# Patient Record
Sex: FEMALE | Race: WHITE | NOT HISPANIC OR LATINO | Employment: OTHER | URBAN - METROPOLITAN AREA
[De-identification: names, ages, dates, MRNs, and addresses within clinical notes are randomized per-mention and may not be internally consistent; named-entity substitution may affect disease eponyms.]

---

## 2017-02-26 ENCOUNTER — APPOINTMENT (OUTPATIENT)
Dept: RADIOLOGY | Facility: MEDICAL CENTER | Age: 47
DRG: 853 | End: 2017-02-26
Attending: EMERGENCY MEDICINE
Payer: MEDICARE

## 2017-02-26 ENCOUNTER — RESOLUTE PROFESSIONAL BILLING HOSPITAL PROF FEE (OUTPATIENT)
Dept: HOSPITALIST | Facility: MEDICAL CENTER | Age: 47
End: 2017-02-26
Payer: MEDICARE

## 2017-02-26 ENCOUNTER — HOSPITAL ENCOUNTER (INPATIENT)
Facility: MEDICAL CENTER | Age: 47
LOS: 25 days | DRG: 853 | End: 2017-03-23
Attending: EMERGENCY MEDICINE | Admitting: HOSPITALIST
Payer: MEDICARE

## 2017-02-26 DIAGNOSIS — A41.9 SEPSIS, DUE TO UNSPECIFIED ORGANISM: ICD-10-CM

## 2017-02-26 DIAGNOSIS — L03.119 CELLULITIS OF FOOT: ICD-10-CM

## 2017-02-26 DIAGNOSIS — L03.115 CELLULITIS OF RIGHT FOOT: ICD-10-CM

## 2017-02-26 PROBLEM — I12.9 CKD STAGE 4 SECONDARY TO HYPERTENSION (HCC): Status: ACTIVE | Noted: 2017-02-26

## 2017-02-26 PROBLEM — I10 HYPERTENSION: Status: ACTIVE | Noted: 2017-02-26

## 2017-02-26 PROBLEM — F03.90 DEMENTIA (HCC): Status: ACTIVE | Noted: 2017-02-26

## 2017-02-26 PROBLEM — I67.5 MOYA MOYA DISEASE: Status: ACTIVE | Noted: 2017-02-26

## 2017-02-26 PROBLEM — N18.4 CKD STAGE 4 SECONDARY TO HYPERTENSION (HCC): Status: ACTIVE | Noted: 2017-02-26

## 2017-02-26 LAB
ALBUMIN SERPL BCP-MCNC: 2.9 G/DL (ref 3.2–4.9)
ALBUMIN/GLOB SERPL: 0.8 G/DL
ALP SERPL-CCNC: 71 U/L (ref 30–99)
ALT SERPL-CCNC: 10 U/L (ref 2–50)
ANION GAP SERPL CALC-SCNC: 16 MMOL/L (ref 0–11.9)
AST SERPL-CCNC: 25 U/L (ref 12–45)
BASOPHILS # BLD AUTO: 0.2 % (ref 0–1.8)
BASOPHILS # BLD: 0.03 K/UL (ref 0–0.12)
BILIRUB SERPL-MCNC: 0.5 MG/DL (ref 0.1–1.5)
BUN SERPL-MCNC: 63 MG/DL (ref 8–22)
CALCIUM SERPL-MCNC: 8.6 MG/DL (ref 8.5–10.5)
CHLORIDE SERPL-SCNC: 105 MMOL/L (ref 96–112)
CO2 SERPL-SCNC: 15 MMOL/L (ref 20–33)
CREAT SERPL-MCNC: 4.61 MG/DL (ref 0.5–1.4)
EOSINOPHIL # BLD AUTO: 0.01 K/UL (ref 0–0.51)
EOSINOPHIL NFR BLD: 0.1 % (ref 0–6.9)
ERYTHROCYTE [DISTWIDTH] IN BLOOD BY AUTOMATED COUNT: 45.3 FL (ref 35.9–50)
GFR SERPL CREATININE-BSD FRML MDRD: 10 ML/MIN/1.73 M 2
GLOBULIN SER CALC-MCNC: 3.6 G/DL (ref 1.9–3.5)
GLUCOSE SERPL-MCNC: 102 MG/DL (ref 65–99)
HCT VFR BLD AUTO: 32.4 % (ref 37–47)
HGB BLD-MCNC: 10.4 G/DL (ref 12–16)
IMM GRANULOCYTES # BLD AUTO: 0.1 K/UL (ref 0–0.11)
IMM GRANULOCYTES NFR BLD AUTO: 0.6 % (ref 0–0.9)
LACTATE BLD-SCNC: 1.4 MMOL/L (ref 0.5–2)
LYMPHOCYTES # BLD AUTO: 1.29 K/UL (ref 1–4.8)
LYMPHOCYTES NFR BLD: 8.2 % (ref 22–41)
MCH RBC QN AUTO: 31.5 PG (ref 27–33)
MCHC RBC AUTO-ENTMCNC: 32.1 G/DL (ref 33.6–35)
MCV RBC AUTO: 98.2 FL (ref 81.4–97.8)
MONOCYTES # BLD AUTO: 2.03 K/UL (ref 0–0.85)
MONOCYTES NFR BLD AUTO: 12.9 % (ref 0–13.4)
NEUTROPHILS # BLD AUTO: 12.24 K/UL (ref 2–7.15)
NEUTROPHILS NFR BLD: 78 % (ref 44–72)
NRBC # BLD AUTO: 0 K/UL
NRBC BLD AUTO-RTO: 0 /100 WBC
PLATELET # BLD AUTO: 139 K/UL (ref 164–446)
PMV BLD AUTO: 11.5 FL (ref 9–12.9)
POTASSIUM SERPL-SCNC: 4.1 MMOL/L (ref 3.6–5.5)
PROT SERPL-MCNC: 6.5 G/DL (ref 6–8.2)
RBC # BLD AUTO: 3.3 M/UL (ref 4.2–5.4)
SODIUM SERPL-SCNC: 136 MMOL/L (ref 135–145)
WBC # BLD AUTO: 15.7 K/UL (ref 4.8–10.8)

## 2017-02-26 PROCEDURE — 99285 EMERGENCY DEPT VISIT HI MDM: CPT

## 2017-02-26 PROCEDURE — 87070 CULTURE OTHR SPECIMN AEROBIC: CPT

## 2017-02-26 PROCEDURE — 87077 CULTURE AEROBIC IDENTIFY: CPT

## 2017-02-26 PROCEDURE — 99223 1ST HOSP IP/OBS HIGH 75: CPT | Mod: AI | Performed by: HOSPITALIST

## 2017-02-26 PROCEDURE — 87186 SC STD MICRODIL/AGAR DIL: CPT

## 2017-02-26 PROCEDURE — 83605 ASSAY OF LACTIC ACID: CPT

## 2017-02-26 PROCEDURE — 700105 HCHG RX REV CODE 258: Performed by: HOSPITALIST

## 2017-02-26 PROCEDURE — 87205 SMEAR GRAM STAIN: CPT

## 2017-02-26 PROCEDURE — 3E0234Z INTRODUCTION OF SERUM, TOXOID AND VACCINE INTO MUSCLE, PERCUTANEOUS APPROACH: ICD-10-PCS | Performed by: EMERGENCY MEDICINE

## 2017-02-26 PROCEDURE — 85025 COMPLETE CBC W/AUTO DIFF WBC: CPT

## 2017-02-26 PROCEDURE — 770020 HCHG ROOM/CARE - TELE (206)

## 2017-02-26 PROCEDURE — 80053 COMPREHEN METABOLIC PANEL: CPT

## 2017-02-26 PROCEDURE — 700111 HCHG RX REV CODE 636 W/ 250 OVERRIDE (IP): Performed by: HOSPITALIST

## 2017-02-26 PROCEDURE — 96365 THER/PROPH/DIAG IV INF INIT: CPT

## 2017-02-26 PROCEDURE — 87040 BLOOD CULTURE FOR BACTERIA: CPT

## 2017-02-26 PROCEDURE — 71010 DX-CHEST-PORTABLE (1 VIEW): CPT

## 2017-02-26 RX ORDER — ONDANSETRON 4 MG/1
4 TABLET, ORALLY DISINTEGRATING ORAL EVERY 4 HOURS PRN
Status: DISCONTINUED | OUTPATIENT
Start: 2017-02-26 | End: 2017-03-23 | Stop reason: HOSPADM

## 2017-02-26 RX ORDER — PAROXETINE 10 MG/1
30 TABLET, FILM COATED ORAL DAILY
Status: DISCONTINUED | OUTPATIENT
Start: 2017-02-27 | End: 2017-02-27 | Stop reason: SINTOL

## 2017-02-26 RX ORDER — ASPIRIN 325 MG
325 TABLET ORAL DAILY
Status: DISCONTINUED | OUTPATIENT
Start: 2017-02-27 | End: 2017-03-23 | Stop reason: HOSPADM

## 2017-02-26 RX ORDER — ONDANSETRON 2 MG/ML
4 INJECTION INTRAMUSCULAR; INTRAVENOUS EVERY 4 HOURS PRN
Status: DISCONTINUED | OUTPATIENT
Start: 2017-02-26 | End: 2017-03-23 | Stop reason: HOSPADM

## 2017-02-26 RX ORDER — LEVETIRACETAM 500 MG/1
500 TABLET ORAL 2 TIMES DAILY
Status: DISCONTINUED | OUTPATIENT
Start: 2017-02-26 | End: 2017-02-26

## 2017-02-26 RX ORDER — AMLODIPINE BESYLATE 5 MG/1
5 TABLET ORAL DAILY
Status: DISCONTINUED | OUTPATIENT
Start: 2017-02-27 | End: 2017-03-23 | Stop reason: HOSPADM

## 2017-02-26 RX ORDER — TRAMADOL HYDROCHLORIDE 50 MG/1
50 TABLET ORAL EVERY 12 HOURS PRN
Status: DISCONTINUED | OUTPATIENT
Start: 2017-02-26 | End: 2017-02-27 | Stop reason: SINTOL

## 2017-02-26 RX ORDER — LORAZEPAM 1 MG/1
1 TABLET ORAL 2 TIMES DAILY
Status: ON HOLD | COMMUNITY
End: 2017-03-21

## 2017-02-26 RX ORDER — SODIUM CHLORIDE 9 MG/ML
500 INJECTION, SOLUTION INTRAVENOUS
Status: DISCONTINUED | OUTPATIENT
Start: 2017-02-26 | End: 2017-03-23 | Stop reason: HOSPADM

## 2017-02-26 RX ORDER — SODIUM BICARBONATE 650 MG/1
650 TABLET ORAL 3 TIMES DAILY
Status: DISCONTINUED | OUTPATIENT
Start: 2017-02-26 | End: 2017-02-27

## 2017-02-26 RX ORDER — ATORVASTATIN CALCIUM 40 MG/1
40 TABLET, FILM COATED ORAL NIGHTLY
Status: DISCONTINUED | OUTPATIENT
Start: 2017-02-27 | End: 2017-03-23 | Stop reason: HOSPADM

## 2017-02-26 RX ORDER — TRAZODONE HYDROCHLORIDE 50 MG/1
50 TABLET ORAL NIGHTLY
Status: DISCONTINUED | OUTPATIENT
Start: 2017-02-26 | End: 2017-02-26

## 2017-02-26 RX ORDER — PHENYTOIN SODIUM 100 MG/1
200 CAPSULE, EXTENDED RELEASE ORAL 2 TIMES DAILY
Status: DISCONTINUED | OUTPATIENT
Start: 2017-02-26 | End: 2017-03-23 | Stop reason: HOSPADM

## 2017-02-26 RX ORDER — LYSINE HCL 500 MG
500 TABLET ORAL DAILY
Status: ON HOLD | COMMUNITY
End: 2017-03-21

## 2017-02-26 RX ORDER — PROMETHAZINE HYDROCHLORIDE 25 MG/1
12.5-25 SUPPOSITORY RECTAL EVERY 4 HOURS PRN
Status: DISCONTINUED | OUTPATIENT
Start: 2017-02-26 | End: 2017-03-23 | Stop reason: HOSPADM

## 2017-02-26 RX ORDER — ROSUVASTATIN CALCIUM 10 MG/1
10 TABLET, COATED ORAL EVERY EVENING
Status: ON HOLD | COMMUNITY
End: 2017-03-21

## 2017-02-26 RX ORDER — DONEPEZIL HYDROCHLORIDE 5 MG/1
5 TABLET, FILM COATED ORAL EVERY EVENING
Status: DISCONTINUED | OUTPATIENT
Start: 2017-02-26 | End: 2017-02-26

## 2017-02-26 RX ORDER — DOCUSATE SODIUM 100 MG/1
100 CAPSULE, LIQUID FILLED ORAL 2 TIMES DAILY
Status: DISCONTINUED | OUTPATIENT
Start: 2017-02-27 | End: 2017-03-23 | Stop reason: HOSPADM

## 2017-02-26 RX ORDER — CARVEDILOL 25 MG/1
25 TABLET ORAL 2 TIMES DAILY WITH MEALS
Status: DISCONTINUED | OUTPATIENT
Start: 2017-02-27 | End: 2017-03-23 | Stop reason: HOSPADM

## 2017-02-26 RX ORDER — ACETAMINOPHEN 325 MG/1
650 TABLET ORAL EVERY 6 HOURS PRN
Status: DISCONTINUED | OUTPATIENT
Start: 2017-02-26 | End: 2017-03-23 | Stop reason: HOSPADM

## 2017-02-26 RX ORDER — RISPERIDONE 1 MG/1
1 TABLET ORAL 2 TIMES DAILY
Status: DISCONTINUED | OUTPATIENT
Start: 2017-02-26 | End: 2017-02-26

## 2017-02-26 RX ORDER — CHOLECALCIFEROL (VITAMIN D3) 125 MCG
1000 CAPSULE ORAL DAILY
Status: DISCONTINUED | OUTPATIENT
Start: 2017-02-27 | End: 2017-03-23 | Stop reason: HOSPADM

## 2017-02-26 RX ORDER — SODIUM CHLORIDE 9 MG/ML
INJECTION, SOLUTION INTRAVENOUS CONTINUOUS
Status: DISCONTINUED | OUTPATIENT
Start: 2017-02-26 | End: 2017-02-27

## 2017-02-26 RX ORDER — PROMETHAZINE HYDROCHLORIDE 25 MG/1
12.5-25 TABLET ORAL EVERY 4 HOURS PRN
Status: DISCONTINUED | OUTPATIENT
Start: 2017-02-26 | End: 2017-03-23 | Stop reason: HOSPADM

## 2017-02-26 RX ORDER — LINEZOLID 2 MG/ML
600 INJECTION, SOLUTION INTRAVENOUS EVERY 12 HOURS
Status: DISCONTINUED | OUTPATIENT
Start: 2017-02-26 | End: 2017-02-27

## 2017-02-26 RX ORDER — BISACODYL 10 MG
10 SUPPOSITORY, RECTAL RECTAL
Status: DISCONTINUED | OUTPATIENT
Start: 2017-02-26 | End: 2017-03-23 | Stop reason: HOSPADM

## 2017-02-26 RX ORDER — ENEMA 19; 7 G/133ML; G/133ML
1 ENEMA RECTAL
Status: DISCONTINUED | OUTPATIENT
Start: 2017-02-26 | End: 2017-03-23 | Stop reason: HOSPADM

## 2017-02-26 RX ORDER — FOLIC ACID 1 MG/1
1 TABLET ORAL DAILY
Status: DISCONTINUED | OUTPATIENT
Start: 2017-02-27 | End: 2017-03-23 | Stop reason: HOSPADM

## 2017-02-26 RX ORDER — AMLODIPINE BESYLATE 5 MG/1
5 TABLET ORAL DAILY
Status: ON HOLD | COMMUNITY
End: 2017-03-28

## 2017-02-26 RX ADMIN — SODIUM CHLORIDE: 9 INJECTION, SOLUTION INTRAVENOUS at 23:55

## 2017-02-26 RX ADMIN — CEFTRIAXONE 2 G: 2 INJECTION, POWDER, FOR SOLUTION INTRAMUSCULAR; INTRAVENOUS at 23:55

## 2017-02-26 ASSESSMENT — PAIN SCALES - GENERAL: PAINLEVEL_OUTOF10: 5

## 2017-02-26 ASSESSMENT — COPD QUESTIONNAIRES
HAVE YOU SMOKED AT LEAST 100 CIGARETTES IN YOUR ENTIRE LIFE: YES
DURING THE PAST 4 WEEKS HOW MUCH DID YOU FEEL SHORT OF BREATH: NONE/LITTLE OF THE TIME
DO YOU EVER COUGH UP ANY MUCUS OR PHLEGM?: NO/ONLY WITH OCCASIONAL COLDS OR INFECTIONS

## 2017-02-26 ASSESSMENT — LIFESTYLE VARIABLES: EVER_SMOKED: YES

## 2017-02-26 NOTE — IP AVS SNAPSHOT
" Home Care Instructions                                                                                                                  Name:Pascale Acevedo  Medical Record Number:[id number]  CSN: [id number]    YOB: 1970   Age: 46 y.o.  Sex: female  HT:1.753 m (5' 9\") WT: 119.9 kg (264 lb 5.3 oz)          Admit Date: 2/26/2017     Discharge Date:   Today's Date: 3/23/2017  Attending Doctor:  Nabeel Monahan M.D.                  Allergies:  Allegra; Amoxicillin; Azithromycin; Claritin; Erythromycin; Ibuprofen & caffeine-vitamins; Penicillins; Procardia; Rosemary oil; and Zyrtec            Discharge Instructions       Discharge Instructions    Discharged to other by medical transportation with escort. Discharged via wheelchair, hospital escort: Yes.  Special equipment needed: Wheelchair    Be sure to schedule a follow-up appointment with your primary care doctor or any specialists as instructed.     Discharge Plan:   Diet Plan: Discussed  Activity Level: Discussed  Smoking Cessation Offered: Patient Refused  Confirmed Follow up Appointment: Patient to Call and Schedule Appointment  Confirmed Symptoms Management: Discussed  Medication Reconciliation Updated: Yes  Influenza Vaccine Indication: Not indicated: Previously immunized this influenza season and > 8 years of age    I understand that a diet low in cholesterol, fat, and sodium is recommended for good health. Unless I have been given specific instructions below for another diet, I accept this instruction as my diet prescription.   Other diet: Regular    Special Instructions: None    · Is patient discharged on Warfarin / Coumadin?   No     · Is patient Post Blood Transfusion?  No    Depression / Suicide Risk    As you are discharged from this [facility] facility, it is important to learn how to keep safe from harming yourself.    Recognize the warning signs:  · Abrupt changes in personality, positive or negative- including increase in energy "   · Giving away possessions  · Change in eating patterns- significant weight changes-  positive or negative  · Change in sleeping patterns- unable to sleep or sleeping all the time   · Unwillingness or inability to communicate  · Depression  · Unusual sadness, discouragement and loneliness  · Talk of wanting to die  · Neglect of personal appearance   · Rebelliousness- reckless behavior  · Withdrawal from people/activities they love  · Confusion- inability to concentrate     If you or a loved one observes any of these behaviors or has concerns about self-harm, here's what you can do:  · Talk about it- your feelings and reasons for harming yourself  · Remove any means that you might use to hurt yourself (examples: pills, rope, extension cords, firearm)  · Get professional help from the community (Mental Health, Substance Abuse, psychological counseling)  · Do not be alone:Call your Safe Contact- someone whom you trust who will be there for you.  · Call your local CRISIS HOTLINE 922-4607 or 147-072-1964  · Call your local Children's Mobile Crisis Response Team [place] (469) 190-4746 or wwwKoru  · Call the toll free National Suicide Prevention Hotlines   · National Suicide Prevention Lifeline 599-609-KRYZ (7194)  · National Hope Line Network 800-SUICIDE (029-9874)  Cellulitis  Cellulitis is an infection of the skin and the tissue beneath it. The infected area is usually red and tender. Cellulitis occurs most often in the arms and lower legs.   CAUSES   Cellulitis is caused by bacteria that enter the skin through cracks or cuts in the skin. The most common types of bacteria that cause cellulitis are staphylococci and streptococci.  SIGNS AND SYMPTOMS   · Redness and warmth.  · Swelling.  · Tenderness or pain.  · Fever.  DIAGNOSIS   Your health care provider can usually determine what is wrong based on a physical exam. Blood tests may also be done.  TREATMENT   Treatment usually involves taking an  antibiotic medicine.  HOME CARE INSTRUCTIONS   · Take your antibiotic medicine as directed by your health care provider. Finish the antibiotic even if you start to feel better.  · Keep the infected arm or leg elevated to reduce swelling.  · Apply a warm cloth to the affected area up to 4 times per day to relieve pain.  · Take medicines only as directed by your health care provider.  · Keep all follow-up visits as directed by your health care provider.  SEEK MEDICAL CARE IF:   · You notice red streaks coming from the infected area.  · Your red area gets larger or turns dark in color.  · Your bone or joint underneath the infected area becomes painful after the skin has healed.  · Your infection returns in the same area or another area.  · You notice a swollen bump in the infected area.  · You develop new symptoms.  · You have a fever.  SEEK IMMEDIATE MEDICAL CARE IF:   · You feel very sleepy.  · You develop vomiting or diarrhea.  · You have a general ill feeling (malaise) with muscle aches and pains.  MAKE SURE YOU:   · Understand these instructions.  · Will watch your condition.  · Will get help right away if you are not doing well or get worse.     This information is not intended to replace advice given to you by your health care provider. Make sure you discuss any questions you have with your health care provider.     Document Released: 09/27/2006 Document Revised: 01/08/2016 Document Reviewed: 03/04/2013  [employer] Interactive Patient Education ©2016 [employer]    Wound Care  Taking care of your wound properly can help to prevent pain and infection. It can also help your wound to heal more quickly.   HOW TO CARE FOR YOUR WOUND   · Take or apply over-the-counter and prescription medicines only as told by your health care provider.  · If you were prescribed antibiotic medicine, take or apply it as told by your health care provider. Do not stop using the antibiotic even if your condition improves.  · Clean the  wound each day or as told by your health care provider.  ¨ Wash the wound with mild soap and water.  ¨ Rinse the wound with water to remove all soap.  ¨ Pat the wound dry with a clean towel. Do not rub it.  · There are many different ways to close and cover a wound. For example, a wound can be covered with stitches (sutures), skin glue, or adhesive strips. Follow instructions from your health care provider about:  ¨ How to take care of your wound.  ¨ When and how you should change your bandage (dressing).  ¨ When you should remove your dressing.  ¨ Removing whatever was used to close your wound.  · Check your wound every day for signs of infection. Watch for:  ¨ Redness, swelling, or pain.  ¨ Fluid, blood, or pus.  · Keep the dressing dry until your health care provider says it can be removed. Do not take baths, swim, use a hot tub, or do anything that would put your wound underwater until your health care provider approves.  · Raise (elevate) the injured area above the level of your heart while you are sitting or lying down.  · Do not scratch or pick at the wound.  · Keep all follow-up visits as told by your health care provider. This is important.  SEEK MEDICAL CARE IF:  · You received a tetanus shot and you have swelling, severe pain, redness, or bleeding at the injection site.  · You have a fever.  · Your pain is not controlled with medicine.  · You have increased redness, swelling, or pain at the site of your wound.  · You have fluid, blood, or pus coming from your wound.  · You notice a bad smell coming from your wound or your dressing.  SEEK IMMEDIATE MEDICAL CARE IF:  · You have a red streak going away from your wound.     This information is not intended to replace advice given to you by your health care provider. Make sure you discuss any questions you have with your health care provider.     Document Released: 09/26/2009 Document Revised: 05/03/2016 Document Reviewed: 12/14/2015  [employer]  Patient Education ©2016 [email or web address]      Follow-up Information     1. Follow up with SILKE Hayes. Schedule an appointment as soon as possible for a visit in 2 weeks.    Specialty:  Family Medicine    Why:  Follow up appointment    Contact information    [address]  [place] [address]  [phone]          2. Follow up with Gerardo Lynn M.D. In 2 weeks.    Specialty:  Orthopaedics    Contact information    [address]  [place] [address]  [phone]          3. Follow up with Ivy Gutierrez M.D. In 2 weeks.    Specialty:  Infectious Disease    Contact information    [address]  [address]  [place] [address] [phone]          4. [facility] .    Specialty:  Skilled Nursing Facility    Contact information    [address] [place] [address] [phone]         Discharge Medication Instructions:    Below are the medications your physician expects you to take upon discharge:    Review all your home medications and newly ordered medications with your doctor and/or pharmacist. Follow medication instructions as directed by your doctor and/or pharmacist.    Please keep your medication list with you and share with your physician.               Medication List      START taking these medications        Instructions    lorazepam 1 MG Tabs   Last time this was given:  1 mg on 3/23/2017 11:41 AM   Commonly known as:  ATIVAN    Take 1 Tab by mouth 3 times a day.   Dose:  1 mg       NS SOLN 50 mL with cefUROXime 7.5 GM SOLR 750 mg   Last time this was given:  750 mg on 3/23/2017  5:11 AM    750 mg by Intravenous route every 8 hours.   Dose:  750 mg       oxcarbazepine 150 MG Tabs   Last time this was given:  150 mg on 3/23/2017  8:27 AM   Commonly known as:  TRILEPTAL    Take 1 Tab by mouth 2 Times a Day.   Dose:  150 mg       sodium bicarbonate 650 MG Tabs   Last time this was given:  1,300 mg on 3/23/2017  8:18 AM   Commonly known as:  SODIUM  BICARBONATE    Take 2 Tabs by mouth 3 times a day.   Dose:  1300 mg         CHANGE how you take these medications        Instructions    atorvastatin 40 MG Tabs   What changed:  when to take this   Last time this was given:  40 mg on 3/22/2017  8:11 PM   Commonly known as:  LIPITOR    Take 1 Tab by mouth every day.   Dose:  40 mg         CONTINUE taking these medications        Instructions    amlodipine 5 MG Tabs   Last time this was given:  5 mg on 3/23/2017  8:21 AM   Commonly known as:  NORVASC    Take 5 mg by mouth every day.   Dose:  5 mg       aspirin 325 MG Tabs   Last time this was given:  325 mg on 3/23/2017  8:21 AM   Commonly known as:  ASA    Take 325 mg by mouth every day.   Dose:  325 mg       carvedilol 25 MG Tabs   Last time this was given:  25 mg on 3/23/2017  8:21 AM   Commonly known as:  COREG    Take 25 mg by mouth 2 times a day, with meals.   Dose:  25 mg       cyanocobalamin 1000 MCG Tabs   Last time this was given:  1,000 mcg on 3/23/2017  8:21 AM   Commonly known as:  VITAMIN B12    Take 1 Tab by mouth every day.   Dose:  1000 mcg       folic acid 1 MG Tabs   Last time this was given:  1 mg on 3/23/2017  8:19 AM   Commonly known as:  FOLVITE    Take 1 Tab by mouth every day.   Dose:  1 mg       lisinopril 20 MG Tabs   Commonly known as:  PRINIVIL    Take 20 mg by mouth every day.   Dose:  20 mg       NEXIUM 20 MG capsule   Generic drug:  esomeprazole    Take 20 mg by mouth every morning before breakfast.   Dose:  20 mg       phenytoin  MG Caps   Last time this was given:  200 mg on 3/23/2017  8:20 AM   Commonly known as:  DILANTIN    Take 200 mg by mouth 2 times a day.   Dose:  200 mg               Instructions           Diet / Nutrition:    Follow any diet instructions given to you by your doctor or the dietician, including how much salt (sodium) you are allowed each day.    If you are overweight, talk to your doctor about a weight reduction plan.    Activity:    Remain physically  active following your doctor's instructions about exercise and activity.    Rest often.     Any time you become even a little tired or short of breath, SIT DOWN and rest.    Worsening Symptoms:    Report any of the following signs and symptoms to the doctor's office immediately:    *Pain of jaw, arm, or neck  *Chest pain not relieved by medication                               *Dizziness or loss of consciousness  *Difficulty breathing even when at rest   *More tired than usual                                       *Bleeding drainage or swelling of surgical site  *Swelling of feet, ankles, legs or stomach                 *Fever (>100ºF)  *Pink or blood tinged sputum  *Weight gain (3lbs/day or 5lbs /week)           *Shock from internal defibrillator (if applicable)  *Palpitations or irregular heartbeats                *Cool and/or numb extremities    Stroke Awareness    Common Risk Factors for Stroke include:    Age  Atrial Fibrillation  Carotid Artery Stenosis  Diabetes Mellitus  Excessive alcohol consumption  High blood pressure  Overweight   Physical inactivity  Smoking    Warning signs and symptoms of a stroke include:    *Sudden numbness or weakness of the face, arm or leg (especially on one side of the body).  *Sudden confusion, trouble speaking or understanding.  *Sudden trouble seeing in one or both eyes.  *Sudden trouble walking, dizziness, loss of balance or coordination.Sudden severe headache with no known cause.    It is very important to get treatment quickly when a stroke occurs. If you experience any of the above warning signs, call 911 immediately.                   Disclaimer         Quit Smoking / Tobacco Use:    I understand the use of any tobacco products increases my chance of suffering from future heart disease or stroke and could cause other illnesses which may shorten my life. Quitting the use of tobacco products is the single most important thing I can do to improve my health. For further  information on smoking / tobacco cessation call a Toll Free Quit Line at 1-985.835.2416 (*National Cancer [place]) or 1-433.510.6518 (American Lung Association) or you can access the web based program at www.lungusa.org.    [place] Tobacco Users Help Line:  (627) 740-8197       Toll Free: 1-547.495.8617  Quit Tobacco Program [facility] Management Services (648)640-5776    Crisis Hotline:    [place] Crisis Hotline:  1-402-RIXSRNO or 1-585.936.7801    [place] Crisis Hotline:    1-406.412.6815 or 051-104-5764    Discharge Survey:   Thank you for choosing [facility]. We hope we did everything we could to make your hospital stay a pleasant one. You may be receiving a phone survey and we would appreciate your time and participation in answering the questions. Your input is very valuable to us in our efforts to improve our service to our patients and their families.        My signature on this form indicates that:    1. I have reviewed and understand the above information.  2. My questions regarding this information have been answered to my satisfaction.  3. I have formulated a plan with my discharge nurse to obtain my prescribed medications for home.                  Disclaimer         __________________________________                     __________       ________                       Patient Signature                                                 Date                    Time

## 2017-02-26 NOTE — IP AVS SNAPSHOT
dermSearch Access Code: Activation code not generated  Current dermSearch Status: Patient Declined    Ruth Kunstadter â€“ [employer]  A secure, online tool to manage your health information     [employer]’s dermSearch® is a secure, online tool that connects you to your personalized health information from the privacy of your home -- day or night - making it very easy for you to manage your healthcare. Once the activation process is completed, you can even access your medical information using the dermSearch tiny, which is available for free in the Apple Tiny store or Google Play store.     dermSearch provides the following levels of access (as shown below):   My Chart Features   [facility] Primary Care Doctor [facility]  Specialists [facility]  Urgent  Care Non-[facility]  Primary Care  Doctor   Email your healthcare team securely and privately 24/7 X X X X   Manage appointments: schedule your next appointment; view details of past/upcoming appointments X      Request prescription refills. X      View recent personal medical records, including lab and immunizations X X X X   View health record, including health history, allergies, medications X X X X   Read reports about your outpatient visits, procedures, consult and ER notes X X X X   See your discharge summary, which is a recap of your hospital and/or ER visit that includes your diagnosis, lab results, and care plan. X X       How to register for dermSearch:  1. Go to  [email or web address].  2. Click on the Sign Up Now box, which takes you to the New Member Sign Up page. You will need to provide the following information:  a. Enter your dermSearch Access Code exactly as it appears at the top of this page. (You will not need to use this code after you’ve completed the sign-up process. If you do not sign up before the expiration date, you must request a new code.)   b. Enter your date of birth.   c. Enter your home email address.   d. Click Submit, and follow the next screen’s instructions.  3. Create a dermSearch ID.  This will be your [employer] login ID and cannot be changed, so think of one that is secure and easy to remember.  4. Create a [employer] password. You can change your password at any time.  5. Enter your Password Reset Question and Answer. This can be used at a later time if you forget your password.   6. Enter your e-mail address. This allows you to receive e-mail notifications when new information is available in [employer].  7. Click Sign Up. You can now view your health information.    For assistance activating your [employer] account, call [phone]

## 2017-02-26 NOTE — IP AVS SNAPSHOT
3/23/2017          Pascale Acevedo  [address] [place] [address]    Dear Pascale:    [facility] wants to ensure your discharge home is safe and you or your loved ones have had all your questions answered regarding your care after you leave the hospital.    You may receive a telephone call within two days of your discharge.  This call is to make certain you understand your discharge instructions as well as ensure we provided you with the best care possible during your stay with us.     The call will only last approximately 3-5 minutes and will be done by a nurse.    Once again, we want to ensure your discharge home is safe and that you have a clear understanding of any next steps in your care.  If you have any questions or concerns, please do not hesitate to contact us, we are here for you.  Thank you for choosing [facility] for your healthcare needs.    Sincerely,    Preston Crane    [facility]

## 2017-02-26 NOTE — IP AVS SNAPSHOT
" <p align=\"LEFT\"><IMG SRC=\"//[email or web address]/blob$/[email or web address]\" alt=\"Image\" WIDTH=\"50%\" HEIGHT=\"200\" BORDER=\"\"></p>                   Name:Pascale Acevedo  Medical Record Number:[id number]  CSN: [id number]    YOB: 1970   Age: 46 y.o.  Sex: female  HT:1.753 m (5' 9\") WT: 119.9 kg (264 lb 5.3 oz)          Admit Date: 2/26/2017     Discharge Date:   Today's Date: 3/23/2017  Attending Doctor:  Nabeel Monahan M.D.                  Allergies:  Allegra; Amoxicillin; Azithromycin; Claritin; Erythromycin; Ibuprofen & caffeine-vitamins; Penicillins; Procardia; Rosemary oil; and Zyrtec          Follow-up Information     1. Follow up with SILKE Hayes. Schedule an appointment as soon as possible for a visit in 2 weeks.    Specialty:  Family Medicine    Why:  Follow up appointment    Contact information    [address] [facility] [place] [facility] [address]  [phone]          2. Follow up with Gerardo Lynn M.D. In 2 weeks.    Specialty:  Orthopaedics    Contact information    [address] [facility] [facility] [address]  [phone]          3. Follow up with Ivy Gutierrez M.D. In 2 weeks.    Specialty:  Infectious Disease    Contact information    [address] [address] [phone]          4. Follow up with [facility] ([facility]) .    Specialty:  Skilled Nursing Facility    Contact information    [facility] [place] [address] [phone]         Medication List      Take these Medications        Instructions    amlodipine 5 MG Tabs   Commonly known as:  NORVASC    Take 5 mg by mouth every day.   Dose:  5 mg       aspirin 325 MG Tabs   Commonly known as:  ASA    Take 325 mg by mouth every day.   Dose:  325 mg       atorvastatin 40 MG Tabs   What changed:  when to take this   Commonly known as:  LIPITOR    Take 1 Tab by mouth every day.   Dose:  40 mg       carvedilol 25 MG Tabs   Commonly known as:  COREG    Take 25 mg by mouth 2 times a day, with meals.   Dose:  25 mg   "    cyanocobalamin 1000 MCG Tabs   Commonly known as:  VITAMIN B12    Take 1 Tab by mouth every day.   Dose:  1000 mcg       folic acid 1 MG Tabs   Commonly known as:  FOLVITE    Take 1 Tab by mouth every day.   Dose:  1 mg       lisinopril 20 MG Tabs   Commonly known as:  PRINIVIL    Take 20 mg by mouth every day.   Dose:  20 mg       lorazepam 1 MG Tabs   Commonly known as:  ATIVAN    Take 1 Tab by mouth 3 times a day.   Dose:  1 mg       NEXIUM 20 MG capsule   Generic drug:  esomeprazole    Take 20 mg by mouth every morning before breakfast.   Dose:  20 mg       NS SOLN 50 mL with cefUROXime 7.5 GM SOLR 750 mg    750 mg by Intravenous route every 8 hours.   Dose:  750 mg       oxcarbazepine 150 MG Tabs   Commonly known as:  TRILEPTAL    Take 1 Tab by mouth 2 Times a Day.   Dose:  150 mg       phenytoin  MG Caps   Commonly known as:  DILANTIN    Take 200 mg by mouth 2 times a day.   Dose:  200 mg       sodium bicarbonate 650 MG Tabs   Commonly known as:  SODIUM BICARBONATE    Take 2 Tabs by mouth 3 times a day.   Dose:  1300 mg

## 2017-02-27 ENCOUNTER — APPOINTMENT (OUTPATIENT)
Dept: RADIOLOGY | Facility: MEDICAL CENTER | Age: 47
DRG: 853 | End: 2017-02-27
Attending: HOSPITALIST
Payer: MEDICARE

## 2017-02-27 ENCOUNTER — HOSPITAL ENCOUNTER (OUTPATIENT)
Dept: RADIOLOGY | Facility: MEDICAL CENTER | Age: 47
End: 2017-02-27

## 2017-02-27 PROBLEM — R65.20 SEVERE SEPSIS (HCC): Status: ACTIVE | Noted: 2017-02-27

## 2017-02-27 PROBLEM — E87.20 METABOLIC ACIDOSIS: Status: ACTIVE | Noted: 2017-02-27

## 2017-02-27 PROBLEM — D64.9 ANEMIA: Status: ACTIVE | Noted: 2017-02-27

## 2017-02-27 PROBLEM — N18.9 ACUTE ON CHRONIC RENAL FAILURE (HCC): Status: ACTIVE | Noted: 2017-02-27

## 2017-02-27 PROBLEM — N17.9 ACUTE ON CHRONIC RENAL FAILURE (HCC): Status: ACTIVE | Noted: 2017-02-27

## 2017-02-27 PROBLEM — A41.9 SEVERE SEPSIS (HCC): Status: ACTIVE | Noted: 2017-02-27

## 2017-02-27 PROBLEM — D69.6 THROMBOCYTOPENIA (HCC): Status: ACTIVE | Noted: 2017-02-27

## 2017-02-27 LAB
ANION GAP SERPL CALC-SCNC: 17 MMOL/L (ref 0–11.9)
B-HCG SERPL-ACNC: <0.6 MIU/ML (ref 0–5)
BNP SERPL-MCNC: 562 PG/ML (ref 0–100)
BUN SERPL-MCNC: 64 MG/DL (ref 8–22)
C DIFF DNA SPEC QL NAA+PROBE: NEGATIVE
C DIFF TOX GENS STL QL NAA+PROBE: NEGATIVE
CALCIUM SERPL-MCNC: 8.6 MG/DL (ref 8.5–10.5)
CHLORIDE SERPL-SCNC: 108 MMOL/L (ref 96–112)
CO2 SERPL-SCNC: 16 MMOL/L (ref 20–33)
CREAT SERPL-MCNC: 4.91 MG/DL (ref 0.5–1.4)
ERYTHROCYTE [SEDIMENTATION RATE] IN BLOOD BY WESTERGREN METHOD: >120 MM/HOUR (ref 0–20)
GFR SERPL CREATININE-BSD FRML MDRD: 10 ML/MIN/1.73 M 2
GLUCOSE SERPL-MCNC: 96 MG/DL (ref 65–99)
GRAM STN SPEC: NORMAL
LACTATE BLD-SCNC: 1.1 MMOL/L (ref 0.5–2)
LV EJECT FRACT  99904: 65
LV EJECT FRACT MOD 2C 99903: 85.68
LV EJECT FRACT MOD 4C 99902: 66.44
LV EJECT FRACT MOD BP 99901: 72.68
MAGNESIUM SERPL-MCNC: 1.8 MG/DL (ref 1.5–2.5)
POTASSIUM SERPL-SCNC: 3.9 MMOL/L (ref 3.6–5.5)
SIGNIFICANT IND 70042: NORMAL
SITE SITE: NORMAL
SODIUM SERPL-SCNC: 141 MMOL/L (ref 135–145)
SOURCE SOURCE: NORMAL
TSH SERPL DL<=0.005 MIU/L-ACNC: 1.08 UIU/ML (ref 0.3–3.7)

## 2017-02-27 PROCEDURE — 500881 HCHG PACK, EXTREMITY: Performed by: ORTHOPAEDIC SURGERY

## 2017-02-27 PROCEDURE — 770020 HCHG ROOM/CARE - TELE (206)

## 2017-02-27 PROCEDURE — 700111 HCHG RX REV CODE 636 W/ 250 OVERRIDE (IP): Performed by: HOSPITALIST

## 2017-02-27 PROCEDURE — 501838 HCHG SUTURE GENERAL: Performed by: ORTHOPAEDIC SURGERY

## 2017-02-27 PROCEDURE — 110371 HCHG SHELL REV 272: Performed by: ORTHOPAEDIC SURGERY

## 2017-02-27 PROCEDURE — 700102 HCHG RX REV CODE 250 W/ 637 OVERRIDE(OP): Performed by: INTERNAL MEDICINE

## 2017-02-27 PROCEDURE — 93306 TTE W/DOPPLER COMPLETE: CPT | Mod: 26 | Performed by: INTERNAL MEDICINE

## 2017-02-27 PROCEDURE — 110382 HCHG SHELL REV 271: Performed by: ORTHOPAEDIC SURGERY

## 2017-02-27 PROCEDURE — 87070 CULTURE OTHR SPECIMN AEROBIC: CPT

## 2017-02-27 PROCEDURE — 36415 COLL VENOUS BLD VENIPUNCTURE: CPT

## 2017-02-27 PROCEDURE — A9270 NON-COVERED ITEM OR SERVICE: HCPCS | Performed by: INTERNAL MEDICINE

## 2017-02-27 PROCEDURE — 84702 CHORIONIC GONADOTROPIN TEST: CPT

## 2017-02-27 PROCEDURE — 700102 HCHG RX REV CODE 250 W/ 637 OVERRIDE(OP): Performed by: HOSPITALIST

## 2017-02-27 PROCEDURE — 80048 BASIC METABOLIC PNL TOTAL CA: CPT

## 2017-02-27 PROCEDURE — 85652 RBC SED RATE AUTOMATED: CPT

## 2017-02-27 PROCEDURE — 87205 SMEAR GRAM STAIN: CPT

## 2017-02-27 PROCEDURE — A4606 OXYGEN PROBE USED W OXIMETER: HCPCS | Performed by: ORTHOPAEDIC SURGERY

## 2017-02-27 PROCEDURE — 160048 HCHG OR STATISTICAL LEVEL 1-5: Performed by: ORTHOPAEDIC SURGERY

## 2017-02-27 PROCEDURE — 0KBV0ZZ EXCISION OF RIGHT FOOT MUSCLE, OPEN APPROACH: ICD-10-PCS | Performed by: ORTHOPAEDIC SURGERY

## 2017-02-27 PROCEDURE — 99233 SBSQ HOSP IP/OBS HIGH 50: CPT | Performed by: HOSPITALIST

## 2017-02-27 PROCEDURE — A9270 NON-COVERED ITEM OR SERVICE: HCPCS | Performed by: HOSPITALIST

## 2017-02-27 PROCEDURE — 160009 HCHG ANES TIME/MIN: Performed by: ORTHOPAEDIC SURGERY

## 2017-02-27 PROCEDURE — 700111 HCHG RX REV CODE 636 W/ 250 OVERRIDE (IP)

## 2017-02-27 PROCEDURE — 160036 HCHG PACU - EA ADDL 30 MINS PHASE I: Performed by: ORTHOPAEDIC SURGERY

## 2017-02-27 PROCEDURE — 500088 HCHG BLADE, SAGITTAL: Performed by: ORTHOPAEDIC SURGERY

## 2017-02-27 PROCEDURE — 87077 CULTURE AEROBIC IDENTIFY: CPT

## 2017-02-27 PROCEDURE — 73718 MRI LOWER EXTREMITY W/O DYE: CPT | Mod: RT

## 2017-02-27 PROCEDURE — 73700 CT LOWER EXTREMITY W/O DYE: CPT | Mod: RT

## 2017-02-27 PROCEDURE — 83735 ASSAY OF MAGNESIUM: CPT

## 2017-02-27 PROCEDURE — 502240 HCHG MISC OR SUPPLY RC 0272: Performed by: ORTHOPAEDIC SURGERY

## 2017-02-27 PROCEDURE — 160002 HCHG RECOVERY MINUTES (STAT): Performed by: ORTHOPAEDIC SURGERY

## 2017-02-27 PROCEDURE — 87015 SPECIMEN INFECT AGNT CONCNTJ: CPT

## 2017-02-27 PROCEDURE — 84443 ASSAY THYROID STIM HORMONE: CPT

## 2017-02-27 PROCEDURE — 87075 CULTR BACTERIA EXCEPT BLOOD: CPT

## 2017-02-27 PROCEDURE — 160035 HCHG PACU - 1ST 60 MINS PHASE I: Performed by: ORTHOPAEDIC SURGERY

## 2017-02-27 PROCEDURE — 83605 ASSAY OF LACTIC ACID: CPT

## 2017-02-27 PROCEDURE — 93306 TTE W/DOPPLER COMPLETE: CPT

## 2017-02-27 PROCEDURE — 87493 C DIFF AMPLIFIED PROBE: CPT

## 2017-02-27 PROCEDURE — 160027 HCHG SURGERY MINUTES - 1ST 30 MINS LEVEL 2: Performed by: ORTHOPAEDIC SURGERY

## 2017-02-27 PROCEDURE — 83880 ASSAY OF NATRIURETIC PEPTIDE: CPT

## 2017-02-27 PROCEDURE — 0JDQ0ZZ EXTRACTION OF RIGHT FOOT SUBCUTANEOUS TISSUE AND FASCIA, OPEN APPROACH: ICD-10-PCS | Performed by: ORTHOPAEDIC SURGERY

## 2017-02-27 PROCEDURE — 0K9V0ZZ DRAINAGE OF RIGHT FOOT MUSCLE, OPEN APPROACH: ICD-10-PCS | Performed by: ORTHOPAEDIC SURGERY

## 2017-02-27 PROCEDURE — 700101 HCHG RX REV CODE 250

## 2017-02-27 PROCEDURE — 501480 HCHG STOCKINETTE: Performed by: ORTHOPAEDIC SURGERY

## 2017-02-27 RX ORDER — SODIUM BICARBONATE 650 MG/1
1300 TABLET ORAL 3 TIMES DAILY
Status: DISCONTINUED | OUTPATIENT
Start: 2017-02-27 | End: 2017-03-23 | Stop reason: HOSPADM

## 2017-02-27 RX ORDER — LORAZEPAM 1 MG/1
1 TABLET ORAL 3 TIMES DAILY
Status: DISCONTINUED | OUTPATIENT
Start: 2017-02-27 | End: 2017-03-23 | Stop reason: HOSPADM

## 2017-02-27 RX ORDER — LINEZOLID 600 MG/1
600 TABLET, FILM COATED ORAL EVERY 12 HOURS
Status: DISCONTINUED | OUTPATIENT
Start: 2017-02-27 | End: 2017-02-28

## 2017-02-27 RX ORDER — HEPARIN SODIUM 5000 [USP'U]/ML
5000 INJECTION, SOLUTION INTRAVENOUS; SUBCUTANEOUS EVERY 8 HOURS
Status: DISCONTINUED | OUTPATIENT
Start: 2017-02-27 | End: 2017-03-23 | Stop reason: HOSPADM

## 2017-02-27 RX ORDER — OXYCODONE HYDROCHLORIDE 5 MG/1
5 TABLET ORAL EVERY 6 HOURS PRN
Status: DISCONTINUED | OUTPATIENT
Start: 2017-02-27 | End: 2017-03-23 | Stop reason: HOSPADM

## 2017-02-27 RX ORDER — METRONIDAZOLE 500 MG/1
500 TABLET ORAL EVERY 8 HOURS
Status: DISCONTINUED | OUTPATIENT
Start: 2017-02-27 | End: 2017-03-01

## 2017-02-27 RX ORDER — LINEZOLID 2 MG/ML
600 INJECTION, SOLUTION INTRAVENOUS EVERY 12 HOURS
Status: DISCONTINUED | OUTPATIENT
Start: 2017-02-27 | End: 2017-02-27

## 2017-02-27 RX ADMIN — TRAMADOL HYDROCHLORIDE 50 MG: 50 TABLET, COATED ORAL at 20:34

## 2017-02-27 RX ADMIN — PHENYTOIN SODIUM 200 MG: 100 CAPSULE, EXTENDED RELEASE ORAL at 20:33

## 2017-02-27 RX ADMIN — TRAMADOL HYDROCHLORIDE 50 MG: 50 TABLET, COATED ORAL at 08:47

## 2017-02-27 RX ADMIN — SODIUM BICARBONATE TAB 650 MG 650 MG: 650 TAB at 01:53

## 2017-02-27 RX ADMIN — CARVEDILOL 25 MG: 25 TABLET, FILM COATED ORAL at 18:12

## 2017-02-27 RX ADMIN — SODIUM BICARBONATE TAB 650 MG 650 MG: 650 TAB at 06:44

## 2017-02-27 RX ADMIN — SODIUM BICARBONATE TAB 650 MG 650 MG: 650 TAB at 15:49

## 2017-02-27 RX ADMIN — METRONIDAZOLE 500 MG: 500 TABLET ORAL at 18:12

## 2017-02-27 RX ADMIN — AMLODIPINE BESYLATE 5 MG: 5 TABLET ORAL at 08:49

## 2017-02-27 RX ADMIN — LINEZOLID 600 MG: 2 INJECTION, SOLUTION INTRAVENOUS at 15:50

## 2017-02-27 RX ADMIN — PAROXETINE HYDROCHLORIDE 30 MG: 10 TABLET, FILM COATED ORAL at 08:48

## 2017-02-27 RX ADMIN — PHENYTOIN SODIUM 200 MG: 100 CAPSULE, EXTENDED RELEASE ORAL at 01:53

## 2017-02-27 RX ADMIN — SODIUM BICARBONATE 150 MEQ: 84 INJECTION, SOLUTION INTRAVENOUS at 10:21

## 2017-02-27 RX ADMIN — CYANOCOBALAMIN TAB 500 MCG 1000 MCG: 500 TAB at 08:47

## 2017-02-27 RX ADMIN — Medication 1300 MG: at 21:32

## 2017-02-27 RX ADMIN — OXYCODONE HYDROCHLORIDE 5 MG: 5 TABLET ORAL at 21:32

## 2017-02-27 RX ADMIN — LINEZOLID 600 MG: 600 INJECTION, SOLUTION INTRAVENOUS at 01:56

## 2017-02-27 RX ADMIN — LORAZEPAM 1 MG: 1 TABLET ORAL at 20:34

## 2017-02-27 RX ADMIN — CARVEDILOL 25 MG: 25 TABLET, FILM COATED ORAL at 08:49

## 2017-02-27 RX ADMIN — LINEZOLID 600 MG: 600 TABLET, FILM COATED ORAL at 21:32

## 2017-02-27 RX ADMIN — PHENYTOIN SODIUM 200 MG: 100 CAPSULE, EXTENDED RELEASE ORAL at 08:49

## 2017-02-27 RX ADMIN — FENTANYL CITRATE 25 MCG: 50 INJECTION, SOLUTION INTRAMUSCULAR; INTRAVENOUS at 13:30

## 2017-02-27 RX ADMIN — ASPIRIN 325 MG: 325 TABLET, FILM COATED ORAL at 08:48

## 2017-02-27 RX ADMIN — ATORVASTATIN CALCIUM 40 MG: 40 TABLET, FILM COATED ORAL at 20:33

## 2017-02-27 RX ADMIN — LORAZEPAM 1 MG: 1 TABLET ORAL at 11:39

## 2017-02-27 ASSESSMENT — COPD QUESTIONNAIRES
DO YOU EVER COUGH UP ANY MUCUS OR PHLEGM?: NO/ONLY WITH OCCASIONAL COLDS OR INFECTIONS
HAVE YOU SMOKED AT LEAST 100 CIGARETTES IN YOUR ENTIRE LIFE: YES
DURING THE PAST 4 WEEKS HOW MUCH DID YOU FEEL SHORT OF BREATH: NONE/LITTLE OF THE TIME

## 2017-02-27 ASSESSMENT — ENCOUNTER SYMPTOMS
COUGH: 0
DIZZINESS: 0
TINGLING: 0
BACK PAIN: 0
SORE THROAT: 0
CHILLS: 0
SHORTNESS OF BREATH: 0
DEPRESSION: 0
BLURRED VISION: 0
VOMITING: 0
ABDOMINAL PAIN: 0
EYE PAIN: 0
FEVER: 0
INSOMNIA: 0
PALPITATIONS: 0
NECK PAIN: 0
HEADACHES: 0
NAUSEA: 0

## 2017-02-27 ASSESSMENT — PAIN SCALES - GENERAL
PAINLEVEL_OUTOF10: 2
PAINLEVEL_OUTOF10: 2
PAINLEVEL_OUTOF10: 4
PAINLEVEL_OUTOF10: 7
PAINLEVEL_OUTOF10: 9
PAINLEVEL_OUTOF10: 0
PAINLEVEL_OUTOF10: 3
PAINLEVEL_OUTOF10: 9
PAINLEVEL_OUTOF10: 9
PAINLEVEL_OUTOF10: 6
PAINLEVEL_OUTOF10: 6

## 2017-02-27 ASSESSMENT — LIFESTYLE VARIABLES: DO YOU DRINK ALCOHOL: NO

## 2017-02-27 NOTE — ED PROVIDER NOTES
ED Provider Note    Scribed for Jordin Ivey M.D. by Esmer Jimenez. 2/26/2017,  10:29 PM.    CHIEF COMPLAINT  Chief Complaint   Patient presents with   • Wound Infection     pt has wound on right foot, went to [facility], report cellutliis with drainage on wound       HPI  Pascale Acevedo is a 46 y.o. female who presents to the Emergency Department brought in by ambulance as a transfer from [place] for a wound infection to the right foot, onset three days ago. There is drainage from the wound. The patient states that she stepped on rock and there were two pieces of rock imbedded in her foot. She endorses a fever and chills. The patient has a history of hypertension, kidney disease, stroke, moyamoya disease, CVA, and seizure disorder.    REVIEW OF SYSTEMS  See HPI for further details. All other systems are negative. C.    PAST MEDICAL HISTORY   has a past medical history of Hypertension; Kidney disease; Stroke (CMS-HCC); Moyamoya disease (10/3/2013); H/O: CVA (cerebrovascular accident) (12/24/2015); Mild mitral regurgitation (7/14/2014); Seizure disorder, grand mal (CMS-HCC) (3/3/2016); and CKD (chronic kidney disease), stage IV (CMS-HCC) (12/24/2015).    SOCIAL HISTORY  Social History     Social History Main Topics   • Smoking status: Former Smoker   • Smokeless tobacco: Never Used   • Alcohol Use: No   • Drug Use: No     History   Drug Use No       SURGICAL HISTORY   has past surgical history that includes primary c section and tubal coagulation laparoscopic bilateral.    CURRENT MEDICATIONS  No current facility-administered medications on file prior to encounter.     Current Outpatient Prescriptions on File Prior to Encounter   Medication Sig Dispense Refill   • vitamin D (CHOLECALCIFEROL) 1000 UNIT Tab Take 1,000 Units by mouth every day.     • phenytoin ER (DILANTIN) 100 MG Cap Take 200 mg by mouth 2 times a day.     • sodium bicarbonate (SODIUM BICARBONATE) 650 MG Tab Take 650 mg by mouth 3 times a day.   "   • Multiple Vitamins-Minerals (MULTIPLE VITAMINS/WOMENS PO) Take 1 Tab by mouth every day.     • aspirin (ASA) 325 MG Tab Take 325 mg by mouth every day.     • Melatonin 10 MG Tab Take 1 Tab by mouth every bedtime.     • lisinopril (PRINIVIL) 20 MG TABS Take 20 mg by mouth every day.     • carvedilol (COREG) 25 MG TABS Take 25 mg by mouth 2 times a day, with meals.     • paroxetine (PAXIL) 30 MG TABS Take 1 Tab by mouth every day. Take with food 30 Tab 11     ALLERGIES  Allergies   Allergen Reactions   • Allegra [Fexofenadine] Unspecified     Rxn = unknown   • Amoxicillin    • Azithromycin Unspecified     Rxn = unknown   • Claritin    • Erythromycin Unspecified     Rxn = unknown   • Ibuprofen & Caffeine-Vitamins Unspecified     Rxn = unknown   • Penicillins Unspecified     Rxn = unknown   • Procardia [Nifedipine]    • Rosemary Oil Anaphylaxis     Throat starts to close/swell.    • Zyrtec [Cetirizine] Unspecified     Rxn = unknown       PHYSICAL EXAM  VITAL SIGNS: /40 mmHg  Pulse 74  Temp(Src) 38.7 °C (101.7 °F)  Resp 18  Ht 1.753 m (5' 9\")  Wt 117.935 kg (260 lb)  BMI 38.38 kg/m2  Constitutional: Alert in no apparent distress.  HENT: No signs of trauma, Bilateral external ears normal, Nose normal.   Eyes: Pupils are equal and reactive, Conjunctiva normal, Non-icteric.   Neck: Normal range of motion, No tenderness, Supple, No stridor.   Lymphatic: No lymphadenopathy noted.   Cardiovascular: Regular rate and rhythm, no murmurs.   Thorax & Lungs: Normal breath sounds, No respiratory distress, No wheezing, No chest tenderness.   Abdomen: Bowel sounds normal, Soft, No tenderness, No masses, No pulsatile masses. No peritoneal signs.  Skin: Warm, Dry, No rash.   Back: No midline bony tenderness.  Extremities: hot erythematous swollen foot from ankle crease anteriorly to the toes with extensive seropurulent drainage from a wound under the pad of the third toe, extensive skin breakdown and purulent drainage " "underneath the third toe itself, Intact distal pulses, No edema, No cyanosis.  Musculoskeletal: Good range of motion in all major joints.   Neurologic: Alert , Normal motor function, Normal sensory function, No focal deficits noted.   Psychiatric: bizarre, child-like, giggly affect    DIAGNOSTIC STUDIES / PROCEDURES    LABS  Labs Reviewed   CBC WITH DIFFERENTIAL - Abnormal; Notable for the following:     WBC 15.7 (*)     RBC 3.30 (*)     Hemoglobin 10.4 (*)     Hematocrit 32.4 (*)     MCV 98.2 (*)     MCHC 32.1 (*)     Platelet Count 139 (*)     Neutrophils-Polys 78.00 (*)     Lymphocytes 8.20 (*)     Neutrophils (Absolute) 12.24 (*)     Monos (Absolute) 2.03 (*)     All other components within normal limits   COMP METABOLIC PANEL - Abnormal; Notable for the following:     Co2 15 (*)     Anion Gap 16.0 (*)     Glucose 102 (*)     Bun 63 (*)     Creatinine 4.61 (*)     Albumin 2.9 (*)     Globulin 3.6 (*)     All other components within normal limits   ESTIMATED GFR - Abnormal; Notable for the following:     GFR If  12 (*)     GFR If Non  10 (*)     All other components within normal limits   LACTIC ACID   BLOOD CULTURE    Narrative:     Per Hospital Policy: Only change Specimen Src: to \"Line\" if  specified by physician order.   BLOOD CULTURE    Narrative:     Per Hospital Policy: Only change Specimen Src: to \"Line\" if  specified by physician order.   CULTURE WOUND W/ GRAM STAIN   URINALYSIS   URINE CULTURE(NEW)   CULTURE RESPIRATORY W/ GRM STN   LACTIC ACID   LACTIC ACID   CBC WITH DIFFERENTIAL   COMP METABOLIC PANEL   MAGNESIUM   TSH   BTYPE NATRIURETIC PEPTIDE   [facility] POC GLUCOSE   [facility] POC GLUCOSE   [facility] POC GLUCOSE   [facility] POC GLUCOSE   [facility] POC GLUCOSE   [facility] POC GLUCOSE   [facility] POC GLUCOSE   [facility] POC GLUCOSE   [facility] POC GLUCOSE   All labs reviewed by me.    RADIOLOGY  CT-FOOT W/O PLUS RECONS RIGHT   Final Result      1.  Soft tissue swelling with " gas in the distal plantar aspect of the foot at the level of the MTP joints with draining wound to the plantar surface of the skin.      2.  Minimal gas in the extensor aspect of the foot at the level of the MTP joints which may represent extension of cellulitis anteriorly.      3.  No loculated fluid collection identified.      4.  Diffuse soft tissue swelling in the remainder of the foot.      5.  No acute bony abnormality. No evidence of osteomyelitis. No radiopaque soft tissue foreign body identified.      DX-CHEST-PORTABLE (1 VIEW)   Final Result      Minimal bibasilar interstitial edema or pneumonia. No lobar consolidation.      ECHOCARDIOGRAM COMP W/O CONT    (Results Pending)   The radiologist's interpretation of all radiological studies have been reviewed by me.    COURSE & MEDICAL DECISION MAKING  Nursing notes, VS, PMSFHx reviewed in chart.     Reviewed patient's medical records from transferring facility that showed the patient was afebrile at transferring facility. Labs showed WBC 16, BUN 58, creatinine of 4. X-ray showed soft tissue swelling but no osteomyelitis. She received a tetanus shot, 4.5g Zosyn at 7:00PM and 1g Vancomycin at 7:00PM.    10:29 PM Patient seen and examined at bedside. Differential diagnosis includes but is not limited to cellulitis, osteomyelitis, abscess. Ordered for DX chest, lactate, CBC with differential, CMP, urinalysis, urine culture, and blood culture to evaluate. The patient was informed that a CT of the foot will be ordered. It was discussed with the patient that she will be started on IV antibiotics and that she will need to be admitted to the hospital. She understood and verbalized agreement.     10:49 PM I spoke with Dr. Bender, hospitalist and informed him about the patient. He will admit. Patient care transferred.    10:54 PM I spoke with Dr. Castillo and informed him about the patient. He recommends ordering a CT with recons. This patient cannot receive contrast,  severe kidney dysfunction.    10:54 PM Ordered CT right foot plus recons. Have spoken with Dr. Giordano and the consulting orthopedist Dr. Estrada. This patient presents with sepsis, likely from a severe cellulitic foot infection with possible underlying osteomyelitis which will require further evaluation. She'll be admitted in guarded condition.    DISPOSITION:  Patient will be admitted to Dr. Bender in guarded condition.    FINAL IMPRESSION  1. Sepsis, due to unspecified organism (CMS-HCC)    2. Cellulitis of foot         Esmer THOMPSON (Fabrice), am scribing for, and in the presence of, Jordin Ivey M.D..    Electronically signed by: Esmer Jimenez (Fabrice), 2/26/2017    IJordin M.D. personally performed the services described in this documentation, as scribed by Esmer Jimenez in my presence, and it is both accurate and complete.    The note accurately reflects work and decisions made by me.  Jordin Ivey  2/27/2017  12:50 AM

## 2017-02-27 NOTE — PROGRESS NOTES
Hospital Medicine Progress Note, Adult, Complex               Author: Augustus Guerin Date & Time created: 2/27/2017  8:37 AM     Interval History:  Low grade temps over night. Bp borderline low but stable. Tolerating meds.     Review of Systems:  Review of Systems   Constitutional: Negative for fever and chills.   HENT: Negative for sore throat.    Eyes: Negative for blurred vision and pain.   Respiratory: Negative for cough and shortness of breath.    Cardiovascular: Negative for chest pain and palpitations.   Gastrointestinal: Negative for nausea, vomiting and abdominal pain.   Genitourinary: Negative for dysuria and urgency.   Musculoskeletal: Positive for joint pain. Negative for back pain and neck pain.   Skin: Negative for itching and rash.   Neurological: Negative for dizziness, tingling and headaches.   Psychiatric/Behavioral: Negative for depression. The patient does not have insomnia.    All other systems reviewed and are negative.      Physical Exam:  Physical Exam   Constitutional: She is oriented to person, place, and time. She appears well-developed and well-nourished. No distress.   HENT:   Right Ear: External ear normal.   Left Ear: External ear normal.   Nose: Nose normal.   Eyes: Conjunctivae are normal. Right eye exhibits no discharge. Left eye exhibits no discharge.   Neck: No JVD present.   Cardiovascular: Regular rhythm and normal heart sounds.    No murmur heard.  Pulmonary/Chest: Effort normal and breath sounds normal. No stridor. No respiratory distress. She has no wheezes. She has no rales.   Abdominal: Soft. Bowel sounds are normal. She exhibits no distension. There is no tenderness.   Musculoskeletal: She exhibits no edema or tenderness.   Left foot swelling with purulent areas cheri toes and on metatarsal head 3/4 there is a purulent discharge.    Neurological: She is alert and oriented to person, place, and time.   Skin: Skin is warm and dry. She is not diaphoretic. No erythema.    Psychiatric: She has a normal mood and affect. Her behavior is normal.   Nursing note and vitals reviewed.      Labs:        Invalid input(s): [id number]  Recent Labs      17   0506   BNPBTYPENAT  562*     Recent Labs      17   0506   SODIUM  136   --    POTASSIUM  4.1   --    CHLORIDE  105   --    CO2  15*   --    BUN  63*   --    CREATININE  4.61*   --    MAGNESIUM   --   1.8   CALCIUM  8.6   --      Recent Labs      17   ALTSGPT  10   ASTSGOT  25   ALKPHOSPHAT  71   TBILIRUBIN  0.5   GLUCOSE  102*     Recent Labs      17   RBC  3.30*   HEMOGLOBIN  10.4*   HEMATOCRIT  32.4*   PLATELETCT  139*     Recent Labs      17   WBC  15.7*   NEUTSPOLYS  78.00*   LYMPHOCYTES  8.20*   MONOCYTES  12.90   EOSINOPHILS  0.10   BASOPHILS  0.20   ASTSGOT  25   ALTSGPT  10   ALKPHOSPHAT  71   TBILIRUBIN  0.5           Hemodynamics:  Temp (24hrs), Av.1 °C (100.5 °F), Min:37.7 °C (99.9 °F), Max:38.7 °C (101.7 °F)  Temperature: 37.7 °C (99.9 °F)  Pulse  Av.3  Min: 70  Max: 74Heart Rate (Monitored): 70  Blood Pressure: 123/62 mmHg, NIBP: (!) 106/38 mmHg     Respiratory:    Respiration: 18, Pulse Oximetry: 91 %, O2 Daily Delivery Respiratory : Room Air with O2 Available     Work Of Breathing / Effort: Tachypnea  RUL Breath Sounds: Diminished, RML Breath Sounds: Diminished, RLL Breath Sounds: Diminished, BENNIE Breath Sounds: Diminished, LLL Breath Sounds: Diminished  Fluids:    Intake/Output Summary (Last 24 hours) at 17 0837  Last data filed at 17 0100   Gross per 24 hour   Intake    100 ml   Output      0 ml   Net    100 ml     Weight: 112.3 kg (247 lb 9.2 oz)  GI/Nutrition:  Orders Placed This Encounter   Procedures   • Diet NPO     Standing Status: Standing      Number of Occurrences: 1      Standing Expiration Date:      Order Specific Question:  Restrict to:     Answer:  Sips with Medications [3]     Medical Decision Making, by  Problem:  Active Hospital Problems    Diagnosis   • CKD (chronic kidney disease), stage IV (CMS-HCC) [N18.4]- trend creat   • HLD (hyperlipidemia) [E78.5]- statin   • Moyamoya disease [I67.5]- in past watch neuro exams.    • Anemia [D64.9]- trend check fe studies.    • Acute on chronic renal failure (CMS-HCC) [N17.9, N18.9]- iv fluids and trend.    • Metabolic acidosis [E87.2]- iv fluids. Add bicarb. Likely sepsis and renal failure related.    • Severe sepsis (CMS-HCC) [A41.9, R65.20]- 2/2 dm foot infection with associated renal failure. Iv fluids. Iv abx. I/d foot in ortho today. ID consult and mri foot. Correct lytes.    • Thrombocytopenia (CMS-HCC) [D69.6- suspect reactive 2/2 sepsis.    •    • Hypertension [I10]- benign   • Dementia [F03.90]- ativan- she was on this chronically tid. Will not change.    • Cellulitis of right foot [L03.115]- as above. Appears to be abscess   • CKD stage 4 secondary to hypertension (CMS-HCC) [I12.9, N18.4]   • Seizure disorder, grand mal (CMS-HCC) [G40.409]- cont meds   • Dilated cardiomyopathy (CMS-HCC) [I42.0]- watch with iv fluids needed.    • Essential hypertension, benign [I10]   Nephro??- kirk follows her. May need to consult them if worse tomorrow.   Discussed plan with RN  Guarded prognosis. High risk for complications.       EKG reviewed, Labs reviewed, Medications reviewed and Radiology images reviewed  Dey catheter: No Dey      DVT Prophylaxis: Heparin    Ulcer prophylaxis: Not indicated  Antibiotics: Treating active infection/contamination beyond 24 hours perioperative coverage  Assessed for rehab: Patient was assess for and/or received rehabilitation services during this hospitalization

## 2017-02-27 NOTE — PROGRESS NOTES
45 yo woman from [place]  Describes approximately 3 day history of swelling/pain/redness to foot  Has webspace abscess between 2nd and 3rd toes and plantar ulcer likely communicating  Will need I&D  Plan OR tomorrow  Discussed possibility that patient may require multiple toe amputations or BKA to eradicate infection  Wound swabbed (surya pus) and sent for culture  Admit medicine, IV antibiotics, NPO

## 2017-02-27 NOTE — PROGRESS NOTES
Pt arrived to unit via hospital bed at 0030. Pt oriented to room, unit, and plan of care. Tele-monitor placed and monitor room notified. All questions answered at this time. Call light within reach; fall precautions in place.

## 2017-02-27 NOTE — ED NOTES
Med rec updated and complete  Allergies reviewed.  Per transferring facility .  Pt is not sure when she last   Took her medications.  PTA pt received   vanco 1 gm  Zosyn 4.5 gm

## 2017-02-27 NOTE — PROGRESS NOTES
2 RN skin check completed with Becky CONTRERAS.  Red, blanching right elbow.  Red, blanching right heel.  Right foot, 2nd and 3rd toes w/ wound, weeping, red, swelling.  Wound on plantar aspect of MTPs of right foot, red, swelling.  Buttocks with IAD.  No other skin breakdown noted.

## 2017-02-27 NOTE — ED NOTES
BIB EMS from [facility]    Chief Complaint   Patient presents with   • Wound Infection     pt has wound on right foot, went to [facility], report cellutliis with drainage on wound     Poth gave pt vanco and 1 mg ativan     Pt has had foot wound for last 3 days.     Pt hx of dementia, Villavicencio villavicencio disease .     Pt on monitor, in gown, chart up for ERP

## 2017-02-27 NOTE — PROGRESS NOTES
Assumed pt care at 0700. Received bedside report from DIANE Bragg. AM assessment completed. AAOx4. Pt denies SOB; c/o pain of 6 on 0 to 10 pain scale (Will administer medication PRN - see MAR). Provided pt with RN and CNA extension numbers on white board and encouraged pt to call when needed. Discussed plan of care for the day, pt verbalizes understanding. VSS. Denies any additional needs at this time. Call light, belongings, and phone within reach. Hourly rounding in effect. Will continue to monitor.

## 2017-02-27 NOTE — H&P
CHIEF COMPLAINT:  Right foot infection times several days.    HISTORY OF PRESENT ILLNESS:  The patient is a very pleasant 46-year-old female   with past medical history of moyamoya with previous CVA in the past,   hyperlipidemia, hypertension, chronic kidney disease, essentially presented to   [facility] ER initially with right foot infection 3 days ago.  Given   the degree of drainage from the wound, patient was transferred here for higher   level of care.  Upon evaluation, the patient reported that she stepped on a   rock and the rock broke about a week ago and as a result was _____ in her   foot.  She started developing pain in her foot as well as fevers and chills.    Upon evaluation, patient appears to have significant exudative drainage from   her right foot.  Hence, as a result emergent CT of her foot was ordered which   shows evidence of soft tissue swelling with gas in the distal plantar aspect   of her foot.  In addition, they noted elevated leukocytosis, sepsis protocol   was started.  Dr. Nina, orthopedics was consulted for evaluation and for   most likely surgical intervention.    REVIEW OF SYSTEMS:  Please see HPI.  Otherwise, all other systems are negative   per AMA/CMS criteria.    ALLERGIES:  PATIENT IS ALLERGIC TO ADVIL, ALLEGRA, ZITHROMAX, CLARITIN,   ERYTHROMYCIN, PENICILLIN, PROCARDIA, AND ZYRTEC.    PAST MEDICAL HISTORY:  1.  Moyamoya disease.  2.  History of CVA in the past.  3.  Hyperlipidemia.  4.  Hypertension.  5.  Chronic kidney disease.    SOCIAL HISTORY:  Patient denies using any tobacco, alcohol or illicit drugs.    FAMILY HISTORY:  Reviewed and noncontributory.    PHYSICAL EXAMINATION:  VITAL SIGNS:  On admission, temperature 101.7, pulse 74, blood pressure   110/48, respirations 18, SpO2 of 93% on room air.  GENERAL:  Patient is well groomed, well nourished, no apparent distress and   nontoxic appearance.  HEENT:  Normocephalic, atraumatic.  Pupils are equal and reactive to  light,   nonicteric.  Mucous membranes are moist.  NECK:  Supple.  No thyromegaly, no JVD, no stridor.  CARDIOVASCULAR:  Normal rate and rhythm.  No gallops, rubs or murmurs   appreciated.  LUNGS:  Clear bilaterally.  No wheezes, rales or rhonchi.  ABDOMEN:  Soft, nontender and nondistended.  Positive bowel sounds.  No   hepatosplenomegaly or peritoneal signs noted.  SKIN:  Warm and dry, no erythema or rashes except for right plantar area,   which has exudative discharge.  EXTREMITIES:  Again, distal pulses intact, +2.  No clubbing or cyanosis.    There is significant swelling and erythema around her right ankle as well as   the pad of her third toe with extensive exudative discharge.  NEUROLOGIC:  Alert and oriented x3.  Cranial nerves II-XII grossly intact, no   focal deficits.  PSYCHIATRIC:  Judgment and mood normal.    LABORATORY DATA:  Her WBC count 15.7, hemoglobin 10.4, hematocrit 30.4,   platelet count 139.  CMP:  Sodium 136, potassium 4.1, chloride 105, CO2 of 15,   BUN 63, creatinine 4.61, lactic acid 1.4.    IMAGING:  CT of the foot without contrast shows soft tissue swelling with gas   in the distal plantar aspect of the foot at the level of the MTP joints with   draining wound to the plantar surface of the skin, minimal gas in the extensor   aspect of the foot at the level of the MTP joints _____ cellulitis, no   loculated fluid collection identified, soft tissue swelling in remainder of   the foot, no bony abnormalities.  No evidence for osteomyelitis.  No   radiopaque soft tissue foreign bodies identified.    ASSESSMENT AND PLAN:  1.  Severe sepsis secondary to right foot cellulitis.  We will continue with   IV antibiotic support including IV Zyvox and ceftriaxone.  We will continue   with aggressive IV fluid hydration and monitor her clinically.  2.  Right lower foot cellulitis.  As per CT reporting, appears that there is   significant tissue swelling with gas in the distal plantar aspect of her foot    with noted drainage.  Again, we will start patient on Zyvox and ceftriaxone.    Dr. Nina has been consulted for possible surgical intervention.  The patient   will be kept n.p.o. at this time.  3.  Metabolic acidosis, most likely due to infection.  Continue IV fluids.    Recheck BMP in the morning.  4.  Acute on chronic renal failure, baseline creatinine is around 2.9 to 3.4.    Her creatinine currently is 4.61.  I suspect there is a prerenal component.    We will give her IV fluids and then recheck her BMP in the morning to see if   her kidney functions have improved.  5.  Normocytic normochromic anemia.  No signs of gross bleeding.  Continue to   follow daily CBCs.  6.  Mild shortness of breath.  Patient denies smoking.  A chest x-ray was   performed which showed minimal bibasilar edema or atelectasis.  No lobar   consolidation.  We will continue with RT protocol and DuoNebs.  She is   currently on antibiotics, which should cover pulmonary organisms as well.  In   addition, an echocardiogram has also been ordered.  At this time, patient will   be placed on 5000 units t.i.d. heparin and docusate for gastrointestinal   prophylaxis.  The patient's code status is full code.      I anticipate hospital length of stay to be greater than 2 midnights.       ____________________________________     MD KATHRYN HARRIS / NEETA    DD:  02/27/2017 02:49:55  DT:  02/27/2017 04:31:53    D#:  [id number]  Job#:  [id number]

## 2017-02-27 NOTE — CARE PLAN
Problem: Safety  Goal: Will remain free from injury  Outcome: PROGRESSING AS EXPECTED  Fall precautions in place, pt calling appropriately    Problem: Knowledge Deficit  Goal: Knowledge of disease process/condition, treatment plan, diagnostic tests, and medications will improve  Outcome: PROGRESSING AS EXPECTED  Plan of care for day discussed at bedside, all questions answered

## 2017-02-27 NOTE — RESPIRATORY CARE
COPD EDUCATION by COPD CLINICAL EDUCATOR  2/27/2017 at 7:02 AM by Diandra Rivas     Patient reviewed by COPD education team. Patient does not qualify for COPD program.

## 2017-02-27 NOTE — CONSULTS
DATE OF SERVICE:  02/27/2017    REQUESTING PHYSICIAN:  Jordin Ivey MD    CHIEF COMPLAINT:  Right foot infection.    HISTORY OF PRESENT ILLNESS:  The patient is a 46-year-old woman with previous   stroke, history of Moyamoya.  She developed an infection in her right foot   about 3 days ago.  Reportedly stepped on a rock a week ago and then started   developing swelling.  Transferred from [facility] to   [facility].  She complains of pain in the foot.    ALLERGIES:  ADVIL, ALLEGRA, ZITHROMAX, CLARITIN, ERYTHROMYCIN, PENICILLIN,   PROCARDIA, ZYRTEC.    MEDICATIONS:  Aspirin, Coreg, lisinopril, multivitamin, Paxil, Dilantin,   sodium bicarbonate, vitamin D.    PAST MEDICAL HISTORY:  Moyamoya disease, history of stroke, hyperlipidemia,   hypertension, chronic renal disease.    SOCIAL HISTORY:  The patient does not smoke, drink alcohol or use drugs.  She   lives in [place] with her mother.    FAMILY HISTORY:  Negative.    REVIEW OF SYSTEMS:  No loss of consciousness, nausea, vomiting, diarrhea,   constipation, polyuria, dysuria, fevers, chills, weight loss, weight gain,   abdominal pain, chest pain or shortness of breath.    PHYSICAL EXAMINATION:  GENERAL:  The patient is in no acute distress.  VITAL SIGNS:  Blood pressure 110/40, heart rate 74, respirations 18,   temperature 101.7.  HEENT:  Normocephalic, atraumatic.  NECK:  Supple, nontender.  CHEST:  Nontender.  No labored breathing.  EXTREMITIES:  Left lower and bilateral upper extremities without tenderness or   deformity.  Right lower extremity shows erythema and swelling from the mid   foot distally.  There is significant swelling and tenderness of the second and   third toes.  There is purulent drainage from the space between the second and   third toes.  There is also an ulcer and callus on the plantar aspect of the   toes under the metatarsal head around the second and third metatarsals where   there is some purulent drainage as  well.    LABORATORY DATA:  White blood cell count 15,700, hematocrit 32%, platelet   count 139,000.  Sodium 136, potassium 4.1, creatinine 4.61, albumin 2.9.    No imaging studies.    ASSESSMENT:  Right foot infection/abscess.    PLAN:  I obtained culture of the purulent drainage, sent this to the lab.  She   will be admitted to the hospitalist on IV antibiotics.  I recommended a CT   scan to better evaluate the infection.  Recommend irrigation and debridement   in the morning.  Discuss with Dr. Monterroso who will be in the operating room   tomorrow.  The patient may require amputation of the second and third toes to   obtain adequate drainage and she may eventually need transmetatarsal or   transtibial amputations.       ____________________________________     MD RADHA WAYNE / NEETA    DD:  02/27/2017 07:32:08  DT:  02/27/2017 13:01:49    D#:  [id number]  Job#:  [id number]

## 2017-02-28 LAB
ALBUMIN SERPL BCP-MCNC: 2.5 G/DL (ref 3.2–4.9)
ALBUMIN/GLOB SERPL: 0.7 G/DL
ALP SERPL-CCNC: 64 U/L (ref 30–99)
ALT SERPL-CCNC: 17 U/L (ref 2–50)
ANION GAP SERPL CALC-SCNC: 15 MMOL/L (ref 0–11.9)
AST SERPL-CCNC: 31 U/L (ref 12–45)
BASOPHILS # BLD AUTO: 0.2 % (ref 0–1.8)
BASOPHILS # BLD: 0.02 K/UL (ref 0–0.12)
BILIRUB SERPL-MCNC: 0.3 MG/DL (ref 0.1–1.5)
BUN SERPL-MCNC: 60 MG/DL (ref 8–22)
CALCIUM SERPL-MCNC: 8.9 MG/DL (ref 8.5–10.5)
CHLORIDE SERPL-SCNC: 106 MMOL/L (ref 96–112)
CO2 SERPL-SCNC: 17 MMOL/L (ref 20–33)
CREAT SERPL-MCNC: 4.27 MG/DL (ref 0.5–1.4)
EOSINOPHIL # BLD AUTO: 0.05 K/UL (ref 0–0.51)
EOSINOPHIL NFR BLD: 0.4 % (ref 0–6.9)
ERYTHROCYTE [DISTWIDTH] IN BLOOD BY AUTOMATED COUNT: 45.5 FL (ref 35.9–50)
GFR SERPL CREATININE-BSD FRML MDRD: 11 ML/MIN/1.73 M 2
GLOBULIN SER CALC-MCNC: 3.8 G/DL (ref 1.9–3.5)
GLUCOSE SERPL-MCNC: 86 MG/DL (ref 65–99)
HCT VFR BLD AUTO: 29.2 % (ref 37–47)
HGB BLD-MCNC: 9.5 G/DL (ref 12–16)
IMM GRANULOCYTES # BLD AUTO: 0.12 K/UL (ref 0–0.11)
IMM GRANULOCYTES NFR BLD AUTO: 0.9 % (ref 0–0.9)
LYMPHOCYTES # BLD AUTO: 1.05 K/UL (ref 1–4.8)
LYMPHOCYTES NFR BLD: 7.9 % (ref 22–41)
MCH RBC QN AUTO: 31.5 PG (ref 27–33)
MCHC RBC AUTO-ENTMCNC: 32.5 G/DL (ref 33.6–35)
MCV RBC AUTO: 96.7 FL (ref 81.4–97.8)
MONOCYTES # BLD AUTO: 1.84 K/UL (ref 0–0.85)
MONOCYTES NFR BLD AUTO: 13.8 % (ref 0–13.4)
NEUTROPHILS # BLD AUTO: 10.23 K/UL (ref 2–7.15)
NEUTROPHILS NFR BLD: 76.8 % (ref 44–72)
NRBC # BLD AUTO: 0 K/UL
NRBC BLD AUTO-RTO: 0 /100 WBC
PLATELET # BLD AUTO: 142 K/UL (ref 164–446)
PMV BLD AUTO: 11.1 FL (ref 9–12.9)
POTASSIUM SERPL-SCNC: 4.1 MMOL/L (ref 3.6–5.5)
PROT SERPL-MCNC: 6.3 G/DL (ref 6–8.2)
RBC # BLD AUTO: 3.02 M/UL (ref 4.2–5.4)
SODIUM SERPL-SCNC: 138 MMOL/L (ref 135–145)
WBC # BLD AUTO: 13.3 K/UL (ref 4.8–10.8)

## 2017-02-28 PROCEDURE — 36415 COLL VENOUS BLD VENIPUNCTURE: CPT

## 2017-02-28 PROCEDURE — A9270 NON-COVERED ITEM OR SERVICE: HCPCS | Performed by: HOSPITALIST

## 2017-02-28 PROCEDURE — 700101 HCHG RX REV CODE 250

## 2017-02-28 PROCEDURE — 700102 HCHG RX REV CODE 250 W/ 637 OVERRIDE(OP): Performed by: HOSPITALIST

## 2017-02-28 PROCEDURE — 700111 HCHG RX REV CODE 636 W/ 250 OVERRIDE (IP): Performed by: INTERNAL MEDICINE

## 2017-02-28 PROCEDURE — 80053 COMPREHEN METABOLIC PANEL: CPT

## 2017-02-28 PROCEDURE — 700105 HCHG RX REV CODE 258

## 2017-02-28 PROCEDURE — 770006 HCHG ROOM/CARE - MED/SURG/GYN SEMI*

## 2017-02-28 PROCEDURE — 700102 HCHG RX REV CODE 250 W/ 637 OVERRIDE(OP): Performed by: INTERNAL MEDICINE

## 2017-02-28 PROCEDURE — A9270 NON-COVERED ITEM OR SERVICE: HCPCS | Performed by: INTERNAL MEDICINE

## 2017-02-28 PROCEDURE — 99232 SBSQ HOSP IP/OBS MODERATE 35: CPT | Performed by: INTERNAL MEDICINE

## 2017-02-28 PROCEDURE — 700105 HCHG RX REV CODE 258: Performed by: INTERNAL MEDICINE

## 2017-02-28 PROCEDURE — 85025 COMPLETE CBC W/AUTO DIFF WBC: CPT

## 2017-02-28 RX ORDER — SODIUM CHLORIDE 9 MG/ML
INJECTION, SOLUTION INTRAVENOUS
Status: COMPLETED
Start: 2017-02-28 | End: 2017-02-28

## 2017-02-28 RX ORDER — DIPHENOXYLATE HYDROCHLORIDE AND ATROPINE SULFATE 2.5; .025 MG/1; MG/1
1 TABLET ORAL 4 TIMES DAILY PRN
Status: DISCONTINUED | OUTPATIENT
Start: 2017-02-28 | End: 2017-03-23 | Stop reason: HOSPADM

## 2017-02-28 RX ADMIN — CYANOCOBALAMIN TAB 500 MCG 1000 MCG: 500 TAB at 09:01

## 2017-02-28 RX ADMIN — CARVEDILOL 25 MG: 25 TABLET, FILM COATED ORAL at 09:02

## 2017-02-28 RX ADMIN — METRONIDAZOLE 500 MG: 500 TABLET ORAL at 17:46

## 2017-02-28 RX ADMIN — SODIUM CHLORIDE 500 ML: 9 INJECTION, SOLUTION INTRAVENOUS at 14:42

## 2017-02-28 RX ADMIN — LORAZEPAM 1 MG: 1 TABLET ORAL at 19:52

## 2017-02-28 RX ADMIN — LORAZEPAM 1 MG: 1 TABLET ORAL at 11:40

## 2017-02-28 RX ADMIN — METRONIDAZOLE 500 MG: 500 TABLET ORAL at 09:01

## 2017-02-28 RX ADMIN — PHENYTOIN SODIUM 200 MG: 100 CAPSULE, EXTENDED RELEASE ORAL at 09:01

## 2017-02-28 RX ADMIN — CARVEDILOL 25 MG: 25 TABLET, FILM COATED ORAL at 17:45

## 2017-02-28 RX ADMIN — PHENYTOIN SODIUM 200 MG: 100 CAPSULE, EXTENDED RELEASE ORAL at 19:52

## 2017-02-28 RX ADMIN — CEFTRIAXONE 2 G: 2 INJECTION, POWDER, FOR SOLUTION INTRAMUSCULAR; INTRAVENOUS at 14:41

## 2017-02-28 RX ADMIN — Medication 1300 MG: at 14:41

## 2017-02-28 RX ADMIN — METRONIDAZOLE 500 MG: 500 TABLET ORAL at 02:57

## 2017-02-28 RX ADMIN — LINEZOLID 600 MG: 600 TABLET, FILM COATED ORAL at 09:01

## 2017-02-28 RX ADMIN — METRONIDAZOLE 500 MG: 500 INJECTION, SOLUTION INTRAVENOUS at 17:38

## 2017-02-28 RX ADMIN — Medication 1300 MG: at 19:52

## 2017-02-28 RX ADMIN — ATORVASTATIN CALCIUM 40 MG: 40 TABLET, FILM COATED ORAL at 19:52

## 2017-02-28 RX ADMIN — OXYCODONE HYDROCHLORIDE 5 MG: 5 TABLET ORAL at 12:43

## 2017-02-28 RX ADMIN — AMLODIPINE BESYLATE 5 MG: 5 TABLET ORAL at 09:02

## 2017-02-28 RX ADMIN — Medication 1300 MG: at 09:02

## 2017-02-28 RX ADMIN — OXYCODONE HYDROCHLORIDE 5 MG: 5 TABLET ORAL at 06:20

## 2017-02-28 RX ADMIN — METRONIDAZOLE 500 MG: 500 INJECTION, SOLUTION INTRAVENOUS at 22:19

## 2017-02-28 RX ADMIN — ASPIRIN 325 MG: 325 TABLET, FILM COATED ORAL at 09:01

## 2017-02-28 RX ADMIN — LORAZEPAM 1 MG: 1 TABLET ORAL at 02:56

## 2017-02-28 ASSESSMENT — ENCOUNTER SYMPTOMS
HEADACHES: 0
PALPITATIONS: 0
VOMITING: 0
FEVER: 0
NAUSEA: 0
DIAPHORESIS: 0
COUGH: 0
CHILLS: 0
ABDOMINAL PAIN: 0
DIARRHEA: 1

## 2017-02-28 ASSESSMENT — LIFESTYLE VARIABLES
PACK_YEARS: 1
EVER_SMOKED: YES
ALCOHOL_USE: NO

## 2017-02-28 ASSESSMENT — PAIN SCALES - GENERAL
PAINLEVEL_OUTOF10: 6
PAINLEVEL_OUTOF10: 0
PAINLEVEL_OUTOF10: 3
PAINLEVEL_OUTOF10: 5
PAINLEVEL_OUTOF10: 3
PAINLEVEL_OUTOF10: 10
PAINLEVEL_OUTOF10: 2

## 2017-02-28 NOTE — OP REPORT
DATE OF SERVICE:  02/27/2017    PREOPERATIVE DIAGNOSES:  1.  Right foot multiloculated abscess.  2.  Moyamoya disease.    INDICATIONS:  This is a 46-year-old female with multiple medical issues who   has had a right foot infection with purulent drainage between her second and   third toes and a plantar foot draining wound and erythema to her mid calf.    Risks and benefits of irrigation and debridement were discussed, which   included but not limited to bleeding, infection, neurovascular damage, pain,   stiffness, and the need for future surgery including possible amputation of   either toes, mid foot or a below-knee amputation.  She understands the   possibility of bleeding, infection, neurovascular damage, pain, stiffness, and   need for the surgery and amputation.  She understands all these risks and   wished to proceed.    DESCRIPTION OF PROCEDURE:  Patient was sedated with LMA anesthesia and her   left lower extremity was prepped and draped in usual sterile fashion.  Her   plantar wound was ellipsed in a 2 cm ellipse and a large amount of purulent   fluid was encountered.  This was in connection with her abscess and drainage   throughout between her second and third toes and the abscess continued   dorsally on the foot.  A 3 cm incision was made over the dorsal low foot to   allow for abscess drainage as well.  The sequential wounds totaling 12x3x2 cm   were debrided the skin, subcutaneous tissue, and underlying muscle in an   excisional fashion with a knife and rongeur.  The wounds were then irrigated   with copious amounts of normal saline solution and closed with nylon suture.    Sterile dressings were applied.  Patient tolerated the procedure well.    POSTOPERATIVE PLAN:  At this point, the patient will be admitted by the   medicine service while awaiting definitive antibiotics.  There is a good   chance that she may require more proximal amputation as she has a quite severe   infection.  We will see what  her response to medical treatment is over the   next several days.       ____________________________________     VALENTIN ROMERO MD PLA / NEETA    DD:  02/27/2017 13:00:14  DT:  02/27/2017 16:31:19    D#:  [id number]  Job#:  [id number]

## 2017-02-28 NOTE — PROGRESS NOTES
Assumed pt care at 0700. Received bedside report from DIANE Browning. AM assessment completed. AAOx4. Pt denies SOB but has some audible wheezing; c/o pain of 3 on 0 to 10 pain scale (Will administer medication PRN - see MAR). Provided pt with RN and CNA extension numbers on white board and encouraged pt to call when needed. Discussed plan of care for the day, pt verbalizes understanding. VSS. Denies any additional needs at this time. Call light, belongings, and phone within reach. Hourly rounding in effect. Will continue to monitor.

## 2017-02-28 NOTE — PROGRESS NOTES
Pt refusing IV restart, and IVF/IV ATB. Dr. Yepez on floor, made aware. MD put new orders in for pt PO ATB, and a change in pain medication per pt request.

## 2017-02-28 NOTE — PROGRESS NOTES
Report called to Gabriela ramirez RN, pt transferring to [place] [id number], pt's belongings packed, pt's family aware of transfer, transport called

## 2017-02-28 NOTE — CARE PLAN
Problem: Infection  Goal: Will remain free from infection  Outcome: NOT MET  This RN educated pt regarding ATB therapy with infection (R foot cellulitis, I&D 2/27/17). Pt refused IV ATB and IVF, along with IV insertion. MD solomon.

## 2017-02-28 NOTE — PROGRESS NOTES
Hospital Medicine Progress Note, Adult, Complex               Author: Shazia Valle Date & Time created: 2/28/2017  9:54 AM     Interval History:  The patient has known diabetes and presented with a foot infection  She had debridement with surgery Dr. Monterroso 2/27    Overnight she is refusing iv placement and iv antibiotics    Review of Systems:  Review of Systems   Constitutional: Negative for fever, chills and diaphoresis.   Respiratory: Negative for cough.    Cardiovascular: Negative for chest pain and palpitations.        Right foot swollen   Gastrointestinal: Positive for diarrhea. Negative for nausea and abdominal pain.   Genitourinary: Negative for dysuria.   Skin: Negative for rash.   Neurological: Negative for headaches.       Physical Exam:  Physical Exam   Constitutional: She is oriented to person, place, and time. No distress.   HENT:   Mouth/Throat: No oropharyngeal exudate.   Eyes: Conjunctivae are normal.   Cardiovascular: Normal rate and regular rhythm.    No murmur heard.  Pulmonary/Chest: Effort normal and breath sounds normal. She has no wheezes. She has no rales.   Abdominal: Bowel sounds are normal.   Musculoskeletal: She exhibits edema.   Right toes swollen  Surgical bandage with clear drainage present   Neurological: She is alert and oriented to person, place, and time.   Skin: Skin is warm and dry.   Nursing note and vitals reviewed.      Labs:        Invalid input(s): [id number]  Recent Labs      02/27/17   0506   BNPBTYPENAT  562*     Recent Labs      02/26/17 2230  02/27/17   0506  02/27/17   1013  02/28/17   0300   SODIUM  136   --   141  138   POTASSIUM  4.1   --   3.9  4.1   CHLORIDE  105   --   108  106   CO2  15*   --   16*  17*   BUN  63*   --   64*  60*   CREATININE  4.61*   --   4.91*  4.27*   MAGNESIUM   --   1.8   --    --    CALCIUM  8.6   --   8.6  8.9     Recent Labs      02/26/17   2230  02/27/17   1013  02/28/17   0300   ALTSGPT  10   --   17   ASTSGOT  25   --   31    ALKPHOSPHAT  71   --   64   TBILIRUBIN  0.5   --   0.3   GLUCOSE  102*  96  86     Recent Labs      170  17   0300   RBC  3.30*  3.02*   HEMOGLOBIN  10.4*  9.5*   HEMATOCRIT  32.4*  29.2*   PLATELETCT  139*  142*     Recent Labs      170  17   0300   WBC  15.7*  13.3*   NEUTSPOLYS  78.00*  76.80*   LYMPHOCYTES  8.20*  7.90*   MONOCYTES  12.90  13.80*   EOSINOPHILS  0.10  0.40   BASOPHILS  0.20  0.20   ASTSGOT  25  31   ALTSGPT  10  17   ALKPHOSPHAT  71  64   TBILIRUBIN  0.5  0.3           Hemodynamics:  Temp (24hrs), Av.9 °C (98.5 °F), Min:36.4 °C (97.6 °F), Max:37.6 °C (99.6 °F)  Temperature: 36.6 °C (97.9 °F)  Pulse  Av.5  Min: 58  Max: 78Heart Rate (Monitored): 61  Blood Pressure: 117/61 mmHg, NIBP: 112/46 mmHg     Respiratory:    Respiration: (!) 21, Pulse Oximetry: 94 %     Work Of Breathing / Effort: Moderate  RUL Breath Sounds: Diminished, RML Breath Sounds: Diminished, RLL Breath Sounds: Diminished, BENNIE Breath Sounds: Expiratory Wheezes, LLL Breath Sounds: Diminished  Fluids:    Intake/Output Summary (Last 24 hours) at 17 0954  Last data filed at 17 1351   Gross per 24 hour   Intake    300 ml   Output      0 ml   Net    300 ml     Weight: 115.3 kg (254 lb 3.1 oz)  GI/Nutrition:  Orders Placed This Encounter   Procedures   • DIET ORDER     Standing Status: Standing      Number of Occurrences: 1      Standing Expiration Date:      Order Specific Question:  Diet:     Answer:  Regular [1]     Medical Decision Making, by Problem:  Active Hospital Problems    Diagnosis   • CKD (chronic kidney disease), stage IV (CMS-HCC) [N18.4] stable  Will monitor creatinine level  Will need nephrology follow up at discharge   • HLD (hyperlipidemia) [E78.5] statin   • Moyamoya disease [I67.5] stable   • Anemia [D64.9] hemoglobin with mild drop after surgery   Will follow lab trend   • Acute on chronic renal failure (CMS-HCC) [N17.9, N18.9]  At her baseline today   •  Metabolic acidosis [E87.2] chronic due to renal failure,   Follow labs   • Severe sepsis (CMS-HCC) [A41.9, R65.20] improving leukocytosis  Encouraged iv placement for treatment, patient agreeable to peripheral iv only at this time  No piccline or centralline per patient request   • Thrombocytopenia (CMS-HCC) [D69.6] stable   • 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]   • Hypertension [I10] coreg and norvasc to treat   • Dementia [F03.90]    • Cellulitis of right foot [L03.115] continue antibiotics debridement done   • CKD stage 4 secondary to hypertension (CMS-HCC) [I12.9, N18.4]   • Seizure disorder, grand mal (CMS-HCC) [G40.409] no seizures now on dilantin   • Dilated cardiomyopathy (CMS-HCC) [I42.0] coreg    • Essential hypertension, benign [I10] controlled, will monitor       Labs reviewed, Medications reviewed and Radiology images reviewed  Dey catheter: No Dey      DVT Prophylaxis: Heparin    Ulcer prophylaxis: Not indicated  Antibiotics: Treating active infection/contamination beyond 24 hours perioperative coverage  Assessed for rehab: Patient returned to prior level of function, rehabilitation not indicated at this time

## 2017-02-28 NOTE — PROGRESS NOTES
Assumed care of patient after telephone report and arrival to floor and bed 2, room 630. Bed locked and in the lowest position. Call bell in reach,perdsonal items in reach. Will continue to observe and report.

## 2017-02-28 NOTE — CARE PLAN
Problem: Safety  Goal: Will remain free from injury  Outcome: PROGRESSING AS EXPECTED  Fall precautions in place, pt calling appropriately    Problem: Knowledge Deficit  Goal: Knowledge of disease process/condition, treatment plan, diagnostic tests, and medications will improve  Outcome: PROGRESSING SLOWER THAN EXPECTED  Plan of care for day discussed at bedside, all questions answered, pt refusing IV access

## 2017-02-28 NOTE — PROGRESS NOTES
Infectious Disease Progress Note    Author: Rocio Hinson M.D.    DOS & Time created: 2017  12:49 PM    Chief Complaint   Patient presents with   • Wound Infection     pt has wound on right foot, [facility], report cellutliis with drainage on wound     Interval History:  46-year-old white female with history of morbid obesity,  Moyamoya syndrome with prior stroke, who presents with a traumatic injury to her right foot with subsequent development of cellulitis and abscess    AF WBC 13.3 s/p drainage abscess-has been refusing IV  Labs Reviewed, Medications Reviewed, Radiology Reviewed and Wound Reviewed.    Review of Systems:  Review of Systems   Constitutional: Negative for fever and chills.   Gastrointestinal: Negative for nausea and vomiting.   Skin: Negative for rash.   All other systems reviewed and are negative.      Hemodynamics:  Temp (24hrs), Av.9 °C (98.4 °F), Min:36.4 °C (97.6 °F), Max:37.4 °C (99.4 °F)  Temperature: 36.6 °C (97.9 °F)  Pulse  Av.5  Min: 58  Max: 78Heart Rate (Monitored): 61  Blood Pressure: 117/61 mmHg, NIBP: 112/46 mmHg       Physical Exam:  Physical Exam   Constitutional: She appears well-developed. No distress.   Obese   HENT:   Head: Normocephalic and atraumatic.   Eyes: EOM are normal. Pupils are equal, round, and reactive to light.   Neck: Neck supple.   Cardiovascular: Normal rate and regular rhythm.    No murmur heard.  Pulmonary/Chest: Effort normal. No respiratory distress. She has no wheezes. She has no rales.   Abdominal: Soft. Bowel sounds are normal. She exhibits no distension. There is no tenderness.   Musculoskeletal:   Right foot dressed   Neurological: She is alert.   Skin: No rash noted.   Psychiatric:   Poor insight   Nursing note and vitals reviewed.      Labs:  Recent Labs      17   2230  17   0300   WBC  15.7*  13.3*   RBC  3.30*  3.02*   HEMOGLOBIN  10.4*  9.5*   HEMATOCRIT  32.4*  29.2*   MCV  98.2*  96.7   MCH  31.5  31.5    RDW  45.3  45.5   PLATELETCT  139*  142*   MPV  11.5  11.1   NEUTSPOLYS  78.00*  76.80*   LYMPHOCYTES  8.20*  7.90*   MONOCYTES  12.90  13.80*   EOSINOPHILS  0.10  0.40   BASOPHILS  0.20  0.20     Recent Labs      02/26/17 2230  02/27/17   1013  02/28/17   0300   SODIUM  136  141  138   POTASSIUM  4.1  3.9  4.1   CHLORIDE  105  108  106   CO2  15*  16*  17*   GLUCOSE  102*  96  86   BUN  63*  64*  60*     Recent Labs      02/26/17 2230 02/27/17   1013  02/28/17   0300   ALBUMIN  2.9*   --   2.5*   TBILIRUBIN  0.5   --   0.3   ALKPHOSPHAT  71   --   64   TOTPROTEIN  6.5   --   6.3   ALTSGPT  10   --   17   ASTSGOT  25   --   31   CREATININE  4.61*  4.91*  4.27*        Imaging:       CT-FOOT W/O PLUS RECONS RIGHT (Final result) Result time: 02/27/17 00:30:10     Final result by Gee Tam M.D. (02/27/17 00:30:10)     Impression:     1.  Soft tissue swelling with gas in the distal plantar aspect of the foot at the level of the MTP joints with draining wound to the plantar surface of the skin.  2.  Minimal gas in the extensor aspect of the foot at the level of the MTP joints which may represent extension of cellulitis anteriorly.  3.  No loculated fluid collection identified.  4.  Diffuse soft tissue swelling in the remainder of the foot.  5.  No acute bony abnormality. No evidence of osteomyelitis. No radiopaque soft tissue foreign body identified     MR-FOOT-W/O RIGHT (Final result) Result time: 02/28/17 11:19:53     Final result by Aureliano Floers M.D. (02/28/17 11:19:53)     Impression:     Forefoot bilobed abscess with apparent plantar draining sinus. Largest loculation is in the dorsal soft tissues as discussed above  No unenhanced MR evidence of osteomyelitis  Metatarsophalangeal joint effusions most likely are reactive given lack of bony destruction/marginal erosive change     Final result by Rad Intf (02/27/17 11:53:22)     Narrative:     TransthoracicEcho Report  CONCLUSIONS  No prior study is  available for comparison.   Normal left ventricular size, thickness, systolic function, and   diastolic function.  Left ventricular ejection fraction is visually estimated to be 65%.  Normal right ventricular size and systolic function.  Trace tricuspid regurgitation.  Normal pericardium without effusion.     Medications:  Current Facility-Administered Medications   Medication Dose Frequency Provider Last Rate Last Dose   • diphenoxylate-atropine (LOMOTIL) 2.5-0.025 MG per tablet 1 Tab  1 Tab 4X/DAY PRN Shazia Valle M.D.       • cefTRIAXone (ROCEPHIN) 2 g in  mL IVPB  2 g Q24HRS Rocio Hinson M.D.       • heparin injection 5,000 Units  5,000 Units Q8HRS Augustus Guerin M.D.   5,000 Units at 02/27/17 1550   • lorazepam (ATIVAN) tablet 1 mg  1 mg TID Augustus Guerin M.D.   1 mg at 02/28/17 1140   • metronidazole (FLAGYL) tablet 500 mg  500 mg Q8HRS Rocio Hinson M.D.   500 mg at 02/28/17 0901   • linezolid (ZYVOX) tablet 600 mg  600 mg Q12HRS Evelia Yepez M.D.   600 mg at 02/28/17 0901   • sodium bicarbonate (SODIUM BICARBONATE) tablet 1,300 mg  1,300 mg TID Evelia Yepez M.D.   1,300 mg at 02/28/17 0902   • oxycodone immediate-release (ROXICODONE) tablet 5 mg  5 mg Q6HRS PRN Evelia Yepez M.D.   5 mg at 02/28/17 1243   • amlodipine (NORVASC) tablet 5 mg  5 mg DAILY Chalino Bender M.D.   5 mg at 02/28/17 0902   • aspirin (ASA) tablet 325 mg  325 mg DAILY Chailno Bender M.D.   325 mg at 02/28/17 0901   • atorvastatin (LIPITOR) tablet 40 mg  40 mg Nightly Chalino Bender M.D.   40 mg at 02/27/17 2033   • carvedilol (COREG) tablet 25 mg  25 mg BID WITH MEALS Chalino Bender M.D.   25 mg at 02/28/17 0902   • cyanocobalamin (VITAMIN B-12) tablet 1,000 mcg  1,000 mcg DAILY Chalino Bender M.D.   1,000 mcg at 02/28/17 0901   • folic acid (FOLVITE) tablet 1 mg  1 mg DAILY Chalino Bender M.D.   Stopped at 02/27/17 0849   • phenytoin ER (DILANTIN) capsule 200  mg  200 mg BID Chalino Bender M.D.   200 mg at 02/28/17 0901   • docusate sodium (COLACE) capsule 100 mg  100 mg BID Chalino Bender M.D.   Stopped at 02/27/17 0900   • bisacodyl (DULCOLAX) suppository 10 mg  10 mg Q24HRS PRN Chalino Bender M.D.       • fleet enema 133 mL  1 Each Once PRN Chalino Bender M.D.       • Respiratory Care per Protocol   Continuous RT Chalino Bender M.D.       • ondansetron (ZOFRAN) syringe/vial injection 4 mg  4 mg Q4HRS PRN Chalino Bender M.D.       • ondansetron (ZOFRAN ODT) dispertab 4 mg  4 mg Q4HRS PRN Chalino Bender M.D.       • promethazine (PHENERGAN) tablet 12.5-25 mg  12.5-25 mg Q4HRS PRN Chalino Bender M.D.       • promethazine (PHENERGAN) suppository 12.5-25 mg  12.5-25 mg Q4HRS PRN Chalino Bender M.D.       • prochlorperazine (COMPAZINE) injection 5-10 mg  5-10 mg Q4HRS PRN Chalino Bender M.D.       • acetaminophen (TYLENOL) tablet 650 mg  650 mg Q6HRS PRN Chalino Bender M.D.       • NS (BOLUS) infusion 500 mL  500 mL Once PRN Chalino Bender M.D.         Last reviewed on 2/26/2017 11:24 PM by Radha Mixon    Micro:  Results     Procedure Component Value Units Date/Time    ANAEROBIC CULTURE [[id number]] Collected:  02/27/17 1243    Order Status:  Completed Specimen Information:  Tissue Updated:  02/28/17 1107     Significant Indicator NEG      Source TISS      Site Right Foot Abscess      Anaerobic Culture, Culture Res Culture in progress.     BLOOD CULTURE [[id number]] Collected:  02/26/17 2330    Order Status:  Completed Specimen Information:  Blood from Peripheral Updated:  02/28/17 0937     Significant Indicator NEG      Source BLD      Site PERIPHERAL      Blood Culture --      Result:        No Growth    Note: Blood cultures are incubated for 5 days and  are monitored continuously.Positive blood cultures  are called to the RN and reported as soon as  they are identified.      Narrative:      Per Hospital Policy: Only  "change Specimen Src: to \"Line\" if  specified by physician order.    GRAM STAIN [[id number]] Collected:  02/27/17 1243    Order Status:  Completed Specimen Information:  Tissue Updated:  02/27/17 1745     Significant Indicator .      Source TISS      Site Right Foot Abscess      Gram Stain Result --      Result:        Many WBCs.  Many Gram positive cocci.      GRAM STAIN [[id number]] Collected:  02/27/17 1015    Order Status:  Completed Specimen Information:  Wound Updated:  02/27/17 1743     Significant Indicator .      Source WND      Site LEFT FOOT      Gram Stain Result --      Result:        Many WBCs.  Many Gram positive cocci.      Narrative:      Collected By:[id number] FRANCES MIX    CULTURE TISSUE W/ GRM STAIN [[id number]] Collected:  02/27/17 1243    Order Status:  Completed Specimen Information:  Other Updated:  02/27/17 1359    CDIFF BY PCR [[id number]] Collected:  02/27/17 1130    Order Status:  Completed Specimen Information:  Stool from Stool Updated:  02/27/17 1347     C Diff by PCR Negative      027-NAP1-BI Presumptive Negative      Comment: Presumptive 027/NAP1/BI target DNA sequences are NOT DETECTED.       Narrative:      Collected By:[id number] FRANCES MIX  Does this patient have risk factors for C-diff?->Yes  C-Diff Risk Factors->antibiotic exposure  Has patient taken stool softeners or laxatives in the last 5  days?->No  Indication for CDiff by PCR?->Greater than/equal to 3 stools  in 24 hr & \"takes shape of container\"    GRAM STAIN [[id number]] Collected:  02/26/17 2350    Order Status:  Completed Specimen Information:  Wound Updated:  02/27/17 1046     Significant Indicator .      Source WND      Site RIGHT FOOT      Gram Stain Result --      Result:        Moderate WBCs.  Many Gram positive cocci.      CULTURE WOUND W/ GRAM STAIN [[id number]] Collected:  02/27/17 1015    Order Status:  Completed Specimen Information:  Wound from Left Foot Updated:  02/27/17 1035    Narrative:      " "Collected By:[id number] FRANCES MIX    BLOOD CULTURE [[id number]] Collected:  02/26/17 2230    Order Status:  Completed Specimen Information:  Blood from Peripheral Updated:  02/27/17 0920     Significant Indicator NEG      Source BLD      Site PERIPHERAL      Blood Culture --      Result:        No Growth    Note: Blood cultures are incubated for 5 days and  are monitored continuously.Positive blood cultures  are called to the RN and reported as soon as  they are identified.      Narrative:      Per Hospital Policy: Only change Specimen Src: to \"Line\" if  specified by physician order.    CULTURE WOUND W/ GRAM STAIN [[id number]] Collected:  02/26/17 2350    Order Status:  Completed Specimen Information:  Wound from Right Foot Updated:  02/26/17 2358    Culture Respiratory W/ GRM STN [[id number]]     Order Status:  Sent Specimen Information:  Respirate from Sputum     URINALYSIS [[id number]]     Order Status:  Sent Specimen Information:  Urine     URINE CULTURE(NEW) [[id number]]     Order Status:  Sent Specimen Information:  Urine     Blood Culture [[id number]] Collected:  02/26/17 0000    Order Status:  Canceled Specimen Information:  Other from Peripheral     Narrative:      ORDER WAS CANCELLED 02/26/2017 23:01, refer to [id number]  23:01  02/26/2017.  From different peripheral sites, if not done within the last  24 hours (Per Hospital Policy: Only change specimen source to  \"Line\" if specified by physician order)    Blood Culture [[id number]] Collected:  02/26/17 0000    Order Status:  Canceled Specimen Information:  Other from Peripheral     Narrative:      ORDER WAS CANCELLED 02/26/2017 23:01, refer to [id number]  23:02  02/26/2017.  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]   • Dementia [F03.90]   • Cellulitis of right foot [L03.115]   • CKD stage 4 secondary to hypertension (CMS-HCC) [I12.9, N18.4]   • Seizure disorder, grand mal (CMS-HCC) [G40.409]   • Dilated cardiomyopathy (CMS-HCC) [I42.0]   Right foot abscess, suspected OM    Plan:  Right foot abscess, multiloculated with sepsis  S/p drainage 2/27, emergent  Fever, resolved  Persistent leukocytosis  Gram stain with GPC, cx pending  Had to switch to PO abx as refusing IV-on Zyvox, flagyl, ceftriaxone (can be given IM)  Blood cxs neg so far  As no obvious OM-anticipating 4 weeks abx therapy if no amputation    Chronic renal failure  Avoiding nephrotoxic drugs  Current abx do not require adjustment for renal function    Moyamoya syndrome with prior stroke/dementia  Noncompliant with abx  Barrier to care  Places her at increased risk for amputation    Discussed with internal medicine

## 2017-02-28 NOTE — CONSULTS
DATE OF SERVICE:  02/27/2017    REASON FOR CONSULTATION:  Foot infection.    CONSULTING PHYSICIAN:  Dr. Guerin.    HISTORY OF PRESENT ILLNESS:  Please note majority of the history is obtained   from the chart and the family as the patient due to mental acuity is unable to   give any good history.  Briefly, she is a 46-year-old obese female with a   history of Moyamoya syndrome with prior stroke who presented to an outlying   facility with complaints of a several day history of worsening right foot   infection.  She denied any trauma or injury to the foot.  However father   stated and mom stated that she had possibly stepped on a rock and then   tried to dig it out herself.  Subsequently, she developed increasing pain,   erythema, and swelling of her foot with associated fever and chills.  On   present, she was transferred to [facility] for higher level of care.  She was   noted to have significant purulent drainage from her foot with a CT concerning   for necrotizing infection.  Orthopedics has been consulted and she is planned   to go to OR today because of PENICILLIN ALLERGY with unknown reaction.  She   was started on ceftriaxone and Zyvox.  Infectious disease consulted for   antibiotic recommendations and management.    REVIEW OF SYSTEMS:  Negative except for stated in the HPI and otherwise   unobtainable.    ALLERGIES:  SHE HAS UNKNOWN ALLERGIES TO ADVIL, ALLEGRA, ZITHROMAX( diarrhea), CLARITIN   TO ERYTHROMYCIN, PENICILLIN, PROCARDIA, AND ZYRTEC.    PAST MEDICAL HISTORY:  Moyamoya disease, stroke, hyperlipidemia, hypertension,   chronic kidney disease, but no diabetes.    FAMILY HISTORY:  Denied.    SOCIAL HISTORY:  She is a smoker and has only quit since being admitted to the   hospital.  No reported drinking or use of illicit drugs.    PHYSICAL EXAMINATION:  GENERAL:  She is morbidly obese white female, smiling and cooperative, in no   acute distress.  VITAL SIGNS:  T-max on admission was 101.7, current  temperature is 98, blood   pressure 104/75, pulse 66, respiratory rate 21, oxygen saturation 95% on 2 L   nasal cannula.  She weighs 112 kilos.  HEENT:  Normocephalic, atraumatic.  Pupils equal, round, and reactive to   light.  Extraocular movements intact.  Oropharynx is clear.  NECK:  Supple.  CARDIOVASCULAR:  Regular rate and rhythm, unable to auscultate any murmurs.  CHEST:  Clear to auscultation bilaterally, unlabored.  She has decreased   breath sounds at the bases.  ABDOMEN:  Obese, soft, nontender, nondistended.  EXTREMITIES:  Show no cyanosis or clubbing.  On the right foot, she has   diffusely swollen forefoot with erythema extending to the midfoot.  She has   multiple plantar bullae versus blistering which appears necrotic.  She has   purulent drainage.  SKIN:  + tattoos.  NEUROLOGIC:  She is awake, alert, does follow commands, but memory is poor.    Family thinks she has neuropathy as a result of her underlying syndrome and   prior stroke.    CURRENT LABORATORY DATA:  On admission, white blood cell count was 15.7, H and   H of 10.4 and 32.4, platelets 139, does have left shift.  ESR was greater   than 120.  Sodium 141, potassium 3.9, chloride 108, bicarbonate 16, glucose   96, BUN 64, creatinine 4.91.  BNP was 562.  Troponin was normal.  INR was not   done.  Urinalysis was not done.  Pregnancy test was negative.  C. diff was   negative.  Gram stain of the foot shows moderate WBCs and many Gram-positive   cocci.  CT scan showed soft tissue swelling with gas in the distal plantar   aspect of the foot.  Right foot cellulitis, no obvious abscess, diffuse soft   tissue swelling, no obvious osteomyelitis.  Chest x-ray showed some   interstitial edema versus pneumonia.    ASSESSMENT:  A 46-year-old white female with history of morbid obesity,   Moyamoya syndrome with prior stroke, who presents with a traumatic injury to   her right foot with subsequent development of cellulitis and concern for   necrotizing  infection.  She was initially febrile with significant improvement   since admission.  Blood cultures are negative at less than 24 hours due to   the severity of the foot infection and concern for necrotizing infection.  We   will discontinue the Zyvox in order to also cover for bloodstream infection   and start ceftaroline.  This will cover for staph which is the most likely   pathogen both MSSA and MRSA and discontinue the ceftriaxone, also cover gram   negatives.  We will add Flagyl to cover for anaerobes.  She has orthopedics to   see and evaluate the patient and she is planned for surgery today.  Discussed potential need for amputation  if debridement and antibiotics are unable to control infection.  Further   recommendations per culture results and clinical course.  Discussed with   internal medicine and family.    Thank you and we will follow with you.       ____________________________________     MD AURELIA BOUCHER / NEETA    DD:  02/27/2017 16:37:10  DT:  02/27/2017 21:02:17    D#:  [id number]  Job#:  [id number]

## 2017-02-28 NOTE — CARE PLAN
Problem: Knowledge Deficit  Goal: Knowledge of disease process/condition, treatment plan, diagnostic tests, and medications will improve  Outcome: NOT MET  Pt refused IV insertion, IVF and IV ATB. This RN and charge RN educated, pt continues to refuse. MD aware.

## 2017-03-01 ENCOUNTER — APPOINTMENT (OUTPATIENT)
Dept: NEUROLOGY | Facility: MEDICAL CENTER | Age: 47
End: 2017-03-01
Payer: MEDICARE

## 2017-03-01 PROBLEM — D69.6 THROMBOCYTOPENIA (HCC): Status: RESOLVED | Noted: 2017-02-27 | Resolved: 2017-03-01

## 2017-03-01 PROBLEM — N18.9 ACUTE ON CHRONIC RENAL FAILURE (HCC): Status: RESOLVED | Noted: 2017-02-27 | Resolved: 2017-03-01

## 2017-03-01 PROBLEM — A41.9 SEVERE SEPSIS (HCC): Status: RESOLVED | Noted: 2017-02-27 | Resolved: 2017-03-01

## 2017-03-01 PROBLEM — N17.9 ACUTE ON CHRONIC RENAL FAILURE (HCC): Status: RESOLVED | Noted: 2017-02-27 | Resolved: 2017-03-01

## 2017-03-01 PROBLEM — R65.20 SEVERE SEPSIS (HCC): Status: RESOLVED | Noted: 2017-02-27 | Resolved: 2017-03-01

## 2017-03-01 PROBLEM — E87.20 METABOLIC ACIDOSIS: Status: RESOLVED | Noted: 2017-02-27 | Resolved: 2017-03-01

## 2017-03-01 LAB
BACTERIA TISS AEROBE CULT: ABNORMAL
BACTERIA WND AEROBE CULT: ABNORMAL
GRAM STN SPEC: ABNORMAL
SIGNIFICANT IND 70042: ABNORMAL
SITE SITE: ABNORMAL
SOURCE SOURCE: ABNORMAL

## 2017-03-01 PROCEDURE — 99233 SBSQ HOSP IP/OBS HIGH 50: CPT | Performed by: HOSPITALIST

## 2017-03-01 PROCEDURE — 700105 HCHG RX REV CODE 258: Performed by: INTERNAL MEDICINE

## 2017-03-01 PROCEDURE — 700112 HCHG RX REV CODE 229: Performed by: HOSPITALIST

## 2017-03-01 PROCEDURE — A9270 NON-COVERED ITEM OR SERVICE: HCPCS | Performed by: HOSPITALIST

## 2017-03-01 PROCEDURE — 700102 HCHG RX REV CODE 250 W/ 637 OVERRIDE(OP): Performed by: HOSPITALIST

## 2017-03-01 PROCEDURE — 700111 HCHG RX REV CODE 636 W/ 250 OVERRIDE (IP): Performed by: INTERNAL MEDICINE

## 2017-03-01 PROCEDURE — A9283 FOOT PRESS OFF LOAD SUPP DEV: HCPCS

## 2017-03-01 PROCEDURE — 306637 HCHG MISC ORTHO ITEM RC 0274

## 2017-03-01 PROCEDURE — 700102 HCHG RX REV CODE 250 W/ 637 OVERRIDE(OP): Performed by: PSYCHIATRY & NEUROLOGY

## 2017-03-01 PROCEDURE — A9270 NON-COVERED ITEM OR SERVICE: HCPCS | Performed by: PSYCHIATRY & NEUROLOGY

## 2017-03-01 PROCEDURE — 700102 HCHG RX REV CODE 250 W/ 637 OVERRIDE(OP): Performed by: INTERNAL MEDICINE

## 2017-03-01 PROCEDURE — 700111 HCHG RX REV CODE 636 W/ 250 OVERRIDE (IP): Performed by: HOSPITALIST

## 2017-03-01 PROCEDURE — A9270 NON-COVERED ITEM OR SERVICE: HCPCS | Performed by: INTERNAL MEDICINE

## 2017-03-01 PROCEDURE — 770006 HCHG ROOM/CARE - MED/SURG/GYN SEMI*

## 2017-03-01 PROCEDURE — 700101 HCHG RX REV CODE 250

## 2017-03-01 RX ORDER — OXCARBAZEPINE 150 MG/1
150 TABLET, FILM COATED ORAL 2 TIMES DAILY
Status: DISCONTINUED | OUTPATIENT
Start: 2017-03-01 | End: 2017-03-23 | Stop reason: HOSPADM

## 2017-03-01 RX ORDER — HALOPERIDOL 5 MG/ML
5 INJECTION INTRAMUSCULAR EVERY 4 HOURS PRN
Status: DISCONTINUED | OUTPATIENT
Start: 2017-03-01 | End: 2017-03-23 | Stop reason: HOSPADM

## 2017-03-01 RX ADMIN — Medication 1300 MG: at 19:53

## 2017-03-01 RX ADMIN — METRONIDAZOLE 500 MG: 500 TABLET ORAL at 09:14

## 2017-03-01 RX ADMIN — PHENYTOIN SODIUM 200 MG: 100 CAPSULE, EXTENDED RELEASE ORAL at 19:52

## 2017-03-01 RX ADMIN — CEFUROXIME SODIUM 750 MG: 7.5 INJECTION, POWDER, FOR SOLUTION INTRAVENOUS at 16:10

## 2017-03-01 RX ADMIN — DOCUSATE SODIUM 100 MG: 100 CAPSULE ORAL at 21:00

## 2017-03-01 RX ADMIN — OXCARBAZEPINE 150 MG: 150 TABLET, FILM COATED ORAL at 21:54

## 2017-03-01 RX ADMIN — ATORVASTATIN CALCIUM 40 MG: 40 TABLET, FILM COATED ORAL at 19:53

## 2017-03-01 RX ADMIN — Medication 1300 MG: at 09:14

## 2017-03-01 RX ADMIN — DIPHENOXYLATE HYDROCHLORIDE AND ATROPINE SULFATE 1 TABLET: 2.5; .025 TABLET ORAL at 11:25

## 2017-03-01 RX ADMIN — CEFUROXIME SODIUM 750 MG: 7.5 INJECTION, POWDER, FOR SOLUTION INTRAVENOUS at 21:39

## 2017-03-01 RX ADMIN — AMLODIPINE BESYLATE 5 MG: 5 TABLET ORAL at 09:15

## 2017-03-01 RX ADMIN — PHENYTOIN SODIUM 200 MG: 100 CAPSULE, EXTENDED RELEASE ORAL at 09:14

## 2017-03-01 RX ADMIN — CARVEDILOL 25 MG: 25 TABLET, FILM COATED ORAL at 17:54

## 2017-03-01 RX ADMIN — ASPIRIN 325 MG: 325 TABLET, FILM COATED ORAL at 09:12

## 2017-03-01 RX ADMIN — HEPARIN SODIUM 5000 UNITS: 5000 INJECTION, SOLUTION INTRAVENOUS; SUBCUTANEOUS at 14:06

## 2017-03-01 RX ADMIN — Medication 1300 MG: at 14:02

## 2017-03-01 RX ADMIN — CARVEDILOL 25 MG: 25 TABLET, FILM COATED ORAL at 09:13

## 2017-03-01 RX ADMIN — METRONIDAZOLE 500 MG: 500 INJECTION, SOLUTION INTRAVENOUS at 04:59

## 2017-03-01 RX ADMIN — LORAZEPAM 1 MG: 1 TABLET ORAL at 04:55

## 2017-03-01 RX ADMIN — CEFTRIAXONE 2 G: 2 INJECTION, POWDER, FOR SOLUTION INTRAMUSCULAR; INTRAVENOUS at 08:52

## 2017-03-01 RX ADMIN — LORAZEPAM 1 MG: 1 TABLET ORAL at 11:28

## 2017-03-01 RX ADMIN — LORAZEPAM 1 MG: 1 TABLET ORAL at 19:53

## 2017-03-01 RX ADMIN — OXYCODONE HYDROCHLORIDE 5 MG: 5 TABLET ORAL at 04:54

## 2017-03-01 ASSESSMENT — ENCOUNTER SYMPTOMS
VOMITING: 0
FEVER: 0
MYALGIAS: 1
CHILLS: 0
NAUSEA: 0

## 2017-03-01 ASSESSMENT — PAIN SCALES - GENERAL
PAINLEVEL_OUTOF10: 0
PAINLEVEL_OUTOF10: 6

## 2017-03-01 ASSESSMENT — COPD QUESTIONNAIRES
DO YOU EVER COUGH UP ANY MUCUS OR PHLEGM?: NO/ONLY WITH OCCASIONAL COLDS OR INFECTIONS
DURING THE PAST 4 WEEKS HOW MUCH DID YOU FEEL SHORT OF BREATH: NONE/LITTLE OF THE TIME

## 2017-03-01 NOTE — PROGRESS NOTES
"No dressing change orders or wound care orders in place, wound is draining serosanguinous, pt has been up and walking regularly on wound despite repeated efforts of education to keep off wound. A progress note from Dr Monterroso states \"Daily dry dressing changes.\" Paged Dr Monterroso at this time to clarify wound care orders.   "

## 2017-03-01 NOTE — PROGRESS NOTES
"Walked into pt's room and immediately pt yelled \"no, walk away, I'm going to refuse it\", explained to pt that I was there to check on her and administer scheduled medications, pt stated \"I'm going to refuse everything, I don't care what it is\". Tried to provide education about importance of treatment plan, pt yelled \"leave me alone, I will talk to my doctor in the morning\"   "

## 2017-03-01 NOTE — PROGRESS NOTES
Patient removed bandage to I&D site. Extensive patient education about disease process and admonished patient to not do so again. Bandage reapplied without incident.

## 2017-03-01 NOTE — CARE PLAN
Problem: Infection  Goal: Will remain free from infection  Outcome: PROGRESSING AS EXPECTED  Antibiotic therapy administered per MAR.    Problem: Mobility  Goal: Risk for activity intolerance will decrease  Outcome: PROGRESSING AS EXPECTED  Pt able to pivot to commode, tolerates well.

## 2017-03-01 NOTE — DISCHARGE PLANNING
Care Transition Team Assessment    Information Source  Orientation : Disoriented to Event, Disoriented to Time  Information Given By: Other (Comments)  Informant's Name: Mother with patient answering some basic questions  Who is responsible for making decisions for patient? : Adult child  Name(s) of Primary Decision Maker: Jose Antonio/mother    Readmission Evaluation  Is this a readmission?: No  Why do you think you were readmitted?:  (Stomach)  Was an appointment arranged for you prior to discharge?: No  Were there new prescriptions you were supposed to fill after you were discharged?: Yes, prescriptions filled  Did you understand your discharge instructions?: Yes  Did you have enough support after your last discharge?: Yes    Elopement Risk  Legal Hold: No  Ambulatory or Self Mobile in Wheelchair: Yes  Disoriented: No  Psychiatric Symptoms: None  History of Wandering: No  Elopement this Admit: No  Vocalizing Wanting to Leave: No  Displays Behaviors, Body Language Wanting to Leave: No-Not at Risk for Elopement  Elopement Risk: Not at Risk for Elopement    Interdisciplinary Discharge Planning  Primary Care Physician: Mikel  Lives with - Patient's Self Care Capacity: Parents  Patient or legal guardian wants to designate a caregiver (see row info): No  Support Systems: Family Member(s), Friends / Neighbors  Housing / Facility: [address]  Do You Take your Prescribed Medications Regularly: Yes  Able to Return to Previous ADL's: Future Time w/Therapy  Mobility Issues: Yes  Prior Services: None  Patient Expects to be Discharged to:: home  Assistance Needed: Unknown at this Time  Durable Medical Equipment: Unknown    Discharge Preparedness  What is your plan after discharge?: Uncertain - pending medical team collaboration  What are your discharge supports?: Parent  Prior Functional Level: Ambulatory, Needs Assist with Medication Management  Difficulity with ADLs: Bathing, Brushing teeth, Dressing, Toileting  Difficulty with  ADLs Comment: Has rare brain disorder and needs assist/mental level low  Difficulity with IADLs: Cooking, Keeping track of finances, Driving, Managing medication, Laundry, Using the telephone or computer, Shopping    Functional Assesment  Prior Functional Level: Ambulatory, Needs Assist with Medication Management    Finances  Financial Barriers to Discharge: No  Prescription Coverage: Yes    Vision / Hearing Impairment  Vision Impairment : No  Right Eye Vision: Wears Glasses  Left Eye Vision: Wears Glasses  Hearing Impairment : No    Values / Beliefs / Concerns  Values / Beliefs Concerns : No    Advance Directive  Advance Directive?: None  Advance Directive offered?: AD Booklet refused    Domestic Abuse  Have you ever been the victim of abuse or violence?: No  Physical Abuse or Sexual Abuse: No  Verbal Abuse or Emotional Abuse: No  Possible Abuse Reported to:: Not Applicable    Psychological Assessment  History of Substance Abuse: None  Date Last Used - Prescription Opioids:  (Pain Management)  History of Psychiatric Problems: No  Non-compliant with Treatment: No  Newly Diagnosed Illness: No    Discharge Risks or Barriers  Discharge risks or barriers?: Post-acute placement / services, Other (comment) (Mother needs assistance or placement for daughter)  Patient risk factors: Cognitive / sensory / physical deficit    Anticipated Discharge Information  Anticipated discharge disposition: Discharge needs currently unknown

## 2017-03-01 NOTE — PROGRESS NOTES
"Pt refuses morning labs, tried to educate pt, but pt continue repeating \"no, no\", and yelled \"leave me alone, I'm not doing anything, I'm not giving blood, I'm not taking more meds, just leave now\", pt turned around and started mumbling talking to herself.  "

## 2017-03-01 NOTE — PROGRESS NOTES
Infectious Disease Progress Note    Author: Ivy Gutierrez M.D.    DOS & Time created: 3/1/2017  1:52 PM    Chief Complaint   Patient presents with   • Wound Infection     pt has wound on right foot, went [facility], report cellutliis with drainage on wound     Interval History:  46-year-old white female with history of morbid obesity,  Moyamoya syndrome with prior stroke, who presents with a traumatic injury to her right foot with subsequent development of cellulitis and abscess    AF WBC 13.3 s/p drainage abscess-has been refusing IV  3/1- willing to take iv. No fevers. C/o pain. Apparently walking on the foot  Labs Reviewed, Medications Reviewed, Radiology Reviewed and Wound Reviewed.    Review of Systems:  Review of Systems   Constitutional: Negative for fever and chills.   Gastrointestinal: Negative for nausea and vomiting.   Musculoskeletal: Positive for myalgias and joint pain.        Foot pain   Skin: Negative for rash.       Hemodynamics:  Temp (24hrs), Av.2 °C (99 °F), Min:36.8 °C (98.2 °F), Max:37.4 °C (99.4 °F)  Temperature: 37.3 °C (99.2 °F)  Pulse  Av.8  Min: 58  Max: 82   Blood Pressure: 131/67 mmHg       Physical Exam:  Physical Exam   Constitutional: She is oriented to person, place, and time. She appears well-developed. No distress.   Obese   HENT:   Head: Normocephalic and atraumatic.   Eyes: EOM are normal. Pupils are equal, round, and reactive to light.   Neck: Neck supple.   Cardiovascular: Normal rate and regular rhythm.    No murmur heard.  Pulmonary/Chest: Effort normal. No respiratory distress. She has no wheezes. She has no rales.   Abdominal: Soft. Bowel sounds are normal. She exhibits no distension. There is no tenderness.   Musculoskeletal:   Right foot dressed   Neurological: She is alert and oriented to person, place, and time.   Skin: No rash noted.   Psychiatric:   Poor insight   Nursing note and vitals reviewed.      Labs:  Recent Labs      17   2230  17    0300   WBC  15.7*  13.3*   RBC  3.30*  3.02*   HEMOGLOBIN  10.4*  9.5*   HEMATOCRIT  32.4*  29.2*   MCV  98.2*  96.7   MCH  31.5  31.5   RDW  45.3  45.5   PLATELETCT  139*  142*   MPV  11.5  11.1   NEUTSPOLYS  78.00*  76.80*   LYMPHOCYTES  8.20*  7.90*   MONOCYTES  12.90  13.80*   EOSINOPHILS  0.10  0.40   BASOPHILS  0.20  0.20     Recent Labs      02/26/17 2230 02/27/17   1013  02/28/17   0300   SODIUM  136  141  138   POTASSIUM  4.1  3.9  4.1   CHLORIDE  105  108  106   CO2  15*  16*  17*   GLUCOSE  102*  96  86   BUN  63*  64*  60*     Recent Labs      02/26/17 2230 02/27/17   1013  02/28/17   0300   ALBUMIN  2.9*   --   2.5*   TBILIRUBIN  0.5   --   0.3   ALKPHOSPHAT  71   --   64   TOTPROTEIN  6.5   --   6.3   ALTSGPT  10   --   17   ASTSGOT  25   --   31   CREATININE  4.61*  4.91*  4.27*        Imaging:       CT-FOOT W/O PLUS RECONS RIGHT (Final result) Result time: 02/27/17 00:30:10     Final result by Gee Tam M.D. (02/27/17 00:30:10)     Impression:     1.  Soft tissue swelling with gas in the distal plantar aspect of the foot at the level of the MTP joints with draining wound to the plantar surface of the skin.  2.  Minimal gas in the extensor aspect of the foot at the level of the MTP joints which may represent extension of cellulitis anteriorly.  3.  No loculated fluid collection identified.  4.  Diffuse soft tissue swelling in the remainder of the foot.  5.  No acute bony abnormality. No evidence of osteomyelitis. No radiopaque soft tissue foreign body identified     MR-FOOT-W/O RIGHT (Final result) Result time: 02/28/17 11:19:53     Final result by Aureliano Flores M.D. (02/28/17 11:19:53)     Impression:     Forefoot bilobed abscess with apparent plantar draining sinus. Largest loculation is in the dorsal soft tissues as discussed above  No unenhanced MR evidence of osteomyelitis  Metatarsophalangeal joint effusions most likely are reactive given lack of bony destruction/marginal  erosive change     Final result by Rad Intf (02/27/17 11:53:22)     Narrative:     TransthoracicEcho Report  CONCLUSIONS  No prior study is available for comparison.   Normal left ventricular size, thickness, systolic function, and   diastolic function.  Left ventricular ejection fraction is visually estimated to be 65%.  Normal right ventricular size and systolic function.  Trace tricuspid regurgitation.  Normal pericardium without effusion.     Medications:  Current Facility-Administered Medications   Medication Dose Frequency Provider Last Rate Last Dose   • diphenoxylate-atropine (LOMOTIL) 2.5-0.025 MG per tablet 1 Tab  1 Tab 4X/DAY PRN Shazia Valle M.D.   1 Tab at 03/01/17 1125   • cefTRIAXone (ROCEPHIN) 2 g in  mL IVPB  2 g Q24HRS Rocio Hinson M.D.   Stopped at 03/01/17 0922   • metronidazole (FLAGYL) IVPB 500 mg  500 mg Q8HRS Vidya Cruz, PHARMD   Stopped at 03/01/17 0559   • heparin injection 5,000 Units  5,000 Units Q8HRS Augustus Guerin M.D.   5,000 Units at 02/27/17 1550   • lorazepam (ATIVAN) tablet 1 mg  1 mg TID Augustus Guerin M.D.   1 mg at 03/01/17 1128   • metronidazole (FLAGYL) tablet 500 mg  500 mg Q8HRS Rocio Hinson M.D.   500 mg at 03/01/17 0914   • sodium bicarbonate (SODIUM BICARBONATE) tablet 1,300 mg  1,300 mg TID Evelia Yepez M.D.   1,300 mg at 03/01/17 0914   • oxycodone immediate-release (ROXICODONE) tablet 5 mg  5 mg Q6HRS PRN Evelia Yepez M.D.   5 mg at 03/01/17 0454   • amlodipine (NORVASC) tablet 5 mg  5 mg DAILY Chalino Bender M.D.   5 mg at 03/01/17 0915   • aspirin (ASA) tablet 325 mg  325 mg DAILY Chalino Bender M.D.   325 mg at 03/01/17 0912   • atorvastatin (LIPITOR) tablet 40 mg  40 mg Nightly Chalino Bender M.D.   40 mg at 02/28/17 1952   • carvedilol (COREG) tablet 25 mg  25 mg BID WITH MEALS Chalino Bender M.D.   25 mg at 03/01/17 0913   • cyanocobalamin (VITAMIN B-12) tablet 1,000 mcg  1,000 mcg DAILY Chalino  RIOS Bender   1,000 mcg at 02/28/17 0901   • folic acid (FOLVITE) tablet 1 mg  1 mg DAILY Chalino Bender M.D.   Stopped at 02/27/17 0849   • phenytoin ER (DILANTIN) capsule 200 mg  200 mg BID Chalino Bender M.D.   200 mg at 03/01/17 0914   • docusate sodium (COLACE) capsule 100 mg  100 mg BID Chalino Bender M.D.   Stopped at 02/27/17 0900   • bisacodyl (DULCOLAX) suppository 10 mg  10 mg Q24HRS PRN Chalino Bender M.D.       • fleet enema 133 mL  1 Each Once PRN Chalino Bender M.D.       • Respiratory Care per Protocol   Continuous RT Chalino Bender M.D.       • ondansetron (ZOFRAN) syringe/vial injection 4 mg  4 mg Q4HRS PRN Chalino Bender M.D.       • ondansetron (ZOFRAN ODT) dispertab 4 mg  4 mg Q4HRS PRN Chalino Bender M.D.       • promethazine (PHENERGAN) tablet 12.5-25 mg  12.5-25 mg Q4HRS PRN Chalino Bender M.D.       • promethazine (PHENERGAN) suppository 12.5-25 mg  12.5-25 mg Q4HRS PRN Chalino Bender M.D.       • prochlorperazine (COMPAZINE) injection 5-10 mg  5-10 mg Q4HRS PRN Chalino Bender M.D.       • acetaminophen (TYLENOL) tablet 650 mg  650 mg Q6HRS PRN Chalino Bender M.D.       • NS (BOLUS) infusion 500 mL  500 mL Once PRN Chalino Bender M.D.         Last reviewed on 2/26/2017 11:24 PM by Radha Mixon    Micro:  Results     Procedure Component Value Units Date/Time    CULTURE WOUND W/ GRAM STAIN [[id number]]  (Abnormal) Collected:  02/27/17 1015    Order Status:  Completed Specimen Information:  Wound from Left Foot Updated:  03/01/17 0949     Gram Stain Result --      Result:        Many WBCs.  Many Gram positive cocci.       Significant Indicator POS (POS)      Source WND      Site LEFT FOOT      Culture Result Wound Moderate growth Mixed skin shawn. (A)      Culture Result Wound -- (A)      Result:        Staphylococcus aureus  Heavy growth  See previous culture for sensitivity report.       Culture Result Wound -- (A)      Result:         Escherichia coli  Rare growth  See previous culture for sensitivity report.      Narrative:      Collected By:[id number] FRANCES MIX    CULTURE TISSUE W/ GRM STAIN [[id number]]  (Abnormal) Collected:  02/27/17 1243    Order Status:  Completed Specimen Information:  Tissue Updated:  03/01/17 0948     Gram Stain Result -- (A)      Result:        Many WBCs.  Many Gram positive cocci.       Significant Indicator POS (POS)      Source TISS      Site Right Foot Abscess      Tissue Culture -- (A)      Tissue Culture -- (A)      Result:        Staphylococcus aureus  Heavy growth  See previous culture for sensitivity report.       Tissue Culture -- (A)      Result:        Escherichia coli  Rare growth  See previous culture for sensitivity report.       Tissue Culture -- (A)      Result:        Viridans Streptococcus  Moderate growth  Mixed morphologies       Tissue Culture -- (A)      Result:        Beta Streptococcus Group G  Light growth      ANAEROBIC CULTURE [[id number]] Collected:  02/27/17 1243    Order Status:  Completed Specimen Information:  Tissue Updated:  03/01/17 0948     Significant Indicator NEG      Source TISS      Site Right Foot Abscess      Anaerobic Culture, Culture Res Culture in progress.     CULTURE WOUND W/ GRAM STAIN [[id number]]  (Abnormal)  (Susceptibility) Collected:  02/26/17 2350    Order Status:  Completed Specimen Information:  Wound from Right Foot Updated:  03/01/17 0946     Gram Stain Result --      Result:        Moderate WBCs.  Many Gram positive cocci.       Significant Indicator POS (POS)      Source WND      Site RIGHT FOOT      Culture Result Wound Moderate growth Mixed skin shawn. (A)      Culture Result Wound -- (A)      Result:        Staphylococcus aureus  Moderate growth       Culture Result Wound -- (A)      Result:        Escherichia coli  Rare growth      Culture & Susceptibility     ESCHERICHIA COLI     Antibiotic Sensitivity Microscan Unit Status    Ampicillin Sensitive  "<=8 mcg/mL Final    Cefepime Sensitive <=8 mcg/mL Final    Cefotaxime Sensitive <=2 mcg/mL Final    Cefotetan Sensitive <=16 mcg/mL Final    Ceftazidime Sensitive <=1 mcg/mL Final    Ceftriaxone Sensitive <=8 mcg/mL Final    Cefuroxime Sensitive <=4 mcg/mL Final    Ciprofloxacin Sensitive <=1 mcg/mL Final    Ertapenem Sensitive <=1 mcg/mL Final    Gentamicin Sensitive <=4 mcg/mL Final    Pip/Tazobactam Sensitive <=16 mcg/mL Final    Tigecycline Sensitive <=2 mcg/mL Final    Tobramycin Sensitive <=4 mcg/mL Final    Trimeth/Sulfa Sensitive <=2/38 mcg/mL Final              STAPHYLOCOCCUS AUREUS     Antibiotic Sensitivity Microscan Unit Status    Ampicillin/sulbactam Sensitive <=8/4 mcg/mL Final    Clindamycin Sensitive <=0.5 mcg/mL Final    Daptomycin Sensitive <=0.5 mcg/mL Final    Erythromycin Sensitive <=0.5 mcg/mL Final    Moxifloxacin Sensitive <=0.5 mcg/mL Final    Oxacillin Sensitive <=0.25 mcg/mL Final    Tetracycline Sensitive <=4 mcg/mL Final    Trimeth/Sulfa Sensitive <=0.5/9.5 mcg/mL Final    Vancomycin Sensitive 2 mcg/mL Final                       BLOOD CULTURE [[id number]] Collected:  02/26/17 2330    Order Status:  Completed Specimen Information:  Blood from Peripheral Updated:  02/28/17 0937     Significant Indicator NEG      Source BLD      Site PERIPHERAL      Blood Culture --      Result:        No Growth    Note: Blood cultures are incubated for 5 days and  are monitored continuously.Positive blood cultures  are called to the RN and reported as soon as  they are identified.      Narrative:      Per Hospital Policy: Only change Specimen Src: to \"Line\" if  specified by physician order.    GRAM STAIN [[id number]] Collected:  02/27/17 1243    Order Status:  Completed Specimen Information:  Tissue Updated:  02/27/17 1745     Significant Indicator .      Source TISS      Site Right Foot Abscess      Gram Stain Result --      Result:        Many WBCs.  Many Gram positive cocci.      GRAM STAIN [[id number]] " "Collected:  02/27/17 1015    Order Status:  Completed Specimen Information:  Wound Updated:  02/27/17 1743     Significant Indicator .      Source WND      Site LEFT FOOT      Gram Stain Result --      Result:        Many WBCs.  Many Gram positive cocci.      Narrative:      Collected By:[id number] FRANCES MIX    CDIFF BY PCR [[id number]] Collected:  02/27/17 1130    Order Status:  Completed Specimen Information:  Stool from Stool Updated:  02/27/17 1347     C Diff by PCR Negative      027-NAP1-BI Presumptive Negative      Comment: Presumptive 027/NAP1/BI target DNA sequences are NOT DETECTED.       Narrative:      Collected By:[id number] FRANCES MIX  Does this patient have risk factors for C-diff?->Yes  C-Diff Risk Factors->antibiotic exposure  Has patient taken stool softeners or laxatives in the last 5  days?->No  Indication for CDiff by PCR?->Greater than/equal to 3 stools  in 24 hr & \"takes shape of container\"    GRAM STAIN [[id number]] Collected:  02/26/17 2350    Order Status:  Completed Specimen Information:  Wound Updated:  02/27/17 1046     Significant Indicator .      Source WND      Site RIGHT FOOT      Gram Stain Result --      Result:        Moderate WBCs.  Many Gram positive cocci.      BLOOD CULTURE [[id number]] Collected:  02/26/17 2230    Order Status:  Completed Specimen Information:  Blood from Peripheral Updated:  02/27/17 0920     Significant Indicator NEG      Source BLD      Site PERIPHERAL      Blood Culture --      Result:        No Growth    Note: Blood cultures are incubated for 5 days and  are monitored continuously.Positive blood cultures  are called to the RN and reported as soon as  they are identified.      Narrative:      Per Hospital Policy: Only change Specimen Src: to \"Line\" if  specified by physician order.    Culture Respiratory W/ GRM STN [[id number]]     Order Status:  Completed Specimen Information:  Respirate from Sputum     URINALYSIS [[id number]]     Order Status:  Sent " "Specimen Information:  Urine     URINE CULTURE(NEW) [[id number]]     Order Status:  Sent Specimen Information:  Urine     Blood Culture [[id number]] Collected:  02/26/17 0000    Order Status:  Canceled Specimen Information:  Other from Peripheral     Narrative:      ORDER WAS CANCELLED 02/26/2017 23:01, refer to [id number]  23:01  02/26/2017.  From different peripheral sites, if not done within the last  24 hours (Per Hospital Policy: Only change specimen source to  \"Line\" if specified by physician order)    Blood Culture [[id number]] Collected:  02/26/17 0000    Order Status:  Canceled Specimen Information:  Other from Peripheral     Narrative:      ORDER WAS CANCELLED 02/26/2017 23:01, refer to [id number]  23:02  02/26/2017.  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]   • Dementia [F03.90]   • Cellulitis of right foot [L03.115]   • CKD stage 4 secondary to hypertension (CMS-HCC) [I12.9, N18.4]   • Seizure disorder, grand mal (CMS-HCC) [G40.409]   • Dilated cardiomyopathy (CMS-HCC) [I42.0]   Right foot abscess, suspected OM    Plan:  Right foot abscess, multiloculated with sepsis  S/p drainage 2/27, emergent  Fever, resolved  Persistent leukocytosis  C/s are mssa, e coli, strep vridans, grp c strep  Start zinacef  picc line    Chronic renal failure  Avoiding nephrotoxic drugs  Current abx do not require adjustment for renal function    Moyamoya syndrome with prior stroke/dementia  Noncompliant with abx  Barrier to care  Places her at increased risk for amputation    Discussed with family  Discussed with internal medicine  "

## 2017-03-01 NOTE — PROGRESS NOTES
Assumed care of pt at shift change, report received from day shift RN. Pt A&Ox4, resting in bed. C/o 2/10 RLE pain, declines pharmacologic intervention, pillows for elevation provided. Denies any other s/s of distress at this time. Up to the commode with one person assist, tolerates well. All needs addressed. Bed in low position, alarm on, call light within reach, hourly rounding in place.

## 2017-03-01 NOTE — WOUND TEAM
Wound team consult placed regarding dressing recommendations for surgical site.  Spoke with floor RN as Dr Monterroso wrote a note requesting daily dry dressings.  Instructed her to call surgeon if wound has declined or there are concerns.

## 2017-03-01 NOTE — CARE PLAN
Problem: Safety  Goal: Will remain free from injury  Outcome: PROGRESSING AS EXPECTED  Patient currently without injury from hospital    Problem: Pain Management  Goal: Pain level will decrease to patient’s comfort goal  Intervention: Educate and implement non-pharmacologic comfort measures. Examples: relaxation, distration, play therapy, activity therapy, massage, etc.  Discussed relaxation and stress release techniques

## 2017-03-01 NOTE — DISCHARGE PLANNING
Care Transition Team Discharge Planning    Anticipated Discharge Information  Anticipated discharge disposition: Discharge needs currently unknown              Discharge Plan:  Myla's discharge plan/needs are not fully known at this time.  I am adding her to difficult discharge and asking Tasha Das to assist with long-term plan.     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  She appears to have simple language skills and needs assistance with personal care due to childlike understanding.  Mother states that patient is increasing in her needs and it is difficult to care for her, mostly due to her running and desire to get away from her mother.  She wonders if there are locked resources available to help with care.  Family lives in [place] and states [facility] has locked unit, but unsure of any availability.  Pt has [employer].  I advised mother that qualifications for lock unit and what would be accepted is up in air.  She understands, but wants to look at if possible for at least a short period.      I called Benji Maurice “Mrs. YUNG” [phone]. Director of [facility] and she advised she has a locked unit which houses both short-term and long-term patients.  She states that patient cannot have wounds if in locked unit.  I advised family that we will need plan from doctors, wound resolved and approval of insurance and facility to be an option so down the road at best.  Family thanked me for at least finding contact and what their current requirements are.      Patient is currently being evaluated for needs and plan of care.  I have notified Tasha of need to f/u on difficult discharge/long-term care needs.  Kurtis brown.

## 2017-03-01 NOTE — PSYCHIATRY
"PSYCHIATRIC CONSULTATION:  Reason for admission:   Wound infection with sepsis  Reason for consult:  Medication Management/family assistance  Requesting Physician:  Nabeel Monahan M.D.   Supervising Physician:   Lynn Carpenter M.D.     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 Pt is hospitalized for a foot wound s/p surgical debridement on 2/27, receiving IV antibiotics. Pt has refused IV placement/antibiotics, multiple PO meds, and AM lab draws this morning. At some point told nurse \"I'm going to refuse everything.\" Per pt, she was just having a mood swing, and is now agreeable to treatment. On ROS pt denied hallucinations but stated \"at 9:30 last night I heard things, but I didn't\", and then declined to explain further. Parents have POA and ask that hospital staff follow treatment plan regardless of pt refusal. Parents also believe that the patient is struggling with the amount of people coming in and out of the room, the noises from the neighbor pt, and the heat in the room. The interview was limited by lack of pt cooperation. She exited the room to sit in the hallway where it was cooler and began mumbling to herself.    Pt's mother is concerned with the patient wandering. At some point during the evening, pt was able to leave chung and head for the tayla pad. She has had issues with wandering in the past, including an incident this fall in which she left [place] in the middle of [place]     She is very disinhibited. She laughs inappropriately at times and appears almost childlike or euphoric. She has insight into her presentation and tries to describe the feeling of having strokes to this provider.       Psychiatric Review of Systems:current symptoms as reported by pt.  Depression:  Decreased sleep, difficulties with " "concentration. No loss of interest, guilt, decreased energy, changes in appetite, thoughts of suicide.       Suzi: No increased energy, racing thoughts, grandiosity.   Anxiety/Panic Attacks: No anxiety, no panic attacks  PTSD symptom: No nightmares/flashbacks  Psychosis: Denies auditory and visual hallucinations, no evidence of response to internal stimuli          Medical Review of Systems: as reported by pt. All systems reviewed. Only those found to be + are noted below. All others are negative.   Neurological:    TBIs: Denies   SZs: Yes, seizure disorder   Strokes: Yes, multiple CVAs    Other: Diagnosis of Moyamoya   Other medical symptoms: Foot pain.       Psychiatric Examination: observed phenomenon:  Vitals:   Filed Vitals:    02/28/17 1600 02/28/17 2000 03/01/17 0500 03/01/17 0753   BP: 132/65 102/48 141/72 131/67   Pulse: 82 67 73 77   Temp: 36.8 °C (98.2 °F) 37.3 °C (99.1 °F) 37.4 °C (99.4 °F) 37.3 °C (99.2 °F)   Resp: 18 17 20 18   Height:       Weight:       SpO2: 93% 95% 94% 92%     Musculoskeletal(abnormal movements, gait, etc): Psychomotor agitation, covers face with hands often  Appearance: Obese, unkempt hair, tattoo on L calf   Thoughts: Disorganized  Speech: Clear, regular rate and rhythm, normal volume and tone  Mood: \"1 out of 5\"      Affect: Irritated     SI/HI:   Denies, no evidence of SI/HI  Attention/Alertness:  Poor attention and alertness  Memory:    Unable to assess  Orientation:   Unable to asses  Fund of Knowledge:    Unable to assess  Insight/Judgement into mental illness: Poor insight and judgement   Neurological Testing: Unable to assess         Past Psychiatric Hx: Pt is seen by Dr. Dino Morin for Neurology, and has received some psychiatric medications from him over the last year. Takes lorazepam and paroxetine for mood changes and irritability. In October she had a week long episode of rage and destructive behavior, during which she was combative with her parents. She was " prescribed risperidone at that time, but later developed facial swelling, difficulties breathing, and hallucinations, so it was discontinued. Also trying Donepezil for memory, but family notes no changes.   She has been on depakote, which gave her an adverse reaction. She states abilify kept her awake and agitated.     Family Psychiatric Hx: Unspecified psychiatric condition in maternal grandfather, otherwise unknown.     Social Hx: Unmarried, no children. Lives with mother, who is also caregiver, in [place]. Mother and father both have POA.     Drug/Alcohol/Tobacco Hx: Former smoker, otherwise uknown.     Medical Hx: labs, MARS, medications, etc were reviewed. Only those findings of potential interest to psychiatry are noted below:    MRI-Brain 2009: 1) Interval resolution of left frontal acute infarct, left frontal intracerebral hematoma and subdural and subarachnoid hemorrhages on the left. 2) Stable pattern of microvascular ischemic gliosis in the bifrontal supratentorial white matter. 3) Unchanged finding of poor flow signal in the medial middle cerebral artery branches bilaterally consistent with the patient's diagnosis of moyamoya disease.     EEG 12/06/2016: Abnormal EEG showing dysfunction in the Right hemisphere, particularly in the occipital and probably posterior temporal regions. Clinical correlation is suggested. The absence of epileptogenic activity on EEG does not rule out epilepsy.       Medical Conditions:  Past Medical History   Diagnosis Date   • Hypertension    • Kidney disease    • Stroke (CMS-HCC)    • Moyamoya disease 10/3/2013   • H/O: CVA (cerebrovascular accident) 12/24/2015   • Mild mitral regurgitation 7/14/2014   • Seizure disorder, grand mal (CMS-HCC) 3/3/2016   • CKD (chronic kidney disease), stage IV (CMS-HCC) 12/24/2015     Allergies:  Medications (currently prescribed at [facility]):    Current facility-administered medications:   •  cefUROXime (ZINACEF) 750 mg in NS 50 mL IVPB, 750 mg,  Intravenous, Q8HRS, Ivy Gutierrez M.D.  •  diphenoxylate-atropine (LOMOTIL) 2.5-0.025 MG per tablet 1 Tab, 1 Tab, Oral, 4X/DAY PRN, Shazia Valle M.D., 1 Tab at 03/01/17 1125  •  heparin injection 5,000 Units, 5,000 Units, Subcutaneous, Q8HRS, Augustus Guerin M.D., 5,000 Units at 03/01/17 1406  •  lorazepam (ATIVAN) tablet 1 mg, 1 mg, Oral, TID, Augustus Guerin M.D., 1 mg at 03/01/17 1128  •  sodium bicarbonate (SODIUM BICARBONATE) tablet 1,300 mg, 1,300 mg, Oral, TID, Evelia Yepez M.D., 1,300 mg at 03/01/17 1402  •  oxycodone immediate-release (ROXICODONE) tablet 5 mg, 5 mg, Oral, Q6HRS PRN, Evelia Yepez M.D., 5 mg at 03/01/17 0454  •  amlodipine (NORVASC) tablet 5 mg, 5 mg, Oral, DAILY, Chalino Bender M.D., 5 mg at 03/01/17 0915  •  aspirin (ASA) tablet 325 mg, 325 mg, Oral, DAILY, Chalino Bender M.D., 325 mg at 03/01/17 0912  •  atorvastatin (LIPITOR) tablet 40 mg, 40 mg, Oral, Nightly, Chalino Bender M.D., 40 mg at 02/28/17 1952  •  carvedilol (COREG) tablet 25 mg, 25 mg, Oral, BID WITH MEALS, Chalino Bender M.D., 25 mg at 03/01/17 0913  •  cyanocobalamin (VITAMIN B-12) tablet 1,000 mcg, 1,000 mcg, Oral, DAILY, Chalino Bender M.D., 1,000 mcg at 02/28/17 0901  •  folic acid (FOLVITE) tablet 1 mg, 1 mg, Oral, DAILY, Chalino Bender M.D., Stopped at 02/27/17 0849  •  phenytoin ER (DILANTIN) capsule 200 mg, 200 mg, Oral, BID, Chalino Bender M.D., 200 mg at 03/01/17 0914  •  docusate sodium (COLACE) capsule 100 mg, 100 mg, Oral, BID, Chalino Bender M.D., Stopped at 02/27/17 0900  •  bisacodyl (DULCOLAX) suppository 10 mg, 10 mg, Rectal, Q24HRS PRN, Chalino Bender M.D.  •  fleet enema 133 mL, 1 Each, Rectal, Once PRN, Chalino Bender M.D.  •  Respiratory Care per Protocol, , Nebulization, Continuous RT, Chalino Bender M.D.  •  ondansetron (ZOFRAN) syringe/vial injection 4 mg, 4 mg, Intravenous, Q4HRS PRN, Chalino Bender M.D.  •  ondansetron (ZOFRAN  ODT) dispertab 4 mg, 4 mg, Oral, Q4HRS PRN, Chalino Bender M.D.  •  promethazine (PHENERGAN) tablet 12.5-25 mg, 12.5-25 mg, Oral, Q4HRS PRN, Chalino Bender M.D.  •  promethazine (PHENERGAN) suppository 12.5-25 mg, 12.5-25 mg, Rectal, Q4HRS PRN, Chalino Bender M.D.  •  prochlorperazine (COMPAZINE) injection 5-10 mg, 5-10 mg, Intravenous, Q4HRS PRN, Chalino Bender M.D.  •  acetaminophen (TYLENOL) tablet 650 mg, 650 mg, Oral, Q6HRS PRN, Chalino Bender M.D.  •  NS (BOLUS) infusion 500 mL, 500 mL, Intravenous, Once PRN, Chalino Bender M.D.  Labs: WBC 13.3  ECG: None this admission    Cranial Imaging: reviewed  MRI from 2009:   . INTERVAL RESOLUTION OF LEFT FRONTAL ACUTE INFARCT, LEFT FRONTAL   INTRACEREBRAL HEMATOMA AND SUBDURAL AND SUBARACHNOID HEMORRHAGES ON THE   LEFT.     2. STABLE PATTERN OF MICROVASCULAR ISCHEMIC GLIOSIS IN THE BIFRONTAL   SUPRATENTORIAL WHITE MATTER.      3. UNCHANGED FINDING OF POOR FLOW SIGNAL IN THE MEDIAL MIDDLE CEREBRAL   ARTERY BRANCHES BILATERALLY CONSISTENT WITH THE PATIENT'S DIAGNOSIS OF   MOYAMOYA DISEASE.  NOTE THAT THERE IS ALSO LOCALIZED INCREASED T2 SIGNAL   WITHIN THE RIGHT GLOBUS PALLIDUS WHICH IS UNCHANGED AND IS LIKELY THE   RESULT OF A PREVIOUS ISCHEMIC INSULT.  IN ADDITION, THERE IS SOMEWHAT   SMALL SIZE AND DEFORMITY OF THE HEAD OF THE CAUDATE NUCLEUS ON THE RIGHT   WHICH IS UNCHANGED AND MAY BE THE RESULT OF A CHRONIC ISCHEMIC PROCESS.          ASSESSMENT: (new dx, acuity level)  1. Moyamoya disease with dementia   2. Hx of CVA  3. Seizure disorder   4. Rule out delirium  5. Right foot abscess     Pt with PMH of moyamoya, multiple CVAs, and seizure disorder is experiencing irritability and agitation.. Images from 2009 demonstrate frontal lobe pathology, indicating that pt may benefit from mood stabilizers vs. Antipsychotic medications to improve quality of life. Currently prescribed and Dilantin as seizure prophylaxis.       PLAN:  Discussed trying  very low dose trileptal with patient. She has tried multiple agents to try to control her behavior; it isn't first line but she has already tried multiple medications.   Suggest we consider decreasing ativan as it can further disinhibit patients with frontal pathology, but will get more collateral about the ativan before we decrease it.   Zyprexa can work incredibly well with frontal pathology but also increases risk of stroke. Will try lower risk medications first.

## 2017-03-02 ENCOUNTER — APPOINTMENT (OUTPATIENT)
Dept: RADIOLOGY | Facility: MEDICAL CENTER | Age: 47
DRG: 853 | End: 2017-03-02
Attending: HOSPITALIST
Payer: MEDICARE

## 2017-03-02 LAB
BACTERIA SPEC ANAEROBE CULT: NORMAL
SIGNIFICANT IND 70042: NORMAL
SITE SITE: NORMAL
SOURCE SOURCE: NORMAL

## 2017-03-02 PROCEDURE — A9270 NON-COVERED ITEM OR SERVICE: HCPCS | Performed by: HOSPITALIST

## 2017-03-02 PROCEDURE — 99233 SBSQ HOSP IP/OBS HIGH 50: CPT | Performed by: HOSPITALIST

## 2017-03-02 PROCEDURE — 700111 HCHG RX REV CODE 636 W/ 250 OVERRIDE (IP): Performed by: HOSPITALIST

## 2017-03-02 PROCEDURE — 770006 HCHG ROOM/CARE - MED/SURG/GYN SEMI*

## 2017-03-02 PROCEDURE — 700102 HCHG RX REV CODE 250 W/ 637 OVERRIDE(OP): Performed by: PSYCHIATRY & NEUROLOGY

## 2017-03-02 PROCEDURE — 700102 HCHG RX REV CODE 250 W/ 637 OVERRIDE(OP): Performed by: HOSPITALIST

## 2017-03-02 PROCEDURE — 700102 HCHG RX REV CODE 250 W/ 637 OVERRIDE(OP): Performed by: INTERNAL MEDICINE

## 2017-03-02 PROCEDURE — A9270 NON-COVERED ITEM OR SERVICE: HCPCS | Performed by: PSYCHIATRY & NEUROLOGY

## 2017-03-02 PROCEDURE — 700112 HCHG RX REV CODE 229: Performed by: HOSPITALIST

## 2017-03-02 PROCEDURE — 700105 HCHG RX REV CODE 258: Performed by: INTERNAL MEDICINE

## 2017-03-02 PROCEDURE — 700111 HCHG RX REV CODE 636 W/ 250 OVERRIDE (IP): Performed by: INTERNAL MEDICINE

## 2017-03-02 PROCEDURE — A9270 NON-COVERED ITEM OR SERVICE: HCPCS | Performed by: INTERNAL MEDICINE

## 2017-03-02 RX ADMIN — Medication 1300 MG: at 14:36

## 2017-03-02 RX ADMIN — LORAZEPAM 1 MG: 1 TABLET ORAL at 04:23

## 2017-03-02 RX ADMIN — Medication 1300 MG: at 09:04

## 2017-03-02 RX ADMIN — DOCUSATE SODIUM 100 MG: 100 CAPSULE ORAL at 20:11

## 2017-03-02 RX ADMIN — CARVEDILOL 25 MG: 25 TABLET, FILM COATED ORAL at 17:24

## 2017-03-02 RX ADMIN — Medication 1300 MG: at 20:11

## 2017-03-02 RX ADMIN — ATORVASTATIN CALCIUM 40 MG: 40 TABLET, FILM COATED ORAL at 20:12

## 2017-03-02 RX ADMIN — FOLIC ACID 1 MG: 1 TABLET ORAL at 09:04

## 2017-03-02 RX ADMIN — CARVEDILOL 25 MG: 25 TABLET, FILM COATED ORAL at 09:03

## 2017-03-02 RX ADMIN — ASPIRIN 325 MG: 325 TABLET, FILM COATED ORAL at 09:03

## 2017-03-02 RX ADMIN — OXCARBAZEPINE 150 MG: 150 TABLET, FILM COATED ORAL at 09:02

## 2017-03-02 RX ADMIN — LORAZEPAM 1 MG: 1 TABLET ORAL at 19:41

## 2017-03-02 RX ADMIN — HEPARIN SODIUM 5000 UNITS: 5000 INJECTION, SOLUTION INTRAVENOUS; SUBCUTANEOUS at 20:11

## 2017-03-02 RX ADMIN — CEFUROXIME SODIUM 750 MG: 7.5 INJECTION, POWDER, FOR SOLUTION INTRAVENOUS at 14:35

## 2017-03-02 RX ADMIN — PHENYTOIN SODIUM 200 MG: 100 CAPSULE, EXTENDED RELEASE ORAL at 09:01

## 2017-03-02 RX ADMIN — CYANOCOBALAMIN TAB 500 MCG 1000 MCG: 500 TAB at 09:05

## 2017-03-02 RX ADMIN — CEFUROXIME SODIUM 750 MG: 7.5 INJECTION, POWDER, FOR SOLUTION INTRAVENOUS at 22:35

## 2017-03-02 RX ADMIN — HEPARIN SODIUM 5000 UNITS: 5000 INJECTION, SOLUTION INTRAVENOUS; SUBCUTANEOUS at 14:36

## 2017-03-02 RX ADMIN — OXCARBAZEPINE 150 MG: 150 TABLET, FILM COATED ORAL at 20:12

## 2017-03-02 RX ADMIN — OXYCODONE HYDROCHLORIDE 5 MG: 5 TABLET ORAL at 20:11

## 2017-03-02 RX ADMIN — CEFUROXIME SODIUM 750 MG: 7.5 INJECTION, POWDER, FOR SOLUTION INTRAVENOUS at 05:58

## 2017-03-02 RX ADMIN — AMLODIPINE BESYLATE 5 MG: 5 TABLET ORAL at 09:03

## 2017-03-02 RX ADMIN — LORAZEPAM 1 MG: 1 TABLET ORAL at 11:18

## 2017-03-02 RX ADMIN — PHENYTOIN SODIUM 200 MG: 100 CAPSULE, EXTENDED RELEASE ORAL at 20:11

## 2017-03-02 RX ADMIN — HEPARIN SODIUM 5000 UNITS: 5000 INJECTION, SOLUTION INTRAVENOUS; SUBCUTANEOUS at 06:04

## 2017-03-02 RX ADMIN — HALOPERIDOL LACTATE 5 MG: 5 INJECTION, SOLUTION INTRAMUSCULAR at 01:56

## 2017-03-02 ASSESSMENT — PAIN SCALES - GENERAL
PAINLEVEL_OUTOF10: 0
PAINLEVEL_OUTOF10: ASSUMED PAIN PRESENT
PAINLEVEL_OUTOF10: 0
PAINLEVEL_OUTOF10: 0

## 2017-03-02 ASSESSMENT — ENCOUNTER SYMPTOMS
FEVER: 0
MYALGIAS: 1
VOMITING: 0
CHILLS: 0
NAUSEA: 0

## 2017-03-02 NOTE — PROGRESS NOTES
Updated by bedside RN of pt's ongoing agitation, Dr. Monahan notified, order received for Haldol 5 mg IV q 4hrs.

## 2017-03-02 NOTE — PROGRESS NOTES
"Pt trying to call the police, stating that her dad was outside, pt oriented to environment and education provided. Later after pt called requesting room change, when told that wasn't possible at this time pt yelled \"do I have to listen to all that, my dad just die right there outside like 10 minutes ago, I can't sleep\", pt reoriented to environment and medication for anxiety provided.   "

## 2017-03-02 NOTE — PROGRESS NOTES
Hospital Medicine Interval Note  Date of Service:  3/2/2017    Chief Complaint  46 y.o.-year-old female admitted 2/26/2017 with foot infection    Interval Problem Update  3/2 - discussed with patient; discussed on IDT rounds. May benefit from locked unit after her abx therapy complete and wound healed; will probably need SNF.     3/1 - discussed with family and patient at length. Family can barely care for the patient and they have to call the police to find her at least several times per week. Discussed with psych. Patient is now agreeable for PICC; ordered. Questions answered.     Consultants/Specialty  Psych - [facility]  ID - Matt  Ortho - [place]    Disposition  Clinical    I spent a total of 35 minutes during this clinical encounter of which > 50% was devoted to counseling and coordinating care including review of records, pertinent lab data and studies, as well as discussing diagnostic evaluation and work up, planned therapeutic interventions and future disposition of care. Where indicated, the assessment and plan reflect discussion of patient with consultants, other healthcare providers, family members, and additional research needed to obtain further information in formulating the plan of care of this patient.          Review of Systems   Unable to perform ROS: psychiatric disorder      Physical Exam Laboratory/Imaging   Filed Vitals:    03/01/17 1523 03/01/17 2000 03/02/17 0352 03/02/17 0800   BP: 127/66 134/59 118/49 115/72   Pulse: 62 67 65 68   Temp: 36.2 °C (97.2 °F) 37.2 °C (99 °F) 36.9 °C (98.4 °F) 36.6 °C (97.8 °F)   Resp: 20 18 20 20   Height:       Weight:       SpO2: 96% 100% 98% 94%     Physical Exam   Constitutional: She appears well-developed and well-nourished.   HENT:   Head: Normocephalic and atraumatic.   Mouth/Throat: Oropharynx is clear and moist.   Eyes: Conjunctivae and EOM are normal. Pupils are equal, round, and reactive to light. No scleral icterus.   Neck: Normal range of  motion. Neck supple. No tracheal deviation present. No thyromegaly present.   Cardiovascular: Normal rate, regular rhythm, normal heart sounds and intact distal pulses.    No murmur heard.  Pulmonary/Chest: Effort normal and breath sounds normal. No respiratory distress. She has no wheezes.   Abdominal: Soft. Bowel sounds are normal. She exhibits no distension. There is no tenderness.   Musculoskeletal: Normal range of motion. She exhibits edema and tenderness.   Right foot wrapped, erythematous at toe with serous drainage   Lymphadenopathy:     She has no cervical adenopathy.        Right: No supraclavicular adenopathy present.        Left: No supraclavicular adenopathy present.   Neurological: She is alert.   Skin: Skin is warm and dry.   Vitals reviewed.   Lab Results   Component Value Date/Time    WBC 13.3* 02/28/2017 03:00 AM    HEMOGLOBIN 9.5* 02/28/2017 03:00 AM    HEMATOCRIT 29.2* 02/28/2017 03:00 AM    PLATELET COUNT 142* 02/28/2017 03:00 AM     Lab Results   Component Value Date/Time    SODIUM 138 02/28/2017 03:00 AM    POTASSIUM 4.1 02/28/2017 03:00 AM    CHLORIDE 106 02/28/2017 03:00 AM    CO2 17* 02/28/2017 03:00 AM    GLUCOSE 86 02/28/2017 03:00 AM    BUN 60* 02/28/2017 03:00 AM    CREATININE 4.27* 02/28/2017 03:00 AM      Assessment/Plan    Cellulitis of right foot  Assessment & Plan  Active Orders     Ordered     Ordering Provider       Wed Mar 1, 2017  1:56 PM    03/01/17 1356  cefUROXime (ZINACEF) 750 mg in NS 50 mL IVPB   EVERY 8 HOURS      Ivy Gutierrez M.D.      ID on board  Ortho I&D'd  PICC ordered    Villavicencio villavicencio disease  Assessment & Plan  Asked psych to see  Currently on ativan and paxil, appreciate psych assistance  Discussed with family  Wander guard    CKD stage 4 secondary to hypertension (CMS-[facility])  Assessment & Plan  Lab Results   Component Value Date    BUN 60* 02/28/2017    CREATININE 4.27* 02/28/2017    CREATININE 0.9 11/19/2006   Baseline Cr 3-range, trending  Maintaining  hydration  Avoiding nephrotoxics: NSAIDs etc  Following Cr closely; to keep near baseline as possible    Anemia  Assessment & Plan  Lab Results   Component Value Date/Time    HEMOGLOBIN 9.5* 02/28/2017 03:00 AM    HEMATOCRIT 29.2* 02/28/2017 03:00 AM    MCV 96.7 02/28/2017 03:00 AM    PLATELET COUNT 142* 02/28/2017 03:00 AM   Goal Hb generally > 8 gm/dL  Transfusion per AABB guidelines available at ncbi.nlm.nih.gov/pubmed/99099148    Essential hypertension, benign  Assessment & Plan  SBP goal less than 140 mmHg  DBP goal less than 90 mmHg  PRN and antihypertensives to titrate by hospitalist towards goal  Most recent Blood Pressure: 115/72 mmHg     Dilated cardiomyopathy (CMS-HCC)  Assessment & Plan  Medically managed    HLD (hyperlipidemia)  Assessment & Plan  Statin    Seizure disorder, grand mal (CMS-HCC)  Assessment & Plan  Dilantin       Labs reviewed and Medications reviewed        DVT Prophylaxis: Heparin    Ulcer prophylaxis: Not indicated

## 2017-03-02 NOTE — ASSESSMENT & PLAN NOTE
Lab Results   Component Value Date    BUN 28* 03/20/2017    CREATININE 2.19* 03/20/2017    CREATININE 0.9 11/19/2006   Baseline Cr 2-3-range, trending  Maintaining hydration  Avoiding nephrotoxics: NSAIDs etc  Following Cr closely; to keep near baseline as possible

## 2017-03-02 NOTE — CARE PLAN
Problem: Communication  Goal: The ability to communicate needs accurately and effectively will improve  Outcome: PROGRESSING AS EXPECTED  Education about POC and safety provided.     Problem: Safety  Goal: Will remain free from injury  Outcome: PROGRESSING AS EXPECTED  Pt getting out of bed without calling for assistance and complaining about bed alarm. Education provided about safety, importance of bed alarm and use of call light for needs. Wanderguard in place.    Problem: Psychosocial Needs:  Goal: Level of anxiety will decrease  Outcome: PROGRESSING AS EXPECTED  Pharmacologic interventions provided for anxiety and agitation.

## 2017-03-02 NOTE — PROGRESS NOTES
Assumed care of pt at shift change, report received from day shift RN. Pt A&Ox4, resting in bed. Denies pain, nausea, dizziness, numbness or tingling at this time. No other s/s of discomfort. Compliant with assessment and agreed to take scheduled medications. Wanderguard on left ankle. All needs addressed at this time. Bed in low position, alarm on, call light within reach, hourly rounding in place.

## 2017-03-02 NOTE — CARE PLAN
Problem: Infection  Goal: Will remain free from infection  Outcome: PROGRESSING AS EXPECTED  Providing education about signs and symptoms of infection and perform hand washing before and after eating or bathroom.

## 2017-03-02 NOTE — PROGRESS NOTES
Infectious Disease Progress Note    Author: Ivy Gutierrez M.D.    DOS & Time created: 3/2/2017  3:18 PM    Chief Complaint   Patient presents with   • Wound Infection     pt has wound on right foot, went [facility], report cellutliis with drainage on wound     Interval History:  46-year-old white female with history of morbid obesity,  Moyamoya syndrome with prior stroke, who presents with a traumatic injury to her right foot with subsequent development of cellulitis and abscess    AF WBC 13.3 s/p drainage abscess-has been refusing IV  3/1- willing to take iv. No fevers. C/o pain. Apparently walking on the foot  3/2- says she is feeling better. No fevers.  Labs Reviewed, Medications Reviewed, Radiology Reviewed and Wound Reviewed.    Review of Systems:  Review of Systems   Constitutional: Negative for fever and chills.   Gastrointestinal: Negative for nausea and vomiting.   Musculoskeletal: Positive for myalgias and joint pain.        Foot pain   Skin: Negative for rash.       Hemodynamics:  Temp (24hrs), Av.7 °C (98.1 °F), Min:36.2 °C (97.2 °F), Max:37.2 °C (99 °F)  Temperature: 36.6 °C (97.8 °F)  Pulse  Av.3  Min: 58  Max: 82   Blood Pressure: 115/72 mmHg       Physical Exam:  Physical Exam   Constitutional: She is oriented to person, place, and time. She appears well-developed. No distress.   Obese   HENT:   Head: Normocephalic and atraumatic.   Eyes: EOM are normal. Pupils are equal, round, and reactive to light.   Neck: Neck supple.   Cardiovascular: Normal rate and regular rhythm.    No murmur heard.  Pulmonary/Chest: Effort normal. No respiratory distress. She has no wheezes. She has no rales.   Abdominal: Soft. Bowel sounds are normal. She exhibits no distension. There is no tenderness.   Musculoskeletal:   Right foot dressed   Neurological: She is alert and oriented to person, place, and time.   Skin: No rash noted.   Psychiatric:   Poor insight   Nursing note and vitals  reviewed.      Labs:  Recent Labs      02/28/17   0300   WBC  13.3*   RBC  3.02*   HEMOGLOBIN  9.5*   HEMATOCRIT  29.2*   MCV  96.7   MCH  31.5   RDW  45.5   PLATELETCT  142*   MPV  11.1   NEUTSPOLYS  76.80*   LYMPHOCYTES  7.90*   MONOCYTES  13.80*   EOSINOPHILS  0.40   BASOPHILS  0.20     Recent Labs      02/28/17   0300   SODIUM  138   POTASSIUM  4.1   CHLORIDE  106   CO2  17*   GLUCOSE  86   BUN  60*     Recent Labs      02/28/17   0300   ALBUMIN  2.5*   TBILIRUBIN  0.3   ALKPHOSPHAT  64   TOTPROTEIN  6.3   ALTSGPT  17   ASTSGOT  31   CREATININE  4.27*        Imaging:       CT-FOOT W/O PLUS RECONS RIGHT (Final result) Result time: 02/27/17 00:30:10     Final result by Gee Tam M.D. (02/27/17 00:30:10)     Impression:     1.  Soft tissue swelling with gas in the distal plantar aspect of the foot at the level of the MTP joints with draining wound to the plantar surface of the skin.  2.  Minimal gas in the extensor aspect of the foot at the level of the MTP joints which may represent extension of cellulitis anteriorly.  3.  No loculated fluid collection identified.  4.  Diffuse soft tissue swelling in the remainder of the foot.  5.  No acute bony abnormality. No evidence of osteomyelitis. No radiopaque soft tissue foreign body identified     MR-FOOT-W/O RIGHT (Final result) Result time: 02/28/17 11:19:53     Final result by Aureliano Flores M.D. (02/28/17 11:19:53)     Impression:     Forefoot bilobed abscess with apparent plantar draining sinus. Largest loculation is in the dorsal soft tissues as discussed above  No unenhanced MR evidence of osteomyelitis  Metatarsophalangeal joint effusions most likely are reactive given lack of bony destruction/marginal erosive change     Final result by Rad Intf (02/27/17 11:53:22)     Narrative:     TransthoracicEcho Report  CONCLUSIONS  No prior study is available for comparison.   Normal left ventricular size, thickness, systolic function, and   diastolic  function.  Left ventricular ejection fraction is visually estimated to be 65%.  Normal right ventricular size and systolic function.  Trace tricuspid regurgitation.  Normal pericardium without effusion.     Medications:  Current Facility-Administered Medications   Medication Dose Frequency Provider Last Rate Last Dose   • [START ON 3/3/2017] cefUROXime (ZINACEF) 750 mg in NS 50 mL IVPB  750 mg DAILY Ivy Gutierrez M.D.       • haloperidol lactate (HALDOL) injection 5 mg  5 mg Q4HRS PRN Nabeel Monahan M.D.   5 mg at 03/02/17 0156   • oxcarbazepine (TRILEPTAL) tablet 150 mg  150 mg BID Lynn Carpenter M.D.   150 mg at 03/02/17 0902   • diphenoxylate-atropine (LOMOTIL) 2.5-0.025 MG per tablet 1 Tab  1 Tab 4X/DAY PRN Shazia Valle M.D.   1 Tab at 03/01/17 1125   • heparin injection 5,000 Units  5,000 Units Q8HRS Augustus Guerin M.D.   5,000 Units at 03/02/17 1436   • lorazepam (ATIVAN) tablet 1 mg  1 mg TID Augustus Guerin M.D.   1 mg at 03/02/17 1118   • sodium bicarbonate (SODIUM BICARBONATE) tablet 1,300 mg  1,300 mg TID Evelia Yepez M.D.   1,300 mg at 03/02/17 1436   • oxycodone immediate-release (ROXICODONE) tablet 5 mg  5 mg Q6HRS PRN Evelia Yepez M.D.   5 mg at 03/01/17 0454   • amlodipine (NORVASC) tablet 5 mg  5 mg DAILY Chalino Bender M.D.   5 mg at 03/02/17 0903   • aspirin (ASA) tablet 325 mg  325 mg DAILY Chalino Bender M.D.   325 mg at 03/02/17 0903   • atorvastatin (LIPITOR) tablet 40 mg  40 mg Nightly Chalino Bender M.D.   40 mg at 03/01/17 1953   • carvedilol (COREG) tablet 25 mg  25 mg BID WITH MEALS Chalino Bender M.D.   25 mg at 03/02/17 0903   • cyanocobalamin (VITAMIN B-12) tablet 1,000 mcg  1,000 mcg DAILY Chalino Bender M.D.   1,000 mcg at 03/02/17 0905   • folic acid (FOLVITE) tablet 1 mg  1 mg DAILY Chalino Bender M.D.   1 mg at 03/02/17 0904   • phenytoin ER (DILANTIN) capsule 200 mg  200 mg BID Chalino Bender M.D.   200 mg at 03/02/17  0901   • docusate sodium (COLACE) capsule 100 mg  100 mg BID Chalino Bender M.D.   100 mg at 03/01/17 2100   • bisacodyl (DULCOLAX) suppository 10 mg  10 mg Q24HRS PRN Chalino Bender M.D.       • fleet enema 133 mL  1 Each Once PRN Chalino Bender M.D.       • Respiratory Care per Protocol   Continuous RT Chalino Bender M.D.       • ondansetron (ZOFRAN) syringe/vial injection 4 mg  4 mg Q4HRS PRN Chalino Bender M.D.       • ondansetron (ZOFRAN ODT) dispertab 4 mg  4 mg Q4HRS PRN Chalino Bender M.D.       • promethazine (PHENERGAN) tablet 12.5-25 mg  12.5-25 mg Q4HRS PRN Chalino Bender M.D.       • promethazine (PHENERGAN) suppository 12.5-25 mg  12.5-25 mg Q4HRS PRN Chalino Bender M.D.       • prochlorperazine (COMPAZINE) injection 5-10 mg  5-10 mg Q4HRS PRN Chalino Bender M.D.       • acetaminophen (TYLENOL) tablet 650 mg  650 mg Q6HRS PRN Chalino Bender M.D.       • NS (BOLUS) infusion 500 mL  500 mL Once PRN Chalino Bender M.D.         Last reviewed on 2/26/2017 11:24 PM by Radha Mixon    Micro:  Results     Procedure Component Value Units Date/Time    ANAEROBIC CULTURE [[id number]] Collected:  02/27/17 1243    Order Status:  Completed Specimen Information:  Tissue Updated:  03/02/17 1455     Significant Indicator NEG      Source TISS      Site Right Foot Abscess      Anaerobic Culture, Culture Res No Anaerobes isolated.     CULTURE TISSUE W/ GRM STAIN [[id number]]  (Abnormal) Collected:  02/27/17 1243    Order Status:  Completed Specimen Information:  Tissue Updated:  03/02/17 1455     Gram Stain Result -- (A)      Result:        Many WBCs.  Many Gram positive cocci.       Significant Indicator POS (POS)      Source TISS      Site Right Foot Abscess      Tissue Culture -- (A)      Tissue Culture -- (A)      Result:        Staphylococcus aureus  Heavy growth  See previous culture for sensitivity report.       Tissue Culture -- (A)      Result:        Escherichia  coli  Rare growth  See previous culture for sensitivity report.       Tissue Culture -- (A)      Result:        Viridans Streptococcus  Moderate growth  Mixed morphologies       Tissue Culture -- (A)      Result:        Beta Streptococcus Group G  Light growth      CULTURE WOUND W/ GRAM STAIN [[id number]]  (Abnormal) Collected:  02/27/17 1015    Order Status:  Completed Specimen Information:  Wound from Left Foot Updated:  03/01/17 0949     Gram Stain Result --      Result:        Many WBCs.  Many Gram positive cocci.       Significant Indicator POS (POS)      Source WND      Site LEFT FOOT      Culture Result Wound Moderate growth Mixed skin shawn. (A)      Culture Result Wound -- (A)      Result:        Staphylococcus aureus  Heavy growth  See previous culture for sensitivity report.       Culture Result Wound -- (A)      Result:        Escherichia coli  Rare growth  See previous culture for sensitivity report.      Narrative:      Collected By:[id number] FRANCES MIX    CULTURE WOUND W/ GRAM STAIN [[id number]]  (Abnormal)  (Susceptibility) Collected:  02/26/17 2350    Order Status:  Completed Specimen Information:  Wound from Right Foot Updated:  03/01/17 0946     Gram Stain Result --      Result:        Moderate WBCs.  Many Gram positive cocci.       Significant Indicator POS (POS)      Source WND      Site RIGHT FOOT      Culture Result Wound Moderate growth Mixed skin shawn. (A)      Culture Result Wound -- (A)      Result:        Staphylococcus aureus  Moderate growth       Culture Result Wound -- (A)      Result:        Escherichia coli  Rare growth      Culture & Susceptibility     ESCHERICHIA COLI     Antibiotic Sensitivity Microscan Unit Status    Ampicillin Sensitive <=8 mcg/mL Final    Cefepime Sensitive <=8 mcg/mL Final    Cefotaxime Sensitive <=2 mcg/mL Final    Cefotetan Sensitive <=16 mcg/mL Final    Ceftazidime Sensitive <=1 mcg/mL Final    Ceftriaxone Sensitive <=8 mcg/mL Final    Cefuroxime  "Sensitive <=4 mcg/mL Final    Ciprofloxacin Sensitive <=1 mcg/mL Final    Ertapenem Sensitive <=1 mcg/mL Final    Gentamicin Sensitive <=4 mcg/mL Final    Pip/Tazobactam Sensitive <=16 mcg/mL Final    Tigecycline Sensitive <=2 mcg/mL Final    Tobramycin Sensitive <=4 mcg/mL Final    Trimeth/Sulfa Sensitive <=2/38 mcg/mL Final              STAPHYLOCOCCUS AUREUS     Antibiotic Sensitivity Microscan Unit Status    Ampicillin/sulbactam Sensitive <=8/4 mcg/mL Final    Clindamycin Sensitive <=0.5 mcg/mL Final    Daptomycin Sensitive <=0.5 mcg/mL Final    Erythromycin Sensitive <=0.5 mcg/mL Final    Moxifloxacin Sensitive <=0.5 mcg/mL Final    Oxacillin Sensitive <=0.25 mcg/mL Final    Tetracycline Sensitive <=4 mcg/mL Final    Trimeth/Sulfa Sensitive <=0.5/9.5 mcg/mL Final    Vancomycin Sensitive 2 mcg/mL Final                       BLOOD CULTURE [[id number]] Collected:  02/26/17 2330    Order Status:  Completed Specimen Information:  Blood from Peripheral Updated:  02/28/17 0937     Significant Indicator NEG      Source BLD      Site PERIPHERAL      Blood Culture --      Result:        No Growth    Note: Blood cultures are incubated for 5 days and  are monitored continuously.Positive blood cultures  are called to the RN and reported as soon as  they are identified.      Narrative:      Per Hospital Policy: Only change Specimen Src: to \"Line\" if  specified by physician order.    GRAM STAIN [[id number]] Collected:  02/27/17 1243    Order Status:  Completed Specimen Information:  Tissue Updated:  02/27/17 1745     Significant Indicator .      Source TISS      Site Right Foot Abscess      Gram Stain Result --      Result:        Many WBCs.  Many Gram positive cocci.      GRAM STAIN [[id number]] Collected:  02/27/17 1015    Order Status:  Completed Specimen Information:  Wound Updated:  02/27/17 1743     Significant Indicator .      Source WND      Site LEFT FOOT      Gram Stain Result --      Result:        Many WBCs.  Many " "Gram positive cocci.      Narrative:      Collected By:[id number] FRANCES MIX    CDIFF BY PCR [[id number]] Collected:  02/27/17 1130    Order Status:  Completed Specimen Information:  Stool from Stool Updated:  02/27/17 1347     C Diff by PCR Negative      027-NAP1-BI Presumptive Negative      Comment: Presumptive 027/NAP1/BI target DNA sequences are NOT DETECTED.       Narrative:      Collected By:[id number] FRANCES MIX  Does this patient have risk factors for C-diff?->Yes  C-Diff Risk Factors->antibiotic exposure  Has patient taken stool softeners or laxatives in the last 5  days?->No  Indication for CDiff by PCR?->Greater than/equal to 3 stools  in 24 hr & \"takes shape of container\"    GRAM STAIN [[id number]] Collected:  02/26/17 2350    Order Status:  Completed Specimen Information:  Wound Updated:  02/27/17 1046     Significant Indicator .      Source WND      Site RIGHT FOOT      Gram Stain Result --      Result:        Moderate WBCs.  Many Gram positive cocci.      BLOOD CULTURE [[id number]] Collected:  02/26/17 2230    Order Status:  Completed Specimen Information:  Blood from Peripheral Updated:  02/27/17 0920     Significant Indicator NEG      Source BLD      Site PERIPHERAL      Blood Culture --      Result:        No Growth    Note: Blood cultures are incubated for 5 days and  are monitored continuously.Positive blood cultures  are called to the RN and reported as soon as  they are identified.      Narrative:      Per Hospital Policy: Only change Specimen Src: to \"Line\" if  specified by physician order.    URINALYSIS [[id number]] Collected:  02/26/17 0000    Order Status:  Canceled Specimen Information:  Urine     Narrative:      TEST Urinalysis WAS CANCELLED, 03/02/17 01:05 Test autocancelled, not  collected or received  Indication for culture:->Emergency Room Patient    URINE CULTURE(NEW) [[id number]] Collected:  02/26/17 0000    Order Status:  Canceled Specimen Information:  Urine     Narrative:      " "TEST Urine Culture WAS CANCELLED, 03/02/17 01:05 Test autocancelled, not  collected or received  Indication for culture:->Emergency Room Patient    Blood Culture [[id number]] Collected:  02/26/17 0000    Order Status:  Canceled Specimen Information:  Other from Peripheral     Narrative:      ORDER WAS CANCELLED 02/26/2017 23:01, refer to [id number]  23:01  02/26/2017.  From different peripheral sites, if not done within the last  24 hours (Per Hospital Policy: Only change specimen source to  \"Line\" if specified by physician order)    Blood Culture [[id number]] Collected:  02/26/17 0000    Order Status:  Canceled Specimen Information:  Other from Peripheral     Narrative:      ORDER WAS CANCELLED 02/26/2017 23:01, refer to [id number]  23:02  02/26/2017.  From different peripheral sites, if not done within the last  24 hours (Per Hospital Policy: Only change specimen source to  \"Line\" if specified by physician order)    Culture Respiratory W/ GRM STN [[id number]] Collected:  02/26/17 0000    Order Status:  Canceled Specimen Information:  Sputum from Sputum     Narrative:      TEST Respiratory Culture w/ GS WAS CANCELLED, 03/02/17 01:05 Test  autocancelled, not collected or received  Sputum cultures, induced if needed. 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]   • Dementia [F03.90]   • Cellulitis of right foot [L03.115]   • CKD stage 4 secondary to hypertension (CMS-HCC) [I12.9, N18.4]   • Seizure disorder, grand mal (CMS-HCC) [G40.409]   • Dilated cardiomyopathy (CMS-HCC) [I42.0]   Right foot abscess, suspected OM    Plan:  Right foot abscess, multiloculated with sepsis  S/p drainage 2/27, emergent  Fever, resolved  Persistent leukocytosis  C/s are mssa, e coli, " strep vridans, grp c strep  Start zinacef        Chronic renal failure- 4.27  Avoiding nephrotoxic drugs  Current abx do not require adjustment for renal function    Moyamoya syndrome with prior stroke/dementia  Noncompliant with abx  Barrier to care  Places her at increased risk for amputation    Discussed with family  Discussed with internal medicine

## 2017-03-02 NOTE — PROGRESS NOTES
Pt wants  to take off the wanderguard off, RN tries to fix it, divert and educate, but pt refuses and still want to take it off

## 2017-03-02 NOTE — DISCHARGE PLANNING
Medical Social Work  Parent's mother is the primary care giver for her daughter, and she is completely overwhelmed at this time, parent's are .  Mother states as her daughter has gotten older her behaviors have gotten worse, arguing, not taking her medications and in particular her running away.  She couldn't say how many times she has called the [employer] to say her daughter has run away.  She has minimal community and family support. Patient receives 721.00 a month SSI-D.  Discussed possible options, group homes or skilled facility.   Parent's main concern is their daughter's safety, in particular her running away.  They are open to both options, if insurance can cover and or if they can afford the cost of care.

## 2017-03-02 NOTE — ASSESSMENT & PLAN NOTE
SBP goal less than 140 mmHg  DBP goal less than 90 mmHg  PRN and antihypertensives to titrate by hospitalist towards goal  Most recent Blood Pressure: 142/69 mmHg

## 2017-03-02 NOTE — CARE PLAN
Problem: Safety  Goal: Will remain free from injury  Fall precaution in place, bed alarm on, assistance with mobility PRN. Encourage pt to wear socks while walking. No injury so far on shift.

## 2017-03-02 NOTE — PROGRESS NOTES
Assumed care of pt at 0730. A/Ox4, discussed plan of care. Pt on room air. Discussed placement of PICC line with pts mother, POA she signed consent form for placement. All needs met at this time. Bed in lowest position, treaded socks on, personal belongings and call light within reach, instructed to call for any assistance.

## 2017-03-02 NOTE — PSYCHIATRY
"PSYCHIATRIC FOLLOW UP:    Reason for Admission: Wound infection with sepsis  Legal hold status:   Not on a hold  Psychiatric Supervising Attending:  Lynn Carpenter M.D.    HPI:  Patient had experiences early this morning that she found very upsetting and felt to be real events. Per RN note, pt attempted to call police because she thought her father had  outside her room. She did not want to discuss today. Was given Ativan for anxiety and reported that it helped her fall back to sleep. Received first dose of Trileptal this morning and is doing very well with it. Her parents report that this is the best they have seen her do in a while.     Parents also state that she does poorly without Ativan and Paxil.      Medical Review of Systems: as reported by pt. All systems reviewed. Only those found to be + are noted below. All others are negative.   Neurological:    TBIs:  Denies   SZs:   Yes, seizure disorder   Strokes:   Yes, multiple CVAs   Other:  Diagnosis of Moyamoya   Other medical systems: Foot pain.     Psychiatric Examination: observed phenomenon:  Vitals:   Filed Vitals:    17 1523 17 2000 17 0352 17 0800   BP: 127/66 134/59 118/49 115/72   Pulse: 62 67 65 68   Temp: 36.2 °C (97.2 °F) 37.2 °C (99 °F) 36.9 °C (98.4 °F) 36.6 °C (97.8 °F)   Resp: 20 18 20 20   Height:       Weight:       SpO2: 96% 100% 98% 94%     Musculoskeletal: Poor eye contact, psychomotor retardation  Appearance: Obese, tired, poor grooming  Thoughts: [place]  Speech: Clear, regular rate and rhythm, normal volume and tone  Mood:  \"Better\"    Affect:  Euthymic   SI/HI:   Denies SI/HI  Attention/Alertness: Attentive and alert     Memory:  Good immediate and delayed recall   Orientation:   Oriented to time, person, and place, but not date  Fund of Knowledge:   Adequate    Insight/Judgement into mental illness: Limited insight  Neurological Testing: Limited by lack of pt cooperation        New Medical Conditions/lab " results/tests: None       Assessment:  1. Moyamoya disease with dementia   2. Hx of CVA  3. Seizure disorder   4. Rule out delirium   5. Right foot abscess     Pt has improved dramatically from yesterday following dose of Trileptal. Pt and parents agree that today is better than things have been for a long time. Parents report that pt is unstable without Ativan and Paxil.      Plan:  Continue Trileptal 150 mg BID. Continue current doses of Ativan and Paxil because pt is stable and has had problems without these meds in the past.    Will follow

## 2017-03-02 NOTE — PROGRESS NOTES
Pt disconnected bed alarm and was walking out her room pulling her IV pole, pt stated she was going to see her dad. Pt educated about safety and transferred back to bed. Bed alarm placed back on and built in alarm turn on.

## 2017-03-02 NOTE — ASSESSMENT & PLAN NOTE
Active Orders     Ordered     Ordering Provider       Wed Mar 1, 2017  1:56 PM    03/01/17 1356  cefUROXime (ZINACEF) 750 mg in NS 50 mL IVPB   EVERY 8 HOURS      Ivy Gutierrez M.D.      Abx through 4/23  ID on board  Ortho I&D'd  PICC in place

## 2017-03-02 NOTE — PROGRESS NOTES
Pt removed wandergaurd, security called for assistance in replacing wandergaurd, wandergaurd placed back on pt's left ankle by DIANE Ahuja.     PRN IV haldol now on board for agitation.

## 2017-03-02 NOTE — PROGRESS NOTES
Hospital Medicine Interval Note  Date of Service:  3/1/2017    Chief Complaint  46 y.o.-year-old female admitted 2/26/2017 with foot infection    Interval Problem Update  3/1 - discussed with family and patient at length. Family can barely care for the patient and they have to call the police to find her at least several times per week. Discussed with psych. Patient is now agreeable for PICC; ordered. Questions answered.     Consultants/Specialty  Psych - Pavlatos  ID - Matt  Ortho - [place]    Disposition  Clinical    I spent a total of 45 minutes during this clinical encounter of which > 50% was devoted to counseling and coordinating care including review of records, pertinent lab data and studies, as well as discussing diagnostic evaluation and work up, planned therapeutic interventions and future disposition of care. Where indicated, the assessment and plan reflect discussion of patient with consultants, other healthcare providers, family members, and additional research needed to obtain further information in formulating the plan of care of this patient.          Review of Systems   Unable to perform ROS: psychiatric disorder      Physical Exam Laboratory/Imaging   Filed Vitals:    02/28/17 2000 03/01/17 0500 03/01/17 0753 03/01/17 1523   BP: 102/48 141/72 131/67 127/66   Pulse: 67 73 77 62   Temp: 37.3 °C (99.1 °F) 37.4 °C (99.4 °F) 37.3 °C (99.2 °F) 36.2 °C (97.2 °F)   Resp: 17 20 18 20   Height:       Weight:       SpO2: 95% 94% 92% 96%     Physical Exam   Constitutional: She appears well-developed and well-nourished.   HENT:   Head: Normocephalic and atraumatic.   Mouth/Throat: Oropharynx is clear and moist.   Eyes: Conjunctivae and EOM are normal. Pupils are equal, round, and reactive to light. No scleral icterus.   Neck: Normal range of motion. Neck supple. No tracheal deviation present. No thyromegaly present.   Cardiovascular: Normal rate, regular rhythm, normal heart sounds and intact distal  pulses.    No murmur heard.  Pulmonary/Chest: Effort normal and breath sounds normal. No respiratory distress. She has no wheezes.   Abdominal: Soft. Bowel sounds are normal. She exhibits no distension. There is no tenderness.   Musculoskeletal: Normal range of motion. She exhibits edema and tenderness.   Right foot wrapped, erythematous at toe with serous drainage   Lymphadenopathy:     She has no cervical adenopathy.        Right: No supraclavicular adenopathy present.        Left: No supraclavicular adenopathy present.   Neurological: She is alert.   Skin: Skin is warm and dry.   Vitals reviewed.   Lab Results   Component Value Date/Time    WBC 13.3* 02/28/2017 03:00 AM    HEMOGLOBIN 9.5* 02/28/2017 03:00 AM    HEMATOCRIT 29.2* 02/28/2017 03:00 AM    PLATELET COUNT 142* 02/28/2017 03:00 AM     Lab Results   Component Value Date/Time    SODIUM 138 02/28/2017 03:00 AM    POTASSIUM 4.1 02/28/2017 03:00 AM    CHLORIDE 106 02/28/2017 03:00 AM    CO2 17* 02/28/2017 03:00 AM    GLUCOSE 86 02/28/2017 03:00 AM    BUN 60* 02/28/2017 03:00 AM    CREATININE 4.27* 02/28/2017 03:00 AM      Assessment/Plan    Cellulitis of right foot  Assessment & Plan  Active Orders     Ordered     Ordering Provider       Wed Mar 1, 2017  1:56 PM    03/01/17 1356  cefUROXime (ZINACEF) 750 mg in NS 50 mL IVPB   EVERY 8 HOURS      Ivy Gutierrez M.D.      ID on board  Ortho I&D'd  PICC ordered    Villavicencio villavicencio disease  Assessment & Plan  Asked psych to see  Currently on ativan and paxil, which is probably not idea  Discussed with family  Wander guard    CKD stage 4 secondary to hypertension (CMS-[facility])  Assessment & Plan  Lab Results   Component Value Date    BUN 60* 02/28/2017    CREATININE 4.27* 02/28/2017    CREATININE 0.9 11/19/2006   Baseline Cr 3-range, trending  Maintaining hydration  Avoiding nephrotoxics: NSAIDs etc  Following Cr closely; to keep near baseline as possible    Anemia  Assessment & Plan  Lab Results   Component Value Date/Time     HEMOGLOBIN 9.5* 02/28/2017 03:00 AM    HEMATOCRIT 29.2* 02/28/2017 03:00 AM    MCV 96.7 02/28/2017 03:00 AM    PLATELET COUNT 142* 02/28/2017 03:00 AM    Goal Hb generally > 8 gm/dL  Transfusion per AABB guidelines available at ncbi.nlm.nih.gov/pubmed/06312864    Essential hypertension, benign  Assessment & Plan  SBP goal less than 140 mmHg  DBP goal less than 90 mmHg  PRN and antihypertensives to titrate by hospitalist towards goal  Most recent Blood Pressure: 127/66 mmHg    Dilated cardiomyopathy (CMS-HCC)  Assessment & Plan  Medically managed    HLD (hyperlipidemia)  Assessment & Plan  Statin    Seizure disorder, grand mal (CMS-HCC)  Assessment & Plan  Dilantin     Labs reviewed and Medications reviewed        DVT Prophylaxis: Heparin    Ulcer prophylaxis: Not indicated

## 2017-03-02 NOTE — ASSESSMENT & PLAN NOTE
Lab Results   Component Value Date/Time    HEMOGLOBIN 10.3* 03/10/2017 02:55 AM    HEMATOCRIT 32.1* 03/10/2017 02:55 AM    MCV 98.5* 03/10/2017 02:55 AM    PLATELET COUNT 329 03/10/2017 02:55 AM   Goal Hb generally > 8 gm/dL  Transfusion per AABB guidelines available at ncbi.nlm.nih.gov/pubmed/97822467

## 2017-03-03 LAB
ANION GAP SERPL CALC-SCNC: 11 MMOL/L (ref 0–11.9)
BUN SERPL-MCNC: 42 MG/DL (ref 8–22)
CALCIUM SERPL-MCNC: 8.5 MG/DL (ref 8.5–10.5)
CHLORIDE SERPL-SCNC: 112 MMOL/L (ref 96–112)
CO2 SERPL-SCNC: 20 MMOL/L (ref 20–33)
CREAT SERPL-MCNC: 2.89 MG/DL (ref 0.5–1.4)
ERYTHROCYTE [DISTWIDTH] IN BLOOD BY AUTOMATED COUNT: 47.1 FL (ref 35.9–50)
GFR SERPL CREATININE-BSD FRML MDRD: 18 ML/MIN/1.73 M 2
GLUCOSE SERPL-MCNC: 106 MG/DL (ref 65–99)
HCT VFR BLD AUTO: 28.4 % (ref 37–47)
HGB BLD-MCNC: 9.4 G/DL (ref 12–16)
MCH RBC QN AUTO: 31.8 PG (ref 27–33)
MCHC RBC AUTO-ENTMCNC: 33.1 G/DL (ref 33.6–35)
MCV RBC AUTO: 95.9 FL (ref 81.4–97.8)
PLATELET # BLD AUTO: 235 K/UL (ref 164–446)
PMV BLD AUTO: 10 FL (ref 9–12.9)
POTASSIUM SERPL-SCNC: 3.5 MMOL/L (ref 3.6–5.5)
RBC # BLD AUTO: 2.96 M/UL (ref 4.2–5.4)
SODIUM SERPL-SCNC: 143 MMOL/L (ref 135–145)
WBC # BLD AUTO: 9.9 K/UL (ref 4.8–10.8)

## 2017-03-03 PROCEDURE — 700111 HCHG RX REV CODE 636 W/ 250 OVERRIDE (IP): Performed by: INTERNAL MEDICINE

## 2017-03-03 PROCEDURE — 700102 HCHG RX REV CODE 250 W/ 637 OVERRIDE(OP): Performed by: PSYCHIATRY & NEUROLOGY

## 2017-03-03 PROCEDURE — 99233 SBSQ HOSP IP/OBS HIGH 50: CPT | Performed by: HOSPITALIST

## 2017-03-03 PROCEDURE — 80048 BASIC METABOLIC PNL TOTAL CA: CPT

## 2017-03-03 PROCEDURE — A9270 NON-COVERED ITEM OR SERVICE: HCPCS | Performed by: HOSPITALIST

## 2017-03-03 PROCEDURE — A9270 NON-COVERED ITEM OR SERVICE: HCPCS | Performed by: PSYCHIATRY & NEUROLOGY

## 2017-03-03 PROCEDURE — 700102 HCHG RX REV CODE 250 W/ 637 OVERRIDE(OP): Performed by: INTERNAL MEDICINE

## 2017-03-03 PROCEDURE — 36415 COLL VENOUS BLD VENIPUNCTURE: CPT

## 2017-03-03 PROCEDURE — 85027 COMPLETE CBC AUTOMATED: CPT

## 2017-03-03 PROCEDURE — 700105 HCHG RX REV CODE 258: Performed by: INTERNAL MEDICINE

## 2017-03-03 PROCEDURE — 770006 HCHG ROOM/CARE - MED/SURG/GYN SEMI*

## 2017-03-03 PROCEDURE — 700102 HCHG RX REV CODE 250 W/ 637 OVERRIDE(OP): Performed by: HOSPITALIST

## 2017-03-03 PROCEDURE — 700111 HCHG RX REV CODE 636 W/ 250 OVERRIDE (IP): Performed by: HOSPITALIST

## 2017-03-03 PROCEDURE — A9270 NON-COVERED ITEM OR SERVICE: HCPCS | Performed by: INTERNAL MEDICINE

## 2017-03-03 RX ADMIN — Medication 1300 MG: at 20:42

## 2017-03-03 RX ADMIN — ASPIRIN 325 MG: 325 TABLET, FILM COATED ORAL at 08:39

## 2017-03-03 RX ADMIN — CEFUROXIME SODIUM 750 MG: 7.5 INJECTION, POWDER, FOR SOLUTION INTRAVENOUS at 23:54

## 2017-03-03 RX ADMIN — CEFUROXIME SODIUM 750 MG: 7.5 INJECTION, POWDER, FOR SOLUTION INTRAVENOUS at 14:30

## 2017-03-03 RX ADMIN — OXCARBAZEPINE 150 MG: 150 TABLET, FILM COATED ORAL at 09:25

## 2017-03-03 RX ADMIN — LORAZEPAM 1 MG: 1 TABLET ORAL at 10:52

## 2017-03-03 RX ADMIN — LORAZEPAM 1 MG: 1 TABLET ORAL at 03:33

## 2017-03-03 RX ADMIN — LORAZEPAM 1 MG: 1 TABLET ORAL at 19:31

## 2017-03-03 RX ADMIN — CARVEDILOL 25 MG: 25 TABLET, FILM COATED ORAL at 08:40

## 2017-03-03 RX ADMIN — CARVEDILOL 25 MG: 25 TABLET, FILM COATED ORAL at 17:22

## 2017-03-03 RX ADMIN — OXYCODONE HYDROCHLORIDE 5 MG: 5 TABLET ORAL at 19:31

## 2017-03-03 RX ADMIN — AMLODIPINE BESYLATE 5 MG: 5 TABLET ORAL at 08:41

## 2017-03-03 RX ADMIN — Medication 1300 MG: at 08:42

## 2017-03-03 RX ADMIN — PHENYTOIN SODIUM 200 MG: 100 CAPSULE, EXTENDED RELEASE ORAL at 20:42

## 2017-03-03 RX ADMIN — FOLIC ACID 1 MG: 1 TABLET ORAL at 08:41

## 2017-03-03 RX ADMIN — CEFUROXIME SODIUM 750 MG: 7.5 INJECTION, POWDER, FOR SOLUTION INTRAVENOUS at 05:56

## 2017-03-03 RX ADMIN — CYANOCOBALAMIN TAB 500 MCG 1000 MCG: 500 TAB at 08:43

## 2017-03-03 RX ADMIN — Medication 1300 MG: at 14:24

## 2017-03-03 RX ADMIN — HEPARIN SODIUM 5000 UNITS: 5000 INJECTION, SOLUTION INTRAVENOUS; SUBCUTANEOUS at 05:56

## 2017-03-03 RX ADMIN — ATORVASTATIN CALCIUM 40 MG: 40 TABLET, FILM COATED ORAL at 20:42

## 2017-03-03 RX ADMIN — OXCARBAZEPINE 150 MG: 150 TABLET, FILM COATED ORAL at 20:42

## 2017-03-03 RX ADMIN — PHENYTOIN SODIUM 200 MG: 100 CAPSULE, EXTENDED RELEASE ORAL at 08:38

## 2017-03-03 ASSESSMENT — ENCOUNTER SYMPTOMS
VOMITING: 0
CHILLS: 0
MYALGIAS: 1
NAUSEA: 0
FEVER: 0

## 2017-03-03 ASSESSMENT — PAIN SCALES - GENERAL
PAINLEVEL_OUTOF10: 7
PAINLEVEL_OUTOF10: 0

## 2017-03-03 NOTE — PROGRESS NOTES
Assumed care of pt at 0730. A/Ox4, discussed plan of care. Pt on room air, tolerating regular diet, pt is stand by assist. All needs met at this time. Bed in lowest position, treaded socks on, personal belongings and call light within reach, instructed to call for any assistance.

## 2017-03-03 NOTE — PROGRESS NOTES
Infectious Disease Progress Note    Author: Ivy Gutierrez M.D.    DOS & Time created: 3/3/2017  2:13 PM    Chief Complaint   Patient presents with   • Wound Infection     pt has wound on right foot, went to [facility], report cellutliis with drainage on wound     Interval History:  46-year-old white female with history of morbid obesity,  Moyamoya syndrome with prior stroke, who presents with a traumatic injury to her right foot with subsequent development of cellulitis and abscess    AF WBC 13.3 s/p drainage abscess-has been refusing IV  3/1- willing to take iv. No fevers. C/o pain. Apparently walking on the foot  3/2- says she is feeling better. No fevers.  3/3- in good spirits. No fevers.   Labs Reviewed, Medications Reviewed, Radiology Reviewed and Wound Reviewed.    Review of Systems:  Review of Systems   Constitutional: Negative for fever and chills.   Gastrointestinal: Negative for nausea and vomiting.   Musculoskeletal: Positive for myalgias and joint pain.        Foot pain   Skin: Negative for rash.       Hemodynamics:  Temp (24hrs), Av.7 °C (98.1 °F), Min:36.4 °C (97.6 °F), Max:37 °C (98.6 °F)  Temperature: 36.4 °C (97.6 °F)  Pulse  Av.3  Min: 58  Max: 82   Blood Pressure: 147/78 mmHg       Physical Exam:  Physical Exam   Constitutional: She is oriented to person, place, and time. She appears well-developed. No distress.   Obese   HENT:   Head: Normocephalic and atraumatic.   Eyes: EOM are normal. Pupils are equal, round, and reactive to light.   Neck: Neck supple.   Cardiovascular: Normal rate and regular rhythm.    No murmur heard.  Pulmonary/Chest: Effort normal. No respiratory distress. She has no wheezes. She has no rales.   Abdominal: Soft. Bowel sounds are normal. She exhibits no distension. There is no tenderness.   Musculoskeletal:   Right foot dressed   Neurological: She is alert and oriented to person, place, and time.   Skin: No rash noted.   Psychiatric:   Poor insight   Nursing  note and vitals reviewed.      Labs:  Recent Labs      03/03/17   0242   WBC  9.9   RBC  2.96*   HEMOGLOBIN  9.4*   HEMATOCRIT  28.4*   MCV  95.9   MCH  31.8   RDW  47.1   PLATELETCT  235   MPV  10.0     Recent Labs      03/03/17   0241   SODIUM  143   POTASSIUM  3.5*   CHLORIDE  112   CO2  20   GLUCOSE  106*   BUN  42*     Recent Labs      03/03/17   0241   CREATININE  2.89*        Imaging:       CT-FOOT W/O PLUS RECONS RIGHT (Final result) Result time: 02/27/17 00:30:10     Final result by Gee Tam M.D. (02/27/17 00:30:10)     Impression:     1.  Soft tissue swelling with gas in the distal plantar aspect of the foot at the level of the MTP joints with draining wound to the plantar surface of the skin.  2.  Minimal gas in the extensor aspect of the foot at the level of the MTP joints which may represent extension of cellulitis anteriorly.  3.  No loculated fluid collection identified.  4.  Diffuse soft tissue swelling in the remainder of the foot.  5.  No acute bony abnormality. No evidence of osteomyelitis. No radiopaque soft tissue foreign body identified     MR-FOOT-W/O RIGHT (Final result) Result time: 02/28/17 11:19:53     Final result by Aureliano Flores M.D. (02/28/17 11:19:53)     Impression:     Forefoot bilobed abscess with apparent plantar draining sinus. Largest loculation is in the dorsal soft tissues as discussed above  No unenhanced MR evidence of osteomyelitis  Metatarsophalangeal joint effusions most likely are reactive given lack of bony destruction/marginal erosive change     Final result by Rad Intf (02/27/17 11:53:22)     Narrative:     TransthoracicEcho Report  CONCLUSIONS  No prior study is available for comparison.   Normal left ventricular size, thickness, systolic function, and   diastolic function.  Left ventricular ejection fraction is visually estimated to be 65%.  Normal right ventricular size and systolic function.  Trace tricuspid regurgitation.  Normal pericardium without  effusion.     Medications:  Current Facility-Administered Medications   Medication Dose Frequency Provider Last Rate Last Dose   • cefUROXime (ZINACEF) 750 mg in NS 50 mL IVPB  750 mg Q8HR Ivy Gutierrez M.D.   Stopped at 03/03/17 0626   • haloperidol lactate (HALDOL) injection 5 mg  5 mg Q4HRS PRN Nabeel Monahan M.D.   5 mg at 03/02/17 0156   • oxcarbazepine (TRILEPTAL) tablet 150 mg  150 mg BID Lynn Carpenter M.D.   150 mg at 03/03/17 0925   • diphenoxylate-atropine (LOMOTIL) 2.5-0.025 MG per tablet 1 Tab  1 Tab 4X/DAY PRN Shazia Valle M.D.   1 Tab at 03/01/17 1125   • heparin injection 5,000 Units  5,000 Units Q8HRS Augustus Guerin M.D.   5,000 Units at 03/03/17 0556   • lorazepam (ATIVAN) tablet 1 mg  1 mg TID Augustus Guerin M.D.   1 mg at 03/03/17 1052   • sodium bicarbonate (SODIUM BICARBONATE) tablet 1,300 mg  1,300 mg TID Evelia Yepez M.D.   1,300 mg at 03/03/17 0842   • oxycodone immediate-release (ROXICODONE) tablet 5 mg  5 mg Q6HRS PRN Evelia Yepez M.D.   5 mg at 03/02/17 2011   • amlodipine (NORVASC) tablet 5 mg  5 mg DAILY Chalino Bender M.D.   5 mg at 03/03/17 0841   • aspirin (ASA) tablet 325 mg  325 mg DAILY Chalino Bender M.D.   325 mg at 03/03/17 0839   • atorvastatin (LIPITOR) tablet 40 mg  40 mg Nightly Chalino Bender M.D.   40 mg at 03/02/17 2012   • carvedilol (COREG) tablet 25 mg  25 mg BID WITH MEALS Chalino Bender M.D.   25 mg at 03/03/17 0840   • cyanocobalamin (VITAMIN B-12) tablet 1,000 mcg  1,000 mcg DAILY Movcarolyn Kazanchyan, M.D.   1,000 mcg at 03/03/17 0843   • folic acid (FOLVITE) tablet 1 mg  1 mg DAILY Chalino Bender M.D.   1 mg at 03/03/17 0841   • phenytoin ER (DILANTIN) capsule 200 mg  200 mg BID Chalino Bender M.D.   200 mg at 03/03/17 0838   • docusate sodium (COLACE) capsule 100 mg  100 mg BID Chalino Bender M.D.   100 mg at 03/02/17 2011   • bisacodyl (DULCOLAX) suppository 10 mg  10 mg Q24HRS PRN Chalino Benedr  M.D.       • fleet enema 133 mL  1 Each Once PRN Chalino Bender M.D.       • Respiratory Care per Protocol   Continuous RT Chalino Bender M.D.       • ondansetron (ZOFRAN) syringe/vial injection 4 mg  4 mg Q4HRS PRN Chalino Benedr M.D.       • ondansetron (ZOFRAN ODT) dispertab 4 mg  4 mg Q4HRS PRN Chalino Bender M.D.       • promethazine (PHENERGAN) tablet 12.5-25 mg  12.5-25 mg Q4HRS PRN Chalino Bender M.D.       • promethazine (PHENERGAN) suppository 12.5-25 mg  12.5-25 mg Q4HRS PRN Chalino Bender M.D.       • prochlorperazine (COMPAZINE) injection 5-10 mg  5-10 mg Q4HRS PRN Chalino Bender M.D.       • acetaminophen (TYLENOL) tablet 650 mg  650 mg Q6HRS PRN Chalino Bender M.D.       • NS (BOLUS) infusion 500 mL  500 mL Once PRN Chalino Bender M.D.         Last reviewed on 2/26/2017 11:24 PM by Radha Mixon    Micro:  Results     Procedure Component Value Units Date/Time    ANAEROBIC CULTURE [[id number]] Collected:  02/27/17 1243    Order Status:  Completed Specimen Information:  Tissue Updated:  03/02/17 1455     Significant Indicator NEG      Source TISS      Site Right Foot Abscess      Anaerobic Culture, Culture Res No Anaerobes isolated.     CULTURE TISSUE W/ GRM STAIN [[id number]]  (Abnormal) Collected:  02/27/17 1243    Order Status:  Completed Specimen Information:  Tissue Updated:  03/02/17 1455     Gram Stain Result -- (A)      Result:        Many WBCs.  Many Gram positive cocci.       Significant Indicator POS (POS)      Source TISS      Site Right Foot Abscess      Tissue Culture -- (A)      Tissue Culture -- (A)      Result:        Staphylococcus aureus  Heavy growth  See previous culture for sensitivity report.       Tissue Culture -- (A)      Result:        Escherichia coli  Rare growth  See previous culture for sensitivity report.       Tissue Culture -- (A)      Result:        Viridans Streptococcus  Moderate growth  Mixed morphologies       Tissue Culture --  (A)      Result:        Beta Streptococcus Group G  Light growth      CULTURE WOUND W/ GRAM STAIN [[id number]]  (Abnormal) Collected:  02/27/17 1015    Order Status:  Completed Specimen Information:  Wound from Left Foot Updated:  03/01/17 0949     Gram Stain Result --      Result:        Many WBCs.  Many Gram positive cocci.       Significant Indicator POS (POS)      Source WND      Site LEFT FOOT      Culture Result Wound Moderate growth Mixed skin shawn. (A)      Culture Result Wound -- (A)      Result:        Staphylococcus aureus  Heavy growth  See previous culture for sensitivity report.       Culture Result Wound -- (A)      Result:        Escherichia coli  Rare growth  See previous culture for sensitivity report.      Narrative:      Collected By:[id number] FRANCES MIX    CULTURE WOUND W/ GRAM STAIN [[id number]]  (Abnormal)  (Susceptibility) Collected:  02/26/17 2350    Order Status:  Completed Specimen Information:  Wound from Right Foot Updated:  03/01/17 0946     Gram Stain Result --      Result:        Moderate WBCs.  Many Gram positive cocci.       Significant Indicator POS (POS)      Source WND      Site RIGHT FOOT      Culture Result Wound Moderate growth Mixed skin shawn. (A)      Culture Result Wound -- (A)      Result:        Staphylococcus aureus  Moderate growth       Culture Result Wound -- (A)      Result:        Escherichia coli  Rare growth      Culture & Susceptibility     ESCHERICHIA COLI     Antibiotic Sensitivity Microscan Unit Status    Ampicillin Sensitive <=8 mcg/mL Final    Cefepime Sensitive <=8 mcg/mL Final    Cefotaxime Sensitive <=2 mcg/mL Final    Cefotetan Sensitive <=16 mcg/mL Final    Ceftazidime Sensitive <=1 mcg/mL Final    Ceftriaxone Sensitive <=8 mcg/mL Final    Cefuroxime Sensitive <=4 mcg/mL Final    Ciprofloxacin Sensitive <=1 mcg/mL Final    Ertapenem Sensitive <=1 mcg/mL Final    Gentamicin Sensitive <=4 mcg/mL Final    Pip/Tazobactam Sensitive <=16 mcg/mL Final     "Tigecycline Sensitive <=2 mcg/mL Final    Tobramycin Sensitive <=4 mcg/mL Final    Trimeth/Sulfa Sensitive <=2/38 mcg/mL Final              STAPHYLOCOCCUS AUREUS     Antibiotic Sensitivity Microscan Unit Status    Ampicillin/sulbactam Sensitive <=8/4 mcg/mL Final    Clindamycin Sensitive <=0.5 mcg/mL Final    Daptomycin Sensitive <=0.5 mcg/mL Final    Erythromycin Sensitive <=0.5 mcg/mL Final    Moxifloxacin Sensitive <=0.5 mcg/mL Final    Oxacillin Sensitive <=0.25 mcg/mL Final    Tetracycline Sensitive <=4 mcg/mL Final    Trimeth/Sulfa Sensitive <=0.5/9.5 mcg/mL Final    Vancomycin Sensitive 2 mcg/mL Final                       BLOOD CULTURE [[id number]] Collected:  02/26/17 2330    Order Status:  Completed Specimen Information:  Blood from Peripheral Updated:  02/28/17 0937     Significant Indicator NEG      Source BLD      Site PERIPHERAL      Blood Culture --      Result:        No Growth    Note: Blood cultures are incubated for 5 days and  are monitored continuously.Positive blood cultures  are called to the RN and reported as soon as  they are identified.      Narrative:      Per Hospital Policy: Only change Specimen Src: to \"Line\" if  specified by physician order.    GRAM STAIN [[id number]] Collected:  02/27/17 1243    Order Status:  Completed Specimen Information:  Tissue Updated:  02/27/17 1745     Significant Indicator .      Source TISS      Site Right Foot Abscess      Gram Stain Result --      Result:        Many WBCs.  Many Gram positive cocci.      GRAM STAIN [[id number]] Collected:  02/27/17 1015    Order Status:  Completed Specimen Information:  Wound Updated:  02/27/17 1743     Significant Indicator .      Source WND      Site LEFT FOOT      Gram Stain Result --      Result:        Many WBCs.  Many Gram positive cocci.      Narrative:      Collected By:[id number] FRANCES BAUTISTAFF BY PCR [[id number]] Collected:  02/27/17 1130    Order Status:  Completed Specimen Information:  Stool from Stool " "Updated:  02/27/17 1347     C Diff by PCR Negative      027-NAP1-BI Presumptive Negative      Comment: Presumptive 027/NAP1/BI target DNA sequences are NOT DETECTED.       Narrative:      Collected By:[id number] MIX  Does this patient have risk factors for C-diff?->Yes  C-Diff Risk Factors->antibiotic exposure  Has patient taken stool softeners or laxatives in the last 5  days?->No  Indication for CDiff by PCR?->Greater than/equal to 3 stools  in 24 hr & \"takes shape of container\"    GRAM STAIN [[id number]] Collected:  02/26/17 2350    Order Status:  Completed Specimen Information:  Wound Updated:  02/27/17 1046     Significant Indicator .      Source WND      Site RIGHT FOOT      Gram Stain Result --      Result:        Moderate WBCs.  Many Gram positive cocci.      BLOOD CULTURE [[id number]] Collected:  02/26/17 2230    Order Status:  Completed Specimen Information:  Blood from Peripheral Updated:  02/27/17 0920     Significant Indicator NEG      Source BLD      Site PERIPHERAL      Blood Culture --      Result:        No Growth    Note: Blood cultures are incubated for 5 days and  are monitored continuously.Positive blood cultures  are called to the RN and reported as soon as  they are identified.      Narrative:      Per Hospital Policy: Only change Specimen Src: to \"Line\" if  specified by physician order.    URINALYSIS [[id number]] Collected:  02/26/17 0000    Order Status:  Canceled Specimen Information:  Urine     Narrative:      TEST Urinalysis WAS CANCELLED, 03/02/17 01:05 Test autocancelled, not  collected or received  Indication for culture:->Emergency Room Patient    URINE CULTURE(NEW) [[id number]] Collected:  02/26/17 0000    Order Status:  Canceled Specimen Information:  Urine     Narrative:      TEST Urine Culture WAS CANCELLED, 03/02/17 01:05 Test autocancelled, not  collected or received  Indication for culture:->Emergency Room Patient    Blood Culture [[id number]] Collected:  02/26/17 0000    " "Order Status:  Canceled Specimen Information:  Other from Peripheral     Narrative:      ORDER WAS CANCELLED 02/26/2017 23:01, refer to [id number]  23:01  02/26/2017.  From different peripheral sites, if not done within the last  24 hours (Per Hospital Policy: Only change specimen source to  \"Line\" if specified by physician order)    Blood Culture [[id number]] Collected:  02/26/17 0000    Order Status:  Canceled Specimen Information:  Other from Peripheral     Narrative:      ORDER WAS CANCELLED 02/26/2017 23:01, refer to [id number]  23:02  02/26/2017.  From different peripheral sites, if not done within the last  24 hours (Per Hospital Policy: Only change specimen source to  \"Line\" if specified by physician order)    Culture Respiratory W/ GRM STN [[id number]] Collected:  02/26/17 0000    Order Status:  Canceled Specimen Information:  Sputum from Sputum     Narrative:      TEST Respiratory Culture w/ GS WAS CANCELLED, 03/02/17 01:05 Test  autocancelled, not collected or received  Sputum cultures, induced if needed. If not done within the  last 24 hours      .mi    Assessment:  Active Hospital Problems    Diagnosis   • Acute on chronic renal failure (CMS-[facility]) [N17.9, N18.9]   • Metabolic acidosis [E87.2]   • Severe sepsis (CMS-HCC) [A41.9, R65.20]   • Thrombocytopenia (CMS-HCC) [D69.6]   • 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]   • Dementia [F03.90]   • Cellulitis of right foot [L03.115]   • CKD stage 4 secondary to hypertension (CMS-HCC) [I12.9, N18.4]   • Seizure disorder, grand mal (CMS-HCC) [G40.409]   • Dilated cardiomyopathy (CMS-HCC) [I42.0]   Right foot abscess, suspected OM    Plan:  Right foot abscess, multiloculated with sepsis  S/p drainage 2/27, emergent  Fever, resolved  Leucocytosis  resolved  C/s are mssa, e coli, strep vridans, grp c strep  Started zinacef  If cr continues to improve picc- held due to her kidney fn  Continue wound care    Chronic renal failure- 2.2  Avoiding nephrotoxic drugs      Moyamoya " syndrome with prior stroke/dementia  Noncompliant with abx  Barrier to care  Places her at increased risk for amputation    Discussed with family  Discussed with internal medicine

## 2017-03-03 NOTE — CARE PLAN
Problem: Communication  Goal: The ability to communicate needs accurately and effectively will improve  Outcome: PROGRESSING AS EXPECTED  Patient and family were educated and updated about patients antibiotics. Communicated plan of care to mother, POA about medical interventions that are being put in place for daughter.     Problem: Fluid Volume:  Goal: Will maintain balanced intake and output  Outcome: PROGRESSING AS EXPECTED  Promoting patient with fluids through out the shift. Offering patient water and juice often.

## 2017-03-03 NOTE — PROGRESS NOTES
PICC Note:  Patient with elevated creatinine of 4.27.  Per protocol unable to place PICC line.  Discussed with Dr. Gutierrez alternative options to PICC.  She will discuss with Nephrology.  Will cancel line for now.

## 2017-03-03 NOTE — PROGRESS NOTES
S/P I&D foot  Some anxiety issues today  Noncompliance has been hard with plantar foot wound  Continue IV ABX  Encourage following weightbearing precautions

## 2017-03-04 LAB
BACTERIA BLD CULT: NORMAL
BACTERIA BLD CULT: NORMAL
GLUCOSE BLD-MCNC: 162 MG/DL (ref 65–99)
SIGNIFICANT IND 70042: NORMAL
SIGNIFICANT IND 70042: NORMAL
SITE SITE: NORMAL
SITE SITE: NORMAL
SOURCE SOURCE: NORMAL
SOURCE SOURCE: NORMAL

## 2017-03-04 PROCEDURE — 93922 UPR/L XTREMITY ART 2 LEVELS: CPT

## 2017-03-04 PROCEDURE — A9270 NON-COVERED ITEM OR SERVICE: HCPCS | Performed by: HOSPITALIST

## 2017-03-04 PROCEDURE — A9270 NON-COVERED ITEM OR SERVICE: HCPCS | Performed by: PSYCHIATRY & NEUROLOGY

## 2017-03-04 PROCEDURE — 99232 SBSQ HOSP IP/OBS MODERATE 35: CPT | Performed by: HOSPITALIST

## 2017-03-04 PROCEDURE — 700102 HCHG RX REV CODE 250 W/ 637 OVERRIDE(OP): Performed by: HOSPITALIST

## 2017-03-04 PROCEDURE — 770006 HCHG ROOM/CARE - MED/SURG/GYN SEMI*

## 2017-03-04 PROCEDURE — 700102 HCHG RX REV CODE 250 W/ 637 OVERRIDE(OP): Performed by: INTERNAL MEDICINE

## 2017-03-04 PROCEDURE — 700111 HCHG RX REV CODE 636 W/ 250 OVERRIDE (IP): Performed by: INTERNAL MEDICINE

## 2017-03-04 PROCEDURE — 700111 HCHG RX REV CODE 636 W/ 250 OVERRIDE (IP): Performed by: HOSPITALIST

## 2017-03-04 PROCEDURE — 700102 HCHG RX REV CODE 250 W/ 637 OVERRIDE(OP): Performed by: PSYCHIATRY & NEUROLOGY

## 2017-03-04 PROCEDURE — 700105 HCHG RX REV CODE 258: Performed by: INTERNAL MEDICINE

## 2017-03-04 PROCEDURE — 93922 UPR/L XTREMITY ART 2 LEVELS: CPT | Mod: 26 | Performed by: SURGERY

## 2017-03-04 PROCEDURE — 82962 GLUCOSE BLOOD TEST: CPT

## 2017-03-04 PROCEDURE — A9270 NON-COVERED ITEM OR SERVICE: HCPCS | Performed by: INTERNAL MEDICINE

## 2017-03-04 RX ADMIN — CYANOCOBALAMIN TAB 500 MCG 1000 MCG: 500 TAB at 10:22

## 2017-03-04 RX ADMIN — OXCARBAZEPINE 150 MG: 150 TABLET, FILM COATED ORAL at 20:25

## 2017-03-04 RX ADMIN — Medication 1300 MG: at 20:25

## 2017-03-04 RX ADMIN — FOLIC ACID 1 MG: 1 TABLET ORAL at 10:23

## 2017-03-04 RX ADMIN — PHENYTOIN SODIUM 200 MG: 100 CAPSULE, EXTENDED RELEASE ORAL at 10:25

## 2017-03-04 RX ADMIN — ASPIRIN 325 MG: 325 TABLET, FILM COATED ORAL at 10:24

## 2017-03-04 RX ADMIN — LORAZEPAM 1 MG: 1 TABLET ORAL at 12:32

## 2017-03-04 RX ADMIN — Medication 1300 MG: at 15:12

## 2017-03-04 RX ADMIN — CEFUROXIME SODIUM 750 MG: 7.5 INJECTION, POWDER, FOR SOLUTION INTRAVENOUS at 23:07

## 2017-03-04 RX ADMIN — LORAZEPAM 1 MG: 1 TABLET ORAL at 04:32

## 2017-03-04 RX ADMIN — CARVEDILOL 25 MG: 25 TABLET, FILM COATED ORAL at 10:23

## 2017-03-04 RX ADMIN — CEFUROXIME SODIUM 750 MG: 7.5 INJECTION, POWDER, FOR SOLUTION INTRAVENOUS at 15:16

## 2017-03-04 RX ADMIN — OXYCODONE HYDROCHLORIDE 5 MG: 5 TABLET ORAL at 12:32

## 2017-03-04 RX ADMIN — CEFUROXIME SODIUM 750 MG: 7.5 INJECTION, POWDER, FOR SOLUTION INTRAVENOUS at 06:32

## 2017-03-04 RX ADMIN — OXCARBAZEPINE 150 MG: 150 TABLET, FILM COATED ORAL at 10:25

## 2017-03-04 RX ADMIN — PHENYTOIN SODIUM 200 MG: 100 CAPSULE, EXTENDED RELEASE ORAL at 20:25

## 2017-03-04 RX ADMIN — LORAZEPAM 1 MG: 1 TABLET ORAL at 18:46

## 2017-03-04 RX ADMIN — CARVEDILOL 25 MG: 25 TABLET, FILM COATED ORAL at 18:46

## 2017-03-04 RX ADMIN — AMLODIPINE BESYLATE 5 MG: 5 TABLET ORAL at 10:23

## 2017-03-04 RX ADMIN — ATORVASTATIN CALCIUM 40 MG: 40 TABLET, FILM COATED ORAL at 20:25

## 2017-03-04 RX ADMIN — HEPARIN SODIUM 5000 UNITS: 5000 INJECTION, SOLUTION INTRAVENOUS; SUBCUTANEOUS at 23:05

## 2017-03-04 RX ADMIN — Medication 1300 MG: at 10:23

## 2017-03-04 RX ADMIN — OXYCODONE HYDROCHLORIDE 5 MG: 5 TABLET ORAL at 05:27

## 2017-03-04 ASSESSMENT — ENCOUNTER SYMPTOMS
CHILLS: 0
MYALGIAS: 1
VOMITING: 0
FEVER: 0
NAUSEA: 0

## 2017-03-04 ASSESSMENT — PAIN SCALES - GENERAL
PAINLEVEL_OUTOF10: 0
PAINLEVEL_OUTOF10: 0
PAINLEVEL_OUTOF10: 4

## 2017-03-04 NOTE — CARE PLAN
Problem: Safety  Goal: Will remain free from falls  Outcome: PROGRESSING AS EXPECTED  No new falls or injuries noted. Patient resting in bed with treaded sock to nonsurgical foot. Patient currently calling appropriately with bed alarm applied. Patient also currently compliant with turning and repositioning.     Problem: Infection  Goal: Will remain free from infection  Outcome: PROGRESSING AS EXPECTED  No new signs or symptoms of infection at this time.

## 2017-03-04 NOTE — PROGRESS NOTES
Hospital Medicine Interval Note  Date of Service:  3/4/2017    Chief Complaint  46 y.o.-year-old female admitted 2/26/2017 with foot infection    Interval Problem Update  3/4 - discussed with patient, who had pulled out her IV and denied it. Advised her not to do that and not to walk on her foot, also. Questions answered.     3/3 - discussed with patient, improved mentation, calmer. Foot less painful. Discussed on IDT rounds. PICC held due to SCr.     3/2 - discussed with patient; discussed on IDT rounds. May benefit from locked unit after her abx therapy complete and wound healed; will probably need SNF.      Consultants/Specialty  Psych - Pavlatos  ID - Matt  Ortho - [place]    Disposition  Clinical         Review of Systems   Unable to perform ROS: psychiatric disorder      Physical Exam Laboratory/Imaging   Filed Vitals:    03/03/17 1600 03/03/17 2000 03/04/17 0400 03/04/17 0700   BP: 138/70 135/68 144/85 132/69   Pulse: 71 66 70 64   Temp: 36.8 °C (98.3 °F) 37.1 °C (98.7 °F) 36.7 °C (98 °F) 36.7 °C (98.1 °F)   Resp: 16 17 18 13   Height:       Weight:  119.6 kg (263 lb 10.7 oz)     SpO2: 95% 96% 96% 96%     Physical Exam   Constitutional: She appears well-developed and well-nourished.   HENT:   Head: Normocephalic and atraumatic.   Mouth/Throat: Oropharynx is clear and moist.   Eyes: Conjunctivae and EOM are normal. Pupils are equal, round, and reactive to light. No scleral icterus.   Neck: Normal range of motion. Neck supple. No tracheal deviation present. No thyromegaly present.   Cardiovascular: Normal rate, regular rhythm, normal heart sounds and intact distal pulses.    No murmur heard.  Pulmonary/Chest: Effort normal and breath sounds normal. No respiratory distress. She has no wheezes.   Abdominal: Soft. Bowel sounds are normal. She exhibits no distension. There is no tenderness.   Musculoskeletal: Normal range of motion. She exhibits edema and tenderness.   Right foot wrapped, erythematous at toe  with serous drainage   Lymphadenopathy:     She has no cervical adenopathy.        Right: No supraclavicular adenopathy present.        Left: No supraclavicular adenopathy present.   Neurological: She is alert.   Skin: Skin is warm and dry.   Vitals reviewed.   Lab Results   Component Value Date/Time    WBC 9.9 03/03/2017 02:42 AM    HEMOGLOBIN 9.4* 03/03/2017 02:42 AM    HEMATOCRIT 28.4* 03/03/2017 02:42 AM    PLATELET COUNT 235 03/03/2017 02:42 AM     Lab Results   Component Value Date/Time    SODIUM 143 03/03/2017 02:41 AM    POTASSIUM 3.5* 03/03/2017 02:41 AM    CHLORIDE 112 03/03/2017 02:41 AM    CO2 20 03/03/2017 02:41 AM    GLUCOSE 106* 03/03/2017 02:41 AM    BUN 42* 03/03/2017 02:41 AM    CREATININE 2.89* 03/03/2017 02:41 AM      Assessment/Plan    Cellulitis of right foot  Assessment & Plan  Active Orders     Ordered     Ordering Provider       Wed Mar 1, 2017  1:56 PM    03/01/17 1356  cefUROXime (ZINACEF) 750 mg in NS 50 mL IVPB   EVERY 8 HOURS      Ivy Gutierrez M.D.      ID on board  Ortho I&D'd  PICC ordered    Daniella middletonya disease  Assessment & Plan  Asked psych to see  Appreciate psych assistance  Discussed with family  Wander guard    CKD stage 4 secondary to hypertension (CMS-HCC)  Assessment & Plan  Lab Results   Component Value Date    BUN 42* 03/03/2017    CREATININE 2.89* 03/03/2017    CREATININE 0.9 11/19/2006   Baseline Cr 3-range, trending  Maintaining hydration  Avoiding nephrotoxics: NSAIDs etc  Following Cr closely; to keep near baseline as possible    Anemia  Assessment & Plan  Lab Results   Component Value Date/Time    HEMOGLOBIN 9.4* 03/03/2017 02:42 AM    HEMATOCRIT 28.4* 03/03/2017 02:42 AM    MCV 95.9 03/03/2017 02:42 AM    PLATELET COUNT 235 03/03/2017 02:42 AM   Goal Hb generally > 8 gm/dL  Transfusion per AABB guidelines available at ncbi.nlm.nih.gov/pubmed/49207691    Essential hypertension, benign  Assessment & Plan  SBP goal less than 140 mmHg  DBP goal less than 90 mmHg  PRN  and antihypertensives to titrate by hospitalist towards goal  Most recent Blood Pressure: 147/78 mmHg     Dilated cardiomyopathy (CMS-[facility])  Assessment & Plan  Medically managed    HLD (hyperlipidemia)  Assessment & Plan  Statin    Seizure disorder, grand mal (CMS-[facility])  Assessment & Plan  Dilantin       Labs reviewed and Medications reviewed        DVT Prophylaxis: Heparin    Ulcer prophylaxis: Not indicated

## 2017-03-04 NOTE — PROGRESS NOTES
"Ortho PN    POD #5 s/p I&D Right foot by Dr. Monterroso    Pain in foot relatively unchanged.  Noncompliant w dressing and WB restrictions    /69 mmHg  Pulse 64  Temp(Src) 36.7 °C (98.1 °F)  Resp 13  Ht 1.753 m (5' 9\")  Wt 119.6 kg (263 lb 10.7 oz)  BMI 38.92 kg/m2  SpO2 96%  Breastfeeding? No    Results     Procedure Component Value Units Date/Time    BLOOD CULTURE [[id number]] Collected:  02/26/17 2330    Order Status:  Completed Specimen Information:  Blood from Peripheral Updated:  03/04/17 0100     Significant Indicator NEG      Source BLD      Site PERIPHERAL      Blood Culture No growth after 5 days of incubation.     Narrative:      Per Hospital Policy: Only change Specimen Src: to \"Line\" if  specified by physician order.    BLOOD CULTURE [[id number]] Collected:  02/26/17 2230    Order Status:  Completed Specimen Information:  Blood from Peripheral Updated:  03/04/17 0100     Significant Indicator NEG      Source BLD      Site PERIPHERAL      Blood Culture No growth after 5 days of incubation.     Narrative:      Per Hospital Policy: Only change Specimen Src: to \"Line\" if  specified by physician order.    ANAEROBIC CULTURE [[id number]] Collected:  02/27/17 1243    Order Status:  Completed Specimen Information:  Tissue Updated:  03/02/17 1455     Significant Indicator NEG      Source TISS      Site Right Foot Abscess      Anaerobic Culture, Culture Res No Anaerobes isolated.     CULTURE TISSUE W/ GRM STAIN [[id number]]  (Abnormal) Collected:  02/27/17 1243    Order Status:  Completed Specimen Information:  Tissue Updated:  03/02/17 1455     Gram Stain Result -- (A)      Result:        Many WBCs.  Many Gram positive cocci.       Significant Indicator POS (POS)      Source TISS      Site Right Foot Abscess      Tissue Culture -- (A)      Tissue Culture -- (A)      Result:        Staphylococcus aureus  Heavy growth  See previous culture for sensitivity report.       Tissue Culture -- (A)      Result:    "     Escherichia coli  Rare growth  See previous culture for sensitivity report.       Tissue Culture -- (A)      Result:        Viridans Streptococcus  Moderate growth  Mixed morphologies       Tissue Culture -- (A)      Result:        Beta Streptococcus Group G  Light growth      CULTURE WOUND W/ GRAM STAIN [[id number]]  (Abnormal) Collected:  02/27/17 1015    Order Status:  Completed Specimen Information:  Wound from Left Foot Updated:  03/01/17 0949     Gram Stain Result --      Result:        Many WBCs.  Many Gram positive cocci.       Significant Indicator POS (POS)      Source WND      Site LEFT FOOT      Culture Result Wound Moderate growth Mixed skin shawn. (A)      Culture Result Wound -- (A)      Result:        Staphylococcus aureus  Heavy growth  See previous culture for sensitivity report.       Culture Result Wound -- (A)      Result:        Escherichia coli  Rare growth  See previous culture for sensitivity report.      Narrative:      Collected By:[id number] FRANCES MIX    CULTURE WOUND W/ GRAM STAIN [[id number]]  (Abnormal)  (Susceptibility) Collected:  02/26/17 2350    Order Status:  Completed Specimen Information:  Wound from Right Foot Updated:  03/01/17 0946     Gram Stain Result --      Result:        Moderate WBCs.  Many Gram positive cocci.       Significant Indicator POS (POS)      Source WND      Site RIGHT FOOT      Culture Result Wound Moderate growth Mixed skin shawn. (A)      Culture Result Wound -- (A)      Result:        Staphylococcus aureus  Moderate growth       Culture Result Wound -- (A)      Result:        Escherichia coli  Rare growth      Culture & Susceptibility     ESCHERICHIA COLI     Antibiotic Sensitivity Microscan Unit Status    Ampicillin Sensitive <=8 mcg/mL Final    Cefepime Sensitive <=8 mcg/mL Final    Cefotaxime Sensitive <=2 mcg/mL Final    Cefotetan Sensitive <=16 mcg/mL Final    Ceftazidime Sensitive <=1 mcg/mL Final    Ceftriaxone Sensitive <=8 mcg/mL Final     Cefuroxime Sensitive <=4 mcg/mL Final    Ciprofloxacin Sensitive <=1 mcg/mL Final    Ertapenem Sensitive <=1 mcg/mL Final    Gentamicin Sensitive <=4 mcg/mL Final    Pip/Tazobactam Sensitive <=16 mcg/mL Final    Tigecycline Sensitive <=2 mcg/mL Final    Tobramycin Sensitive <=4 mcg/mL Final    Trimeth/Sulfa Sensitive <=2/38 mcg/mL Final              STAPHYLOCOCCUS AUREUS     Antibiotic Sensitivity Microscan Unit Status    Ampicillin/sulbactam Sensitive <=8/4 mcg/mL Final    Clindamycin Sensitive <=0.5 mcg/mL Final    Daptomycin Sensitive <=0.5 mcg/mL Final    Erythromycin Sensitive <=0.5 mcg/mL Final    Moxifloxacin Sensitive <=0.5 mcg/mL Final    Oxacillin Sensitive <=0.25 mcg/mL Final    Tetracycline Sensitive <=4 mcg/mL Final    Trimeth/Sulfa Sensitive <=0.5/9.5 mcg/mL Final    Vancomycin Sensitive 2 mcg/mL Final                       GRAM STAIN [[id number]] Collected:  02/27/17 1243    Order Status:  Completed Specimen Information:  Tissue Updated:  02/27/17 1745     Significant Indicator .      Source TISS      Site Right Foot Abscess      Gram Stain Result --      Result:        Many WBCs.  Many Gram positive cocci.      GRAM STAIN [[id number]] Collected:  02/27/17 1015    Order Status:  Completed Specimen Information:  Wound Updated:  02/27/17 1743     Significant Indicator .      Source WND      Site LEFT FOOT      Gram Stain Result --      Result:        Many WBCs.  Many Gram positive cocci.      Narrative:      Collected By:[id number] FRANCES MIX    CDIFF BY PCR [[id number]] Collected:  02/27/17 1130    Order Status:  Completed Specimen Information:  Stool from Stool Updated:  02/27/17 1347     C Diff by PCR Negative      027-NAP1-BI Presumptive Negative      Comment: Presumptive 027/NAP1/BI target DNA sequences are NOT DETECTED.       Narrative:      Collected By:[id number] FRANCES MIX  Does this patient have risk factors for C-diff?->Yes  C-Diff Risk Factors->antibiotic exposure  Has patient taken  "stool softeners or laxatives in the last 5  days?->No  Indication for CDiff by PCR?->Greater than/equal to 3 stools  in 24 hr & \"takes shape of container\"    GRAM STAIN [[id number]] Collected:  02/26/17 2350    Order Status:  Completed Specimen Information:  Wound Updated:  02/27/17 1046     Significant Indicator .      Source WND      Site RIGHT FOOT      Gram Stain Result --      Result:        Moderate WBCs.  Many Gram positive cocci.      URINALYSIS [[id number]] Collected:  02/26/17 0000    Order Status:  Canceled Specimen Information:  Urine     Narrative:      TEST Urinalysis WAS CANCELLED, 03/02/17 01:05 Test autocancelled, not  collected or received  Indication for culture:->Emergency Room Patient    URINE CULTURE(NEW) [[id number]] Collected:  02/26/17 0000    Order Status:  Canceled Specimen Information:  Urine     Narrative:      TEST Urine Culture WAS CANCELLED, 03/02/17 01:05 Test autocancelled, not  collected or received  Indication for culture:->Emergency Room Patient    Blood Culture [[id number]] Collected:  02/26/17 0000    Order Status:  Canceled Specimen Information:  Other from Peripheral     Narrative:      ORDER WAS CANCELLED 02/26/2017 23:01, refer to [id number]  23:01  02/26/2017.  From different peripheral sites, if not done within the last  24 hours (Per Hospital Policy: Only change specimen source to  \"Line\" if specified by physician order)    Blood Culture [[id number]] Collected:  02/26/17 0000    Order Status:  Canceled Specimen Information:  Other from Peripheral     Narrative:      ORDER WAS CANCELLED 02/26/2017 23:01, refer to [id number]  23:02  02/26/2017.  From different peripheral sites, if not done within the last  24 hours (Per Hospital Policy: Only change specimen source to  \"Line\" if specified by physician order)    Culture Respiratory W/ GRM STN [[id number]] Collected:  02/26/17 0000    Order Status:  Canceled Specimen Information:  Sputum from Sputum     Narrative:      TEST " Respiratory Culture w/ GS WAS CANCELLED, 03/02/17 01:05 Test  autocancelled, not collected or received  Sputum cultures, induced if needed. If not done within the  last 24 hours        RLE: Circumferential swelling forefoot, purulent drainage plantar 2nd MTH ulcer.  States she has normal sensation throughout.  Difficulty palpating PT or DP pulses RLE, easily palpable LLE.      POD #5 with persistent drainage.      NWB RLE  LPS consult  Vascular studies  Will need repeat I&D with GSR and 2nd MTH excision v amputation, depending on results of vascular studies  Abx per ID, pt will have difficulty getting PICC 2/2 underlying kidney dz  NPO at midnight tonight if vasc studies done     Gerardo Lynn MD

## 2017-03-04 NOTE — PROGRESS NOTES
Hospital Medicine Interval Note  Date of Service:  3/3/2017    Chief Complaint  46 y.o.-year-old female admitted 2/26/2017 with foot infection    Interval Problem Update  3/3 - discussed with patient, improved mentation, calmer. Foot less painful. Discussed on IDT rounds. PICC held due to SCr.     3/2 - discussed with patient; discussed on IDT rounds. May benefit from locked unit after her abx therapy complete and wound healed; will probably need SNF.      Consultants/Specialty  Psych - Pavlatos  ID - Matt  Ortho - [place]    Disposition  Clinical         Review of Systems   Unable to perform ROS: psychiatric disorder      Physical Exam Laboratory/Imaging   Filed Vitals:    03/02/17 1600 03/02/17 2000 03/03/17 0400 03/03/17 0800   BP: 119/73 108/51 125/77 147/78   Pulse: 72 67 68 64   Temp: 36.8 °C (98.3 °F) 37 °C (98.6 °F) 36.7 °C (98 °F) 36.4 °C (97.6 °F)   Resp: 18 16 16 16   Height:       Weight:       SpO2: 95% 95% 94% 96%     Physical Exam   Constitutional: She appears well-developed and well-nourished.   HENT:   Head: Normocephalic and atraumatic.   Mouth/Throat: Oropharynx is clear and moist.   Eyes: Conjunctivae and EOM are normal. Pupils are equal, round, and reactive to light. No scleral icterus.   Neck: Normal range of motion. Neck supple. No tracheal deviation present. No thyromegaly present.   Cardiovascular: Normal rate, regular rhythm, normal heart sounds and intact distal pulses.    No murmur heard.  Pulmonary/Chest: Effort normal and breath sounds normal. No respiratory distress. She has no wheezes.   Abdominal: Soft. Bowel sounds are normal. She exhibits no distension. There is no tenderness.   Musculoskeletal: Normal range of motion. She exhibits edema and tenderness.   Right foot wrapped, erythematous at toe with serous drainage   Lymphadenopathy:     She has no cervical adenopathy.        Right: No supraclavicular adenopathy present.        Left: No supraclavicular adenopathy present.    Neurological: She is alert.   Skin: Skin is warm and dry.   Vitals reviewed.   Lab Results   Component Value Date/Time    WBC 9.9 03/03/2017 02:42 AM    HEMOGLOBIN 9.4* 03/03/2017 02:42 AM    HEMATOCRIT 28.4* 03/03/2017 02:42 AM    PLATELET COUNT 235 03/03/2017 02:42 AM     Lab Results   Component Value Date/Time    SODIUM 143 03/03/2017 02:41 AM    POTASSIUM 3.5* 03/03/2017 02:41 AM    CHLORIDE 112 03/03/2017 02:41 AM    CO2 20 03/03/2017 02:41 AM    GLUCOSE 106* 03/03/2017 02:41 AM    BUN 42* 03/03/2017 02:41 AM    CREATININE 2.89* 03/03/2017 02:41 AM      Assessment/Plan    Cellulitis of right foot  Assessment & Plan  Active Orders     Ordered     Ordering Provider       Wed Mar 1, 2017  1:56 PM    03/01/17 1356  cefUROXime (ZINACEF) 750 mg in NS 50 mL IVPB   EVERY 8 HOURS      Ivy Gutierrez M.D.      ID on board  Ortho I&D'd  PICC ordered    Daniella villavicencio disease  Assessment & Plan  Asked psych to see  Appreciate psych assistance  Discussed with family  Wander guard    CKD stage 4 secondary to hypertension (CMS-[facility])  Assessment & Plan  Lab Results   Component Value Date    BUN 42* 03/03/2017    CREATININE 2.89* 03/03/2017    CREATININE 0.9 11/19/2006   Baseline Cr 3-range, trending  Maintaining hydration  Avoiding nephrotoxics: NSAIDs etc  Following Cr closely; to keep near baseline as possible    Anemia  Assessment & Plan  Lab Results   Component Value Date/Time    HEMOGLOBIN 9.4* 03/03/2017 02:42 AM    HEMATOCRIT 28.4* 03/03/2017 02:42 AM    MCV 95.9 03/03/2017 02:42 AM    PLATELET COUNT 235 03/03/2017 02:42 AM   Goal Hb generally > 8 gm/dL  Transfusion per AABB guidelines available at ncbi.nlm.nih.gov/pubmed/48973358    Essential hypertension, benign  Assessment & Plan  SBP goal less than 140 mmHg  DBP goal less than 90 mmHg  PRN and antihypertensives to titrate by hospitalist towards goal  Most recent Blood Pressure: 147/78 mmHg     Dilated cardiomyopathy (CMS-HCC)  Assessment & Plan  Medically  managed    HLD (hyperlipidemia)  Assessment & Plan  Statin    Seizure disorder, grand mal (CMS-HCC)  Assessment & Plan  Dilantin       Labs reviewed and Medications reviewed        DVT Prophylaxis: Heparin    Ulcer prophylaxis: Not indicated

## 2017-03-04 NOTE — CARE PLAN
Problem: Safety  Goal: Will remain free from injury  Outcome: PROGRESSING AS EXPECTED  Educated about calling for help when ambulating. Patient has call light with in reach and all personal belongings near patient.     Problem: Bowel/Gastric:  Goal: Normal bowel function is maintained or improved  Outcome: PROGRESSING AS EXPECTED  Educated patient about normal bowel habits. Educated the patient about stool softness offered per MAR.

## 2017-03-04 NOTE — PROGRESS NOTES
Patient's mother called, and updated over the telephone with daughter's permission. Telephone supplied to patient for conversation.

## 2017-03-05 ENCOUNTER — APPOINTMENT (OUTPATIENT)
Dept: RADIOLOGY | Facility: MEDICAL CENTER | Age: 47
DRG: 853 | End: 2017-03-05
Attending: ANESTHESIOLOGY
Payer: MEDICARE

## 2017-03-05 LAB
APTT PPP: 27.9 SEC (ref 24.7–36)
EKG IMPRESSION: NORMAL
GLUCOSE BLD-MCNC: 98 MG/DL (ref 65–99)
GRAM STN SPEC: NORMAL
GRAM STN SPEC: NORMAL
HCG UR QL: NEGATIVE
INR PPP: 1.2 (ref 0.87–1.13)
PROTHROMBIN TIME: 15.6 SEC (ref 12–14.6)
SIGNIFICANT IND 70042: NORMAL
SIGNIFICANT IND 70042: NORMAL
SITE SITE: NORMAL
SITE SITE: NORMAL
SOURCE SOURCE: NORMAL
SOURCE SOURCE: NORMAL
SP GR UR REFRACTOMETRY: 1.01

## 2017-03-05 PROCEDURE — 87205 SMEAR GRAM STAIN: CPT | Mod: 91

## 2017-03-05 PROCEDURE — 87075 CULTR BACTERIA EXCEPT BLOOD: CPT | Mod: 91

## 2017-03-05 PROCEDURE — 82962 GLUCOSE BLOOD TEST: CPT

## 2017-03-05 PROCEDURE — 0Y6R0Z0 DETACHMENT AT RIGHT 2ND TOE, COMPLETE, OPEN APPROACH: ICD-10-PCS | Performed by: ORTHOPAEDIC SURGERY

## 2017-03-05 PROCEDURE — A4606 OXYGEN PROBE USED W OXIMETER: HCPCS | Performed by: ORTHOPAEDIC SURGERY

## 2017-03-05 PROCEDURE — A9270 NON-COVERED ITEM OR SERVICE: HCPCS | Performed by: HOSPITALIST

## 2017-03-05 PROCEDURE — 0JDQ0ZZ EXTRACTION OF RIGHT FOOT SUBCUTANEOUS TISSUE AND FASCIA, OPEN APPROACH: ICD-10-PCS | Performed by: ORTHOPAEDIC SURGERY

## 2017-03-05 PROCEDURE — 87070 CULTURE OTHR SPECIMN AEROBIC: CPT

## 2017-03-05 PROCEDURE — 770006 HCHG ROOM/CARE - MED/SURG/GYN SEMI*

## 2017-03-05 PROCEDURE — 501838 HCHG SUTURE GENERAL: Performed by: ORTHOPAEDIC SURGERY

## 2017-03-05 PROCEDURE — 160035 HCHG PACU - 1ST 60 MINS PHASE I: Performed by: ORTHOPAEDIC SURGERY

## 2017-03-05 PROCEDURE — 700111 HCHG RX REV CODE 636 W/ 250 OVERRIDE (IP)

## 2017-03-05 PROCEDURE — 501488 HCHG SUCTION CANN, WOUNDVAC TRAC: Performed by: ORTHOPAEDIC SURGERY

## 2017-03-05 PROCEDURE — 501330 HCHG SET, CYSTO IRRIG TUBING: Performed by: ORTHOPAEDIC SURGERY

## 2017-03-05 PROCEDURE — 0Y6T0Z0 DETACHMENT AT RIGHT 3RD TOE, COMPLETE, OPEN APPROACH: ICD-10-PCS | Performed by: ORTHOPAEDIC SURGERY

## 2017-03-05 PROCEDURE — 700102 HCHG RX REV CODE 250 W/ 637 OVERRIDE(OP): Performed by: HOSPITALIST

## 2017-03-05 PROCEDURE — 110382 HCHG SHELL REV 271: Performed by: ORTHOPAEDIC SURGERY

## 2017-03-05 PROCEDURE — 700102 HCHG RX REV CODE 250 W/ 637 OVERRIDE(OP): Performed by: INTERNAL MEDICINE

## 2017-03-05 PROCEDURE — 500088 HCHG BLADE, SAGITTAL: Performed by: ORTHOPAEDIC SURGERY

## 2017-03-05 PROCEDURE — 500054 HCHG BANDAGE, ELASTIC 6: Performed by: ORTHOPAEDIC SURGERY

## 2017-03-05 PROCEDURE — 99232 SBSQ HOSP IP/OBS MODERATE 35: CPT | Performed by: HOSPITALIST

## 2017-03-05 PROCEDURE — 85730 THROMBOPLASTIN TIME PARTIAL: CPT

## 2017-03-05 PROCEDURE — 0LNN0ZZ RELEASE RIGHT LOWER LEG TENDON, OPEN APPROACH: ICD-10-PCS | Performed by: ORTHOPAEDIC SURGERY

## 2017-03-05 PROCEDURE — 93010 ELECTROCARDIOGRAM REPORT: CPT | Performed by: INTERNAL MEDICINE

## 2017-03-05 PROCEDURE — 160027 HCHG SURGERY MINUTES - 1ST 30 MINS LEVEL 2: Performed by: ORTHOPAEDIC SURGERY

## 2017-03-05 PROCEDURE — 500881 HCHG PACK, EXTREMITY: Performed by: ORTHOPAEDIC SURGERY

## 2017-03-05 PROCEDURE — A9270 NON-COVERED ITEM OR SERVICE: HCPCS | Performed by: PSYCHIATRY & NEUROLOGY

## 2017-03-05 PROCEDURE — 160038 HCHG SURGERY MINUTES - EA ADDL 1 MIN LEVEL 2: Performed by: ORTHOPAEDIC SURGERY

## 2017-03-05 PROCEDURE — A6454 SELF-ADHER BAND W>=3" <5"/YD: HCPCS | Performed by: ORTHOPAEDIC SURGERY

## 2017-03-05 PROCEDURE — 160009 HCHG ANES TIME/MIN: Performed by: ORTHOPAEDIC SURGERY

## 2017-03-05 PROCEDURE — 700111 HCHG RX REV CODE 636 W/ 250 OVERRIDE (IP): Performed by: INTERNAL MEDICINE

## 2017-03-05 PROCEDURE — A6402 STERILE GAUZE <= 16 SQ IN: HCPCS | Performed by: ORTHOPAEDIC SURGERY

## 2017-03-05 PROCEDURE — 160036 HCHG PACU - EA ADDL 30 MINS PHASE I: Performed by: ORTHOPAEDIC SURGERY

## 2017-03-05 PROCEDURE — 500452 HCHG DRESSING, WOUND VAC MED.: Performed by: ORTHOPAEDIC SURGERY

## 2017-03-05 PROCEDURE — 36415 COLL VENOUS BLD VENIPUNCTURE: CPT

## 2017-03-05 PROCEDURE — 700102 HCHG RX REV CODE 250 W/ 637 OVERRIDE(OP): Performed by: PSYCHIATRY & NEUROLOGY

## 2017-03-05 PROCEDURE — 0QBN0ZZ EXCISION OF RIGHT METATARSAL, OPEN APPROACH: ICD-10-PCS | Performed by: ORTHOPAEDIC SURGERY

## 2017-03-05 PROCEDURE — 87186 SC STD MICRODIL/AGAR DIL: CPT

## 2017-03-05 PROCEDURE — 160048 HCHG OR STATISTICAL LEVEL 1-5: Performed by: ORTHOPAEDIC SURGERY

## 2017-03-05 PROCEDURE — 87015 SPECIMEN INFECT AGNT CONCNTJ: CPT

## 2017-03-05 PROCEDURE — 160002 HCHG RECOVERY MINUTES (STAT): Performed by: ORTHOPAEDIC SURGERY

## 2017-03-05 PROCEDURE — 700101 HCHG RX REV CODE 250

## 2017-03-05 PROCEDURE — 85610 PROTHROMBIN TIME: CPT

## 2017-03-05 PROCEDURE — 502240 HCHG MISC OR SUPPLY RC 0272: Performed by: ORTHOPAEDIC SURGERY

## 2017-03-05 PROCEDURE — 93005 ELECTROCARDIOGRAM TRACING: CPT | Performed by: ANESTHESIOLOGY

## 2017-03-05 PROCEDURE — 81025 URINE PREGNANCY TEST: CPT

## 2017-03-05 PROCEDURE — 700105 HCHG RX REV CODE 258: Performed by: INTERNAL MEDICINE

## 2017-03-05 PROCEDURE — A9270 NON-COVERED ITEM OR SERVICE: HCPCS | Performed by: INTERNAL MEDICINE

## 2017-03-05 PROCEDURE — 71010 DX-CHEST-PORTABLE (1 VIEW): CPT

## 2017-03-05 PROCEDURE — 87077 CULTURE AEROBIC IDENTIFY: CPT

## 2017-03-05 RX ADMIN — OXYCODONE HYDROCHLORIDE 5 MG: 5 TABLET ORAL at 16:51

## 2017-03-05 RX ADMIN — LORAZEPAM 1 MG: 1 TABLET ORAL at 18:51

## 2017-03-05 RX ADMIN — LORAZEPAM 1 MG: 1 TABLET ORAL at 03:35

## 2017-03-05 RX ADMIN — PHENYTOIN SODIUM 200 MG: 100 CAPSULE, EXTENDED RELEASE ORAL at 21:31

## 2017-03-05 RX ADMIN — CEFUROXIME SODIUM 750 MG: 7.5 INJECTION, POWDER, FOR SOLUTION INTRAVENOUS at 15:42

## 2017-03-05 RX ADMIN — Medication 1300 MG: at 15:42

## 2017-03-05 RX ADMIN — ACETAMINOPHEN 650 MG: 325 TABLET, FILM COATED ORAL at 21:31

## 2017-03-05 RX ADMIN — CARVEDILOL 25 MG: 25 TABLET, FILM COATED ORAL at 16:51

## 2017-03-05 RX ADMIN — ATORVASTATIN CALCIUM 40 MG: 40 TABLET, FILM COATED ORAL at 21:32

## 2017-03-05 RX ADMIN — CEFUROXIME SODIUM 750 MG: 7.5 INJECTION, POWDER, FOR SOLUTION INTRAVENOUS at 06:10

## 2017-03-05 RX ADMIN — CARVEDILOL 25 MG: 25 TABLET, FILM COATED ORAL at 08:30

## 2017-03-05 RX ADMIN — CEFUROXIME SODIUM 750 MG: 7.5 INJECTION, POWDER, FOR SOLUTION INTRAVENOUS at 21:32

## 2017-03-05 RX ADMIN — OXCARBAZEPINE 150 MG: 150 TABLET, FILM COATED ORAL at 21:31

## 2017-03-05 RX ADMIN — AMLODIPINE BESYLATE 5 MG: 5 TABLET ORAL at 08:30

## 2017-03-05 RX ADMIN — Medication 1300 MG: at 21:32

## 2017-03-05 ASSESSMENT — ENCOUNTER SYMPTOMS
WHEEZING: 0
MYALGIAS: 1
NAUSEA: 0
FEVER: 0
VOMITING: 0
DIARRHEA: 0
CONSTIPATION: 0
NERVOUS/ANXIOUS: 1
SHORTNESS OF BREATH: 0
CHILLS: 0
COUGH: 0

## 2017-03-05 ASSESSMENT — PAIN SCALES - GENERAL
PAINLEVEL_OUTOF10: 0

## 2017-03-05 NOTE — CARE PLAN
Problem: Communication  Goal: The ability to communicate needs accurately and effectively will improve  Pt educated on use of call light and white board for communication, pt verbalizes understanding of white board communication and times of rounding.

## 2017-03-05 NOTE — PROGRESS NOTES
LIMB PRESERVATION SERVICE     Recd consult for R foot  47 y/o female with moyamoya syndrome with CVA, HTN, CKD. Admitted for right foot infection after stepping on rock. ID and Ortho involved. S/p I & D of R foot on 2/27      Arterial studies completed, per Venus CONTRERAS, non invasive study tech said prelim studies show no sign of blood flow issues. Advised to place pt NPO at midnight.  Plantar and dorsal foot ulcerations. RN to place dressing per dressing maintenance orders.      PLAN  NPO midnight, pending final results of arterial study  Wound care per orders to RLE  NWB RLE      D/w: RN, Dr. Hinson, Dr. Lynn

## 2017-03-05 NOTE — OR SURGEON
Immediate Post-Operative Note      PreOp Diagnosis: Right nonhealing foot ulceration    PostOp Diagnosis: Same    Procedure(s):  IRRIGATION & DEBRIDEMENT ORTHO AND GASTROC RECESSION - Wound Class: Dirty or Infected  SECOND METATARSAL HEAD RESECTION - Wound Class: Dirty or Infected    Surgeon(s):  Gerardo Lynn M.D.    Anesthesiologist/Type of Anesthesia:  Anesthesiologist: Brian Duval M.D./General    Surgical Staff:  Circulator: Loyda Britton R.N.  Scrub Person: Michi Singh    Specimen: Tissue for cx, 2/3 MT heads for Cx    Estimated Blood Loss: 12cc    TT: 36 @ 250mmHg    Findings: Gross purulence dorsal incisions and plantar ulceration    Complications: None    Postop    Will need return to OR Wednesday or Thursday for vac change v delayed primary closure  NWB RLE in CAM boot, boot at ALL times          3/5/2017 1:37 PM Gerardo Lynn

## 2017-03-05 NOTE — PROGRESS NOTES
Infectious Disease Progress Note    Author: Rocio Hinson M.D.    DOS & Time created: 3/4/2017  5:49 PM    Chief Complaint   Patient presents with   • Wound Infection     pt has wound on right foot, [facility], report cellutliis with drainage on wound     Interval History:  46-year-old white female with history of morbid obesity,  Moyamoya syndrome with prior stroke, who presents with a traumatic injury to her right foot with subsequent development of cellulitis and abscess    AF WBC 13.3 s/p drainage abscess-has been refusing IV  3/1- willing to take iv. No fevers. C/o pain. Apparently walking on the foot  3/2- says she is feeling better. No fevers.  3/3- in good spirits. No fevers.   3/4 AF WBC 9.9 foot worse-denies pain  Labs Reviewed, Medications Reviewed, Radiology Reviewed and Wound Reviewed.    Review of Systems:  Review of Systems   Constitutional: Negative for fever and chills.   Gastrointestinal: Negative for nausea and vomiting.   Musculoskeletal: Positive for myalgias and joint pain.        Foot pain   Skin: Negative for rash.       Hemodynamics:  Temp (24hrs), Av.8 °C (98.3 °F), Min:36.7 °C (98 °F), Max:37.1 °C (98.7 °F)  Temperature: 36.7 °C (98.1 °F)  Pulse  Av.2  Min: 58  Max: 82   Blood Pressure: 132/69 mmHg       Physical Exam:  Physical Exam   Constitutional: She is oriented to person, place, and time. She appears well-developed. No distress.   Obese   HENT:   Head: Normocephalic and atraumatic.   Eyes: EOM are normal. Pupils are equal, round, and reactive to light.   Neck: Neck supple.   Cardiovascular: Normal rate and regular rhythm.    No murmur heard.  Pulmonary/Chest: Effort normal. No respiratory distress. She has no wheezes. She has no rales.   Abdominal: Soft. Bowel sounds are normal. She exhibits no distension. There is no tenderness.   Musculoskeletal:   Right foot edematous-visible purulence   Neurological: She is alert and oriented to person, place, and time.    Skin: No rash noted.   Psychiatric:   Poor insight   Nursing note and vitals reviewed.      Labs:  Recent Labs      03/03/17   0242   WBC  9.9   RBC  2.96*   HEMOGLOBIN  9.4*   HEMATOCRIT  28.4*   MCV  95.9   MCH  31.8   RDW  47.1   PLATELETCT  235   MPV  10.0     Recent Labs      03/03/17   0241   SODIUM  143   POTASSIUM  3.5*   CHLORIDE  112   CO2  20   GLUCOSE  106*   BUN  42*     Recent Labs      03/03/17   0241   CREATININE  2.89*        Imaging:       CT-FOOT W/O PLUS RECONS RIGHT (Final result) Result time: 02/27/17 00:30:10     Final result by Gee Tam M.D. (02/27/17 00:30:10)     Impression:     1.  Soft tissue swelling with gas in the distal plantar aspect of the foot at the level of the MTP joints with draining wound to the plantar surface of the skin.  2.  Minimal gas in the extensor aspect of the foot at the level of the MTP joints which may represent extension of cellulitis anteriorly.  3.  No loculated fluid collection identified.  4.  Diffuse soft tissue swelling in the remainder of the foot.  5.  No acute bony abnormality. No evidence of osteomyelitis. No radiopaque soft tissue foreign body identified     MR-FOOT-W/O RIGHT (Final result) Result time: 02/28/17 11:19:53     Final result by Aureliano Flores M.D. (02/28/17 11:19:53)     Impression:     Forefoot bilobed abscess with apparent plantar draining sinus. Largest loculation is in the dorsal soft tissues as discussed above  No unenhanced MR evidence of osteomyelitis  Metatarsophalangeal joint effusions most likely are reactive given lack of bony destruction/marginal erosive change     Final result by Rad Intf (02/27/17 11:53:22)     Narrative:     TransthoracicEcho Report  CONCLUSIONS  No prior study is available for comparison.   Normal left ventricular size, thickness, systolic function, and   diastolic function.  Left ventricular ejection fraction is visually estimated to be 65%.  Normal right ventricular size and systolic  function.  Trace tricuspid regurgitation.  Normal pericardium without effusion.     Medications:  Current Facility-Administered Medications   Medication Dose Frequency Provider Last Rate Last Dose   • cefUROXime (ZINACEF) 750 mg in NS 50 mL IVPB  750 mg Q8HR Ivy Gutierrez M.D.   Stopped at 03/04/17 1546   • haloperidol lactate (HALDOL) injection 5 mg  5 mg Q4HRS PRN Nabeel Monahan M.D.   5 mg at 03/02/17 0156   • oxcarbazepine (TRILEPTAL) tablet 150 mg  150 mg BID Lynn Carpenter M.D.   150 mg at 03/04/17 1025   • diphenoxylate-atropine (LOMOTIL) 2.5-0.025 MG per tablet 1 Tab  1 Tab 4X/DAY PRN Shazia Valle M.D.   1 Tab at 03/01/17 1125   • heparin injection 5,000 Units  5,000 Units Q8HRS Augustus Guerin M.D.   5,000 Units at 03/03/17 0556   • lorazepam (ATIVAN) tablet 1 mg  1 mg TID Augustus Guerin M.D.   1 mg at 03/04/17 1232   • sodium bicarbonate (SODIUM BICARBONATE) tablet 1,300 mg  1,300 mg TID Evelia Yepez M.D.   1,300 mg at 03/04/17 1512   • oxycodone immediate-release (ROXICODONE) tablet 5 mg  5 mg Q6HRS PRN Evelia Yepez M.D.   5 mg at 03/04/17 1232   • amlodipine (NORVASC) tablet 5 mg  5 mg DAILY Chalino Bender M.D.   5 mg at 03/04/17 1023   • aspirin (ASA) tablet 325 mg  325 mg DAILY Chalino Bender M.D.   325 mg at 03/04/17 1024   • atorvastatin (LIPITOR) tablet 40 mg  40 mg Nightly Chalino Bender M.D.   40 mg at 03/03/17 2042   • carvedilol (COREG) tablet 25 mg  25 mg BID WITH MEALS Chalino Bender M.D.   25 mg at 03/04/17 1023   • cyanocobalamin (VITAMIN B-12) tablet 1,000 mcg  1,000 mcg DAILY Chalino Bender M.D.   1,000 mcg at 03/04/17 1022   • folic acid (FOLVITE) tablet 1 mg  1 mg DAILY Chalino Bender M.D.   1 mg at 03/04/17 1023   • phenytoin ER (DILANTIN) capsule 200 mg  200 mg BID Chalino Bender M.D.   200 mg at 03/04/17 1025   • docusate sodium (COLACE) capsule 100 mg  100 mg BID Chalino Bender M.D.   100 mg at 03/02/17 2011   •  "bisacodyl (DULCOLAX) suppository 10 mg  10 mg Q24HRS PRN Chalino Bender M.D.       • fleet enema 133 mL  1 Each Once PRN Chalino Bender M.D.       • Respiratory Care per Protocol   Continuous RT Chalino Bender M.D.       • ondansetron (ZOFRAN) syringe/vial injection 4 mg  4 mg Q4HRS PRN Chalino Bender M.D.       • ondansetron (ZOFRAN ODT) dispertab 4 mg  4 mg Q4HRS PRN Chalino Bender M.D.       • promethazine (PHENERGAN) tablet 12.5-25 mg  12.5-25 mg Q4HRS PRN Chalino Bender M.D.       • promethazine (PHENERGAN) suppository 12.5-25 mg  12.5-25 mg Q4HRS PRN Chalino eBnder M.D.       • prochlorperazine (COMPAZINE) injection 5-10 mg  5-10 mg Q4HRS PRN Chalino Bender M.D.       • acetaminophen (TYLENOL) tablet 650 mg  650 mg Q6HRS PRN Chalino Bender M.D.       • NS (BOLUS) infusion 500 mL  500 mL Once PRN Chalino Bender M.D.         Last reviewed on 2/26/2017 11:24 PM by Radha Mixon    Micro:  Results     Procedure Component Value Units Date/Time    BLOOD CULTURE [[id number]] Collected:  02/26/17 2330    Order Status:  Completed Specimen Information:  Blood from Peripheral Updated:  03/04/17 0100     Significant Indicator NEG      Source BLD      Site PERIPHERAL      Blood Culture No growth after 5 days of incubation.     Narrative:      Per Hospital Policy: Only change Specimen Src: to \"Line\" if  specified by physician order.    BLOOD CULTURE [[id number]] Collected:  02/26/17 2230    Order Status:  Completed Specimen Information:  Blood from Peripheral Updated:  03/04/17 0100     Significant Indicator NEG      Source BLD      Site PERIPHERAL      Blood Culture No growth after 5 days of incubation.     Narrative:      Per Hospital Policy: Only change Specimen Src: to \"Line\" if  specified by physician order.    ANAEROBIC CULTURE [[id number]] Collected:  02/27/17 1243    Order Status:  Completed Specimen Information:  Tissue Updated:  03/02/17 7890     Significant Indicator NEG  "     Source TISS      Site Right Foot Abscess      Anaerobic Culture, Culture Res No Anaerobes isolated.     CULTURE TISSUE W/ GRM STAIN [[id number]]  (Abnormal) Collected:  02/27/17 1243    Order Status:  Completed Specimen Information:  Tissue Updated:  03/02/17 1455     Gram Stain Result -- (A)      Result:        Many WBCs.  Many Gram positive cocci.       Significant Indicator POS (POS)      Source TISS      Site Right Foot Abscess      Tissue Culture -- (A)      Tissue Culture -- (A)      Result:        Staphylococcus aureus  Heavy growth  See previous culture for sensitivity report.       Tissue Culture -- (A)      Result:        Escherichia coli  Rare growth  See previous culture for sensitivity report.       Tissue Culture -- (A)      Result:        Viridans Streptococcus  Moderate growth  Mixed morphologies       Tissue Culture -- (A)      Result:        Beta Streptococcus Group G  Light growth      CULTURE WOUND W/ GRAM STAIN [[id number]]  (Abnormal) Collected:  02/27/17 1015    Order Status:  Completed Specimen Information:  Wound from Left Foot Updated:  03/01/17 0949     Gram Stain Result --      Result:        Many WBCs.  Many Gram positive cocci.       Significant Indicator POS (POS)      Source WND      Site LEFT FOOT      Culture Result Wound Moderate growth Mixed skin shawn. (A)      Culture Result Wound -- (A)      Result:        Staphylococcus aureus  Heavy growth  See previous culture for sensitivity report.       Culture Result Wound -- (A)      Result:        Escherichia coli  Rare growth  See previous culture for sensitivity report.      Narrative:      Collected By:[id number] FRANCES MIX    CULTURE WOUND W/ GRAM STAIN [[id number]]  (Abnormal)  (Susceptibility) Collected:  02/26/17 2350    Order Status:  Completed Specimen Information:  Wound from Right Foot Updated:  03/01/17 0946     Gram Stain Result --      Result:        Moderate WBCs.  Many Gram positive cocci.       Significant  Indicator POS (POS)      Source WND      Site RIGHT FOOT      Culture Result Wound Moderate growth Mixed skin shawn. (A)      Culture Result Wound -- (A)      Result:        Staphylococcus aureus  Moderate growth       Culture Result Wound -- (A)      Result:        Escherichia coli  Rare growth      Culture & Susceptibility     ESCHERICHIA COLI     Antibiotic Sensitivity Microscan Unit Status    Ampicillin Sensitive <=8 mcg/mL Final    Cefepime Sensitive <=8 mcg/mL Final    Cefotaxime Sensitive <=2 mcg/mL Final    Cefotetan Sensitive <=16 mcg/mL Final    Ceftazidime Sensitive <=1 mcg/mL Final    Ceftriaxone Sensitive <=8 mcg/mL Final    Cefuroxime Sensitive <=4 mcg/mL Final    Ciprofloxacin Sensitive <=1 mcg/mL Final    Ertapenem Sensitive <=1 mcg/mL Final    Gentamicin Sensitive <=4 mcg/mL Final    Pip/Tazobactam Sensitive <=16 mcg/mL Final    Tigecycline Sensitive <=2 mcg/mL Final    Tobramycin Sensitive <=4 mcg/mL Final    Trimeth/Sulfa Sensitive <=2/38 mcg/mL Final              STAPHYLOCOCCUS AUREUS     Antibiotic Sensitivity Microscan Unit Status    Ampicillin/sulbactam Sensitive <=8/4 mcg/mL Final    Clindamycin Sensitive <=0.5 mcg/mL Final    Daptomycin Sensitive <=0.5 mcg/mL Final    Erythromycin Sensitive <=0.5 mcg/mL Final    Moxifloxacin Sensitive <=0.5 mcg/mL Final    Oxacillin Sensitive <=0.25 mcg/mL Final    Tetracycline Sensitive <=4 mcg/mL Final    Trimeth/Sulfa Sensitive <=0.5/9.5 mcg/mL Final    Vancomycin Sensitive 2 mcg/mL Final                       GRAM STAIN [[id number]] Collected:  02/27/17 1243    Order Status:  Completed Specimen Information:  Tissue Updated:  02/27/17 1745     Significant Indicator .      Source TISS      Site Right Foot Abscess      Gram Stain Result --      Result:        Many WBCs.  Many Gram positive cocci.      GRAM STAIN [[id number]] Collected:  02/27/17 1015    Order Status:  Completed Specimen Information:  Wound Updated:  02/27/17 1743     Significant Indicator .   "    Source [facility]      Site LEFT FOOT      Gram Stain Result --      Result:        Many WBCs.  Many Gram positive cocci.      Narrative:      Collected By:[id number] FRANCES MIX    CDIFF BY PCR [[id number]] Collected:  02/27/17 1130    Order Status:  Completed Specimen Information:  Stool from Stool Updated:  02/27/17 1347     C Diff by PCR Negative      027-NAP1-BI Presumptive Negative      Comment: Presumptive 027/NAP1/BI target DNA sequences are NOT DETECTED.       Narrative:      Collected By:[id number] FRANCES MIX  Does this patient have risk factors for C-diff?->Yes  C-Diff Risk Factors->antibiotic exposure  Has patient taken stool softeners or laxatives in the last 5  days?->No  Indication for CDiff by PCR?->Greater than/equal to 3 stools  in 24 hr & \"takes shape of container\"    GRAM STAIN [[id number]] Collected:  02/26/17 2350    Order Status:  Completed Specimen Information:  Wound Updated:  02/27/17 1046     Significant Indicator .      Source [facility] RIGHT FOOT      Gram Stain Result --      Result:        Moderate WBCs.  Many Gram positive cocci.      URINALYSIS [[id number]] Collected:  02/26/17 0000    Order Status:  Canceled Specimen Information:  Urine     Narrative:      TEST Urinalysis WAS CANCELLED, 03/02/17 01:05 Test autocancelled, not  collected or received  Indication for culture:->Emergency Room Patient    URINE CULTURE(NEW) [[id number]] Collected:  02/26/17 0000    Order Status:  Canceled Specimen Information:  Urine     Narrative:      TEST Urine Culture WAS CANCELLED, 03/02/17 01:05 Test autocancelled, not  collected or received  Indication for culture:->Emergency Room Patient    Blood Culture [[id number]] Collected:  02/26/17 0000    Order Status:  Canceled Specimen Information:  Other from Peripheral     Narrative:      ORDER WAS CANCELLED 02/26/2017 23:01, refer to [id number]  23:01  02/26/2017.  From different peripheral sites, if not done within the last  24 hours (Per Hospital " "Policy: Only change specimen source to  \"Line\" if specified by physician order)    Blood Culture [[id number]] Collected:  02/26/17 0000    Order Status:  Canceled Specimen Information:  Other from Peripheral     Narrative:      ORDER WAS CANCELLED 02/26/2017 23:01, refer to [id number]  23:02  02/26/2017.  From different peripheral sites, if not done within the last  24 hours (Per Hospital Policy: Only change specimen source to  \"Line\" if specified by physician order)    Culture Respiratory W/ GRM STN [[id number]] Collected:  02/26/17 0000    Order Status:  Canceled Specimen Information:  Sputum from Sputum     Narrative:      TEST Respiratory Culture w/ GS WAS CANCELLED, 03/02/17 01:05 Test  autocancelled, not collected or received  Sputum cultures, induced if needed. 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]   • Dementia [F03.90]   • Cellulitis of right foot [L03.115]   • CKD stage 4 secondary to hypertension (CMS-HCC) [I12.9, N18.4]   • Seizure disorder, grand mal (CMS-HCC) [G40.409]   • Dilated cardiomyopathy (CMS-HCC) [I42.0]   Right foot abscess, suspected OM    Plan:  Right foot abscess, multiloculated with sepsis  S/p drainage 2/27, emergent  Fever, resolved  Leucocytosis  resolved  C/s are MSSA,E coli, strep vridans, group C strep  Continue zinacef  Foot not improving-high risk for limb loss  Plan for repeat debridement  Continue wound care    Chronic renal failure- 2.9  Avoiding nephrotoxic drugs  Adjusting abx for renal function    Moyamoya syndrome with prior stroke/dementia  Noncompliant with abx  Barrier to care  Places her at increased risk for amputation    Discussed with Surgery  "

## 2017-03-05 NOTE — CARE PLAN
"Problem: Communication  Goal: The ability to communicate needs accurately and effectively will improve  Outcome: PROGRESSING SLOWER THAN EXPECTED  Pt is unable to coherently communicate medical updates with her Mother who is also her POA. While pt is oriented she is unable to recall what the MDs say during rounds. Andria Acevedo requests a plan of care update after the vascular study today. She also states not to allow Pt to refuse anything \"That will help her foot get better\". Pt verbalizes understanding as well. Will continue to monitor.          "

## 2017-03-05 NOTE — PROGRESS NOTES
MD ordered Vascular studies for Pt. As RN was speaking with the CN because study had not been completed, MD reordered the study as STAT (at 1523). RN spoke with Vascular Tech Gurvinder at [phone] who stated he was in the ER and would be up to complete the study after he was complete with the ER.

## 2017-03-05 NOTE — PROGRESS NOTES
"Ortho PN    POD #6 s/p I&D Right foot by Dr. Monterroso    [facility] since [facility] for [facility] today    /81 mmHg  Pulse 57  Temp(Src) 36.8 °C (98.2 °F)  Resp 18  Ht 1.753 m (5' 9\")  Wt 119.6 kg (263 lb 10.7 oz)  BMI 38.92 kg/m2  SpO2 99%  Breastfeeding? No    Results     Procedure Component Value Units Date/Time    BLOOD CULTURE [[id number]] Collected:  02/26/17 2330    Order Status:  Completed Specimen Information:  Blood from Peripheral Updated:  03/04/17 0100     Significant Indicator NEG      Source BLD      Site PERIPHERAL      Blood Culture No growth after 5 days of incubation.     Narrative:      Per Hospital Policy: Only change Specimen Src: to \"Line\" if  specified by physician order.    BLOOD CULTURE [[id number]] Collected:  02/26/17 2230    Order Status:  Completed Specimen Information:  Blood from Peripheral Updated:  03/04/17 0100     Significant Indicator NEG      Source BLD      Site PERIPHERAL      Blood Culture No growth after 5 days of incubation.     Narrative:      Per Hospital Policy: Only change Specimen Src: to \"Line\" if  specified by physician order.    ANAEROBIC CULTURE [[id number]] Collected:  02/27/17 1243    Order Status:  Completed Specimen Information:  Tissue Updated:  03/02/17 1455     Significant Indicator NEG      Source TISS      Site Right Foot Abscess      Anaerobic Culture, Culture Res No Anaerobes isolated.     CULTURE TISSUE W/ GRM STAIN [[id number]]  (Abnormal) Collected:  02/27/17 1243    Order Status:  Completed Specimen Information:  Tissue Updated:  03/02/17 1455     Gram Stain Result -- (A)      Result:        Many WBCs.  Many Gram positive cocci.       Significant Indicator POS (POS)      Source TISS      Site Right Foot Abscess      Tissue Culture -- (A)      Tissue Culture -- (A)      Result:        Staphylococcus aureus  Heavy growth  See previous culture for sensitivity report.       Tissue Culture -- (A)      Result:        Escherichia " coli  Rare growth  See previous culture for sensitivity report.       Tissue Culture -- (A)      Result:        Viridans Streptococcus  Moderate growth  Mixed morphologies       Tissue Culture -- (A)      Result:        Beta Streptococcus Group G  Light growth      CULTURE WOUND W/ GRAM STAIN [[id number]]  (Abnormal) Collected:  02/27/17 1015    Order Status:  Completed Specimen Information:  Wound from Left Foot Updated:  03/01/17 0949     Gram Stain Result --      Result:        Many WBCs.  Many Gram positive cocci.       Significant Indicator POS (POS)      Source WND      Site LEFT FOOT      Culture Result Wound Moderate growth Mixed skin shawn. (A)      Culture Result Wound -- (A)      Result:        Staphylococcus aureus  Heavy growth  See previous culture for sensitivity report.       Culture Result Wound -- (A)      Result:        Escherichia coli  Rare growth  See previous culture for sensitivity report.      Narrative:      Collected By:[id number] FRANCES MIX    CULTURE WOUND W/ GRAM STAIN [[id number]]  (Abnormal)  (Susceptibility) Collected:  02/26/17 2350    Order Status:  Completed Specimen Information:  Wound from Right Foot Updated:  03/01/17 0946     Gram Stain Result --      Result:        Moderate WBCs.  Many Gram positive cocci.       Significant Indicator POS (POS)      Source WND      Site RIGHT FOOT      Culture Result Wound Moderate growth Mixed skin shawn. (A)      Culture Result Wound -- (A)      Result:        Staphylococcus aureus  Moderate growth       Culture Result Wound -- (A)      Result:        Escherichia coli  Rare growth      Culture & Susceptibility     ESCHERICHIA COLI     Antibiotic Sensitivity Microscan Unit Status    Ampicillin Sensitive <=8 mcg/mL Final    Cefepime Sensitive <=8 mcg/mL Final    Cefotaxime Sensitive <=2 mcg/mL Final    Cefotetan Sensitive <=16 mcg/mL Final    Ceftazidime Sensitive <=1 mcg/mL Final    Ceftriaxone Sensitive <=8 mcg/mL Final    Cefuroxime  Sensitive <=4 mcg/mL Final    Ciprofloxacin Sensitive <=1 mcg/mL Final    Ertapenem Sensitive <=1 mcg/mL Final    Gentamicin Sensitive <=4 mcg/mL Final    Pip/Tazobactam Sensitive <=16 mcg/mL Final    Tigecycline Sensitive <=2 mcg/mL Final    Tobramycin Sensitive <=4 mcg/mL Final    Trimeth/Sulfa Sensitive <=2/38 mcg/mL Final              STAPHYLOCOCCUS AUREUS     Antibiotic Sensitivity Microscan Unit Status    Ampicillin/sulbactam Sensitive <=8/4 mcg/mL Final    Clindamycin Sensitive <=0.5 mcg/mL Final    Daptomycin Sensitive <=0.5 mcg/mL Final    Erythromycin Sensitive <=0.5 mcg/mL Final    Moxifloxacin Sensitive <=0.5 mcg/mL Final    Oxacillin Sensitive <=0.25 mcg/mL Final    Tetracycline Sensitive <=4 mcg/mL Final    Trimeth/Sulfa Sensitive <=0.5/9.5 mcg/mL Final    Vancomycin Sensitive 2 mcg/mL Final                       GRAM STAIN [[id number]] Collected:  02/27/17 1243    Order Status:  Completed Specimen Information:  Tissue Updated:  02/27/17 1745     Significant Indicator .      Source TISS      Site Right Foot Abscess      Gram Stain Result --      Result:        Many WBCs.  Many Gram positive cocci.      GRAM STAIN [[id number]] Collected:  02/27/17 1015    Order Status:  Completed Specimen Information:  Wound Updated:  02/27/17 1743     Significant Indicator .      Source WND      Site LEFT FOOT      Gram Stain Result --      Result:        Many WBCs.  Many Gram positive cocci.      Narrative:      Collected By:[id number] FRANCES MIX    CDIFF BY PCR [[id number]] Collected:  02/27/17 1130    Order Status:  Completed Specimen Information:  Stool from Stool Updated:  02/27/17 1347     C Diff by PCR Negative      027-NAP1-BI Presumptive Negative      Comment: Presumptive 027/NAP1/BI target DNA sequences are NOT DETECTED.       Narrative:      Collected By:[id number] FRANCES MIX  Does this patient have risk factors for C-diff?->Yes  C-Diff Risk Factors->antibiotic exposure  Has patient taken stool  "softeners or laxatives in the last 5  days?->No  Indication for CDiff by PCR?->Greater than/equal to 3 stools  in 24 hr & \"takes shape of container\"    GRAM STAIN [[id number]] Collected:  02/26/17 3848    Order Status:  Completed Specimen Information:  Wound Updated:  02/27/17 1046     Significant Indicator .      Source WND      Site RIGHT FOOT      Gram Stain Result --      Result:        Moderate WBCs.  Many Gram positive cocci.          RLE: Circumferential swelling forefoot, purulent drainage plantar 2nd MTH ulcer.  States she has normal sensation throughout.  Difficulty palpating PT or DP pulses RLE, easily palpable LLE.      POD #6 with persistent drainage.      Right foot I&D, GSR, 2nd MT head excision today  NWB RLE  LPS consult  Abx per ID, pt will have difficulty getting PICC 2/2 underlying kidney dz    Gerardo Lynn MD    "

## 2017-03-05 NOTE — OP REPORT
DATE OF SERVICE:  03/05/2017    PREOPERATIVE DIAGNOSIS:  Right foot nonhealing ulceration.    POSTOPERATIVE DIAGNOSIS:  Right foot nonhealing ulceration.    PROCEDURES PERFORMED:  1.  Irrigation and debridement, right dorsal foot surgical wound dehiscence.  2.  Irrigation and debridement skin, subcutaneous tissue to bone, right foot.  3.  Right second metatarsal head excision.  4.  Right third metatarsal head excision.  5.  Right gastrocsoleus recession.  6.  Right wound VAC placement, nondisposable, less than 50 cm2.    SURGEON:  Gerardo Lynn MD    ANESTHESIA:  General.    ESTIMATED BLOOD LOSS:  12 mL.    TOURNIQUET TIME:  36 minutes at 250 mmHg.    SPECIMENS:  Tissue for culture, second and third metatarsal heads sent   separately for culture.    FINDINGS:  Gross purulence and dehiscence dorsal incisions x2, one directly in   the third webspace, one proximal to the third webspace, gross purulence   plantar second and third metatarsal head, ulceration tracking to metatarsal   head.    COMPLICATIONS:  None.    OUTCOME:  To PACU in stable condition.    HISTORY OF PRESENT ILLNESS:  This is a 46-year-old female with multiple   medical comorbidities who is postoperative day #6 status post incision and   drainage of right foot by one of my partner.  She has gone on to have   persistent and worsening purulent drainage and was indicated for repeat I and   D.  Vascular studies were completed that showed adequate blood flow to the   foot such that she should be able to heal this infection.  She and her parents   were agreed in the preoperative holding area and identified by name and   medical record number.  The right lower extremity was marked.  Risks of the   procedure including bleeding, infection, pain, neurovascular damage, need for   more surgery and amputation were discussed and she provided a written consent.    DESCRIPTION OF PROCEDURE:  She was taken to the operating room, placed on the   table in supine  position.  General anesthesia was induced.  A nonsterile   tourniquet was placed on her right thigh.  Sutures were removed from her right   foot.  Purulent drainage was appreciated from dorsal and plantar wounds.  Her   right lower extremity was prepped and draped with Betadine prep and paint.    An operative pause was undertaken, where all present were in agreement with   patient identification, laterality, and procedure to be performed.  The leg   was elevated for 5 minutes and the tourniquet was raised to 250 mmHg.    Right gastrocsoleus recession:  Her foot wounds were covered with a Coban.  A   3 cm longitudinal incision was made along the medial border of the   myotendinous junction of the gastrocsoleus complex.  The crural fascia was   incised in line with the incision.  The gastroc fascia was then isolated and   transected with a 15 blade, taking care to protect the sural and saphenous   nerves.  We gained 20 degrees of dorsiflexion with this.  The incision was   copiously irrigated.  The subcutaneous layer was closed with buried   interrupted Vicryl suture.  The skin was closed with a running 3-0 nylon.    Xeroform gauze and a Tegaderm dressing were placed.    Irrigation and debridement surgical wound dehiscence, irrigation and   debridement, skin, subcutaneous tissue to bone, dorsal and plantar wounds:    There were 2 previous incisions, one 2 cm just proximal to the third webspace   and one approximately 1 cm within the third webspace.  These were connected   sharply and purulent drainage and necrotic tissue was found underlying,   significant amount of time was spent debriding necrotic tissue sharply with   forceps and scalpel.  Attention was turned to the plantar ulceration, which   was approximately 2x2 cm.  A significant amount of purulent material was   encountered in this plantar area.  This was sharply removed.  Skin edges were   freshened.    Right second and third metatarsal head excision:  The  second   metatarsophalangeal joint capsule was entered dorsally in longitudinal   fashion.  We had noted previously that the plantar ulceration tracked to both   the second and third metatarsal heads, which were quite prominent plantarly as   the second and third toes metatarsophalangeal joints were dorsally   subluxated.  An oscillating saw was used to remove the second metatarsal head   at the neck with a plantar bevel.  The same procedure was performed with the   third metatarsal head.  Each of these was removed and sent for culture   separately from tissue obtained from within the wound.  A 3 L of sterile   normal saline was irrigated through the foot and the communicating dorsal and   plantar wounds.  A curette was used to sharply debride remaining necrotic   tissue.  No additional pockets of purulence were appreciated.    Wound VAC placement:  It was decided that given the amount of purulence within   the foot that a closure would not be wise.  The dorsal incision was left open   and closed around a vacuum sponge.  Both plantar and dorsal incisions were   lined with an Ioban dressing.  They were bridged with the leash dorsal.    Excellent suction was obtained.  A compressive Ace bandage was placed.  The   tourniquet was let down for a total time of 36 minutes.  The patient was   awakened and extubated in stable condition.    POSTOPERATIVE COURSE:  She will get her a diabetic Cam walker boot with   offloading for her plantar second and third metatarsal area.  She will be   touchdown weightbearing right lower extremity.  We will plan to return to the   operating room in 3-4 days for repeat I and D and VAC change versus delayed   primary closure.       ____________________________________     MD RHIANNA ORELLANA / NEETA    DD:  03/05/2017 14:01:33  DT:  03/05/2017 14:33:02    D#:  [id number]  Job#:  [id number]

## 2017-03-05 NOTE — CONSULTS
"DATE OF SERVICE:  2017    REASON FOR CONSULTATION:  Acute kidney injury and need for PICC line   placement.    CHIEF COMPLAINT:  \"I have an infection on my right foot again.\"    HISTORY OF PRESENT ILLNESS:  A 46-year-old white female with PMH CKD stage   III/IV, hypertension, anemia of CKD, Moyamoya disease, and hyperlipidemia who   initially presented to an outside ER with a right foot infection on   2017.  She was admitted there, had an I and D performed and transferred   here for higher level of care as they felt that maybe she needed more surgical   options.  The patient states that she believes her injury was sustained when   she stepped on a manjula approximately a week prior to the previous ER and then   she started having pain and swelling along with some redness in her feet.  She   feels better since admission and the drainage.  However, a CT of the foot   that was done showed evidence of soft tissue swelling with gas and so   orthopedics was consulted here and she underwent an I and D.  She has been   doing relatively well, tolerating antibiotics and the plan was again for the   surgeons to go and perform another I and D today.  ID has been also following   the case and following her cultures.  They are concerned that there is a high   risk of limb loss if not improved and she likely is to be on long-term   antibiotics.  The primary team would like to place the PICC line to help   facilitate her discharge and outpatient antibiotic regimen and consulted us to   help them with any options.    REVIEW OF SYSTEMS:  As per HPI, otherwise negative per AMA/CMS criteria.    PAST MEDICAL HISTORY:  1.  CKD, stage III/IV.  2.  Hypertension.  3.  Anemia of CKD.  4.  Moyamoya disease.  5.  History of cerebrovascular accident.  6.  Hyperlipidemia.  7.  History of intracranial hemorrhage.  8.  Dilated cardiomyopathy.    PAST SURGICAL HISTORY:  1.  Tonsillectomy.  2.  .  3.  Tubal ligation.  4.  ET tube " placement.  5.  I and D.  6.  Cholecystectomy.  7.  Uterine ablation.    FAMILY HISTORY:  Reviewed and noncontributory to current illness.    SOCIAL HISTORY:  1.  Denies tobacco use.  2.  Denies ETOH use.  3.  Denies any illicit drug use.    ALLERGIES:  1.  ALLEGRA, REACTION UNKNOWN.  2.  AMOXICILLIN, REACTION UNKNOWN.  3.  AZITHROMYCIN, REACTION UNKNOWN.  4.  CLARITIN, REACTION UNKNOWN.  5.  ERYTHROMYCIN, REACTION UNKNOWN.  6.  IBUPROFEN, REACTION UNKNOWN.  7.  PENICILLIN, REACTION UNKNOWN.  8.  HUY OIL, REACTION ANAPHYLAXIS.  9.  ZYRTEC, REACTION UNKNOWN.  10.  PROCARDIA, REACTION UNKNOWN.    PHYSICAL EXAMINATION:  VITAL SIGNS:  Blood pressure 121/77, pulse of 57, respiratory rate 17,   temperature 98.5, pulse ox 91% on room air, weight 119.6 kilograms.  HEENT:  Normocephalic, atraumatic.  OP clear.  No scleral icterus.  NECK:  Supple, nontender.  CARDIOVASCULAR:  Regular rate and rhythm.  No murmurs, rubs, gallops.  LUNGS:  Clear to auscultation bilaterally.  No wheezes or rales.  ABDOMEN:  Soft, nontender, nondistended.  Positive bowel sounds.  EXTREMITIES:  No clubbing or cyanosis.  Bilateral upper extremity and left   lower extremity with no tenderness or deformity.  RLE with erythema and   swelling from the mid foot distally, significant swelling and tenderness of   the second and third toes, and purulent drainage from the space between the   second and third toes.  There is also an ulcer and callus on the plantar   aspect of the toes underneath the metatarsal head around the second and third   metatarsals.  NEUROLOGIC:  No focal deficits.  Alert and oriented.  PSYCHIATRIC:  The patient is cooperative and appropriate.    DIAGNOSTIC LABS:  The following labs are all from March 3rd.  WBC 9.9,   hemoglobin 9.4, hematocrit 28.4, platelets 235.  Sodium 143, potassium 3.5,   chloride 112, CO2 of 20, BUN 42, creatinine 2.9, glucose 106, calcium 8.5.    ASSESSMENT AND PLAN:  1.  Acute kidney injury, etiology  likely secondary to acute tubular necrosis   from cellulitis.  2.  Chronic kidney disease stage III/IV.  Etiology likely secondary to   hypertension.  3.  Right lower extremity cellulitis with abscess, status post   incision and drainage.  4.  Hypertension.  5.  Anemia of chronic kidney disease.  6.  Hyperlipidemia.  7.  Moyamoya disease.  8.  History of cerebrovascular accident.    ASSESSMENT:  1.  Glomerular filtration rate:  Baseline creatinine unclear, presumed to be   around 2.5-3, currently at or near baseline.  2.  Urine:  Nonoliguric.  3.  Blood pressure:  Goal less than 140/90.  4.  Volume:  Euvolemic.  5.  Acid base:  Non-anion gap metabolic acidosis likely secondary to acute   kidney injury on chronic kidney disease.  6.  Electrolytes:  Stable.  7.  Anemia.  Goal hemoglobin 10-11.  8.  Mineral and bone disorder:  Calcium normal.  PO4 unavailable.  PTH   unavailable.  Vitamin D unavailable.  9.  Dialysis access:  None.  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    PLAN:  1.  No compelling indication for RRT at this time.  2.  Would prefer the patient to have a tunneled IJ PICC line if needed to save   her peripheral veins for possible other future accesses.  3.  We will obtain PTH, vitamin D and phosphorus in the morning with a.m.   labs.  4.  Please dose all antibiotics and other medications for eGFR less than 15.  5.  We will obtain an iron panel in the a.m.  6.  We will follow the patient with you closely.    Thank you for this consultation.       ____________________________________     MD SHANDA Sorensen / NEETA    DD:  03/05/2017 10:34:18  DT:  03/05/2017 11:25:42    D#:  [id number]  Job#:  [id number]

## 2017-03-05 NOTE — PROGRESS NOTES
Pt is A&Ox4, flat affect.  No family is at bedside. VSS.  Denies pain.  SBA.  Calls appropriately.  Denies N/T at this time.   Dressings to right foot are CDI.  Pt updated on POC, needs met and questions answered. Call light within reach and working properly.

## 2017-03-06 ENCOUNTER — APPOINTMENT (OUTPATIENT)
Dept: RADIOLOGY | Facility: MEDICAL CENTER | Age: 47
DRG: 853 | End: 2017-03-06
Attending: HOSPITALIST
Payer: MEDICARE

## 2017-03-06 LAB
25(OH)D3 SERPL-MCNC: 51 NG/ML (ref 30–100)
ANION GAP SERPL CALC-SCNC: 7 MMOL/L (ref 0–11.9)
BUN SERPL-MCNC: 30 MG/DL (ref 8–22)
CALCIUM SERPL-MCNC: 8.5 MG/DL (ref 8.5–10.5)
CHLORIDE SERPL-SCNC: 110 MMOL/L (ref 96–112)
CO2 SERPL-SCNC: 25 MMOL/L (ref 20–33)
CREAT SERPL-MCNC: 2.4 MG/DL (ref 0.5–1.4)
ERYTHROCYTE [DISTWIDTH] IN BLOOD BY AUTOMATED COUNT: 47.3 FL (ref 35.9–50)
FERRITIN SERPL-MCNC: 291.1 NG/ML (ref 10–291)
GFR SERPL CREATININE-BSD FRML MDRD: 22 ML/MIN/1.73 M 2
GLUCOSE SERPL-MCNC: 78 MG/DL (ref 65–99)
HCT VFR BLD AUTO: 29 % (ref 37–47)
HGB BLD-MCNC: 9.5 G/DL (ref 12–16)
IRON SATN MFR SERPL: 17 % (ref 15–55)
IRON SERPL-MCNC: 29 UG/DL (ref 40–170)
MAGNESIUM SERPL-MCNC: 1.5 MG/DL (ref 1.5–2.5)
MCH RBC QN AUTO: 31.9 PG (ref 27–33)
MCHC RBC AUTO-ENTMCNC: 32.8 G/DL (ref 33.6–35)
MCV RBC AUTO: 97.3 FL (ref 81.4–97.8)
PHOSPHATE SERPL-MCNC: 4.4 MG/DL (ref 2.5–4.5)
PLATELET # BLD AUTO: 302 K/UL (ref 164–446)
PMV BLD AUTO: 9.2 FL (ref 9–12.9)
POTASSIUM SERPL-SCNC: 3.7 MMOL/L (ref 3.6–5.5)
PTH-INTACT SERPL-MCNC: 83.3 PG/ML (ref 14–72)
RBC # BLD AUTO: 2.98 M/UL (ref 4.2–5.4)
SODIUM SERPL-SCNC: 142 MMOL/L (ref 135–145)
TIBC SERPL-MCNC: 168 UG/DL (ref 250–450)
WBC # BLD AUTO: 8.6 K/UL (ref 4.8–10.8)

## 2017-03-06 PROCEDURE — 76937 US GUIDE VASCULAR ACCESS: CPT

## 2017-03-06 PROCEDURE — 700102 HCHG RX REV CODE 250 W/ 637 OVERRIDE(OP): Performed by: HOSPITALIST

## 2017-03-06 PROCEDURE — 700101 HCHG RX REV CODE 250

## 2017-03-06 PROCEDURE — 02HV33Z INSERTION OF INFUSION DEVICE INTO SUPERIOR VENA CAVA, PERCUTANEOUS APPROACH: ICD-10-PCS | Performed by: RADIOLOGY

## 2017-03-06 PROCEDURE — 0JH63WZ INSERTION OF TOTALLY IMPLANTABLE VASCULAR ACCESS DEVICE INTO CHEST SUBCUTANEOUS TISSUE AND FASCIA, PERCUTANEOUS APPROACH: ICD-10-PCS | Performed by: RADIOLOGY

## 2017-03-06 PROCEDURE — 99153 MOD SED SAME PHYS/QHP EA: CPT

## 2017-03-06 PROCEDURE — B518ZZA FLUOROSCOPY OF SUPERIOR VENA CAVA, GUIDANCE: ICD-10-PCS | Performed by: RADIOLOGY

## 2017-03-06 PROCEDURE — 770006 HCHG ROOM/CARE - MED/SURG/GYN SEMI*

## 2017-03-06 PROCEDURE — A9270 NON-COVERED ITEM OR SERVICE: HCPCS | Performed by: PSYCHIATRY & NEUROLOGY

## 2017-03-06 PROCEDURE — A9270 NON-COVERED ITEM OR SERVICE: HCPCS | Performed by: INTERNAL MEDICINE

## 2017-03-06 PROCEDURE — 700102 HCHG RX REV CODE 250 W/ 637 OVERRIDE(OP): Performed by: INTERNAL MEDICINE

## 2017-03-06 PROCEDURE — A9270 NON-COVERED ITEM OR SERVICE: HCPCS | Performed by: HOSPITALIST

## 2017-03-06 PROCEDURE — 700111 HCHG RX REV CODE 636 W/ 250 OVERRIDE (IP): Performed by: INTERNAL MEDICINE

## 2017-03-06 PROCEDURE — 80048 BASIC METABOLIC PNL TOTAL CA: CPT

## 2017-03-06 PROCEDURE — 82728 ASSAY OF FERRITIN: CPT

## 2017-03-06 PROCEDURE — 700105 HCHG RX REV CODE 258: Performed by: INTERNAL MEDICINE

## 2017-03-06 PROCEDURE — 77001 FLUOROGUIDE FOR VEIN DEVICE: CPT

## 2017-03-06 PROCEDURE — 85027 COMPLETE CBC AUTOMATED: CPT

## 2017-03-06 PROCEDURE — 99232 SBSQ HOSP IP/OBS MODERATE 35: CPT | Performed by: HOSPITALIST

## 2017-03-06 PROCEDURE — 700111 HCHG RX REV CODE 636 W/ 250 OVERRIDE (IP)

## 2017-03-06 PROCEDURE — 83970 ASSAY OF PARATHORMONE: CPT

## 2017-03-06 PROCEDURE — 84100 ASSAY OF PHOSPHORUS: CPT

## 2017-03-06 PROCEDURE — 83550 IRON BINDING TEST: CPT

## 2017-03-06 PROCEDURE — 36558 INSERT TUNNELED CV CATH: CPT

## 2017-03-06 PROCEDURE — 83540 ASSAY OF IRON: CPT

## 2017-03-06 PROCEDURE — 700111 HCHG RX REV CODE 636 W/ 250 OVERRIDE (IP): Performed by: HOSPITALIST

## 2017-03-06 PROCEDURE — 82306 VITAMIN D 25 HYDROXY: CPT

## 2017-03-06 PROCEDURE — B548ZZA ULTRASONOGRAPHY OF SUPERIOR VENA CAVA, GUIDANCE: ICD-10-PCS | Performed by: RADIOLOGY

## 2017-03-06 PROCEDURE — 700112 HCHG RX REV CODE 229: Performed by: HOSPITALIST

## 2017-03-06 PROCEDURE — A6212 FOAM DRG <=16 SQ IN W/BORDER: HCPCS | Performed by: RADIOLOGY

## 2017-03-06 PROCEDURE — 700102 HCHG RX REV CODE 250 W/ 637 OVERRIDE(OP): Performed by: PSYCHIATRY & NEUROLOGY

## 2017-03-06 PROCEDURE — 83735 ASSAY OF MAGNESIUM: CPT

## 2017-03-06 RX ORDER — MIDAZOLAM HYDROCHLORIDE 1 MG/ML
INJECTION INTRAMUSCULAR; INTRAVENOUS
Status: COMPLETED
Start: 2017-03-06 | End: 2017-03-06

## 2017-03-06 RX ORDER — ONDANSETRON 2 MG/ML
4 INJECTION INTRAMUSCULAR; INTRAVENOUS PRN
Status: ACTIVE | OUTPATIENT
Start: 2017-03-06 | End: 2017-03-06

## 2017-03-06 RX ORDER — LIDOCAINE HYDROCHLORIDE AND EPINEPHRINE BITARTRATE 20; .01 MG/ML; MG/ML
INJECTION, SOLUTION SUBCUTANEOUS
Status: COMPLETED
Start: 2017-03-06 | End: 2017-03-06

## 2017-03-06 RX ORDER — SODIUM CHLORIDE 9 MG/ML
500 INJECTION, SOLUTION INTRAVENOUS PRN
Status: DISCONTINUED | OUTPATIENT
Start: 2017-03-06 | End: 2017-03-23 | Stop reason: HOSPADM

## 2017-03-06 RX ORDER — MIDAZOLAM HYDROCHLORIDE 1 MG/ML
.5-2 INJECTION INTRAMUSCULAR; INTRAVENOUS PRN
Status: ACTIVE | OUTPATIENT
Start: 2017-03-06 | End: 2017-03-06

## 2017-03-06 RX ADMIN — FOLIC ACID 1 MG: 1 TABLET ORAL at 09:11

## 2017-03-06 RX ADMIN — LORAZEPAM 1 MG: 1 TABLET ORAL at 02:48

## 2017-03-06 RX ADMIN — Medication 1300 MG: at 21:34

## 2017-03-06 RX ADMIN — CARVEDILOL 25 MG: 25 TABLET, FILM COATED ORAL at 17:52

## 2017-03-06 RX ADMIN — LIDOCAINE HYDROCHLORIDE AND EPINEPHRINE: 20; 10 INJECTION, SOLUTION INFILTRATION; PERINEURAL at 11:48

## 2017-03-06 RX ADMIN — CEFUROXIME SODIUM 750 MG: 7.5 INJECTION, POWDER, FOR SOLUTION INTRAVENOUS at 21:35

## 2017-03-06 RX ADMIN — ASPIRIN 325 MG: 325 TABLET, FILM COATED ORAL at 09:10

## 2017-03-06 RX ADMIN — Medication 1300 MG: at 14:40

## 2017-03-06 RX ADMIN — ACETAMINOPHEN 650 MG: 325 TABLET, FILM COATED ORAL at 21:55

## 2017-03-06 RX ADMIN — Medication 1300 MG: at 09:11

## 2017-03-06 RX ADMIN — ATORVASTATIN CALCIUM 40 MG: 40 TABLET, FILM COATED ORAL at 21:34

## 2017-03-06 RX ADMIN — LORAZEPAM 1 MG: 1 TABLET ORAL at 21:34

## 2017-03-06 RX ADMIN — OXCARBAZEPINE 150 MG: 150 TABLET, FILM COATED ORAL at 21:34

## 2017-03-06 RX ADMIN — CEFUROXIME SODIUM 750 MG: 7.5 INJECTION, POWDER, FOR SOLUTION INTRAVENOUS at 14:41

## 2017-03-06 RX ADMIN — PHENYTOIN SODIUM 200 MG: 100 CAPSULE, EXTENDED RELEASE ORAL at 09:11

## 2017-03-06 RX ADMIN — MIDAZOLAM 2 MG: 1 INJECTION INTRAMUSCULAR; INTRAVENOUS at 11:06

## 2017-03-06 RX ADMIN — AMLODIPINE BESYLATE 5 MG: 5 TABLET ORAL at 09:12

## 2017-03-06 RX ADMIN — LORAZEPAM 1 MG: 1 TABLET ORAL at 12:35

## 2017-03-06 RX ADMIN — OXYCODONE HYDROCHLORIDE 5 MG: 5 TABLET ORAL at 17:52

## 2017-03-06 RX ADMIN — PHENYTOIN SODIUM 200 MG: 100 CAPSULE, EXTENDED RELEASE ORAL at 21:34

## 2017-03-06 RX ADMIN — CEFUROXIME SODIUM 750 MG: 7.5 INJECTION, POWDER, FOR SOLUTION INTRAVENOUS at 05:12

## 2017-03-06 RX ADMIN — FENTANYL CITRATE 50 MCG: 50 INJECTION, SOLUTION INTRAMUSCULAR; INTRAVENOUS at 11:06

## 2017-03-06 RX ADMIN — CARVEDILOL 25 MG: 25 TABLET, FILM COATED ORAL at 09:09

## 2017-03-06 RX ADMIN — CYANOCOBALAMIN TAB 500 MCG 1000 MCG: 500 TAB at 09:11

## 2017-03-06 RX ADMIN — MIDAZOLAM HYDROCHLORIDE 2 MG: 1 INJECTION INTRAMUSCULAR; INTRAVENOUS at 11:06

## 2017-03-06 RX ADMIN — OXYCODONE HYDROCHLORIDE 5 MG: 5 TABLET ORAL at 01:05

## 2017-03-06 RX ADMIN — HEPARIN SODIUM 5000 UNITS: 5000 INJECTION, SOLUTION INTRAVENOUS; SUBCUTANEOUS at 14:40

## 2017-03-06 ASSESSMENT — ENCOUNTER SYMPTOMS
ABDOMINAL PAIN: 0
NAUSEA: 0
FEVER: 0
CHILLS: 0
SHORTNESS OF BREATH: 1
MYALGIAS: 1
COUGH: 0
NERVOUS/ANXIOUS: 1
CONSTIPATION: 0
SHORTNESS OF BREATH: 0
DIARRHEA: 0
VOMITING: 0
DEPRESSION: 0
HEADACHES: 0

## 2017-03-06 ASSESSMENT — PAIN SCALES - GENERAL
PAINLEVEL_OUTOF10: 2
PAINLEVEL_OUTOF10: 8
PAINLEVEL_OUTOF10: 4

## 2017-03-06 NOTE — PROGRESS NOTES
Infectious Disease Progress Note    Author: Rocio Hinson M.D.    DOS & Time created: 3/5/2017  6:01 PM    Chief Complaint   Patient presents with   • Wound Infection     pt has wound on right foot, went to [facility], report cellutliis with drainage on wound     Interval History:  46-year-old white female with history of morbid obesity,  Moyamoya syndrome with prior stroke, who presents with a traumatic injury to her right foot with subsequent development of cellulitis and abscess    AF WBC 13.3 s/p drainage abscess-has been refusing IV  3/1- willing to take iv. No fevers. C/o pain. Apparently walking on the foot  3/2- says she is feeling better. No fevers.  3/3- in good spirits. No fevers.   3/4 AF WBC 9.9 foot worse-denies pain  3/5 AF no CBC NPO for surgery. No new complaints  Labs Reviewed, Medications Reviewed, Radiology Reviewed and Wound Reviewed.    Review of Systems:  Review of Systems   Constitutional: Negative for fever and chills.   Gastrointestinal: Negative for nausea and vomiting.   Musculoskeletal: Positive for myalgias and joint pain.        Foot pain   Skin: Negative for rash.       Hemodynamics:  Temp (24hrs), Av.9 °C (98.5 °F), Min:36.6 °C (97.9 °F), Max:37.4 °C (99.3 °F)  Temperature: 36.8 °C (98.2 °F)  Pulse  Av.6  Min: 52  Max: 82Heart Rate (Monitored): (!) 57  Blood Pressure: 135/74 mmHg, NIBP: 158/74 mmHg       Physical Exam:  Physical Exam   Constitutional: She is oriented to person, place, and time. She appears well-developed. No distress.   Obese   HENT:   Head: Normocephalic and atraumatic.   Eyes: EOM are normal. Pupils are equal, round, and reactive to light.   Neck: Neck supple.   Cardiovascular: Normal rate and regular rhythm.    No murmur heard.  Pulmonary/Chest: Effort normal. No respiratory distress. She has no wheezes. She has no rales.   Abdominal: Soft. Bowel sounds are normal. She exhibits no distension. There is no tenderness.   Musculoskeletal:   Right foot  edematous-visible purulence   Neurological: She is alert and oriented to person, place, and time.   Skin: No rash noted.   Psychiatric:   Poor insight   Nursing note and vitals reviewed.      Labs:  Recent Labs      03/03/17   0242   WBC  9.9   RBC  2.96*   HEMOGLOBIN  9.4*   HEMATOCRIT  28.4*   MCV  95.9   MCH  31.8   RDW  47.1   PLATELETCT  235   MPV  10.0     Recent Labs      03/03/17   0241   SODIUM  143   POTASSIUM  3.5*   CHLORIDE  112   CO2  20   GLUCOSE  106*   BUN  42*     Recent Labs      03/03/17   0241   CREATININE  2.89*        Imaging:       CT-FOOT W/O PLUS RECONS RIGHT (Final result) Result time: 02/27/17 00:30:10     Final result by Gee Tam M.D. (02/27/17 00:30:10)     Impression:     1.  Soft tissue swelling with gas in the distal plantar aspect of the foot at the level of the MTP joints with draining wound to the plantar surface of the skin.  2.  Minimal gas in the extensor aspect of the foot at the level of the MTP joints which may represent extension of cellulitis anteriorly.  3.  No loculated fluid collection identified.  4.  Diffuse soft tissue swelling in the remainder of the foot.  5.  No acute bony abnormality. No evidence of osteomyelitis. No radiopaque soft tissue foreign body identified     MR-FOOT-W/O RIGHT (Final result) Result time: 02/28/17 11:19:53     Final result by Aureliano Flores M.D. (02/28/17 11:19:53)     Impression:     Forefoot bilobed abscess with apparent plantar draining sinus. Largest loculation is in the dorsal soft tissues as discussed above  No unenhanced MR evidence of osteomyelitis  Metatarsophalangeal joint effusions most likely are reactive given lack of bony destruction/marginal erosive change     Final result by Rad Intf (02/27/17 11:53:22)     Narrative:     TransthoracicEcho Report  CONCLUSIONS  No prior study is available for comparison.   Normal left ventricular size, thickness, systolic function, and   diastolic function.  Left ventricular  ejection fraction is visually estimated to be 65%.  Normal right ventricular size and systolic function.  Trace tricuspid regurgitation.  Normal pericardium without effusion.     Medications:  Current Facility-Administered Medications   Medication Dose Frequency Provider Last Rate Last Dose   • cefUROXime (ZINACEF) 750 mg in NS 50 mL IVPB  750 mg Q8HR Ivy Gutierrez M.D.   Stopped at 03/05/17 1612   • haloperidol lactate (HALDOL) injection 5 mg  5 mg Q4HRS PRN Nabeel Monahan M.D.   5 mg at 03/02/17 0156   • oxcarbazepine (TRILEPTAL) tablet 150 mg  150 mg BID Lynn Carpenter M.D.   Stopped at 03/05/17 0900   • diphenoxylate-atropine (LOMOTIL) 2.5-0.025 MG per tablet 1 Tab  1 Tab 4X/DAY PRN Shazia Valle M.D.   1 Tab at 03/01/17 1125   • heparin injection 5,000 Units  5,000 Units Q8HRS Augustus Guerin M.D.   Stopped at 03/05/17 0600   • lorazepam (ATIVAN) tablet 1 mg  1 mg TID Augustus Guerin M.D.   Stopped at 03/05/17 1130   • sodium bicarbonate (SODIUM BICARBONATE) tablet 1,300 mg  1,300 mg TID Evelia Yepez M.D.   1,300 mg at 03/05/17 1542   • oxycodone immediate-release (ROXICODONE) tablet 5 mg  5 mg Q6HRS PRN Evelia Yepez M.D.   5 mg at 03/05/17 1651   • amlodipine (NORVASC) tablet 5 mg  5 mg DAILY Chalino Bender M.D.   5 mg at 03/05/17 0830   • aspirin (ASA) tablet 325 mg  325 mg DAILY Chalino Bender M.D.   Stopped at 03/05/17 0900   • atorvastatin (LIPITOR) tablet 40 mg  40 mg Nightly Chalino Bender M.D.   40 mg at 03/04/17 2025   • carvedilol (COREG) tablet 25 mg  25 mg BID WITH MEALS Chalino Bender M.D.   25 mg at 03/05/17 1651   • cyanocobalamin (VITAMIN B-12) tablet 1,000 mcg  1,000 mcg DAILY Chalino Bender M.D.   Stopped at 03/05/17 0900   • folic acid (FOLVITE) tablet 1 mg  1 mg DAILY Chalino Bender M.D.   Stopped at 03/05/17 0900   • phenytoin ER (DILANTIN) capsule 200 mg  200 mg BID Chalino Bender M.D.   Stopped at 03/05/17 0900   • docusate  sodium (COLACE) capsule 100 mg  100 mg BID Chalino Bender M.D.   Stopped at 03/05/17 0900   • bisacodyl (DULCOLAX) suppository 10 mg  10 mg Q24HRS PRN Chalino Bender M.D.       • fleet enema 133 mL  1 Each Once PRN Chalino Bender M.D.       • Respiratory Care per Protocol   Continuous RT Chalino Bender M.D.       • ondansetron (ZOFRAN) syringe/vial injection 4 mg  4 mg Q4HRS PRN Chalino Bender M.D.       • ondansetron (ZOFRAN ODT) dispertab 4 mg  4 mg Q4HRS PRN Chalino Bender M.D.       • promethazine (PHENERGAN) tablet 12.5-25 mg  12.5-25 mg Q4HRS PRN Chalino Bender M.D.       • promethazine (PHENERGAN) suppository 12.5-25 mg  12.5-25 mg Q4HRS PRN Chalino Bender M.D.       • prochlorperazine (COMPAZINE) injection 5-10 mg  5-10 mg Q4HRS PRN Chalino Bender M.D.       • acetaminophen (TYLENOL) tablet 650 mg  650 mg Q6HRS PRN Chalino Bender M.D.       • NS (BOLUS) infusion 500 mL  500 mL Once PRN Chalino Bender M.D.         Last reviewed on 2/26/2017 11:24 PM by Radha Mixon    Micro:  Results     Procedure Component Value Units Date/Time    CULTURE TISSUE W/ GRM STAIN [[id number]] Collected:  03/05/17 1307    Order Status:  Completed Specimen Information:  Other Updated:  03/05/17 1359    ANAEROBIC CULTURE [[id number]] Collected:  03/05/17 1307    Order Status:  Completed Specimen Information:  Other Updated:  03/05/17 1359    CULTURE TISSUE W/ GRM STAIN [[id number]] Collected:  03/05/17 1302    Order Status:  Completed Specimen Information:  Other Updated:  03/05/17 1358    ANAEROBIC CULTURE [[id number]] Collected:  03/05/17 1302    Order Status:  Completed Specimen Information:  Other Updated:  03/05/17 1358    BLOOD CULTURE [[id number]] Collected:  02/26/17 2330    Order Status:  Completed Specimen Information:  Blood from Peripheral Updated:  03/04/17 0100     Significant Indicator NEG      Source BLD      Site PERIPHERAL      Blood Culture No growth after 5 days of  "incubation.     Narrative:      Per Hospital Policy: Only change Specimen Src: to \"Line\" if  specified by physician order.    BLOOD CULTURE [[id number]] Collected:  02/26/17 2230    Order Status:  Completed Specimen Information:  Blood from Peripheral Updated:  03/04/17 0100     Significant Indicator NEG      Source BLD      Site PERIPHERAL      Blood Culture No growth after 5 days of incubation.     Narrative:      Per Hospital Policy: Only change Specimen Src: to \"Line\" if  specified by physician order.    ANAEROBIC CULTURE [[id number]] Collected:  02/27/17 1243    Order Status:  Completed Specimen Information:  Tissue Updated:  03/02/17 1455     Significant Indicator NEG      Source TISS      Site Right Foot Abscess      Anaerobic Culture, Culture Res No Anaerobes isolated.     CULTURE TISSUE W/ GRM STAIN [[id number]]  (Abnormal) Collected:  02/27/17 1243    Order Status:  Completed Specimen Information:  Tissue Updated:  03/02/17 1455     Gram Stain Result -- (A)      Result:        Many WBCs.  Many Gram positive cocci.       Significant Indicator POS (POS)      Source TISS      Site Right Foot Abscess      Tissue Culture -- (A)      Tissue Culture -- (A)      Result:        Staphylococcus aureus  Heavy growth  See previous culture for sensitivity report.       Tissue Culture -- (A)      Result:        Escherichia coli  Rare growth  See previous culture for sensitivity report.       Tissue Culture -- (A)      Result:        Viridans Streptococcus  Moderate growth  Mixed morphologies       Tissue Culture -- (A)      Result:        Beta Streptococcus Group G  Light growth      CULTURE WOUND W/ GRAM STAIN [[id number]]  (Abnormal) Collected:  02/27/17 1015    Order Status:  Completed Specimen Information:  Wound from Left Foot Updated:  03/01/17 0949     Gram Stain Result --      Result:        Many WBCs.  Many Gram positive cocci.       Significant Indicator POS (POS)      Source WND      Site LEFT FOOT      Culture " Result Wound Moderate growth Mixed skin shawn. (A)      Culture Result Wound -- (A)      Result:        Staphylococcus aureus  Heavy growth  See previous culture for sensitivity report.       Culture Result Wound -- (A)      Result:        Escherichia coli  Rare growth  See previous culture for sensitivity report.      Narrative:      Collected By:[id number] FRANCES MIX    CULTURE WOUND W/ GRAM STAIN [[id number]]  (Abnormal)  (Susceptibility) Collected:  02/26/17 8840    Order Status:  Completed Specimen Information:  Wound from Right Foot Updated:  03/01/17 0940     Gram Stain Result --      Result:        Moderate WBCs.  Many Gram positive cocci.       Significant Indicator POS (POS)      Source WND      Site RIGHT FOOT      Culture Result Wound Moderate growth Mixed skin shawn. (A)      Culture Result Wound -- (A)      Result:        Staphylococcus aureus  Moderate growth       Culture Result Wound -- (A)      Result:        Escherichia coli  Rare growth      Culture & Susceptibility     ESCHERICHIA COLI     Antibiotic Sensitivity Microscan Unit Status    Ampicillin Sensitive <=8 mcg/mL Final    Cefepime Sensitive <=8 mcg/mL Final    Cefotaxime Sensitive <=2 mcg/mL Final    Cefotetan Sensitive <=16 mcg/mL Final    Ceftazidime Sensitive <=1 mcg/mL Final    Ceftriaxone Sensitive <=8 mcg/mL Final    Cefuroxime Sensitive <=4 mcg/mL Final    Ciprofloxacin Sensitive <=1 mcg/mL Final    Ertapenem Sensitive <=1 mcg/mL Final    Gentamicin Sensitive <=4 mcg/mL Final    Pip/Tazobactam Sensitive <=16 mcg/mL Final    Tigecycline Sensitive <=2 mcg/mL Final    Tobramycin Sensitive <=4 mcg/mL Final    Trimeth/Sulfa Sensitive <=2/38 mcg/mL Final              STAPHYLOCOCCUS AUREUS     Antibiotic Sensitivity Microscan Unit Status    Ampicillin/sulbactam Sensitive <=8/4 mcg/mL Final    Clindamycin Sensitive <=0.5 mcg/mL Final    Daptomycin Sensitive <=0.5 mcg/mL Final    Erythromycin Sensitive <=0.5 mcg/mL Final    Moxifloxacin  "Sensitive <=0.5 mcg/mL Final    Oxacillin Sensitive <=0.25 mcg/mL Final    Tetracycline Sensitive <=4 mcg/mL Final    Trimeth/Sulfa Sensitive <=0.5/9.5 mcg/mL Final    Vancomycin Sensitive 2 mcg/mL Final                       GRAM STAIN [[id number]] Collected:  02/27/17 1243    Order Status:  Completed Specimen Information:  Tissue Updated:  02/27/17 1745     Significant Indicator .      Source TISS      Site Right Foot Abscess      Gram Stain Result --      Result:        Many WBCs.  Many Gram positive cocci.      GRAM STAIN [[id number]] Collected:  02/27/17 1015    Order Status:  Completed Specimen Information:  Wound Updated:  02/27/17 1743     Significant Indicator .      Source WND      Site LEFT FOOT      Gram Stain Result --      Result:        Many WBCs.  Many Gram positive cocci.      Narrative:      Collected By:[id number] FRANCES MIX    CDIFF BY PCR [[id number]] Collected:  02/27/17 1130    Order Status:  Completed Specimen Information:  Stool from Stool Updated:  02/27/17 1347     C Diff by PCR Negative      027-NAP1-BI Presumptive Negative      Comment: Presumptive 027/NAP1/BI target DNA sequences are NOT DETECTED.       Narrative:      Collected By:[id number] FRANCES MIX  Does this patient have risk factors for C-diff?->Yes  C-Diff Risk Factors->antibiotic exposure  Has patient taken stool softeners or laxatives in the last 5  days?->No  Indication for CDiff by PCR?->Greater than/equal to 3 stools  in 24 hr & \"takes shape of container\"    GRAM STAIN [[id number]] Collected:  02/26/17 2350    Order Status:  Completed Specimen Information:  Wound Updated:  02/27/17 1046     Significant Indicator .      Source WND      Site RIGHT FOOT      Gram Stain Result --      Result:        Moderate WBCs.  Many Gram positive cocci.            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 Recheck CBC  C/s are MSSA,E coli, strep viridans, group C strep  Continue zinacef  Due to worsening-Plan for repeat debridement today  Vascular studies normal  Continue wound care    Chronic renal failure- 2.9  Avoiding nephrotoxic drugs  Adjusting abx for renal function    Moyamoya syndrome with prior stroke/dementia  Noncompliant with abx  Barrier to care    Remains at risk for limb loss    Discussed with Surgery

## 2017-03-06 NOTE — PROGRESS NOTES
Hospital Medicine Interval Note  Date of Service:  3/5/2017    Chief Complaint  46 y.o.-year-old female admitted 2/26/2017 with foot infection    Interval Problem Update  3/5 - Consuted nephro janeth Schwarz to have tunneled IJ for abx. Central line placed by anesth in the meantime. DC accuchecks.     3/4 - discussed with patient, who had pulled out her IV and denied it. Advised her not to do that and not to walk on her foot, also. Questions answered.     3/3 - discussed with patient, improved mentation, calmer. Foot less painful. Discussed on IDT rounds. PICC held due to SCr.     3/2 - discussed with patient; discussed on IDT rounds. May benefit from locked unit after her abx therapy complete and wound healed; will probably need SNF.      Disposition  Clinical         Review of Systems   Constitutional: Negative for fever and chills.   Respiratory: Negative for cough, shortness of breath and wheezing.    Cardiovascular: Negative for chest pain.   Gastrointestinal: Negative for nausea, vomiting, diarrhea and constipation.   Psychiatric/Behavioral: The patient is nervous/anxious.       Physical Exam Laboratory/Imaging   Filed Vitals:    03/05/17 1415 03/05/17 1420 03/05/17 1430 03/05/17 1431   BP:    109/56   Pulse: 57 58 58 74   Temp:    36.8 °C (98.2 °F)   Resp: 17 14 16 17   Height:       Weight:       SpO2: 96% 98% 97% 99%     Physical Exam   Constitutional: She appears well-developed and well-nourished.   HENT:   Head: Normocephalic and atraumatic.   Eyes: Conjunctivae are normal.   Cardiovascular: Normal rate, regular rhythm, normal heart sounds and intact distal pulses.    No murmur heard.  Pulmonary/Chest: Effort normal and breath sounds normal. No respiratory distress. She has no wheezes.   Abdominal: Soft. Bowel sounds are normal. She exhibits no distension. There is no tenderness.   Musculoskeletal: Normal range of motion. She exhibits edema and tenderness.   Right foot wrapped, erythematous at toe with serous  drainage   Lymphadenopathy:        Right: No supraclavicular adenopathy present.        Left: No supraclavicular adenopathy present.   Neurological: She is alert.   Somewhat oriented   Skin: Skin is warm and dry.   Vitals reviewed.   Lab Results   Component Value Date/Time    WBC 9.9 03/03/2017 02:42 AM    HEMOGLOBIN 9.4* 03/03/2017 02:42 AM    HEMATOCRIT 28.4* 03/03/2017 02:42 AM    PLATELET COUNT 235 03/03/2017 02:42 AM     Lab Results   Component Value Date/Time    SODIUM 143 03/03/2017 02:41 AM    POTASSIUM 3.5* 03/03/2017 02:41 AM    CHLORIDE 112 03/03/2017 02:41 AM    CO2 20 03/03/2017 02:41 AM    GLUCOSE 106* 03/03/2017 02:41 AM    BUN 42* 03/03/2017 02:41 AM    CREATININE 2.89* 03/03/2017 02:41 AM      Assessment/Plan    Cellulitis of right foot  Assessment & Plan  Active Orders     Ordered     Ordering Provider       Wed Mar 1, 2017  1:56 PM    03/01/17 1356  cefUROXime (ZINACEF) 750 mg in NS 50 mL IVPB   EVERY 8 HOURS      Ivy Gutierrez M.D.      ID consulted  Ortho consulted  Ortho I&D'd 2/27, 3/5/2017  Some non-compliance w/ abx in past   Tunneled IJ PENDING    Villavicencio villavicencio disease  Assessment & Plan  Psych consulted  Discussed with family  Wander guard    CKD stage 4 secondary to hypertension (CMS-HCC)  Assessment & Plan  Lab Results   Component Value Date    BUN 42* 03/03/2017    CREATININE 2.89* 03/03/2017    CREATININE 0.9 11/19/2006   Baseline Cr 3-range, trending  Maintaining hydration  Avoiding nephrotoxics: NSAIDs etc  nephro Schwarz consulted     Anemia  Assessment & Plan  Hgb stable     Essential hypertension, benign  Assessment & Plan  SBP goal less than 140 mmHg  DBP goal less than 90 mmHg  PRN and antihypertensives to titrate by hospitalist towards goal    Dilated cardiomyopathy (CMS-HCC)  Assessment & Plan  Medically managed    HLD (hyperlipidemia)  Assessment & Plan  Statin    Seizure disorder, grand mal (CMS-HCC)  Assessment & Plan  Dilantin       Labs reviewed and Medications  reviewed        DVT Prophylaxis: Heparin    Ulcer prophylaxis: Not indicated

## 2017-03-06 NOTE — PROGRESS NOTES
Hospital Medicine Interval Note  Date of Service:  3/6/2017    Chief Complaint  46 y.o.-year-old female admitted 2/26/2017 with foot infection    Interval Problem Update  3/6 - RIJ removed and tunneled IJ central line placed. Cultures growing staph.     3/5 - Consuted nephro Schwarz, ok to have tunneled IJ for abx. Central line placed by anesth in the meantime. DC accuchecks.     3/4 - discussed with patient, who had pulled out her IV and denied it. Advised her not to do that and not to walk on her foot, also. Questions answered.     3/3 - discussed with patient, improved mentation, calmer. Foot less painful. Discussed on IDT rounds. PICC held due to SCr.     3/2 - discussed with patient; discussed on IDT rounds. May benefit from locked unit after her abx therapy complete and wound healed; will probably need SNF.      Disposition  Clinical     Review of Systems   Constitutional: Negative for fever and chills.   Respiratory: Negative for cough and shortness of breath.    Cardiovascular: Negative for chest pain.   Gastrointestinal: Negative for nausea, vomiting, diarrhea and constipation.   Psychiatric/Behavioral: Negative for depression. The patient is nervous/anxious.       Physical Exam Laboratory/Imaging   Filed Vitals:    03/06/17 1220 03/06/17 1235 03/06/17 1250 03/06/17 1320   BP: 134/71 135/61 130/69 110/51   Pulse: 62 62 60 66   Temp: 36.8 °C (98.3 °F) 36.7 °C (98.1 °F) 36.7 °C (98.1 °F) 36.9 °C (98.4 °F)   Resp: 18 18 18 18   Height:       Weight:       SpO2: 93% 92% 92% 94%     Physical Exam   Constitutional: She appears well-developed and well-nourished.   HENT:   Head: Normocephalic.   Eyes: Conjunctivae are normal.   Cardiovascular: Normal rate and regular rhythm.    Pulmonary/Chest: Effort normal. No respiratory distress. She has no wheezes.   Abdominal: Soft. Bowel sounds are normal. She exhibits no distension. There is no tenderness.   Musculoskeletal: Normal range of motion. She exhibits edema and  tenderness.   Right foot wrapped, erythematous at toe with serous drainage   Lymphadenopathy:        Right: No supraclavicular adenopathy present.        Left: No supraclavicular adenopathy present.   Neurological: She is alert.   Somewhat oriented   Skin: Skin is warm and dry. There is erythema (right foot bandage).   Vitals reviewed.   Lab Results   Component Value Date/Time    WBC 8.6 03/06/2017 02:50 AM    HEMOGLOBIN 9.5* 03/06/2017 02:50 AM    HEMATOCRIT 29.0* 03/06/2017 02:50 AM    PLATELET COUNT 302 03/06/2017 02:50 AM     Lab Results   Component Value Date/Time    SODIUM 142 03/06/2017 02:50 AM    POTASSIUM 3.7 03/06/2017 02:50 AM    CHLORIDE 110 03/06/2017 02:50 AM    CO2 25 03/06/2017 02:50 AM    GLUCOSE 78 03/06/2017 02:50 AM    BUN 30* 03/06/2017 02:50 AM    CREATININE 2.40* 03/06/2017 02:50 AM      Assessment/Plan    Cellulitis of right foot  Assessment & Plan  Active Orders     Ordered     Ordering Provider       Wed Mar 1, 2017  1:56 PM    03/01/17 1356  cefUROXime (ZINACEF) 750 mg in NS 50 mL IVPB   EVERY 8 HOURS      Ivy Gutierrez M.D.      ID consulted  Ortho consulted  Ortho I&D'd 2/27, 3/5/2017  Some non-compliance w/ abx in past   Tunneled IJ placed    Villavicencio villavicencio disease  Assessment & Plan  Psych consulted  Discussed with family  Wander guard    CKD stage 4 secondary to hypertension (CMS-HCC)  Assessment & Plan  Baseline Cr 3-range, trending  Maintaining hydration  Avoiding nephrotoxics: NSAIDs etc  nephro Schwarz consulted     Anemia  Assessment & Plan  Hgb stable     Essential hypertension, benign  Assessment & Plan  SBP goal less than 140 mmHg  DBP goal less than 90 mmHg  PRN and antihypertensives to titrate by hospitalist towards goal    Dilated cardiomyopathy (CMS-HCC)  Assessment & Plan  Medically managed    HLD (hyperlipidemia)  Assessment & Plan  Statin    Seizure disorder, grand mal (CMS-HCC)  Assessment & Plan  Dilantin       Labs reviewed and Medications reviewed        DVT  Prophylaxis: Heparin    Ulcer prophylaxis: Not indicated

## 2017-03-06 NOTE — PROGRESS NOTES
Patient underwent a tunneled central line placement right subclavian 6F 24 cm by Dr. Montes. Pt remained hemodynamically stable during procedure. Report called to Jarad CONTRERAS. Pt transported by bed to [facility].     DescribeMe Powerline 6F Ref #[id number] Lot # [id number]

## 2017-03-06 NOTE — CARE PLAN
Problem: Safety  Goal: Will remain free from falls  Bed alarm in place, bed in low and locked position, in room close to nurses station, call light within reach and used appropriately.     Problem: Pain Management  Goal: Pain level will decrease to patient’s comfort goal  Pt reporting pain in right foot, medicated per MAR

## 2017-03-06 NOTE — OR SURGEON
Immediate Post- Operative Note        PostOp Diagnosis: Need for long term abx.       Procedure(s): Tunneled CVC placement, Removal R IJ CVC.       Estimated Blood Loss: Less than 5 ml        Complications: None            3/6/2017     1130 AM     Slick Montes

## 2017-03-06 NOTE — PROGRESS NOTES
"Hospital Medicine Progress Note, Adult, Complex               Author: Marichuy Brennan Date & Time created: 3/6/2017  11:19 AM     Interval History:    DATE OF SERVICE:  03/05/2017    REASON FOR CONSULTATION:  Acute kidney injury and need for PICC line    placement.    CHIEF COMPLAINT:  \"I have an infection on my right foot again.\"    HISTORY OF PRESENT ILLNESS:  A 46-year-old white female with PMH CKD stage  III/IV, hypertension, anemia of CKD, Moyamoya disease, and hyperlipidemia who initially presented to an outside ER with a right foot infection on    02/24/2017.  She was admitted there, had an I and D performed and transferred   here for higher level of care as they felt that maybe she needed more surgical options.  The patient states that she believes her injury was sustained when  she stepped on a manjula approximately a week prior to the previous ER and then  she started having pain and swelling along with some redness in her feet.  She feels better since admission and the drainage.  However, a CT of the foot  that was done showed evidence of soft tissue swelling with gas and so    orthopedics was consulted here and she underwent an I and D.  She has been  doing relatively well, tolerating antibiotics and the plan was again for the  surgeons to go and perform another I and D today.  ID has been also following  the case and following her cultures.  They are concerned that there is a high  risk of limb loss if not improved and she likely is to be on long-term  antibiotics.  The primary team would like to place the PICC line to help  facilitate her discharge and outpatient antibiotic regimen and consulted us tohelp them with any options.    Daily Nephrology update  3/06/17 - Cr is trending down, good UOP    REVIEW OF SYSTEMS:  As per HPI, otherwise negative per AMA/CMS criteria.      PHYSICAL EXAMINATION:  VITAL SIGNS:  Blood pressure 121/77, pulse of 57, respiratory rate 17,    temperature 98.5, pulse ox 91% on " room air, weight 119.6 kilograms.  HEENT:  Normocephalic, atraumatic.  OP clear.  No scleral icterus.  NECK:  Supple, nontender.  CARDIOVASCULAR:  Regular rate and rhythm.  No murmurs, rubs, gallops.  LUNGS:  Clear to auscultation bilaterally.  No wheezes or rales.  ABDOMEN:  Soft, nontender, nondistended.  Positive bowel sounds.  EXTREMITIES:  No clubbing or cyanosis.  Bilateral upper extremity and left    lower extremity with no tenderness or deformity.  RLE with erythema and    swelling from the mid foot distally, significant swelling and tenderness of    the second and third toes, and purulent drainage from the space between the    second and third toes.  There is also an ulcer and callus on the plantar    aspect of the toes underneath the metatarsal head around the second and third    metatarsals.  NEUROLOGIC:  No focal deficits.  Alert and oriented.  PSYCHIATRIC:  The patient is cooperative and appropriate.        Review of Systems:  ROS    Physical Exam:  Physical Exam    Labs:        Invalid input(s): [id number]      Recent Labs      17   0250   SODIUM  142   POTASSIUM  3.7   CHLORIDE  110   CO2  25   BUN  30*   CREATININE  2.40*   MAGNESIUM  1.5   PHOSPHORUS  4.4   CALCIUM  8.5     Recent Labs      17   0250   GLUCOSE  78     Recent Labs      17   1113  17   0250   RBC   --   2.98*   HEMOGLOBIN   --   9.5*   HEMATOCRIT   --   29.0*   PLATELETCT   --   302   PROTHROMBTM  15.6*   --    APTT  27.9   --    INR  1.20*   --    IRON   --   29*   FERRITIN   --   291.1*   TOTIRONBC   --   168*     Recent Labs      17   0250   WBC  8.6           Hemodynamics:  Temp (24hrs), Av.7 °C (98.1 °F), Min:36.4 °C (97.6 °F), Max:36.9 °C (98.4 °F)  Temperature: 36.9 °C (98.4 °F)  Pulse  Av.2  Min: 52  Max: 82Heart Rate (Monitored): (!) 58  Blood Pressure: 158/84 mmHg, NIBP: 147/73 mmHg     Respiratory:    Respiration: 18, Pulse Oximetry: 98 %     Work Of Breathing / Effort: Mild  RUL  Breath Sounds: Diminished, RML Breath Sounds: Diminished, RLL Breath Sounds: Diminished, BENNIE Breath Sounds: Diminished, LLL Breath Sounds: Diminished  Fluids:    Intake/Output Summary (Last 24 hours) at 03/06/17 1119  Last data filed at 03/06/17 0600   Gross per 24 hour   Intake   1160 ml   Output      0 ml   Net   1160 ml        GI/Nutrition:  Orders Placed This Encounter   Procedures   • DIET NPO     Standing Status: Standing      Number of Occurrences: 8      Standing Expiration Date:      Order Specific Question:  Restrict to:     Answer:  Sips with Medications [3]      Comments:  for tunneled PICC placement     Medical Decision Making, by Problem:  Active Hospital Problems    Diagnosis   • Cellulitis of right foot [L03.115]   • Anemia [D64.9]   • 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  Glomerular filtration rate:    Baseline creatinine unclear, presumed to be  around 2.5-3, currently at or near baseline.  2.  Urine:  Nonoliguric.  3.  Blood pressure:  Goal less than 140/90.  4.  Volume:  Euvolemic.  5.  Acid base:  Non-anion gap metabolic acidosis likely secondary to acute    kidney injury on chronic kidney disease.  6.  Electrolytes:  Stable.  7.  Anemia.  Goal hemoglobin 10-11.  8.  Mineral and bone disorder:  Calcium normal.  PO4 unavailable.  PTH    unavailable.  Vitamin D unavailable.  9.  Dialysis access:  None.  She has had multiple fistulas on bilateral    extremities that have failed.    PLAN:  1.  No compelling indication for RRT at this time.  2. Cr is trending down, good UOP, follow  PTH, vitamin D and phosphorus .  4.  Please dose all antibiotics and other medications for eGFR less than 15.    Core Measures

## 2017-03-07 LAB
ANION GAP SERPL CALC-SCNC: 9 MMOL/L (ref 0–11.9)
BACTERIA TISS AEROBE CULT: ABNORMAL
BACTERIA TISS AEROBE CULT: ABNORMAL
BUN SERPL-MCNC: 27 MG/DL (ref 8–22)
CALCIUM SERPL-MCNC: 8.1 MG/DL (ref 8.5–10.5)
CHLORIDE SERPL-SCNC: 110 MMOL/L (ref 96–112)
CO2 SERPL-SCNC: 27 MMOL/L (ref 20–33)
CREAT SERPL-MCNC: 2.28 MG/DL (ref 0.5–1.4)
ERYTHROCYTE [DISTWIDTH] IN BLOOD BY AUTOMATED COUNT: 45.8 FL (ref 35.9–50)
GFR SERPL CREATININE-BSD FRML MDRD: 23 ML/MIN/1.73 M 2
GLUCOSE SERPL-MCNC: 87 MG/DL (ref 65–99)
GRAM STN SPEC: ABNORMAL
HCT VFR BLD AUTO: 28.7 % (ref 37–47)
HGB BLD-MCNC: 9.2 G/DL (ref 12–16)
MCH RBC QN AUTO: 31.4 PG (ref 27–33)
MCHC RBC AUTO-ENTMCNC: 32.1 G/DL (ref 33.6–35)
MCV RBC AUTO: 98 FL (ref 81.4–97.8)
PLATELET # BLD AUTO: 266 K/UL (ref 164–446)
PMV BLD AUTO: 9.6 FL (ref 9–12.9)
POTASSIUM SERPL-SCNC: 3.7 MMOL/L (ref 3.6–5.5)
RBC # BLD AUTO: 2.93 M/UL (ref 4.2–5.4)
SIGNIFICANT IND 70042: ABNORMAL
SITE SITE: ABNORMAL
SODIUM SERPL-SCNC: 146 MMOL/L (ref 135–145)
SOURCE SOURCE: ABNORMAL
WBC # BLD AUTO: 7.3 K/UL (ref 4.8–10.8)

## 2017-03-07 PROCEDURE — 700105 HCHG RX REV CODE 258: Performed by: INTERNAL MEDICINE

## 2017-03-07 PROCEDURE — 700111 HCHG RX REV CODE 636 W/ 250 OVERRIDE (IP): Performed by: INTERNAL MEDICINE

## 2017-03-07 PROCEDURE — 700102 HCHG RX REV CODE 250 W/ 637 OVERRIDE(OP): Performed by: HOSPITALIST

## 2017-03-07 PROCEDURE — 80048 BASIC METABOLIC PNL TOTAL CA: CPT

## 2017-03-07 PROCEDURE — A9270 NON-COVERED ITEM OR SERVICE: HCPCS | Performed by: HOSPITALIST

## 2017-03-07 PROCEDURE — 700102 HCHG RX REV CODE 250 W/ 637 OVERRIDE(OP): Performed by: INTERNAL MEDICINE

## 2017-03-07 PROCEDURE — 700111 HCHG RX REV CODE 636 W/ 250 OVERRIDE (IP): Performed by: HOSPITALIST

## 2017-03-07 PROCEDURE — 85027 COMPLETE CBC AUTOMATED: CPT

## 2017-03-07 PROCEDURE — A9270 NON-COVERED ITEM OR SERVICE: HCPCS | Performed by: PSYCHIATRY & NEUROLOGY

## 2017-03-07 PROCEDURE — 700102 HCHG RX REV CODE 250 W/ 637 OVERRIDE(OP): Performed by: PSYCHIATRY & NEUROLOGY

## 2017-03-07 PROCEDURE — A9270 NON-COVERED ITEM OR SERVICE: HCPCS | Performed by: INTERNAL MEDICINE

## 2017-03-07 PROCEDURE — 770006 HCHG ROOM/CARE - MED/SURG/GYN SEMI*

## 2017-03-07 PROCEDURE — 99233 SBSQ HOSP IP/OBS HIGH 50: CPT | Performed by: INTERNAL MEDICINE

## 2017-03-07 RX ADMIN — ATORVASTATIN CALCIUM 40 MG: 40 TABLET, FILM COATED ORAL at 19:57

## 2017-03-07 RX ADMIN — Medication 1300 MG: at 14:42

## 2017-03-07 RX ADMIN — FOLIC ACID 1 MG: 1 TABLET ORAL at 07:49

## 2017-03-07 RX ADMIN — LORAZEPAM 1 MG: 1 TABLET ORAL at 19:57

## 2017-03-07 RX ADMIN — ASPIRIN 325 MG: 325 TABLET, FILM COATED ORAL at 07:49

## 2017-03-07 RX ADMIN — CARVEDILOL 25 MG: 25 TABLET, FILM COATED ORAL at 07:49

## 2017-03-07 RX ADMIN — OXYCODONE HYDROCHLORIDE 5 MG: 5 TABLET ORAL at 10:35

## 2017-03-07 RX ADMIN — CYANOCOBALAMIN TAB 500 MCG 1000 MCG: 500 TAB at 07:48

## 2017-03-07 RX ADMIN — HEPARIN SODIUM 5000 UNITS: 5000 INJECTION, SOLUTION INTRAVENOUS; SUBCUTANEOUS at 14:41

## 2017-03-07 RX ADMIN — CEFUROXIME SODIUM 750 MG: 7.5 INJECTION, POWDER, FOR SOLUTION INTRAVENOUS at 05:10

## 2017-03-07 RX ADMIN — HEPARIN SODIUM 5000 UNITS: 5000 INJECTION, SOLUTION INTRAVENOUS; SUBCUTANEOUS at 20:00

## 2017-03-07 RX ADMIN — OXCARBAZEPINE 150 MG: 150 TABLET, FILM COATED ORAL at 19:57

## 2017-03-07 RX ADMIN — CARVEDILOL 25 MG: 25 TABLET, FILM COATED ORAL at 17:30

## 2017-03-07 RX ADMIN — CEFUROXIME SODIUM 750 MG: 7.5 INJECTION, POWDER, FOR SOLUTION INTRAVENOUS at 14:42

## 2017-03-07 RX ADMIN — Medication 1300 MG: at 07:50

## 2017-03-07 RX ADMIN — Medication 1300 MG: at 20:44

## 2017-03-07 RX ADMIN — LORAZEPAM 1 MG: 1 TABLET ORAL at 11:16

## 2017-03-07 RX ADMIN — CEFUROXIME SODIUM 750 MG: 7.5 INJECTION, POWDER, FOR SOLUTION INTRAVENOUS at 20:45

## 2017-03-07 RX ADMIN — AMLODIPINE BESYLATE 5 MG: 5 TABLET ORAL at 07:49

## 2017-03-07 RX ADMIN — PHENYTOIN SODIUM 200 MG: 100 CAPSULE, EXTENDED RELEASE ORAL at 19:57

## 2017-03-07 RX ADMIN — OXYCODONE HYDROCHLORIDE 5 MG: 5 TABLET ORAL at 19:57

## 2017-03-07 RX ADMIN — PHENYTOIN SODIUM 200 MG: 100 CAPSULE, EXTENDED RELEASE ORAL at 07:49

## 2017-03-07 RX ADMIN — LORAZEPAM 1 MG: 1 TABLET ORAL at 03:26

## 2017-03-07 ASSESSMENT — PAIN SCALES - GENERAL
PAINLEVEL_OUTOF10: 7
PAINLEVEL_OUTOF10: 0
PAINLEVEL_OUTOF10: 4

## 2017-03-07 ASSESSMENT — ENCOUNTER SYMPTOMS
CONSTIPATION: 0
NERVOUS/ANXIOUS: 1
HEADACHES: 0
DEPRESSION: 0
FEVER: 0
SHORTNESS OF BREATH: 0
COUGH: 0
VOMITING: 0
ABDOMINAL PAIN: 0
DIARRHEA: 0
NAUSEA: 0
CHILLS: 0

## 2017-03-07 NOTE — PROGRESS NOTES
Patient has become uncooperative and unwilling to wear surgical boot on left foot, she reports that it is too hot and it hurts.  Patient has been educated on the importance of the boot by RN and MD.  Called patient's family to enlist their help/influence.  Patient finally agree to wear the boot after administration of oxycodone and 4 ice packs.

## 2017-03-07 NOTE — PROGRESS NOTES
"Infectious Disease Progress Note    Author: Rocio Hinson M.D.    DOS & Time created: 3/7/2017  3:56 PM    Chief Complaint   Patient presents with   • Wound Infection     pt has wound on right foot, went to [facility], report cellutliis with drainage on wound     Interval History:  46-year-old white female with history of morbid obesity,  Moyamoya syndrome with prior stroke, who presents with a traumatic injury to her right foot with subsequent development of cellulitis and abscess.  S/p repeat I&D with 2nd and 3rd metatarsal head excisions and GSR on 3/5.     AF WBC 13.3 s/p drainage abscess-has been refusing IV  3/1- willing to take iv. No fevers. C/o pain. Apparently walking on the foot  3/2- says she is feeling better. No fevers.  3/3- in good spirits. No fevers.   3/4 AF WBC 9.9 foot worse-denies pain  3/5 AF no CBC NPO for surgery. No new complaints  3/6 AF, WBC 8.6.  Minimal pain to Right foot, complains that \"boot is heavy.\"  3/7 AF WBC 7.3 feeling better today-coloring books Denies SE abx  Labs Reviewed, Medications Reviewed, Radiology Reviewed and Wound Reviewed.    Review of Systems:  Review of Systems   Constitutional: Negative for fever and chills.   Cardiovascular: Negative for chest pain.   Gastrointestinal: Negative for nausea, vomiting, abdominal pain and diarrhea.   Musculoskeletal: Positive for joint pain.        Foot pain- improving   Skin: Negative for rash.   Neurological: Negative for headaches.   All other systems reviewed and are negative.      Hemodynamics:  Temp (24hrs), Av.7 °C (98.1 °F), Min:36.5 °C (97.7 °F), Max:37.1 °C (98.7 °F)  Temperature: 36.6 °C (97.9 °F)  Pulse  Av.1  Min: 52  Max: 89   Blood Pressure: 145/82 mmHg       Physical Exam:  Physical Exam   Constitutional: She is oriented to person, place, and time. She appears well-developed. No distress.   Obese   HENT:   Head: Normocephalic and atraumatic.   Eyes: EOM are normal. Pupils are equal, round, and reactive " to light.   Neck: Neck supple.   Right IJ triple lumen- tender to touch, minimal erythema and bruising   Cardiovascular: Normal rate and regular rhythm.    No murmur heard.  Pulmonary/Chest: Effort normal and breath sounds normal. No respiratory distress. She has no wheezes. She has no rales.   Abdominal: Soft. Bowel sounds are normal. She exhibits no distension. There is no tenderness.   Musculoskeletal: She exhibits tenderness.   Right foot- DM walking boot, original surgical bandage in place with WV.   Neurological: She is alert and oriented to person, place, and time.   Skin: Skin is warm and dry. No rash noted.   Psychiatric:   Poor insight   Nursing note and vitals reviewed.      Labs:  Recent Labs      03/06/17   0250  03/07/17   0330   WBC  8.6  7.3   RBC  2.98*  2.93*   HEMOGLOBIN  9.5*  9.2*   HEMATOCRIT  29.0*  28.7*   MCV  97.3  98.0*   MCH  31.9  31.4   RDW  47.3  45.8   PLATELETCT  302  266   MPV  9.2  9.6     Recent Labs      03/06/17   0250  03/07/17   0330   SODIUM  142  146*   POTASSIUM  3.7  3.7   CHLORIDE  110  110   CO2  25  27   GLUCOSE  78  87   BUN  30*  27*     Recent Labs      03/06/17   0250  03/07/17   0330   CREATININE  2.40*  2.28*        Imaging:       CT-FOOT W/O PLUS RECONS RIGHT (Final result) Result time: 02/27/17 00:30:10     Final result by Gee Tam M.D. (02/27/17 00:30:10)     Impression:     1.  Soft tissue swelling with gas in the distal plantar aspect of the foot at the level of the MTP joints with draining wound to the plantar surface of the skin.  2.  Minimal gas in the extensor aspect of the foot at the level of the MTP joints which may represent extension of cellulitis anteriorly.  3.  No loculated fluid collection identified.  4.  Diffuse soft tissue swelling in the remainder of the foot.  5.  No acute bony abnormality. No evidence of osteomyelitis. No radiopaque soft tissue foreign body identified     MR-FOOT-W/O RIGHT (Final result) Result time: 02/28/17  11:19:53     Final result by Aureliano Flores M.D. (02/28/17 11:19:53)     Impression:     Forefoot bilobed abscess with apparent plantar draining sinus. Largest loculation is in the dorsal soft tissues as discussed above  No unenhanced MR evidence of osteomyelitis  Metatarsophalangeal joint effusions most likely are reactive given lack of bony destruction/marginal erosive change     Final result by Rad Intf (02/27/17 11:53:22)     Narrative:     TransthoracicEcho Report  CONCLUSIONS  No prior study is available for comparison.   Normal left ventricular size, thickness, systolic function, and   diastolic function.  Left ventricular ejection fraction is visually estimated to be 65%.  Normal right ventricular size and systolic function.  Trace tricuspid regurgitation.  Normal pericardium without effusion.     Medications:  Current Facility-Administered Medications   Medication Dose Frequency Provider Last Rate Last Dose   • NS (BOLUS) infusion 500 mL  500 mL PRN Slick Montes M.D.       • cefUROXime (ZINACEF) 750 mg in NS 50 mL IVPB  750 mg Q8HR Ivy Gutierrez M.D. 100 mL/hr at 03/07/17 1442 750 mg at 03/07/17 1442   • haloperidol lactate (HALDOL) injection 5 mg  5 mg Q4HRS PRN Nabeel Monahan M.D.   5 mg at 03/02/17 0156   • oxcarbazepine (TRILEPTAL) tablet 150 mg  150 mg BID Lynn Carpenter M.D.   150 mg at 03/06/17 2134   • diphenoxylate-atropine (LOMOTIL) 2.5-0.025 MG per tablet 1 Tab  1 Tab 4X/DAY PRN Shazia Valle M.D.   1 Tab at 03/01/17 1125   • heparin injection 5,000 Units  5,000 Units Q8HRS Augustus Guerin M.D.   5,000 Units at 03/07/17 1441   • lorazepam (ATIVAN) tablet 1 mg  1 mg TID Augustus Guerin M.D.   1 mg at 03/07/17 1116   • sodium bicarbonate (SODIUM BICARBONATE) tablet 1,300 mg  1,300 mg TID Evelia Yepez M.D.   1,300 mg at 03/07/17 1442   • oxycodone immediate-release (ROXICODONE) tablet 5 mg  5 mg Q6HRS PRN Evelia Yepez M.D.   5 mg at 03/07/17 1035   • amlodipine  (NORVASC) tablet 5 mg  5 mg DAILY Chalino Bender M.D.   5 mg at 03/07/17 0749   • aspirin (ASA) tablet 325 mg  325 mg DAILY Chalino Bender M.D.   325 mg at 03/07/17 0749   • atorvastatin (LIPITOR) tablet 40 mg  40 mg Nightly Chalino Bender M.D.   40 mg at 03/06/17 2134   • carvedilol (COREG) tablet 25 mg  25 mg BID WITH MEALS Chalino Bender M.D.   25 mg at 03/07/17 0749   • cyanocobalamin (VITAMIN B-12) tablet 1,000 mcg  1,000 mcg DAILY Chalino Bender M.D.   1,000 mcg at 03/07/17 0748   • folic acid (FOLVITE) tablet 1 mg  1 mg DAILY Chalino Bender M.D.   1 mg at 03/07/17 0749   • phenytoin ER (DILANTIN) capsule 200 mg  200 mg BID Chalino Bender M.D.   200 mg at 03/07/17 0749   • docusate sodium (COLACE) capsule 100 mg  100 mg BID Chalino Bender M.D.   Stopped at 03/05/17 0900   • bisacodyl (DULCOLAX) suppository 10 mg  10 mg Q24HRS PRN Chalino Bender M.D.       • fleet enema 133 mL  1 Each Once PRN Chalino Bender M.D.       • Respiratory Care per Protocol   Continuous RT Chalino Bender M.D.       • ondansetron (ZOFRAN) syringe/vial injection 4 mg  4 mg Q4HRS PRN Chalino Bender M.D.       • ondansetron (ZOFRAN ODT) dispertab 4 mg  4 mg Q4HRS PRN Chalino Bender M.D.       • promethazine (PHENERGAN) tablet 12.5-25 mg  12.5-25 mg Q4HRS PRN Chalino Bender M.D.       • promethazine (PHENERGAN) suppository 12.5-25 mg  12.5-25 mg Q4HRS PRN JOSE GómezD.       • prochlorperazine (COMPAZINE) injection 5-10 mg  5-10 mg Q4HRS PRN Chalino Bender M.D.       • acetaminophen (TYLENOL) tablet 650 mg  650 mg Q6HRS PRN Chalino Bender M.D.   650 mg at 03/06/17 2155   • NS (BOLUS) infusion 500 mL  500 mL Once PRN Chalino Bender M.D.         Last reviewed on 2/26/2017 11:24 PM by Claudia Abebe PhT    Micro:  Results     Procedure Component Value Units Date/Time    CULTURE TISSUE W/ GRM STAIN [[id number]] Collected:  03/05/17 1307    Order Status:  Completed  Specimen Information:  Bone Updated:  03/07/17 0749     Significant Indicator NEG      Source BONE      Site Right Metatarsal Heads      Tissue Culture No growth at 48 hours.      Gram Stain Result --      Result:        Rare WBCs.  No organisms seen.      ANAEROBIC CULTURE [[id number]] Collected:  03/05/17 1307    Order Status:  Completed Specimen Information:  Bone Updated:  03/07/17 0749     Significant Indicator NEG      Source BONE      Site Right Metatarsal Heads      Anaerobic Culture, Culture Res Culture in progress.     CULTURE TISSUE W/ GRM STAIN [[id number]]  (Abnormal)  (Susceptibility) Collected:  03/05/17 1302    Order Status:  Completed Specimen Information:  Tissue Updated:  03/07/17 0748     Gram Stain Result -- (A)      Result:        Few WBCs.  Moderate Gram positive cocci.       Significant Indicator POS (POS)      Source TISS      Site Right Foot Tissue      Tissue Culture -- (A)      Tissue Culture -- (A)      Result:        Staphylococcus aureus  Rare growth      Culture & Susceptibility     STAPHYLOCOCCUS AUREUS     Antibiotic Sensitivity Microscan Unit Status    Ampicillin/sulbactam Sensitive <=8/4 mcg/mL Final    Clindamycin Sensitive <=0.5 mcg/mL Final    Daptomycin Sensitive <=0.5 mcg/mL Final    Erythromycin Sensitive <=0.5 mcg/mL Final    Moxifloxacin Sensitive <=0.5 mcg/mL Final    Oxacillin Sensitive <=0.25 mcg/mL Final    Tetracycline Sensitive <=4 mcg/mL Final    Trimeth/Sulfa Sensitive <=0.5/9.5 mcg/mL Final    Vancomycin Sensitive 1 mcg/mL Final                       ANAEROBIC CULTURE [[id number]] Collected:  03/05/17 1302    Order Status:  Completed Specimen Information:  Tissue Updated:  03/07/17 0748     Significant Indicator NEG      Source TISS      Site Right Foot Tissue      Anaerobic Culture, Culture Res Culture in progress.     GRAM STAIN [[id number]] Collected:  03/05/17 1302    Order Status:  Completed Specimen Information:  Tissue Updated:  03/05/17 2013     Significant  "Indicator .      Source TISS      Site Right Foot Tissue      Gram Stain Result --      Result:        Few WBCs.  Moderate Gram positive cocci.      GRAM STAIN [[id number]] Collected:  03/05/17 1307    Order Status:  Completed Specimen Information:  Bone Updated:  03/05/17 2013     Significant Indicator .      Source BONE      Site Right Metatarsal Heads      Gram Stain Result --      Result:        Rare WBCs.  No organisms seen.      BLOOD CULTURE [[id number]] Collected:  02/26/17 2330    Order Status:  Completed Specimen Information:  Blood from Peripheral Updated:  03/04/17 0100     Significant Indicator NEG      Source BLD      Site PERIPHERAL      Blood Culture No growth after 5 days of incubation.     Narrative:      Per Hospital Policy: Only change Specimen Src: to \"Line\" if  specified by physician order.    BLOOD CULTURE [[id number]] Collected:  02/26/17 2230    Order Status:  Completed Specimen Information:  Blood from Peripheral Updated:  03/04/17 0100     Significant Indicator NEG      Source BLD      Site PERIPHERAL      Blood Culture No growth after 5 days of incubation.     Narrative:      Per Hospital Policy: Only change Specimen Src: to \"Line\" if  specified by physician order.    ANAEROBIC CULTURE [[id number]] Collected:  02/27/17 1243    Order Status:  Completed Specimen Information:  Tissue Updated:  03/02/17 1455     Significant Indicator NEG      Source TISS      Site Right Foot Abscess      Anaerobic Culture, Culture Res No Anaerobes isolated.     CULTURE TISSUE W/ GRM STAIN [[id number]]  (Abnormal) Collected:  02/27/17 1243    Order Status:  Completed Specimen Information:  Tissue Updated:  03/02/17 1455     Gram Stain Result -- (A)      Result:        Many WBCs.  Many Gram positive cocci.       Significant Indicator POS (POS)      Source TISS      Site Right Foot Abscess      Tissue Culture -- (A)      Tissue Culture -- (A)      Result:        Staphylococcus aureus  Heavy growth  See previous " culture for sensitivity report.       Tissue Culture -- (A)      Result:        Escherichia coli  Rare growth  See previous culture for sensitivity report.       Tissue Culture -- (A)      Result:        Viridans Streptococcus  Moderate growth  Mixed morphologies       Tissue Culture -- (A)      Result:        Beta Streptococcus Group G  Light growth      CULTURE WOUND W/ GRAM STAIN [[id number]]  (Abnormal) Collected:  02/27/17 1015    Order Status:  Completed Specimen Information:  Wound from Left Foot Updated:  03/01/17 0949     Gram Stain Result --      Result:        Many WBCs.  Many Gram positive cocci.       Significant Indicator POS (POS)      Source WND      Site LEFT FOOT      Culture Result Wound Moderate growth Mixed skin shawn. (A)      Culture Result Wound -- (A)      Result:        Staphylococcus aureus  Heavy growth  See previous culture for sensitivity report.       Culture Result Wound -- (A)      Result:        Escherichia coli  Rare growth  See previous culture for sensitivity report.      Narrative:      Collected By:[id number] FRANCES MIX    CULTURE WOUND W/ GRAM STAIN [[id number]]  (Abnormal)  (Susceptibility) Collected:  02/26/17 2350    Order Status:  Completed Specimen Information:  Wound from Right Foot Updated:  03/01/17 0946     Gram Stain Result --      Result:        Moderate WBCs.  Many Gram positive cocci.       Significant Indicator POS (POS)      Source WND      Site RIGHT FOOT      Culture Result Wound Moderate growth Mixed skin shawn. (A)      Culture Result Wound -- (A)      Result:        Staphylococcus aureus  Moderate growth       Culture Result Wound -- (A)      Result:        Escherichia coli  Rare growth      Culture & Susceptibility     ESCHERICHIA COLI     Antibiotic Sensitivity Microscan Unit Status    Ampicillin Sensitive <=8 mcg/mL Final    Cefepime Sensitive <=8 mcg/mL Final    Cefotaxime Sensitive <=2 mcg/mL Final    Cefotetan Sensitive <=16 mcg/mL Final     Ceftazidime Sensitive <=1 mcg/mL Final    Ceftriaxone Sensitive <=8 mcg/mL Final    Cefuroxime Sensitive <=4 mcg/mL Final    Ciprofloxacin Sensitive <=1 mcg/mL Final    Ertapenem Sensitive <=1 mcg/mL Final    Gentamicin Sensitive <=4 mcg/mL Final    Pip/Tazobactam Sensitive <=16 mcg/mL Final    Tigecycline Sensitive <=2 mcg/mL Final    Tobramycin Sensitive <=4 mcg/mL Final    Trimeth/Sulfa Sensitive <=2/38 mcg/mL Final              STAPHYLOCOCCUS AUREUS     Antibiotic Sensitivity Microscan Unit Status    Ampicillin/sulbactam Sensitive <=8/4 mcg/mL Final    Clindamycin Sensitive <=0.5 mcg/mL Final    Daptomycin Sensitive <=0.5 mcg/mL Final    Erythromycin Sensitive <=0.5 mcg/mL Final    Moxifloxacin Sensitive <=0.5 mcg/mL Final    Oxacillin Sensitive <=0.25 mcg/mL Final    Tetracycline Sensitive <=4 mcg/mL Final    Trimeth/Sulfa Sensitive <=0.5/9.5 mcg/mL Final    Vancomycin Sensitive 2 mcg/mL Final                             Assessment:  Active Hospital Problems    Diagnosis   • Acute on chronic renal failure (CMS-[facility]) [N17.9, N18.9]   • Metabolic acidosis [E87.2]   • Severe sepsis (CMS-HCC) [A41.9, R65.20]   • Thrombocytopenia (CMS-HCC) [D69.6]   • 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  C/s are MSSA,E coli, strep viridans, group C strep  Due to worsening s/p repeat I&D with 2nd and 3rd metatarsal head excisions and GSR on 3/5 by Dr. Lynn.    OR cx sent of tissue and bone- pending.  Plan for return to OR in 3-4 days for repeat I&D with WV change vs delayed primary closure.  Vascular studies normal  Continue IV Zinacef-endpoint 6  weeks from last surgery if limb salvageable  Continue wound care    Chronic renal failure- 2.28  Avoid nephrotoxic drugs  Adjusting abx for renal function    Moyamoya syndrome with prior stroke/dementia  Noncompliant with abx  Barrier to care    Remains at risk for limb loss

## 2017-03-07 NOTE — PROGRESS NOTES
LIMB PRESERVATION SERVICE       45 y/o female with moyamoya syndrome with CVA, HTN, CKD. Admitted for right foot infection after stepping on rock. ID and Ortho involved. S/p I & D of R foot on 2/27    POD # 1 s/p I & D R dorsal foot with VAC application, R 2nd and 3rd MTH excision, R GSR by Dr. Lynn    Surgical drsg intact, VAC patent at 125mmhg continuously   Pain controlled    Pt non compliant with wearing wearing saniya boot.   Placed this am by RN, then removed in afternoon by pt. Stated boot is too heavy and too hot.   Explained importance of wearing offloading boot. Adjusted padding, different suggestions to make boot more comfortable offered. Pt refused to wear boot.    Discussed with RN and Ortho MD. If pt continues to refuse boot, recommend PRAFO boot to maintain dorsiflexion          PLAN  drsg to remain CDI  Walking boot to RLE continuously d/t calf incision  Heel touch WB RLE in boot  IV abx per ID  Return to surgery in 2-3 days for I & D with VAC change vs closure      D/w: pt, RN, Dr. Lynn

## 2017-03-07 NOTE — PROGRESS NOTES
Received report at bedside and assumed care of patient at 1900. Pt resting comfortably in bed at this time. AxOx4, bed alarm ibed in low and locked position, call light within reach and used appropriately, non-slip socks on patient, hourly rounding in place. Pt in no signs of distress at this time.     Pt refusing bed alarm despite education about fall risk and risk for injury. Patient in room close to nurses station and agrees to call when in need of assistance.

## 2017-03-07 NOTE — PROGRESS NOTES
Hospital Medicine Interval Note  Date of Service:  3/7/2017    Chief Complaint  46 y.o.-year-old female admitted 2/26/2017 with foot infection    Interval Problem Update  3/7 - No acute issues or complaints.    3/6 - RIJ removed and tunneled IJ central line placed. Cultures growing staph.     3/5 - Consuted nephro Schwarz, ok to have tunneled IJ for abx. Central line placed by anesth in the meantime. DC accuchecks.     3/4 - discussed with patient, who had pulled out her IV and denied it. Advised her not to do that and not to walk on her foot, also. Questions answered.     3/3 - discussed with patient, improved mentation, calmer. Foot less painful. Discussed on IDT rounds. PICC held due to SCr.     3/2 - discussed with patient; discussed on IDT rounds. May benefit from locked unit after her abx therapy complete and wound healed; will probably need SNF.      Disposition  Clinical     Review of Systems   Constitutional: Negative for fever and chills.   Respiratory: Negative for cough and shortness of breath.    Cardiovascular: Negative for chest pain.   Gastrointestinal: Negative for nausea, vomiting, diarrhea and constipation.   Psychiatric/Behavioral: Negative for depression. The patient is nervous/anxious.       Physical Exam Laboratory/Imaging   Filed Vitals:    03/06/17 1608 03/06/17 2100 03/07/17 0400 03/07/17 0800   BP: 127/60 146/77 138/88 145/82   Pulse: 63 70 89 67   Temp: 36.8 °C (98.2 °F) 37.1 °C (98.7 °F) 36.5 °C (97.7 °F) 36.6 °C (97.9 °F)   Resp: 18 17 17 16   Height:       Weight:       SpO2: 94% 95% 95% 98%     Physical Exam   Constitutional: She appears well-developed and well-nourished.   HENT:   Head: Normocephalic.   Eyes: Conjunctivae are normal.   Cardiovascular: Normal rate and regular rhythm.    Pulmonary/Chest: Effort normal. No respiratory distress. She has no wheezes.   Abdominal: Soft. Bowel sounds are normal. She exhibits no distension. There is no tenderness.   Musculoskeletal: Normal  range of motion. She exhibits edema and tenderness.   Right foot wrapped, erythematous at toe with serous drainage   Lymphadenopathy:        Right: No supraclavicular adenopathy present.        Left: No supraclavicular adenopathy present.   Neurological: She is alert.   Somewhat oriented   Skin: Skin is warm and dry. There is erythema (right foot bandage).   Vitals reviewed.   Lab Results   Component Value Date/Time    WBC 7.3 03/07/2017 03:30 AM    HEMOGLOBIN 9.2* 03/07/2017 03:30 AM    HEMATOCRIT 28.7* 03/07/2017 03:30 AM    PLATELET COUNT 266 03/07/2017 03:30 AM     Lab Results   Component Value Date/Time    SODIUM 146* 03/07/2017 03:30 AM    POTASSIUM 3.7 03/07/2017 03:30 AM    CHLORIDE 110 03/07/2017 03:30 AM    CO2 27 03/07/2017 03:30 AM    GLUCOSE 87 03/07/2017 03:30 AM    BUN 27* 03/07/2017 03:30 AM    CREATININE 2.28* 03/07/2017 03:30 AM      Assessment/Plan    Cellulitis of right foot  Assessment & Plan  Active Orders     Ordered     Ordering Provider       Wed Mar 1, 2017  1:56 PM    03/01/17 1356  cefUROXime (ZINACEF) 750 mg in NS 50 mL IVPB   EVERY 8 HOURS      Ivy Gutierrez M.D.      ID consulted  Ortho consulted  Ortho I&D'd 2/27, 3/5/2017  Some non-compliance w/ abx in past   Tunneled IJ placed    Villavicencio villavicencio disease  Assessment & Plan  Psych consulted  Discussed with family  Wander guard    CKD stage 4 secondary to hypertension (CMS-HCC)  Assessment & Plan  Baseline Cr 3-range, trending  Maintaining hydration  Avoiding nephrotoxics: NSAIDs etc  nephro recs appeciated     Anemia  Assessment & Plan  Hgb stable     Essential hypertension, benign  Assessment & Plan  SBP goal less than 140 mmHg  DBP goal less than 90 mmHg  PRN and antihypertensives to titrate by hospitalist towards goal    Dilated cardiomyopathy (CMS-HCC)  Assessment & Plan  Medically managed    HLD (hyperlipidemia)  Assessment & Plan  Statin    Seizure disorder, grand mal (CMS-HCC)  Assessment & Plan  Dilantin       Labs reviewed and  Medications reviewed        DVT Prophylaxis: Heparin    Ulcer prophylaxis: Not indicated

## 2017-03-07 NOTE — PROGRESS NOTES
[facility] -  PROGRESS NOTE               Author: Dwayne Schaffer M.D. (Resident)  Attending: Dr. Marichuy Bajwa Date & Time: 3/7/2017  11:40 AM     HPI:  A 46-year-old white female with PMH CKD stage  III/IV, hypertension, anemia of CKD, Moyamoya disease, and hyperlipidemia who initially presented to an outside ER with a right foot infection on  02/24/2017.  She was admitted there, had an I and D performed and transferred   here for higher level of care as they felt that maybe she needed more surgical options.  The patient states that she believes her injury was sustained when  she stepped on a manjula approximately a week prior to the previous ER and then  she started having pain and swelling along with some redness in her feet.  She feels better since admission and the drainage.  However, a CT of the foot  that was done showed evidence of soft tissue swelling with gas and so  orthopedics was consulted here and she underwent an I and D.  She has been  doing relatively well, tolerating antibiotics and the plan was again for the  surgeons to go and perform another I and D today.  ID has been also following  the case and following her cultures.  They are concerned that there is a high  risk of limb loss if not improved and she likely is to be on long-term  antibiotics.  The primary team would like to place the PICC line to help  facilitate her discharge and outpatient antibiotic regimen and consulted us tohelp them with any options.        DAILY NEPHROLOGY SUMMARY:  3/05/17 - Consult completed  3/06/17 - Cr is trending down, good UOP  3/07/17 - Cr continues to improve, has been at or below her baseline for the past several days, urinating without issue, right tunnelled IJ PICC line in place, no new complaints.    ROS   As per HPI, otherwise negative per AMA/CMS criteria.    Physical Exam  VITAL SIGNS:  Blood pressure 121/77, pulse of 57, respiratory rate 17,    temperature 98.5, pulse ox 91% on  room air, weight 119.6 kilograms.  HEENT:  Normocephalic, atraumatic.  OP clear.  No scleral icterus.  NECK:  Supple, nontender.  CARDIOVASCULAR:  Regular rate and rhythm.  No murmurs, rubs, gallops.  LUNGS:  Clear to auscultation bilaterally.  No wheezes or rales.  ABDOMEN:  Soft, nontender, nondistended.  Positive bowel sounds.  EXTREMITIES:  No clubbing or cyanosis.  Bilateral upper extremity and left    lower extremity with no tenderness or deformity.  RLE with erythema and    swelling from the mid foot distally, significant swelling and tenderness of    the second and third toes, and purulent drainage from the space between the    second and third toes.  There is also an ulcer and callus on the plantar    aspect of the toes underneath the metatarsal head around the second and third    metatarsals.  NEUROLOGIC:  No focal deficits.  Alert and oriented.  PSYCHIATRIC:  The patient is cooperative and appropriate.    PAST FAMILY HISTORY: Reviewed and Unchanged  SOCIAL HISTORY: Reviewed and Unchanged  CURRENT MEDICATIONS: Reviewed  LABORATORY RESULTS: Reviewed  IMAGING STUDIES: Reviewed      Fluids:    Date 03/07/17 0700 - 03/08/17 0659   Shift 9889-8020 7681-3002 7046-5738 24 Hour Total   I  N  T  A  K  E   P.O. 240   240      P.O. 240   240    Shift Total 240   240   O  U  T  P  U  T   Urine          Number of Times Voided 1 x   1 x    Stool          Number of Times Stooled 1 x   1 x    Shift Total          240         IMPRESSION:  1.  Acute kidney injury, etiology likely secondary to acute tubular necrosis from cellulitis.  2.  Chronic kidney disease stage III/IV.  Etiology likely secondary to hypertension.  3.  Right lower extremity cellulitis with abscess, status post incision and drainage X2.  4.  Hypertension.  5.  Anemia of chronic kidney disease.  6.  Hyperlipidemia.  7.  Moyamoya disease.  8.  History of cerebrovascular accident.  9. Secondary hyperparathyroidism. Etiology chronic kidney  disease.    ASSESSMENT:  1.  Glomerular filtration rate:  Baseline creatinine unclear, presumed to be around 2.5-3, currently at or near baseline.  2.  Urine:  Nonoliguric.  3.  Blood pressure:  Goal less than 140/90.  4.  Volume:  Euvolemic.  5.  Acid base:  No disturbances.  6.  Electrolytes:  Stable.  7.  Anemia.  Goal hemoglobin 10-11.  8.  Mineral and bone disorder:  Calcium low.  PO4 normal.  PTH mildly elevated.  Vitamin D normal.  9.  Dialysis access:  None.  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 Right IJ PICC in place.    PLAN:  - No compelling indication for RRT at this time.  - Patient has been at baseline kidney function for several days and right IJ PICC line is in place and functional  - Nephrology will sign off at this time. Appreciate the opportunity to participate in the care of this patient.  - Please have patient follow up with Dr. Ortiz (her outpatient nephrologist) 2-4 weeks after discharge

## 2017-03-07 NOTE — CARE PLAN
"Problem: Communication  Goal: The ability to communicate needs accurately and effectively will improve  Intervention: Educate patient and significant other/support system about the plan of care, procedures, treatments, medications and allow for questions  Patient educated about surgeons desire for her to wear boot at all times, patient continues to refuse stating \"the doctor doesn't want me to do that\"      Problem: Safety  Goal: Will remain free from falls  Bed alarm in place, bed in low and locked position, non-slip socks on patient, hourly rounding in place.         "

## 2017-03-07 NOTE — PROGRESS NOTES
Per day shift RN, surgeon called and wants patient wearing boot at all times, not just while ambulating because she is at risk for malformations in her foot. Pt refusing to wear the boot even after education of malformation risk and how that could affect her mobilizing. Pt states she understands and continues to refuse to wear boot.

## 2017-03-08 LAB
ANION GAP SERPL CALC-SCNC: 6 MMOL/L (ref 0–11.9)
BACTERIA TISS AEROBE CULT: NORMAL
BASOPHILS # BLD AUTO: 0.2 % (ref 0–1.8)
BASOPHILS # BLD: 0.02 K/UL (ref 0–0.12)
BUN SERPL-MCNC: 25 MG/DL (ref 8–22)
CALCIUM SERPL-MCNC: 8.4 MG/DL (ref 8.5–10.5)
CHLORIDE SERPL-SCNC: 109 MMOL/L (ref 96–112)
CO2 SERPL-SCNC: 26 MMOL/L (ref 20–33)
CREAT SERPL-MCNC: 2.21 MG/DL (ref 0.5–1.4)
EOSINOPHIL # BLD AUTO: 0.22 K/UL (ref 0–0.51)
EOSINOPHIL NFR BLD: 2.7 % (ref 0–6.9)
ERYTHROCYTE [DISTWIDTH] IN BLOOD BY AUTOMATED COUNT: 45.9 FL (ref 35.9–50)
GFR SERPL CREATININE-BSD FRML MDRD: 24 ML/MIN/1.73 M 2
GLUCOSE SERPL-MCNC: 91 MG/DL (ref 65–99)
GRAM STN SPEC: NORMAL
HCT VFR BLD AUTO: 28.5 % (ref 37–47)
HGB BLD-MCNC: 9.2 G/DL (ref 12–16)
IMM GRANULOCYTES # BLD AUTO: 0.07 K/UL (ref 0–0.11)
IMM GRANULOCYTES NFR BLD AUTO: 0.9 % (ref 0–0.9)
LYMPHOCYTES # BLD AUTO: 2.63 K/UL (ref 1–4.8)
LYMPHOCYTES NFR BLD: 32.5 % (ref 22–41)
MCH RBC QN AUTO: 31.7 PG (ref 27–33)
MCHC RBC AUTO-ENTMCNC: 32.3 G/DL (ref 33.6–35)
MCV RBC AUTO: 98.3 FL (ref 81.4–97.8)
MONOCYTES # BLD AUTO: 0.76 K/UL (ref 0–0.85)
MONOCYTES NFR BLD AUTO: 9.4 % (ref 0–13.4)
NEUTROPHILS # BLD AUTO: 4.39 K/UL (ref 2–7.15)
NEUTROPHILS NFR BLD: 54.3 % (ref 44–72)
NRBC # BLD AUTO: 0 K/UL
NRBC BLD AUTO-RTO: 0 /100 WBC
PLATELET # BLD AUTO: 282 K/UL (ref 164–446)
PMV BLD AUTO: 9.5 FL (ref 9–12.9)
POTASSIUM SERPL-SCNC: 4 MMOL/L (ref 3.6–5.5)
RBC # BLD AUTO: 2.9 M/UL (ref 4.2–5.4)
SIGNIFICANT IND 70042: NORMAL
SITE SITE: NORMAL
SODIUM SERPL-SCNC: 141 MMOL/L (ref 135–145)
SOURCE SOURCE: NORMAL
WBC # BLD AUTO: 8.1 K/UL (ref 4.8–10.8)

## 2017-03-08 PROCEDURE — 99233 SBSQ HOSP IP/OBS HIGH 50: CPT | Performed by: INTERNAL MEDICINE

## 2017-03-08 PROCEDURE — 770006 HCHG ROOM/CARE - MED/SURG/GYN SEMI*

## 2017-03-08 PROCEDURE — A9270 NON-COVERED ITEM OR SERVICE: HCPCS | Performed by: HOSPITALIST

## 2017-03-08 PROCEDURE — 85025 COMPLETE CBC W/AUTO DIFF WBC: CPT

## 2017-03-08 PROCEDURE — 80048 BASIC METABOLIC PNL TOTAL CA: CPT

## 2017-03-08 PROCEDURE — A9270 NON-COVERED ITEM OR SERVICE: HCPCS | Performed by: PSYCHIATRY & NEUROLOGY

## 2017-03-08 PROCEDURE — 700102 HCHG RX REV CODE 250 W/ 637 OVERRIDE(OP): Performed by: HOSPITALIST

## 2017-03-08 PROCEDURE — 700102 HCHG RX REV CODE 250 W/ 637 OVERRIDE(OP): Performed by: INTERNAL MEDICINE

## 2017-03-08 PROCEDURE — 700111 HCHG RX REV CODE 636 W/ 250 OVERRIDE (IP): Performed by: INTERNAL MEDICINE

## 2017-03-08 PROCEDURE — A9270 NON-COVERED ITEM OR SERVICE: HCPCS | Performed by: INTERNAL MEDICINE

## 2017-03-08 PROCEDURE — 700102 HCHG RX REV CODE 250 W/ 637 OVERRIDE(OP): Performed by: PSYCHIATRY & NEUROLOGY

## 2017-03-08 PROCEDURE — 700105 HCHG RX REV CODE 258: Performed by: INTERNAL MEDICINE

## 2017-03-08 PROCEDURE — 700111 HCHG RX REV CODE 636 W/ 250 OVERRIDE (IP): Performed by: HOSPITALIST

## 2017-03-08 RX ADMIN — LORAZEPAM 1 MG: 1 TABLET ORAL at 03:42

## 2017-03-08 RX ADMIN — LORAZEPAM 1 MG: 1 TABLET ORAL at 11:42

## 2017-03-08 RX ADMIN — HEPARIN SODIUM 5000 UNITS: 5000 INJECTION, SOLUTION INTRAVENOUS; SUBCUTANEOUS at 21:28

## 2017-03-08 RX ADMIN — ATORVASTATIN CALCIUM 40 MG: 40 TABLET, FILM COATED ORAL at 19:57

## 2017-03-08 RX ADMIN — ACETAMINOPHEN 650 MG: 325 TABLET, FILM COATED ORAL at 22:23

## 2017-03-08 RX ADMIN — Medication 1300 MG: at 19:56

## 2017-03-08 RX ADMIN — Medication 1300 MG: at 14:38

## 2017-03-08 RX ADMIN — CEFUROXIME SODIUM 750 MG: 7.5 INJECTION, POWDER, FOR SOLUTION INTRAVENOUS at 22:23

## 2017-03-08 RX ADMIN — LORAZEPAM 1 MG: 1 TABLET ORAL at 19:56

## 2017-03-08 RX ADMIN — OXCARBAZEPINE 150 MG: 150 TABLET, FILM COATED ORAL at 09:16

## 2017-03-08 RX ADMIN — CEFUROXIME SODIUM 750 MG: 7.5 INJECTION, POWDER, FOR SOLUTION INTRAVENOUS at 05:28

## 2017-03-08 RX ADMIN — FOLIC ACID 1 MG: 1 TABLET ORAL at 09:09

## 2017-03-08 RX ADMIN — HEPARIN SODIUM 5000 UNITS: 5000 INJECTION, SOLUTION INTRAVENOUS; SUBCUTANEOUS at 14:38

## 2017-03-08 RX ADMIN — OXCARBAZEPINE 150 MG: 150 TABLET, FILM COATED ORAL at 19:56

## 2017-03-08 RX ADMIN — HEPARIN SODIUM 5000 UNITS: 5000 INJECTION, SOLUTION INTRAVENOUS; SUBCUTANEOUS at 05:28

## 2017-03-08 RX ADMIN — PHENYTOIN SODIUM 200 MG: 100 CAPSULE, EXTENDED RELEASE ORAL at 19:57

## 2017-03-08 RX ADMIN — CYANOCOBALAMIN TAB 500 MCG 1000 MCG: 500 TAB at 09:08

## 2017-03-08 RX ADMIN — PHENYTOIN SODIUM 200 MG: 100 CAPSULE, EXTENDED RELEASE ORAL at 09:16

## 2017-03-08 RX ADMIN — CARVEDILOL 25 MG: 25 TABLET, FILM COATED ORAL at 09:09

## 2017-03-08 RX ADMIN — AMLODIPINE BESYLATE 5 MG: 5 TABLET ORAL at 09:09

## 2017-03-08 RX ADMIN — ASPIRIN 325 MG: 325 TABLET, FILM COATED ORAL at 09:09

## 2017-03-08 RX ADMIN — OXYCODONE HYDROCHLORIDE 5 MG: 5 TABLET ORAL at 19:56

## 2017-03-08 RX ADMIN — CEFUROXIME SODIUM 750 MG: 7.5 INJECTION, POWDER, FOR SOLUTION INTRAVENOUS at 14:36

## 2017-03-08 ASSESSMENT — PAIN SCALES - GENERAL
PAINLEVEL_OUTOF10: 4
PAINLEVEL_OUTOF10: 0
PAINLEVEL_OUTOF10: 7

## 2017-03-08 ASSESSMENT — ENCOUNTER SYMPTOMS
VOMITING: 0
CONSTIPATION: 0
HEADACHES: 0
CHILLS: 0
DIARRHEA: 0
DEPRESSION: 0
COUGH: 0
ABDOMINAL PAIN: 0
FEVER: 0
SHORTNESS OF BREATH: 0
NAUSEA: 0
NERVOUS/ANXIOUS: 1

## 2017-03-08 NOTE — PROGRESS NOTES
"LIMB PRESERVATION SERVICE     POD #3, s/p :  1.  Irrigation and debridement, right dorsal foot surgical wound dehiscence.  2.  Irrigation and debridement skin, subcutaneous tissue to bone, right foot.  3.  Right second metatarsal head excision.  4.  Right third metatarsal head excision.  5.  Right gastrocsoleus recession.  6.  Right wound VAC placement    /76 mmHg  Pulse 63  Temp(Src) 36.9 °C (98.4 °F)  Resp 16  Ht 1.753 m (5' 9\")  Wt 119.6 kg (263 lb 10.7 oz)  BMI 38.92 kg/m2  SpO2 99%  Breastfeeding? No   Pain well controlled  VAC to right foot, pressure maintained at 125 mmHg  Hipolito boot in plave    Back to surgery tomorrow with Dr. Lynn  NPO at [place]  "

## 2017-03-08 NOTE — PROGRESS NOTES
Hospital Medicine Interval Note  Date of Service:  3/8/2017    Chief Complaint  46 y.o.-year-old female admitted 2/26/2017 with foot infection    Interval Problem Update  3/8 - Pt coloring in her coloring book.  No acute issues overnight    3/7 - No acute issues or complaints.    3/6 - RIJ removed and tunneled IJ central line placed. Cultures growing staph.     3/5 - Consuted nephro Schwarz, ok to have tunneled IJ for abx. Central line placed by anesth in the meantime. DC accuchecks.     3/4 - discussed with patient, who had pulled out her IV and denied it. Advised her not to do that and not to walk on her foot, also. Questions answered.     3/3 - discussed with patient, improved mentation, calmer. Foot less painful. Discussed on IDT rounds. PICC held due to SCr.     3/2 - discussed with patient; discussed on IDT rounds. May benefit from locked unit after her abx therapy complete and wound healed; will probably need SNF.      Disposition  Clinical     Review of Systems   Constitutional: Negative for fever and chills.   Respiratory: Negative for cough and shortness of breath.    Cardiovascular: Negative for chest pain.   Gastrointestinal: Negative for nausea, vomiting, diarrhea and constipation.   Psychiatric/Behavioral: Negative for depression. The patient is nervous/anxious.       Physical Exam Laboratory/Imaging   Filed Vitals:    03/07/17 1600 03/07/17 2000 03/08/17 0400 03/08/17 0845   BP: 133/62 127/66 125/67 151/76   Pulse: 64 64 70 63   Temp: 36.3 °C (97.4 °F) 36.3 °C (97.3 °F) 37.6 °C (99.7 °F) 36.9 °C (98.4 °F)   Resp: 16 17 18 16   Height:       Weight:       SpO2: 96% 94% 96% 99%     Physical Exam   Constitutional: She appears well-developed and well-nourished.   HENT:   Head: Normocephalic.   Eyes: Conjunctivae are normal.   Cardiovascular: Normal rate and regular rhythm.    Pulmonary/Chest: Effort normal. No respiratory distress. She has no wheezes.   Abdominal: Soft. Bowel sounds are normal. She exhibits  no distension. There is no tenderness.   Musculoskeletal: Normal range of motion. She exhibits edema and tenderness.   Right foot wrapped, erythematous at toe with serous drainage   Lymphadenopathy:        Right: No supraclavicular adenopathy present.        Left: No supraclavicular adenopathy present.   Neurological: She is alert.   Somewhat oriented   Skin: Skin is warm and dry. There is erythema (right foot bandage).   Vitals reviewed.   Lab Results   Component Value Date/Time    WBC 8.1 03/08/2017 03:50 AM    HEMOGLOBIN 9.2* 03/08/2017 03:50 AM    HEMATOCRIT 28.5* 03/08/2017 03:50 AM    PLATELET COUNT 282 03/08/2017 03:50 AM     Lab Results   Component Value Date/Time    SODIUM 141 03/08/2017 03:50 AM    POTASSIUM 4.0 03/08/2017 03:50 AM    CHLORIDE 109 03/08/2017 03:50 AM    CO2 26 03/08/2017 03:50 AM    GLUCOSE 91 03/08/2017 03:50 AM    BUN 25* 03/08/2017 03:50 AM    CREATININE 2.21* 03/08/2017 03:50 AM      Assessment/Plan    Cellulitis of right foot  Assessment & Plan  Active Orders     Ordered     Ordering Provider       Wed Mar 1, 2017  1:56 PM    03/01/17 1356  cefUROXime (ZINACEF) 750 mg in NS 50 mL IVPB   EVERY 8 HOURS      Ivy Gutierrez M.D.      ID recs appreciated  Ortho recs appreciated  Ortho I&D'd 2/27, 3/5/2017  Some non-compliance w/ abx in past   Tunneled IJ placed  Possible repeat I&D vs wound closure in next few days  Cont wound vac and care  Cont abx as above    Villavicencio villavicencio disease  Assessment & Plan  Psych consulted  Discussed with family  Wander guard    CKD stage 4 secondary to hypertension (CMS-HCC)  Assessment & Plan  Baseline Cr 2-3-range, monitoring  Maintaining hydration  Avoiding nephrotoxics: NSAIDs etc  nephro sign off    Anemia  Assessment & Plan  Hgb stable     Essential hypertension, benign  Assessment & Plan  SBP goal less than 140 mmHg  DBP goal less than 90 mmHg  PRN and antihypertensives to titrate by hospitalist towards goal    Dilated cardiomyopathy (CMS-[facility])  Assessment  & Plan  Medically managed    HLD (hyperlipidemia)  Assessment & Plan  Statin    Seizure disorder, grand mal (CMS-HCC)  Assessment & Plan  Dilantin       Labs reviewed and Medications reviewed        DVT Prophylaxis: Heparin    Ulcer prophylaxis: Not indicated

## 2017-03-08 NOTE — CARE PLAN
Problem: Safety  Goal: Will remain free from falls  Bed alarm in place, bed in low and locked position, non-slip socks on patient.     Problem: Pain Management  Goal: Pain level will decrease to patient’s comfort goal  Pt reporting 7/10 pain in right foot, medicated per MAR.

## 2017-03-08 NOTE — PROGRESS NOTES
"Infectious Disease Progress Note    Author: Rocio Hinson M.D.    DOS & Time created: 3/8/2017  2:37 PM    Chief Complaint   Patient presents with   • Wound Infection     pt has wound on right foot, went to [facility], report cellutliis with drainage on wound     Interval History:  46-year-old white female with history of morbid obesity,  Moyamoya syndrome with prior stroke, who presents with a traumatic injury to her right foot with subsequent development of cellulitis and abscess.  S/p repeat I&D with 2nd and 3rd metatarsal head excisions and GSR on 3/5.     AF WBC 13.3 s/p drainage abscess-has been refusing IV  3/1- willing to take iv. No fevers. C/o pain. Apparently walking on the foot  3/2- says she is feeling better. No fevers.  3/3- in good spirits. No fevers.   3/4 AF WBC 9.9 foot worse-denies pain  3/5 AF no CBC NPO for surgery. No new complaints  3/6 AF, WBC 8.6.  Minimal pain to Right foot, complains that \"boot is heavy.\"  3/7 AF WBC 7.3 feeling better today-coloring books Denies SE abx  3/8 AF WBC 8.1 ambulates to BR-pain controlled. Still coloring  Labs Reviewed, Medications Reviewed, Radiology Reviewed and Wound Reviewed.    Review of Systems:  Review of Systems   Constitutional: Negative for fever and chills.   Cardiovascular: Negative for chest pain.   Gastrointestinal: Negative for nausea, vomiting, abdominal pain and diarrhea.   Musculoskeletal: Positive for joint pain.        Foot pain- improving   Skin: Negative for rash.   Neurological: Negative for headaches.   All other systems reviewed and are negative.      Hemodynamics:  Temp (24hrs), Av.8 °C (98.2 °F), Min:36.3 °C (97.3 °F), Max:37.6 °C (99.7 °F)  Temperature: 36.9 °C (98.4 °F)  Pulse  Av.1  Min: 52  Max: 89   Blood Pressure: 151/76 mmHg       Physical Exam:  Physical Exam   Constitutional: She is oriented to person, place, and time. She appears well-developed. No distress.   Obese   HENT:   Head: Normocephalic and " atraumatic.   Eyes: EOM are normal. Pupils are equal, round, and reactive to light.   Neck: Neck supple.   Right SCV PICC- tender to touch, minimal erythema and bruising   Cardiovascular: Normal rate and regular rhythm.    No murmur heard.  Pulmonary/Chest: Effort normal and breath sounds normal. No respiratory distress. She has no wheezes. She has no rales.   Abdominal: Soft. Bowel sounds are normal. She exhibits no distension. There is no tenderness.   Musculoskeletal: She exhibits tenderness.   Right foot- DM walking boot, dressed   Neurological: She is alert and oriented to person, place, and time.   Skin: Skin is warm and dry. No rash noted.   Psychiatric:   Poor insight   Nursing note and vitals reviewed.      Labs:  Recent Labs      03/06/17 0250 03/07/17 0330 03/08/17   0350   WBC  8.6  7.3  8.1   RBC  2.98*  2.93*  2.90*   HEMOGLOBIN  9.5*  9.2*  9.2*   HEMATOCRIT  29.0*  28.7*  28.5*   MCV  97.3  98.0*  98.3*   MCH  31.9  31.4  31.7   RDW  47.3  45.8  45.9   PLATELETCT  302  266  282   MPV  9.2  9.6  9.5   NEUTSPOLYS   --    --   54.30   LYMPHOCYTES   --    --   32.50   MONOCYTES   --    --   9.40   EOSINOPHILS   --    --   2.70   BASOPHILS   --    --   0.20     Recent Labs      03/06/17 0250 03/07/17 0330 03/08/17   0350   SODIUM  142  146*  141   POTASSIUM  3.7  3.7  4.0   CHLORIDE  110  110  109   CO2  25  27  26   GLUCOSE  78  87  91   BUN  30*  27*  25*     Recent Labs      03/06/17 0250 03/07/17 0330  03/08/17   0350   CREATININE  2.40*  2.28*  2.21*        Imaging:       CT-FOOT W/O PLUS RECONS RIGHT (Final result) Result time: 02/27/17 00:30:10     Final result by Gee Tam M.D. (02/27/17 00:30:10)     Impression:     1.  Soft tissue swelling with gas in the distal plantar aspect of the foot at the level of the MTP joints with draining wound to the plantar surface of the skin.  2.  Minimal gas in the extensor aspect of the foot at the level of the MTP joints which may  represent extension of cellulitis anteriorly.  3.  No loculated fluid collection identified.  4.  Diffuse soft tissue swelling in the remainder of the foot.  5.  No acute bony abnormality. No evidence of osteomyelitis. No radiopaque soft tissue foreign body identified     MR-FOOT-W/O RIGHT (Final result) Result time: 02/28/17 11:19:53     Final result by Aureliano Flores M.D. (02/28/17 11:19:53)     Impression:     Forefoot bilobed abscess with apparent plantar draining sinus. Largest loculation is in the dorsal soft tissues as discussed above  No unenhanced MR evidence of osteomyelitis  Metatarsophalangeal joint effusions most likely are reactive given lack of bony destruction/marginal erosive change     Final result by Rad Intf (02/27/17 11:53:22)     Narrative:     TransthoracicEcho Report  CONCLUSIONS  No prior study is available for comparison.   Normal left ventricular size, thickness, systolic function, and   diastolic function.  Left ventricular ejection fraction is visually estimated to be 65%.  Normal right ventricular size and systolic function.  Trace tricuspid regurgitation.  Normal pericardium without effusion.     Medications:  Current Facility-Administered Medications   Medication Dose Frequency Provider Last Rate Last Dose   • NS (BOLUS) infusion 500 mL  500 mL PRN Slick Montes M.D.       • cefUROXime (ZINACEF) 750 mg in NS 50 mL IVPB  750 mg Q8HR Ivy Gutierrez M.D. 100 mL/hr at 03/08/17 1436 750 mg at 03/08/17 1436   • haloperidol lactate (HALDOL) injection 5 mg  5 mg Q4HRS PRN Nabeel Monahan M.D.   5 mg at 03/02/17 0156   • oxcarbazepine (TRILEPTAL) tablet 150 mg  150 mg BID Lynn Carpenter M.D.   150 mg at 03/08/17 0916   • diphenoxylate-atropine (LOMOTIL) 2.5-0.025 MG per tablet 1 Tab  1 Tab 4X/DAY PRN Shazia Valle M.D.   1 Tab at 03/01/17 1125   • heparin injection 5,000 Units  5,000 Units Q8HRS Augustus Guerin M.D.   5,000 Units at 03/08/17 0528   • lorazepam (ATIVAN) tablet 1  mg  1 mg TID Augustus Guerin M.D.   1 mg at 03/08/17 1142   • sodium bicarbonate (SODIUM BICARBONATE) tablet 1,300 mg  1,300 mg TID Evelia Yepez M.D.   Stopped at 03/08/17 0800   • oxycodone immediate-release (ROXICODONE) tablet 5 mg  5 mg Q6HRS PRN Evelia Yepez M.D.   5 mg at 03/07/17 1957   • amlodipine (NORVASC) tablet 5 mg  5 mg DAILY Chalino Bender M.D.   5 mg at 03/08/17 0909   • aspirin (ASA) tablet 325 mg  325 mg DAILY Chalino Bender M.D.   325 mg at 03/08/17 0909   • atorvastatin (LIPITOR) tablet 40 mg  40 mg Nightly Chalino Bender M.D.   40 mg at 03/07/17 1957   • carvedilol (COREG) tablet 25 mg  25 mg BID WITH MEALS Chalino Bender M.D.   25 mg at 03/08/17 0909   • cyanocobalamin (VITAMIN B-12) tablet 1,000 mcg  1,000 mcg DAILY Chalino Bender M.D.   1,000 mcg at 03/08/17 0908   • folic acid (FOLVITE) tablet 1 mg  1 mg DAILY Chalino Bender M.D.   1 mg at 03/08/17 0909   • phenytoin ER (DILANTIN) capsule 200 mg  200 mg BID Chalino Bender M.D.   200 mg at 03/08/17 0916   • docusate sodium (COLACE) capsule 100 mg  100 mg BID Chalino Bender M.D.   Stopped at 03/05/17 0900   • bisacodyl (DULCOLAX) suppository 10 mg  10 mg Q24HRS PRN Chalino Bender M.D.       • fleet enema 133 mL  1 Each Once PRN Chalino Bender M.D.       • Respiratory Care per Protocol   Continuous RT Chalino Bender M.D.       • ondansetron (ZOFRAN) syringe/vial injection 4 mg  4 mg Q4HRS PRN Chalino Bender M.D.       • ondansetron (ZOFRAN ODT) dispertab 4 mg  4 mg Q4HRS PRN Chalino Bender M.D.       • promethazine (PHENERGAN) tablet 12.5-25 mg  12.5-25 mg Q4HRS PRN Chalino Bender M.D.       • promethazine (PHENERGAN) suppository 12.5-25 mg  12.5-25 mg Q4HRS PRN Chalino Bender M.D.       • prochlorperazine (COMPAZINE) injection 5-10 mg  5-10 mg Q4HRS PRN Chalino Bender M.D.       • acetaminophen (TYLENOL) tablet 650 mg  650 mg Q6HRS PRN Chalino Bender M.D.    650 mg at 03/06/17 2155   • NS (BOLUS) infusion 500 mL  500 mL Once PRN Chalino Bender M.D.         Last reviewed on 2/26/2017 11:24 PM by aRdha Mixon    Micro:  Results     Procedure Component Value Units Date/Time    CULTURE TISSUE W/ GRM STAIN [[id number]] Collected:  03/05/17 1307    Order Status:  Completed Specimen Information:  Bone Updated:  03/08/17 0856     Gram Stain Result --      Result:        Rare WBCs.  No organisms seen.       Significant Indicator NEG      Source BONE      Site Right Metatarsal Heads      Tissue Culture No growth at 72 hours.     ANAEROBIC CULTURE [[id number]] Collected:  03/05/17 1307    Order Status:  Completed Specimen Information:  Bone Updated:  03/08/17 0856     Significant Indicator NEG      Source BONE      Site Right Metatarsal Heads      Anaerobic Culture, Culture Res Culture in progress.     CULTURE TISSUE W/ GRM STAIN [[id number]]  (Abnormal)  (Susceptibility) Collected:  03/05/17 1302    Order Status:  Completed Specimen Information:  Tissue Updated:  03/07/17 0748     Gram Stain Result -- (A)      Result:        Few WBCs.  Moderate Gram positive cocci.       Significant Indicator POS (POS)      Source TISS      Site Right Foot Tissue      Tissue Culture -- (A)      Tissue Culture -- (A)      Result:        Staphylococcus aureus  Rare growth      Culture & Susceptibility     STAPHYLOCOCCUS AUREUS     Antibiotic Sensitivity Microscan Unit Status    Ampicillin/sulbactam Sensitive <=8/4 mcg/mL Final    Clindamycin Sensitive <=0.5 mcg/mL Final    Daptomycin Sensitive <=0.5 mcg/mL Final    Erythromycin Sensitive <=0.5 mcg/mL Final    Moxifloxacin Sensitive <=0.5 mcg/mL Final    Oxacillin Sensitive <=0.25 mcg/mL Final    Tetracycline Sensitive <=4 mcg/mL Final    Trimeth/Sulfa Sensitive <=0.5/9.5 mcg/mL Final    Vancomycin Sensitive 1 mcg/mL Final                       ANAEROBIC CULTURE [[id number]] Collected:  03/05/17 1302    Order Status:  Completed Specimen  "Information:  Tissue Updated:  03/07/17 0748     Significant Indicator NEG      Source TISS      Site Right Foot Tissue      Anaerobic Culture, Culture Res Culture in progress.     GRAM STAIN [[id number]] Collected:  03/05/17 1302    Order Status:  Completed Specimen Information:  Tissue Updated:  03/05/17 2013     Significant Indicator .      Source TISS      Site Right Foot Tissue      Gram Stain Result --      Result:        Few WBCs.  Moderate Gram positive cocci.      GRAM STAIN [[id number]] Collected:  03/05/17 1307    Order Status:  Completed Specimen Information:  Bone Updated:  03/05/17 2013     Significant Indicator .      Source BONE      Site Right Metatarsal Heads      Gram Stain Result --      Result:        Rare WBCs.  No organisms seen.      BLOOD CULTURE [[id number]] Collected:  02/26/17 2330    Order Status:  Completed Specimen Information:  Blood from Peripheral Updated:  03/04/17 0100     Significant Indicator NEG      Source BLD      Site PERIPHERAL      Blood Culture No growth after 5 days of incubation.     Narrative:      Per Hospital Policy: Only change Specimen Src: to \"Line\" if  specified by physician order.    BLOOD CULTURE [[id number]] Collected:  02/26/17 2230    Order Status:  Completed Specimen Information:  Blood from Peripheral Updated:  03/04/17 0100     Significant Indicator NEG      Source BLD      Site PERIPHERAL      Blood Culture No growth after 5 days of incubation.     Narrative:      Per Hospital Policy: Only change Specimen Src: to \"Line\" if  specified by physician order.    ANAEROBIC CULTURE [[id number]] Collected:  02/27/17 1243    Order Status:  Completed Specimen Information:  Tissue Updated:  03/02/17 1455     Significant Indicator NEG      Source TISS      Site Right Foot Abscess      Anaerobic Culture, Culture Res No Anaerobes isolated.     CULTURE TISSUE W/ GRM STAIN [[id number]]  (Abnormal) Collected:  02/27/17 1243    Order Status:  Completed Specimen Information:  " Tissue Updated:  03/02/17 5472     Gram Stain Result -- (A)      Result:        Many WBCs.  Many Gram positive cocci.       Significant Indicator POS (POS)      Source TISS      Site Right Foot Abscess      Tissue Culture -- (A)      Tissue Culture -- (A)      Result:        Staphylococcus aureus  Heavy growth  See previous culture for sensitivity report.       Tissue Culture -- (A)      Result:        Escherichia coli  Rare growth  See previous culture for sensitivity report.       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 Lynn.    OR cx sent of tissue and bone- negative to date Tissue +SA  Plan for return to OR for repeat I&D with [place] change vs delayed primary closure.  Vascular studies normal  Continue IV Zinacef-endpoint 6 weeks from last surgery if limb salvageable  Continue wound care    Chronic renal failure- 2.21  Avoiding nephrotoxic drugs  Adjusting abx as needed for renal function    Moyamoya syndrome with prior stroke/dementia  Noncompliant with abx  Barrier to  care    Remains at risk for limb loss

## 2017-03-09 PROCEDURE — 160035 HCHG PACU - 1ST 60 MINS PHASE I: Performed by: ORTHOPAEDIC SURGERY

## 2017-03-09 PROCEDURE — A9270 NON-COVERED ITEM OR SERVICE: HCPCS | Performed by: HOSPITALIST

## 2017-03-09 PROCEDURE — 160027 HCHG SURGERY MINUTES - 1ST 30 MINS LEVEL 2: Performed by: ORTHOPAEDIC SURGERY

## 2017-03-09 PROCEDURE — 160036 HCHG PACU - EA ADDL 30 MINS PHASE I: Performed by: ORTHOPAEDIC SURGERY

## 2017-03-09 PROCEDURE — 700102 HCHG RX REV CODE 250 W/ 637 OVERRIDE(OP): Performed by: INTERNAL MEDICINE

## 2017-03-09 PROCEDURE — 160038 HCHG SURGERY MINUTES - EA ADDL 1 MIN LEVEL 2: Performed by: ORTHOPAEDIC SURGERY

## 2017-03-09 PROCEDURE — 501330 HCHG SET, CYSTO IRRIG TUBING: Performed by: ORTHOPAEDIC SURGERY

## 2017-03-09 PROCEDURE — 700101 HCHG RX REV CODE 250: Mod: JW

## 2017-03-09 PROCEDURE — 500881 HCHG PACK, EXTREMITY: Performed by: ORTHOPAEDIC SURGERY

## 2017-03-09 PROCEDURE — A4606 OXYGEN PROBE USED W OXIMETER: HCPCS | Performed by: ORTHOPAEDIC SURGERY

## 2017-03-09 PROCEDURE — 160002 HCHG RECOVERY MINUTES (STAT): Performed by: ORTHOPAEDIC SURGERY

## 2017-03-09 PROCEDURE — 110371 HCHG SHELL REV 272: Performed by: ORTHOPAEDIC SURGERY

## 2017-03-09 PROCEDURE — 700102 HCHG RX REV CODE 250 W/ 637 OVERRIDE(OP): Performed by: PSYCHIATRY & NEUROLOGY

## 2017-03-09 PROCEDURE — 700111 HCHG RX REV CODE 636 W/ 250 OVERRIDE (IP): Performed by: INTERNAL MEDICINE

## 2017-03-09 PROCEDURE — 700111 HCHG RX REV CODE 636 W/ 250 OVERRIDE (IP)

## 2017-03-09 PROCEDURE — 500440 HCHG DRESSING, STERILE ROLL (KERLIX): Performed by: ORTHOPAEDIC SURGERY

## 2017-03-09 PROCEDURE — 160048 HCHG OR STATISTICAL LEVEL 1-5: Performed by: ORTHOPAEDIC SURGERY

## 2017-03-09 PROCEDURE — 700105 HCHG RX REV CODE 258: Performed by: INTERNAL MEDICINE

## 2017-03-09 PROCEDURE — 700102 HCHG RX REV CODE 250 W/ 637 OVERRIDE(OP): Performed by: HOSPITALIST

## 2017-03-09 PROCEDURE — 770006 HCHG ROOM/CARE - MED/SURG/GYN SEMI*

## 2017-03-09 PROCEDURE — 0JBQ0ZZ EXCISION OF RIGHT FOOT SUBCUTANEOUS TISSUE AND FASCIA, OPEN APPROACH: ICD-10-PCS | Performed by: ORTHOPAEDIC SURGERY

## 2017-03-09 PROCEDURE — 99233 SBSQ HOSP IP/OBS HIGH 50: CPT | Performed by: INTERNAL MEDICINE

## 2017-03-09 PROCEDURE — 110382 HCHG SHELL REV 271: Performed by: ORTHOPAEDIC SURGERY

## 2017-03-09 PROCEDURE — 700112 HCHG RX REV CODE 229: Performed by: HOSPITALIST

## 2017-03-09 PROCEDURE — 500053 HCHG BANDAGE, ELASTIC 4: Performed by: ORTHOPAEDIC SURGERY

## 2017-03-09 PROCEDURE — 0JDQ0ZZ EXTRACTION OF RIGHT FOOT SUBCUTANEOUS TISSUE AND FASCIA, OPEN APPROACH: ICD-10-PCS | Performed by: ORTHOPAEDIC SURGERY

## 2017-03-09 PROCEDURE — 0HQMXZZ REPAIR RIGHT FOOT SKIN, EXTERNAL APPROACH: ICD-10-PCS | Performed by: ORTHOPAEDIC SURGERY

## 2017-03-09 PROCEDURE — 160022 HCHG BLOCK: Performed by: ORTHOPAEDIC SURGERY

## 2017-03-09 PROCEDURE — 160009 HCHG ANES TIME/MIN: Performed by: ORTHOPAEDIC SURGERY

## 2017-03-09 PROCEDURE — A9270 NON-COVERED ITEM OR SERVICE: HCPCS | Performed by: PSYCHIATRY & NEUROLOGY

## 2017-03-09 PROCEDURE — 502240 HCHG MISC OR SUPPLY RC 0272: Performed by: ORTHOPAEDIC SURGERY

## 2017-03-09 PROCEDURE — A9270 NON-COVERED ITEM OR SERVICE: HCPCS | Performed by: INTERNAL MEDICINE

## 2017-03-09 PROCEDURE — 501838 HCHG SUTURE GENERAL: Performed by: ORTHOPAEDIC SURGERY

## 2017-03-09 PROCEDURE — 700111 HCHG RX REV CODE 636 W/ 250 OVERRIDE (IP): Performed by: HOSPITALIST

## 2017-03-09 RX ADMIN — HEPARIN SODIUM 5000 UNITS: 5000 INJECTION, SOLUTION INTRAVENOUS; SUBCUTANEOUS at 22:42

## 2017-03-09 RX ADMIN — OXCARBAZEPINE 150 MG: 150 TABLET, FILM COATED ORAL at 22:43

## 2017-03-09 RX ADMIN — CYANOCOBALAMIN TAB 500 MCG 1000 MCG: 500 TAB at 10:04

## 2017-03-09 RX ADMIN — ASPIRIN 325 MG: 325 TABLET, FILM COATED ORAL at 09:00

## 2017-03-09 RX ADMIN — PHENYTOIN SODIUM 200 MG: 100 CAPSULE, EXTENDED RELEASE ORAL at 22:43

## 2017-03-09 RX ADMIN — LORAZEPAM 1 MG: 1 TABLET ORAL at 11:31

## 2017-03-09 RX ADMIN — AMLODIPINE BESYLATE 5 MG: 5 TABLET ORAL at 10:04

## 2017-03-09 RX ADMIN — PHENYTOIN SODIUM 200 MG: 100 CAPSULE, EXTENDED RELEASE ORAL at 10:05

## 2017-03-09 RX ADMIN — CEFUROXIME SODIUM 750 MG: 7.5 INJECTION, POWDER, FOR SOLUTION INTRAVENOUS at 15:08

## 2017-03-09 RX ADMIN — LORAZEPAM 1 MG: 1 TABLET ORAL at 04:33

## 2017-03-09 RX ADMIN — Medication 1300 MG: at 22:43

## 2017-03-09 RX ADMIN — Medication 1300 MG: at 10:04

## 2017-03-09 RX ADMIN — LORAZEPAM 1 MG: 1 TABLET ORAL at 19:14

## 2017-03-09 RX ADMIN — CEFUROXIME SODIUM 750 MG: 7.5 INJECTION, POWDER, FOR SOLUTION INTRAVENOUS at 22:44

## 2017-03-09 RX ADMIN — DOCUSATE SODIUM 100 MG: 100 CAPSULE ORAL at 22:42

## 2017-03-09 RX ADMIN — CARVEDILOL 25 MG: 25 TABLET, FILM COATED ORAL at 17:38

## 2017-03-09 RX ADMIN — CEFUROXIME SODIUM 750 MG: 7.5 INJECTION, POWDER, FOR SOLUTION INTRAVENOUS at 06:09

## 2017-03-09 RX ADMIN — FOLIC ACID 1 MG: 1 TABLET ORAL at 10:03

## 2017-03-09 RX ADMIN — CARVEDILOL 25 MG: 25 TABLET, FILM COATED ORAL at 10:03

## 2017-03-09 RX ADMIN — ATORVASTATIN CALCIUM 40 MG: 40 TABLET, FILM COATED ORAL at 22:42

## 2017-03-09 ASSESSMENT — PAIN SCALES - GENERAL
PAINLEVEL_OUTOF10: 6
PAINLEVEL_OUTOF10: 0
PAINLEVEL_OUTOF10: 6

## 2017-03-09 ASSESSMENT — ENCOUNTER SYMPTOMS
NERVOUS/ANXIOUS: 1
DEPRESSION: 0
DIARRHEA: 0
COUGH: 0
VOMITING: 0
FEVER: 0
CONSTIPATION: 0
SHORTNESS OF BREATH: 0
NAUSEA: 0
CHILLS: 0

## 2017-03-09 NOTE — CARE PLAN
Problem: Communication  Goal: The ability to communicate needs accurately and effectively will improve  Outcome: PROGRESSING AS EXPECTED    Problem: Pain Management  Goal: Pain level will decrease to patient’s comfort goal  Outcome: PROGRESSING AS EXPECTED    03/08/17 2000   OTHER   Nurse Pain Scale 0 - 10  7   Non Verbal Scale  Calm   Comfort Goal Comfort at Rest;Comfort with Movement

## 2017-03-09 NOTE — CARE PLAN
Problem: Psychosocial Needs:  Goal: Level of anxiety will decrease  Outcome: PROGRESSING SLOWER THAN EXPECTED  Pt became increasingly anxious/frustrated when this RN explained to her that she needed to wear the boot to her RLE at all times. Refusing adamantly & becoming verbally aggressive/slamming her fists down on her side table. Administered scheduled Ativan.    Problem: Mobility  Goal: Risk for activity intolerance will decrease  Outcome: PROGRESSING SLOWER THAN EXPECTED  Pt refusing to wear boot to RLE despite extensive education. Utilizing commode.

## 2017-03-09 NOTE — PROGRESS NOTES
AOx4. Pain in right foot. PRN pain medication given. Refuses to wear boot. MD aware. RIJ has fluids TKO. Tolerates PO intake well. NPO after midnight for surgery today.  1 assist to the bedside commode. VSS with no signs of acute distress. Bed alarm on for safety. Bed in lowest position. Hourly rounding continued.

## 2017-03-09 NOTE — PROGRESS NOTES
Hospital Medicine Interval Note  Date of Service:  3/9/2017    Chief Complaint  46 y.o.-year-old female admitted 2/26/2017 with foot infection    Interval Problem Update  3/9 - Pt removed protective boot from foot yesterday evening.  No other acute issues or complaints.     3/8 - Pt coloring in her coloring book.  No acute issues overnight    3/7 - No acute issues or complaints.    3/6 - RIJ removed and tunneled IJ central line placed. Cultures growing staph.     3/5 - Consuted nephro janeth Schwarz to have tunneled IJ for abx. Central line placed by anesth in the meantime. DC accuchecks.     3/4 - discussed with patient, who had pulled out her IV and denied it. Advised her not to do that and not to walk on her foot, also. Questions answered.     3/3 - discussed with patient, improved mentation, calmer. Foot less painful. Discussed on IDT rounds. PICC held due to SCr.     3/2 - discussed with patient; discussed on IDT rounds. May benefit from locked unit after her abx therapy complete and wound healed; will probably need SNF.      Disposition  Clinical     Review of Systems   Constitutional: Negative for fever and chills.   Respiratory: Negative for cough and shortness of breath.    Cardiovascular: Negative for chest pain.   Gastrointestinal: Negative for nausea, vomiting, diarrhea and constipation.   Psychiatric/Behavioral: Negative for depression. The patient is nervous/anxious.       Physical Exam Laboratory/Imaging   Filed Vitals:    03/08/17 1957 03/08/17 2000 03/09/17 0400 03/09/17 0813   BP: 118/54 118/54 145/77 146/78   Pulse:  69 61 70   Temp:  36.4 °C (97.6 °F) 36.4 °C (97.6 °F) 36.8 °C (98.2 °F)   Resp:  18 20 18   Height:       Weight:       SpO2:  95% 96% 97%     Physical Exam   Constitutional: She appears well-developed and well-nourished.   HENT:   Head: Normocephalic.   Eyes: Conjunctivae are normal.   Cardiovascular: Normal rate and regular rhythm.    Pulmonary/Chest: Effort normal. No respiratory  distress. She has no wheezes.   Abdominal: Soft. Bowel sounds are normal. She exhibits no distension. There is no tenderness.   Musculoskeletal: Normal range of motion. She exhibits edema and tenderness.   Right foot wrapped, erythematous at toe with serous drainage   Lymphadenopathy:        Right: No supraclavicular adenopathy present.        Left: No supraclavicular adenopathy present.   Neurological: She is alert.   Somewhat oriented   Skin: Skin is warm and dry. There is erythema (right foot bandage).   Vitals reviewed.   Lab Results   Component Value Date/Time    WBC 8.1 03/08/2017 03:50 AM    HEMOGLOBIN 9.2* 03/08/2017 03:50 AM    HEMATOCRIT 28.5* 03/08/2017 03:50 AM    PLATELET COUNT 282 03/08/2017 03:50 AM     Lab Results   Component Value Date/Time    SODIUM 141 03/08/2017 03:50 AM    POTASSIUM 4.0 03/08/2017 03:50 AM    CHLORIDE 109 03/08/2017 03:50 AM    CO2 26 03/08/2017 03:50 AM    GLUCOSE 91 03/08/2017 03:50 AM    BUN 25* 03/08/2017 03:50 AM    CREATININE 2.21* 03/08/2017 03:50 AM      Assessment/Plan    Cellulitis of right foot  Assessment & Plan  Active Orders     Ordered     Ordering Provider       Wed Mar 1, 2017  1:56 PM    03/01/17 1356  cefUROXime (ZINACEF) 750 mg in NS 50 mL IVPB   EVERY 8 HOURS      Ivy Gutierrez M.D.      ID recs appreciated  Ortho recs appreciated  Ortho I&D'd 2/27, 3/5/2017  Some non-compliance w/ abx in past   Tunneled IJ placed  Tentatively scheduled for repeat I&D 3/9  Cont wound vac and care  Cont abx as above    Villavicencio villavicencio disease  Assessment & Plan  Psych consulted  Discussed with family  Wander guard    CKD stage 4 secondary to hypertension (CMS-HCC)  Assessment & Plan  Baseline Cr 2-3-range, monitoring  Maintaining hydration  Avoiding nephrotoxics: NSAIDs etc  nephro sign off    Anemia  Assessment & Plan  Hgb stable     Essential hypertension, benign  Assessment & Plan  SBP goal less than 140 mmHg  DBP goal less than 90 mmHg  PRN and antihypertensives to titrate  by hospitalist towards goal    Dilated cardiomyopathy (CMS-HCC)  Assessment & Plan  Medically managed    HLD (hyperlipidemia)  Assessment & Plan  Statin    Seizure disorder, grand mal (CMS-[facility])  Assessment & Plan  Dilantin       Labs reviewed and Medications reviewed        DVT Prophylaxis: Heparin    Ulcer prophylaxis: Not indicated

## 2017-03-09 NOTE — OR SURGEON
Immediate Post-Operative Note      PreOp Diagnosis: Right foot nonhealing wound    PostOp Diagnosis: Same    Procedure(s):  IRRIGATION & DEBRIDEMENT ORTHO - FOOT - Wound Class: Dirty or Infected    Surgeon(s):  Gerardo Lynn M.D.    Anesthesiologist/Type of Anesthesia:  Anesthesiologist: Jordin Felix M.D./General    Surgical Staff:  Circulator: Rin Mark R.N.  Scrub Person: Hilda Hollins    Specimen: None    Estimated Blood Loss: 10cc    Findings: No gross purulence, dorsal wound amenable to colsure    Complications: None        3/9/2017 3:47 PM Gerardo Lynn

## 2017-03-09 NOTE — PROGRESS NOTES
"Ortho PN    POD #4 s/p Right GSR, I&D Right foot w vac placement    NPO since MN for repeat I&D with DPC v vac change.  States she has been better about wearing boot.    /67 mmHg  Pulse 67  Temp(Src) 36.9 °C (98.4 °F)  Resp 16  Ht 1.753 m (5' 9\")  Wt 119.6 kg (263 lb 10.7 oz)  BMI 38.92 kg/m2  SpO2 96%  Breastfeeding? No    Results     Procedure Component Value Units Date/Time    CULTURE TISSUE W/ GRM STAIN [[id number]] Collected:  03/05/17 1307    Order Status:  Completed Specimen Information:  Bone Updated:  03/08/17 0856     Gram Stain Result --      Result:        Rare WBCs.  No organisms seen.       Significant Indicator NEG      Source BONE      Site Right Metatarsal Heads      Tissue Culture No growth at 72 hours.     ANAEROBIC CULTURE [[id number]] Collected:  03/05/17 1307    Order Status:  Completed Specimen Information:  Bone Updated:  03/08/17 0856     Significant Indicator NEG      Source BONE      Site Right Metatarsal Heads      Anaerobic Culture, Culture Res Culture in progress.     CULTURE TISSUE W/ GRM STAIN [[id number]]  (Abnormal)  (Susceptibility) Collected:  03/05/17 1302    Order Status:  Completed Specimen Information:  Tissue Updated:  03/07/17 0748     Gram Stain Result -- (A)      Result:        Few WBCs.  Moderate Gram positive cocci.       Significant Indicator POS (POS)      Source TISS      Site Right Foot Tissue      Tissue Culture -- (A)      Tissue Culture -- (A)      Result:        Staphylococcus aureus  Rare growth      Culture & Susceptibility     STAPHYLOCOCCUS AUREUS     Antibiotic Sensitivity Microscan Unit Status    Ampicillin/sulbactam Sensitive <=8/4 mcg/mL Final    Clindamycin Sensitive <=0.5 mcg/mL Final    Daptomycin Sensitive <=0.5 mcg/mL Final    Erythromycin Sensitive <=0.5 mcg/mL Final    Moxifloxacin Sensitive <=0.5 mcg/mL Final    Oxacillin Sensitive <=0.25 mcg/mL Final    Tetracycline Sensitive <=4 mcg/mL Final    Trimeth/Sulfa Sensitive <=0.5/9.5 mcg/mL " "Final    Vancomycin Sensitive 1 mcg/mL Final                       ANAEROBIC CULTURE [[id number]] Collected:  03/05/17 1302    Order Status:  Completed Specimen Information:  Tissue Updated:  03/07/17 0748     Significant Indicator NEG      Source TISS      Site Right Foot Tissue      Anaerobic Culture, Culture Res Culture in progress.     GRAM STAIN [[id number]] Collected:  03/05/17 1302    Order Status:  Completed Specimen Information:  Tissue Updated:  03/05/17 2013     Significant Indicator .      Source TISS      Site Right Foot Tissue      Gram Stain Result --      Result:        Few WBCs.  Moderate Gram positive cocci.      GRAM STAIN [[id number]] Collected:  03/05/17 1307    Order Status:  Completed Specimen Information:  Bone Updated:  03/05/17 2013     Significant Indicator .      Source BONE      Site Right Metatarsal Heads      Gram Stain Result --      Result:        Rare WBCs.  No organisms seen.      BLOOD CULTURE [[id number]] Collected:  02/26/17 2330    Order Status:  Completed Specimen Information:  Blood from Peripheral Updated:  03/04/17 0100     Significant Indicator NEG      Source BLD      Site PERIPHERAL      Blood Culture No growth after 5 days of incubation.     Narrative:      Per Hospital Policy: Only change Specimen Src: to \"Line\" if  specified by physician order.    BLOOD CULTURE [[id number]] Collected:  02/26/17 2230    Order Status:  Completed Specimen Information:  Blood from Peripheral Updated:  03/04/17 0100     Significant Indicator NEG      Source BLD      Site PERIPHERAL      Blood Culture No growth after 5 days of incubation.     Narrative:      Per Hospital Policy: Only change Specimen Src: to \"Line\" if  specified by physician order.        RLE: Vac in place,  Wiggles toes      POD #4      Right foot I&D, vac change v delayed primary closure today  NWB RLE, Boot at ALL times  Dressing changes per LPS  Abx per ID    Gerardo Lynn MD    "

## 2017-03-10 ENCOUNTER — APPOINTMENT (OUTPATIENT)
Dept: RADIOLOGY | Facility: MEDICAL CENTER | Age: 47
DRG: 853 | End: 2017-03-10
Attending: INTERNAL MEDICINE
Payer: MEDICARE

## 2017-03-10 LAB
ANION GAP SERPL CALC-SCNC: 7 MMOL/L (ref 0–11.9)
BACTERIA SPEC ANAEROBE CULT: NORMAL
BASOPHILS # BLD AUTO: 0.3 % (ref 0–1.8)
BASOPHILS # BLD: 0.03 K/UL (ref 0–0.12)
BUN SERPL-MCNC: 21 MG/DL (ref 8–22)
CALCIUM SERPL-MCNC: 8.4 MG/DL (ref 8.5–10.5)
CHLORIDE SERPL-SCNC: 109 MMOL/L (ref 96–112)
CO2 SERPL-SCNC: 26 MMOL/L (ref 20–33)
CREAT SERPL-MCNC: 2.23 MG/DL (ref 0.5–1.4)
EOSINOPHIL # BLD AUTO: 0.15 K/UL (ref 0–0.51)
EOSINOPHIL NFR BLD: 1.6 % (ref 0–6.9)
ERYTHROCYTE [DISTWIDTH] IN BLOOD BY AUTOMATED COUNT: 45.9 FL (ref 35.9–50)
GFR SERPL CREATININE-BSD FRML MDRD: 24 ML/MIN/1.73 M 2
GLUCOSE SERPL-MCNC: 90 MG/DL (ref 65–99)
HCT VFR BLD AUTO: 32.1 % (ref 37–47)
HGB BLD-MCNC: 10.3 G/DL (ref 12–16)
IMM GRANULOCYTES # BLD AUTO: 0.05 K/UL (ref 0–0.11)
IMM GRANULOCYTES NFR BLD AUTO: 0.5 % (ref 0–0.9)
LYMPHOCYTES # BLD AUTO: 2.44 K/UL (ref 1–4.8)
LYMPHOCYTES NFR BLD: 25.7 % (ref 22–41)
MCH RBC QN AUTO: 31.6 PG (ref 27–33)
MCHC RBC AUTO-ENTMCNC: 32.1 G/DL (ref 33.6–35)
MCV RBC AUTO: 98.5 FL (ref 81.4–97.8)
MONOCYTES # BLD AUTO: 0.74 K/UL (ref 0–0.85)
MONOCYTES NFR BLD AUTO: 7.8 % (ref 0–13.4)
NEUTROPHILS # BLD AUTO: 6.09 K/UL (ref 2–7.15)
NEUTROPHILS NFR BLD: 64.1 % (ref 44–72)
NRBC # BLD AUTO: 0 K/UL
NRBC BLD AUTO-RTO: 0 /100 WBC
PLATELET # BLD AUTO: 329 K/UL (ref 164–446)
PMV BLD AUTO: 9.5 FL (ref 9–12.9)
POTASSIUM SERPL-SCNC: 4.4 MMOL/L (ref 3.6–5.5)
RBC # BLD AUTO: 3.26 M/UL (ref 4.2–5.4)
SIGNIFICANT IND 70042: NORMAL
SITE SITE: NORMAL
SODIUM SERPL-SCNC: 142 MMOL/L (ref 135–145)
SOURCE SOURCE: NORMAL
WBC # BLD AUTO: 9.5 K/UL (ref 4.8–10.8)

## 2017-03-10 PROCEDURE — 700112 HCHG RX REV CODE 229: Performed by: HOSPITALIST

## 2017-03-10 PROCEDURE — A9270 NON-COVERED ITEM OR SERVICE: HCPCS | Performed by: INTERNAL MEDICINE

## 2017-03-10 PROCEDURE — 700111 HCHG RX REV CODE 636 W/ 250 OVERRIDE (IP): Performed by: INTERNAL MEDICINE

## 2017-03-10 PROCEDURE — 80048 BASIC METABOLIC PNL TOTAL CA: CPT

## 2017-03-10 PROCEDURE — 700102 HCHG RX REV CODE 250 W/ 637 OVERRIDE(OP): Performed by: HOSPITALIST

## 2017-03-10 PROCEDURE — 700102 HCHG RX REV CODE 250 W/ 637 OVERRIDE(OP): Performed by: PSYCHIATRY & NEUROLOGY

## 2017-03-10 PROCEDURE — 700102 HCHG RX REV CODE 250 W/ 637 OVERRIDE(OP)

## 2017-03-10 PROCEDURE — 700102 HCHG RX REV CODE 250 W/ 637 OVERRIDE(OP): Performed by: INTERNAL MEDICINE

## 2017-03-10 PROCEDURE — 99233 SBSQ HOSP IP/OBS HIGH 50: CPT | Performed by: INTERNAL MEDICINE

## 2017-03-10 PROCEDURE — 85025 COMPLETE CBC W/AUTO DIFF WBC: CPT

## 2017-03-10 PROCEDURE — A9270 NON-COVERED ITEM OR SERVICE: HCPCS | Performed by: HOSPITALIST

## 2017-03-10 PROCEDURE — 700111 HCHG RX REV CODE 636 W/ 250 OVERRIDE (IP): Performed by: HOSPITALIST

## 2017-03-10 PROCEDURE — 71010 DX-CHEST-PORTABLE (1 VIEW): CPT

## 2017-03-10 PROCEDURE — 700105 HCHG RX REV CODE 258: Performed by: INTERNAL MEDICINE

## 2017-03-10 PROCEDURE — A9270 NON-COVERED ITEM OR SERVICE: HCPCS

## 2017-03-10 PROCEDURE — A9270 NON-COVERED ITEM OR SERVICE: HCPCS | Performed by: PSYCHIATRY & NEUROLOGY

## 2017-03-10 PROCEDURE — 770006 HCHG ROOM/CARE - MED/SURG/GYN SEMI*

## 2017-03-10 RX ORDER — ALBUTEROL SULFATE 90 UG/1
2 AEROSOL, METERED RESPIRATORY (INHALATION)
Status: DISCONTINUED | OUTPATIENT
Start: 2017-03-10 | End: 2017-03-10

## 2017-03-10 RX ORDER — ALBUTEROL SULFATE 90 UG/1
AEROSOL, METERED RESPIRATORY (INHALATION)
Status: ACTIVE
Start: 2017-03-10 | End: 2017-03-11

## 2017-03-10 RX ORDER — ALBUTEROL SULFATE 90 UG/1
2 AEROSOL, METERED RESPIRATORY (INHALATION) EVERY 4 HOURS PRN
Status: DISCONTINUED | OUTPATIENT
Start: 2017-03-10 | End: 2017-03-23 | Stop reason: HOSPADM

## 2017-03-10 RX ADMIN — HEPARIN SODIUM 5000 UNITS: 5000 INJECTION, SOLUTION INTRAVENOUS; SUBCUTANEOUS at 14:30

## 2017-03-10 RX ADMIN — LORAZEPAM 1 MG: 1 TABLET ORAL at 11:57

## 2017-03-10 RX ADMIN — CYANOCOBALAMIN TAB 500 MCG 1000 MCG: 500 TAB at 08:35

## 2017-03-10 RX ADMIN — Medication 1300 MG: at 14:30

## 2017-03-10 RX ADMIN — CEFUROXIME SODIUM 750 MG: 7.5 INJECTION, POWDER, FOR SOLUTION INTRAVENOUS at 14:29

## 2017-03-10 RX ADMIN — AMLODIPINE BESYLATE 5 MG: 5 TABLET ORAL at 08:34

## 2017-03-10 RX ADMIN — OXCARBAZEPINE 150 MG: 150 TABLET, FILM COATED ORAL at 08:34

## 2017-03-10 RX ADMIN — ATORVASTATIN CALCIUM 40 MG: 40 TABLET, FILM COATED ORAL at 20:38

## 2017-03-10 RX ADMIN — ASPIRIN 325 MG: 325 TABLET, FILM COATED ORAL at 08:34

## 2017-03-10 RX ADMIN — LORAZEPAM 1 MG: 1 TABLET ORAL at 20:38

## 2017-03-10 RX ADMIN — Medication 1300 MG: at 08:35

## 2017-03-10 RX ADMIN — HEPARIN SODIUM 5000 UNITS: 5000 INJECTION, SOLUTION INTRAVENOUS; SUBCUTANEOUS at 05:55

## 2017-03-10 RX ADMIN — CEFUROXIME SODIUM 750 MG: 7.5 INJECTION, POWDER, FOR SOLUTION INTRAVENOUS at 06:22

## 2017-03-10 RX ADMIN — OXCARBAZEPINE 150 MG: 150 TABLET, FILM COATED ORAL at 20:47

## 2017-03-10 RX ADMIN — CARVEDILOL 25 MG: 25 TABLET, FILM COATED ORAL at 17:11

## 2017-03-10 RX ADMIN — HEPARIN SODIUM 5000 UNITS: 5000 INJECTION, SOLUTION INTRAVENOUS; SUBCUTANEOUS at 20:39

## 2017-03-10 RX ADMIN — OXYCODONE HYDROCHLORIDE 5 MG: 5 TABLET ORAL at 19:17

## 2017-03-10 RX ADMIN — ALBUTEROL SULFATE 2 PUFF: 90 AEROSOL, METERED RESPIRATORY (INHALATION) at 19:19

## 2017-03-10 RX ADMIN — PHENYTOIN SODIUM 200 MG: 100 CAPSULE, EXTENDED RELEASE ORAL at 20:47

## 2017-03-10 RX ADMIN — CARVEDILOL 25 MG: 25 TABLET, FILM COATED ORAL at 08:34

## 2017-03-10 RX ADMIN — Medication 1300 MG: at 20:47

## 2017-03-10 RX ADMIN — DOCUSATE SODIUM 100 MG: 100 CAPSULE ORAL at 20:38

## 2017-03-10 RX ADMIN — PHENYTOIN SODIUM 200 MG: 100 CAPSULE, EXTENDED RELEASE ORAL at 08:35

## 2017-03-10 RX ADMIN — FOLIC ACID 1 MG: 1 TABLET ORAL at 08:34

## 2017-03-10 ASSESSMENT — PAIN SCALES - GENERAL
PAINLEVEL_OUTOF10: 0
PAINLEVEL_OUTOF10: 6

## 2017-03-10 ASSESSMENT — ENCOUNTER SYMPTOMS
ABDOMINAL PAIN: 0
FEVER: 0
NERVOUS/ANXIOUS: 1
CONSTIPATION: 0
SHORTNESS OF BREATH: 0
VOMITING: 0
DEPRESSION: 0
COUGH: 0
NAUSEA: 0
CHILLS: 0
HEADACHES: 0
DIARRHEA: 0

## 2017-03-10 ASSESSMENT — LIFESTYLE VARIABLES
EVER_SMOKED: YES
PACK_YEARS: 30

## 2017-03-10 NOTE — PROGRESS NOTES
Aaox4, Vieira while in bed, c/o RLE pain with movement, dressing cdi, POc discussed, iv antibiotics, needs attended, kept safe.

## 2017-03-10 NOTE — CARE PLAN
Problem: Safety  Goal: Will remain free from falls  Intervention: Implement fall precautions  Call light and personal belongings within reach. Pt instructed to call for assistance, pt verbalizes understanding. Non skid socks on. Strip alarm in place. Bed in lowest position.       Problem: Venous Thromboembolism (VTW)/Deep Vein Thrombosis (DVT) Prevention:  Goal: Patient will participate in Venous Thrombosis (VTE)/Deep Vein Thrombosis (DVT)Prevention Measures  Intervention: Assess and monitor for anticoagulation complications  Patient is on heparin for dvt prophylaxis without complication.

## 2017-03-10 NOTE — PROGRESS NOTES
Post op assessment- pt was awake and oriented x4. Scale 1-10 pt stated 6 for pain. Respiratory assessment, breath sounds clear bilaterally anterior, clear diminished posterior. Cardiac assessment- RRR- pulse 2+ radial, capillary refill within 2 second, skin turgor within normal limits, capillary refill on operated foot 4 second- sensation intact per pt. Pt. States bandage is not too tight.

## 2017-03-10 NOTE — CARE PLAN
Problem: Knowledge Deficit  Goal: Knowledge of disease process/condition, treatment plan, diagnostic tests, and medications will improve  Intervention: Explain information regarding disease process/condition, treatment plan, diagnostic tests, and medications and document in education  Awaiting orders for RLE wound dressing and activity      Problem: Pain Management  Goal: Pain level will decrease to patient’s comfort goal  Intervention: Follow pain managment plan developed in collaboration with patient and Interdisciplinary Team  Prn meds per MAR

## 2017-03-10 NOTE — PROGRESS NOTES
Hospital Medicine Interval Note  Date of Service:  3/10/2017    Chief Complaint  46 y.o.-year-old female admitted 2/26/2017 with foot infection    Interval Problem Update  3/10 - s/p I&D with wound closure of right foot.  Pain controlled.    3/9 - Pt removed protective boot from foot yesterday evening.  No other acute issues or complaints.     3/8 - Pt coloring in her coloring book.  No acute issues overnight    3/7 - No acute issues or complaints.    3/6 - RIJ removed and tunneled IJ central line placed. Cultures growing staph.     3/5 - Consuted nephro janeth Schwarz to have tunneled IJ for abx. Central line placed by anesth in the meantime. DC accuchecks.     3/4 - discussed with patient, who had pulled out her IV and denied it. Advised her not to do that and not to walk on her foot, also. Questions answered.     3/3 - discussed with patient, improved mentation, calmer. Foot less painful. Discussed on IDT rounds. PICC held due to SCr.     3/2 - discussed with patient; discussed on IDT rounds. May benefit from locked unit after her abx therapy complete and wound healed; will probably need SNF.      Disposition  Clinical     Review of Systems   Constitutional: Negative for fever and chills.   Respiratory: Negative for cough and shortness of breath.    Cardiovascular: Negative for chest pain.   Gastrointestinal: Negative for nausea, vomiting, diarrhea and constipation.   Psychiatric/Behavioral: Negative for depression. The patient is nervous/anxious.       Physical Exam Laboratory/Imaging   Filed Vitals:    03/09/17 1707 03/09/17 2000 03/10/17 0400 03/10/17 0800   BP: 153/81 130/65 147/83 139/71   Pulse: 66 61 73 67   Temp: 36.3 °C (97.3 °F) 36 °C (96.8 °F) 36.1 °C (97 °F) 36.5 °C (97.7 °F)   Resp: 18 16 16 18   Height:       Weight:       SpO2: 92% 93% 93% 94%     Physical Exam   Constitutional: She appears well-developed and well-nourished.   HENT:   Head: Normocephalic.   Eyes: Conjunctivae are normal.    Cardiovascular: Normal rate and regular rhythm.    Pulmonary/Chest: Effort normal. No respiratory distress. She has no wheezes.   Abdominal: Soft. Bowel sounds are normal. She exhibits no distension. There is no tenderness.   Musculoskeletal: Normal range of motion.   Right foot wrapped, erythematous at toe    Lymphadenopathy:        Right: No supraclavicular adenopathy present.        Left: No supraclavicular adenopathy present.   Neurological: She is alert.   Somewhat oriented   Skin: Skin is warm and dry. Erythema: right foot bandage.   Vitals reviewed.   Lab Results   Component Value Date/Time    WBC 9.5 03/10/2017 02:55 AM    HEMOGLOBIN 10.3* 03/10/2017 02:55 AM    HEMATOCRIT 32.1* 03/10/2017 02:55 AM    PLATELET COUNT 329 03/10/2017 02:55 AM     Lab Results   Component Value Date/Time    SODIUM 142 03/10/2017 02:55 AM    POTASSIUM 4.4 03/10/2017 02:55 AM    CHLORIDE 109 03/10/2017 02:55 AM    CO2 26 03/10/2017 02:55 AM    GLUCOSE 90 03/10/2017 02:55 AM    BUN 21 03/10/2017 02:55 AM    CREATININE 2.23* 03/10/2017 02:55 AM      Assessment/Plan    Cellulitis of right foot  Assessment & Plan  Active Orders     Ordered     Ordering Provider       Wed Mar 1, 2017  1:56 PM    03/01/17 1356  cefUROXime (ZINACEF) 750 mg in NS 50 mL IVPB   EVERY 8 HOURS      Ivy Gutierrez M.D.      ID recs appreciated  Ortho recs appreciated  Ortho I&D'd 2/27, 3/5/2017, and 3/9/17 with wound closure  Some non-compliance w/ abx in past   Tunneled IJ placed  Cont wound care and abx as above    Villavicencio villavicencio disease  Assessment & Plan  Psych consulted  Discussed with family  Wander guard    CKD stage 4 secondary to hypertension (CMS-HCC)  Assessment & Plan  Baseline Cr 2-3-range, monitoring  Cont to encourage PO hydration  Avoiding nephrotoxics: NSAIDs etc  nephro sign off    Anemia  Assessment & Plan  Hgb stable     Essential hypertension, benign  Assessment & Plan  SBP goal less than 140 mmHg  DBP goal less than 90 mmHg  PRN and  antihypertensives to titrate by hospitalist towards goal    Dilated cardiomyopathy (CMS-[facility])  Assessment & Plan  Medically managed    HLD (hyperlipidemia)  Assessment & Plan  Statin    Seizure disorder, grand mal (CMS-HCC)  Assessment & Plan  Dilantin       Labs reviewed and Medications reviewed        DVT Prophylaxis: Heparin    Ulcer prophylaxis: Not indicated

## 2017-03-10 NOTE — PROGRESS NOTES
"Infectious Disease Progress Note    Author: Ivy Gutierrez M.D.    DOS & Time created: 3/10/2017  11:17 AM    Chief Complaint   Patient presents with   • Wound Infection     pt has wound on right foot, went to [facility], report cellutliis with drainage on wound     Interval History:  46-year-old white female with history of morbid obesity,  Moyamoya syndrome with prior stroke, who presents with a traumatic injury to her right foot with subsequent development of cellulitis and abscess.  S/p repeat I&D with 2nd and 3rd metatarsal head excisions and GSR on 3/5.     AF WBC 13.3 s/p drainage abscess-has been refusing IV  3/1- willing to take iv. No fevers. C/o pain. Apparently walking on the foot  3/2- says she is feeling better. No fevers.  3/3- in good spirits. No fevers.   3/4 AF WBC 9.9 foot worse-denies pain  3/5 AF no CBC NPO for surgery. No new complaints  3/6 AF, WBC 8.6.  Minimal pain to Right foot, complains that \"boot is heavy.\"  3/7 AF WBC 7.3 feeling better today-coloring books Denies SE abx  3/8 AF WBC 8.1 ambulates to BR-pain controlled. Still coloring  3/10- no fevers. Had another I&D 3/9  Labs Reviewed, Medications Reviewed, Radiology Reviewed and Wound Reviewed.    Review of Systems:  Review of Systems   Constitutional: Negative for fever and chills.   Cardiovascular: Negative for chest pain.   Gastrointestinal: Negative for nausea, vomiting, abdominal pain and diarrhea.   Musculoskeletal: Positive for joint pain.        Foot pain- improving   Skin: Negative for rash.   Neurological: Negative for headaches.   All other systems reviewed and are negative.      Hemodynamics:  Temp (24hrs), Av.5 °C (97.7 °F), Min:36 °C (96.8 °F), Max:37.2 °C (99 °F)  Temperature: 36.5 °C (97.7 °F)  Pulse  Av.4  Min: 52  Max: 89Heart Rate (Monitored): 66  Blood Pressure: 139/71 mmHg, NIBP: 149/88 mmHg       Physical Exam:  Physical Exam   Constitutional: She is oriented to person, place, and time. She appears " well-developed. No distress.   Obese   HENT:   Head: Normocephalic and atraumatic.   Eyes: EOM are normal. Pupils are equal, round, and reactive to light.   Neck: Neck supple.   Right SCV PICC- tender to touch, minimal erythema and bruising   Cardiovascular: Normal rate and regular rhythm.    No murmur heard.  Pulmonary/Chest: Effort normal and breath sounds normal. No respiratory distress. She has no wheezes. She has no rales.   Abdominal: Soft. Bowel sounds are normal. She exhibits no distension. There is no tenderness.   Musculoskeletal: She exhibits tenderness.   Right foot- DM walking boot, dressed   Neurological: She is alert and oriented to person, place, and time.   Skin: Skin is warm and dry. No rash noted.   Psychiatric:   Poor insight   Nursing note and vitals reviewed.      Labs:  Recent Labs      03/08/17   0350  03/10/17   0255   WBC  8.1  9.5   RBC  2.90*  3.26*   HEMOGLOBIN  9.2*  10.3*   HEMATOCRIT  28.5*  32.1*   MCV  98.3*  98.5*   MCH  31.7  31.6   RDW  45.9  45.9   PLATELETCT  282  329   MPV  9.5  9.5   NEUTSPOLYS  54.30  64.10   LYMPHOCYTES  32.50  25.70   MONOCYTES  9.40  7.80   EOSINOPHILS  2.70  1.60   BASOPHILS  0.20  0.30     Recent Labs      03/08/17   0350  03/10/17   0255   SODIUM  141  142   POTASSIUM  4.0  4.4   CHLORIDE  109  109   CO2  26  26   GLUCOSE  91  90   BUN  25*  21     Recent Labs      03/08/17   0350  03/10/17   0255   CREATININE  2.21*  2.23*        Imaging:       CT-FOOT W/O PLUS RECONS RIGHT (Final result) Result time: 02/27/17 00:30:10     Final result by Gee Tam M.D. (02/27/17 00:30:10)     Impression:     1.  Soft tissue swelling with gas in the distal plantar aspect of the foot at the level of the MTP joints with draining wound to the plantar surface of the skin.  2.  Minimal gas in the extensor aspect of the foot at the level of the MTP joints which may represent extension of cellulitis anteriorly.  3.  No loculated fluid collection identified.  4.   Diffuse soft tissue swelling in the remainder of the foot.  5.  No acute bony abnormality. No evidence of osteomyelitis. No radiopaque soft tissue foreign body identified     MR-FOOT-W/O RIGHT (Final result) Result time: 02/28/17 11:19:53     Final result by Aureliano Flores M.D. (02/28/17 11:19:53)     Impression:     Forefoot bilobed abscess with apparent plantar draining sinus. Largest loculation is in the dorsal soft tissues as discussed above  No unenhanced MR evidence of osteomyelitis  Metatarsophalangeal joint effusions most likely are reactive given lack of bony destruction/marginal erosive change     Final result by Rad Intf (02/27/17 11:53:22)     Narrative:     TransthoracicEcho Report  CONCLUSIONS  No prior study is available for comparison.   Normal left ventricular size, thickness, systolic function, and   diastolic function.  Left ventricular ejection fraction is visually estimated to be 65%.  Normal right ventricular size and systolic function.  Trace tricuspid regurgitation.  Normal pericardium without effusion.     Medications:  Current Facility-Administered Medications   Medication Dose Frequency Provider Last Rate Last Dose   • NS (BOLUS) infusion 500 mL  500 mL PRN Slick Motnes M.D.       • cefUROXime (ZINACEF) 750 mg in NS 50 mL IVPB  750 mg Q8HR Ivy Gutierrez M.D.   Stopped at 03/10/17 0652   • haloperidol lactate (HALDOL) injection 5 mg  5 mg Q4HRS PRN Nabeel Monahan M.D.   5 mg at 03/02/17 0156   • oxcarbazepine (TRILEPTAL) tablet 150 mg  150 mg BID Lynn Carpenter M.D.   150 mg at 03/10/17 0834   • diphenoxylate-atropine (LOMOTIL) 2.5-0.025 MG per tablet 1 Tab  1 Tab 4X/DAY PRN Shazia Valle M.D.   1 Tab at 03/01/17 1125   • heparin injection 5,000 Units  5,000 Units Q8HRS Augustus Guerin M.D.   5,000 Units at 03/10/17 0555   • lorazepam (ATIVAN) tablet 1 mg  1 mg TID Augustus Guerin M.D.   Stopped at 03/10/17 0330   • sodium bicarbonate (SODIUM BICARBONATE) tablet 1,300 mg   1,300 mg TID Evelia Yepez M.D.   1,300 mg at 03/10/17 0835   • oxycodone immediate-release (ROXICODONE) tablet 5 mg  5 mg Q6HRS PRN Evelia Yepez M.D.   5 mg at 03/08/17 1956   • amlodipine (NORVASC) tablet 5 mg  5 mg DAILY Chalino Bender M.D.   5 mg at 03/10/17 0834   • aspirin (ASA) tablet 325 mg  325 mg DAILY Chalino Bender M.D.   325 mg at 03/10/17 0834   • atorvastatin (LIPITOR) tablet 40 mg  40 mg Nightly Chalino Bender M.D.   40 mg at 03/09/17 2242   • carvedilol (COREG) tablet 25 mg  25 mg BID WITH MEALS Chalino Bender M.D.   25 mg at 03/10/17 0834   • cyanocobalamin (VITAMIN B-12) tablet 1,000 mcg  1,000 mcg DAILY Chalino Bender M.D.   1,000 mcg at 03/10/17 0835   • folic acid (FOLVITE) tablet 1 mg  1 mg DAILY Chalino Bender M.D.   1 mg at 03/10/17 0834   • phenytoin ER (DILANTIN) capsule 200 mg  200 mg BID Chalino Bender M.D.   200 mg at 03/10/17 0835   • docusate sodium (COLACE) capsule 100 mg  100 mg BID Chalino Bender M.D.   100 mg at 03/09/17 2242   • bisacodyl (DULCOLAX) suppository 10 mg  10 mg Q24HRS PRN Chalino Bender M.D.       • fleet enema 133 mL  1 Each Once PRN Chalino Bender M.D.       • Respiratory Care per Protocol   Continuous RT Chalino Bender M.D.       • ondansetron (ZOFRAN) syringe/vial injection 4 mg  4 mg Q4HRS PRN Chalino Bender M.D.       • ondansetron (ZOFRAN ODT) dispertab 4 mg  4 mg Q4HRS PRN Chalino Bender M.D.       • promethazine (PHENERGAN) tablet 12.5-25 mg  12.5-25 mg Q4HRS PRN Chalino Bender M.D.       • promethazine (PHENERGAN) suppository 12.5-25 mg  12.5-25 mg Q4HRS PRSEDA Bender M.D.       • prochlorperazine (COMPAZINE) injection 5-10 mg  5-10 mg Q4HRS PRN Chalino Bender M.D.       • acetaminophen (TYLENOL) tablet 650 mg  650 mg Q6HRS PRN Chalino Bender M.D.   650 mg at 03/08/17 2223   • NS (BOLUS) infusion 500 mL  500 mL Once PRN Chalino Bender M.D.         Last reviewed on  3/9/2017  3:38 PM by Rin Mark R.N.    Micro:  Results     Procedure Component Value Units Date/Time    CULTURE TISSUE W/ GRM STAIN [[id number]]  (Abnormal)  (Susceptibility) Collected:  03/05/17 1302    Order Status:  Completed Specimen Information:  Tissue Updated:  03/09/17 1612     Significant Indicator POS (POS)      Source TISS      Site Right Foot Tissue      Tissue Culture -- (A)      Gram Stain Result -- (A)      Result:        Few WBCs.  Moderate Gram positive cocci.       Tissue Culture -- (A)      Result:        Staphylococcus aureus  Rare growth      Culture & Susceptibility     STAPHYLOCOCCUS AUREUS     Antibiotic Sensitivity Microscan Unit Status    Ampicillin/sulbactam Sensitive <=8/4 mcg/mL Final    Clindamycin Sensitive <=0.5 mcg/mL Final    Daptomycin Sensitive <=0.5 mcg/mL Final    Erythromycin Sensitive <=0.5 mcg/mL Final    Moxifloxacin Sensitive <=0.5 mcg/mL Final    Oxacillin Sensitive <=0.25 mcg/mL Final    Tetracycline Sensitive <=4 mcg/mL Final    Trimeth/Sulfa Sensitive <=0.5/9.5 mcg/mL Final    Vancomycin Sensitive 1 mcg/mL Final                       ANAEROBIC CULTURE [[id number]] Collected:  03/05/17 1302    Order Status:  Completed Specimen Information:  Tissue Updated:  03/09/17 1612     Significant Indicator NEG      Source TISS      Site Right Foot Tissue      Anaerobic Culture, Culture Res No Anaerobes isolated.     CULTURE TISSUE W/ GRM STAIN [[id number]] Collected:  03/05/17 1307    Order Status:  Completed Specimen Information:  Bone Updated:  03/08/17 0856     Gram Stain Result --      Result:        Rare WBCs.  No organisms seen.       Significant Indicator NEG      Source BONE      Site Right Metatarsal Heads      Tissue Culture No growth at 72 hours.     ANAEROBIC CULTURE [[id number]] Collected:  03/05/17 1307    Order Status:  Completed Specimen Information:  Bone Updated:  03/08/17 0856     Significant Indicator NEG      Source BONE      Site Right Metatarsal  "Heads      Anaerobic Culture, Culture Res Culture in progress.     GRAM STAIN [[id number]] Collected:  03/05/17 1302    Order Status:  Completed Specimen Information:  Tissue Updated:  03/05/17 2013     Significant Indicator .      Source TISS      Site Right Foot Tissue      Gram Stain Result --      Result:        Few WBCs.  Moderate Gram positive cocci.      GRAM STAIN [[id number]] Collected:  03/05/17 1307    Order Status:  Completed Specimen Information:  Bone Updated:  03/05/17 2013     Significant Indicator .      Source BONE      Site Right Metatarsal Heads      Gram Stain Result --      Result:        Rare WBCs.  No organisms seen.      BLOOD CULTURE [[id number]] Collected:  02/26/17 2330    Order Status:  Completed Specimen Information:  Blood from Peripheral Updated:  03/04/17 0100     Significant Indicator NEG      Source BLD      Site PERIPHERAL      Blood Culture No growth after 5 days of incubation.     Narrative:      Per Hospital Policy: Only change Specimen Src: to \"Line\" if  specified by physician order.    BLOOD CULTURE [[id number]] Collected:  02/26/17 2230    Order Status:  Completed Specimen Information:  Blood from Peripheral Updated:  03/04/17 0100     Significant Indicator NEG      Source BLD      Site PERIPHERAL      Blood Culture No growth after 5 days of incubation.     Narrative:      Per Hospital Policy: Only change Specimen Src: to \"Line\" if  specified by physician order.          Assessment:  Active Hospital Problems    Diagnosis   • Acute on chronic renal failure (CMS-HCC) [N17.9, N18.9]   • Metabolic acidosis [E87.2]   • Severe sepsis (CMS-HCC) [A41.9, R65.20]   • Thrombocytopenia (CMS-HCC) [D69.6]   • 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]   • Dementia [F03.90]   • Cellulitis of right foot [L03.115]   • CKD stage 4 secondary to hypertension (CMS-HCC) [I12.9, N18.4]   • Seizure disorder, grand mal (CMS-HCC) [G40.409]   • Dilated cardiomyopathy (CMS-HCC) [I42.0]   Right foot abscess, suspected " OM    Plan:  Right foot abscess, multiloculated with sepsis  S/p drainage 2/27, emergent  Fever, resolved  Leukocytosis- resolved   C/s are MSSA,E coli, strep viridans, group C strep  Due to worsening s/p repeat I&D with 2nd and 3rd metatarsal head excisions and GSR on 3/5 by Dr. Lynn.    OR cx sent of tissue and bone- negative to date Tissue +MSSA  3/9 went to OR  For  repeat I&D with [place] change vs delayed primary closure.  Vascular studies normal  Continue IV Zinacef-endpoint 6 weeks from last surgery if limb salvageable  Continue wound care    Chronic renal failure- 2.23  Avoiding nephrotoxic drugs  Adjusting abx as needed for renal function    Moyamoya syndrome with prior stroke/dementia  Noncompliant with abx  Barrier to care    Remains at risk for limb loss  snf placement

## 2017-03-10 NOTE — OP REPORT
DATE OF SERVICE:  03/09/2017    PREOPERATIVE DIAGNOSIS:  Right nonhealing foot wound.    POSTOPERATIVE DIAGNOSIS:  Right nonhealing foot wound.    PROCEDURES PERFORMED:  1.  Irrigation and debridement skin, subcutaneous tissue to bone.  2.  Complex closure of surgical wound dehiscence, right foot.    SURGEON:  Gerardo Lynn MD     ASSISTANT:  Radha Concepcion.    ANESTHESIA:  General with peripheral nerve block requested by me for   postoperative pain control.    SPECIMENS:  None.    ESTIMATED BLOOD LOSS:  10 mL.     FINDINGS:  Closeable dorsal incision, no gross purulence, wound continues to   track to second and third metatarsophalangeal joints, plantar ulceration   approximately 2x2 cm, also tracking to second and third metatarsophalangeal   joints.     COMPLICATIONS:  None.     OUTCOME:  To PACU in stable condition.    HISTORY OF PRESENT ILLNESS:  This is a 46-year-old female with a history of   nonhealing ulceration of her right foot.  She was initially washed out by my   partner then taken for revision washout 4 days ago by myself where we left the   wound open over a wound VAC.  We also performed second and third metatarsal   head excisions and gastroc recession.  The patient has been marginally   cooperative and wearing her boot since that time.  She is indicated today for   repeat I and D and closure versus VAC change.  She was agreed in the   preoperative holding area and identified by name and medical record number.    The right lower extremity was marked.  Risks of the procedure including   bleeding, infection, pain, need for more surgery, neurovascular damage, and   amputation were discussed and she provided a written consent.    DESCRIPTION OF PROCEDURE:  She was taken to operating room and placed on table   in supine position.  General anesthesia was induced.  The wound VAC was   removed and granulation tissue was found underlying.  There was no gross   purulence.  There was some necrotic fibrinous tissue  present particularly in the   plantar wound and also in the dorsal.  The leg was prepped with a Betadine   prep and paint and dressed in sterile fashion.  The plantar and dorsal   incisions were washed out with 3 liters of sterile normal saline on cysto   tubing using a combination of curette, forceps and scalpel to excise necrotic   tissue.  There was fibrinous tissue plantar that was removed.  Skin edges were   sharply freshened.  The dorsal incision was closed with large bites 2-0   Vicryl suture in horizontal mattress fashion.  The tissue was somewhat   macerated, but did approximate well.  The plantar wound was dressed with an   Aquacel dressing, gauze, and a compressive dressing were placed.  The patient   was awakened and extubated in stable condition.    POSTOPERATIVE COURSE:  She will remain under the care of the hospitalist   service, followed by infectious disease and limb preservation.  Daily dressing   changes per limb preservation service.  She will be heel weightbearing in her   offloading Cam walker boot at all times.       ____________________________________     MD RHIANNA ORELLANA / NEETA    DD:  03/09/2017 15:54:22  DT:  03/09/2017 16:45:59    D#:  [id number]  Job#:  [id number]

## 2017-03-11 LAB
ANION GAP SERPL CALC-SCNC: 9 MMOL/L (ref 0–11.9)
BUN SERPL-MCNC: 22 MG/DL (ref 8–22)
CALCIUM SERPL-MCNC: 8.6 MG/DL (ref 8.5–10.5)
CHLORIDE SERPL-SCNC: 108 MMOL/L (ref 96–112)
CO2 SERPL-SCNC: 23 MMOL/L (ref 20–33)
CREAT SERPL-MCNC: 2.04 MG/DL (ref 0.5–1.4)
GFR SERPL CREATININE-BSD FRML MDRD: 26 ML/MIN/1.73 M 2
GLUCOSE SERPL-MCNC: 90 MG/DL (ref 65–99)
POTASSIUM SERPL-SCNC: 4.1 MMOL/L (ref 3.6–5.5)
SODIUM SERPL-SCNC: 140 MMOL/L (ref 135–145)

## 2017-03-11 PROCEDURE — 80048 BASIC METABOLIC PNL TOTAL CA: CPT

## 2017-03-11 PROCEDURE — 700102 HCHG RX REV CODE 250 W/ 637 OVERRIDE(OP): Performed by: PSYCHIATRY & NEUROLOGY

## 2017-03-11 PROCEDURE — A9270 NON-COVERED ITEM OR SERVICE: HCPCS | Performed by: HOSPITALIST

## 2017-03-11 PROCEDURE — 700111 HCHG RX REV CODE 636 W/ 250 OVERRIDE (IP): Performed by: HOSPITALIST

## 2017-03-11 PROCEDURE — 700102 HCHG RX REV CODE 250 W/ 637 OVERRIDE(OP): Performed by: INTERNAL MEDICINE

## 2017-03-11 PROCEDURE — 700111 HCHG RX REV CODE 636 W/ 250 OVERRIDE (IP): Performed by: INTERNAL MEDICINE

## 2017-03-11 PROCEDURE — A9270 NON-COVERED ITEM OR SERVICE: HCPCS | Performed by: PSYCHIATRY & NEUROLOGY

## 2017-03-11 PROCEDURE — 700105 HCHG RX REV CODE 258: Performed by: INTERNAL MEDICINE

## 2017-03-11 PROCEDURE — 99233 SBSQ HOSP IP/OBS HIGH 50: CPT | Performed by: INTERNAL MEDICINE

## 2017-03-11 PROCEDURE — 700102 HCHG RX REV CODE 250 W/ 637 OVERRIDE(OP): Performed by: HOSPITALIST

## 2017-03-11 PROCEDURE — A9270 NON-COVERED ITEM OR SERVICE: HCPCS | Performed by: INTERNAL MEDICINE

## 2017-03-11 PROCEDURE — 770006 HCHG ROOM/CARE - MED/SURG/GYN SEMI*

## 2017-03-11 RX ADMIN — ASPIRIN 325 MG: 325 TABLET, FILM COATED ORAL at 07:59

## 2017-03-11 RX ADMIN — CARVEDILOL 25 MG: 25 TABLET, FILM COATED ORAL at 07:59

## 2017-03-11 RX ADMIN — Medication 1300 MG: at 20:35

## 2017-03-11 RX ADMIN — FOLIC ACID 1 MG: 1 TABLET ORAL at 07:59

## 2017-03-11 RX ADMIN — CEFUROXIME SODIUM 750 MG: 7.5 INJECTION, POWDER, FOR SOLUTION INTRAVENOUS at 13:47

## 2017-03-11 RX ADMIN — CEFUROXIME SODIUM 750 MG: 7.5 INJECTION, POWDER, FOR SOLUTION INTRAVENOUS at 21:56

## 2017-03-11 RX ADMIN — HEPARIN SODIUM 5000 UNITS: 5000 INJECTION, SOLUTION INTRAVENOUS; SUBCUTANEOUS at 20:35

## 2017-03-11 RX ADMIN — OXYCODONE HYDROCHLORIDE 5 MG: 5 TABLET ORAL at 13:39

## 2017-03-11 RX ADMIN — AMLODIPINE BESYLATE 5 MG: 5 TABLET ORAL at 07:59

## 2017-03-11 RX ADMIN — PHENYTOIN SODIUM 200 MG: 100 CAPSULE, EXTENDED RELEASE ORAL at 07:59

## 2017-03-11 RX ADMIN — OXCARBAZEPINE 150 MG: 150 TABLET, FILM COATED ORAL at 20:38

## 2017-03-11 RX ADMIN — HEPARIN SODIUM 5000 UNITS: 5000 INJECTION, SOLUTION INTRAVENOUS; SUBCUTANEOUS at 06:06

## 2017-03-11 RX ADMIN — HEPARIN SODIUM 5000 UNITS: 5000 INJECTION, SOLUTION INTRAVENOUS; SUBCUTANEOUS at 13:39

## 2017-03-11 RX ADMIN — ATORVASTATIN CALCIUM 40 MG: 40 TABLET, FILM COATED ORAL at 20:35

## 2017-03-11 RX ADMIN — LORAZEPAM 1 MG: 1 TABLET ORAL at 20:35

## 2017-03-11 RX ADMIN — OXCARBAZEPINE 150 MG: 150 TABLET, FILM COATED ORAL at 07:59

## 2017-03-11 RX ADMIN — CYANOCOBALAMIN TAB 500 MCG 1000 MCG: 500 TAB at 07:59

## 2017-03-11 RX ADMIN — CARVEDILOL 25 MG: 25 TABLET, FILM COATED ORAL at 16:51

## 2017-03-11 RX ADMIN — CEFUROXIME SODIUM 750 MG: 7.5 INJECTION, POWDER, FOR SOLUTION INTRAVENOUS at 00:09

## 2017-03-11 RX ADMIN — CEFUROXIME SODIUM 750 MG: 7.5 INJECTION, POWDER, FOR SOLUTION INTRAVENOUS at 06:06

## 2017-03-11 RX ADMIN — LORAZEPAM 1 MG: 1 TABLET ORAL at 10:14

## 2017-03-11 RX ADMIN — Medication 1300 MG: at 16:51

## 2017-03-11 RX ADMIN — Medication 1300 MG: at 08:50

## 2017-03-11 RX ADMIN — PHENYTOIN SODIUM 200 MG: 100 CAPSULE, EXTENDED RELEASE ORAL at 20:38

## 2017-03-11 ASSESSMENT — PAIN SCALES - GENERAL
PAINLEVEL_OUTOF10: 4
PAINLEVEL_OUTOF10: 0
PAINLEVEL_OUTOF10: 4
PAINLEVEL_OUTOF10: 0
PAINLEVEL_OUTOF10: 3

## 2017-03-11 ASSESSMENT — ENCOUNTER SYMPTOMS
SHORTNESS OF BREATH: 0
VOMITING: 0
COUGH: 0
CHILLS: 0
NAUSEA: 0
DIARRHEA: 0
FEVER: 0
DEPRESSION: 0
NERVOUS/ANXIOUS: 1
CONSTIPATION: 0

## 2017-03-11 NOTE — PROGRESS NOTES
Hospital Medicine Interval Note  Date of Service:  3/11/2017    Chief Complaint  46 y.o.-year-old female admitted 2/26/2017 with foot infection    Interval Problem Update  Pt resting comfortably.  No acute issues or complaints overnight.      Disposition  Clinical     Review of Systems   Constitutional: Negative for fever and chills.   Respiratory: Negative for cough and shortness of breath.    Cardiovascular: Negative for chest pain.   Gastrointestinal: Negative for nausea, vomiting, diarrhea and constipation.   Psychiatric/Behavioral: Negative for depression. The patient is nervous/anxious.       Physical Exam Laboratory/Imaging   Filed Vitals:    03/10/17 1600 03/10/17 2000 03/11/17 0400 03/11/17 0800   BP: 137/77 133/71 143/71 175/88   Pulse: 70 66 63 62   Temp: 36.5 °C (97.7 °F) 36.8 °C (98.2 °F) 36.8 °C (98.2 °F) 36.8 °C (98.2 °F)   Resp: 18 16 16 16   Height:       Weight:   119.9 kg (264 lb 5.3 oz)    SpO2: 95% 96% 96% 96%     Physical Exam   Constitutional: She appears well-developed and well-nourished.   HENT:   Head: Normocephalic.   Eyes: Conjunctivae are normal.   Cardiovascular: Normal rate and regular rhythm.    Pulmonary/Chest: Effort normal. No respiratory distress. She has no wheezes.   Abdominal: Soft. Bowel sounds are normal. She exhibits no distension. There is no tenderness.   Musculoskeletal: Normal range of motion.   Right foot wrapped and protective boot on   Lymphadenopathy:        Right: No supraclavicular adenopathy present.        Left: No supraclavicular adenopathy present.   Neurological: She is alert.   Skin: Skin is warm and dry. Erythema: right foot bandage.   Vitals reviewed.   Lab Results   Component Value Date/Time    WBC 9.5 03/10/2017 02:55 AM    HEMOGLOBIN 10.3* 03/10/2017 02:55 AM    HEMATOCRIT 32.1* 03/10/2017 02:55 AM    PLATELET COUNT 329 03/10/2017 02:55 AM     Lab Results   Component Value Date/Time    SODIUM 140 03/11/2017 03:00 AM    POTASSIUM 4.1 03/11/2017 03:00 AM     CHLORIDE 108 03/11/2017 03:00 AM    CO2 23 03/11/2017 03:00 AM    GLUCOSE 90 03/11/2017 03:00 AM    BUN 22 03/11/2017 03:00 AM    CREATININE 2.04* 03/11/2017 03:00 AM      Assessment/Plan    Cellulitis of right foot  Assessment & Plan  Active Orders     Ordered     Ordering Provider       Wed Mar 1, 2017  1:56 PM    03/01/17 1356  cefUROXime (ZINACEF) 750 mg in NS 50 mL IVPB   EVERY 8 HOURS      Ivy Gutierrez M.D.      ID recs appreciated  Ortho recs appreciated  Ortho I&D'd 2/27, 3/5/2017, and 3/9/17 with wound closure  LPS on board; recs appreciated and following  Some non-compliance w/ abx in past   Tunneled IJ placed  Cont wound care and abx as above    Villavicencio villavicencio disease  Assessment & Plan  Psych consulted  Discussed with family  Wander guard    CKD stage 4 secondary to hypertension (CMS-HCC)  Assessment & Plan  Baseline Cr 2-3-range, monitoring  Cont to encourage PO hydration  Avoiding nephrotoxics: NSAIDs etc  nephro sign off    Anemia  Assessment & Plan  Hgb stable     Essential hypertension, benign  Assessment & Plan  SBP goal less than 140 mmHg  DBP goal less than 90 mmHg  PRN and antihypertensives to titrate by hospitalist towards goal    Dilated cardiomyopathy (CMS-HCC)  Assessment & Plan  Medically managed    HLD (hyperlipidemia)  Assessment & Plan  Statin    Seizure disorder, grand mal (CMS-HCC)  Assessment & Plan  Dilantin       Labs reviewed and Medications reviewed        DVT Prophylaxis: Heparin    Ulcer prophylaxis: Not indicated

## 2017-03-11 NOTE — PROGRESS NOTES
"LIMB PRESERVATION SERVICE       47 y/o female with moyamoya syndrome with CVA, HTN, CKD. Admitted for right foot infection after stepping on rock. ID and Ortho involved. S/p I & D of R foot on 2/27. Non diabetic.      s/p I & D R dorsal foot with VAC application, R 2nd and 3rd MTH excision, R GSR by Dr. Lynn on 3/5  S/p  I & D R foot with closure by Dr. Lynn on 3/9    /88 mmHg  Pulse 62  Temp(Src) 36.8 °C (98.2 °F)  Resp 16  Ht 1.753 m (5' 9\")  Wt 119.9 kg (264 lb 5.3 oz)  BMI 39.02 kg/m2  SpO2 96%  Breastfeeding? No  Surgical drsg loose, falling off  Pain controlled      Per RN, not wearing boot this am, boot was replaced. Upon assessment for dressing change boot was off     R dorsal incision: approximated with sutures, scant bleeding when dressing was removed. Cleaned with NS, applied adaptic, followed by dry gauze.    R forefoot plantar ulcer: 2nd and 3rd MTH previously excised. Wound tissue 50% slough, 50% red tissue. Measures 1.5x1.5x0.8cm. Wound cleansed with NS, applied honey alginate to autolytically debride slough.    R GSR incision: with sutures well approximated. Cleaned with NS, applied adhesive foam.   Secured entire foot with kerlix, ace wrap.Boot replaced following dressing change.      Explained importance of wearing offloading boot. Pt states she will wear the boot all of the time.      PLAN  dressing orders placed for R foot and calf incision for nursing   Walking boot to RLE continuously d/t calf incision  Heel touch WB RLE in boot  IV abx per ID, plan for 6 wks      D/C plan: SNF referrals. Will need to f/u 3 wks at [facility] 3/31 for post op check         D/w: pt, RN, Dr. Gutierrez  "

## 2017-03-11 NOTE — PROGRESS NOTES
Pt a+ox4, c/o RLE pain with movement- boot remains on- dsg CDI, limb preservation to change today per report. Pt verbalizes understanding of POC, cont on iv abx. Bed alarm on .   -family @ bedside and aware of POC.

## 2017-03-11 NOTE — CARE PLAN
Problem: Safety  Goal: Will remain free from injury  Intervention: Provide assistance with mobility  Bed alarm on , call light within reach.

## 2017-03-11 NOTE — PROGRESS NOTES
C/o wheezing and SOB as verbalized, O2 sat in RA 95%, IS barely 1000,  MD made aware, chest xray ordered, RT protocol in place.

## 2017-03-11 NOTE — CARE PLAN
Problem: Safety  Goal: Will remain free from falls  Intervention: Implement fall precautions  Call light and personal belongings within reach. Pt instructed to call for assistance, pt verbalizes understanding. Non skid socks on. Strip alarm in place. Bed in lowest position.       Problem: Venous Thromboembolism (VTW)/Deep Vein Thrombosis (DVT) Prevention:  Goal: Patient will participate in Venous Thrombosis (VTE)/Deep Vein Thrombosis (DVT)Prevention Measures  Intervention: Assess and monitor for anticoagulation complications  Patient is on heparin for dvt prophylaxis without complication.      Problem: Pain Management  Goal: Pain level will decrease to patient’s comfort goal  Intervention: Follow pain managment plan developed in collaboration with patient and Interdisciplinary Team  Patient c/o pain. Medications given with good results. Pre and post pain assessment documented.

## 2017-03-12 PROCEDURE — 700102 HCHG RX REV CODE 250 W/ 637 OVERRIDE(OP): Performed by: PSYCHIATRY & NEUROLOGY

## 2017-03-12 PROCEDURE — 700102 HCHG RX REV CODE 250 W/ 637 OVERRIDE(OP): Performed by: HOSPITALIST

## 2017-03-12 PROCEDURE — 700105 HCHG RX REV CODE 258

## 2017-03-12 PROCEDURE — 700105 HCHG RX REV CODE 258: Performed by: INTERNAL MEDICINE

## 2017-03-12 PROCEDURE — A9270 NON-COVERED ITEM OR SERVICE: HCPCS | Performed by: INTERNAL MEDICINE

## 2017-03-12 PROCEDURE — 700111 HCHG RX REV CODE 636 W/ 250 OVERRIDE (IP): Performed by: INTERNAL MEDICINE

## 2017-03-12 PROCEDURE — 99233 SBSQ HOSP IP/OBS HIGH 50: CPT | Performed by: INTERNAL MEDICINE

## 2017-03-12 PROCEDURE — 770006 HCHG ROOM/CARE - MED/SURG/GYN SEMI*

## 2017-03-12 PROCEDURE — 700111 HCHG RX REV CODE 636 W/ 250 OVERRIDE (IP): Performed by: HOSPITALIST

## 2017-03-12 PROCEDURE — A9270 NON-COVERED ITEM OR SERVICE: HCPCS | Performed by: HOSPITALIST

## 2017-03-12 PROCEDURE — A9270 NON-COVERED ITEM OR SERVICE: HCPCS | Performed by: PSYCHIATRY & NEUROLOGY

## 2017-03-12 PROCEDURE — 700102 HCHG RX REV CODE 250 W/ 637 OVERRIDE(OP): Performed by: INTERNAL MEDICINE

## 2017-03-12 RX ORDER — SODIUM CHLORIDE 9 MG/ML
INJECTION, SOLUTION INTRAVENOUS
Status: COMPLETED
Start: 2017-03-12 | End: 2017-03-12

## 2017-03-12 RX ADMIN — Medication 1300 MG: at 14:45

## 2017-03-12 RX ADMIN — OXYCODONE HYDROCHLORIDE 5 MG: 5 TABLET ORAL at 08:11

## 2017-03-12 RX ADMIN — LORAZEPAM 1 MG: 1 TABLET ORAL at 04:12

## 2017-03-12 RX ADMIN — CARVEDILOL 25 MG: 25 TABLET, FILM COATED ORAL at 17:30

## 2017-03-12 RX ADMIN — ASPIRIN 325 MG: 325 TABLET, FILM COATED ORAL at 08:01

## 2017-03-12 RX ADMIN — ALBUTEROL SULFATE 2 PUFF: 90 AEROSOL, METERED RESPIRATORY (INHALATION) at 14:58

## 2017-03-12 RX ADMIN — LORAZEPAM 1 MG: 1 TABLET ORAL at 20:20

## 2017-03-12 RX ADMIN — HEPARIN SODIUM 5000 UNITS: 5000 INJECTION, SOLUTION INTRAVENOUS; SUBCUTANEOUS at 20:19

## 2017-03-12 RX ADMIN — OXYCODONE HYDROCHLORIDE 5 MG: 5 TABLET ORAL at 21:04

## 2017-03-12 RX ADMIN — ACETAMINOPHEN 650 MG: 325 TABLET, FILM COATED ORAL at 10:56

## 2017-03-12 RX ADMIN — OXCARBAZEPINE 150 MG: 150 TABLET, FILM COATED ORAL at 20:18

## 2017-03-12 RX ADMIN — CEFUROXIME SODIUM 750 MG: 7.5 INJECTION, POWDER, FOR SOLUTION INTRAVENOUS at 21:05

## 2017-03-12 RX ADMIN — CEFUROXIME SODIUM 750 MG: 7.5 INJECTION, POWDER, FOR SOLUTION INTRAVENOUS at 14:45

## 2017-03-12 RX ADMIN — OXCARBAZEPINE 150 MG: 150 TABLET, FILM COATED ORAL at 08:01

## 2017-03-12 RX ADMIN — FOLIC ACID 1 MG: 1 TABLET ORAL at 08:02

## 2017-03-12 RX ADMIN — CYANOCOBALAMIN TAB 500 MCG 1000 MCG: 500 TAB at 08:01

## 2017-03-12 RX ADMIN — LORAZEPAM 1 MG: 1 TABLET ORAL at 11:09

## 2017-03-12 RX ADMIN — SODIUM CHLORIDE 500 ML: 9 INJECTION, SOLUTION INTRAVENOUS at 20:34

## 2017-03-12 RX ADMIN — Medication 1300 MG: at 08:01

## 2017-03-12 RX ADMIN — PHENYTOIN SODIUM 200 MG: 100 CAPSULE, EXTENDED RELEASE ORAL at 20:19

## 2017-03-12 RX ADMIN — Medication 1300 MG: at 20:19

## 2017-03-12 RX ADMIN — OXYCODONE HYDROCHLORIDE 5 MG: 5 TABLET ORAL at 14:44

## 2017-03-12 RX ADMIN — CARVEDILOL 25 MG: 25 TABLET, FILM COATED ORAL at 08:02

## 2017-03-12 RX ADMIN — HEPARIN SODIUM 5000 UNITS: 5000 INJECTION, SOLUTION INTRAVENOUS; SUBCUTANEOUS at 05:31

## 2017-03-12 RX ADMIN — ATORVASTATIN CALCIUM 40 MG: 40 TABLET, FILM COATED ORAL at 20:19

## 2017-03-12 RX ADMIN — PHENYTOIN SODIUM 200 MG: 100 CAPSULE, EXTENDED RELEASE ORAL at 08:02

## 2017-03-12 RX ADMIN — HEPARIN SODIUM 5000 UNITS: 5000 INJECTION, SOLUTION INTRAVENOUS; SUBCUTANEOUS at 14:45

## 2017-03-12 RX ADMIN — CEFUROXIME SODIUM 750 MG: 7.5 INJECTION, POWDER, FOR SOLUTION INTRAVENOUS at 05:32

## 2017-03-12 RX ADMIN — AMLODIPINE BESYLATE 5 MG: 5 TABLET ORAL at 08:01

## 2017-03-12 ASSESSMENT — ENCOUNTER SYMPTOMS
CONSTIPATION: 0
NAUSEA: 0
VOMITING: 0
HEADACHES: 0
SHORTNESS OF BREATH: 0
DEPRESSION: 0
NERVOUS/ANXIOUS: 1
DIARRHEA: 0
FEVER: 0
CHILLS: 0
ABDOMINAL PAIN: 0
COUGH: 0

## 2017-03-12 ASSESSMENT — PAIN SCALES - GENERAL
PAINLEVEL_OUTOF10: 0
PAINLEVEL_OUTOF10: 0
PAINLEVEL_OUTOF10: 6
PAINLEVEL_OUTOF10: 4
PAINLEVEL_OUTOF10: 0

## 2017-03-12 NOTE — PROGRESS NOTES
"Infectious Disease Progress Note    Author: Ivy Gutierrez M.D.    DOS & Time created: 3/12/2017  4:45 PM    Chief Complaint   Patient presents with   • Wound Infection     pt has wound on right foot, went to [facility], report cellutliis with drainage on wound     Interval History:  46-year-old white female with history of morbid obesity,  Moyamoya syndrome with prior stroke, who presents with a traumatic injury to her right foot with subsequent development of cellulitis and abscess.  S/p repeat I&D with 2nd and 3rd metatarsal head excisions and GSR on 3/5.     AF WBC 13.3 s/p drainage abscess-has been refusing IV  3/1- willing to take iv. No fevers. C/o pain. Apparently walking on the foot  3/2- says she is feeling better. No fevers.  3/3- in good spirits. No fevers.   3/4 AF WBC 9.9 foot worse-denies pain  3/5 AF no CBC NPO for surgery. No new complaints  3/6 AF, WBC 8.6.  Minimal pain to Right foot, complains that \"boot is heavy.\"  3/7 AF WBC 7.3 feeling better today-coloring books Denies SE abx  3/8 AF WBC 8.1 ambulates to BR-pain controlled. Still coloring  3/10- no fevers. Had another I&D 3/9  3/12- no fevers. Sitting in bed coloring.  Labs Reviewed, Medications Reviewed, Radiology Reviewed and Wound Reviewed.    Review of Systems:  Review of Systems   Constitutional: Negative for fever and chills.   Cardiovascular: Negative for chest pain.   Gastrointestinal: Negative for nausea, vomiting, abdominal pain and diarrhea.   Musculoskeletal: Positive for joint pain.        Foot pain- improving   Skin: Negative for rash.   Neurological: Negative for headaches.   All other systems reviewed and are negative.      Hemodynamics:  Temp (24hrs), Av.5 °C (97.7 °F), Min:36.3 °C (97.3 °F), Max:36.9 °C (98.4 °F)  Temperature: 36.3 °C (97.3 °F)  Pulse  Av.6  Min: 52  Max: 89   Blood Pressure: 146/75 mmHg       Physical Exam:  Physical Exam   Constitutional: She is oriented to person, place, and time. She appears " well-developed. No distress.   Obese   HENT:   Head: Normocephalic and atraumatic.   Eyes: EOM are normal. Pupils are equal, round, and reactive to light.   Neck: Neck supple.   Right SCV PICC- tender to touch, minimal erythema and bruising   Cardiovascular: Normal rate and regular rhythm.    No murmur heard.  Pulmonary/Chest: Effort normal and breath sounds normal. No respiratory distress. She has no wheezes. She has no rales.   Abdominal: Soft. Bowel sounds are normal. She exhibits no distension. There is no tenderness.   Musculoskeletal: She exhibits tenderness.   Right foot- DM walking boot, dressed   Neurological: She is alert and oriented to person, place, and time.   Skin: Skin is warm and dry. No rash noted.   Psychiatric:   Poor insight   Nursing note and vitals reviewed.      Labs:  Recent Labs      03/10/17   0255   WBC  9.5   RBC  3.26*   HEMOGLOBIN  10.3*   HEMATOCRIT  32.1*   MCV  98.5*   MCH  31.6   RDW  45.9   PLATELETCT  329   MPV  9.5   NEUTSPOLYS  64.10   LYMPHOCYTES  25.70   MONOCYTES  7.80   EOSINOPHILS  1.60   BASOPHILS  0.30     Recent Labs      03/10/17   0255  03/11/17   0300   SODIUM  142  140   POTASSIUM  4.4  4.1   CHLORIDE  109  108   CO2  26  23   GLUCOSE  90  90   BUN  21  22     Recent Labs      03/10/17   0255  03/11/17   0300   CREATININE  2.23*  2.04*        Imaging:       CT-FOOT W/O PLUS RECONS RIGHT (Final result) Result time: 02/27/17 00:30:10     Final result by Gee Tam M.D. (02/27/17 00:30:10)     Impression:     1.  Soft tissue swelling with gas in the distal plantar aspect of the foot at the level of the MTP joints with draining wound to the plantar surface of the skin.  2.  Minimal gas in the extensor aspect of the foot at the level of the MTP joints which may represent extension of cellulitis anteriorly.  3.  No loculated fluid collection identified.  4.  Diffuse soft tissue swelling in the remainder of the foot.  5.  No acute bony abnormality. No evidence of  osteomyelitis. No radiopaque soft tissue foreign body identified     MR-FOOT-W/O RIGHT (Final result) Result time: 02/28/17 11:19:53     Final result by Aureliano Flores M.D. (02/28/17 11:19:53)     Impression:     Forefoot bilobed abscess with apparent plantar draining sinus. Largest loculation is in the dorsal soft tissues as discussed above  No unenhanced MR evidence of osteomyelitis  Metatarsophalangeal joint effusions most likely are reactive given lack of bony destruction/marginal erosive change     Final result by Rad Intf (02/27/17 11:53:22)     Narrative:     TransthoracicEcho Report  CONCLUSIONS  No prior study is available for comparison.   Normal left ventricular size, thickness, systolic function, and   diastolic function.  Left ventricular ejection fraction is visually estimated to be 65%.  Normal right ventricular size and systolic function.  Trace tricuspid regurgitation.  Normal pericardium without effusion.     Medications:  Current Facility-Administered Medications   Medication Dose Frequency Provider Last Rate Last Dose   • albuterol inhaler 2 Puff  2 Puff Q4HRS PRN Chalino Bender M.D.   2 Puff at 03/12/17 1458   • NS (BOLUS) infusion 500 mL  500 mL PRN Slick Montes M.D.       • cefUROXime (ZINACEF) 750 mg in NS 50 mL IVPB  750 mg Q8HR Ivy Gutierrez M.D.   Stopped at 03/12/17 1515   • haloperidol lactate (HALDOL) injection 5 mg  5 mg Q4HRS PRN Nabeel Monahan M.D.   5 mg at 03/02/17 0156   • oxcarbazepine (TRILEPTAL) tablet 150 mg  150 mg BID Lynn Carpenter M.D.   150 mg at 03/12/17 0801   • diphenoxylate-atropine (LOMOTIL) 2.5-0.025 MG per tablet 1 Tab  1 Tab 4X/DAY PRN Shazia Valle M.D.   1 Tab at 03/01/17 1125   • heparin injection 5,000 Units  5,000 Units Q8HRS Augustus Guerin M.D.   5,000 Units at 03/12/17 1445   • lorazepam (ATIVAN) tablet 1 mg  1 mg TID Augustus Guerin M.D.   1 mg at 03/12/17 1109   • sodium bicarbonate (SODIUM BICARBONATE) tablet 1,300 mg  1,300 mg  TID Evelia Yepez M.D.   1,300 mg at 03/12/17 1445   • oxycodone immediate-release (ROXICODONE) tablet 5 mg  5 mg Q6HRS PRN Evelia Yepez M.D.   5 mg at 03/12/17 1444   • amlodipine (NORVASC) tablet 5 mg  5 mg DAILY Chalino Bender M.D.   5 mg at 03/12/17 0801   • aspirin (ASA) tablet 325 mg  325 mg DAILY Chalino Bender M.D.   325 mg at 03/12/17 0801   • atorvastatin (LIPITOR) tablet 40 mg  40 mg Nightly Chalino Bender M.D.   40 mg at 03/11/17 2035   • carvedilol (COREG) tablet 25 mg  25 mg BID WITH MEALS Chalino Bender M.D.   25 mg at 03/12/17 0802   • cyanocobalamin (VITAMIN B-12) tablet 1,000 mcg  1,000 mcg DAILY Chalino Bender M.D.   1,000 mcg at 03/12/17 0801   • folic acid (FOLVITE) tablet 1 mg  1 mg DAILY Chalino Bender M.D.   1 mg at 03/12/17 0802   • phenytoin ER (DILANTIN) capsule 200 mg  200 mg BID Chalino Bender M.D.   200 mg at 03/12/17 0802   • docusate sodium (COLACE) capsule 100 mg  100 mg BID Chalino Bender M.D.   100 mg at 03/10/17 2038   • bisacodyl (DULCOLAX) suppository 10 mg  10 mg Q24HRS PRN Chalino Bender M.D.       • fleet enema 133 mL  1 Each Once PRN Chalino Bender M.D.       • Respiratory Care per Protocol   Continuous RT Chalino Bender M.D.       • ondansetron (ZOFRAN) syringe/vial injection 4 mg  4 mg Q4HRS PRN Chalino Bender M.D.       • ondansetron (ZOFRAN ODT) dispertab 4 mg  4 mg Q4HRS PRN Chalino Bender M.D.       • promethazine (PHENERGAN) tablet 12.5-25 mg  12.5-25 mg Q4HRS PRN Chalino Bender M.D.       • promethazine (PHENERGAN) suppository 12.5-25 mg  12.5-25 mg Q4HRS PRN Chalino Bender M.D.       • prochlorperazine (COMPAZINE) injection 5-10 mg  5-10 mg Q4HRS PRN Chalino Bender M.D.       • acetaminophen (TYLENOL) tablet 650 mg  650 mg Q6HRS PRN Chalino Bender M.D.   650 mg at 03/12/17 1056   • NS (BOLUS) infusion 500 mL  500 mL Once PRN Chalino Bender M.D.         Last reviewed on 3/9/2017   3:38 PM by Rin Mark R.N.    Micro:  Results     Procedure Component Value Units Date/Time    CULTURE TISSUE W/ GRM STAIN [[id number]] Collected:  03/05/17 1307    Order Status:  Completed Specimen Information:  Bone Updated:  03/10/17 1544     Gram Stain Result --      Result:        Rare WBCs.  No organisms seen.       Significant Indicator NEG      Source BONE      Site Right Metatarsal Heads      Tissue Culture No growth at 72 hours.     ANAEROBIC CULTURE [[id number]] Collected:  03/05/17 1307    Order Status:  Completed Specimen Information:  Bone Updated:  03/10/17 1544     Significant Indicator NEG      Source BONE      Site Right Metatarsal Heads      Anaerobic Culture, Culture Res No Anaerobes isolated.     CULTURE TISSUE W/ GRM STAIN [[id number]]  (Abnormal)  (Susceptibility) Collected:  03/05/17 1302    Order Status:  Completed Specimen Information:  Tissue Updated:  03/09/17 1612     Significant Indicator POS (POS)      Source TISS      Site Right Foot Tissue      Tissue Culture -- (A)      Gram Stain Result -- (A)      Result:        Few WBCs.  Moderate Gram positive cocci.       Tissue Culture -- (A)      Result:        Staphylococcus aureus  Rare growth      Culture & Susceptibility     STAPHYLOCOCCUS AUREUS     Antibiotic Sensitivity Microscan Unit Status    Ampicillin/sulbactam Sensitive <=8/4 mcg/mL Final    Clindamycin Sensitive <=0.5 mcg/mL Final    Daptomycin Sensitive <=0.5 mcg/mL Final    Erythromycin Sensitive <=0.5 mcg/mL Final    Moxifloxacin Sensitive <=0.5 mcg/mL Final    Oxacillin Sensitive <=0.25 mcg/mL Final    Tetracycline Sensitive <=4 mcg/mL Final    Trimeth/Sulfa Sensitive <=0.5/9.5 mcg/mL Final    Vancomycin Sensitive 1 mcg/mL Final                       ANAEROBIC CULTURE [[id number]] Collected:  03/05/17 1302    Order Status:  Completed Specimen Information:  Tissue Updated:  03/09/17 1612     Significant Indicator NEG      Source TISS      Site Right Foot Tissue       Anaerobic Culture, Culture Res No Anaerobes isolated.     GRAM STAIN [[id number]] Collected:  03/05/17 1302    Order Status:  Completed Specimen Information:  Tissue Updated:  03/05/17 2013     Significant Indicator .      Source TISS      Site Right Foot Tissue      Gram Stain Result --      Result:        Few WBCs.  Moderate Gram positive cocci.      GRAM STAIN [[id number]] Collected:  03/05/17 1307    Order Status:  Completed Specimen Information:  Bone Updated:  03/05/17 2013     Significant Indicator .      Source BONE      Site Right Metatarsal Heads      Gram Stain Result --      Result:        Rare WBCs.  No organisms seen.            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 Lynn.    OR cx sent of tissue and bone- negative to date Tissue +MSSA  3/9 went to OR  For  repeat I&D with [place] change vs delayed primary closure.  Vascular studies normal  Continue IV Zinacef-endpoint 6 weeks from last surgery if limb salvageable  Continue wound care    Chronic renal failure- 2.23  Avoiding nephrotoxic drugs  Adjusting abx as needed for renal function    Moyamoya syndrome with prior stroke/dementia  Noncompliant with abx  Barrier to care    Remains at risk for limb loss  snf  placement

## 2017-03-12 NOTE — CARE PLAN
Problem: Safety  Goal: Will remain free from injury  Bed alarm on, safety teaching reinforced

## 2017-03-13 PROCEDURE — 770006 HCHG ROOM/CARE - MED/SURG/GYN SEMI*

## 2017-03-13 PROCEDURE — 700111 HCHG RX REV CODE 636 W/ 250 OVERRIDE (IP): Performed by: HOSPITALIST

## 2017-03-13 PROCEDURE — 700105 HCHG RX REV CODE 258: Performed by: INTERNAL MEDICINE

## 2017-03-13 PROCEDURE — A9270 NON-COVERED ITEM OR SERVICE: HCPCS | Performed by: INTERNAL MEDICINE

## 2017-03-13 PROCEDURE — 700102 HCHG RX REV CODE 250 W/ 637 OVERRIDE(OP): Performed by: HOSPITALIST

## 2017-03-13 PROCEDURE — 99232 SBSQ HOSP IP/OBS MODERATE 35: CPT | Performed by: INTERNAL MEDICINE

## 2017-03-13 PROCEDURE — A9270 NON-COVERED ITEM OR SERVICE: HCPCS | Performed by: HOSPITALIST

## 2017-03-13 PROCEDURE — 700102 HCHG RX REV CODE 250 W/ 637 OVERRIDE(OP): Performed by: PSYCHIATRY & NEUROLOGY

## 2017-03-13 PROCEDURE — 700111 HCHG RX REV CODE 636 W/ 250 OVERRIDE (IP): Performed by: INTERNAL MEDICINE

## 2017-03-13 PROCEDURE — 700102 HCHG RX REV CODE 250 W/ 637 OVERRIDE(OP): Performed by: INTERNAL MEDICINE

## 2017-03-13 PROCEDURE — A9270 NON-COVERED ITEM OR SERVICE: HCPCS | Performed by: PSYCHIATRY & NEUROLOGY

## 2017-03-13 RX ADMIN — ATORVASTATIN CALCIUM 40 MG: 40 TABLET, FILM COATED ORAL at 20:43

## 2017-03-13 RX ADMIN — OXCARBAZEPINE 150 MG: 150 TABLET, FILM COATED ORAL at 09:00

## 2017-03-13 RX ADMIN — ASPIRIN 325 MG: 325 TABLET, FILM COATED ORAL at 08:01

## 2017-03-13 RX ADMIN — Medication 1300 MG: at 08:02

## 2017-03-13 RX ADMIN — CEFUROXIME SODIUM 750 MG: 7.5 INJECTION, POWDER, FOR SOLUTION INTRAVENOUS at 14:08

## 2017-03-13 RX ADMIN — HEPARIN SODIUM 5000 UNITS: 5000 INJECTION, SOLUTION INTRAVENOUS; SUBCUTANEOUS at 14:08

## 2017-03-13 RX ADMIN — CARVEDILOL 25 MG: 25 TABLET, FILM COATED ORAL at 08:02

## 2017-03-13 RX ADMIN — HEPARIN SODIUM 5000 UNITS: 5000 INJECTION, SOLUTION INTRAVENOUS; SUBCUTANEOUS at 06:08

## 2017-03-13 RX ADMIN — OXYCODONE HYDROCHLORIDE 5 MG: 5 TABLET ORAL at 14:27

## 2017-03-13 RX ADMIN — OXYCODONE HYDROCHLORIDE 5 MG: 5 TABLET ORAL at 20:43

## 2017-03-13 RX ADMIN — CEFUROXIME SODIUM 750 MG: 7.5 INJECTION, POWDER, FOR SOLUTION INTRAVENOUS at 06:08

## 2017-03-13 RX ADMIN — LORAZEPAM 1 MG: 1 TABLET ORAL at 20:41

## 2017-03-13 RX ADMIN — FOLIC ACID 1 MG: 1 TABLET ORAL at 08:02

## 2017-03-13 RX ADMIN — Medication 1300 MG: at 20:43

## 2017-03-13 RX ADMIN — AMLODIPINE BESYLATE 5 MG: 5 TABLET ORAL at 08:02

## 2017-03-13 RX ADMIN — OXYCODONE HYDROCHLORIDE 5 MG: 5 TABLET ORAL at 06:08

## 2017-03-13 RX ADMIN — PHENYTOIN SODIUM 200 MG: 100 CAPSULE, EXTENDED RELEASE ORAL at 20:44

## 2017-03-13 RX ADMIN — PHENYTOIN SODIUM 200 MG: 100 CAPSULE, EXTENDED RELEASE ORAL at 09:00

## 2017-03-13 RX ADMIN — LORAZEPAM 1 MG: 1 TABLET ORAL at 11:30

## 2017-03-13 RX ADMIN — CEFUROXIME SODIUM 750 MG: 7.5 INJECTION, POWDER, FOR SOLUTION INTRAVENOUS at 23:00

## 2017-03-13 RX ADMIN — CARVEDILOL 25 MG: 25 TABLET, FILM COATED ORAL at 17:30

## 2017-03-13 RX ADMIN — CYANOCOBALAMIN TAB 500 MCG 1000 MCG: 500 TAB at 08:02

## 2017-03-13 RX ADMIN — OXCARBAZEPINE 150 MG: 150 TABLET, FILM COATED ORAL at 20:44

## 2017-03-13 RX ADMIN — Medication 1300 MG: at 14:08

## 2017-03-13 RX ADMIN — HEPARIN SODIUM 5000 UNITS: 5000 INJECTION, SOLUTION INTRAVENOUS; SUBCUTANEOUS at 20:44

## 2017-03-13 ASSESSMENT — ENCOUNTER SYMPTOMS
NAUSEA: 0
COUGH: 0
DEPRESSION: 0
CONSTIPATION: 0
FEVER: 0
VOMITING: 0
DIARRHEA: 0
CHILLS: 0
HEADACHES: 0
ABDOMINAL PAIN: 0
SHORTNESS OF BREATH: 0

## 2017-03-13 ASSESSMENT — PAIN SCALES - GENERAL
PAINLEVEL_OUTOF10: 9
PAINLEVEL_OUTOF10: 3
PAINLEVEL_OUTOF10: 3
PAINLEVEL_OUTOF10: 0

## 2017-03-13 NOTE — PROGRESS NOTES
AOx4, ambulatory with sba, no N/V, +flatus, normoactive bs, voiding, -BM, reporting R foot pain that is 6/10, medicated per MAR. Dressings changed during previous shift, CDI. Patient and RN discussed plan of care, all questions answered. Labs noted, assessment complete, patient tolerating reg diet. Call light in place, personal belongings available, bed alarm on, patient educated on importance of calling for assistance, hourly rounding in place. No additional needs at this time. VSS

## 2017-03-13 NOTE — CARE PLAN
Problem: Safety  Goal: Will remain free from falls  Outcome: PROGRESSING AS EXPECTED  Bed alarm in place and in working order, bed in lowest locked position, call light within reach, non slip socks in place when OOB, RN extension provided. Verbalizes understanding to call for assistance prior to independent attempts at mobility. Hourly rounding in effect.     Problem: Venous Thromboembolism (VTW)/Deep Vein Thrombosis (DVT) Prevention:  Goal: Patient will participate in Venous Thrombosis (VTE)/Deep Vein Thrombosis (DVT)Prevention Measures  Outcome: PROGRESSING AS EXPECTED  Receiving Heparin for dvt ppx.     Problem: Pain Management  Goal: Pain level will decrease to patient’s comfort goal  Outcome: PROGRESSING AS EXPECTED  Medicated w/ 5mg Oxy for pain

## 2017-03-13 NOTE — PROGRESS NOTES
Hospital Medicine Interval Note  Date of Service:  3/13/2017    Chief Complaint  46 y.o.-year-old female admitted 2/26/2017 with foot infection    Interval Problem Update  Pt resting comfortably.  No acute issues or complaints.  Denies any palin from right foot.  Offloading boot on.     Disposition  Clinical     Review of Systems   Constitutional: Negative for fever and chills.   Respiratory: Negative for cough and shortness of breath.    Cardiovascular: Negative for chest pain.   Gastrointestinal: Negative for nausea, vomiting, diarrhea and constipation.   Psychiatric/Behavioral: Negative for depression.      Physical Exam Laboratory/Imaging   Filed Vitals:    03/12/17 1600 03/12/17 2000 03/13/17 0400 03/13/17 0740   BP: 142/79 121/59 143/75 153/82   Pulse: 63 62 66 62   Temp: 37.2 °C (99 °F) 37.6 °C (99.6 °F) 36.9 °C (98.4 °F) 36.8 °C (98.2 °F)   Resp: 16 18 18 18   Height:       Weight:       SpO2: 90% 94% 95% 95%     Physical Exam   Constitutional: She appears well-developed and well-nourished.   HENT:   Head: Normocephalic.   Eyes: Conjunctivae are normal.   Cardiovascular: Normal rate and regular rhythm.    Pulmonary/Chest: Effort normal. No respiratory distress. She has no wheezes.   Abdominal: Soft. Bowel sounds are normal. She exhibits no distension. There is no tenderness.   Musculoskeletal: Normal range of motion.   Right foot wrapped and protective boot on   Lymphadenopathy:        Right: No supraclavicular adenopathy present.        Left: No supraclavicular adenopathy present.   Neurological: She is alert.   Skin: Skin is warm and dry. Erythema: right foot bandage.   Vitals reviewed.   Lab Results   Component Value Date/Time    WBC 9.5 03/10/2017 02:55 AM    HEMOGLOBIN 10.3* 03/10/2017 02:55 AM    HEMATOCRIT 32.1* 03/10/2017 02:55 AM    PLATELET COUNT 329 03/10/2017 02:55 AM     Lab Results   Component Value Date/Time    SODIUM 140 03/11/2017 03:00 AM    POTASSIUM 4.1 03/11/2017 03:00 AM    CHLORIDE 108  03/11/2017 03:00 AM    CO2 23 03/11/2017 03:00 AM    GLUCOSE 90 03/11/2017 03:00 AM    BUN 22 03/11/2017 03:00 AM    CREATININE 2.04* 03/11/2017 03:00 AM      Assessment/Plan    Cellulitis of right foot  Assessment & Plan  Active Orders     Ordered     Ordering Provider       Wed Mar 1, 2017  1:56 PM    03/01/17 1356  cefUROXime (ZINACEF) 750 mg in NS 50 mL IVPB   EVERY 8 HOURS      Ivy Gutierrez M.D.      ID recs appreciated  Ortho recs appreciated  Ortho I&D'd 2/27, 3/5/2017, and 3/9/17 with wound closure  LPS on board; recs appreciated and following  Some non-compliance w/ abx in past   Tunneled IJ placed  Cont wound care and abx as above  IV abx for 6 weeks from 3/9 - 3/20    Villavicencio villavicencio disease  Assessment & Plan  Psych consulted  Discussed with family  Wander guard    CKD stage 4 secondary to hypertension (CMS-HCC)  Assessment & Plan  Baseline Cr 2-3-range, monitoring  Cont to encourage PO hydration  Avoiding nephrotoxics: NSAIDs etc  nephro signed off    Anemia  Assessment & Plan  Hgb stable     Essential hypertension, benign  Assessment & Plan  SBP goal less than 140 mmHg  DBP goal less than 90 mmHg  PRN and antihypertensives to titrate by hospitalist towards goal    Dilated cardiomyopathy (CMS-HCC)  Assessment & Plan  Medically managed    HLD (hyperlipidemia)  Assessment & Plan  Statin    Seizure disorder, grand mal (CMS-HCC)  Assessment & Plan  Dilantin       Labs reviewed and Medications reviewed        DVT Prophylaxis: Heparin    Ulcer prophylaxis: Not indicated

## 2017-03-13 NOTE — PROGRESS NOTES
"Infectious Disease Progress Note    Author: Ivy Gutierrez M.D.    DOS & Time created: 3/13/2017  4:14 PM    Chief Complaint   Patient presents with   • Wound Infection     pt has wound on right foot, [facility], report cellutliis with drainage on wound     Interval History:  46-year-old white female with history of morbid obesity,  Moyamoya syndrome with prior stroke, who presents with a traumatic injury to her right foot with subsequent development of cellulitis and abscess.  S/p repeat I&D with 2nd and 3rd metatarsal head excisions and GSR on 3/5.     AF WBC 13.3 s/p drainage abscess-has been refusing IV  3/1- willing to take iv. No fevers. C/o pain. Apparently walking on the foot  3/2- says she is feeling better. No fevers.  3/3- in good spirits. No fevers.   3/4 AF WBC 9.9 foot worse-denies pain  3/5 AF no CBC NPO for surgery. No new complaints  3/6 AF, WBC 8.6.  Minimal pain to Right foot, complains that \"boot is heavy.\"  3/7 AF WBC 7.3 feeling better today-coloring books Denies SE abx  3/8 AF WBC 8.1 ambulates to BR-pain controlled. Still coloring  3/10- no fevers. Had another I&D 3/9  3/12- no fevers. Sitting in bed coloring.  3/13- no fevers. No new issues.  Labs Reviewed, Medications Reviewed, Radiology Reviewed and Wound Reviewed.    Review of Systems:  Review of Systems   Constitutional: Negative for fever and chills.   Cardiovascular: Negative for chest pain.   Gastrointestinal: Negative for nausea, vomiting, abdominal pain and diarrhea.   Musculoskeletal: Positive for joint pain.        Foot pain- improving   Skin: Negative for rash.   Neurological: Negative for headaches.   All other systems reviewed and are negative.      Hemodynamics:  Temp (24hrs), Av.1 °C (98.7 °F), Min:36.8 °C (98.2 °F), Max:37.6 °C (99.6 °F)  Temperature: 36.8 °C (98.2 °F)  Pulse  Av.5  Min: 52  Max: 89   Blood Pressure: 153/82 mmHg       Physical Exam:  Physical Exam   Constitutional: She is oriented to " person, place, and time. She appears well-developed. No distress.   Obese   HENT:   Head: Normocephalic and atraumatic.   Eyes: EOM are normal. Pupils are equal, round, and reactive to light.   Neck: Neck supple.   Right SCV PICC- tender to touch, minimal erythema and bruising   Cardiovascular: Normal rate and regular rhythm.    No murmur heard.  Pulmonary/Chest: Effort normal and breath sounds normal. No respiratory distress. She has no wheezes. She has no rales.   Abdominal: Soft. Bowel sounds are normal. She exhibits no distension. There is no tenderness.   Musculoskeletal: She exhibits tenderness.   Right foot- DM walking boot, dressed   Neurological: She is alert and oriented to person, place, and time.   Skin: Skin is warm and dry. No rash noted.   Psychiatric:   Poor insight   Nursing note and vitals reviewed.      Labs:  No results for input(s): WBC, RBC, HEMOGLOBIN, HEMATOCRIT, MCV, MCH, RDW, PLATELETCT, MPV, NEUTSPOLYS, LYMPHOCYTES, MONOCYTES, EOSINOPHILS, BASOPHILS, RBCMORPHOLO in the last 72 hours.  Recent Labs      03/11/17   0300   SODIUM  140   POTASSIUM  4.1   CHLORIDE  108   CO2  23   GLUCOSE  90   BUN  22     Recent Labs      03/11/17   0300   CREATININE  2.04*        Imaging:       CT-FOOT W/O PLUS RECONS RIGHT (Final result) Result time: 02/27/17 00:30:10     Final result by Gee Tam M.D. (02/27/17 00:30:10)     Impression:     1.  Soft tissue swelling with gas in the distal plantar aspect of the foot at the level of the MTP joints with draining wound to the plantar surface of the skin.  2.  Minimal gas in the extensor aspect of the foot at the level of the MTP joints which may represent extension of cellulitis anteriorly.  3.  No loculated fluid collection identified.  4.  Diffuse soft tissue swelling in the remainder of the foot.  5.  No acute bony abnormality. No evidence of osteomyelitis. No radiopaque soft tissue foreign body identified     MR-FOOT-W/O RIGHT (Final result) Result  time: 02/28/17 11:19:53     Final result by Aureliano Flores M.D. (02/28/17 11:19:53)     Impression:     Forefoot bilobed abscess with apparent plantar draining sinus. Largest loculation is in the dorsal soft tissues as discussed above  No unenhanced MR evidence of osteomyelitis  Metatarsophalangeal joint effusions most likely are reactive given lack of bony destruction/marginal erosive change     Final result by Rad Intf (02/27/17 11:53:22)     Narrative:     TransthoracicEcho Report  CONCLUSIONS  No prior study is available for comparison.   Normal left ventricular size, thickness, systolic function, and   diastolic function.  Left ventricular ejection fraction is visually estimated to be 65%.  Normal right ventricular size and systolic function.  Trace tricuspid regurgitation.  Normal pericardium without effusion.     Medications:  Current Facility-Administered Medications   Medication Dose Frequency Provider Last Rate Last Dose   • albuterol inhaler 2 Puff  2 Puff Q4HRS PRN Chalino Bender M.D.   2 Puff at 03/12/17 1458   • NS (BOLUS) infusion 500 mL  500 mL PRN Slick Montes M.D.       • cefUROXime (ZINACEF) 750 mg in NS 50 mL IVPB  750 mg Q8HR Ivy Gutierrez M.D.   Stopped at 03/13/17 1438   • haloperidol lactate (HALDOL) injection 5 mg  5 mg Q4HRS PRN Nabeel Monahan M.D.   5 mg at 03/02/17 0156   • oxcarbazepine (TRILEPTAL) tablet 150 mg  150 mg BID Lynn Carpenter M.D.   150 mg at 03/13/17 0900   • diphenoxylate-atropine (LOMOTIL) 2.5-0.025 MG per tablet 1 Tab  1 Tab 4X/DAY PRN Shazia Valle M.D.   1 Tab at 03/01/17 1125   • heparin injection 5,000 Units  5,000 Units Q8HRS Augustus Guerin M.D.   5,000 Units at 03/13/17 1408   • lorazepam (ATIVAN) tablet 1 mg  1 mg TID Augustus Guerin M.D.   1 mg at 03/13/17 1130   • sodium bicarbonate (SODIUM BICARBONATE) tablet 1,300 mg  1,300 mg TID Evelia Yepez M.D.   1,300 mg at 03/13/17 1408   • oxycodone immediate-release (ROXICODONE) tablet  5 mg  5 mg Q6HRS PRN Evelia Yepez M.D.   5 mg at 03/13/17 1427   • amlodipine (NORVASC) tablet 5 mg  5 mg DAILY Chalino Bender M.D.   5 mg at 03/13/17 0802   • aspirin (ASA) tablet 325 mg  325 mg DAILY Chalino Bender M.D.   325 mg at 03/13/17 0801   • atorvastatin (LIPITOR) tablet 40 mg  40 mg Nightly Chalino Bender M.D.   40 mg at 03/12/17 2019   • carvedilol (COREG) tablet 25 mg  25 mg BID WITH MEALS Chalino Bender M.D.   25 mg at 03/13/17 0802   • cyanocobalamin (VITAMIN B-12) tablet 1,000 mcg  1,000 mcg DAILY Chalino Bender M.D.   1,000 mcg at 03/13/17 0802   • folic acid (FOLVITE) tablet 1 mg  1 mg DAILY Chalino Bender M.D.   1 mg at 03/13/17 0802   • phenytoin ER (DILANTIN) capsule 200 mg  200 mg BID Chalino Bender M.D.   200 mg at 03/13/17 0900   • docusate sodium (COLACE) capsule 100 mg  100 mg BID Chalino Bender M.D.   100 mg at 03/10/17 2038   • bisacodyl (DULCOLAX) suppository 10 mg  10 mg Q24HRS PRN Chalino Bender M.D.       • fleet enema 133 mL  1 Each Once PRN Chalino Bender M.D.       • Respiratory Care per Protocol   Continuous RT Chalino Bender M.D.       • ondansetron (ZOFRAN) syringe/vial injection 4 mg  4 mg Q4HRS PRN Chalino Bender M.D.       • ondansetron (ZOFRAN ODT) dispertab 4 mg  4 mg Q4HRS PRN Chailno Bender M.D.       • promethazine (PHENERGAN) tablet 12.5-25 mg  12.5-25 mg Q4HRS PRN Chalino Bender M.D.       • promethazine (PHENERGAN) suppository 12.5-25 mg  12.5-25 mg Q4HRS PRN Chalino Bender M.D.       • prochlorperazine (COMPAZINE) injection 5-10 mg  5-10 mg Q4HRS PRN Chalino Bender M.D.       • acetaminophen (TYLENOL) tablet 650 mg  650 mg Q6HRS PRN Chalino Bender M.D.   650 mg at 03/12/17 1056   • NS (BOLUS) infusion 500 mL  500 mL Once PRN Chalino Bender M.D.         Last reviewed on 3/9/2017  3:38 PM by Rin Mark R.N.    Micro:  Results     Procedure Component Value Units Date/Time     CULTURE TISSUE W/ GRM STAIN [[id number]] Collected:  03/05/17 1307    Order Status:  Completed Specimen Information:  Bone Updated:  03/10/17 1544     Gram Stain Result --      Result:        Rare WBCs.  No organisms seen.       Significant Indicator NEG      Source BONE      Site Right Metatarsal Heads      Tissue Culture No growth at 72 hours.     ANAEROBIC CULTURE [[id number]] Collected:  03/05/17 1307    Order Status:  Completed Specimen Information:  Bone Updated:  03/10/17 1544     Significant Indicator NEG      Source BONE      Site Right Metatarsal Heads      Anaerobic Culture, Culture Res No Anaerobes isolated.     CULTURE TISSUE W/ GRM STAIN [[id number]]  (Abnormal)  (Susceptibility) Collected:  03/05/17 1302    Order Status:  Completed Specimen Information:  Tissue Updated:  03/09/17 1612     Significant Indicator POS (POS)      Source TISS      Site Right Foot Tissue      Tissue Culture -- (A)      Gram Stain Result -- (A)      Result:        Few WBCs.  Moderate Gram positive cocci.       Tissue Culture -- (A)      Result:        Staphylococcus aureus  Rare growth      Culture & Susceptibility     STAPHYLOCOCCUS AUREUS     Antibiotic Sensitivity Microscan Unit Status    Ampicillin/sulbactam Sensitive <=8/4 mcg/mL Final    Clindamycin Sensitive <=0.5 mcg/mL Final    Daptomycin Sensitive <=0.5 mcg/mL Final    Erythromycin Sensitive <=0.5 mcg/mL Final    Moxifloxacin Sensitive <=0.5 mcg/mL Final    Oxacillin Sensitive <=0.25 mcg/mL Final    Tetracycline Sensitive <=4 mcg/mL Final    Trimeth/Sulfa Sensitive <=0.5/9.5 mcg/mL Final    Vancomycin Sensitive 1 mcg/mL Final                       ANAEROBIC CULTURE [[id number]] Collected:  03/05/17 1302    Order Status:  Completed Specimen Information:  Tissue Updated:  03/09/17 1612     Significant Indicator NEG      Source TISS      Site Right Foot Tissue      Anaerobic Culture, Culture Res No Anaerobes isolated.           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 Lynn.    OR cx sent of tissue and bone- negative to date Tissue +MSSA  3/9 went to OR  For  repeat I&D with [place] change vs delayed primary closure.  Vascular studies normal  Continue IV Zinacef-endpoint 6 weeks from last surgery - 4/23/17  Continue wound care    Chronic renal failure- 2.23  Avoiding nephrotoxic drugs  Adjusting abx as needed for renal function    Moyamoya syndrome with prior stroke/dementia  Noncompliant with abx  Barrier to care    Remains at risk for limb loss  snf placement

## 2017-03-14 PROCEDURE — 99232 SBSQ HOSP IP/OBS MODERATE 35: CPT | Performed by: FAMILY MEDICINE

## 2017-03-14 PROCEDURE — A9270 NON-COVERED ITEM OR SERVICE: HCPCS | Performed by: PSYCHIATRY & NEUROLOGY

## 2017-03-14 PROCEDURE — 700102 HCHG RX REV CODE 250 W/ 637 OVERRIDE(OP): Performed by: HOSPITALIST

## 2017-03-14 PROCEDURE — A9270 NON-COVERED ITEM OR SERVICE: HCPCS | Performed by: HOSPITALIST

## 2017-03-14 PROCEDURE — 700102 HCHG RX REV CODE 250 W/ 637 OVERRIDE(OP): Performed by: PSYCHIATRY & NEUROLOGY

## 2017-03-14 PROCEDURE — 770006 HCHG ROOM/CARE - MED/SURG/GYN SEMI*

## 2017-03-14 PROCEDURE — 700111 HCHG RX REV CODE 636 W/ 250 OVERRIDE (IP): Performed by: INTERNAL MEDICINE

## 2017-03-14 PROCEDURE — A9270 NON-COVERED ITEM OR SERVICE: HCPCS | Performed by: INTERNAL MEDICINE

## 2017-03-14 PROCEDURE — 700111 HCHG RX REV CODE 636 W/ 250 OVERRIDE (IP): Performed by: HOSPITALIST

## 2017-03-14 PROCEDURE — 700102 HCHG RX REV CODE 250 W/ 637 OVERRIDE(OP): Performed by: INTERNAL MEDICINE

## 2017-03-14 PROCEDURE — 700105 HCHG RX REV CODE 258: Performed by: INTERNAL MEDICINE

## 2017-03-14 RX ADMIN — HEPARIN SODIUM 5000 UNITS: 5000 INJECTION, SOLUTION INTRAVENOUS; SUBCUTANEOUS at 05:22

## 2017-03-14 RX ADMIN — FOLIC ACID 1 MG: 1 TABLET ORAL at 08:13

## 2017-03-14 RX ADMIN — AMLODIPINE BESYLATE 5 MG: 5 TABLET ORAL at 08:13

## 2017-03-14 RX ADMIN — OXYCODONE HYDROCHLORIDE 5 MG: 5 TABLET ORAL at 10:34

## 2017-03-14 RX ADMIN — HEPARIN SODIUM 5000 UNITS: 5000 INJECTION, SOLUTION INTRAVENOUS; SUBCUTANEOUS at 21:30

## 2017-03-14 RX ADMIN — PHENYTOIN SODIUM 200 MG: 100 CAPSULE, EXTENDED RELEASE ORAL at 08:14

## 2017-03-14 RX ADMIN — LORAZEPAM 1 MG: 1 TABLET ORAL at 10:34

## 2017-03-14 RX ADMIN — CEFUROXIME SODIUM 750 MG: 7.5 INJECTION, POWDER, FOR SOLUTION INTRAVENOUS at 21:29

## 2017-03-14 RX ADMIN — Medication 1300 MG: at 13:40

## 2017-03-14 RX ADMIN — CYANOCOBALAMIN TAB 500 MCG 1000 MCG: 500 TAB at 08:13

## 2017-03-14 RX ADMIN — ATORVASTATIN CALCIUM 40 MG: 40 TABLET, FILM COATED ORAL at 19:57

## 2017-03-14 RX ADMIN — CARVEDILOL 25 MG: 25 TABLET, FILM COATED ORAL at 17:24

## 2017-03-14 RX ADMIN — OXCARBAZEPINE 150 MG: 150 TABLET, FILM COATED ORAL at 08:14

## 2017-03-14 RX ADMIN — OXCARBAZEPINE 150 MG: 150 TABLET, FILM COATED ORAL at 19:57

## 2017-03-14 RX ADMIN — HEPARIN SODIUM 5000 UNITS: 5000 INJECTION, SOLUTION INTRAVENOUS; SUBCUTANEOUS at 13:40

## 2017-03-14 RX ADMIN — ASPIRIN 325 MG: 325 TABLET, FILM COATED ORAL at 08:13

## 2017-03-14 RX ADMIN — Medication 1300 MG: at 08:13

## 2017-03-14 RX ADMIN — Medication 1300 MG: at 19:57

## 2017-03-14 RX ADMIN — CEFUROXIME SODIUM 750 MG: 7.5 INJECTION, POWDER, FOR SOLUTION INTRAVENOUS at 13:40

## 2017-03-14 RX ADMIN — LORAZEPAM 1 MG: 1 TABLET ORAL at 19:57

## 2017-03-14 RX ADMIN — LORAZEPAM 1 MG: 1 TABLET ORAL at 05:22

## 2017-03-14 RX ADMIN — CARVEDILOL 25 MG: 25 TABLET, FILM COATED ORAL at 08:13

## 2017-03-14 RX ADMIN — PHENYTOIN SODIUM 200 MG: 100 CAPSULE, EXTENDED RELEASE ORAL at 19:57

## 2017-03-14 RX ADMIN — CEFUROXIME SODIUM 750 MG: 7.5 INJECTION, POWDER, FOR SOLUTION INTRAVENOUS at 05:48

## 2017-03-14 ASSESSMENT — ENCOUNTER SYMPTOMS
BLURRED VISION: 0
VOMITING: 0
TREMORS: 0
ABDOMINAL PAIN: 0
COUGH: 0
CHILLS: 0
TINGLING: 0
PALPITATIONS: 0
DOUBLE VISION: 0
NAUSEA: 0
SPUTUM PRODUCTION: 0
DIZZINESS: 0
FEVER: 0
HEMOPTYSIS: 0
PHOTOPHOBIA: 0
WEIGHT LOSS: 0
HEARTBURN: 0
NECK PAIN: 0
HEADACHES: 0
DIARRHEA: 0
ORTHOPNEA: 0

## 2017-03-14 ASSESSMENT — PAIN SCALES - GENERAL
PAINLEVEL_OUTOF10: 0

## 2017-03-14 ASSESSMENT — LIFESTYLE VARIABLES: DO YOU DRINK ALCOHOL: NO

## 2017-03-14 NOTE — PSYCHIATRY
"PSYCHIATRIC FOLLOW-UP NOTE      ID:   45 y/o woman with Moyamoya, frontal lobe stroke.       S:  Remains stable on trileptal. Paxil tapered off. Also on ativan. This combination seems to work well. Her affect has almost normalized. No more wandering behavior, no more grandiosity. Per staff she is occasionally childlike but this has decreased quite a bit. Focus is improved. She states she still doesn't like to go out of the house much because she doesn't like to interact with people. Discussed doing something like [employer] music therapy in one on one lessons so she isn't in a class; she stated she would consider it. Resources provided    ROS:  Mood improved  Denies depression  No signs/symptoms of priya  No signs/symptoms of psychosis   All other systems reviewed and are negative.     MSE:  Vitals:  Filed Vitals:    03/13/17 1620 03/13/17 2000 03/14/17 0400 03/14/17 0800   BP: 117/63 123/71 154/89 126/73   Pulse: 65 60 62 60   Temp: 36.2 °C (97.2 °F) 36.7 °C (98.1 °F) 36.1 °C (97 °F) 36.7 °C (98.1 °F)   Resp: 20 18 18 18   Height:       Weight:       SpO2: 95% 94% 94% 97%       Appearance:  Grooming wnl  Behavior: no psychomotor agitation or retardation   Speech: nl tone, rate, volume   Mood: \"good\"  Affect: full and congruent   Thought Process:logical and sequential   Thought Content  No si/hi  Cognition: impaired  Memory:wn  Attention:fair   Judgment: fair   Insightfair     A:  Moyamoya  Frontal lobe stroke  Mood disorder unspecified    P:  Trileptal 150mg po bid  Ativan 1mg po tid  Avoid antidepressants  F/u with outpatient psychiatry when ready to go.   Psych signing off  "

## 2017-03-14 NOTE — PROGRESS NOTES
"Infectious Disease Progress Note    Author: Ivy Gutierrez M.D.    DOS & Time created: 3/14/2017  2:56 PM    Chief Complaint   Patient presents with   • Wound Infection     pt has wound on right foot, [facility], report cellutliis with drainage on wound     Interval History:  46-year-old white female with history of morbid obesity,  Moyamoya syndrome with prior stroke, who presents with a traumatic injury to her right foot with subsequent development of cellulitis and abscess.  S/p repeat I&D with 2nd and 3rd metatarsal head excisions and GSR on 3/5.     AF WBC 13.3 s/p drainage abscess-has been refusing IV  3/1- willing to take iv. No fevers. C/o pain. Apparently walking on the foot  3/2- says she is feeling better. No fevers.  3/3- in good spirits. No fevers.   3/4 AF WBC 9.9 foot worse-denies pain  3/5 AF no CBC NPO for surgery. No new complaints  3/6 AF, WBC 8.6.  Minimal pain to Right foot, complains that \"boot is heavy.\"  3/7 AF WBC 7.3 feeling better today-coloring books Denies SE abx  3/8 AF WBC 8.1 ambulates to BR-pain controlled. Still coloring  3/10- no fevers. Had another I&D 3/9  3/12- no fevers. Sitting in bed coloring.  3/13- no fevers. No new issues.  3/14- no fevers. Awaiting placement  Labs Reviewed, Medications Reviewed, Radiology Reviewed and Wound Reviewed.    Review of Systems:  Review of Systems   Constitutional: Negative for fever and chills.   Cardiovascular: Negative for chest pain.   Gastrointestinal: Negative for nausea, vomiting, abdominal pain and diarrhea.   Musculoskeletal: Positive for joint pain.        Foot pain- improving   Skin: Negative for rash.   Neurological: Negative for headaches.   All other systems reviewed and are negative.      Hemodynamics:  Temp (24hrs), Av.4 °C (97.6 °F), Min:36.1 °C (97 °F), Max:36.7 °C (98.1 °F)  Temperature: 36.7 °C (98.1 °F)  Pulse  Av.3  Min: 52  Max: 89   Blood Pressure: 126/73 mmHg       Physical Exam:  Physical Exam "   Constitutional: She is oriented to person, place, and time. She appears well-developed. No distress.   Obese   HENT:   Head: Normocephalic and atraumatic.   Eyes: EOM are normal. Pupils are equal, round, and reactive to light.   Neck: Neck supple.   Right SCV PICC- tender to touch, minimal erythema and bruising   Cardiovascular: Normal rate and regular rhythm.    No murmur heard.  Pulmonary/Chest: Effort normal and breath sounds normal. No respiratory distress. She has no wheezes. She has no rales.   Abdominal: Soft. Bowel sounds are normal. She exhibits no distension. There is no tenderness.   Musculoskeletal: She exhibits tenderness.   Right foot- DM walking boot, dressed   Neurological: She is alert and oriented to person, place, and time.   Skin: Skin is warm and dry. No rash noted.   Psychiatric:   Poor insight   Nursing note and vitals reviewed.      Labs:  No results for input(s): WBC, RBC, HEMOGLOBIN, HEMATOCRIT, MCV, MCH, RDW, PLATELETCT, MPV, NEUTSPOLYS, LYMPHOCYTES, MONOCYTES, EOSINOPHILS, BASOPHILS, RBCMORPHOLO in the last 72 hours.  No results for input(s): SODIUM, POTASSIUM, CHLORIDE, CO2, GLUCOSE, BUN, CPKTOTAL in the last 72 hours.  No results for input(s): ALBUMIN, TBILIRUBIN, ALKPHOSPHAT, TOTPROTEIN, ALTSGPT, ASTSGOT, CREATININE in the last 72 hours.     Imaging:       CT-FOOT W/O PLUS RECONS RIGHT (Final result) Result time: 02/27/17 00:30:10     Final result by Gee Tam M.D. (02/27/17 00:30:10)     Impression:     1.  Soft tissue swelling with gas in the distal plantar aspect of the foot at the level of the MTP joints with draining wound to the plantar surface of the skin.  2.  Minimal gas in the extensor aspect of the foot at the level of the MTP joints which may represent extension of cellulitis anteriorly.  3.  No loculated fluid collection identified.  4.  Diffuse soft tissue swelling in the remainder of the foot.  5.  No acute bony abnormality. No evidence of osteomyelitis. No  radiopaque soft tissue foreign body identified     MR-FOOT-W/O RIGHT (Final result) Result time: 02/28/17 11:19:53     Final result by Aureliano Flores M.D. (02/28/17 11:19:53)     Impression:     Forefoot bilobed abscess with apparent plantar draining sinus. Largest loculation is in the dorsal soft tissues as discussed above  No unenhanced MR evidence of osteomyelitis  Metatarsophalangeal joint effusions most likely are reactive given lack of bony destruction/marginal erosive change     Final result by Rad Intf (02/27/17 11:53:22)     Narrative:     TransthoracicEcho Report  CONCLUSIONS  No prior study is available for comparison.   Normal left ventricular size, thickness, systolic function, and   diastolic function.  Left ventricular ejection fraction is visually estimated to be 65%.  Normal right ventricular size and systolic function.  Trace tricuspid regurgitation.  Normal pericardium without effusion.     Medications:  Current Facility-Administered Medications   Medication Dose Frequency Provider Last Rate Last Dose   • albuterol inhaler 2 Puff  2 Puff Q4HRS PRN Chalino Bender M.D.   2 Puff at 03/12/17 1458   • NS (BOLUS) infusion 500 mL  500 mL PRN Slick Montes M.D.       • cefUROXime (ZINACEF) 750 mg in NS 50 mL IVPB  750 mg Q8HR Ivy Gutierrez M.D. 100 mL/hr at 03/14/17 1340 750 mg at 03/14/17 1340   • haloperidol lactate (HALDOL) injection 5 mg  5 mg Q4HRS PRN Nabeel Monahan M.D.   5 mg at 03/02/17 0156   • oxcarbazepine (TRILEPTAL) tablet 150 mg  150 mg BID Lynn Carpenter M.D.   150 mg at 03/14/17 0814   • diphenoxylate-atropine (LOMOTIL) 2.5-0.025 MG per tablet 1 Tab  1 Tab 4X/DAY PRN Shazia Valle M.D.   1 Tab at 03/01/17 1125   • heparin injection 5,000 Units  5,000 Units Q8HRS Augustus Guerin M.D.   5,000 Units at 03/14/17 1340   • lorazepam (ATIVAN) tablet 1 mg  1 mg TID Augustus Guerin M.D.   1 mg at 03/14/17 1034   • sodium bicarbonate (SODIUM BICARBONATE) tablet 1,300 mg   1,300 mg TID Evelia Yepez M.D.   1,300 mg at 03/14/17 1340   • oxycodone immediate-release (ROXICODONE) tablet 5 mg  5 mg Q6HRS PRN Evelia Yepez M.D.   5 mg at 03/14/17 1034   • amlodipine (NORVASC) tablet 5 mg  5 mg DAILY Chalino Bender M.D.   5 mg at 03/14/17 0813   • aspirin (ASA) tablet 325 mg  325 mg DAILY Chalino Bender M.D.   325 mg at 03/14/17 0813   • atorvastatin (LIPITOR) tablet 40 mg  40 mg Nightly Chalino Bender M.D.   40 mg at 03/13/17 2043   • carvedilol (COREG) tablet 25 mg  25 mg BID WITH MEALS Chalino Bender M.D.   25 mg at 03/14/17 0813   • cyanocobalamin (VITAMIN B-12) tablet 1,000 mcg  1,000 mcg DAILY Chalino Bender M.D.   1,000 mcg at 03/14/17 0813   • folic acid (FOLVITE) tablet 1 mg  1 mg DAILY Chalino Bender M.D.   1 mg at 03/14/17 0813   • phenytoin ER (DILANTIN) capsule 200 mg  200 mg BID Chalino Bender M.D.   200 mg at 03/14/17 0814   • docusate sodium (COLACE) capsule 100 mg  100 mg BID Chalino Bender M.D.   100 mg at 03/10/17 2038   • bisacodyl (DULCOLAX) suppository 10 mg  10 mg Q24HRS PRN Chalino Bender M.D.       • fleet enema 133 mL  1 Each Once PRN Chalino Bender M.D.       • Respiratory Care per Protocol   Continuous RT Chalino Bender M.D.       • ondansetron (ZOFRAN) syringe/vial injection 4 mg  4 mg Q4HRS PRN Chalino Bender M.D.       • ondansetron (ZOFRAN ODT) dispertab 4 mg  4 mg Q4HRS PRN Chalino Bender M.D.       • promethazine (PHENERGAN) tablet 12.5-25 mg  12.5-25 mg Q4HRS PRN Chalino Bender M.D.       • promethazine (PHENERGAN) suppository 12.5-25 mg  12.5-25 mg Q4HRS PRSEDA Bender M.D.       • prochlorperazine (COMPAZINE) injection 5-10 mg  5-10 mg Q4HRS PRN Chalino Bender M.D.       • acetaminophen (TYLENOL) tablet 650 mg  650 mg Q6HRS PRN Chalino Bender M.D.   650 mg at 03/12/17 1056   • NS (BOLUS) infusion 500 mL  500 mL Once PRN Chalino Bender M.D.         Last reviewed on  3/9/2017  3:38 PM by Rin Mark R.N.    Micro:  Results     Procedure Component Value Units Date/Time    CULTURE TISSUE W/ GRM STAIN [[id number]] Collected:  03/05/17 1307    Order Status:  Completed Specimen Information:  Bone Updated:  03/10/17 1544     Gram Stain Result --      Result:        Rare WBCs.  No organisms seen.       Significant Indicator NEG      Source BONE      Site Right Metatarsal Heads      Tissue Culture No growth at 72 hours.     ANAEROBIC CULTURE [[id number]] Collected:  03/05/17 1307    Order Status:  Completed Specimen Information:  Bone Updated:  03/10/17 1544     Significant Indicator NEG      Source BONE      Site Right Metatarsal Heads      Anaerobic Culture, Culture Res No Anaerobes isolated.     CULTURE TISSUE W/ GRM STAIN [[id number]]  (Abnormal)  (Susceptibility) Collected:  03/05/17 1302    Order Status:  Completed Specimen Information:  Tissue Updated:  03/09/17 1612     Significant Indicator POS (POS)      Source TISS      Site Right Foot Tissue      Tissue Culture -- (A)      Gram Stain Result -- (A)      Result:        Few WBCs.  Moderate Gram positive cocci.       Tissue Culture -- (A)      Result:        Staphylococcus aureus  Rare growth      Culture & Susceptibility     STAPHYLOCOCCUS AUREUS     Antibiotic Sensitivity Microscan Unit Status    Ampicillin/sulbactam Sensitive <=8/4 mcg/mL Final    Clindamycin Sensitive <=0.5 mcg/mL Final    Daptomycin Sensitive <=0.5 mcg/mL Final    Erythromycin Sensitive <=0.5 mcg/mL Final    Moxifloxacin Sensitive <=0.5 mcg/mL Final    Oxacillin Sensitive <=0.25 mcg/mL Final    Tetracycline Sensitive <=4 mcg/mL Final    Trimeth/Sulfa Sensitive <=0.5/9.5 mcg/mL Final    Vancomycin Sensitive 1 mcg/mL Final                       ANAEROBIC CULTURE [[id number]] Collected:  03/05/17 1302    Order Status:  Completed Specimen Information:  Tissue Updated:  03/09/17 1612     Significant Indicator NEG      Source TISS      Site Right Foot  Tissue      Anaerobic Culture, Culture Res No Anaerobes isolated.           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 Lynn.    OR cx sent of tissue and bone- negative to date Tissue +MSSA  3/9 went to OR  For  repeat I&D with [place] change vs delayed primary closure.  Vascular studies normal  Continue IV Zinacef-endpoint 6 weeks from last surgery - 4/23/17  Continue wound care    Chronic renal failure- 2.23  Avoiding nephrotoxic drugs  Adjusting abx as needed for renal function    Moyamoya syndrome with prior stroke/dementia  Noncompliant with abx  Barrier to care    Remains at risk for limb loss  snf placement

## 2017-03-14 NOTE — PROGRESS NOTES
Hospital Medicine Progress Note, Adult, Complex               Author: Belén Tony Date & Time created: 3/14/2017  10:39 AM     Interval History:  46 y.o.-year-old female admitted 2/26/2017 with foot infection    Review of Systems:  Review of Systems   Constitutional: Negative for fever, chills and weight loss.   HENT: Negative for hearing loss and tinnitus.    Eyes: Negative for blurred vision, double vision and photophobia.   Respiratory: Negative for cough, hemoptysis and sputum production.    Cardiovascular: Negative for chest pain, palpitations and orthopnea.   Gastrointestinal: Negative for heartburn, nausea and vomiting.   Genitourinary: Negative for dysuria, urgency and frequency.   Musculoskeletal: Negative for joint pain and neck pain.   Skin: Negative for itching.   Neurological: Negative for dizziness, tingling, tremors and headaches.       Physical Exam:  Physical Exam   Constitutional: No distress.   HENT:   Head: Normocephalic.   Eyes: Right eye exhibits no discharge. Left eye exhibits no discharge.   Neck: Neck supple. No JVD present.   Cardiovascular: Normal rate and regular rhythm.    Pulmonary/Chest: Effort normal. No stridor. No respiratory distress. She has no wheezes. She has no rales.   Abdominal: Soft. She exhibits no distension. There is no tenderness. There is no rebound.   Musculoskeletal:   PROTECTIVE BOOT IS ON THE RIGHT LEG   Neurological: She is alert.   Skin: Skin is warm. She is not diaphoretic.       Labs:        Invalid input(s): [id number]      No results for input(s): SODIUM, POTASSIUM, CHLORIDE, CO2, BUN, CREATININE, MAGNESIUM, PHOSPHORUS, CALCIUM in the last 72 hours.  No results for input(s): ALTSGPT, ASTSGOT, ALKPHOSPHAT, TBILIRUBIN, DBILIRUBIN, GAMMAGT, AMYLASE, LIPASE, ALB, PREALBUMIN, GLUCOSE in the last 72 hours.  No results for input(s): RBC, HEMOGLOBIN, HEMATOCRIT, PLATELETCT, PROTHROMBTM, APTT, INR, IRON, FERRITIN, TOTIRONBC in the last 72 hours.             Hemodynamics:  Temp (24hrs), Av.4 °C (97.6 °F), Min:36.1 °C (97 °F), Max:36.7 °C (98.1 °F)  Temperature: 36.7 °C (98.1 °F)  Pulse  Av.3  Min: 52  Max: 89   Blood Pressure: 126/73 mmHg     Respiratory:    Respiration: 18, Pulse Oximetry: 97 %     Work Of Breathing / Effort: Mild  RUL Breath Sounds: Clear, RML Breath Sounds: Clear, RLL Breath Sounds: Clear, BENNIE Breath Sounds: Clear, LLL Breath Sounds: Clear  Fluids:    Intake/Output Summary (Last 24 hours) at 17 1039  Last data filed at 17 1243   Gross per 24 hour   Intake    320 ml   Output      0 ml   Net    320 ml        GI/Nutrition:  Orders Placed This Encounter   Procedures   • DIET ORDER     Standing Status: Standing      Number of Occurrences: 1      Standing Expiration Date:      Order Specific Question:  Diet:     Answer:  Regular [1]     Medical Decision Making, by Problem:  Active Hospital Problems    Diagnosis   • Cellulitis of right foot [L03.115]   • Anemia [D64.9]   • 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

## 2017-03-15 PROCEDURE — 700102 HCHG RX REV CODE 250 W/ 637 OVERRIDE(OP): Performed by: HOSPITALIST

## 2017-03-15 PROCEDURE — A9270 NON-COVERED ITEM OR SERVICE: HCPCS | Performed by: HOSPITALIST

## 2017-03-15 PROCEDURE — A9270 NON-COVERED ITEM OR SERVICE: HCPCS | Performed by: INTERNAL MEDICINE

## 2017-03-15 PROCEDURE — G8979 MOBILITY GOAL STATUS: HCPCS | Mod: CI

## 2017-03-15 PROCEDURE — 700102 HCHG RX REV CODE 250 W/ 637 OVERRIDE(OP): Performed by: INTERNAL MEDICINE

## 2017-03-15 PROCEDURE — 99232 SBSQ HOSP IP/OBS MODERATE 35: CPT | Performed by: FAMILY MEDICINE

## 2017-03-15 PROCEDURE — 700102 HCHG RX REV CODE 250 W/ 637 OVERRIDE(OP): Performed by: PSYCHIATRY & NEUROLOGY

## 2017-03-15 PROCEDURE — 770006 HCHG ROOM/CARE - MED/SURG/GYN SEMI*

## 2017-03-15 PROCEDURE — A9270 NON-COVERED ITEM OR SERVICE: HCPCS | Performed by: PSYCHIATRY & NEUROLOGY

## 2017-03-15 PROCEDURE — G8980 MOBILITY D/C STATUS: HCPCS | Mod: CI

## 2017-03-15 PROCEDURE — 700111 HCHG RX REV CODE 636 W/ 250 OVERRIDE (IP): Performed by: INTERNAL MEDICINE

## 2017-03-15 PROCEDURE — 97161 PT EVAL LOW COMPLEX 20 MIN: CPT

## 2017-03-15 PROCEDURE — 700105 HCHG RX REV CODE 258: Performed by: INTERNAL MEDICINE

## 2017-03-15 PROCEDURE — 700111 HCHG RX REV CODE 636 W/ 250 OVERRIDE (IP): Performed by: HOSPITALIST

## 2017-03-15 PROCEDURE — G8978 MOBILITY CURRENT STATUS: HCPCS | Mod: CI

## 2017-03-15 PROCEDURE — 700112 HCHG RX REV CODE 229: Performed by: HOSPITALIST

## 2017-03-15 RX ADMIN — ATORVASTATIN CALCIUM 40 MG: 40 TABLET, FILM COATED ORAL at 21:03

## 2017-03-15 RX ADMIN — HEPARIN SODIUM 5000 UNITS: 5000 INJECTION, SOLUTION INTRAVENOUS; SUBCUTANEOUS at 04:34

## 2017-03-15 RX ADMIN — PHENYTOIN SODIUM 200 MG: 100 CAPSULE, EXTENDED RELEASE ORAL at 08:49

## 2017-03-15 RX ADMIN — LORAZEPAM 1 MG: 1 TABLET ORAL at 21:03

## 2017-03-15 RX ADMIN — CARVEDILOL 25 MG: 25 TABLET, FILM COATED ORAL at 18:14

## 2017-03-15 RX ADMIN — CYANOCOBALAMIN TAB 500 MCG 1000 MCG: 500 TAB at 08:47

## 2017-03-15 RX ADMIN — Medication 1300 MG: at 12:28

## 2017-03-15 RX ADMIN — DOCUSATE SODIUM 100 MG: 100 CAPSULE ORAL at 21:03

## 2017-03-15 RX ADMIN — Medication 1300 MG: at 21:03

## 2017-03-15 RX ADMIN — HEPARIN SODIUM 5000 UNITS: 5000 INJECTION, SOLUTION INTRAVENOUS; SUBCUTANEOUS at 21:03

## 2017-03-15 RX ADMIN — FOLIC ACID 1 MG: 1 TABLET ORAL at 09:02

## 2017-03-15 RX ADMIN — OXCARBAZEPINE 150 MG: 150 TABLET, FILM COATED ORAL at 08:49

## 2017-03-15 RX ADMIN — ASPIRIN 325 MG: 325 TABLET, FILM COATED ORAL at 08:48

## 2017-03-15 RX ADMIN — PHENYTOIN SODIUM 200 MG: 100 CAPSULE, EXTENDED RELEASE ORAL at 21:03

## 2017-03-15 RX ADMIN — CARVEDILOL 25 MG: 25 TABLET, FILM COATED ORAL at 08:46

## 2017-03-15 RX ADMIN — AMLODIPINE BESYLATE 5 MG: 5 TABLET ORAL at 08:48

## 2017-03-15 RX ADMIN — CEFUROXIME SODIUM 750 MG: 7.5 INJECTION, POWDER, FOR SOLUTION INTRAVENOUS at 21:03

## 2017-03-15 RX ADMIN — LORAZEPAM 1 MG: 1 TABLET ORAL at 12:29

## 2017-03-15 RX ADMIN — CEFUROXIME SODIUM 750 MG: 7.5 INJECTION, POWDER, FOR SOLUTION INTRAVENOUS at 05:19

## 2017-03-15 RX ADMIN — CEFUROXIME SODIUM 750 MG: 7.5 INJECTION, POWDER, FOR SOLUTION INTRAVENOUS at 14:11

## 2017-03-15 RX ADMIN — OXCARBAZEPINE 150 MG: 150 TABLET, FILM COATED ORAL at 21:03

## 2017-03-15 RX ADMIN — HEPARIN SODIUM 5000 UNITS: 5000 INJECTION, SOLUTION INTRAVENOUS; SUBCUTANEOUS at 14:10

## 2017-03-15 RX ADMIN — LORAZEPAM 1 MG: 1 TABLET ORAL at 04:34

## 2017-03-15 ASSESSMENT — ENCOUNTER SYMPTOMS
ORTHOPNEA: 0
SPUTUM PRODUCTION: 0
DIARRHEA: 0
TINGLING: 0
BACK PAIN: 0
DOUBLE VISION: 0
HEADACHES: 0
FEVER: 0
MYALGIAS: 0
COUGH: 0
DIZZINESS: 0
ABDOMINAL PAIN: 0
NAUSEA: 0
WEIGHT LOSS: 0
BLURRED VISION: 0
VOMITING: 0
HEMOPTYSIS: 0
PALPITATIONS: 0
CHILLS: 0
TREMORS: 0
PHOTOPHOBIA: 0
HEARTBURN: 0

## 2017-03-15 ASSESSMENT — PAIN SCALES - GENERAL
PAINLEVEL_OUTOF10: 0

## 2017-03-15 ASSESSMENT — GAIT ASSESSMENTS
DISTANCE (FEET): 250
GAIT LEVEL OF ASSIST: MODIFIED INDEPENDENT
DEVIATION: ANTALGIC

## 2017-03-15 NOTE — PROGRESS NOTES
Pt. Awake alert and oriented with intact dressing on R foot with boot on.  Pt. Able to make her needs known. Due meds given. Able to get up with standby assistance to the bathroom tolerates well. Needs attended.

## 2017-03-15 NOTE — DISCHARGE PLANNING
Medical Social Work  Faxed Choice to [facility] for [facility] in [place].

## 2017-03-15 NOTE — PROGRESS NOTES
Patient refusing to allow me to collect labs. Managed to try once and was not able to get blood return. Dr. Wells notified. Will continue to try intermittently.

## 2017-03-15 NOTE — THERAPY
"Physical Therapy Evaluation completed.   Bed Mobility:  Supine to Sit: Modified Independent  Transfers: Sit to Stand: Modified Independent  Gait: Level Of Assist: Modified Independent with No Equipment Needed       Plan of Care: Patient with no further skilled PT needs in the acute care setting at this time and Patient demonstrates safety with mobility in this environment at this time.   Discharge Recommendations: Equipment: No Equipment Needed. Post-acute therapy recommended after discharged home.    See \"Rehab Therapy-Acute\" Patient Summary Report for complete documentation.     "

## 2017-03-15 NOTE — PROGRESS NOTES
Report received from day shift RN, patient care assumed at 1900. Patient assessment as documented. Patient denies pain or discomfort at this time. Breathing is even and unlabored on room air. Patient is ambulatory with SBA. Patient is resting in bed. Bed in low position, call light within reach. Hourly rounding in place. Patient has no further complaints at this time, will continue to monitor.    Dressing and boot in place to RLE. CDI.     Patient refusing dressing change at this time. Patient educated regarding proper wound care and continues to refuse. Spoke with patient's mother on the phone about situation. Will continue to monitor and try again tomorrow.

## 2017-03-15 NOTE — DISCHARGE PLANNING
[facility] received notification from [facility]. The referral has been denied. Due to behaviors and Care exceeds capacity

## 2017-03-15 NOTE — CARE PLAN
Problem: Safety  Goal: Will remain free from injury  Bed in lowest, locked position. Call light within reach.     Problem: Venous Thromboembolism (VTW)/Deep Vein Thrombosis (DVT) Prevention:  Goal: Patient will participate in Venous Thrombosis (VTE)/Deep Vein Thrombosis (DVT)Prevention Measures  Patient receiving heparin injections

## 2017-03-15 NOTE — DISCHARGE PLANNING
[facility]S received a SNF order. Per the choice form the referral has been sent to [facility]. [facility] called and spoke to  on floor Kurtis to notify  that a SNF order is needed

## 2017-03-15 NOTE — PROGRESS NOTES
"Infectious Disease Progress Note    Author: Ivy Gutierrez M.D.    DOS & Time created: 3/15/2017  2:14 PM    Chief Complaint   Patient presents with   • Wound Infection     pt has wound on right foot, [facility], report cellutliis with drainage on wound     Interval History:  46-year-old white female with history of morbid obesity,  Moyamoya syndrome with prior stroke, who presents with a traumatic injury to her right foot with subsequent development of cellulitis and abscess.  S/p repeat I&D with 2nd and 3rd metatarsal head excisions and GSR on 3/5.     AF WBC 13.3 s/p drainage abscess-has been refusing IV  3/1- willing to take iv. No fevers. C/o pain. Apparently walking on the foot  3/2- says she is feeling better. No fevers.  3/3- in good spirits. No fevers.   3/4 AF WBC 9.9 foot worse-denies pain  3/5 AF no CBC NPO for surgery. No new complaints  3/6 AF, WBC 8.6.  Minimal pain to Right foot, complains that \"boot is heavy.\"  3/7 AF WBC 7.3 feeling better today-coloring books Denies SE abx  3/8 AF WBC 8.1 ambulates to BR-pain controlled. Still coloring  3/10- no fevers. Had another I&D 3/9  3/12- no fevers. Sitting in bed coloring.  3/13- no fevers. No new issues.  3/14- no fevers. Awaiting placement  3/15- no fevers. No new issues  Labs Reviewed, Medications Reviewed, Radiology Reviewed and Wound Reviewed.    Review of Systems:  Review of Systems   Constitutional: Negative for fever and chills.   Cardiovascular: Negative for chest pain.   Gastrointestinal: Negative for nausea, vomiting, abdominal pain and diarrhea.   Musculoskeletal: Positive for joint pain.        Foot pain- improving   Skin: Negative for rash.   Neurological: Negative for headaches.   All other systems reviewed and are negative.      Hemodynamics:  Temp (24hrs), Av.6 °C (97.8 °F), Min:36.3 °C (97.3 °F), Max:37 °C (98.6 °F)  Temperature: 36.3 °C (97.3 °F)  Pulse  Av.2  Min: 52  Max: 89   Blood Pressure: 157/82 mmHg   "     Physical Exam:  Physical Exam   Constitutional: She is oriented to person, place, and time. She appears well-developed. No distress.   Obese   HENT:   Head: Normocephalic and atraumatic.   Eyes: EOM are normal. Pupils are equal, round, and reactive to light.   Neck: Neck supple.   Right SCV PICC- tender to touch, minimal erythema and bruising   Cardiovascular: Normal rate and regular rhythm.    No murmur heard.  Pulmonary/Chest: Effort normal and breath sounds normal. No respiratory distress. She has no wheezes. She has no rales.   Abdominal: Soft. Bowel sounds are normal. She exhibits no distension. There is no tenderness.   Musculoskeletal: She exhibits tenderness.   Right foot- DM walking boot, dressed   Neurological: She is alert and oriented to person, place, and time.   Skin: Skin is warm and dry. No rash noted.   Psychiatric:   Poor insight   Nursing note and vitals reviewed.      Labs:  No results for input(s): WBC, RBC, HEMOGLOBIN, HEMATOCRIT, MCV, MCH, RDW, PLATELETCT, MPV, NEUTSPOLYS, LYMPHOCYTES, MONOCYTES, EOSINOPHILS, BASOPHILS, RBCMORPHOLO in the last 72 hours.  No results for input(s): SODIUM, POTASSIUM, CHLORIDE, CO2, GLUCOSE, BUN, CPKTOTAL in the last 72 hours.  No results for input(s): ALBUMIN, TBILIRUBIN, ALKPHOSPHAT, TOTPROTEIN, ALTSGPT, ASTSGOT, CREATININE in the last 72 hours.     Imaging:       CT-FOOT W/O PLUS RECONS RIGHT (Final result) Result time: 02/27/17 00:30:10     Final result by Gee Tam M.D. (02/27/17 00:30:10)     Impression:     1.  Soft tissue swelling with gas in the distal plantar aspect of the foot at the level of the MTP joints with draining wound to the plantar surface of the skin.  2.  Minimal gas in the extensor aspect of the foot at the level of the MTP joints which may represent extension of cellulitis anteriorly.  3.  No loculated fluid collection identified.  4.  Diffuse soft tissue swelling in the remainder of the foot.  5.  No acute bony abnormality. No  evidence of osteomyelitis. No radiopaque soft tissue foreign body identified     MR-FOOT-W/O RIGHT (Final result) Result time: 02/28/17 11:19:53     Final result by Aureliano Flores M.D. (02/28/17 11:19:53)     Impression:     Forefoot bilobed abscess with apparent plantar draining sinus. Largest loculation is in the dorsal soft tissues as discussed above  No unenhanced MR evidence of osteomyelitis  Metatarsophalangeal joint effusions most likely are reactive given lack of bony destruction/marginal erosive change     Final result by Rad Intf (02/27/17 11:53:22)     Narrative:     TransthoracicEcho Report  CONCLUSIONS  No prior study is available for comparison.   Normal left ventricular size, thickness, systolic function, and   diastolic function.  Left ventricular ejection fraction is visually estimated to be 65%.  Normal right ventricular size and systolic function.  Trace tricuspid regurgitation.  Normal pericardium without effusion.     Medications:  Current Facility-Administered Medications   Medication Dose Frequency Provider Last Rate Last Dose   • albuterol inhaler 2 Puff  2 Puff Q4HRS PRN Chalino Bender M.D.   2 Puff at 03/12/17 1458   • NS (BOLUS) infusion 500 mL  500 mL PRN Slick Montes M.D.       • cefUROXime (ZINACEF) 750 mg in NS 50 mL IVPB  750 mg Q8HR Ivy Gutierrez M.D. 100 mL/hr at 03/15/17 1411 750 mg at 03/15/17 1411   • haloperidol lactate (HALDOL) injection 5 mg  5 mg Q4HRS PRN Nabeel Monahan M.D.   5 mg at 03/02/17 0156   • oxcarbazepine (TRILEPTAL) tablet 150 mg  150 mg BID Lynn Carpneter M.D.   150 mg at 03/15/17 0849   • diphenoxylate-atropine (LOMOTIL) 2.5-0.025 MG per tablet 1 Tab  1 Tab 4X/DAY PRN Shazia Valle M.D.   1 Tab at 03/01/17 1125   • heparin injection 5,000 Units  5,000 Units Q8HRS Augustus Guerin M.D.   5,000 Units at 03/15/17 1410   • lorazepam (ATIVAN) tablet 1 mg  1 mg TID Augustus Guerin M.D.   1 mg at 03/15/17 1229   • sodium bicarbonate (SODIUM  BICARBONATE) tablet 1,300 mg  1,300 mg TID Evelia Yepez M.D.   1,300 mg at 03/15/17 1228   • oxycodone immediate-release (ROXICODONE) tablet 5 mg  5 mg Q6HRS PRN Evelia Yepez M.D.   5 mg at 03/14/17 1034   • amlodipine (NORVASC) tablet 5 mg  5 mg DAILY Chalino Bender M.D.   5 mg at 03/15/17 0848   • aspirin (ASA) tablet 325 mg  325 mg DAILY Chalino Bender M.D.   325 mg at 03/15/17 0848   • atorvastatin (LIPITOR) tablet 40 mg  40 mg Nightly Chalino Bender M.D.   40 mg at 03/14/17 1957   • carvedilol (COREG) tablet 25 mg  25 mg BID WITH MEALS Chalino Bender M.D.   25 mg at 03/15/17 0846   • cyanocobalamin (VITAMIN B-12) tablet 1,000 mcg  1,000 mcg DAILY Chalino Bender M.D.   1,000 mcg at 03/15/17 0847   • folic acid (FOLVITE) tablet 1 mg  1 mg DAILY Chalino Bender M.D.   1 mg at 03/15/17 0902   • phenytoin ER (DILANTIN) capsule 200 mg  200 mg BID Chalino Bender M.D.   200 mg at 03/15/17 0849   • docusate sodium (COLACE) capsule 100 mg  100 mg BID Chalino Bender M.D.   100 mg at 03/10/17 2038   • bisacodyl (DULCOLAX) suppository 10 mg  10 mg Q24HRS PRN Chalino Bender M.D.       • fleet enema 133 mL  1 Each Once PRN Chalino Bender M.D.       • Respiratory Care per Protocol   Continuous RT Chalino Bender M.D.       • ondansetron (ZOFRAN) syringe/vial injection 4 mg  4 mg Q4HRS PRN Chalino Bender M.D.       • ondansetron (ZOFRAN ODT) dispertab 4 mg  4 mg Q4HRS PRN Chalino Bender M.D.       • promethazine (PHENERGAN) tablet 12.5-25 mg  12.5-25 mg Q4HRS PRN Chalino Bender M.D.       • promethazine (PHENERGAN) suppository 12.5-25 mg  12.5-25 mg Q4HRS PRN Chalino Bender M.D.       • prochlorperazine (COMPAZINE) injection 5-10 mg  5-10 mg Q4HRS PRN Chalino Bender M.D.       • acetaminophen (TYLENOL) tablet 650 mg  650 mg Q6HRS PRN Chalino Bender M.D.   650 mg at 03/12/17 1056   • NS (BOLUS) infusion 500 mL  500 mL Once PRN Chalino Bender  M.D.         Last reviewed on 3/9/2017  3:38 PM by iRn Mark R.N.    Micro:  Results     Procedure Component Value Units Date/Time    CULTURE TISSUE W/ GRM STAIN [[id number]] Collected:  03/05/17 1307    Order Status:  Completed Specimen Information:  Bone Updated:  03/10/17 1544     Gram Stain Result --      Result:        Rare WBCs.  No organisms seen.       Significant Indicator NEG      Source BONE      Site Right Metatarsal Heads      Tissue Culture No growth at 72 hours.     ANAEROBIC CULTURE [[id number]] Collected:  03/05/17 1307    Order Status:  Completed Specimen Information:  Bone Updated:  03/10/17 1544     Significant Indicator NEG      Source BONE      Site Right Metatarsal Heads      Anaerobic Culture, Culture Res No Anaerobes isolated.     CULTURE TISSUE W/ GRM STAIN [[id number]]  (Abnormal)  (Susceptibility) Collected:  03/05/17 1302    Order Status:  Completed Specimen Information:  Tissue Updated:  03/09/17 1612     Significant Indicator POS (POS)      Source TISS      Site Right Foot Tissue      Tissue Culture -- (A)      Gram Stain Result -- (A)      Result:        Few WBCs.  Moderate Gram positive cocci.       Tissue Culture -- (A)      Result:        Staphylococcus aureus  Rare growth      Culture & Susceptibility     STAPHYLOCOCCUS AUREUS     Antibiotic Sensitivity Microscan Unit Status    Ampicillin/sulbactam Sensitive <=8/4 mcg/mL Final    Clindamycin Sensitive <=0.5 mcg/mL Final    Daptomycin Sensitive <=0.5 mcg/mL Final    Erythromycin Sensitive <=0.5 mcg/mL Final    Moxifloxacin Sensitive <=0.5 mcg/mL Final    Oxacillin Sensitive <=0.25 mcg/mL Final    Tetracycline Sensitive <=4 mcg/mL Final    Trimeth/Sulfa Sensitive <=0.5/9.5 mcg/mL Final    Vancomycin Sensitive 1 mcg/mL Final                       ANAEROBIC CULTURE [[id number]] Collected:  03/05/17 1302    Order Status:  Completed Specimen Information:  Tissue Updated:  03/09/17 1612     Significant Indicator NEG      Source  TISS      Site Right Foot Tissue      Anaerobic Culture, Culture Res No Anaerobes isolated.           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 Lynn.    OR cx sent of tissue and bone- negative to date Tissue +MSSA  3/9 went to OR  For  repeat I&D with [place] change vs delayed primary closure.  Vascular studies normal  Continue IV Zinacef-endpoint 6 weeks from last surgery - 4/23/17  Continue wound care    Chronic renal failure- 2.04  Avoiding nephrotoxic drugs  Adjusting abx as needed for renal function    Moyamoya syndrome with prior stroke/dementia  Noncompliant with abx  Barrier to care    Remains at risk for limb loss  snf placement- referral sent to [facility]

## 2017-03-15 NOTE — PROGRESS NOTES
Hospital Medicine Progress Note, Adult, Complex               Author: Belén Tony Date & Time created: 3/15/2017  10:34 AM     Interval History:    46 y.o.-year-old female admitted 2/26/2017 with foot infection,MODD DISORDER AND  IBARRA IBARRA.  Review of Systems:  Review of Systems   Constitutional: Negative for fever, chills and weight loss.   HENT: Negative for hearing loss and tinnitus.    Eyes: Negative for blurred vision, double vision and photophobia.   Respiratory: Negative for cough, hemoptysis and sputum production.    Cardiovascular: Negative for chest pain, palpitations and orthopnea.   Gastrointestinal: Negative for heartburn, nausea and vomiting.   Genitourinary: Negative for dysuria, urgency and frequency.   Musculoskeletal: Negative for myalgias and back pain.   Skin: Negative for itching.   Neurological: Negative for dizziness, tingling, tremors and headaches.       Physical Exam:  Physical Exam   Constitutional: She is oriented to person, place, and time. No distress.   HENT:   Head: Normocephalic and atraumatic.   Eyes: Right eye exhibits no discharge. Left eye exhibits no discharge.   Neck: Neck supple. No JVD present.   Cardiovascular: Normal rate and regular rhythm.    Pulmonary/Chest: No stridor. No respiratory distress. She has no wheezes. She has no rales.   Abdominal: Soft. She exhibits no distension. There is no tenderness. There is no rebound.   Musculoskeletal:   RIGHT LEG IN THE PROTECTIVE BOOT   Neurological: She is alert and oriented to person, place, and time.   Skin: Skin is warm. She is not diaphoretic.       Labs:        Invalid input(s): [id number]      No results for input(s): SODIUM, POTASSIUM, CHLORIDE, CO2, BUN, CREATININE, MAGNESIUM, PHOSPHORUS, CALCIUM in the last 72 hours.  No results for input(s): ALTSGPT, ASTSGOT, ALKPHOSPHAT, TBILIRUBIN, DBILIRUBIN, GAMMAGT, AMYLASE, LIPASE, ALB, PREALBUMIN, GLUCOSE in the last 72 hours.  No results for input(s): RBC,  HEMOGLOBIN, HEMATOCRIT, PLATELETCT, PROTHROMBTM, APTT, INR, IRON, FERRITIN, TOTIRONBC in the last 72 hours.            Hemodynamics:  Temp (24hrs), Av.6 °C (97.8 °F), Min:36.3 °C (97.3 °F), Max:37 °C (98.6 °F)  Temperature: 36.3 °C (97.3 °F)  Pulse  Av.2  Min: 52  Max: 89   Blood Pressure: 157/82 mmHg     Respiratory:    Respiration: 18, Pulse Oximetry: 95 %     Work Of Breathing / Effort: Mild  RUL Breath Sounds: Clear, RML Breath Sounds: Clear, RLL Breath Sounds: Clear, BENNIE Breath Sounds: Clear, LLL Breath Sounds: Clear  Fluids:    Intake/Output Summary (Last 24 hours) at 03/15/17 1034  Last data filed at 03/15/17 1010   Gross per 24 hour   Intake    660 ml   Output      0 ml   Net    660 ml        GI/Nutrition:  Orders Placed This Encounter   Procedures   • DIET ORDER     Standing Status: Standing      Number of Occurrences: 1      Standing Expiration Date:      Order Specific Question:  Diet:     Answer:  Regular [1]     Medical Decision Making, by Problem:  Active Hospital Problems    Diagnosis   • Cellulitis of right foot [L03.115]   • Anemia [D64.9]   • 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  S/P I&D  ORTHO INPUT IS NOTED  I.D INPUT IS NOTED  IV ZINACEF UNTIL 2017  WOUND CARE    CHRONIC RENAL FAILURE  AT BASELINE    VILLAVICENCIO VILLAVICENCIO    NO ACUTE CHANGE    HTN  CONTROLLED  ON AMLODIPINE  COREG    SEIZURE  ON DILANTIN    MOOD DISORDER  ATIVAN  TRILEPTAL  PSYCH INPUT IS NOTED          Dey catheter: No Dey

## 2017-03-16 PROCEDURE — A9270 NON-COVERED ITEM OR SERVICE: HCPCS | Performed by: HOSPITALIST

## 2017-03-16 PROCEDURE — 700111 HCHG RX REV CODE 636 W/ 250 OVERRIDE (IP): Performed by: INTERNAL MEDICINE

## 2017-03-16 PROCEDURE — 700102 HCHG RX REV CODE 250 W/ 637 OVERRIDE(OP): Performed by: INTERNAL MEDICINE

## 2017-03-16 PROCEDURE — 700111 HCHG RX REV CODE 636 W/ 250 OVERRIDE (IP): Performed by: HOSPITALIST

## 2017-03-16 PROCEDURE — 700102 HCHG RX REV CODE 250 W/ 637 OVERRIDE(OP): Performed by: PSYCHIATRY & NEUROLOGY

## 2017-03-16 PROCEDURE — A9270 NON-COVERED ITEM OR SERVICE: HCPCS | Performed by: INTERNAL MEDICINE

## 2017-03-16 PROCEDURE — 770006 HCHG ROOM/CARE - MED/SURG/GYN SEMI*

## 2017-03-16 PROCEDURE — 700102 HCHG RX REV CODE 250 W/ 637 OVERRIDE(OP): Performed by: HOSPITALIST

## 2017-03-16 PROCEDURE — A9270 NON-COVERED ITEM OR SERVICE: HCPCS | Performed by: PSYCHIATRY & NEUROLOGY

## 2017-03-16 PROCEDURE — 700105 HCHG RX REV CODE 258: Performed by: INTERNAL MEDICINE

## 2017-03-16 PROCEDURE — 99232 SBSQ HOSP IP/OBS MODERATE 35: CPT | Performed by: FAMILY MEDICINE

## 2017-03-16 RX ADMIN — OXYCODONE HYDROCHLORIDE 5 MG: 5 TABLET ORAL at 20:19

## 2017-03-16 RX ADMIN — FOLIC ACID 1 MG: 1 TABLET ORAL at 07:56

## 2017-03-16 RX ADMIN — Medication 1300 MG: at 08:00

## 2017-03-16 RX ADMIN — LORAZEPAM 1 MG: 1 TABLET ORAL at 11:12

## 2017-03-16 RX ADMIN — CEFUROXIME SODIUM 750 MG: 7.5 INJECTION, POWDER, FOR SOLUTION INTRAVENOUS at 05:43

## 2017-03-16 RX ADMIN — LORAZEPAM 1 MG: 1 TABLET ORAL at 04:38

## 2017-03-16 RX ADMIN — AMLODIPINE BESYLATE 5 MG: 5 TABLET ORAL at 07:56

## 2017-03-16 RX ADMIN — CEFUROXIME SODIUM 750 MG: 7.5 INJECTION, POWDER, FOR SOLUTION INTRAVENOUS at 22:29

## 2017-03-16 RX ADMIN — CARVEDILOL 25 MG: 25 TABLET, FILM COATED ORAL at 07:56

## 2017-03-16 RX ADMIN — CEFUROXIME SODIUM 750 MG: 7.5 INJECTION, POWDER, FOR SOLUTION INTRAVENOUS at 14:21

## 2017-03-16 RX ADMIN — HEPARIN SODIUM 5000 UNITS: 5000 INJECTION, SOLUTION INTRAVENOUS; SUBCUTANEOUS at 05:43

## 2017-03-16 RX ADMIN — ATORVASTATIN CALCIUM 40 MG: 40 TABLET, FILM COATED ORAL at 20:19

## 2017-03-16 RX ADMIN — HEPARIN SODIUM 5000 UNITS: 5000 INJECTION, SOLUTION INTRAVENOUS; SUBCUTANEOUS at 20:20

## 2017-03-16 RX ADMIN — PHENYTOIN SODIUM 200 MG: 100 CAPSULE, EXTENDED RELEASE ORAL at 07:57

## 2017-03-16 RX ADMIN — OXCARBAZEPINE 150 MG: 150 TABLET, FILM COATED ORAL at 07:58

## 2017-03-16 RX ADMIN — Medication 1300 MG: at 20:20

## 2017-03-16 RX ADMIN — HEPARIN SODIUM 5000 UNITS: 5000 INJECTION, SOLUTION INTRAVENOUS; SUBCUTANEOUS at 14:21

## 2017-03-16 RX ADMIN — OXCARBAZEPINE 150 MG: 150 TABLET, FILM COATED ORAL at 20:20

## 2017-03-16 RX ADMIN — Medication 1300 MG: at 14:21

## 2017-03-16 RX ADMIN — CARVEDILOL 25 MG: 25 TABLET, FILM COATED ORAL at 17:22

## 2017-03-16 RX ADMIN — LORAZEPAM 1 MG: 1 TABLET ORAL at 20:19

## 2017-03-16 RX ADMIN — ASPIRIN 325 MG: 325 TABLET, FILM COATED ORAL at 07:56

## 2017-03-16 RX ADMIN — CYANOCOBALAMIN TAB 500 MCG 1000 MCG: 500 TAB at 07:56

## 2017-03-16 RX ADMIN — PHENYTOIN SODIUM 200 MG: 100 CAPSULE, EXTENDED RELEASE ORAL at 20:20

## 2017-03-16 ASSESSMENT — ENCOUNTER SYMPTOMS
NECK PAIN: 0
BACK PAIN: 0
HEMOPTYSIS: 0
ORTHOPNEA: 0
BLURRED VISION: 0
NAUSEA: 0
MYALGIAS: 0
FEVER: 0
SPUTUM PRODUCTION: 0
HEARTBURN: 0
COUGH: 0
PALPITATIONS: 0
HEADACHES: 0
TREMORS: 0
DIZZINESS: 0
DIARRHEA: 0
WEIGHT LOSS: 0
DOUBLE VISION: 0
VOMITING: 0
ABDOMINAL PAIN: 0
PHOTOPHOBIA: 0
CHILLS: 0
TINGLING: 0

## 2017-03-16 ASSESSMENT — PAIN SCALES - GENERAL
PAINLEVEL_OUTOF10: 2

## 2017-03-16 NOTE — PROGRESS NOTES
Hospital Medicine Progress Note, Adult, Complex               Author: Belén Tony Date & Time created: 3/16/2017  10:52 AM     Interval History:  46 y.o.-year-old female admitted 2/26/2017 with foot infection,MODD DISORDER AND  IBARRA IBARRA.    Review of Systems:  Review of Systems   Constitutional: Negative for fever, chills and weight loss.   HENT: Negative for hearing loss and tinnitus.    Eyes: Negative for blurred vision, double vision and photophobia.   Respiratory: Negative for cough, hemoptysis and sputum production.    Cardiovascular: Negative for chest pain, palpitations and orthopnea.   Gastrointestinal: Negative for heartburn, nausea and vomiting.   Genitourinary: Negative for dysuria, urgency and frequency.   Musculoskeletal: Negative for myalgias, back pain and neck pain.   Skin: Negative for itching.   Neurological: Negative for dizziness, tingling, tremors and headaches.       Physical Exam:  Physical Exam   Constitutional: She is oriented to person, place, and time. No distress.   HENT:   Head: Normocephalic and atraumatic.   Eyes: Right eye exhibits no discharge. Left eye exhibits discharge.   Neck: JVD present.   Cardiovascular: Normal rate and regular rhythm.    Pulmonary/Chest: No stridor. No respiratory distress. She has no wheezes. She has no rales.   Abdominal: Soft. She exhibits no distension. There is no tenderness. There is no rebound.   Musculoskeletal:   PROTECTIVE BOOT ON THE RIGHT LEG.   Neurological: She is alert and oriented to person, place, and time.   Skin: Skin is warm. She is not diaphoretic.       Labs:        Invalid input(s): [id number]      No results for input(s): SODIUM, POTASSIUM, CHLORIDE, CO2, BUN, CREATININE, MAGNESIUM, PHOSPHORUS, CALCIUM in the last 72 hours.  No results for input(s): ALTSGPT, ASTSGOT, ALKPHOSPHAT, TBILIRUBIN, DBILIRUBIN, GAMMAGT, AMYLASE, LIPASE, ALB, PREALBUMIN, GLUCOSE in the last 72 hours.  No results for input(s): RBC, HEMOGLOBIN,  HEMATOCRIT, PLATELETCT, PROTHROMBTM, APTT, INR, IRON, FERRITIN, TOTIRONBC in the last 72 hours.            Hemodynamics:  Temp (24hrs), Av.3 °C (97.4 °F), Min:36.1 °C (96.9 °F), Max:36.7 °C (98.1 °F)  Temperature: 36.3 °C (97.3 °F)  Pulse  Av  Min: 52  Max: 89   Blood Pressure: 159/73 mmHg     Respiratory:    Respiration: 18, Pulse Oximetry: 100 %     Work Of Breathing / Effort: Mild  RUL Breath Sounds: Clear, RML Breath Sounds: Clear, RLL Breath Sounds: Clear, BENNIE Breath Sounds: Clear, LLL Breath Sounds: Clear  Fluids:    Intake/Output Summary (Last 24 hours) at 17 1052  Last data filed at 17 0819   Gross per 24 hour   Intake   1560 ml   Output      0 ml   Net   1560 ml        GI/Nutrition:  Orders Placed This Encounter   Procedures   • DIET ORDER     Standing Status: Standing      Number of Occurrences: 1      Standing Expiration Date:      Order Specific Question:  Diet:     Answer:  Regular [1]     Medical Decision Making, by Problem:  Active Hospital Problems    Diagnosis   • Cellulitis of right foot [L03.115]   • Anemia [D64.9]   • 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  S/P I&D  NO ACUTE CHANGE  ORTHO INPUT IS NOTED  I.D INPUT IS NOTED  IV ZINACEF UNTIL 2017  WOUND CARE    CHRONIC RENAL FAILURE  AT BASELINE    VILLAVICENCIO VILLAVICENCIO    NO ACUTE CHANGE    HTN  ON AMLODIPINE  COREG    SEIZURE  ON DILANTIN    MOOD DISORDER  ATIVAN  TRILEPTAL  PSYCH INPUT IS NOTED    Core Measures

## 2017-03-16 NOTE — CARE PLAN
Problem: Safety  Goal: Will remain free from injury  Bed in low and locked position. Call light and belongings within reach. Hourly rounding.     Problem: Knowledge Deficit  Goal: Knowledge of disease process/condition, treatment plan, diagnostic tests, and medications will improve  Plan of care discussed with the patient.

## 2017-03-16 NOTE — PROGRESS NOTES
Received report and assumed care of the patient. Patient is alert and oriented x4. Flat affect. Up self with a SBA. Bed in low and locked position. Call light and belongings within reach. Dressing and boot/immobilizer in place to RLE. Plan of care discussed with the patient. Hourly rounding in place.

## 2017-03-16 NOTE — CARE PLAN
Problem: Communication  Goal: The ability to communicate needs accurately and effectively will improve  Outcome: PROGRESSING AS EXPECTED  Patient is able to make needs known    Problem: Safety  Goal: Will remain free from injury  Outcome: PROGRESSING AS EXPECTED  Patient did not have a fall this shift. Safety measures intact. Refusing bed alarm after further education from this RN

## 2017-03-16 NOTE — DISCHARGE PLANNING
CCS received a message from [facility] the referral has been accepted.      CCS spoke to [place] to Samreen  At [facility]. The referral is pending. Samreen is requesting clarification on wound care needs of the patient's. SW on floor mitchel has been notified.

## 2017-03-16 NOTE — PROGRESS NOTES
Patient A&Ox4 this shift. Patient rates foot pain a 2/10 on pain scale but declines interventions at this time. Central line has closed system set up in place running at KVO. Bilateral lower extremities CSM intact. Patient has two DSD and immobilizer on RLE. Safety measures intact. Patient refusing bed alarm after further education from this RN. Hourly rounding completed. No s/sx of distress this shift.     0730- Patient continues to refuse morning labs.  1115- Two RLE dressings changed.  RLE cleansed with NS and pat dry. Foam dressing applied. Right Dorsal foot incision cleansed with normal saline and then pat dry. Adaptic applied over incision and then covered with gauze. Right Plantar wound cleansed with normal saline and pat dry. Honeycolloid applied to wound bed and covered in gauze. Entire RLE then wrapped in Kerlex and an ace bandage. Dressing to be changed every 48 hours.

## 2017-03-16 NOTE — DISCHARGE PLANNING
CCS received a SNf choice form per the choice form the referral has been sent to both [facility].

## 2017-03-16 NOTE — PROGRESS NOTES
Patient is refusing a bed alarm. Educated regarding the use of an alarm for her safety and she is still refusing. All other safety precautions are in place.

## 2017-03-16 NOTE — PROGRESS NOTES
"Infectious Disease Progress Note    Author: Ivy Gutierrez M.D.    DOS & Time created: 3/16/2017  1:47 PM    Chief Complaint   Patient presents with   • Wound Infection     pt has wound on right foot, went to [facility], report cellutliis with drainage on wound     Interval History:  46-year-old white female with history of morbid obesity,  Moyamoya syndrome with prior stroke, who presents with a traumatic injury to her right foot with subsequent development of cellulitis and abscess.  S/p repeat I&D with 2nd and 3rd metatarsal head excisions and GSR on 3/5.     AF WBC 13.3 s/p drainage abscess-has been refusing IV  3/1- willing to take iv. No fevers. C/o pain. Apparently walking on the foot  3/2- says she is feeling better. No fevers.  3/3- in good spirits. No fevers.   3/4 AF WBC 9.9 foot worse-denies pain  3/5 AF no CBC NPO for surgery. No new complaints  3/6 AF, WBC 8.6.  Minimal pain to Right foot, complains that \"boot is heavy.\"  3/7 AF WBC 7.3 feeling better today-coloring books Denies SE abx  3/8 AF WBC 8.1 ambulates to BR-pain controlled. Still coloring  3/10- no fevers. Had another I&D 3/9  3/12- no fevers. Sitting in bed coloring.  3/13- no fevers. No new issues.  3/14- no fevers. Awaiting placement  3/15- no fevers. No new issues  3/16- no fevers. No new issues  Labs Reviewed, Medications Reviewed, Radiology Reviewed and Wound Reviewed.    Review of Systems:  Review of Systems   Constitutional: Negative for fever and chills.   Cardiovascular: Negative for chest pain.   Gastrointestinal: Negative for nausea, vomiting, abdominal pain and diarrhea.   Musculoskeletal: Positive for joint pain.        Foot pain- improving   Skin: Negative for rash.   Neurological: Negative for headaches.   All other systems reviewed and are negative.      Hemodynamics:  Temp (24hrs), Av.3 °C (97.4 °F), Min:36.1 °C (96.9 °F), Max:36.7 °C (98.1 °F)  Temperature: 36.3 °C (97.3 °F)  Pulse  Av  Min: 52  Max: 89   Blood " Pressure: 159/73 mmHg       Physical Exam:  Physical Exam   Constitutional: She is oriented to person, place, and time. She appears well-developed. No distress.   Obese   HENT:   Head: Normocephalic and atraumatic.   Eyes: EOM are normal. Pupils are equal, round, and reactive to light.   Neck: Neck supple.   Right SCV PICC- tender to touch, minimal erythema and bruising   Cardiovascular: Normal rate and regular rhythm.    No murmur heard.  Pulmonary/Chest: Effort normal and breath sounds normal. No respiratory distress. She has no wheezes. She has no rales.   Abdominal: Soft. Bowel sounds are normal. She exhibits no distension. There is no tenderness.   Musculoskeletal: She exhibits tenderness.   Right foot- DM walking boot, dressed   Neurological: She is alert and oriented to person, place, and time.   Skin: Skin is warm and dry. No rash noted.   Psychiatric:   Poor insight   Nursing note and vitals reviewed.      Labs:  No results for input(s): WBC, RBC, HEMOGLOBIN, HEMATOCRIT, MCV, MCH, RDW, PLATELETCT, MPV, NEUTSPOLYS, LYMPHOCYTES, MONOCYTES, EOSINOPHILS, BASOPHILS, RBCMORPHOLO in the last 72 hours.  No results for input(s): SODIUM, POTASSIUM, CHLORIDE, CO2, GLUCOSE, BUN, CPKTOTAL in the last 72 hours.  No results for input(s): ALBUMIN, TBILIRUBIN, ALKPHOSPHAT, TOTPROTEIN, ALTSGPT, ASTSGOT, CREATININE in the last 72 hours.     Imaging:       CT-FOOT W/O PLUS RECONS RIGHT (Final result) Result time: 02/27/17 00:30:10     Final result by Gee Tam M.D. (02/27/17 00:30:10)     Impression:     1.  Soft tissue swelling with gas in the distal plantar aspect of the foot at the level of the MTP joints with draining wound to the plantar surface of the skin.  2.  Minimal gas in the extensor aspect of the foot at the level of the MTP joints which may represent extension of cellulitis anteriorly.  3.  No loculated fluid collection identified.  4.  Diffuse soft tissue swelling in the remainder of the foot.  5.  No  acute bony abnormality. No evidence of osteomyelitis. No radiopaque soft tissue foreign body identified     MR-FOOT-W/O RIGHT (Final result) Result time: 02/28/17 11:19:53     Final result by Aureliano Flores M.D. (02/28/17 11:19:53)     Impression:     Forefoot bilobed abscess with apparent plantar draining sinus. Largest loculation is in the dorsal soft tissues as discussed above  No unenhanced MR evidence of osteomyelitis  Metatarsophalangeal joint effusions most likely are reactive given lack of bony destruction/marginal erosive change     Final result by Rad Intf (02/27/17 11:53:22)     Narrative:     TransthoracicEcho Report  CONCLUSIONS  No prior study is available for comparison.   Normal left ventricular size, thickness, systolic function, and   diastolic function.  Left ventricular ejection fraction is visually estimated to be 65%.  Normal right ventricular size and systolic function.  Trace tricuspid regurgitation.  Normal pericardium without effusion.     Medications:  Current Facility-Administered Medications   Medication Dose Frequency Provider Last Rate Last Dose   • albuterol inhaler 2 Puff  2 Puff Q4HRS PRN Chalion Bender M.D.   2 Puff at 03/12/17 1458   • NS (BOLUS) infusion 500 mL  500 mL PRN Slick Montes M.D.       • cefUROXime (ZINACEF) 750 mg in NS 50 mL IVPB  750 mg Q8HR Ivy Gutierrez M.D.   Stopped at 03/16/17 0713   • haloperidol lactate (HALDOL) injection 5 mg  5 mg Q4HRS PRN Nabeel Monahan M.D.   5 mg at 03/02/17 0156   • oxcarbazepine (TRILEPTAL) tablet 150 mg  150 mg BID Lynn Carpenter M.D.   150 mg at 03/16/17 0758   • diphenoxylate-atropine (LOMOTIL) 2.5-0.025 MG per tablet 1 Tab  1 Tab 4X/DAY PRN Shazia Valle M.D.   1 Tab at 03/01/17 1125   • heparin injection 5,000 Units  5,000 Units Q8HRS Augustus Guerin M.D.   5,000 Units at 03/16/17 0543   • lorazepam (ATIVAN) tablet 1 mg  1 mg TID Augustus Guerin M.D.   1 mg at 03/16/17 1112   • sodium bicarbonate (SODIUM  BICARBONATE) tablet 1,300 mg  1,300 mg TID Evelia Yepez M.D.   1,300 mg at 03/16/17 0800   • oxycodone immediate-release (ROXICODONE) tablet 5 mg  5 mg Q6HRS PRN Evelia Yepez M.D.   5 mg at 03/14/17 1034   • amlodipine (NORVASC) tablet 5 mg  5 mg DAILY Chalino Bender M.D.   5 mg at 03/16/17 0756   • aspirin (ASA) tablet 325 mg  325 mg DAILY Chalino Bender M.D.   325 mg at 03/16/17 0756   • atorvastatin (LIPITOR) tablet 40 mg  40 mg Nightly Chalino Bender M.D.   40 mg at 03/15/17 2103   • carvedilol (COREG) tablet 25 mg  25 mg BID WITH MEALS Chalino Bender M.D.   25 mg at 03/16/17 0756   • cyanocobalamin (VITAMIN B-12) tablet 1,000 mcg  1,000 mcg DAILY Chalino Bender M.D.   1,000 mcg at 03/16/17 0756   • folic acid (FOLVITE) tablet 1 mg  1 mg DAILY Chalino Bender M.D.   1 mg at 03/16/17 0756   • phenytoin ER (DILANTIN) capsule 200 mg  200 mg BID Chalino Bender M.D.   200 mg at 03/16/17 0757   • docusate sodium (COLACE) capsule 100 mg  100 mg BID Chalino Bender M.D.   100 mg at 03/15/17 2103   • bisacodyl (DULCOLAX) suppository 10 mg  10 mg Q24HRS PRN Chalino Bender M.D.       • fleet enema 133 mL  1 Each Once PRN Chalino Bender M.D.       • Respiratory Care per Protocol   Continuous RT Chalino Bender M.D.       • ondansetron (ZOFRAN) syringe/vial injection 4 mg  4 mg Q4HRS PRN Chalino Bender M.D.       • ondansetron (ZOFRAN ODT) dispertab 4 mg  4 mg Q4HRS PRN Chalino Bender M.D.       • promethazine (PHENERGAN) tablet 12.5-25 mg  12.5-25 mg Q4HRS PRN Chalino Bender M.D.       • promethazine (PHENERGAN) suppository 12.5-25 mg  12.5-25 mg Q4HRS PRN Chalino Bender M.D.       • prochlorperazine (COMPAZINE) injection 5-10 mg  5-10 mg Q4HRS PRN Chalino Bender M.D.       • acetaminophen (TYLENOL) tablet 650 mg  650 mg Q6HRS PRN Chalino Bender M.D.   650 mg at 03/12/17 1056   • NS (BOLUS) infusion 500 mL  500 mL Once PRN Chalino Bender  M.D.         Last reviewed on 3/9/2017  3:38 PM by Rin Mark R.N.    Micro:  Results     Procedure Component Value Units Date/Time    CULTURE TISSUE W/ GRM STAIN [[id number]] Collected:  03/05/17 1307    Order Status:  Completed Specimen Information:  Bone Updated:  03/10/17 1544     Gram Stain Result --      Result:        Rare WBCs.  No organisms seen.       Significant Indicator NEG      Source BONE      Site Right Metatarsal Heads      Tissue Culture No growth at 72 hours.     ANAEROBIC CULTURE [[id number]] Collected:  03/05/17 1307    Order Status:  Completed Specimen Information:  Bone Updated:  03/10/17 1544     Significant Indicator NEG      Source BONE      Site Right Metatarsal Heads      Anaerobic Culture, Culture Res No Anaerobes isolated.     CULTURE TISSUE W/ GRM STAIN [[id number]]  (Abnormal)  (Susceptibility) Collected:  03/05/17 1302    Order Status:  Completed Specimen Information:  Tissue Updated:  03/09/17 1612     Significant Indicator POS (POS)      Source TISS      Site Right Foot Tissue      Tissue Culture -- (A)      Gram Stain Result -- (A)      Result:        Few WBCs.  Moderate Gram positive cocci.       Tissue Culture -- (A)      Result:        Staphylococcus aureus  Rare growth      Culture & Susceptibility     STAPHYLOCOCCUS AUREUS     Antibiotic Sensitivity Microscan Unit Status    Ampicillin/sulbactam Sensitive <=8/4 mcg/mL Final    Clindamycin Sensitive <=0.5 mcg/mL Final    Daptomycin Sensitive <=0.5 mcg/mL Final    Erythromycin Sensitive <=0.5 mcg/mL Final    Moxifloxacin Sensitive <=0.5 mcg/mL Final    Oxacillin Sensitive <=0.25 mcg/mL Final    Tetracycline Sensitive <=4 mcg/mL Final    Trimeth/Sulfa Sensitive <=0.5/9.5 mcg/mL Final    Vancomycin Sensitive 1 mcg/mL Final                       ANAEROBIC CULTURE [[id number]] Collected:  03/05/17 1302    Order Status:  Completed Specimen Information:  Tissue Updated:  03/09/17 1612     Significant Indicator NEG      Source  TISS      Site Right Foot Tissue      Anaerobic Culture, Culture Res No Anaerobes isolated.           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 Lynn.    OR cx sent of tissue and bone- negative to date Tissue +MSSA  3/9 went to OR  For  repeat I&D with [place] change vs delayed primary closure.  Vascular studies normal  Continue IV Zinacef-endpoint 6 weeks from last surgery - 4/23/17  Continue wound care  Check labs in am    Chronic renal failure- 2.04  Avoiding nephrotoxic drugs  Adjusting abx as needed for renal function    Moyamoya syndrome with prior stroke/dementia  Noncompliant with abx  Barrier to care    Remains at risk for limb loss  Awaiting placement  ID will sign off. Please call as needed

## 2017-03-17 PROCEDURE — 700111 HCHG RX REV CODE 636 W/ 250 OVERRIDE (IP): Performed by: INTERNAL MEDICINE

## 2017-03-17 PROCEDURE — 700102 HCHG RX REV CODE 250 W/ 637 OVERRIDE(OP): Performed by: INTERNAL MEDICINE

## 2017-03-17 PROCEDURE — 700111 HCHG RX REV CODE 636 W/ 250 OVERRIDE (IP): Performed by: HOSPITALIST

## 2017-03-17 PROCEDURE — A9270 NON-COVERED ITEM OR SERVICE: HCPCS | Performed by: INTERNAL MEDICINE

## 2017-03-17 PROCEDURE — 700102 HCHG RX REV CODE 250 W/ 637 OVERRIDE(OP): Performed by: HOSPITALIST

## 2017-03-17 PROCEDURE — 700112 HCHG RX REV CODE 229: Performed by: HOSPITALIST

## 2017-03-17 PROCEDURE — 700102 HCHG RX REV CODE 250 W/ 637 OVERRIDE(OP): Performed by: PSYCHIATRY & NEUROLOGY

## 2017-03-17 PROCEDURE — A9270 NON-COVERED ITEM OR SERVICE: HCPCS | Performed by: HOSPITALIST

## 2017-03-17 PROCEDURE — 700105 HCHG RX REV CODE 258: Performed by: INTERNAL MEDICINE

## 2017-03-17 PROCEDURE — 99232 SBSQ HOSP IP/OBS MODERATE 35: CPT | Performed by: FAMILY MEDICINE

## 2017-03-17 PROCEDURE — A9270 NON-COVERED ITEM OR SERVICE: HCPCS | Performed by: PSYCHIATRY & NEUROLOGY

## 2017-03-17 PROCEDURE — 770006 HCHG ROOM/CARE - MED/SURG/GYN SEMI*

## 2017-03-17 RX ADMIN — AMLODIPINE BESYLATE 5 MG: 5 TABLET ORAL at 08:42

## 2017-03-17 RX ADMIN — LORAZEPAM 1 MG: 1 TABLET ORAL at 20:06

## 2017-03-17 RX ADMIN — Medication 1300 MG: at 08:52

## 2017-03-17 RX ADMIN — Medication 1300 MG: at 20:05

## 2017-03-17 RX ADMIN — CARVEDILOL 25 MG: 25 TABLET, FILM COATED ORAL at 16:32

## 2017-03-17 RX ADMIN — CYANOCOBALAMIN TAB 500 MCG 1000 MCG: 500 TAB at 08:41

## 2017-03-17 RX ADMIN — LORAZEPAM 1 MG: 1 TABLET ORAL at 03:30

## 2017-03-17 RX ADMIN — OXCARBAZEPINE 150 MG: 150 TABLET, FILM COATED ORAL at 20:06

## 2017-03-17 RX ADMIN — FOLIC ACID 1 MG: 1 TABLET ORAL at 08:42

## 2017-03-17 RX ADMIN — DOCUSATE SODIUM 100 MG: 100 CAPSULE ORAL at 20:06

## 2017-03-17 RX ADMIN — ACETAMINOPHEN 650 MG: 325 TABLET, FILM COATED ORAL at 08:46

## 2017-03-17 RX ADMIN — CEFUROXIME SODIUM 750 MG: 7.5 INJECTION, POWDER, FOR SOLUTION INTRAVENOUS at 06:27

## 2017-03-17 RX ADMIN — CEFUROXIME SODIUM 750 MG: 7.5 INJECTION, POWDER, FOR SOLUTION INTRAVENOUS at 14:46

## 2017-03-17 RX ADMIN — ASPIRIN 325 MG: 325 TABLET, FILM COATED ORAL at 08:41

## 2017-03-17 RX ADMIN — Medication 1300 MG: at 16:32

## 2017-03-17 RX ADMIN — CARVEDILOL 25 MG: 25 TABLET, FILM COATED ORAL at 08:41

## 2017-03-17 RX ADMIN — PHENYTOIN SODIUM 200 MG: 100 CAPSULE, EXTENDED RELEASE ORAL at 08:42

## 2017-03-17 RX ADMIN — PHENYTOIN SODIUM 200 MG: 100 CAPSULE, EXTENDED RELEASE ORAL at 20:05

## 2017-03-17 RX ADMIN — HEPARIN SODIUM 5000 UNITS: 5000 INJECTION, SOLUTION INTRAVENOUS; SUBCUTANEOUS at 22:07

## 2017-03-17 RX ADMIN — ATORVASTATIN CALCIUM 40 MG: 40 TABLET, FILM COATED ORAL at 20:06

## 2017-03-17 RX ADMIN — OXCARBAZEPINE 150 MG: 150 TABLET, FILM COATED ORAL at 08:44

## 2017-03-17 RX ADMIN — HEPARIN SODIUM 5000 UNITS: 5000 INJECTION, SOLUTION INTRAVENOUS; SUBCUTANEOUS at 06:26

## 2017-03-17 RX ADMIN — CEFUROXIME SODIUM 750 MG: 7.5 INJECTION, POWDER, FOR SOLUTION INTRAVENOUS at 22:07

## 2017-03-17 ASSESSMENT — ENCOUNTER SYMPTOMS
WEIGHT LOSS: 0
NECK PAIN: 0
NAUSEA: 0
BLURRED VISION: 0
MYALGIAS: 0
SPUTUM PRODUCTION: 0
VOMITING: 0
FEVER: 0
PALPITATIONS: 0
COUGH: 0
HEMOPTYSIS: 0
TINGLING: 0
DIZZINESS: 0
PHOTOPHOBIA: 0
CHILLS: 0
HEADACHES: 0
DOUBLE VISION: 0
BACK PAIN: 0
ORTHOPNEA: 0
HEARTBURN: 0
TREMORS: 0

## 2017-03-17 ASSESSMENT — PAIN SCALES - GENERAL
PAINLEVEL_OUTOF10: 0
PAINLEVEL_OUTOF10: 4

## 2017-03-17 NOTE — PROGRESS NOTES
Pt resting with eyes closed at this time.  Bed in lowest position. Call light within reach. Personal belongings within reach. Non-skid socks on both feet. Will continue to monitor and provide care.

## 2017-03-17 NOTE — PROGRESS NOTES
Hospital Medicine Progress Note, Adult, Complex               Author: Belén Tony Date & Time created: 3/17/2017  11:18 AM     Interval History:    46 y.o.-year-old female admitted 2/26/2017 with foot infection,MODD DISORDER AND  IBARRA IBARRA.  Review of Systems:  Review of Systems   Constitutional: Negative for fever, chills and weight loss.   HENT: Negative for hearing loss and tinnitus.    Eyes: Negative for blurred vision, double vision and photophobia.   Respiratory: Negative for cough, hemoptysis and sputum production.    Cardiovascular: Negative for chest pain, palpitations and orthopnea.   Gastrointestinal: Negative for heartburn, nausea and vomiting.   Genitourinary: Negative for dysuria, urgency and frequency.   Musculoskeletal: Negative for myalgias, back pain and neck pain.   Skin: Negative for itching.   Neurological: Negative for dizziness, tingling, tremors and headaches.       Physical Exam:  Physical Exam   Constitutional: She is oriented to person, place, and time. No distress.   HENT:   Head: Normocephalic and atraumatic.   Eyes: Right eye exhibits no discharge. Left eye exhibits no discharge.   Neck: Neck supple. No JVD present.   Cardiovascular: Normal rate and regular rhythm.    Pulmonary/Chest: Effort normal. No stridor. No respiratory distress. She has no wheezes. She has no rales.   Abdominal: She exhibits no distension. There is no tenderness. There is no rebound.   Musculoskeletal: She exhibits no tenderness.   RIGHT LEG IN PROTECTIVE BOOT.   Neurological: She is alert and oriented to person, place, and time.   Skin: Skin is warm and dry. She is not diaphoretic.       Labs:        Invalid input(s): [id number]      No results for input(s): SODIUM, POTASSIUM, CHLORIDE, CO2, BUN, CREATININE, MAGNESIUM, PHOSPHORUS, CALCIUM in the last 72 hours.  No results for input(s): ALTSGPT, ASTSGOT, ALKPHOSPHAT, TBILIRUBIN, DBILIRUBIN, GAMMAGT, AMYLASE, LIPASE, ALB, PREALBUMIN, GLUCOSE in the  last 72 hours.  No results for input(s): RBC, HEMOGLOBIN, HEMATOCRIT, PLATELETCT, PROTHROMBTM, APTT, INR, IRON, FERRITIN, TOTIRONBC in the last 72 hours.            Hemodynamics:  Temp (24hrs), Av.7 °C (98 °F), Min:36.3 °C (97.4 °F), Max:37 °C (98.6 °F)  Temperature: 36.3 °C (97.4 °F)  Pulse  Av  Min: 52  Max: 89   Blood Pressure: 156/76 mmHg     Respiratory:    Respiration: 16, Pulse Oximetry: 96 %     Work Of Breathing / Effort: Mild  RUL Breath Sounds: Clear, RML Breath Sounds: Clear, RLL Breath Sounds: Clear, BENNIE Breath Sounds: Clear, LLL Breath Sounds: Clear  Fluids:    Intake/Output Summary (Last 24 hours) at 17 1118  Last data filed at 17 0600   Gross per 24 hour   Intake   1280 ml   Output      0 ml   Net   1280 ml        GI/Nutrition:  Orders Placed This Encounter   Procedures   • DIET ORDER     Standing Status: Standing      Number of Occurrences: 1      Standing Expiration Date:      Order Specific Question:  Diet:     Answer:  Regular [1]     Medical Decision Making, by Problem:  Active Hospital Problems    Diagnosis   • Cellulitis of right foot [L03.115]   • Anemia [D64.9]   • 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]   • CKD stage 4 secondary to hypertension (CMS-HCC) [I12.9, N18.4]   • Seizure disorder, grand mal (CMS-HCC) [G40.409]   • HLD (hyperlipidemia) [E78.5]   • Dilated cardiomyopathy (CMS-HCC) [I42.0]   • Essential hypertension, benign [I10]   46YR OLD F WITH RIGHT FOOT CELLULITIS AND ABCESS OF RIGHT  FOOT  DISCUSSED THE PT DURING THE ROUNDS WITH THE NURSING STAFF,PT STATED   THAT SHE ONLY WANTS TO GO HOME  S/P I&D  NO ACUTE CHANGE  ORTHO INPUT IS NOTED  I.D INPUT IS NOTED  IV ZINACEF UNTIL 2017  WOUND CARE    CHRONIC RENAL FAILURE  AT BASELINE    FRED VILLAVICENCIO    NO ACUTE CHANGE    HTN  ON AMLODIPINE  COREG    SEIZURE  ON DILANTIN    MOOD DISORDER  ATIVAN  TRILEPTAL  PSYCH INPUT IS NOTED    Core Measures

## 2017-03-17 NOTE — PROGRESS NOTES
Pt's central line does not draw blood, and day-shift RN stated that MD is aware of this. Pt refused to get blood from peripheral vein.  Will pass this to day-shift RN during report.

## 2017-03-17 NOTE — PROGRESS NOTES
"LIMB PRESERVATION SERVICE       45 y/o female with moyamoya syndrome with CVA, HTN, CKD. Admitted for right foot infection after stepping on rock. ID and Ortho involved. S/p I & D of R foot on 2/27. Non diabetic.      s/p I & D R dorsal foot with VAC application, R 2nd and 3rd MTH excision, R GSR by Dr. yLnn on 3/5  S/p  I & D R foot with closure by Dr. Lynn on 3/9    /84 mmHg  Pulse 65  Temp(Src) 36.4 °C (97.5 °F)  Resp 18  Ht 1.753 m (5' 9\")  Wt 119.9 kg (264 lb 5.3 oz)  BMI 39.02 kg/m2  SpO2 96%  Breastfeeding? No     ID has signed off      Daily wound care by nsg    SNF placement pending      PLAN  -Continue wound care  -Walking boot to RLE at all times until 6 weeks post-op  -Heel touch WB RLE in boot  -pt to f/u at wound clinic during LPS rounds on 3/31  "

## 2017-03-17 NOTE — CARE PLAN
Problem: Communication  Goal: The ability to communicate needs accurately and effectively will improve  Outcome: PROGRESSING AS EXPECTED  Patient is able to make needs known.    Problem: Safety  Goal: Will remain free from injury  Outcome: PROGRESSING AS EXPECTED  Patient did not have a fall this shift. Safety measures intact. Call bell in reach. Refusing bed alarm after further education from this RN

## 2017-03-17 NOTE — PROGRESS NOTES
Pt refuses bed alarm.  Instructed pt to call for assistance at anytime, and pt verbalized understanding.  Pt is aox4; 2 bed rails raised; call light within reach; all personal belongings within reach. Will continue to monitor and provide care.

## 2017-03-17 NOTE — PROGRESS NOTES
Patient A&Ox4 this shift. Patient rates foot pain a 4/10 on pain scale. PRN Tylenol administered per pt request. Central line has closed system set up in place running at KVO. Bilateral lower extremities CSM intact. Patient has two DSD and immobilizer on RLE. Safety measures intact. Patient refusing bed alarm after further education from this RN. Hourly rounding completed. No s/sx of distress this shift.     1000- Pain reassessment. Patient denies pain at this time and declines interventions.  1043- Right lateral surgical incision CDI with Foam adaptic in place. Right Plantar wound cleansed with normal saline and pat dry. Honeycolloid applied to wound bed and covered in gauze. Entire RLE then wrapped in Kerlex and an ace bandage.

## 2017-03-18 PROCEDURE — A9270 NON-COVERED ITEM OR SERVICE: HCPCS | Performed by: HOSPITALIST

## 2017-03-18 PROCEDURE — 700102 HCHG RX REV CODE 250 W/ 637 OVERRIDE(OP): Performed by: INTERNAL MEDICINE

## 2017-03-18 PROCEDURE — A9270 NON-COVERED ITEM OR SERVICE: HCPCS | Performed by: PSYCHIATRY & NEUROLOGY

## 2017-03-18 PROCEDURE — 700102 HCHG RX REV CODE 250 W/ 637 OVERRIDE(OP): Performed by: HOSPITALIST

## 2017-03-18 PROCEDURE — 700111 HCHG RX REV CODE 636 W/ 250 OVERRIDE (IP): Performed by: HOSPITALIST

## 2017-03-18 PROCEDURE — 700105 HCHG RX REV CODE 258: Performed by: INTERNAL MEDICINE

## 2017-03-18 PROCEDURE — 99232 SBSQ HOSP IP/OBS MODERATE 35: CPT | Performed by: HOSPITALIST

## 2017-03-18 PROCEDURE — 700102 HCHG RX REV CODE 250 W/ 637 OVERRIDE(OP): Performed by: PSYCHIATRY & NEUROLOGY

## 2017-03-18 PROCEDURE — 700111 HCHG RX REV CODE 636 W/ 250 OVERRIDE (IP): Performed by: INTERNAL MEDICINE

## 2017-03-18 PROCEDURE — A9270 NON-COVERED ITEM OR SERVICE: HCPCS | Performed by: INTERNAL MEDICINE

## 2017-03-18 PROCEDURE — 51798 US URINE CAPACITY MEASURE: CPT

## 2017-03-18 PROCEDURE — 700112 HCHG RX REV CODE 229: Performed by: HOSPITALIST

## 2017-03-18 PROCEDURE — 770006 HCHG ROOM/CARE - MED/SURG/GYN SEMI*

## 2017-03-18 RX ADMIN — PHENYTOIN SODIUM 200 MG: 100 CAPSULE, EXTENDED RELEASE ORAL at 20:10

## 2017-03-18 RX ADMIN — OXCARBAZEPINE 150 MG: 150 TABLET, FILM COATED ORAL at 08:14

## 2017-03-18 RX ADMIN — DOCUSATE SODIUM 100 MG: 100 CAPSULE ORAL at 20:08

## 2017-03-18 RX ADMIN — LORAZEPAM 1 MG: 1 TABLET ORAL at 20:08

## 2017-03-18 RX ADMIN — ASPIRIN 325 MG: 325 TABLET, FILM COATED ORAL at 08:13

## 2017-03-18 RX ADMIN — FOLIC ACID 1 MG: 1 TABLET ORAL at 08:12

## 2017-03-18 RX ADMIN — Medication 1300 MG: at 20:08

## 2017-03-18 RX ADMIN — PHENYTOIN SODIUM 200 MG: 100 CAPSULE, EXTENDED RELEASE ORAL at 08:14

## 2017-03-18 RX ADMIN — CARVEDILOL 25 MG: 25 TABLET, FILM COATED ORAL at 17:12

## 2017-03-18 RX ADMIN — HEPARIN SODIUM 5000 UNITS: 5000 INJECTION, SOLUTION INTRAVENOUS; SUBCUTANEOUS at 05:53

## 2017-03-18 RX ADMIN — CEFUROXIME SODIUM 750 MG: 7.5 INJECTION, POWDER, FOR SOLUTION INTRAVENOUS at 23:00

## 2017-03-18 RX ADMIN — Medication 1300 MG: at 08:12

## 2017-03-18 RX ADMIN — CARVEDILOL 25 MG: 25 TABLET, FILM COATED ORAL at 08:12

## 2017-03-18 RX ADMIN — Medication 1300 MG: at 14:06

## 2017-03-18 RX ADMIN — PHENYTOIN SODIUM 100 MG: 100 CAPSULE, EXTENDED RELEASE ORAL at 20:08

## 2017-03-18 RX ADMIN — ATORVASTATIN CALCIUM 40 MG: 40 TABLET, FILM COATED ORAL at 20:08

## 2017-03-18 RX ADMIN — CEFUROXIME SODIUM 750 MG: 7.5 INJECTION, POWDER, FOR SOLUTION INTRAVENOUS at 05:57

## 2017-03-18 RX ADMIN — CEFUROXIME SODIUM 750 MG: 7.5 INJECTION, POWDER, FOR SOLUTION INTRAVENOUS at 14:05

## 2017-03-18 RX ADMIN — OXCARBAZEPINE 150 MG: 150 TABLET, FILM COATED ORAL at 20:09

## 2017-03-18 RX ADMIN — CYANOCOBALAMIN TAB 500 MCG 1000 MCG: 500 TAB at 08:12

## 2017-03-18 RX ADMIN — LORAZEPAM 1 MG: 1 TABLET ORAL at 03:51

## 2017-03-18 RX ADMIN — LORAZEPAM 1 MG: 1 TABLET ORAL at 10:56

## 2017-03-18 RX ADMIN — AMLODIPINE BESYLATE 5 MG: 5 TABLET ORAL at 08:12

## 2017-03-18 ASSESSMENT — ENCOUNTER SYMPTOMS
ABDOMINAL PAIN: 0
DIZZINESS: 0
NAUSEA: 0
PALPITATIONS: 0
CHILLS: 0
CONSTIPATION: 0
FEVER: 0
VOMITING: 0
COUGH: 0
FOCAL WEAKNESS: 0
MYALGIAS: 0
SHORTNESS OF BREATH: 0
HEADACHES: 0
DIARRHEA: 0
WEAKNESS: 0

## 2017-03-18 ASSESSMENT — PAIN SCALES - GENERAL
PAINLEVEL_OUTOF10: 0

## 2017-03-18 NOTE — PROGRESS NOTES
Patient A&Ox4 this shift. Patient denies pain or the the need for interventions.  Central line has closed system set up in place running at KVO. Central line dressing changed and new tubing in place. Bilateral lower extremities CSM intact. Patient has two DSD and immobilizer on RLE. Safety measures intact. Patient refusing bed alarm after further education from this RN. Hourly rounding completed. No s/sx of distress this shift.       1115-Right lateral surgical incision CDI with Foam adaptic in place. Right Plantar wound cleansed with normal saline and pat dry. Honeycolloid applied to wound bed and covered in gauze. Entire RLE then wrapped in Kerlex and an ace bandage.

## 2017-03-18 NOTE — PROGRESS NOTES
Hospital Medicine Interval Note  Date of Service:  3/18/2017    Chief Complaint  46 y.o.-year-old female admitted 2/26/2017 with villavicencio villavicencio and right foot infection    Interval Problem Update  3/18 - no changes today, kind of grumpy. No questions. She tells me she is not interested in going to any SNF but only wants to go home with her parents.    Consultants/Specialty  Matt, ID    Disposition  IV abx through 4/23        Review of Systems   Constitutional: Negative for fever and chills.   Respiratory: Negative for cough and shortness of breath.    Cardiovascular: Negative for chest pain and palpitations.   Gastrointestinal: Negative for nausea, vomiting, abdominal pain, diarrhea and constipation.   Genitourinary: Negative for dysuria.   Musculoskeletal: Positive for joint pain. Negative for myalgias.   Skin: Negative for itching.   Neurological: Negative for dizziness, focal weakness, weakness and headaches.   All other systems reviewed and are negative.     Physical Exam Laboratory/Imaging   Filed Vitals:    03/17/17 1450 03/17/17 1950 03/18/17 0255 03/18/17 0745   BP: 140/74 122/71 136/73 170/88   Pulse: 60 61 63 64   Temp: 36.8 °C (98.3 °F) 36.7 °C (98.1 °F) 36.4 °C (97.5 °F) 36.4 °C (97.6 °F)   Resp: 18 17 16 16   Height:       Weight:       SpO2: 95% 95% 96% 97%     Physical Exam   Constitutional: She appears well-developed and well-nourished.   HENT:   Head: Normocephalic and atraumatic.   Mouth/Throat: Oropharynx is clear and moist.   Eyes: Conjunctivae and EOM are normal. Pupils are equal, round, and reactive to light. No scleral icterus.   Neck: Normal range of motion. Neck supple. No tracheal deviation present. No thyromegaly present.   Cardiovascular: Normal rate, regular rhythm, normal heart sounds and intact distal pulses.    No murmur heard.  Pulmonary/Chest: Effort normal and breath sounds normal. No respiratory distress. She has no wheezes.   Abdominal: Soft. Bowel sounds are normal. She exhibits  no distension. There is no tenderness.   Musculoskeletal: Normal range of motion. She exhibits tenderness. She exhibits no edema.   Right foot in boot   Lymphadenopathy:     She has no cervical adenopathy.        Right: No supraclavicular adenopathy present.        Left: No supraclavicular adenopathy present.   Neurological: She is alert.   Skin: Skin is warm and dry.   Vitals reviewed.   Lab Results   Component Value Date/Time    WBC 9.5 03/10/2017 02:55 AM    HEMOGLOBIN 10.3* 03/10/2017 02:55 AM    HEMATOCRIT 32.1* 03/10/2017 02:55 AM    PLATELET COUNT 329 03/10/2017 02:55 AM     Lab Results   Component Value Date/Time    SODIUM 140 03/11/2017 03:00 AM    POTASSIUM 4.1 03/11/2017 03:00 AM    CHLORIDE 108 03/11/2017 03:00 AM    CO2 23 03/11/2017 03:00 AM    GLUCOSE 90 03/11/2017 03:00 AM    BUN 22 03/11/2017 03:00 AM    CREATININE 2.04* 03/11/2017 03:00 AM      Assessment/Plan    Cellulitis of right foot  Assessment & Plan  Active Orders     Ordered     Ordering Provider       Wed Mar 1, 2017  1:56 PM    03/01/17 1356  cefUROXime (ZINACEF) 750 mg in NS 50 mL IVPB   EVERY 8 HOURS      Ivy Gutierrez M.D.      Abx through 4/23  ID on board  Ortho I&D'd  PICC in place    Daniella villavicencio disease  Assessment & Plan  Asked psych to see  Appreciate psych assistance  Discussed with family  Wander guard    CKD stage 4 secondary to hypertension (CMS-[facility])  Assessment & Plan  Lab Results   Component Value Date    BUN 22 03/11/2017    CREATININE 2.04* 03/11/2017    CREATININE 0.9 11/19/2006   Baseline Cr 2-3-range, trending  Maintaining hydration  Avoiding nephrotoxics: NSAIDs etc  Following Cr closely; to keep near baseline as possible    Anemia  Assessment & Plan  Lab Results   Component Value Date/Time    HEMOGLOBIN 10.3* 03/10/2017 02:55 AM    HEMATOCRIT 32.1* 03/10/2017 02:55 AM    MCV 98.5* 03/10/2017 02:55 AM    PLATELET COUNT 329 03/10/2017 02:55 AM   Goal Hb generally > 8 gm/dL  Transfusion per AABB guidelines available at  ncbi.nlm.nih.gov/pubmed/62435278    Essential hypertension, benign  Assessment & Plan  SBP goal less than 140 mmHg  DBP goal less than 90 mmHg  PRN and antihypertensives to titrate by hospitalist towards goal  Most recent Blood Pressure: (!) 170/88 mmHg     Dilated cardiomyopathy (CMS-HCC)  Assessment & Plan  Medically managed    HLD (hyperlipidemia)  Assessment & Plan  Statin    Seizure disorder, grand mal (CMS-[facility])  Assessment & Plan  Dilantin     Labs reviewed and Medications reviewed        DVT Prophylaxis: Heparin    Ulcer prophylaxis: Not indicated

## 2017-03-19 PROCEDURE — A9270 NON-COVERED ITEM OR SERVICE: HCPCS | Performed by: INTERNAL MEDICINE

## 2017-03-19 PROCEDURE — 700102 HCHG RX REV CODE 250 W/ 637 OVERRIDE(OP): Performed by: PSYCHIATRY & NEUROLOGY

## 2017-03-19 PROCEDURE — 700102 HCHG RX REV CODE 250 W/ 637 OVERRIDE(OP): Performed by: INTERNAL MEDICINE

## 2017-03-19 PROCEDURE — 99232 SBSQ HOSP IP/OBS MODERATE 35: CPT | Performed by: HOSPITALIST

## 2017-03-19 PROCEDURE — 700105 HCHG RX REV CODE 258: Performed by: INTERNAL MEDICINE

## 2017-03-19 PROCEDURE — 700102 HCHG RX REV CODE 250 W/ 637 OVERRIDE(OP): Performed by: HOSPITALIST

## 2017-03-19 PROCEDURE — 700111 HCHG RX REV CODE 636 W/ 250 OVERRIDE (IP): Performed by: INTERNAL MEDICINE

## 2017-03-19 PROCEDURE — A9270 NON-COVERED ITEM OR SERVICE: HCPCS | Performed by: HOSPITALIST

## 2017-03-19 PROCEDURE — 700111 HCHG RX REV CODE 636 W/ 250 OVERRIDE (IP): Performed by: HOSPITALIST

## 2017-03-19 PROCEDURE — 770006 HCHG ROOM/CARE - MED/SURG/GYN SEMI*

## 2017-03-19 PROCEDURE — A9270 NON-COVERED ITEM OR SERVICE: HCPCS | Performed by: PSYCHIATRY & NEUROLOGY

## 2017-03-19 PROCEDURE — 700112 HCHG RX REV CODE 229: Performed by: HOSPITALIST

## 2017-03-19 RX ORDER — DIPHENHYDRAMINE HCL 25 MG
25 TABLET ORAL EVERY 6 HOURS PRN
Status: DISCONTINUED | OUTPATIENT
Start: 2017-03-19 | End: 2017-03-23 | Stop reason: HOSPADM

## 2017-03-19 RX ADMIN — ALBUTEROL SULFATE 2 PUFF: 90 AEROSOL, METERED RESPIRATORY (INHALATION) at 08:17

## 2017-03-19 RX ADMIN — ASPIRIN 325 MG: 325 TABLET, FILM COATED ORAL at 08:15

## 2017-03-19 RX ADMIN — ATORVASTATIN CALCIUM 40 MG: 40 TABLET, FILM COATED ORAL at 19:43

## 2017-03-19 RX ADMIN — CEFUROXIME SODIUM 750 MG: 7.5 INJECTION, POWDER, FOR SOLUTION INTRAVENOUS at 22:45

## 2017-03-19 RX ADMIN — Medication 1300 MG: at 08:55

## 2017-03-19 RX ADMIN — FOLIC ACID 1 MG: 1 TABLET ORAL at 08:14

## 2017-03-19 RX ADMIN — OXCARBAZEPINE 150 MG: 150 TABLET, FILM COATED ORAL at 08:16

## 2017-03-19 RX ADMIN — Medication 1300 MG: at 15:37

## 2017-03-19 RX ADMIN — LORAZEPAM 1 MG: 1 TABLET ORAL at 11:50

## 2017-03-19 RX ADMIN — HEPARIN SODIUM 5000 UNITS: 5000 INJECTION, SOLUTION INTRAVENOUS; SUBCUTANEOUS at 13:44

## 2017-03-19 RX ADMIN — Medication 1300 MG: at 19:43

## 2017-03-19 RX ADMIN — HEPARIN SODIUM 5000 UNITS: 5000 INJECTION, SOLUTION INTRAVENOUS; SUBCUTANEOUS at 22:00

## 2017-03-19 RX ADMIN — CYANOCOBALAMIN TAB 500 MCG 1000 MCG: 500 TAB at 08:15

## 2017-03-19 RX ADMIN — CEFUROXIME SODIUM 750 MG: 7.5 INJECTION, POWDER, FOR SOLUTION INTRAVENOUS at 05:37

## 2017-03-19 RX ADMIN — CARVEDILOL 25 MG: 25 TABLET, FILM COATED ORAL at 08:15

## 2017-03-19 RX ADMIN — AMLODIPINE BESYLATE 5 MG: 5 TABLET ORAL at 08:15

## 2017-03-19 RX ADMIN — DOCUSATE SODIUM 100 MG: 100 CAPSULE ORAL at 19:43

## 2017-03-19 RX ADMIN — PHENYTOIN SODIUM 200 MG: 100 CAPSULE, EXTENDED RELEASE ORAL at 19:45

## 2017-03-19 RX ADMIN — HEPARIN SODIUM 5000 UNITS: 5000 INJECTION, SOLUTION INTRAVENOUS; SUBCUTANEOUS at 05:37

## 2017-03-19 RX ADMIN — CEFUROXIME SODIUM 750 MG: 7.5 INJECTION, POWDER, FOR SOLUTION INTRAVENOUS at 13:38

## 2017-03-19 RX ADMIN — ACETAMINOPHEN 650 MG: 325 TABLET, FILM COATED ORAL at 02:23

## 2017-03-19 RX ADMIN — CARVEDILOL 25 MG: 25 TABLET, FILM COATED ORAL at 17:44

## 2017-03-19 RX ADMIN — DIPHENHYDRAMINE HCL 25 MG: 25 TABLET ORAL at 16:20

## 2017-03-19 RX ADMIN — LORAZEPAM 1 MG: 1 TABLET ORAL at 03:27

## 2017-03-19 RX ADMIN — LORAZEPAM 1 MG: 1 TABLET ORAL at 19:43

## 2017-03-19 RX ADMIN — OXCARBAZEPINE 150 MG: 150 TABLET, FILM COATED ORAL at 19:44

## 2017-03-19 RX ADMIN — PHENYTOIN SODIUM 200 MG: 100 CAPSULE, EXTENDED RELEASE ORAL at 08:15

## 2017-03-19 ASSESSMENT — ENCOUNTER SYMPTOMS
VOMITING: 0
HEADACHES: 0
DIZZINESS: 0
NAUSEA: 0
SHORTNESS OF BREATH: 0
FEVER: 0
PALPITATIONS: 0
CHILLS: 0
FOCAL WEAKNESS: 0
COUGH: 0
DIARRHEA: 0
ABDOMINAL PAIN: 0
WEAKNESS: 0
CONSTIPATION: 0
MYALGIAS: 0

## 2017-03-19 ASSESSMENT — PAIN SCALES - GENERAL
PAINLEVEL_OUTOF10: 0
PAINLEVEL_OUTOF10: 0

## 2017-03-19 NOTE — CARE PLAN
Problem: Safety  Goal: Will remain free from falls  Fall precautions in place. RN educated on bed alarm, patient still refuses to have bed alarm turned on. Call light within reach, RN instructed patient to use call light for assistance with activities. Non-skid socks in place. Frequent rounding by staff.    Problem: Infection  Goal: Will remain free from infection  Intervention: Assess signs and symptoms of infection  Staff members use Avagard when entering and exiting room. Chlorhexidine bath to central line site ordered (patient refused tonight), RN scrubs hub of central line for 15 seconds using alcohol swab before accessing line. Primary lines changed twice weekly and secondary lines changed daily.

## 2017-03-19 NOTE — PROGRESS NOTES
Hospital Medicine Interval Note  Date of Service:  3/19/2017    Chief Complaint  46 y.o.-year-old female admitted 2/26/2017 with villavicencio villavicencio and right foot infection    Interval Problem Update  3/19 - about the same, parents came to visit, abx continuing. She still says she does not want to go to the SNF in [place].     3/18 - no changes today, kind of grumpy. No questions. She tells me she is not interested in going to any SNF but only wants to go home with her parents.    Consultants/Specialty  Matt, ID    Disposition  IV abx through 4/23        Review of Systems   Constitutional: Negative for fever and chills.   Respiratory: Negative for cough and shortness of breath.    Cardiovascular: Negative for chest pain and palpitations.   Gastrointestinal: Negative for nausea, vomiting, abdominal pain, diarrhea and constipation.   Genitourinary: Negative for dysuria.   Musculoskeletal: Positive for joint pain. Negative for myalgias.   Skin: Negative for itching.   Neurological: Negative for dizziness, focal weakness, weakness and headaches.   All other systems reviewed and are negative.     Physical Exam Laboratory/Imaging   Filed Vitals:    03/18/17 1535 03/18/17 2000 03/19/17 0400 03/19/17 0800   BP: 139/66 126/51 146/72 134/81   Pulse: 63 64 69 66   Temp: 36.7 °C (98 °F) 36.7 °C (98 °F) 36.4 °C (97.5 °F) 36.4 °C (97.6 °F)   Resp: 16 16 16 18   Height:       Weight:       SpO2: 92% 97% 96% 96%     Physical Exam   Constitutional: She appears well-developed and well-nourished.   HENT:   Head: Normocephalic and atraumatic.   Mouth/Throat: Oropharynx is clear and moist.   Eyes: Conjunctivae and EOM are normal. Pupils are equal, round, and reactive to light. No scleral icterus.   Neck: Normal range of motion. Neck supple. No tracheal deviation present. No thyromegaly present.   Cardiovascular: Normal rate, regular rhythm, normal heart sounds and intact distal pulses.    No murmur heard.  Pulmonary/Chest: Effort normal and  breath sounds normal. No respiratory distress. She has no wheezes.   Abdominal: Soft. Bowel sounds are normal. She exhibits no distension. There is no tenderness.   Musculoskeletal: Normal range of motion. She exhibits tenderness. She exhibits no edema.   Right foot in boot   Lymphadenopathy:     She has no cervical adenopathy.        Right: No supraclavicular adenopathy present.        Left: No supraclavicular adenopathy present.   Neurological: She is alert.   Skin: Skin is warm and dry.   Vitals reviewed.   Lab Results   Component Value Date/Time    WBC 9.5 03/10/2017 02:55 AM    HEMOGLOBIN 10.3* 03/10/2017 02:55 AM    HEMATOCRIT 32.1* 03/10/2017 02:55 AM    PLATELET COUNT 329 03/10/2017 02:55 AM     Lab Results   Component Value Date/Time    SODIUM 140 03/11/2017 03:00 AM    POTASSIUM 4.1 03/11/2017 03:00 AM    CHLORIDE 108 03/11/2017 03:00 AM    CO2 23 03/11/2017 03:00 AM    GLUCOSE 90 03/11/2017 03:00 AM    BUN 22 03/11/2017 03:00 AM    CREATININE 2.04* 03/11/2017 03:00 AM      Assessment/Plan    Cellulitis of right foot  Assessment & Plan  Active Orders     Ordered     Ordering Provider       Wed Mar 1, 2017  1:56 PM    03/01/17 1356  cefUROXime (ZINACEF) 750 mg in NS 50 mL IVPB   EVERY 8 HOURS      Ivy Gutierrez M.D.      Abx through 4/23  ID on board  Ortho I&D'd  PICC in place    Daniella middletonya disease  Assessment & Plan  Stable  Wander guard    CKD stage 4 secondary to hypertension (CMS-[facility])  Assessment & Plan  Lab Results   Component Value Date    BUN 22 03/11/2017    CREATININE 2.04* 03/11/2017    CREATININE 0.9 11/19/2006   Baseline Cr 2-3-range, trending  Maintaining hydration  Avoiding nephrotoxics: NSAIDs etc  Following Cr closely; to keep near baseline as possible    Anemia  Assessment & Plan  Lab Results   Component Value Date/Time    HEMOGLOBIN 10.3* 03/10/2017 02:55 AM    HEMATOCRIT 32.1* 03/10/2017 02:55 AM    MCV 98.5* 03/10/2017 02:55 AM    PLATELET COUNT 329 03/10/2017 02:55 AM   Goal Hb  generally > 8 gm/dL  Transfusion per AABB guidelines available at ncbi.nlm.nih.gov/pubmed/70266648    Essential hypertension, benign  Assessment & Plan  SBP goal less than 140 mmHg  DBP goal less than 90 mmHg  PRN and antihypertensives to titrate by hospitalist towards goal  Most recent Blood Pressure: 134/81 mmHg     Dilated cardiomyopathy (CMS-HCC)  Assessment & Plan  Medically managed    HLD (hyperlipidemia)  Assessment & Plan  Statin    Seizure disorder, grand mal (CMS-HCC)  Assessment & Plan  Dilantin       Labs reviewed and Medications reviewed        DVT Prophylaxis: Heparin    Ulcer prophylaxis: Not indicated

## 2017-03-20 LAB
ANION GAP SERPL CALC-SCNC: 10 MMOL/L (ref 0–11.9)
BUN SERPL-MCNC: 28 MG/DL (ref 8–22)
CALCIUM SERPL-MCNC: 8.3 MG/DL (ref 8.5–10.5)
CHLORIDE SERPL-SCNC: 105 MMOL/L (ref 96–112)
CO2 SERPL-SCNC: 24 MMOL/L (ref 20–33)
CREAT SERPL-MCNC: 2.19 MG/DL (ref 0.5–1.4)
ERYTHROCYTE [DISTWIDTH] IN BLOOD BY AUTOMATED COUNT: 50.8 FL (ref 35.9–50)
GFR SERPL CREATININE-BSD FRML MDRD: 24 ML/MIN/1.73 M 2
GLUCOSE SERPL-MCNC: 108 MG/DL (ref 65–99)
HCT VFR BLD AUTO: 30.5 % (ref 37–47)
HGB BLD-MCNC: 9.7 G/DL (ref 12–16)
MCH RBC QN AUTO: 31.4 PG (ref 27–33)
MCHC RBC AUTO-ENTMCNC: 31.8 G/DL (ref 33.6–35)
MCV RBC AUTO: 98.7 FL (ref 81.4–97.8)
PLATELET # BLD AUTO: 204 K/UL (ref 164–446)
PMV BLD AUTO: 10 FL (ref 9–12.9)
POTASSIUM SERPL-SCNC: 3.9 MMOL/L (ref 3.6–5.5)
RBC # BLD AUTO: 3.09 M/UL (ref 4.2–5.4)
SODIUM SERPL-SCNC: 139 MMOL/L (ref 135–145)
WBC # BLD AUTO: 10.3 K/UL (ref 4.8–10.8)

## 2017-03-20 PROCEDURE — A9270 NON-COVERED ITEM OR SERVICE: HCPCS | Performed by: HOSPITALIST

## 2017-03-20 PROCEDURE — 700111 HCHG RX REV CODE 636 W/ 250 OVERRIDE (IP): Performed by: HOSPITALIST

## 2017-03-20 PROCEDURE — 700102 HCHG RX REV CODE 250 W/ 637 OVERRIDE(OP): Performed by: HOSPITALIST

## 2017-03-20 PROCEDURE — 80048 BASIC METABOLIC PNL TOTAL CA: CPT

## 2017-03-20 PROCEDURE — A9270 NON-COVERED ITEM OR SERVICE: HCPCS | Performed by: PSYCHIATRY & NEUROLOGY

## 2017-03-20 PROCEDURE — 770006 HCHG ROOM/CARE - MED/SURG/GYN SEMI*

## 2017-03-20 PROCEDURE — 85027 COMPLETE CBC AUTOMATED: CPT

## 2017-03-20 PROCEDURE — 700105 HCHG RX REV CODE 258: Performed by: INTERNAL MEDICINE

## 2017-03-20 PROCEDURE — 700102 HCHG RX REV CODE 250 W/ 637 OVERRIDE(OP): Performed by: PSYCHIATRY & NEUROLOGY

## 2017-03-20 PROCEDURE — A9270 NON-COVERED ITEM OR SERVICE: HCPCS | Performed by: INTERNAL MEDICINE

## 2017-03-20 PROCEDURE — 700111 HCHG RX REV CODE 636 W/ 250 OVERRIDE (IP): Performed by: INTERNAL MEDICINE

## 2017-03-20 PROCEDURE — 700102 HCHG RX REV CODE 250 W/ 637 OVERRIDE(OP): Performed by: INTERNAL MEDICINE

## 2017-03-20 PROCEDURE — 99233 SBSQ HOSP IP/OBS HIGH 50: CPT | Performed by: HOSPITALIST

## 2017-03-20 RX ADMIN — CARVEDILOL 25 MG: 25 TABLET, FILM COATED ORAL at 16:52

## 2017-03-20 RX ADMIN — LORAZEPAM 1 MG: 1 TABLET ORAL at 19:49

## 2017-03-20 RX ADMIN — PHENYTOIN SODIUM 200 MG: 100 CAPSULE, EXTENDED RELEASE ORAL at 08:48

## 2017-03-20 RX ADMIN — DIPHENHYDRAMINE HCL 25 MG: 25 TABLET ORAL at 03:36

## 2017-03-20 RX ADMIN — FOLIC ACID 1 MG: 1 TABLET ORAL at 08:49

## 2017-03-20 RX ADMIN — CYANOCOBALAMIN TAB 500 MCG 1000 MCG: 500 TAB at 08:47

## 2017-03-20 RX ADMIN — ALBUTEROL SULFATE 2 PUFF: 90 AEROSOL, METERED RESPIRATORY (INHALATION) at 03:41

## 2017-03-20 RX ADMIN — DIPHENHYDRAMINE HCL 25 MG: 25 TABLET ORAL at 16:51

## 2017-03-20 RX ADMIN — ASPIRIN 325 MG: 325 TABLET, FILM COATED ORAL at 08:47

## 2017-03-20 RX ADMIN — OXCARBAZEPINE 150 MG: 150 TABLET, FILM COATED ORAL at 08:49

## 2017-03-20 RX ADMIN — CEFUROXIME SODIUM 750 MG: 7.5 INJECTION, POWDER, FOR SOLUTION INTRAVENOUS at 23:00

## 2017-03-20 RX ADMIN — ACETAMINOPHEN 650 MG: 325 TABLET, FILM COATED ORAL at 08:53

## 2017-03-20 RX ADMIN — PHENYTOIN SODIUM 200 MG: 100 CAPSULE, EXTENDED RELEASE ORAL at 20:55

## 2017-03-20 RX ADMIN — ALTEPLASE 2 MG: 2.2 INJECTION, POWDER, LYOPHILIZED, FOR SOLUTION INTRAVENOUS at 19:54

## 2017-03-20 RX ADMIN — Medication 1300 MG: at 20:55

## 2017-03-20 RX ADMIN — LORAZEPAM 1 MG: 1 TABLET ORAL at 12:44

## 2017-03-20 RX ADMIN — AMLODIPINE BESYLATE 5 MG: 5 TABLET ORAL at 08:48

## 2017-03-20 RX ADMIN — CEFUROXIME SODIUM 750 MG: 7.5 INJECTION, POWDER, FOR SOLUTION INTRAVENOUS at 13:59

## 2017-03-20 RX ADMIN — Medication 1300 MG: at 16:51

## 2017-03-20 RX ADMIN — Medication 1300 MG: at 08:47

## 2017-03-20 RX ADMIN — OXCARBAZEPINE 150 MG: 150 TABLET, FILM COATED ORAL at 20:55

## 2017-03-20 RX ADMIN — CARVEDILOL 25 MG: 25 TABLET, FILM COATED ORAL at 08:48

## 2017-03-20 RX ADMIN — ATORVASTATIN CALCIUM 40 MG: 40 TABLET, FILM COATED ORAL at 20:55

## 2017-03-20 RX ADMIN — HEPARIN SODIUM 5000 UNITS: 5000 INJECTION, SOLUTION INTRAVENOUS; SUBCUTANEOUS at 05:52

## 2017-03-20 ASSESSMENT — ENCOUNTER SYMPTOMS
SHORTNESS OF BREATH: 0
VOMITING: 0
CHILLS: 0
NAUSEA: 0
PALPITATIONS: 0
MYALGIAS: 0
WEAKNESS: 0
CONSTIPATION: 0
FEVER: 0
FOCAL WEAKNESS: 0
ABDOMINAL PAIN: 0
HEADACHES: 0
DIZZINESS: 0
COUGH: 0
DIARRHEA: 0

## 2017-03-20 ASSESSMENT — PAIN SCALES - GENERAL
PAINLEVEL_OUTOF10: 3
PAINLEVEL_OUTOF10: 5

## 2017-03-20 NOTE — PROGRESS NOTES
Hospital Medicine Interval Note  Date of Service:  3/20/2017    Chief Complaint  46 y.o.-year-old female admitted 2/26/2017 with villavicencio villavicencio and right foot infection    Interval Problem Update  3/20 - discussed with the patient and her parents at the bedside. They want her to go to a facility though the patient says she will go home with parents. Parents say absolutely not. Discussed plan, questions answered.     3/19 - about the same, parents came to visit, abx continuing. She still says she does not want to go to the SNF in [place].     3/18 - no changes today, kind of grumpy. No questions. She tells me she is not interested in going to any SNF but only wants to go home with her parents.    Consultants/Specialty  ALLISON Gutierrez    Disposition  IV abx through 4/23     I spent a total of 35 minutes during this clinical encounter of which > 50% was devoted to counseling and coordinating care including review of records, pertinent lab data and studies, as well as discussing diagnostic evaluation and work up, planned therapeutic interventions and future disposition of care. Where indicated, the assessment and plan reflect discussion of patient with consultants, other healthcare providers, family members, and additional research needed to obtain further information in formulating the plan of care of this patient.          Review of Systems   Constitutional: Negative for fever and chills.   Respiratory: Negative for cough and shortness of breath.    Cardiovascular: Negative for chest pain and palpitations.   Gastrointestinal: Negative for nausea, vomiting, abdominal pain, diarrhea and constipation.   Genitourinary: Negative for dysuria.   Musculoskeletal: Positive for joint pain. Negative for myalgias.   Skin: Negative for itching.   Neurological: Negative for dizziness, focal weakness, weakness and headaches.   All other systems reviewed and are negative.     Physical Exam Laboratory/Imaging   Filed Vitals:    03/19/17 1600  03/19/17 2000 03/20/17 0400 03/20/17 0700   BP: 139/77 144/81 154/78 152/70   Pulse: 65 66 70 68   Temp: 37 °C (98.6 °F) 36.9 °C (98.4 °F) 36.2 °C (97.1 °F) 36.7 °C (98.1 °F)   Resp: 18 16 16 15   Height:       Weight:       SpO2: 93% 96% 94% 98%     Physical Exam   Constitutional: She appears well-developed and well-nourished.   HENT:   Head: Normocephalic and atraumatic.   Mouth/Throat: Oropharynx is clear and moist.   Eyes: Conjunctivae and EOM are normal. Pupils are equal, round, and reactive to light. No scleral icterus.   Neck: Normal range of motion. Neck supple. No tracheal deviation present. No thyromegaly present.   Cardiovascular: Normal rate, regular rhythm, normal heart sounds and intact distal pulses.    No murmur heard.  Pulmonary/Chest: Effort normal and breath sounds normal. No respiratory distress. She has no wheezes.   Abdominal: Soft. Bowel sounds are normal. She exhibits no distension. There is no tenderness.   Musculoskeletal: Normal range of motion. She exhibits tenderness. She exhibits no edema.   Right foot in boot   Lymphadenopathy:     She has no cervical adenopathy.        Right: No supraclavicular adenopathy present.        Left: No supraclavicular adenopathy present.   Neurological: She is alert.   Skin: Skin is warm and dry.   Vitals reviewed.   Lab Results   Component Value Date/Time    WBC 9.5 03/10/2017 02:55 AM    HEMOGLOBIN 10.3* 03/10/2017 02:55 AM    HEMATOCRIT 32.1* 03/10/2017 02:55 AM    PLATELET COUNT 329 03/10/2017 02:55 AM     Lab Results   Component Value Date/Time    SODIUM 140 03/11/2017 03:00 AM    POTASSIUM 4.1 03/11/2017 03:00 AM    CHLORIDE 108 03/11/2017 03:00 AM    CO2 23 03/11/2017 03:00 AM    GLUCOSE 90 03/11/2017 03:00 AM    BUN 22 03/11/2017 03:00 AM    CREATININE 2.04* 03/11/2017 03:00 AM      Assessment/Plan    Cellulitis of right foot  Assessment & Plan  Active Orders     Ordered     Ordering Provider       Wed Mar 1, 2017  1:56 PM    03/01/17 1357   cefUROXime (ZINACEF) 750 mg in NS 50 mL IVPB   EVERY 8 HOURS      Ivy Gutierrez M.D.      Abx through 4/23  ID on board  Ortho I&D'd  PICC in place    Daniella villavicencio disease  Assessment & Plan  Stable  Wander guard    CKD stage 4 secondary to hypertension (CMS-[facility])  Assessment & Plan  Lab Results   Component Value Date    BUN 22 03/11/2017    CREATININE 2.04* 03/11/2017    CREATININE 0.9 11/19/2006   Baseline Cr 2-3-range, trending  Maintaining hydration  Avoiding nephrotoxics: NSAIDs etc  Following Cr closely; to keep near baseline as possible    Anemia  Assessment & Plan  Lab Results   Component Value Date/Time    HEMOGLOBIN 10.3* 03/10/2017 02:55 AM    HEMATOCRIT 32.1* 03/10/2017 02:55 AM    MCV 98.5* 03/10/2017 02:55 AM    PLATELET COUNT 329 03/10/2017 02:55 AM   Goal Hb generally > 8 gm/dL  Transfusion per AABB guidelines available at ncbi.nlm.nih.gov/pubmed/32317877    Essential hypertension, benign  Assessment & Plan  SBP goal less than 140 mmHg  DBP goal less than 90 mmHg  PRN and antihypertensives to titrate by hospitalist towards goal  Most recent Blood Pressure: 134/81 mmHg     Dilated cardiomyopathy (CMS-HCC)  Assessment & Plan  Medically managed    HLD (hyperlipidemia)  Assessment & Plan  Statin    Seizure disorder, grand mal (CMS-HCC)  Assessment & Plan  Dilantin       Labs reviewed and Medications reviewed        DVT Prophylaxis: Heparin    Ulcer prophylaxis: Not indicated

## 2017-03-20 NOTE — PROGRESS NOTES
Patient alert and oriented.   Compliant with all care and medications.   R foot dressings changed today.   Ambulated around unit twice.   IV antibiotics until 4/23/17.  Possible discharge to SNF, social work following.

## 2017-03-20 NOTE — CARE PLAN
Problem: Mobility  Goal: Risk for activity intolerance will decrease  Outcome: PROGRESSING AS EXPECTED  Patient ambulated twice around unit independently.     Problem: Skin Integrity  Goal: Risk for impaired skin integrity will decrease  Outcome: PROGRESSING AS EXPECTED  Patient's wounds to R foot changed today, healing well- sutures approximated.

## 2017-03-21 PROBLEM — D64.9 ANEMIA: Status: RESOLVED | Noted: 2017-02-27 | Resolved: 2017-03-21

## 2017-03-21 PROCEDURE — A9270 NON-COVERED ITEM OR SERVICE: HCPCS | Performed by: INTERNAL MEDICINE

## 2017-03-21 PROCEDURE — 700102 HCHG RX REV CODE 250 W/ 637 OVERRIDE(OP): Performed by: HOSPITALIST

## 2017-03-21 PROCEDURE — A9270 NON-COVERED ITEM OR SERVICE: HCPCS | Performed by: HOSPITALIST

## 2017-03-21 PROCEDURE — A9270 NON-COVERED ITEM OR SERVICE: HCPCS | Performed by: PSYCHIATRY & NEUROLOGY

## 2017-03-21 PROCEDURE — 99231 SBSQ HOSP IP/OBS SF/LOW 25: CPT | Performed by: HOSPITALIST

## 2017-03-21 PROCEDURE — 700105 HCHG RX REV CODE 258: Performed by: INTERNAL MEDICINE

## 2017-03-21 PROCEDURE — 700102 HCHG RX REV CODE 250 W/ 637 OVERRIDE(OP): Performed by: PSYCHIATRY & NEUROLOGY

## 2017-03-21 PROCEDURE — 700111 HCHG RX REV CODE 636 W/ 250 OVERRIDE (IP): Performed by: HOSPITALIST

## 2017-03-21 PROCEDURE — 700111 HCHG RX REV CODE 636 W/ 250 OVERRIDE (IP): Performed by: INTERNAL MEDICINE

## 2017-03-21 PROCEDURE — 700102 HCHG RX REV CODE 250 W/ 637 OVERRIDE(OP): Performed by: INTERNAL MEDICINE

## 2017-03-21 PROCEDURE — 770006 HCHG ROOM/CARE - MED/SURG/GYN SEMI*

## 2017-03-21 RX ORDER — OXCARBAZEPINE 150 MG/1
150 TABLET, FILM COATED ORAL 2 TIMES DAILY
Qty: 60 TAB | Status: ON HOLD | DISCHARGE
Start: 2017-03-21 | End: 2017-03-28

## 2017-03-21 RX ORDER — FOLIC ACID 1 MG/1
1 TABLET ORAL DAILY
Qty: 30 TAB | Status: ON HOLD | DISCHARGE
Start: 2017-03-21 | End: 2017-03-28

## 2017-03-21 RX ORDER — ATORVASTATIN CALCIUM 40 MG/1
40 TABLET, FILM COATED ORAL DAILY
Qty: 30 TAB | Status: ON HOLD | DISCHARGE
Start: 2017-03-21 | End: 2017-03-28

## 2017-03-21 RX ORDER — SODIUM BICARBONATE 650 MG/1
1300 TABLET ORAL 3 TIMES DAILY
Qty: 120 TAB | Refills: 3 | Status: SHIPPED | DISCHARGE
Start: 2017-03-21 | End: 2017-03-26

## 2017-03-21 RX ORDER — LORAZEPAM 1 MG/1
1 TABLET ORAL 3 TIMES DAILY
Qty: 30 TAB | Refills: 0 | Status: ON HOLD | DISCHARGE
Start: 2017-03-21 | End: 2017-03-28

## 2017-03-21 RX ADMIN — OXYCODONE HYDROCHLORIDE 5 MG: 5 TABLET ORAL at 02:05

## 2017-03-21 RX ADMIN — Medication 1300 MG: at 11:18

## 2017-03-21 RX ADMIN — LORAZEPAM 1 MG: 1 TABLET ORAL at 20:38

## 2017-03-21 RX ADMIN — CEFUROXIME SODIUM 750 MG: 7.5 INJECTION, POWDER, FOR SOLUTION INTRAVENOUS at 15:07

## 2017-03-21 RX ADMIN — FOLIC ACID 1 MG: 1 TABLET ORAL at 08:04

## 2017-03-21 RX ADMIN — HEPARIN SODIUM 5000 UNITS: 5000 INJECTION, SOLUTION INTRAVENOUS; SUBCUTANEOUS at 06:08

## 2017-03-21 RX ADMIN — CYANOCOBALAMIN TAB 500 MCG 1000 MCG: 500 TAB at 08:05

## 2017-03-21 RX ADMIN — PHENYTOIN SODIUM 200 MG: 100 CAPSULE, EXTENDED RELEASE ORAL at 08:05

## 2017-03-21 RX ADMIN — CEFUROXIME SODIUM 750 MG: 7.5 INJECTION, POWDER, FOR SOLUTION INTRAVENOUS at 22:45

## 2017-03-21 RX ADMIN — DIPHENHYDRAMINE HCL 25 MG: 25 TABLET ORAL at 00:37

## 2017-03-21 RX ADMIN — AMLODIPINE BESYLATE 5 MG: 5 TABLET ORAL at 08:06

## 2017-03-21 RX ADMIN — ALBUTEROL SULFATE 2 PUFF: 90 AEROSOL, METERED RESPIRATORY (INHALATION) at 08:07

## 2017-03-21 RX ADMIN — Medication 1300 MG: at 15:07

## 2017-03-21 RX ADMIN — OXCARBAZEPINE 150 MG: 150 TABLET, FILM COATED ORAL at 08:05

## 2017-03-21 RX ADMIN — CARVEDILOL 25 MG: 25 TABLET, FILM COATED ORAL at 08:04

## 2017-03-21 RX ADMIN — ASPIRIN 325 MG: 325 TABLET, FILM COATED ORAL at 08:04

## 2017-03-21 RX ADMIN — OXCARBAZEPINE 150 MG: 150 TABLET, FILM COATED ORAL at 20:38

## 2017-03-21 RX ADMIN — ATORVASTATIN CALCIUM 40 MG: 40 TABLET, FILM COATED ORAL at 20:38

## 2017-03-21 RX ADMIN — CARVEDILOL 25 MG: 25 TABLET, FILM COATED ORAL at 17:44

## 2017-03-21 RX ADMIN — LORAZEPAM 1 MG: 1 TABLET ORAL at 02:46

## 2017-03-21 RX ADMIN — Medication 1300 MG: at 20:38

## 2017-03-21 RX ADMIN — PHENYTOIN SODIUM 200 MG: 100 CAPSULE, EXTENDED RELEASE ORAL at 20:38

## 2017-03-21 RX ADMIN — CEFUROXIME SODIUM 750 MG: 7.5 INJECTION, POWDER, FOR SOLUTION INTRAVENOUS at 06:09

## 2017-03-21 RX ADMIN — LORAZEPAM 1 MG: 1 TABLET ORAL at 11:18

## 2017-03-21 ASSESSMENT — ENCOUNTER SYMPTOMS
HEADACHES: 0
COUGH: 0
ABDOMINAL PAIN: 0
CHILLS: 0
DIZZINESS: 0
FEVER: 0
PALPITATIONS: 0
SHORTNESS OF BREATH: 0
FOCAL WEAKNESS: 0
MYALGIAS: 0
NAUSEA: 0
WEAKNESS: 0
DIARRHEA: 0
CONSTIPATION: 0
VOMITING: 0

## 2017-03-21 ASSESSMENT — PAIN SCALES - GENERAL
PAINLEVEL_OUTOF10: 6
PAINLEVEL_OUTOF10: 0
PAINLEVEL_OUTOF10: 0

## 2017-03-21 NOTE — PROGRESS NOTES
Hospital Medicine Interval Note  Date of Service:  3/21/2017    Chief Complaint  46 y.o.-year-old female admitted 2/26/2017 with villavicencio villavicencio and right foot infection    Interval Problem Update  3/21 - discussed with SW. Discussed with nursing/ID. Planning on SNF, accepted locally. Questions answered.    3/20 - discussed with the patient and her parents at the bedside. They want her to go to a facility though the patient says she will go home with parents. Parents say absolutely not. Discussed plan, questions answered.     3/19 - about the same, parents came to visit, abx continuing. She still says she does not want to go to the SNF in [place].       Consultants/Specialty  Matt, ID    Disposition  IV abx through 4/23            Review of Systems   Constitutional: Negative for fever and chills.   Respiratory: Negative for cough and shortness of breath.    Cardiovascular: Negative for chest pain and palpitations.   Gastrointestinal: Negative for nausea, vomiting, abdominal pain, diarrhea and constipation.   Genitourinary: Negative for dysuria.   Musculoskeletal: Positive for joint pain. Negative for myalgias.   Skin: Negative for itching.   Neurological: Negative for dizziness, focal weakness, weakness and headaches.   All other systems reviewed and are negative.     Physical Exam Laboratory/Imaging   Filed Vitals:    03/20/17 1600 03/20/17 2002 03/21/17 0300 03/21/17 0700   BP: 135/55 145/79 130/74 180/96   Pulse: 69 63 79 80   Temp: 36.7 °C (98.1 °F) 36.2 °C (97.1 °F) 36.2 °C (97.1 °F) 37.2 °C (99 °F)   Resp: 14 16 19 15   Height:       Weight:       SpO2: 96% 95% 97% 97%     Physical Exam   Constitutional: She appears well-developed and well-nourished.   HENT:   Head: Normocephalic and atraumatic.   Mouth/Throat: Oropharynx is clear and moist.   Eyes: Conjunctivae and EOM are normal. Pupils are equal, round, and reactive to light. No scleral icterus.   Neck: Normal range of motion. Neck supple. No tracheal deviation  present. No thyromegaly present.   Cardiovascular: Normal rate, regular rhythm, normal heart sounds and intact distal pulses.    No murmur heard.  Pulmonary/Chest: Effort normal and breath sounds normal. No respiratory distress. She has no wheezes.   Abdominal: Soft. Bowel sounds are normal. She exhibits no distension. There is no tenderness.   Musculoskeletal: Normal range of motion. She exhibits tenderness. She exhibits no edema.   Right foot in boot   Lymphadenopathy:     She has no cervical adenopathy.        Right: No supraclavicular adenopathy present.        Left: No supraclavicular adenopathy present.   Neurological: She is alert.   Skin: Skin is warm and dry.   Vitals reviewed.   Lab Results   Component Value Date/Time    WBC 10.3 03/20/2017 09:20 PM    HEMOGLOBIN 9.7* 03/20/2017 09:20 PM    HEMATOCRIT 30.5* 03/20/2017 09:20 PM    PLATELET COUNT 204 03/20/2017 09:20 PM     Lab Results   Component Value Date/Time    SODIUM 139 03/20/2017 09:20 PM    POTASSIUM 3.9 03/20/2017 09:20 PM    CHLORIDE 105 03/20/2017 09:20 PM    CO2 24 03/20/2017 09:20 PM    GLUCOSE 108* 03/20/2017 09:20 PM    BUN 28* 03/20/2017 09:20 PM    CREATININE 2.19* 03/20/2017 09:20 PM      Assessment/Plan    Cellulitis of right foot  Assessment & Plan  Active Orders     Ordered     Ordering Provider       Wed Mar 1, 2017  1:56 PM    03/01/17 1356  cefUROXime (ZINACEF) 750 mg in NS 50 mL IVPB   EVERY 8 HOURS      Ivy Gutierrez M.D.      Abx through 4/23  ID on board  Ortho I&D'd  PICC in place    Villavicencio villavicencio disease  Assessment & Plan  Stable  Wander guard    CKD stage 4 secondary to hypertension (CMS-[facility])  Assessment & Plan  Lab Results   Component Value Date    BUN 22 03/11/2017    CREATININE 2.04* 03/11/2017    CREATININE 0.9 11/19/2006   Baseline Cr 2-3-range, trending  Maintaining hydration  Avoiding nephrotoxics: NSAIDs etc  Following Cr closely; to keep near baseline as possible    Essential hypertension, benign  Assessment &  Plan  SBP goal less than 140 mmHg  DBP goal less than 90 mmHg  PRN and antihypertensives to titrate by hospitalist towards goal  Most recent Blood Pressure: 134/81 mmHg     Dilated cardiomyopathy (CMS-[facility])  Assessment & Plan  Medically managed    HLD (hyperlipidemia)  Assessment & Plan  Statin    Seizure disorder, grand mal (CMS-[facility])  Assessment & Plan  Dilantin       Labs reviewed and Medications reviewed        DVT Prophylaxis: Heparin    Ulcer prophylaxis: Not indicated

## 2017-03-21 NOTE — DISCHARGE SUMMARY
CHIEF COMPLAINT ON ADMISSION  Chief Complaint   Patient presents with   • Wound Infection     pt has wound on right foot, went to [facility], report cellutliis with drainage on wound       CODE STATUS  Full Code    HPI & HOSPITAL COURSE  This is a 46 y.o. year old female here with a past medical history of moyamoya with previous CVA in the past, hyperlipidemia, hypertension, chronic kidney disease, essentially presented to [facility] ER initially with right foot infection 3 days ago.  Given the degree of drainage from the wound, patient was transferred here for higher level of care.  Upon evaluation, the patient reported that she stepped on a rock and the rock broke about a week ago and as a result was a wound on her foot.  She started developing pain in her foot as well as fevers and chills.  Upon evaluation, patient appears to have significant exudative drainage from her right foot.  Hence, as a result emergent CT of her foot was ordered which shows evidence of soft tissue swelling with gas in the distal plantar aspect of her foot.  In addition, they noted elevated leukocytosis, sepsis protocol was started. Orthopedics was consulted. She underwent I&D on 2/27 for her wound with repeat debridement on 3/5 and 3/9. Tunneled central venous catheterization was performed as the patient's renal function precluded a PICC line. ID recommended continuing antibiotic management through 4/23/2017 with cefuroxime. Her family was agreeable. Due to her Moyamoya, her psychiatric medications were adjusted and her mood stabilized. SNF was arranged.     Therefore, she is discharged in fair and stable condition for further post-acute management.     SPECIFIC OUTPATIENT FOLLOW-UP  Wound care; needs outpatient follow-up with Shane (ortho) and Matt (ID)    DISCHARGE PROBLEM LIST  Active Problems:    Cellulitis of right foot POA: Yes    Villavicencio villavicencio disease POA: Yes    CKD stage 4 secondary to hypertension (CMS-[facility]) POA: Yes     Essential hypertension, benign POA: Yes    Dilated cardiomyopathy (CMS-HCC) POA: Yes      Overview: 1/29/2014: EF 45-50%    HLD (hyperlipidemia) POA: Yes    Seizure disorder, grand mal (CMS-HCC) POA: Yes  Resolved Problems:    Anemia POA: Yes    Acute on chronic renal failure (CMS-HCC) POA: Yes    Metabolic acidosis POA: Unknown    Severe sepsis (CMS-HCC) POA: Unknown    Thrombocytopenia (CMS-HCC) POA: Yes      FOLLOW UP  No future appointments.  SILKE Hayes  [address] [facility] [address]  [phone]    Schedule an appointment as soon as possible for a visit in 2 weeks  Follow up appointment    Gerardo Lynn M.D.  [address] [facility] [address]  [phone]    In 2 weeks      Ivy Gutierrez M.D.  [address] [id number] [phone]    In 2 weeks        MEDICATIONS ON DISCHARGE  amlodipine (NORVASC) 5 MG Tab  Take 5 mg by mouth every day.     aspirin (ASA) 325 MG Tab  Take 325 mg by mouth every day.     atorvastatin (LIPITOR) 40 MG Tab  Take 1 Tab by mouth every day.     carvedilol (COREG) 25 MG TABS  Take 25 mg by mouth 2 times a day, with meals.     cyanocobalamin (VITAMIN B12) 1000 MCG Tab  Take 1 Tab by mouth every day.     esomeprazole (NEXIUM) 20 MG capsule  Take 20 mg by mouth every morning before breakfast.     folic acid (FOLVITE) 1 MG Tab  Take 1 Tab by mouth every day.     lisinopril (PRINIVIL) 20 MG TABS  Take 20 mg by mouth every day.     lorazepam (ATIVAN) 1 MG Tab  Take 1 Tab by mouth 3 times a day.     NS SOLN 50 mL with cefUROXime 7.5 GM SOLR 750 mg  750 mg by Intravenous route every 8 hours.  Through 4/23/17     oxcarbazepine (TRILEPTAL) 150 MG Tab  Take 1 Tab by mouth 2 Times a Day.     phenytoin ER (DILANTIN) 100 MG Cap  Take 200 mg by mouth 2 times a day.     sodium bicarbonate (SODIUM BICARBONATE) 650 MG Tab  Take 2 Tabs by mouth 3 times a day.         DIET  Orders Placed This Encounter   Procedures   • DIET ORDER     Standing Status: Standing       Number of Occurrences: 1      Standing Expiration Date:      Order Specific Question:  Diet:     Answer:  Regular [1]       ACTIVITY  As tolerated and directed by skilled nursing.    LINES, DRAINS, AND WOUNDS  This is an automated list. Peripheral IVs will be removed prior to discharge.  PIV Group Left Upper Arm 20g (Active)       Central Line Group 3 (C) Right;Subclavian Dual Lumen;Power Injection Catheter 5 (Active)   Line Length (cm) 24 cm 3/6/2017 11:00 AM   Line Secured Sutured in Place 3/20/2017  8:00 PM   Patency and Function Check Performed at Beginning of Shift 3/20/2017  8:00 PM   Line Necessity Assessed Antibiotic Therapy Greater than 7 Days 3/20/2017  8:00 PM   Closed Tubing Set Up Yes 3/20/2017  8:00 PM   Hand Washing / Gloves Prior to Every Access Yes 3/20/2017  8:00 PM   Next Daily Chlorhexidine Bath Due (Regional ONLY) 03/20/17 3/20/2017  8:00 PM   Port Access  Scrub the Hub Prior to Access;Blood Return  3/20/2017  8:00 PM   Site Condition / Description Assessed 3/20/2017  8:00 PM   Signs and Symptoms of Infection None Apparent at this Time 3/20/2017  8:00 PM   Dressing Type / Description Antimicrobial Patch (BioPatch) 3/20/2017  8:00 PM   Dressing Status Observed 3/20/2017  8:00 PM   Next Dressing Change  03/25/17 3/20/2017  8:00 PM   Date Primary Tubing Changed 03/18/17 3/20/2017  8:00 PM   Date Secondary Tubing Changed 03/20/17 3/20/2017  8:00 PM   NEXT Primary Tubing Change  03/21/17 3/20/2017  8:00 PM   NEXT Secondary Tubing Change  03/21/17 3/20/2017  8:00 PM   Date IV Connector(s) Changed 03/20/17 3/20/2017  8:00 PM   NEXT IV Connector(s) Change Date 03/25/17 3/20/2017  8:00 PM       Surgical Incision  Incision Right Dorsal Foot (Active)   Wound Bed Other (comment) 3/20/2017  8:00 PM   Drainage  None 3/20/2017  8:00 PM   Periwound Skin Other (Comments) 3/20/2017  8:00 PM   Daily - Wound Closure Sutures 3/20/2017  8:00 PM   Dressing Options Nonadherent 3/20/2017  8:00 PM   Dressing Status /  Change Clean;Dry;Intact;Changed 3/20/2017  8:00 PM   Daily - Dressing Change Observed 3/20/2017  8:00 PM   Dressing Change Frequency Daily 3/20/2017  8:00 PM   Next Dressing Change  03/21/17 3/20/2017  8:00 PM   Time Spent with Patient (mins) 30 3/20/2017  6:00 PM       Surgical Incision  Right Lateral Leg Lower (Active)   Wound Bed Other (comment) 3/20/2017  8:00 PM   Drainage  None 3/20/2017  8:00 PM   Periwound Skin Other (Comments) 3/20/2017  8:00 PM   Daily - Wound Closure Sutures 3/20/2017  9:00 AM   Dressing Options Foam 3/20/2017  8:00 PM   Dressing Status / Change Observed 3/20/2017  8:00 PM   Daily - Dressing Change Observed 3/20/2017  8:00 PM   Dressing Change Frequency Every 48 hrs 3/20/2017  8:00 PM   Next Dressing Change  03/21/17 3/20/2017  8:00 PM       Wound Open Surgical (Complicated) Foot Right Plantar (Active)   Wound Bed Other (comment) 3/20/2017  8:00 PM   Drainage  None 3/20/2017  9:00 AM   Periwound Skin Pink;Normal;Calloused 3/20/2017  9:00 AM   Cleansing Normal Saline Irrigation 3/20/2017  9:00 AM   Dressing Options Calcium Alginate 3/20/2017  9:00 AM   Dressing Status / Change Clean;Dry;Intact 3/20/2017  8:00 PM   Length (cm) 1.5 3/11/2017  3:00 PM   Width (cm) 1.5 3/11/2017  3:00 PM   Depth (cm) 0.8 3/11/2017  3:00 PM   Weekly Photo (Inpatient Only) 03/11/17 3/11/2017  3:00 PM   Dressing Change Frequency Daily 3/20/2017  8:00 PM   Next Dressing Change  03/21/17 3/20/2017  8:00 PM       Wound Abrasion Chest Right (Active)   Wound Bed Other (comment) 3/20/2017  8:00 PM   Drainage  None 3/18/2017  7:45 AM   Periwound Skin Pink 3/18/2017  7:45 AM   Cleansing Normal Saline Irrigation 3/17/2017  7:13 PM   Dressing Options Open to Air 3/18/2017  7:45 AM                  MENTAL STATUS ON TRANSFER  Level of Consciousness: Alert  Orientation : Oriented x 4  Speech: Speech Clear    CONSULTATIONS  ALLISON Gutierrez, adriana    PROCEDURES  DATE OF SERVICE:  03/09/2017  PREOPERATIVE DIAGNOSIS:  Right  nonhealing foot wound.  POSTOPERATIVE DIAGNOSIS:  Right nonhealing foot wound.  PROCEDURES PERFORMED:  1.  Irrigation and debridement skin, subcutaneous tissue to bone.  2.  Complex closure of surgical wound dehiscence, right foot.  SURGEON:  Gerardo Lynn MD   ASSISTANT:  Radha Concepcion.  ANESTHESIA:  General with peripheral nerve block requested by me for    postoperative pain control.    PostOp Diagnosis: Need for long term abx.   Procedure(s): Tunneled CVC placement, Removal R IJ CVC.   Estimated Blood Loss: Less than 5 ml  Complications: None  3/6/2017     1130 AM     Slick Montes    DATE OF SERVICE:  03/05/2017  PREOPERATIVE DIAGNOSIS:  Right foot nonhealing ulceration.  POSTOPERATIVE DIAGNOSIS:  Right foot nonhealing ulceration.  PROCEDURES PERFORMED:  1.  Irrigation and debridement, right dorsal foot surgical wound dehiscence.  2.  Irrigation and debridement skin, subcutaneous tissue to bone, right foot.  3.  Right second metatarsal head excision.  4.  Right third metatarsal head excision.  5.  Right gastrocsoleus recession.  6.  Right wound VAC placement, nondisposable, less than 50 cm2.  SURGEON:  Gerardo Lynn MD  ANESTHESIA:  General.    DATE OF SERVICE:  02/27/2017  PREOPERATIVE DIAGNOSES:  1.  Right foot multiloculated abscess.  2.  Moyamoya disease.  PROCEDURES PERFORMED:  1.  1.  Irrigation and debridement, right dorsal foot wound.      LABORATORY  Lab Results   Component Value Date/Time    SODIUM 139 03/20/2017 09:20 PM    POTASSIUM 3.9 03/20/2017 09:20 PM    CHLORIDE 105 03/20/2017 09:20 PM    CO2 24 03/20/2017 09:20 PM    GLUCOSE 108* 03/20/2017 09:20 PM    BUN 28* 03/20/2017 09:20 PM    CREATININE 2.19* 03/20/2017 09:20 PM        Lab Results   Component Value Date/Time    WBC 10.3 03/20/2017 09:20 PM    HEMOGLOBIN 9.7* 03/20/2017 09:20 PM    HEMATOCRIT 30.5* 03/20/2017 09:20 PM    PLATELET COUNT 204 03/20/2017 09:20 PM        Total time of the discharge process exceeds 40 minutes.

## 2017-03-21 NOTE — PROGRESS NOTES
Patient alert and oriented.    R foot dressings changed today.    Ambulated around unit twice.    IV antibiotics until 4/23/17.  Possible discharge to SNF vs home, social work following.

## 2017-03-21 NOTE — CARE PLAN
Problem: Venous Thromboembolism (VTW)/Deep Vein Thrombosis (DVT) Prevention:  Goal: Patient will participate in Venous Thrombosis (VTE)/Deep Vein Thrombosis (DVT)Prevention Measures  Intervention: Assess and monitor for anticoagulation complications  Patient refusing Heparin 5000 unit SQ injections and use of SCDs. RN educated patient on complications of immobility and risk for DVT; patient continues to refuse treatment. Patient is ambulatory and gets out of bed to use bathroom, she is participating with PT during the day.      Problem: Mobility  Goal: Risk for activity intolerance will decrease  Patient ambulates ad alireza with steady gait, immobilizer to RLE in place. Patient participating with PT during the day.

## 2017-03-21 NOTE — PROGRESS NOTES
"LIMB PRESERVATION SERVICE       45 y/o female with moyamoya syndrome with CVA, HTN, CKD. Admitted for right foot infection after stepping on rock. ID and Ortho involved. S/p I & D of R foot on 2/27. Non diabetic.      s/p I & D R dorsal foot with VAC application, R 2nd and 3rd MTH excision, R GSR by Dr. Lynn on 3/5  S/p  I & D R foot with closure by Dr. Lynn on 3/9    /55 mmHg  Pulse 69  Temp(Src) 36.7 °C (98.1 °F)  Resp 14  Ht 1.753 m (5' 9\")  Wt 119.9 kg (264 lb 5.3 oz)  BMI 39.02 kg/m2  SpO2 96%  Breastfeeding? No  Pain controlled      Pt wearing boot, up to BR  drsg changed by RN today. Reports plantar ulcer with scant amount of slough. Continue honey alginate.   Dorsal incision and calf incision approximated per RN    Pt reports she takes her boot off for 2 hours in 24hr period. Reiterated importance of wearing boot, may remove during dressing changes.       PLAN  dressing orders updated for R foot and calf incision for nursing   Walking boot to RLE continuously d/t calf incision  Heel touch WB RLE in boot  IV abx per ID, until 4/23. ID s/o      D/C plan: SNF referrals. Will need to f/u 3 wks at [facility] 3/31 for post op check, suture removal          "

## 2017-03-22 PROCEDURE — A9270 NON-COVERED ITEM OR SERVICE: HCPCS | Performed by: HOSPITALIST

## 2017-03-22 PROCEDURE — 700102 HCHG RX REV CODE 250 W/ 637 OVERRIDE(OP): Performed by: PSYCHIATRY & NEUROLOGY

## 2017-03-22 PROCEDURE — 99231 SBSQ HOSP IP/OBS SF/LOW 25: CPT | Performed by: HOSPITALIST

## 2017-03-22 PROCEDURE — A9270 NON-COVERED ITEM OR SERVICE: HCPCS | Performed by: PSYCHIATRY & NEUROLOGY

## 2017-03-22 PROCEDURE — 700102 HCHG RX REV CODE 250 W/ 637 OVERRIDE(OP): Performed by: HOSPITALIST

## 2017-03-22 PROCEDURE — A9270 NON-COVERED ITEM OR SERVICE: HCPCS | Performed by: INTERNAL MEDICINE

## 2017-03-22 PROCEDURE — 700102 HCHG RX REV CODE 250 W/ 637 OVERRIDE(OP): Performed by: INTERNAL MEDICINE

## 2017-03-22 PROCEDURE — 700111 HCHG RX REV CODE 636 W/ 250 OVERRIDE (IP): Performed by: HOSPITALIST

## 2017-03-22 PROCEDURE — 770006 HCHG ROOM/CARE - MED/SURG/GYN SEMI*

## 2017-03-22 PROCEDURE — 700111 HCHG RX REV CODE 636 W/ 250 OVERRIDE (IP): Performed by: INTERNAL MEDICINE

## 2017-03-22 PROCEDURE — 700105 HCHG RX REV CODE 258: Performed by: INTERNAL MEDICINE

## 2017-03-22 RX ADMIN — Medication 1300 MG: at 07:27

## 2017-03-22 RX ADMIN — CEFUROXIME SODIUM 750 MG: 7.5 INJECTION, POWDER, FOR SOLUTION INTRAVENOUS at 05:41

## 2017-03-22 RX ADMIN — PHENYTOIN SODIUM 200 MG: 100 CAPSULE, EXTENDED RELEASE ORAL at 07:29

## 2017-03-22 RX ADMIN — AMLODIPINE BESYLATE 5 MG: 5 TABLET ORAL at 07:29

## 2017-03-22 RX ADMIN — OXCARBAZEPINE 150 MG: 150 TABLET, FILM COATED ORAL at 07:28

## 2017-03-22 RX ADMIN — CEFUROXIME SODIUM 750 MG: 7.5 INJECTION, POWDER, FOR SOLUTION INTRAVENOUS at 21:56

## 2017-03-22 RX ADMIN — DIPHENHYDRAMINE HCL 25 MG: 25 TABLET ORAL at 20:10

## 2017-03-22 RX ADMIN — OXYCODONE HYDROCHLORIDE 5 MG: 5 TABLET ORAL at 07:29

## 2017-03-22 RX ADMIN — OXCARBAZEPINE 150 MG: 150 TABLET, FILM COATED ORAL at 20:11

## 2017-03-22 RX ADMIN — CARVEDILOL 25 MG: 25 TABLET, FILM COATED ORAL at 07:26

## 2017-03-22 RX ADMIN — Medication 1300 MG: at 14:16

## 2017-03-22 RX ADMIN — ALBUTEROL SULFATE 2 PUFF: 90 AEROSOL, METERED RESPIRATORY (INHALATION) at 00:29

## 2017-03-22 RX ADMIN — CYANOCOBALAMIN TAB 500 MCG 1000 MCG: 500 TAB at 07:28

## 2017-03-22 RX ADMIN — CEFUROXIME SODIUM 750 MG: 7.5 INJECTION, POWDER, FOR SOLUTION INTRAVENOUS at 14:16

## 2017-03-22 RX ADMIN — LORAZEPAM 1 MG: 1 TABLET ORAL at 18:32

## 2017-03-22 RX ADMIN — HEPARIN SODIUM 5000 UNITS: 5000 INJECTION, SOLUTION INTRAVENOUS; SUBCUTANEOUS at 14:16

## 2017-03-22 RX ADMIN — ATORVASTATIN CALCIUM 40 MG: 40 TABLET, FILM COATED ORAL at 20:11

## 2017-03-22 RX ADMIN — DIPHENHYDRAMINE HCL 25 MG: 25 TABLET ORAL at 00:29

## 2017-03-22 RX ADMIN — ALBUTEROL SULFATE 2 PUFF: 90 AEROSOL, METERED RESPIRATORY (INHALATION) at 20:10

## 2017-03-22 RX ADMIN — FOLIC ACID 1 MG: 1 TABLET ORAL at 07:28

## 2017-03-22 RX ADMIN — LORAZEPAM 1 MG: 1 TABLET ORAL at 11:30

## 2017-03-22 RX ADMIN — CARVEDILOL 25 MG: 25 TABLET, FILM COATED ORAL at 16:54

## 2017-03-22 RX ADMIN — ASPIRIN 325 MG: 325 TABLET, FILM COATED ORAL at 07:29

## 2017-03-22 RX ADMIN — Medication 1300 MG: at 20:11

## 2017-03-22 RX ADMIN — PHENYTOIN SODIUM 200 MG: 100 CAPSULE, EXTENDED RELEASE ORAL at 20:11

## 2017-03-22 ASSESSMENT — ENCOUNTER SYMPTOMS
MYALGIAS: 0
COUGH: 0
NAUSEA: 0
DIZZINESS: 0
DIARRHEA: 0
VOMITING: 0
CONSTIPATION: 0
PALPITATIONS: 0
ABDOMINAL PAIN: 0
WEAKNESS: 0
FEVER: 0
SHORTNESS OF BREATH: 0
HEADACHES: 0
FOCAL WEAKNESS: 0
CHILLS: 0

## 2017-03-22 ASSESSMENT — PAIN SCALES - GENERAL
PAINLEVEL_OUTOF10: 5
PAINLEVEL_OUTOF10: 0
PAINLEVEL_OUTOF10: 0

## 2017-03-22 NOTE — DISCHARGE PLANNING
Arranged patient's transport to [facility] at 1100 on 03/23 via [employer].  Kurtis([facility]) notified.

## 2017-03-22 NOTE — PROGRESS NOTES
"Received report from day shift RN, assumed care. Pt. Is awake, on bed. A&Ox4, stand by assist , denies pain. Due medications given, no s/s of aspiration noted. Pt. Has dual lumen subclavian line, closed tubing set up in use. Pt. Refuses patency and function check, when asked why pt stated \" I just don't want to. I know it is working because i feel it\". Reiterated importance of patency check but pt. Continues to refuse. Call light and personal belongings within reach, bed kept low, treaded socks on. Assisted as necessary. Kept rested and comfortable at all times.    Pt. Refuses bed alarm despite the health education given such as fall risk and injury, and explaining the importance of having one. Pt. Verbalized understanding but continues to refuse. Instructed to call for help appropriately. Will continue to monitor.  "

## 2017-03-22 NOTE — CARE PLAN
Problem: Infection  Goal: Will remain free from infection  Outcome: PROGRESSING AS EXPECTED  IV ATB in use. Stop date 4/23/17.    Problem: Knowledge Deficit  Goal: Knowledge of disease process/condition, treatment plan, diagnostic tests, and medications will improve  Outcome: PROGRESSING AS EXPECTED  Pt. Refusing SQ heparin shot. Reiterated importance of DVT pharmacologic prophylaxis. Pt. Continues to refuse.

## 2017-03-22 NOTE — CARE PLAN
Problem: Respiratory:  Goal: Respiratory status will improve  Outcome: PROGRESSING AS EXPECTED  Some nasal congestion, lungs clear.     Problem: Mobility  Goal: Risk for activity intolerance will decrease  Outcome: PROGRESSING AS EXPECTED  Patient ambulates with standby assist to bathroom.

## 2017-03-22 NOTE — DISCHARGE PLANNING
Medical Social Work  Faxed transport communication form to [facility] to a tentative transport time of 11:00 to [facility] tomorrow, 3/23/17

## 2017-03-22 NOTE — DISCHARGE PLANNING
Medical Social Work  Talked to patient and her parents about d/c to skilled for wound care and what her plans are what when she d/c from a skilled. Patient initially was very reluctant to talk, telling me to go away, that she is tired and she isn't talking to me. Patient asked her mother to leave, and her father and I where able to talk.  Patient initially stated she doesn't want to leave.  I explained to her that she doesn't have that choice. That the hospital is for people who are acute/sick and she isn't.  I further told her if she was medically clear to discharge from the hospital, not needing wound care I could d/c her to the shelter. Patient reluctantly said she will go tomorrow and agreed upon a time of 11:00.  Patient told me that she doesn't want to live with her mom after she is d/c.  Doesn't know where she wants to live, doesn't want to live in a group home nor is she wanting to live in an apartment.

## 2017-03-22 NOTE — PROGRESS NOTES
Patient alert and oriented.    R foot dressings changed today.    Ambulated around unit twice.    IV antibiotics until 4/23/17.  Anticipate discharge to SNF, social work following.

## 2017-03-22 NOTE — CARE PLAN
Problem: Infection  Goal: Will remain free from infection  Outcome: PROGRESSING AS EXPECTED  On IV abx until 4/23 per ID recs, WBC at 10.3, will CTM for any further signs of worsening infxn    Problem: Pain Management  Goal: Pain level will decrease to patient’s comfort goal  Outcome: PROGRESSING AS EXPECTED  Pt complained of pain, medicated with PRN pain medications see MAR, will continue to monitor for pain

## 2017-03-22 NOTE — DISCHARGE PLANNING
Arranged patient's transport to [facility] at 1200 on 03/23.  Kurtis([facility]) notified.

## 2017-03-22 NOTE — PROGRESS NOTES
Hospital Medicine Interval Note  Date of Service:  3/22/2017    Chief Complaint  46 y.o.-year-old female admitted 2/26/2017 with villavicencio villavicencio and right foot infection    Interval Problem Update  3/22 - discussed with SW/IDT. Family meeting to determine care plan. Questions answered. No significant changes.     3/21 - discussed with SW. Discussed with nursing/ID. Planning on SNF, accepted locally. Questions answered.    Consultants/Specialty  ALLISON Gutierrez    Disposition  IV abx through 4/23            Review of Systems   Constitutional: Negative for fever and chills.   Respiratory: Negative for cough and shortness of breath.    Cardiovascular: Negative for chest pain and palpitations.   Gastrointestinal: Negative for nausea, vomiting, abdominal pain, diarrhea and constipation.   Genitourinary: Negative for dysuria.   Musculoskeletal: Positive for joint pain. Negative for myalgias.   Skin: Negative for itching.   Neurological: Negative for dizziness, focal weakness, weakness and headaches.   All other systems reviewed and are negative.     Physical Exam Laboratory/Imaging   Filed Vitals:    03/21/17 2000 03/22/17 0400 03/22/17 0722 03/22/17 1128   BP: 147/62 150/76 143/90 142/69   Pulse: 64 74 72 65   Temp: 36.7 °C (98 °F) 36.3 °C (97.3 °F) 37.1 °C (98.7 °F) 36.9 °C (98.5 °F)   Resp: 16 16 18 18   Height:       Weight:       SpO2: 95% 93% 92% 97%     Physical Exam   Constitutional: She appears well-developed and well-nourished.   HENT:   Head: Normocephalic and atraumatic.   Mouth/Throat: Oropharynx is clear and moist.   Eyes: Conjunctivae and EOM are normal. Pupils are equal, round, and reactive to light. No scleral icterus.   Neck: Normal range of motion. Neck supple. No tracheal deviation present. No thyromegaly present.   Cardiovascular: Normal rate, regular rhythm, normal heart sounds and intact distal pulses.    No murmur heard.  Pulmonary/Chest: Effort normal and breath sounds normal. No respiratory distress. She  has no wheezes.   Abdominal: Soft. Bowel sounds are normal. She exhibits no distension. There is no tenderness.   Musculoskeletal: Normal range of motion. She exhibits tenderness. She exhibits no edema.   Right foot in boot   Lymphadenopathy:     She has no cervical adenopathy.        Right: No supraclavicular adenopathy present.        Left: No supraclavicular adenopathy present.   Neurological: She is alert.   Skin: Skin is warm and dry.   Vitals reviewed.   Lab Results   Component Value Date/Time    WBC 10.3 03/20/2017 09:20 PM    HEMOGLOBIN 9.7* 03/20/2017 09:20 PM    HEMATOCRIT 30.5* 03/20/2017 09:20 PM    PLATELET COUNT 204 03/20/2017 09:20 PM     Lab Results   Component Value Date/Time    SODIUM 139 03/20/2017 09:20 PM    POTASSIUM 3.9 03/20/2017 09:20 PM    CHLORIDE 105 03/20/2017 09:20 PM    CO2 24 03/20/2017 09:20 PM    GLUCOSE 108* 03/20/2017 09:20 PM    BUN 28* 03/20/2017 09:20 PM    CREATININE 2.19* 03/20/2017 09:20 PM      Assessment/Plan    Cellulitis of right foot  Assessment & Plan  Active Orders     Ordered     Ordering Provider       Wed Mar 1, 2017  1:56 PM    03/01/17 1356  cefUROXime (ZINACEF) 750 mg in NS 50 mL IVPB   EVERY 8 HOURS      Ivy Gutierrez M.D.      Abx through 4/23  ID on board  Ortho I&D'd  PICC in place    Daniella villavicencio disease  Assessment & Plan  Stable  Wander guard    CKD stage 4 secondary to hypertension (CMS-HCC)  Assessment & Plan  Lab Results   Component Value Date    BUN 28* 03/20/2017    CREATININE 2.19* 03/20/2017    CREATININE 0.9 11/19/2006   Baseline Cr 2-3-range, trending  Maintaining hydration  Avoiding nephrotoxics: NSAIDs etc  Following Cr closely; to keep near baseline as possible    Essential hypertension, benign  Assessment & Plan  SBP goal less than 140 mmHg  DBP goal less than 90 mmHg  PRN and antihypertensives to titrate by hospitalist towards goal  Most recent Blood Pressure: 142/69 mmHg     Dilated cardiomyopathy (CMS-[facility])  Assessment & Plan  Medically  managed    HLD (hyperlipidemia)  Assessment & Plan  Statin    Seizure disorder, grand mal (CMS-HCC)  Assessment & Plan  Dilantin       Labs reviewed and Medications reviewed        DVT Prophylaxis: Heparin    Ulcer prophylaxis: Not indicated

## 2017-03-22 NOTE — PROGRESS NOTES
Pt is AOx4, complains of pain, medicated with PRN pain medications, tolerated all oral medications, skin and sacral assessment done, right foot dressing in place---CDI, R subclavian dual lumen catheter in place--CDI, on RA, call light in use, refused bed alarm on, charge RN aware, treaded socks on, bed locked in low position, plan of care discussed, all needs met at this time.

## 2017-03-23 VITALS
HEART RATE: 68 BPM | SYSTOLIC BLOOD PRESSURE: 157 MMHG | RESPIRATION RATE: 17 BRPM | HEIGHT: 69 IN | WEIGHT: 264.33 LBS | TEMPERATURE: 98.4 F | DIASTOLIC BLOOD PRESSURE: 98 MMHG | OXYGEN SATURATION: 92 % | BODY MASS INDEX: 39.15 KG/M2

## 2017-03-23 PROCEDURE — 700102 HCHG RX REV CODE 250 W/ 637 OVERRIDE(OP): Performed by: PSYCHIATRY & NEUROLOGY

## 2017-03-23 PROCEDURE — 700102 HCHG RX REV CODE 250 W/ 637 OVERRIDE(OP): Performed by: HOSPITALIST

## 2017-03-23 PROCEDURE — A9270 NON-COVERED ITEM OR SERVICE: HCPCS | Performed by: PSYCHIATRY & NEUROLOGY

## 2017-03-23 PROCEDURE — 700105 HCHG RX REV CODE 258: Performed by: INTERNAL MEDICINE

## 2017-03-23 PROCEDURE — A9270 NON-COVERED ITEM OR SERVICE: HCPCS | Performed by: HOSPITALIST

## 2017-03-23 PROCEDURE — 99239 HOSP IP/OBS DSCHRG MGMT >30: CPT | Performed by: HOSPITALIST

## 2017-03-23 PROCEDURE — A9270 NON-COVERED ITEM OR SERVICE: HCPCS | Performed by: INTERNAL MEDICINE

## 2017-03-23 PROCEDURE — 700111 HCHG RX REV CODE 636 W/ 250 OVERRIDE (IP): Performed by: INTERNAL MEDICINE

## 2017-03-23 PROCEDURE — 700102 HCHG RX REV CODE 250 W/ 637 OVERRIDE(OP): Performed by: INTERNAL MEDICINE

## 2017-03-23 RX ADMIN — CARVEDILOL 25 MG: 25 TABLET, FILM COATED ORAL at 08:21

## 2017-03-23 RX ADMIN — ALBUTEROL SULFATE 2 PUFF: 90 AEROSOL, METERED RESPIRATORY (INHALATION) at 05:29

## 2017-03-23 RX ADMIN — FOLIC ACID 1 MG: 1 TABLET ORAL at 08:19

## 2017-03-23 RX ADMIN — LORAZEPAM 1 MG: 1 TABLET ORAL at 11:41

## 2017-03-23 RX ADMIN — OXCARBAZEPINE 150 MG: 150 TABLET, FILM COATED ORAL at 08:27

## 2017-03-23 RX ADMIN — LORAZEPAM 1 MG: 1 TABLET ORAL at 05:29

## 2017-03-23 RX ADMIN — AMLODIPINE BESYLATE 5 MG: 5 TABLET ORAL at 08:21

## 2017-03-23 RX ADMIN — PHENYTOIN SODIUM 200 MG: 100 CAPSULE, EXTENDED RELEASE ORAL at 08:20

## 2017-03-23 RX ADMIN — CYANOCOBALAMIN TAB 500 MCG 1000 MCG: 500 TAB at 08:21

## 2017-03-23 RX ADMIN — CEFUROXIME SODIUM 750 MG: 7.5 INJECTION, POWDER, FOR SOLUTION INTRAVENOUS at 05:11

## 2017-03-23 RX ADMIN — Medication 1300 MG: at 08:18

## 2017-03-23 RX ADMIN — ASPIRIN 325 MG: 325 TABLET, FILM COATED ORAL at 08:21

## 2017-03-23 ASSESSMENT — PAIN SCALES - GENERAL
PAINLEVEL_OUTOF10: 0
PAINLEVEL_OUTOF10: 0

## 2017-03-23 NOTE — DISCHARGE INSTRUCTIONS
Discharge Instructions    Discharged to other by medical transportation with escort. Discharged via wheelchair, hospital escort: Yes.  Special equipment needed: Wheelchair    Be sure to schedule a follow-up appointment with your primary care doctor or any specialists as instructed.     Discharge Plan:   Diet Plan: Discussed  Activity Level: Discussed  Smoking Cessation Offered: Patient Refused  Confirmed Follow up Appointment: Patient to Call and Schedule Appointment  Confirmed Symptoms Management: Discussed  Medication Reconciliation Updated: Yes  Influenza Vaccine Indication: Not indicated: Previously immunized this influenza season and > 8 years of age    I understand that a diet low in cholesterol, fat, and sodium is recommended for good health. Unless I have been given specific instructions below for another diet, I accept this instruction as my diet prescription.   Other diet: Regular    Special Instructions: None    · Is patient discharged on Warfarin / Coumadin?   No     · Is patient Post Blood Transfusion?  No    Depression / Suicide Risk    As you are discharged from this [facility] facility, it is important to learn how to keep safe from harming yourself.    Recognize the warning signs:  · Abrupt changes in personality, positive or negative- including increase in energy   · Giving away possessions  · Change in eating patterns- significant weight changes-  positive or negative  · Change in sleeping patterns- unable to sleep or sleeping all the time   · Unwillingness or inability to communicate  · Depression  · Unusual sadness, discouragement and loneliness  · Talk of wanting to die  · Neglect of personal appearance   · Rebelliousness- reckless behavior  · Withdrawal from people/activities they love  · Confusion- inability to concentrate     If you or a loved one observes any of these behaviors or has concerns about self-harm, here's what you can do:  · Talk about it- your feelings and reasons for  harming yourself  · Remove any means that you might use to hurt yourself (examples: pills, rope, extension cords, firearm)  · Get professional help from the community (Mental Health, Substance Abuse, psychological counseling)  · Do not be alone:Call your Safe Contact- someone whom you trust who will be there for you.  · Call your local CRISIS HOTLINE 401-7362 or 672-789-2299  · Call your local Children's Mobile Crisis Response Team [place] (848) 306-0942 or wwwGrexIt  · Call the toll free National Suicide Prevention Hotlines   · National Suicide Prevention Lifeline 633-141-AIKU (3033)  · [employer] Line Network 800-SUICIDE (014-1027)  Cellulitis  Cellulitis is an infection of the skin and the tissue beneath it. The infected area is usually red and tender. Cellulitis occurs most often in the arms and lower legs.   CAUSES   Cellulitis is caused by bacteria that enter the skin through cracks or cuts in the skin. The most common types of bacteria that cause cellulitis are staphylococci and streptococci.  SIGNS AND SYMPTOMS   · Redness and warmth.  · Swelling.  · Tenderness or pain.  · Fever.  DIAGNOSIS   Your health care provider can usually determine what is wrong based on a physical exam. Blood tests may also be done.  TREATMENT   Treatment usually involves taking an antibiotic medicine.  HOME CARE INSTRUCTIONS   · Take your antibiotic medicine as directed by your health care provider. Finish the antibiotic even if you start to feel better.  · Keep the infected arm or leg elevated to reduce swelling.  · Apply a warm cloth to the affected area up to 4 times per day to relieve pain.  · Take medicines only as directed by your health care provider.  · Keep all follow-up visits as directed by your health care provider.  SEEK MEDICAL CARE IF:   · You notice red streaks coming from the infected area.  · Your red area gets larger or turns dark in color.  · Your bone or joint underneath the infected area  becomes painful after the skin has healed.  · Your infection returns in the same area or another area.  · You notice a swollen bump in the infected area.  · You develop new symptoms.  · You have a fever.  SEEK IMMEDIATE MEDICAL CARE IF:   · You feel very sleepy.  · You develop vomiting or diarrhea.  · You have a general ill feeling (malaise) with muscle aches and pains.  MAKE SURE YOU:   · Understand these instructions.  · Will watch your condition.  · Will get help right away if you are not doing well or get worse.     This information is not intended to replace advice given to you by your health care provider. Make sure you discuss any questions you have with your health care provider.     Document Released: 09/27/2006 Document Revised: 01/08/2016 Document Reviewed: 03/04/2013  [employer] Patient Education ©2016 [employer]    Wound Care  Taking care of your wound properly can help to prevent pain and infection. It can also help your wound to heal more quickly.   HOW TO CARE FOR YOUR WOUND   · Take or apply over-the-counter and prescription medicines only as told by your health care provider.  · If you were prescribed antibiotic medicine, take or apply it as told by your health care provider. Do not stop using the antibiotic even if your condition improves.  · Clean the wound each day or as told by your health care provider.  ¨ Wash the wound with mild soap and water.  ¨ Rinse the wound with water to remove all soap.  ¨ Pat the wound dry with a clean towel. Do not rub it.  · There are many different ways to close and cover a wound. For example, a wound can be covered with stitches (sutures), skin glue, or adhesive strips. Follow instructions from your health care provider about:  ¨ How to take care of your wound.  ¨ When and how you should change your bandage (dressing).  ¨ When you should remove your dressing.  ¨ Removing whatever was used to close your wound.  · Check your wound every day for signs  of infection. Watch for:  ¨ Redness, swelling, or pain.  ¨ Fluid, blood, or pus.  · Keep the dressing dry until your health care provider says it can be removed. Do not take baths, swim, use a hot tub, or do anything that would put your wound underwater until your health care provider approves.  · Raise (elevate) the injured area above the level of your heart while you are sitting or lying down.  · Do not scratch or pick at the wound.  · Keep all follow-up visits as told by your health care provider. This is important.  SEEK MEDICAL CARE IF:  · You received a tetanus shot and you have swelling, severe pain, redness, or bleeding at the injection site.  · You have a fever.  · Your pain is not controlled with medicine.  · You have increased redness, swelling, or pain at the site of your wound.  · You have fluid, blood, or pus coming from your wound.  · You notice a bad smell coming from your wound or your dressing.  SEEK IMMEDIATE MEDICAL CARE IF:  · You have a red streak going away from your wound.     This information is not intended to replace advice given to you by your health care provider. Make sure you discuss any questions you have with your health care provider.     Document Released: 09/26/2009 Document Revised: 05/03/2016 Document Reviewed: 12/14/2015  [employer] Patient Education ©2016 [employer]

## 2017-03-23 NOTE — CARE PLAN
Problem: Discharge Barriers/Planning  Goal: Patient’s continuum of care needs will be met  Outcome: PROGRESSING AS EXPECTED  Possible discharge tomorrow. SW following. Transport coordinated.

## 2017-03-23 NOTE — CARE PLAN
Problem: Infection  Goal: Will remain free from infection  Outcome: PROGRESSING AS EXPECTED  Educated pt about infection prevention in wounds. Pt stated understanding and provided teach back.     Problem: Mobility  Goal: Risk for activity intolerance will decrease  Outcome: PROGRESSING SLOWER THAN EXPECTED  Educated pt about boot use 23 hours of the day. Pt demonstrated understanding.

## 2017-03-23 NOTE — CARE PLAN
Problem: Safety  Goal: Will remain free from falls  Outcome: PROGRESSING AS EXPECTED  Safety precautions in place. Bed kept low and locked, call light within reach, assisted as necessary.    Problem: Knowledge Deficit  Goal: Knowledge of disease process/condition, treatment plan, diagnostic tests, and medications will improve  Outcome: PROGRESSING AS EXPECTED  Pt. Refusing patency check for subclavian dual lumen. Reiterated importance of patency check but pt. Continues to refuse.

## 2017-03-23 NOTE — PROGRESS NOTES
Received report from day shift RN, assumed care. Pt. Is awake, on bed. A&Ox4, Up self with steady gait.  Pt. denies pain. Subclavian dual lumen in place, closed tubing set up in use. Pt. Refusing patency check. Plan of care was safety, comfort and rest. Discussed plan of care. Call light and personal belongings within reach, bed kept low, treaded socks on. Assisted as necessary. Kept rested and comfortable at all times.    Pt. Refuses bed alarm despite the health education given such as fall risk and injury, and explaining the importance of having one. Pt. Verbalized understanding but continues to refuse. Instructed to call for help appropriately. Will continue to monitor.

## 2017-03-23 NOTE — PROGRESS NOTES
"Pt. Initially refusing IV ATB. When asked why, pt. Stated \" I refused it!\". RN educated pt. That finishing IV ATB is important to prevent ATB resistance and that finishing IV ATB course will prevent complications on her wounds. Pt. Agreeable to this educations and let this RN hang IV ATB.  "

## 2017-03-23 NOTE — PROGRESS NOTES
Received discharge orders. Pt went with central line in place, ok by doctor. Went over discharge instructions with pt and parents. Called and gave report at 1205. Dressing changed and pt toileted before transport. All questions answered, patient feels safe to discharge. Patient went to skilled facility with medical transport. All belongings gathered and patient dressed.

## 2017-03-23 NOTE — PROGRESS NOTES
Pt refused bed alarm, educated pt about safety precautions and risks. Pt wears walking boot for right foot and medicated with Ativan. Pt verbalizes understanding and continues to refuse.

## 2017-03-23 NOTE — PROGRESS NOTES
Assumed care of pt at 0715. A/Ox4, discussed plan of care. Pt on room air, tolerating regular diet, up self with steady gait. Pt stated she had a bowel movement this morning and refused stool softener. Pt plan to be discharge today around 11 AM. All needs met at this time. Bed in lowest position, treaded socks on, personal belongings and call light within reach, instructed to call for any assistance.

## 2017-03-24 ENCOUNTER — TELEPHONE (OUTPATIENT)
Dept: INFECTIOUS DISEASES | Facility: MEDICAL CENTER | Age: 47
End: 2017-03-24

## 2017-03-24 NOTE — TELEPHONE ENCOUNTER
Spoke with pt's mother, Andria, who states that her daughter was kicked out of [facility] this morning.  Pt has several social issues.  Pt was receiving IV Cefuroxime Q8h.  Pt needs to be seen in ER for possible alternative placement, SW/CM may be able to assist with these social issues.  Mother agrees that they should return to the ER to determine how they are going to continue on with treatment.

## 2017-03-26 ENCOUNTER — HOSPITAL ENCOUNTER (INPATIENT)
Facility: MEDICAL CENTER | Age: 47
LOS: 3 days | DRG: 871 | End: 2017-03-29
Attending: EMERGENCY MEDICINE | Admitting: HOSPITALIST
Payer: MEDICARE

## 2017-03-26 ENCOUNTER — APPOINTMENT (OUTPATIENT)
Dept: RADIOLOGY | Facility: MEDICAL CENTER | Age: 47
DRG: 871 | End: 2017-03-26
Attending: EMERGENCY MEDICINE
Payer: MEDICARE

## 2017-03-26 ENCOUNTER — RESOLUTE PROFESSIONAL BILLING HOSPITAL PROF FEE (OUTPATIENT)
Dept: HOSPITALIST | Facility: MEDICAL CENTER | Age: 47
End: 2017-03-26
Payer: MEDICARE

## 2017-03-26 DIAGNOSIS — I50.21 ACUTE SYSTOLIC CONGESTIVE HEART FAILURE (HCC): ICD-10-CM

## 2017-03-26 DIAGNOSIS — L03.119 CELLULITIS OF FOOT: ICD-10-CM

## 2017-03-26 DIAGNOSIS — L03.818 CELLULITIS OF OTHER SPECIFIED SITE: ICD-10-CM

## 2017-03-26 DIAGNOSIS — A41.01 SEPSIS DUE TO METHICILLIN SUSCEPTIBLE STAPHYLOCOCCUS AUREUS (HCC): ICD-10-CM

## 2017-03-26 PROBLEM — A41.9 SEPSIS (HCC): Status: ACTIVE | Noted: 2017-03-26

## 2017-03-26 PROBLEM — J96.01 ACUTE RESPIRATORY FAILURE WITH HYPOXIA (HCC): Status: ACTIVE | Noted: 2017-03-26

## 2017-03-26 LAB
ANION GAP SERPL CALC-SCNC: 9 MMOL/L (ref 0–11.9)
BASOPHILS # BLD AUTO: 0.3 % (ref 0–1.8)
BASOPHILS # BLD: 0.04 K/UL (ref 0–0.12)
BNP SERPL-MCNC: 1176 PG/ML (ref 0–100)
BUN SERPL-MCNC: 31 MG/DL (ref 8–22)
CALCIUM SERPL-MCNC: 8.7 MG/DL (ref 8.5–10.5)
CHLORIDE SERPL-SCNC: 108 MMOL/L (ref 96–112)
CO2 SERPL-SCNC: 24 MMOL/L (ref 20–33)
CREAT SERPL-MCNC: 2.01 MG/DL (ref 0.5–1.4)
EOSINOPHIL # BLD AUTO: 0.25 K/UL (ref 0–0.51)
EOSINOPHIL NFR BLD: 1.9 % (ref 0–6.9)
ERYTHROCYTE [DISTWIDTH] IN BLOOD BY AUTOMATED COUNT: 51.3 FL (ref 35.9–50)
GFR SERPL CREATININE-BSD FRML MDRD: 27 ML/MIN/1.73 M 2
GLUCOSE SERPL-MCNC: 152 MG/DL (ref 65–99)
GRAM STN SPEC: NORMAL
HCT VFR BLD AUTO: 34 % (ref 37–47)
HGB BLD-MCNC: 10.9 G/DL (ref 12–16)
IMM GRANULOCYTES # BLD AUTO: 0.04 K/UL (ref 0–0.11)
IMM GRANULOCYTES NFR BLD AUTO: 0.3 % (ref 0–0.9)
LACTATE BLD-SCNC: 1.8 MMOL/L (ref 0.5–2)
LYMPHOCYTES # BLD AUTO: 2.07 K/UL (ref 1–4.8)
LYMPHOCYTES NFR BLD: 15.9 % (ref 22–41)
MAGNESIUM SERPL-MCNC: 1.5 MG/DL (ref 1.5–2.5)
MCH RBC QN AUTO: 31.5 PG (ref 27–33)
MCHC RBC AUTO-ENTMCNC: 32.1 G/DL (ref 33.6–35)
MCV RBC AUTO: 98.3 FL (ref 81.4–97.8)
MONOCYTES # BLD AUTO: 0.74 K/UL (ref 0–0.85)
MONOCYTES NFR BLD AUTO: 5.7 % (ref 0–13.4)
NEUTROPHILS # BLD AUTO: 9.84 K/UL (ref 2–7.15)
NEUTROPHILS NFR BLD: 75.9 % (ref 44–72)
NRBC # BLD AUTO: 0 K/UL
NRBC BLD AUTO-RTO: 0 /100 WBC
PLATELET # BLD AUTO: 247 K/UL (ref 164–446)
PMV BLD AUTO: 9.8 FL (ref 9–12.9)
POTASSIUM SERPL-SCNC: 4.5 MMOL/L (ref 3.6–5.5)
RBC # BLD AUTO: 3.46 M/UL (ref 4.2–5.4)
SIGNIFICANT IND 70042: NORMAL
SITE SITE: NORMAL
SODIUM SERPL-SCNC: 141 MMOL/L (ref 135–145)
SOURCE SOURCE: NORMAL
TROPONIN I SERPL-MCNC: 0.01 NG/ML (ref 0–0.04)
WBC # BLD AUTO: 13 K/UL (ref 4.8–10.8)

## 2017-03-26 PROCEDURE — 99223 1ST HOSP IP/OBS HIGH 75: CPT | Mod: AI | Performed by: HOSPITALIST

## 2017-03-26 PROCEDURE — 85025 COMPLETE CBC W/AUTO DIFF WBC: CPT

## 2017-03-26 PROCEDURE — 93005 ELECTROCARDIOGRAM TRACING: CPT | Performed by: EMERGENCY MEDICINE

## 2017-03-26 PROCEDURE — 87205 SMEAR GRAM STAIN: CPT

## 2017-03-26 PROCEDURE — 770020 HCHG ROOM/CARE - TELE (206)

## 2017-03-26 PROCEDURE — 71010 DX-CHEST-PORTABLE (1 VIEW): CPT

## 2017-03-26 PROCEDURE — 96374 THER/PROPH/DIAG INJ IV PUSH: CPT

## 2017-03-26 PROCEDURE — 84484 ASSAY OF TROPONIN QUANT: CPT

## 2017-03-26 PROCEDURE — 700105 HCHG RX REV CODE 258: Performed by: HOSPITALIST

## 2017-03-26 PROCEDURE — A9270 NON-COVERED ITEM OR SERVICE: HCPCS | Performed by: HOSPITALIST

## 2017-03-26 PROCEDURE — 87070 CULTURE OTHR SPECIMN AEROBIC: CPT

## 2017-03-26 PROCEDURE — 83880 ASSAY OF NATRIURETIC PEPTIDE: CPT

## 2017-03-26 PROCEDURE — 83605 ASSAY OF LACTIC ACID: CPT

## 2017-03-26 PROCEDURE — 80048 BASIC METABOLIC PNL TOTAL CA: CPT

## 2017-03-26 PROCEDURE — 87040 BLOOD CULTURE FOR BACTERIA: CPT

## 2017-03-26 PROCEDURE — 700111 HCHG RX REV CODE 636 W/ 250 OVERRIDE (IP): Performed by: HOSPITALIST

## 2017-03-26 PROCEDURE — 700102 HCHG RX REV CODE 250 W/ 637 OVERRIDE(OP): Performed by: HOSPITALIST

## 2017-03-26 PROCEDURE — 700101 HCHG RX REV CODE 250: Performed by: EMERGENCY MEDICINE

## 2017-03-26 PROCEDURE — 94640 AIRWAY INHALATION TREATMENT: CPT

## 2017-03-26 PROCEDURE — 83735 ASSAY OF MAGNESIUM: CPT

## 2017-03-26 PROCEDURE — 96372 THER/PROPH/DIAG INJ SC/IM: CPT

## 2017-03-26 PROCEDURE — 99285 EMERGENCY DEPT VISIT HI MDM: CPT

## 2017-03-26 PROCEDURE — 700111 HCHG RX REV CODE 636 W/ 250 OVERRIDE (IP): Performed by: EMERGENCY MEDICINE

## 2017-03-26 RX ORDER — MORPHINE SULFATE 4 MG/ML
4 INJECTION, SOLUTION INTRAMUSCULAR; INTRAVENOUS ONCE
Status: COMPLETED | OUTPATIENT
Start: 2017-03-26 | End: 2017-03-26

## 2017-03-26 RX ORDER — SODIUM BICARBONATE 650 MG/1
650 TABLET ORAL 3 TIMES DAILY
Status: DISCONTINUED | OUTPATIENT
Start: 2017-03-26 | End: 2017-03-29 | Stop reason: HOSPADM

## 2017-03-26 RX ORDER — FOLIC ACID 1 MG/1
1 TABLET ORAL DAILY
Status: DISCONTINUED | OUTPATIENT
Start: 2017-03-26 | End: 2017-03-29 | Stop reason: HOSPADM

## 2017-03-26 RX ORDER — FUROSEMIDE 10 MG/ML
40 INJECTION INTRAMUSCULAR; INTRAVENOUS ONCE
Status: COMPLETED | OUTPATIENT
Start: 2017-03-26 | End: 2017-03-26

## 2017-03-26 RX ORDER — BISACODYL 10 MG
10 SUPPOSITORY, RECTAL RECTAL
Status: DISCONTINUED | OUTPATIENT
Start: 2017-03-26 | End: 2017-03-29 | Stop reason: HOSPADM

## 2017-03-26 RX ORDER — SODIUM CHLORIDE 9 MG/ML
2000 INJECTION, SOLUTION INTRAVENOUS
Status: DISCONTINUED | OUTPATIENT
Start: 2017-03-26 | End: 2017-03-26

## 2017-03-26 RX ORDER — PHENYTOIN SODIUM 100 MG/1
200 CAPSULE, EXTENDED RELEASE ORAL 2 TIMES DAILY
Status: DISCONTINUED | OUTPATIENT
Start: 2017-03-26 | End: 2017-03-29 | Stop reason: HOSPADM

## 2017-03-26 RX ORDER — ALBUTEROL SULFATE 90 UG/1
2 AEROSOL, METERED RESPIRATORY (INHALATION)
Status: DISCONTINUED | OUTPATIENT
Start: 2017-03-26 | End: 2017-03-29 | Stop reason: HOSPADM

## 2017-03-26 RX ORDER — ONDANSETRON 2 MG/ML
4 INJECTION INTRAMUSCULAR; INTRAVENOUS ONCE
Status: COMPLETED | OUTPATIENT
Start: 2017-03-26 | End: 2017-03-26

## 2017-03-26 RX ORDER — OMEPRAZOLE 20 MG/1
20 CAPSULE, DELAYED RELEASE ORAL DAILY
Status: DISCONTINUED | OUTPATIENT
Start: 2017-03-27 | End: 2017-03-29 | Stop reason: HOSPADM

## 2017-03-26 RX ORDER — CEFAZOLIN SODIUM 1 G/3ML
1 INJECTION, POWDER, FOR SOLUTION INTRAMUSCULAR; INTRAVENOUS ONCE
Status: COMPLETED | OUTPATIENT
Start: 2017-03-26 | End: 2017-03-26

## 2017-03-26 RX ORDER — ACETAMINOPHEN 325 MG/1
650 TABLET ORAL EVERY 6 HOURS PRN
Status: DISCONTINUED | OUTPATIENT
Start: 2017-03-26 | End: 2017-03-29 | Stop reason: HOSPADM

## 2017-03-26 RX ORDER — ALBUTEROL SULFATE 2.5 MG/3ML
2.5 SOLUTION RESPIRATORY (INHALATION) ONCE
Status: COMPLETED | OUTPATIENT
Start: 2017-03-26 | End: 2017-03-26

## 2017-03-26 RX ORDER — LORAZEPAM 1 MG/1
1 TABLET ORAL 3 TIMES DAILY
Status: DISCONTINUED | OUTPATIENT
Start: 2017-03-26 | End: 2017-03-29 | Stop reason: HOSPADM

## 2017-03-26 RX ORDER — IPRATROPIUM BROMIDE AND ALBUTEROL SULFATE 2.5; .5 MG/3ML; MG/3ML
3 SOLUTION RESPIRATORY (INHALATION)
Status: DISCONTINUED | OUTPATIENT
Start: 2017-03-26 | End: 2017-03-29 | Stop reason: HOSPADM

## 2017-03-26 RX ORDER — ASPIRIN 325 MG
325 TABLET ORAL DAILY
Status: DISCONTINUED | OUTPATIENT
Start: 2017-03-26 | End: 2017-03-29 | Stop reason: HOSPADM

## 2017-03-26 RX ORDER — ATORVASTATIN CALCIUM 40 MG/1
40 TABLET, FILM COATED ORAL DAILY
Status: DISCONTINUED | OUTPATIENT
Start: 2017-03-26 | End: 2017-03-29 | Stop reason: HOSPADM

## 2017-03-26 RX ORDER — LISINOPRIL 20 MG/1
20 TABLET ORAL 2 TIMES DAILY
Status: DISCONTINUED | OUTPATIENT
Start: 2017-03-26 | End: 2017-03-29 | Stop reason: HOSPADM

## 2017-03-26 RX ORDER — SODIUM CHLORIDE 9 MG/ML
500 INJECTION, SOLUTION INTRAVENOUS
Status: DISCONTINUED | OUTPATIENT
Start: 2017-03-26 | End: 2017-03-29 | Stop reason: HOSPADM

## 2017-03-26 RX ORDER — SODIUM BICARBONATE 650 MG/1
650 TABLET ORAL 3 TIMES DAILY
Status: ON HOLD | COMMUNITY
End: 2017-03-28

## 2017-03-26 RX ORDER — CARVEDILOL 25 MG/1
25 TABLET ORAL 3 TIMES DAILY
Status: DISCONTINUED | OUTPATIENT
Start: 2017-03-26 | End: 2017-03-29 | Stop reason: HOSPADM

## 2017-03-26 RX ORDER — HEPARIN SODIUM 5000 [USP'U]/ML
5000 INJECTION, SOLUTION INTRAVENOUS; SUBCUTANEOUS EVERY 8 HOURS
Status: DISCONTINUED | OUTPATIENT
Start: 2017-03-26 | End: 2017-03-29 | Stop reason: HOSPADM

## 2017-03-26 RX ORDER — SODIUM CHLORIDE 9 MG/ML
1000 INJECTION, SOLUTION INTRAVENOUS
Status: DISCONTINUED | OUTPATIENT
Start: 2017-03-26 | End: 2017-03-29 | Stop reason: HOSPADM

## 2017-03-26 RX ORDER — CHOLECALCIFEROL (VITAMIN D3) 125 MCG
1000 CAPSULE ORAL DAILY
Status: DISCONTINUED | OUTPATIENT
Start: 2017-03-26 | End: 2017-03-26

## 2017-03-26 RX ORDER — OXCARBAZEPINE 150 MG/1
150 TABLET, FILM COATED ORAL 2 TIMES DAILY
Status: DISCONTINUED | OUTPATIENT
Start: 2017-03-26 | End: 2017-03-27

## 2017-03-26 RX ORDER — POLYETHYLENE GLYCOL 3350 17 G/17G
1 POWDER, FOR SOLUTION ORAL
Status: DISCONTINUED | OUTPATIENT
Start: 2017-03-26 | End: 2017-03-29 | Stop reason: HOSPADM

## 2017-03-26 RX ORDER — AMLODIPINE BESYLATE 5 MG/1
5 TABLET ORAL DAILY
Status: DISCONTINUED | OUTPATIENT
Start: 2017-03-26 | End: 2017-03-29 | Stop reason: HOSPADM

## 2017-03-26 RX ORDER — AMOXICILLIN 250 MG
2 CAPSULE ORAL 2 TIMES DAILY
Status: DISCONTINUED | OUTPATIENT
Start: 2017-03-26 | End: 2017-03-29 | Stop reason: HOSPADM

## 2017-03-26 RX ORDER — SODIUM CHLORIDE 9 MG/ML
INJECTION, SOLUTION INTRAVENOUS CONTINUOUS
Status: DISCONTINUED | OUTPATIENT
Start: 2017-03-26 | End: 2017-03-29 | Stop reason: HOSPADM

## 2017-03-26 RX ADMIN — ACETAMINOPHEN 650 MG: 325 TABLET, FILM COATED ORAL at 18:29

## 2017-03-26 RX ADMIN — LORAZEPAM 1 MG: 1 TABLET ORAL at 15:03

## 2017-03-26 RX ADMIN — SODIUM BICARBONATE TAB 650 MG 650 MG: 650 TAB at 21:10

## 2017-03-26 RX ADMIN — PHENYTOIN SODIUM 200 MG: 100 CAPSULE, EXTENDED RELEASE ORAL at 16:44

## 2017-03-26 RX ADMIN — LISINOPRIL 20 MG: 20 TABLET ORAL at 16:44

## 2017-03-26 RX ADMIN — CEFUROXIME SODIUM 750 MG: 7.5 INJECTION, POWDER, FOR SOLUTION INTRAVENOUS at 18:29

## 2017-03-26 RX ADMIN — ATORVASTATIN CALCIUM 40 MG: 40 TABLET, FILM COATED ORAL at 16:44

## 2017-03-26 RX ADMIN — CEFAZOLIN 1 G: 1 INJECTION, POWDER, FOR SOLUTION INTRAVENOUS at 11:35

## 2017-03-26 RX ADMIN — FOLIC ACID 1 MG: 1 TABLET ORAL at 16:44

## 2017-03-26 RX ADMIN — ALBUTEROL SULFATE 2.5 MG: 2.5 SOLUTION RESPIRATORY (INHALATION) at 11:09

## 2017-03-26 RX ADMIN — ONDANSETRON 4 MG: 2 INJECTION, SOLUTION INTRAMUSCULAR; INTRAVENOUS at 11:35

## 2017-03-26 RX ADMIN — FUROSEMIDE 40 MG: 10 INJECTION, SOLUTION INTRAVENOUS at 15:04

## 2017-03-26 RX ADMIN — OXCARBAZEPINE 150 MG: 150 TABLET, FILM COATED ORAL at 21:10

## 2017-03-26 RX ADMIN — CARVEDILOL 25 MG: 25 TABLET, FILM COATED ORAL at 21:10

## 2017-03-26 RX ADMIN — SODIUM CHLORIDE: 9 INJECTION, SOLUTION INTRAVENOUS at 16:38

## 2017-03-26 RX ADMIN — MORPHINE SULFATE 4 MG: 4 INJECTION INTRAVENOUS at 11:35

## 2017-03-26 RX ADMIN — PHENYTOIN SODIUM 200 MG: 100 CAPSULE, EXTENDED RELEASE ORAL at 21:10

## 2017-03-26 RX ADMIN — AMLODIPINE BESYLATE 5 MG: 5 TABLET ORAL at 16:44

## 2017-03-26 RX ADMIN — CARVEDILOL 25 MG: 25 TABLET, FILM COATED ORAL at 16:44

## 2017-03-26 RX ADMIN — LORAZEPAM 1 MG: 1 TABLET ORAL at 21:10

## 2017-03-26 RX ADMIN — ASPIRIN 325 MG: 325 TABLET, COATED ORAL at 16:42

## 2017-03-26 ASSESSMENT — COPD QUESTIONNAIRES
DURING THE PAST 4 WEEKS HOW MUCH DID YOU FEEL SHORT OF BREATH: SOME OF THE TIME
HAVE YOU SMOKED AT LEAST 100 CIGARETTES IN YOUR ENTIRE LIFE: NO/DON'T KNOW
COPD SCREENING SCORE: 2
DO YOU EVER COUGH UP ANY MUCUS OR PHLEGM?: NO/ONLY WITH OCCASIONAL COLDS OR INFECTIONS

## 2017-03-26 ASSESSMENT — PAIN SCALES - GENERAL
PAINLEVEL_OUTOF10: 3
PAINLEVEL_OUTOF10: 9
PAINLEVEL_OUTOF10: 2

## 2017-03-26 ASSESSMENT — LIFESTYLE VARIABLES: EVER_SMOKED: UNABLE TO EVALUATE AT THIS TIME - NEEDS ASSESSMENT PRIOR TO DISCHARGE

## 2017-03-26 NOTE — IP AVS SNAPSHOT
" Home Care Instructions                                                                                                                  Name:Pascale Acevedo  Medical Record Number:[id number]  CSN: [id number]    YOB: 1970   Age: 46 y.o.  Sex: female  HT:1.753 m (5' 9.02\") WT: 122.8 kg (270 lb 11.6 oz)          Admit Date: 3/26/2017     Discharge Date:   Today's Date: 3/29/2017  Attending Doctor:  Manoj Menendez M.D.                  Allergies:  Allegra; Amoxicillin; Azithromycin; Claritin; Erythromycin; Ibuprofen & caffeine-vitamins; Penicillins; Procardia; Rosemary oil; and Zyrtec            Discharge Instructions       Discharge Instructions    Discharged to other by medical transportation with escort. Discharged via ambulance, hospital escort: Yes.  Special equipment needed: Not Applicable    Be sure to schedule a follow-up appointment with your primary care doctor or any specialists as instructed.     Discharge Plan:   Diet Plan: Discussed  Activity Level: Discussed  Confirmed Follow up Appointment: No (Comments) (Patient going to [facility])  Confirmed Symptoms Management: Discussed  Medication Reconciliation Updated: Yes  Influenza Vaccine Indication: Not indicated: Previously immunized this influenza season and > 8 years of age    I understand that a diet low in cholesterol, fat, and sodium is recommended for good health. Unless I have been given specific instructions below for another diet, I accept this instruction as my diet prescription.   Other diet: Cardiac = low sodium, low fat diet, Diabetic diet.     Special Instructions: None    · Is patient discharged on Warfarin / Coumadin?   No     · Is patient Post Blood Transfusion?  No    Depression / Suicide Risk    As you are discharged from this [facility] facility, it is important to learn how to keep safe from harming yourself.    Recognize the warning signs:  · Abrupt changes in personality, positive or negative- including increase in energy "   · Giving away possessions  · Change in eating patterns- significant weight changes-  positive or negative  · Change in sleeping patterns- unable to sleep or sleeping all the time   · Unwillingness or inability to communicate  · Depression  · Unusual sadness, discouragement and loneliness  · Talk of wanting to die  · Neglect of personal appearance   · Rebelliousness- reckless behavior  · Withdrawal from people/activities they love  · Confusion- inability to concentrate     If you or a loved one observes any of these behaviors or has concerns about self-harm, here's what you can do:  · Talk about it- your feelings and reasons for harming yourself  · Remove any means that you might use to hurt yourself (examples: pills, rope, extension cords, firearm)  · Get professional help from the community (Mental Health, Substance Abuse, psychological counseling)  · Do not be alone:Call your Safe Contact- someone whom you trust who will be there for you.  · Call your local CRISIS HOTLINE 615-8286 or 964-657-7440  · Call your local Children's Mobile Crisis Response Team [place] (094) 687-3762 or wwwSocialbakers  · Call the toll free National Suicide Prevention Hotlines   · National Suicide Prevention Lifeline 135-305-BBOT (9239)  · National Hope Line Network 800-SUICIDE (080-1098)        Sepsis, Adult  Sepsis is a serious infection of your blood or tissues that affects your whole body. The infection that causes sepsis may be bacterial, viral, fungal, or parasitic. Sepsis may be life threatening. Sepsis can cause your blood pressure to drop. This may result in shock. Shock causes your central nervous system and your organs to stop working correctly.   RISK FACTORS  Sepsis can happen in anyone, but it is more likely to happen in people who have weakened immune systems.  SIGNS AND SYMPTOMS   Symptoms of sepsis can include:  · Fever or low body temperature (hypothermia).  · Rapid breathing  (hyperventilation).  · Chills.  · Rapid heartbeat (tachycardia).  · Confusion or light-headedness.  · Trouble breathing.  · Urinating much less than usual.  · Cool, clammy skin or red, flushed skin.  · Other problems with the heart, kidneys, or brain.  DIAGNOSIS   Your health care provider will likely do tests to look for an infection, to see if the infection has spread to your blood, and to see how serious your condition is. Tests can include:  · Blood tests, including cultures of your blood.  · Cultures of other fluids from your body, such as:  · Urine.  · Pus from wounds.  · Mucus coughed up from your lungs.  · Urine tests other than cultures.  · X-ray exams or other imaging tests.  TREATMENT   Treatment will begin with elimination of the source of infection. If your sepsis is likely caused by a bacterial or fungal infection, you will be given antibiotic or antifungal medicines.  You may also receive:  · Oxygen.  · Fluids through an IV tube.  · Medicines to increase your blood pressure.  · A machine to clean your blood (dialysis) if your kidneys fail.  · A machine to help you breathe if your lungs fail.  SEEK IMMEDIATE MEDICAL CARE IF:  You get an infection or develop any of the signs and symptoms of sepsis after surgery or a hospitalization.     This information is not intended to replace advice given to you by your health care provider. Make sure you discuss any questions you have with your health care provider.     Document Released: 09/15/2004 Document Revised: 05/03/2016 Document Reviewed: 08/25/2014  Baby[employer] Interactive Patient Education ©2016 [employer]vier Inc.        Cellulitis  Cellulitis is an infection of the skin and the tissue beneath it. The infected area is usually red and tender. Cellulitis occurs most often in the arms and lower legs.   CAUSES   Cellulitis is caused by bacteria that enter the skin through cracks or cuts in the skin. The most common types of bacteria that cause cellulitis are  staphylococci and streptococci.  SIGNS AND SYMPTOMS   · Redness and warmth.  · Swelling.  · Tenderness or pain.  · Fever.  DIAGNOSIS   Your health care provider can usually determine what is wrong based on a physical exam. Blood tests may also be done.  TREATMENT   Treatment usually involves taking an antibiotic medicine.  HOME CARE INSTRUCTIONS   · Take your antibiotic medicine as directed by your health care provider. Finish the antibiotic even if you start to feel better.  · Keep the infected arm or leg elevated to reduce swelling.  · Apply a warm cloth to the affected area up to 4 times per day to relieve pain.  · Take medicines only as directed by your health care provider.  · Keep all follow-up visits as directed by your health care provider.  SEEK MEDICAL CARE IF:   · You notice red streaks coming from the infected area.  · Your red area gets larger or turns dark in color.  · Your bone or joint underneath the infected area becomes painful after the skin has healed.  · Your infection returns in the same area or another area.  · You notice a swollen bump in the infected area.  · You develop new symptoms.  · You have a fever.  SEEK IMMEDIATE MEDICAL CARE IF:   · You feel very sleepy.  · You develop vomiting or diarrhea.  · You have a general ill feeling (malaise) with muscle aches and pains.  MAKE SURE YOU:   · Understand these instructions.  · Will watch your condition.  · Will get help right away if you are not doing well or get worse.     This information is not intended to replace advice given to you by your health care provider. Make sure you discuss any questions you have with your health care provider.     Document Released: 09/27/2006 Document Revised: 01/08/2016 Document Reviewed: 03/04/2013  Elsevier Interactive Patient Education ©2016 [employer]         Discharge Medication Instructions:    Below are the medications your physician expects you to take upon discharge:    Review all your home  medications and newly ordered medications with your doctor and/or pharmacist. Follow medication instructions as directed by your doctor and/or pharmacist.    Please keep your medication list with you and share with your physician.               Medication List      START taking these medications        Instructions    acetaminophen 325 MG Tabs   Last time this was given:  650 mg on 3/27/2017  1:54 PM   Commonly known as:  TYLENOL    Take 2 Tabs by mouth every 6 hours as needed (Mild Pain; (Pain scale 1-3); Temp greater than 100.5 F).   Dose:  650 mg       albuterol 108 (90 BASE) MCG/ACT Aers inhalation aerosol   Last time this was given:  2 Puffs on 3/28/2017  3:40 AM    Inhale 2 Puffs by mouth every four hours as needed for Shortness of Breath.   Dose:  2 Puff       bisacodyl 10 MG Supp   Commonly known as:  DULCOLAX    Insert 1 Suppository in rectum 1 time daily as needed (if magnesium hydroxide ineffective after 24 hours).   Dose:  10 mg       heparin 5000 UNIT/ML Soln    Inject 1 mL as instructed every 8 hours.   Dose:  5000 Units       ipratropium-albuterol 0.5-2.5 (3) MG/3ML nebulizer solution   Commonly known as:  DUONEB    3 mL by Nebulization route every four hours as needed for Shortness of Breath.   Dose:  3 mL       magnesium hydroxide 400 MG/5ML Susp   Commonly known as:  MILK OF MAGNESIA    Take 30 mL by mouth 1 time daily as needed (if polyethylene glycol ineffective after 24 hours).   Dose:  30 mL       * NS Soln   Last time this was given:  3/27/2017  5:08 PM    85 mL by Intravenous route Continuous.   Dose:  85 mL       * NS Soln   Last time this was given:  3/27/2017  5:08 PM    1,000 mL by Intravenous route Once PRN (If not already previously administered (THIS IS THE FIRST BOLUS)).   Dose:  1000 mL       NS SOLN 50 mL with cefUROXime 7.5 GM SOLR 750 mg   Last time this was given:  750 mg on 3/29/2017 10:46 AM    750 mg by Intravenous route every 8 hours.   Dose:  750 mg       omeprazole 20 MG  delayed-release capsule   Last time this was given:  20 mg on 3/29/2017  8:15 AM   Commonly known as:  PRILOSEC    Take 1 Cap by mouth every day.   Dose:  20 mg       polyethylene glycol/lytes Pack   Commonly known as:  MIRALAX    Take 1 Packet by mouth 1 time daily as needed (if sennosides and docusate ineffective after 24 hours).   Dose:  17 g       senna-docusate 8.6-50 MG Tabs   Commonly known as:  PERICOLACE or SENOKOT S    Take 2 Tabs by mouth 2 Times a Day.   Dose:  2 Tab       * Notice:  This list has 2 medication(s) that are the same as other medications prescribed for you. Read the directions carefully, and ask your doctor or other care provider to review them with you.      CHANGE how you take these medications        Instructions    lisinopril 20 MG Tabs   What changed:  when to take this   Last time this was given:  20 mg on 3/29/2017  8:16 AM   Commonly known as:  PRINIVIL    Take 1 Tab by mouth 2 Times a Day.   Dose:  20 mg       lorazepam 1 MG Tabs   What changed:  when to take this   Last time this was given:  1 mg on 3/29/2017  8:16 AM   Commonly known as:  ATIVAN    Take 1 Tab by mouth 3 times a day.   Dose:  1 mg       oxcarbazepine 300 MG Tabs   What changed:    - medication strength  - how much to take   Last time this was given:  300 mg on 3/29/2017  8:16 AM   Commonly known as:  TRILEPTAL    Take 1 Tab by mouth 2 Times a Day.   Dose:  300 mg       phenytoin 200 MG ER capsule   What changed:    - medication strength  - when to take this   Last time this was given:  200 mg on 3/29/2017  8:16 AM   Commonly known as:  DILANTIN    Take 1 Cap by mouth 2 Times a Day.   Dose:  200 mg       sodium bicarbonate 650 MG Tabs   What changed:  when to take this   Last time this was given:  650 mg on 3/29/2017  8:16 AM   Commonly known as:  SODIUM BICARBONATE    Take 1 Tab by mouth 3 times a day.   Dose:  650 mg         CONTINUE taking these medications        Instructions    amlodipine 5 MG Tabs   Last time  this was given:  5 mg on 3/29/2017  8:16 AM   Commonly known as:  NORVASC    Take 1 Tab by mouth every day.   Dose:  5 mg       aspirin 325 MG Tabs   Last time this was given:  325 mg on 3/29/2017  8:16 AM   Commonly known as:  ASA    Take 1 Tab by mouth every day.   Dose:  325 mg       atorvastatin 40 MG Tabs   Last time this was given:  40 mg on 3/29/2017  8:15 AM   Commonly known as:  LIPITOR    Take 1 Tab by mouth every day.   Dose:  40 mg       carvedilol 25 MG Tabs   Last time this was given:  25 mg on 3/29/2017  8:16 AM   Commonly known as:  COREG    Take 1 Tab by mouth 3 times a day.   Dose:  25 mg       folic acid 1 MG Tabs   Last time this was given:  1 mg on 3/29/2017  8:15 AM   Commonly known as:  FOLVITE    Take 1 Tab by mouth every day.   Dose:  1 mg               Instructions           Diet / Nutrition:    Follow any diet instructions given to you by your doctor or the dietician, including how much salt (sodium) you are allowed each day.    If you are overweight, talk to your doctor about a weight reduction plan.    Activity:    Remain physically active following your doctor's instructions about exercise and activity.    Rest often.     Any time you become even a little tired or short of breath, SIT DOWN and rest.    Worsening Symptoms:    Report any of the following signs and symptoms to the doctor's office immediately:    *Pain of jaw, arm, or neck  *Chest pain not relieved by medication                               *Dizziness or loss of consciousness  *Difficulty breathing even when at rest   *More tired than usual                                       *Bleeding drainage or swelling of surgical site  *Swelling of feet, ankles, legs or stomach                 *Fever (>100ºF)  *Pink or blood tinged sputum  *Weight gain (3lbs/day or 5lbs /week)           *Shock from internal defibrillator (if applicable)  *Palpitations or irregular heartbeats                *Cool and/or numb extremities    Stroke  Awareness    Common Risk Factors for Stroke include:    Age  Atrial Fibrillation  Carotid Artery Stenosis  Diabetes Mellitus  Excessive alcohol consumption  High blood pressure  Overweight   Physical inactivity  Smoking    Warning signs and symptoms of a stroke include:    *Sudden numbness or weakness of the face, arm or leg (especially on one side of the body).  *Sudden confusion, trouble speaking or understanding.  *Sudden trouble seeing in one or both eyes.  *Sudden trouble walking, dizziness, loss of balance or coordination.Sudden severe headache with no known cause.    It is very important to get treatment quickly when a stroke occurs. If you experience any of the above warning signs, call 911 immediately.                   Disclaimer         Quit Smoking / Tobacco Use:    I understand the use of any tobacco products increases my chance of suffering from future heart disease or stroke and could cause other illnesses which may shorten my life. Quitting the use of tobacco products is the single most important thing I can do to improve my health. For further information on smoking / tobacco cessation call a Toll Free Quit Line at 1-290.961.4361 (*National Cancer [place]) or 1-914.416.6670 (American Lung Association) or you can access the web based program at www.lungusa.org.    [place] Tobacco Users Help Line:  (634) 969-9570       Toll Free: 1-970.493.3113  Quit Tobacco Program [facility] [phone])[phone]    Crisis Hotline:    [place] Crisis Hotline:  5-772-QDWIPDS or 1-310.477.4408    [place] Crisis Hotline:    1-354.598.7237 or 671-131-9645    Discharge Survey:   Thank you for choosing [facility]. We hope we did everything we could to make your hospital stay a pleasant one. You may be receiving a phone survey and we would appreciate your time and participation in answering the questions. Your input is very valuable to us in our efforts to improve our service to our patients and their  families.        My signature on this form indicates that:    1. I have reviewed and understand the above information.  2. My questions regarding this information have been answered to my satisfaction.  3. I have formulated a plan with my discharge nurse to obtain my prescribed medications for home.                  Disclaimer         __________________________________                     __________       ________                       Patient Signature                                                 Date                    Time

## 2017-03-26 NOTE — ED NOTES
RT at bedside for breathing treatment.   Per ERP, pt is to have all IM medications and lab draw. Due to inability to obtain PIV access.   Lab called for blood draw.

## 2017-03-26 NOTE — ED NOTES
Ambulatory to [facility]. SOB, tachypneic with RR 32 and speech limiting. Recent IV antibiotics and hospital stay. Pt refusing to answer some questions but does states that foot pain is 10/10.

## 2017-03-26 NOTE — IP AVS SNAPSHOT
3/29/2017          Pascale Acevedo  [address] [place] [address]    Dear Pascale:    [facility] wants to ensure your discharge home is safe and you or your loved ones have had all your questions answered regarding your care after you leave the hospital.    You may receive a telephone call within two days of your discharge.  This call is to make certain you understand your discharge instructions as well as ensure we provided you with the best care possible during your stay with us.     The call will only last approximately 3-5 minutes and will be done by a nurse.    Once again, we want to ensure your discharge home is safe and that you have a clear understanding of any next steps in your care.  If you have any questions or concerns, please do not hesitate to contact us, we are here for you.  Thank you for choosing [facility] for your healthcare needs.    Sincerely,    Preston Crane    [facility]

## 2017-03-26 NOTE — ED NOTES
Assist RN at bedside to attempt US guided PIV access. Admitting MD is aware of difficulty in obtaining PIV access.

## 2017-03-26 NOTE — ED NOTES
Med rec updated and complete per pt at bedside.  Allergies reviewed.  Pt states she is on an ABX for her foot, does not know the name, says she got it from here.

## 2017-03-26 NOTE — ED NOTES
Report to GENA CONTRERAS. All questions answered. Updated on VS, treatment plan, and lab results.

## 2017-03-26 NOTE — IP AVS SNAPSHOT
Graph Story Access Code: Activation code not generated  Current Graph Story Status: Patient Declined    [employer]  A secure, online tool to manage your health information     [employer]’s Graph Story® is a secure, online tool that connects you to your personalized health information from the privacy of your home -- day or night - making it very easy for you to manage your healthcare. Once the activation process is completed, you can even access your medical information using the Graph Story tiny, which is available for free in the Apple Tiny store or Google Play store.     Graph Story provides the following levels of access (as shown below):   My Chart Features   [facility] Primary Care Doctor [facility]  Specialists [facility]  Urgent  Care Non-[facility]  Primary Care  Doctor   Email your healthcare team securely and privately 24/7 X X X X   Manage appointments: schedule your next appointment; view details of past/upcoming appointments X      Request prescription refills. X      View recent personal medical records, including lab and immunizations X X X X   View health record, including health history, allergies, medications X X X X   Read reports about your outpatient visits, procedures, consult and ER notes X X X X   See your discharge summary, which is a recap of your hospital and/or ER visit that includes your diagnosis, lab results, and care plan. X X       How to register for Graph Story:  1. Go to  [email or web address].  2. Click on the Sign Up Now box, which takes you to the New Member Sign Up page. You will need to provide the following information:  a. Enter your Graph Story Access Code exactly as it appears at the top of this page. (You will not need to use this code after you’ve completed the sign-up process. If you do not sign up before the expiration date, you must request a new code.)   b. Enter your date of birth.   c. Enter your home email address.   d. Click Submit, and follow the next screen’s instructions.  3. Create a Graph Story ID.  This will be your [employer] login ID and cannot be changed, so think of one that is secure and easy to remember.  4. Create a [employer] password. You can change your password at any time.  5. Enter your Password Reset Question and Answer. This can be used at a later time if you forget your password.   6. Enter your e-mail address. This allows you to receive e-mail notifications when new information is available in [employer].  7. Click Sign Up. You can now view your health information.    For assistance activating your [employer] account, call [phone]

## 2017-03-26 NOTE — ED PROVIDER NOTES
CHIEF COMPLAINT  Chief Complaint   Patient presents with   • Other     Pt BIB self/family to triage. Pt recently left [facility] and was receiving IV antibiotic therapy for foot celulitis. Pt cannot receive medication at home and comes to [facility] today for IV therapy (Zinacef).       HPI  Pascale Acevedo is a 46 y.o. female who presents to emergency room today with complaints of possible infection to right foot. Patient was admitted to the hospital here and just recently discharged to [facility] where she apparently had left without completing her antibiotics. She had grown out positive for staph infection. She presented today with redness to her foot she has a lesion over the palm of her right foot. Unable to take her medications as she did not have any further antibiotics. Complaint of difficulty breathing her respiratory rate 32 noted. Denies chest pain, fever, chills. No changes to bowel/bladder. She has very poor historian.    REVIEW OF SYSTEMS  See [facility] for further details. All other systems are negative.     PAST MEDICAL HISTORY  Past Medical History   Diagnosis Date   • Hypertension    • Kidney disease    • Stroke (CMS-HCC)    • Moyamoya disease 10/3/2013   • H/O: CVA (cerebrovascular accident) 12/24/2015   • Mild mitral regurgitation 7/14/2014   • Seizure disorder, grand mal (CMS-HCC) 3/3/2016   • CKD (chronic kidney disease), stage IV (CMS-HCC) 12/24/2015       FAMILY HISTORY  [unfilled]    SOCIAL HISTORY  Social History     Social History   • Marital Status: Single     Spouse Name: N/A   • Number of Children: N/A   • Years of Education: N/A     Social History Main Topics   • Smoking status: Former Smoker   • Smokeless tobacco: Never Used   • Alcohol Use: No   • Drug Use: No   • Sexual Activity: Not Asked     Other Topics Concern   • None     Social History Narrative       SURGICAL HISTORY  Past Surgical History   Procedure Laterality Date   • Primary c section     • Tubal coagulation laparoscopic bilateral      • Irrigation & debridement ortho Right 2/27/2017     Procedure: IRRIGATION & DEBRIDEMENT ORTHO;  Surgeon: Coleman Monterroso M.D.;  Location: SURGERY [facility];  Service:    • Irrigation & debridement ortho Right 3/5/2017     Procedure: IRRIGATION & DEBRIDEMENT ORTHO AND GASTROC RECESSION;  Surgeon: Gerardo Lynn M.D.;  Location: SURGERY [facility];  Service:    • Metatarsal head resection  3/5/2017     Procedure: SECOND METATARSAL HEAD RESECTION;  Surgeon: Gerardo Lynn M.D.;  Location: SURGERY [facility];  Service:    • Irrigation & debridement ortho Right 3/9/2017     Procedure: IRRIGATION & DEBRIDEMENT ORTHO - FOOT;  Surgeon: Gerardo Lynn M.D.;  Location: SURGERY [facility];  Service:        CURRENT MEDICATIONS  Home Medications     Reviewed by Sherri Carter, Pharmacy Int (Pharmacy Intern) on 03/26/17 at 1255  Med List Status: Complete    Medication Last Dose Status    amlodipine (NORVASC) 5 MG Tab 3/25/2017 Active    aspirin (ASA) 325 MG Tab 3/25/2017 Active    atorvastatin (LIPITOR) 40 MG Tab 3/25/2017 Active    carvedilol (COREG) 25 MG TABS 3/25/2017 Active    cyanocobalamin (VITAMIN B12) 1000 MCG Tab 3/25/2017 Active    esomeprazole (NEXIUM) 20 MG capsule 3/25/2017 Active    folic acid (FOLVITE) 1 MG Tab 3/25/2017 Active    lisinopril (PRINIVIL) 20 MG TABS 3/25/2017 Active    lorazepam (ATIVAN) 1 MG Tab 3/25/2017 Active    oxcarbazepine (TRILEPTAL) 150 MG Tab 3/25/2017 Active    phenytoin ER (DILANTIN) 100 MG Cap 3/25/2017 Active    sodium bicarbonate (SODIUM BICARBONATE) 650 MG Tab 3/25/2017 Active                ALLERGIES  Allergies   Allergen Reactions   • Allegra [Fexofenadine] Unspecified     Rxn = unknown   • Amoxicillin    • Azithromycin Unspecified     Rxn = unknown   • Claritin    • Erythromycin Unspecified     Rxn = unknown   • Ibuprofen & Caffeine-Vitamins Unspecified     Rxn = unknown   • Penicillins Unspecified     Rxn = unknown   • Procardia [Nifedipine]    •  "Rosemary Oil Anaphylaxis     Throat starts to close/swell.    • Zyrtec [Cetirizine] Unspecified     Rxn = unknown       PHYSICAL EXAM  VITAL SIGNS: /90 mmHg  Pulse 111  Temp(Src) 36.3 °C (97.3 °F)  Resp 18  Ht 1.753 m (5' 9.02\")  Wt 122 kg (268 lb 15.4 oz)  BMI 39.70 kg/m2  SpO2 92% Room air O2: 92    Constitutional: Well developed, Well nourished,  distress, Non-toxic appearance.   HENT: Normocephalic, Atraumatic, Bilateral external ears normal, Oropharynx moist, No oral exudates, Nose normal.   Eyes: PERRLA, EOMI, Conjunctiva normal, No discharge.   Neck: Normal range of motion, No tenderness, Supple, No stridor.   Lymphatic: No lymphadenopathy noted.   Cardiovascular: Normal heart rate, Normal rhythm, No murmurs, No rubs, No gallops.   Thorax & Lungs: Diminished breath sounds and rales noted throughout base, No respiratory distress, No wheezing, No chest tenderness.   Abdomen: Bowel sounds normal, Soft, No tenderness, No masses, No pulsatile masses. Morbidly obese.  Skin: Warm, Dry, on the right foot lesion over the foot at the base of the great toe measuring 3 cm no drainage at this time is also some erythema surrounding the dorsum of her foot standing up to the ankle area.  Back: No tenderness, No CVA tenderness.   Extremities: Intact distal pulses, No edema, No tenderness, No cyanosis, No clubbing.   Musculoskeletal: Good range of motion in all major joints. No tenderness to palpation or major deformities noted.   Neurologic: Alert & oriented x 3, Normal motor function, Normal sensory function, No focal deficits noted.   Psychiatric: Affect normal, Judgment normal, Mood normal.     EKG  Normal sinus rhythm at 90 beats per minute, no ST elevation or depression, no widening of the QRS complex, poor R-wave progression,pr intervals are normal, no diagnostic Q waves noted, this a 12-lead EKG was no previous EKG available at this time for comparison.    RADIOLOGY/PROCEDURES  DX-CHEST-PORTABLE (1 VIEW) "   Final Result      Perihilar interstitial opacities are most suggestive of pulmonary edema.      IR-PICC LINE PLACEMENT > AGE 5    (Results Pending)         COURSE & MEDICAL DECISION MAKING  Pertinent Labs & Imaging studies reviewed. (See chart for details)  Patient will be admitted to be admitted to the hospital for cellulitis she has elevated white count 13.8 and not on Anaprox currently. Also as noted she had a BNP elevated at 1100 with prominent edema and a chest x-ray given Lasix in the emergency room and [facility] emergency room will be admitted to the hospital lactic acid is normal. He has a history of cardiomyopathy, renal failure most likely contributing to CHF.    FINAL IMPRESSION  1. Acute cellulitis right foot  2. Pulmonary edema/CHF  3. Chronic renal failure  4. Cardiomyopathy by history         Electronically signed by: Gee Arevalo, 3/26/2017 4:35 PM

## 2017-03-26 NOTE — ED NOTES
Lab at bedside for blood draw x 2.   Per pt she feels better after breathing treatment. RR now 19.

## 2017-03-26 NOTE — IP AVS SNAPSHOT
" <p align=\"LEFT\"><IMG SRC=\"//[email or web address]$/[email or web address]\" alt=\"Image\" WIDTH=\"50%\" HEIGHT=\"200\" BORDER=\"\"></p>                   Name:Pascale Acevedo  Medical Record Number:[id number]  CSN: [id number]    YOB: 1970   Age: 46 y.o.  Sex: female  HT:1.753 m (5' 9.02\") WT: 122.8 kg (270 lb 11.6 oz)          Admit Date: 3/26/2017     Discharge Date:   Today's Date: 3/29/2017  Attending Doctor:  Manoj Menendez M.D.                  Allergies:  Allegra; Amoxicillin; Azithromycin; Claritin; Erythromycin; Ibuprofen & caffeine-vitamins; Penicillins; Procardia; Rosemary oil; and Zyrtec             Medication List      Take these Medications        Instructions    acetaminophen 325 MG Tabs   Commonly known as:  TYLENOL    Take 2 Tabs by mouth every 6 hours as needed (Mild Pain; (Pain scale 1-3); Temp greater than 100.5 F).   Dose:  650 mg       albuterol 108 (90 BASE) MCG/ACT Aers inhalation aerosol    Inhale 2 Puffs by mouth every four hours as needed for Shortness of Breath.   Dose:  2 Puff       amlodipine 5 MG Tabs   Commonly known as:  NORVASC    Take 1 Tab by mouth every day.   Dose:  5 mg       aspirin 325 MG Tabs   Commonly known as:  ASA    Take 1 Tab by mouth every day.   Dose:  325 mg       atorvastatin 40 MG Tabs   Commonly known as:  LIPITOR    Take 1 Tab by mouth every day.   Dose:  40 mg       bisacodyl 10 MG Supp   Commonly known as:  DULCOLAX    Insert 1 Suppository in rectum 1 time daily as needed (if magnesium hydroxide ineffective after 24 hours).   Dose:  10 mg       carvedilol 25 MG Tabs   Commonly known as:  COREG    Take 1 Tab by mouth 3 times a day.   Dose:  25 mg       folic acid 1 MG Tabs   Commonly known as:  FOLVITE    Take 1 Tab by mouth every day.   Dose:  1 mg       heparin 5000 UNIT/ML Soln    Inject 1 mL as instructed every 8 hours.   Dose:  5000 Units       ipratropium-albuterol 0.5-2.5 (3) MG/3ML nebulizer solution   Commonly known as:  DUONEB    3 mL by Nebulization route every four " hours as needed for Shortness of Breath.   Dose:  3 mL       lisinopril 20 MG Tabs   What changed:  when to take this   Commonly known as:  PRINIVIL    Take 1 Tab by mouth 2 Times a Day.   Dose:  20 mg       lorazepam 1 MG Tabs   What changed:  when to take this   Commonly known as:  ATIVAN    Take 1 Tab by mouth 3 times a day.   Dose:  1 mg       magnesium hydroxide 400 MG/5ML Susp   Commonly known as:  MILK OF MAGNESIA    Take 30 mL by mouth 1 time daily as needed (if polyethylene glycol ineffective after 24 hours).   Dose:  30 mL       * NS Soln    85 mL by Intravenous route Continuous.   Dose:  85 mL       * NS Soln    1,000 mL by Intravenous route Once PRN (If not already previously administered (THIS IS THE FIRST BOLUS)).   Dose:  1000 mL       NS SOLN 50 mL with cefUROXime 7.5 GM SOLR 750 mg    750 mg by Intravenous route every 8 hours.   Dose:  750 mg       omeprazole 20 MG delayed-release capsule   Commonly known as:  PRILOSEC    Take 1 Cap by mouth every day.   Dose:  20 mg       oxcarbazepine 300 MG Tabs   What changed:    - medication strength  - how much to take   Commonly known as:  TRILEPTAL    Take 1 Tab by mouth 2 Times a Day.   Dose:  300 mg       phenytoin 200 MG ER capsule   What changed:    - medication strength  - when to take this   Commonly known as:  DILANTIN    Take 1 Cap by mouth 2 Times a Day.   Dose:  200 mg       polyethylene glycol/lytes Pack   Commonly known as:  MIRALAX    Take 1 Packet by mouth 1 time daily as needed (if sennosides and docusate ineffective after 24 hours).   Dose:  17 g       senna-docusate 8.6-50 MG Tabs   Commonly known as:  PERICOLACE or SENOKOT S    Take 2 Tabs by mouth 2 Times a Day.   Dose:  2 Tab       sodium bicarbonate 650 MG Tabs   What changed:  when to take this   Commonly known as:  SODIUM BICARBONATE    Take 1 Tab by mouth 3 times a day.   Dose:  650 mg       * Notice:  This list has 2 medication(s) that are the same as other medications prescribed for  you. Read the directions carefully, and ask your doctor or other care provider to review them with you.

## 2017-03-26 NOTE — PROGRESS NOTES
Received female pt from ER via declan. AAO x4. Oriented to room, bed and unit routine. Bed in low position, call light w/in reach.

## 2017-03-26 NOTE — ED NOTES
Chief Complaint   Patient presents with   • Other     Pt BIB self/family to triage. Pt recently left [facility] and was receiving IV antibiotic therapy for foot celulitis. Pt cannot receive medication at home and comes to [facility] today for IV therapy (Petey).     Explained to pt triage process, made pt aware to tell this RN of any changes/concerns, pt verbalized understanding of process and instructions given. Pt to BHARGAV leonard.

## 2017-03-27 LAB
ALBUMIN SERPL BCP-MCNC: 2.9 G/DL (ref 3.2–4.9)
ALBUMIN/GLOB SERPL: 0.7 G/DL
ALP SERPL-CCNC: 82 U/L (ref 30–99)
ALT SERPL-CCNC: 11 U/L (ref 2–50)
ANION GAP SERPL CALC-SCNC: 8 MMOL/L (ref 0–11.9)
AST SERPL-CCNC: 13 U/L (ref 12–45)
BASOPHILS # BLD AUTO: 0.2 % (ref 0–1.8)
BASOPHILS # BLD: 0.02 K/UL (ref 0–0.12)
BILIRUB SERPL-MCNC: 0.5 MG/DL (ref 0.1–1.5)
BUN SERPL-MCNC: 31 MG/DL (ref 8–22)
CALCIUM SERPL-MCNC: 8.8 MG/DL (ref 8.5–10.5)
CHLORIDE SERPL-SCNC: 107 MMOL/L (ref 96–112)
CO2 SERPL-SCNC: 24 MMOL/L (ref 20–33)
CREAT SERPL-MCNC: 2.39 MG/DL (ref 0.5–1.4)
EOSINOPHIL # BLD AUTO: 0.18 K/UL (ref 0–0.51)
EOSINOPHIL NFR BLD: 2.1 % (ref 0–6.9)
ERYTHROCYTE [DISTWIDTH] IN BLOOD BY AUTOMATED COUNT: 53 FL (ref 35.9–50)
GFR SERPL CREATININE-BSD FRML MDRD: 22 ML/MIN/1.73 M 2
GLOBULIN SER CALC-MCNC: 3.9 G/DL (ref 1.9–3.5)
GLUCOSE SERPL-MCNC: 115 MG/DL (ref 65–99)
HCT VFR BLD AUTO: 31.5 % (ref 37–47)
HGB BLD-MCNC: 10.1 G/DL (ref 12–16)
IMM GRANULOCYTES # BLD AUTO: 0.01 K/UL (ref 0–0.11)
IMM GRANULOCYTES NFR BLD AUTO: 0.1 % (ref 0–0.9)
LYMPHOCYTES # BLD AUTO: 2.28 K/UL (ref 1–4.8)
LYMPHOCYTES NFR BLD: 27 % (ref 22–41)
MCH RBC QN AUTO: 32.1 PG (ref 27–33)
MCHC RBC AUTO-ENTMCNC: 32.1 G/DL (ref 33.6–35)
MCV RBC AUTO: 100 FL (ref 81.4–97.8)
MONOCYTES # BLD AUTO: 0.76 K/UL (ref 0–0.85)
MONOCYTES NFR BLD AUTO: 9 % (ref 0–13.4)
NEUTROPHILS # BLD AUTO: 5.18 K/UL (ref 2–7.15)
NEUTROPHILS NFR BLD: 61.6 % (ref 44–72)
NRBC # BLD AUTO: 0 K/UL
NRBC BLD AUTO-RTO: 0 /100 WBC
PLATELET # BLD AUTO: 209 K/UL (ref 164–446)
PMV BLD AUTO: 9.9 FL (ref 9–12.9)
POTASSIUM SERPL-SCNC: 4.4 MMOL/L (ref 3.6–5.5)
PROT SERPL-MCNC: 6.8 G/DL (ref 6–8.2)
RBC # BLD AUTO: 3.15 M/UL (ref 4.2–5.4)
SODIUM SERPL-SCNC: 139 MMOL/L (ref 135–145)
WBC # BLD AUTO: 8.4 K/UL (ref 4.8–10.8)

## 2017-03-27 PROCEDURE — 85025 COMPLETE CBC W/AUTO DIFF WBC: CPT

## 2017-03-27 PROCEDURE — 99233 SBSQ HOSP IP/OBS HIGH 50: CPT | Performed by: INTERNAL MEDICINE

## 2017-03-27 PROCEDURE — 700111 HCHG RX REV CODE 636 W/ 250 OVERRIDE (IP): Performed by: HOSPITALIST

## 2017-03-27 PROCEDURE — 700102 HCHG RX REV CODE 250 W/ 637 OVERRIDE(OP): Performed by: PSYCHIATRY & NEUROLOGY

## 2017-03-27 PROCEDURE — 36415 COLL VENOUS BLD VENIPUNCTURE: CPT

## 2017-03-27 PROCEDURE — A9270 NON-COVERED ITEM OR SERVICE: HCPCS | Performed by: HOSPITALIST

## 2017-03-27 PROCEDURE — 770020 HCHG ROOM/CARE - TELE (206)

## 2017-03-27 PROCEDURE — A9270 NON-COVERED ITEM OR SERVICE: HCPCS | Performed by: PSYCHIATRY & NEUROLOGY

## 2017-03-27 PROCEDURE — 700105 HCHG RX REV CODE 258: Performed by: HOSPITALIST

## 2017-03-27 PROCEDURE — 700102 HCHG RX REV CODE 250 W/ 637 OVERRIDE(OP): Performed by: HOSPITALIST

## 2017-03-27 PROCEDURE — 80053 COMPREHEN METABOLIC PANEL: CPT

## 2017-03-27 PROCEDURE — 97161 PT EVAL LOW COMPLEX 20 MIN: CPT

## 2017-03-27 RX ORDER — OXCARBAZEPINE 300 MG/1
300 TABLET, FILM COATED ORAL 2 TIMES DAILY
Status: DISCONTINUED | OUTPATIENT
Start: 2017-03-27 | End: 2017-03-29 | Stop reason: HOSPADM

## 2017-03-27 RX ADMIN — SODIUM BICARBONATE TAB 650 MG 650 MG: 650 TAB at 08:25

## 2017-03-27 RX ADMIN — CEFUROXIME SODIUM 750 MG: 7.5 INJECTION, POWDER, FOR SOLUTION INTRAVENOUS at 17:08

## 2017-03-27 RX ADMIN — CARVEDILOL 25 MG: 25 TABLET, FILM COATED ORAL at 21:36

## 2017-03-27 RX ADMIN — SODIUM BICARBONATE TAB 650 MG 650 MG: 650 TAB at 15:09

## 2017-03-27 RX ADMIN — FOLIC ACID 1 MG: 1 TABLET ORAL at 08:25

## 2017-03-27 RX ADMIN — SODIUM CHLORIDE: 9 INJECTION, SOLUTION INTRAVENOUS at 17:08

## 2017-03-27 RX ADMIN — PHENYTOIN SODIUM 200 MG: 100 CAPSULE, EXTENDED RELEASE ORAL at 21:36

## 2017-03-27 RX ADMIN — ASPIRIN 325 MG: 325 TABLET, COATED ORAL at 08:25

## 2017-03-27 RX ADMIN — LISINOPRIL 20 MG: 20 TABLET ORAL at 08:25

## 2017-03-27 RX ADMIN — ACETAMINOPHEN 650 MG: 325 TABLET, FILM COATED ORAL at 13:54

## 2017-03-27 RX ADMIN — OXCARBAZEPINE 300 MG: 300 TABLET, FILM COATED ORAL at 21:36

## 2017-03-27 RX ADMIN — OXCARBAZEPINE 150 MG: 150 TABLET, FILM COATED ORAL at 08:25

## 2017-03-27 RX ADMIN — LORAZEPAM 1 MG: 1 TABLET ORAL at 21:36

## 2017-03-27 RX ADMIN — PHENYTOIN SODIUM 200 MG: 100 CAPSULE, EXTENDED RELEASE ORAL at 08:25

## 2017-03-27 RX ADMIN — ATORVASTATIN CALCIUM 40 MG: 40 TABLET, FILM COATED ORAL at 08:26

## 2017-03-27 RX ADMIN — CEFUROXIME SODIUM 750 MG: 7.5 INJECTION, POWDER, FOR SOLUTION INTRAVENOUS at 10:39

## 2017-03-27 RX ADMIN — LORAZEPAM 1 MG: 1 TABLET ORAL at 15:09

## 2017-03-27 RX ADMIN — CEFUROXIME SODIUM 750 MG: 7.5 INJECTION, POWDER, FOR SOLUTION INTRAVENOUS at 02:34

## 2017-03-27 RX ADMIN — CARVEDILOL 25 MG: 25 TABLET, FILM COATED ORAL at 08:25

## 2017-03-27 RX ADMIN — CARVEDILOL 25 MG: 25 TABLET, FILM COATED ORAL at 15:09

## 2017-03-27 RX ADMIN — AMLODIPINE BESYLATE 5 MG: 5 TABLET ORAL at 08:25

## 2017-03-27 RX ADMIN — OMEPRAZOLE 20 MG: 20 CAPSULE, DELAYED RELEASE ORAL at 08:25

## 2017-03-27 RX ADMIN — LORAZEPAM 1 MG: 1 TABLET ORAL at 08:25

## 2017-03-27 RX ADMIN — SODIUM BICARBONATE TAB 650 MG 650 MG: 650 TAB at 21:36

## 2017-03-27 ASSESSMENT — PAIN SCALES - GENERAL
PAINLEVEL_OUTOF10: 3
PAINLEVEL_OUTOF10: 0
PAINLEVEL_OUTOF10: 10
PAINLEVEL_OUTOF10: 2
PAINLEVEL_OUTOF10: 0
PAINLEVEL_OUTOF10: 2

## 2017-03-27 ASSESSMENT — ENCOUNTER SYMPTOMS
SPUTUM PRODUCTION: 0
WEIGHT LOSS: 0
FOCAL WEAKNESS: 0
VOMITING: 0
HEARTBURN: 0
SHORTNESS OF BREATH: 0
EYE DISCHARGE: 0
STRIDOR: 0
PALPITATIONS: 0
FEVER: 0
EYE PAIN: 0
DIARRHEA: 0
HEADACHES: 0
SEIZURES: 0
COUGH: 0
BACK PAIN: 0
CHILLS: 0
ABDOMINAL PAIN: 0
ORTHOPNEA: 0
BLURRED VISION: 0
NECK PAIN: 0
DIZZINESS: 0
MYALGIAS: 0
NAUSEA: 0

## 2017-03-27 NOTE — H&P
CHIEF COMPLAINT:  Cannot get IV antibiotics.    HISTORY OF PRESENT ILLNESS:  The patient is a 46-year-old female who was   recently admitted on 03/14/2017 after having severe sepsis due to right foot   cellulitis with the assistance of infectious disease and she underwent an I   and D of her right second and third metatarsal which was positive for MSSA, E.   coli, strep viridans and group C strep.  Patient was sent to [facility] for   prolonged IV antibiotics until 04/23/2017 with Zinacef however, apparently   patient was kicked out of [facility] for being disruptive and she is unable to   obtain antibiotics for that reason.  On our evaluation patient denies having   any fevers or chills, shortness of breath, chest pain, shortness of breath or   nausea, vomiting.    ALLERGIES:  PATIENT IS ALLERGIC TO ADVIL, ALLEGRA, ZITHROMAX, CLARITIN,   ERYTHROMYCIN, PENICILLIN, PROCARDIA, AND ZYRTEC.    REVIEW OF SYSTEMS:  Please see HPI, otherwise all review of systems are   negative per AMA/CMS criteria.    PAST MEDICAL HISTORY:  History of CVA in the past, hyperlipidemia,   hypertension, Moyamoya disease, chronic kidney disease.    SOCIAL HISTORY:  Patient denies using tobacco or illicit drugs.    FAMILY HISTORY:  Reviewed and noncontributory.    MEDICATIONS:  At home include IV cefuroxime infusions, atorvastatin 40 mg   daily, aspirin 325 mg daily, amlodipine 5 mg daily, Nexium 20  mg daily,   lisinopril 20 mg twice a day, lorazepam 1 mg 3 times a day as needed,   Trileptal 150 mg twice a day, Dilantin 100 mg daily, sodium bicarbonate 650 mg   3 times a day.    PHYSICAL EXAMINATION:  VITAL SIGNS:  On admission, temperature 97.3, pulse 111, respirations 18,   blood pressure was 177/90, SpO2 92% on room air.  GENERAL:  Patient appears well-groomed, well-nourished, no apparent distress,   nontoxic appearance.  HEENT:  Normocephalic, atraumatic.  Pupils are equal, round, and reactive to   light, nonicteric.  Mucous membranes  dry.  NECK:  Supple.  No thyromegaly, JVD or stridor.  CARDIOVASCULAR:  Normal rate and rhythm.  No gallops, rubs or murmurs   appreciated.  LUNGS:  Diminished breath sounds at bilateral bases.  No wheezes or rhonchi.  ABDOMEN:  Soft, nontender, nondistended.  Positive bowel sounds.  No   hepatosplenomegaly or pulsatile masses noted.  SKIN:  Warm, dry, no erythema or rashes.  EXTREMITIES:  There were sutures underlying anterior aspect of her right foot   with a deep 0.5 cm ulcer on the bottom of her second and third metatarsal   area.  No discharge noted.  No erythema noted.  Distal pulses were intact.    Distal pulses intact, +2.  No cyanosis, clubbing noted.  Range of motion all 4   extremities normal.  NEUROLOGIC:  Alert and oriented x3.  Cranial nerves are grossly intact, no   focal deficits.  PSYCHIATRIC:  Affect, judgment and mood is normal.    LABORATORY DATA:  Her WBC count 13.0, hemoglobin 10.9, hematocrit 34.0,   platelet count 247.  Chemistry:  Sodium 141, potassium 4.5, chloride 108, CO2   of 24, BUN 31, creatinine 2.01.  Troponin 0.01.  BNP 1176.    IMAGING:  Chest x-ray shows perihilar interstitial opacities, more suggestive   of pulmonary edema.    ASSESSMENT AND PLAN:  1.  Failed outpatient IV antibiotic therapy as the patient was kicked out of   her subacute rehab facility, they remove her PICC line.  She has not had   antibiotics for several days.  At this time, we will go ahead and place her   back on a PICC line and we will restart her Zinacef antibiotics.  We have also   ordered wound care to follow with dressing changes.  2.  Mild noted hypoxia, possibly due to noted pulmonary edema on chest x-ray   and her BNP was elevated over 1000.  She did have an echocardiogram on   02/27/2017, which did show preserved EF of 65%, with a normal right   ventricular size and systolic function at this time despite her recent normal   echocardiogram.  I do not believe that her physical examination is consistent    with this, I will go ahead and reorder an echocardiogram.  If needed, we will   start with gentle diuresis, otherwise patient is currently doing well on   minimal amount of oxygen.  3.  Leukocytosis, most likely due to sepsis from not receiving antibiotics and   from noncompliance.  At this time, we will reinstitute antibiotics and   monitor her clinical progression closely.  4.  Normocytic anemia.  No signs of gross bleeding.  Continue to monitor daily   CBCs for any acute changes.  5.  History of hypertension.  Continue Coreg and lisinopril and amlodipine   _____.  6.  Hyperlipidemia.  Continue atorvastatin.  7.  History of seizure disorder.  Continue Dilantin and Trileptal.  8.  Gastroesophageal reflux disease.  Continue omeprazole.  At this time,   patient will be placed on heparin 5000 units t.i.d. for deep venous thrombosis   prophylaxis, docusate for gastrointestinal prophylaxis.  9.  Patient's code status is full code.    I anticipate hospital length stay to be greater than 2 midnights.       ____________________________________     MD KATHRYN HARRIS / NEETA    DD:  03/26/2017 16:35:38  DT:  03/26/2017 19:09:59    D#:  [id number]  Job#:  [id number]

## 2017-03-27 NOTE — PROGRESS NOTES
Bedside report received.  POC discussed with pt;   Discussed importance of heparin or SCD, continues to refuse  Bed in lowest position, call light in reach  All questions answered at this time.

## 2017-03-27 NOTE — CARE PLAN
Problem: Infection  Goal: Will remain free from infection  Outcome: PROGRESSING AS EXPECTED  IV antibiotics, wound care per orders     Problem: Knowledge Deficit  Goal: Knowledge of disease process/condition, treatment plan, diagnostic tests, and medications will improve  Outcome: PROGRESSING AS EXPECTED  POC discussed with pt; all questions answered

## 2017-03-27 NOTE — DIETARY
NUTRITION SERVICES: BMI - Pt with BMI >40 (=41.39). Weight loss counseling not appropriate in acute care setting. RECOMMEND - Referral to outpatient nutrition services for weight management after D/C.

## 2017-03-27 NOTE — PROGRESS NOTES
"Hospital Medicine Progress Note, Adult, Complex               Author: Loi Cheema Date & Time created: 3/27/2017  7:53 AM     CC: cannot get IV antibiotics    Interval History:  45 y/o F with PMH Of CVA, HTN, DLD, recently discharged to [facility] for her right MSSA wound post I&D admitted for above.    When asked about why she left [facility] and she said \"I don't know, they just kicked me out\". And she said that the person who kicked her out was fired. And she said she had appointment for her wound surgery today and when asked who told her that and she doesn't know. She lives in [place] and she said \"I sort of have family in [place]\".    Review of Systems:  Review of Systems   Constitutional: Negative for fever, chills and weight loss.   HENT: Negative for congestion and nosebleeds.    Eyes: Negative for blurred vision, pain and discharge.   Respiratory: Negative for cough, sputum production, shortness of breath and stridor.    Cardiovascular: Negative for chest pain, palpitations and orthopnea.   Gastrointestinal: Negative for heartburn, nausea, vomiting, abdominal pain and diarrhea.   Genitourinary: Negative for dysuria, urgency and frequency.   Musculoskeletal: Negative for myalgias, back pain and neck pain.   Skin: Negative for itching and rash.   Neurological: Negative for dizziness, focal weakness, seizures and headaches.       Physical Exam:  Physical Exam   Constitutional: She is oriented to person, place, and time. No distress.   HENT:   Head: Normocephalic and atraumatic.   Mouth/Throat: Oropharynx is clear and moist.   Eyes: Conjunctivae and EOM are normal. Pupils are equal, round, and reactive to light.   Neck: Normal range of motion. Neck supple. No tracheal deviation present. No thyromegaly present.   Cardiovascular: Normal rate and regular rhythm.    No murmur heard.  Pulmonary/Chest: Effort normal and breath sounds normal. No respiratory distress. She has no wheezes.   Abdominal: Soft. Bowel sounds " are normal. She exhibits no distension. There is no tenderness.   Musculoskeletal: She exhibits no edema or tenderness.   Right dorsum foot wound and near medial calf area with stitching   Neurological: She is alert and oriented to person, place, and time. No cranial nerve deficit.   Skin: Skin is warm and dry. She is not diaphoretic. No erythema.       Labs:        Invalid input(s): [id number]  Recent Labs      17   1130   TROPONINI  0.01   BNPBTYPENAT  1176*     Recent Labs      17   1130   SODIUM  141   POTASSIUM  4.5   CHLORIDE  108   CO2  24   BUN  31*   CREATININE  2.01*   MAGNESIUM  1.5   CALCIUM  8.7     Recent Labs      17   1130   GLUCOSE  152*     Recent Labs      17   1130   RBC  3.46*   HEMOGLOBIN  10.9*   HEMATOCRIT  34.0*   PLATELETCT  247     Recent Labs      17   1130   WBC  13.0*   NEUTSPOLYS  75.90*   LYMPHOCYTES  15.90*   MONOCYTES  5.70   EOSINOPHILS  1.90   BASOPHILS  0.30           Hemodynamics:  Temp (24hrs), Av.4 °C (97.6 °F), Min:35.9 °C (96.7 °F), Max:36.9 °C (98.4 °F)  Temperature: 36.7 °C (98 °F)  Pulse  Av.9  Min: 69  Max: 111Heart Rate (Monitored): 76  Blood Pressure: 128/58 mmHg, NIBP: (!) 169/80 mmHg     Respiratory:    Respiration: 20, Pulse Oximetry: 93 %, O2 Daily Delivery Respiratory : Room Air with O2 Available     Given By:: Mouthpiece, Work Of Breathing / Effort: Mild  RUL Breath Sounds: Clear, RML Breath Sounds: Diminished, RLL Breath Sounds: Diminished, BENNIE Breath Sounds: Clear, LLL Breath Sounds: Diminished  Fluids:    Intake/Output Summary (Last 24 hours) at 17 0753  Last data filed at 17 1800   Gross per 24 hour   Intake    400 ml   Output      0 ml   Net    400 ml     Weight: (!) 127.2 kg (280 lb 6.8 oz)  GI/Nutrition:  Orders Placed This Encounter   Procedures   • Diet Order     Standing Status: Standing      Number of Occurrences: 1      Standing Expiration Date:      Order Specific Question:  Diet:     Answer:   Diabetic [3]     Order Specific Question:  Diet:     Answer:  Cardiac [6]     Medical Decision Making, by Problem:  Active Hospital Problems    Diagnosis   • CKD stage 4 secondary to hypertension (CMS-[facility]) [I12.9, N18.4]  - avoid nephrotoxic drugs  - follow cmp closely in am     • Leukocytosis [D72.829]  - likely related to her right foot wound  - continue cefuroxime IV(last dose 4/23/17)  - wound care     • Seizure disorder, grand mal (CMS-[facility]) [G40.409]     • HLD (hyperlipidemia) [E78.5]     • Essential hypertension, benign [I10]     • Cellulitis of foot [L03.119]  - on IV cefuroxime last dose 4/23  - wound care     • Sepsis (CMS-HCC) [A41.9]  - improving, plan as above       *  The patient has very strange behavior: consider psych evaluation for capacity if she continues to act strange tomorrow.    Labs reviewed, Medications reviewed and Radiology images reviewed        DVT Prophylaxis: Heparin      Antibiotics: Treating active infection/contamination beyond 24 hours perioperative coverage

## 2017-03-27 NOTE — PROGRESS NOTES
Received report from day RN assumed care at 1915. Pt A&Ox 4 . Pt states 0 /10 pain at this time. Plan of care discussed with Pt, verbalized understanding. no family present at bedside. Assessment completed. All Pt needs met at this time. Call light within reach, bed alarm on , bed locked and in low position. Will continue to monitor.

## 2017-03-27 NOTE — WOUND TEAM
"[facility]  Inpatient Services  Initial Wound and Skin Care Evaluation    Admission Date:  3/26/17  HPI, PMH, SH: Reviewed  Unit where seen by Wound Team:T 8    WOUND CONSULT RELATED TO: R foot plantar wound and dorsal incision, RLE calf incision    SUBJECTIVE:  \" I can feel it but it doesn't hurt.\"    Self Report / Pain Level: None    OBJECTIVE: Cooperative, says she thinks nsg just kept putting the honey alginate on top of the old honey alginate dressing without cleaning or removing the old honey.    WOUND TYPE, LOCATION, CHARACTERISTICS (Pressure ulcers: location, stage, POA or date identified)    Location and type of wound: POA wound, I&D of R foot  Plantar surface on 2/27 and 3/9        Periwound: intact,normal    Drainage: none     Tissue Type and %: 100% adherent white/yellow slough   Wound Edges: some undermining <0.2    Odor: none      Exposed structure(s): none visible  S&S of Infection: wound bed      Measurements: 3/27/17  Length: 0.5 cm     Width:  1 cm     Depth: 0.5 cm       INTERVENTIONS BY WOUND TEAM: It took several minutes to remove the layers of  old honey alginate that covered the periwound. There was no honey alginate in the wound itself. Once the wound was cleaned with NS, measured and pictured, the wound bed was filled with honey alginate and the wound was covered with 1/2 of an adhesive foam. The incision on the dorsal foot was cleaned with NS then covered with adaptic and then 1/2 of an adhesive foam was placed over it. The forefoot was then wrapped in Kerlix. Dressing maintenance orders were written. [facility] service has seen patient so they were communicated with re: state of wound. Patient was given skin moisturizer to put on bilateral heels at night.    Honey alginate is appropriate for the depth of this wound and will be antimicrobial and autolytic.    Interdisciplinary consultation: Nsg, patient, LPS service    EVALUATION: Patient expressed concern re: R calf incision " stitches growing into her skin. She received instructions to remind caregivers to place honey alginate in wound bed only. Patient tolerated all procedures well. LPS service with check incision stitches today.    Factors affecting wound healing:CVA, obesity and mobility.  Goals: decrease wound size by 1 %/ week    NURSING PLAN OF CARE ORDERS (X):    Dressing changes: See Dressing Maintenance orders: X  Skin care: See Skin Care orders:   Rectal tube care: See Rectal Tube Care orders:   Other orders:    RSKIN: CURRENT (X) ORDERED (O)  Q shift Cl:  X  Q shift pressure point assessments:  X  Pressure redistribution mattress  X      CONCEPCIÓN      Bariatric CONCEPCIÓN      Bariatric foam        Heel float boots       Heels floated on pillows      Barrier wipes      Barrier Cream      Barrier paste      Sacral silicone dressing      Silicone O2 tubing      Anchorfast      Trach with Optifoam split foam       Waffle cushion      Rectal tube or BMS      Antifungal tx    Turn q 2 hours     Up to chair     Ambulate X- with boot on, heel touch WB  PT/OT     Dietician      PO     TF   TPN     PVN    NPO   # days   Other       WOUND TEAM PLAN OF CARE (X):   NPWT change 3 x week:        Dressing changes by wound team:       Follow up as needed:  X     Other (explain):    Anticipated discharge plans (X):  SNF:  X         Home Care:           Outpatient Wound Center:            Self Care:            Other:

## 2017-03-27 NOTE — PROGRESS NOTES
C/O right food discomfort 7/10 on scale, medicated as ordered. Pt angry and irritable at times. Pt's mother Andria advised this RN of daughter's flight risk history. Notified charge RN and pt strip alarm on.

## 2017-03-27 NOTE — RESPIRATORY CARE
COPD EDUCATION by COPD CLINICAL EDUCATOR  3/27/2017 at 6:45 AM by Diandra Rivas     Patient reviewed by COPD education team. Patient does not qualify for COPD program.

## 2017-03-27 NOTE — CARE PLAN
Problem: Communication  Goal: The ability to communicate needs accurately and effectively will improve  Outcome: PROGRESSING AS EXPECTED  Pt is alert and oriented x 4 . Pt is oriented to the environment and call light. I have discussed with the pt the plan of care, treatments and medications and the pt verbalizes agreement and understanding. The pt has been able to communicate her needs effectively and has been given the opportunity to ask any questions. All pt needs have been met and questions have been answered at this time. Hourly rounding in place.         Problem: Safety  Goal: Will remain free from injury  Outcome: PROGRESSING AS EXPECTED  Pt assessed at beginning of shift. Pt determined to be standby . Fall precautions in place. Call light and personal possessions in reach, bed alarm on . Hourly rounding in place.

## 2017-03-28 LAB
ALBUMIN SERPL BCP-MCNC: 2.6 G/DL (ref 3.2–4.9)
ALBUMIN/GLOB SERPL: 0.8 G/DL
ALP SERPL-CCNC: 72 U/L (ref 30–99)
ALT SERPL-CCNC: 9 U/L (ref 2–50)
ANION GAP SERPL CALC-SCNC: 10 MMOL/L (ref 0–11.9)
AST SERPL-CCNC: 12 U/L (ref 12–45)
BACTERIA WND AEROBE CULT: NORMAL
BASOPHILS # BLD AUTO: 0.4 % (ref 0–1.8)
BASOPHILS # BLD: 0.03 K/UL (ref 0–0.12)
BILIRUB SERPL-MCNC: 0.3 MG/DL (ref 0.1–1.5)
BUN SERPL-MCNC: 33 MG/DL (ref 8–22)
CALCIUM SERPL-MCNC: 8.3 MG/DL (ref 8.5–10.5)
CHLORIDE SERPL-SCNC: 108 MMOL/L (ref 96–112)
CO2 SERPL-SCNC: 24 MMOL/L (ref 20–33)
CREAT SERPL-MCNC: 2.38 MG/DL (ref 0.5–1.4)
EOSINOPHIL # BLD AUTO: 0.38 K/UL (ref 0–0.51)
EOSINOPHIL NFR BLD: 5.3 % (ref 0–6.9)
ERYTHROCYTE [DISTWIDTH] IN BLOOD BY AUTOMATED COUNT: 51 FL (ref 35.9–50)
GFR SERPL CREATININE-BSD FRML MDRD: 22 ML/MIN/1.73 M 2
GLOBULIN SER CALC-MCNC: 3.2 G/DL (ref 1.9–3.5)
GLUCOSE SERPL-MCNC: 85 MG/DL (ref 65–99)
GRAM STN SPEC: NORMAL
HCT VFR BLD AUTO: 28.4 % (ref 37–47)
HGB BLD-MCNC: 9.2 G/DL (ref 12–16)
IMM GRANULOCYTES # BLD AUTO: 0.02 K/UL (ref 0–0.11)
IMM GRANULOCYTES NFR BLD AUTO: 0.3 % (ref 0–0.9)
LYMPHOCYTES # BLD AUTO: 2.12 K/UL (ref 1–4.8)
LYMPHOCYTES NFR BLD: 29.7 % (ref 22–41)
MCH RBC QN AUTO: 32.2 PG (ref 27–33)
MCHC RBC AUTO-ENTMCNC: 32.4 G/DL (ref 33.6–35)
MCV RBC AUTO: 99.3 FL (ref 81.4–97.8)
MONOCYTES # BLD AUTO: 0.76 K/UL (ref 0–0.85)
MONOCYTES NFR BLD AUTO: 10.7 % (ref 0–13.4)
NEUTROPHILS # BLD AUTO: 3.82 K/UL (ref 2–7.15)
NEUTROPHILS NFR BLD: 53.6 % (ref 44–72)
NRBC # BLD AUTO: 0 K/UL
NRBC BLD AUTO-RTO: 0 /100 WBC
PLATELET # BLD AUTO: 186 K/UL (ref 164–446)
PMV BLD AUTO: 9.9 FL (ref 9–12.9)
POTASSIUM SERPL-SCNC: 4.2 MMOL/L (ref 3.6–5.5)
PROT SERPL-MCNC: 5.8 G/DL (ref 6–8.2)
RBC # BLD AUTO: 2.86 M/UL (ref 4.2–5.4)
SIGNIFICANT IND 70042: NORMAL
SITE SITE: NORMAL
SODIUM SERPL-SCNC: 142 MMOL/L (ref 135–145)
SOURCE SOURCE: NORMAL
WBC # BLD AUTO: 7.1 K/UL (ref 4.8–10.8)

## 2017-03-28 PROCEDURE — A9270 NON-COVERED ITEM OR SERVICE: HCPCS | Performed by: HOSPITALIST

## 2017-03-28 PROCEDURE — A9270 NON-COVERED ITEM OR SERVICE: HCPCS | Performed by: PSYCHIATRY & NEUROLOGY

## 2017-03-28 PROCEDURE — 700102 HCHG RX REV CODE 250 W/ 637 OVERRIDE(OP): Performed by: HOSPITALIST

## 2017-03-28 PROCEDURE — 700102 HCHG RX REV CODE 250 W/ 637 OVERRIDE(OP): Performed by: EMERGENCY MEDICINE

## 2017-03-28 PROCEDURE — 700111 HCHG RX REV CODE 636 W/ 250 OVERRIDE (IP): Performed by: HOSPITALIST

## 2017-03-28 PROCEDURE — 700105 HCHG RX REV CODE 258: Performed by: HOSPITALIST

## 2017-03-28 PROCEDURE — 36415 COLL VENOUS BLD VENIPUNCTURE: CPT

## 2017-03-28 PROCEDURE — 700102 HCHG RX REV CODE 250 W/ 637 OVERRIDE(OP): Performed by: PSYCHIATRY & NEUROLOGY

## 2017-03-28 PROCEDURE — 770020 HCHG ROOM/CARE - TELE (206)

## 2017-03-28 PROCEDURE — A9270 NON-COVERED ITEM OR SERVICE: HCPCS | Performed by: EMERGENCY MEDICINE

## 2017-03-28 PROCEDURE — 85025 COMPLETE CBC W/AUTO DIFF WBC: CPT

## 2017-03-28 PROCEDURE — 99239 HOSP IP/OBS DSCHRG MGMT >30: CPT | Performed by: HOSPITALIST

## 2017-03-28 PROCEDURE — 80053 COMPREHEN METABOLIC PANEL: CPT

## 2017-03-28 RX ORDER — IPRATROPIUM BROMIDE AND ALBUTEROL SULFATE 2.5; .5 MG/3ML; MG/3ML
3 SOLUTION RESPIRATORY (INHALATION) EVERY 4 HOURS PRN
Status: SHIPPED | DISCHARGE
Start: 2017-03-28 | End: 2019-02-14

## 2017-03-28 RX ORDER — ALBUTEROL SULFATE 90 UG/1
2 AEROSOL, METERED RESPIRATORY (INHALATION) EVERY 4 HOURS PRN
Qty: 8.5 G | Status: ON HOLD | DISCHARGE
Start: 2017-03-28 | End: 2020-05-30

## 2017-03-28 RX ORDER — BISACODYL 10 MG
10 SUPPOSITORY, RECTAL RECTAL
Refills: 0 | Status: SHIPPED | DISCHARGE
Start: 2017-03-28 | End: 2019-02-14

## 2017-03-28 RX ORDER — ATORVASTATIN CALCIUM 40 MG/1
40 TABLET, FILM COATED ORAL DAILY
Qty: 30 TAB | Status: SHIPPED | DISCHARGE
Start: 2017-03-28 | End: 2019-08-19 | Stop reason: SDUPTHER

## 2017-03-28 RX ORDER — OMEPRAZOLE 20 MG/1
20 CAPSULE, DELAYED RELEASE ORAL DAILY
Qty: 30 CAP | Status: SHIPPED | DISCHARGE
Start: 2017-03-28 | End: 2019-02-14

## 2017-03-28 RX ORDER — LORAZEPAM 1 MG/1
1 TABLET ORAL 3 TIMES DAILY
Qty: 30 TAB | Refills: 0 | Status: SHIPPED | DISCHARGE
Start: 2017-03-28 | End: 2018-04-20

## 2017-03-28 RX ORDER — PHENYTOIN SODIUM 200 MG/1
200 CAPSULE, EXTENDED RELEASE ORAL 2 TIMES DAILY
Qty: 90 CAP | Refills: 11 | Status: SHIPPED | DISCHARGE
Start: 2017-03-28 | End: 2018-10-01

## 2017-03-28 RX ORDER — LISINOPRIL 20 MG/1
20 TABLET ORAL 2 TIMES DAILY
Qty: 30 TAB | Status: ON HOLD | DISCHARGE
Start: 2017-03-28 | End: 2020-04-20

## 2017-03-28 RX ORDER — SODIUM CHLORIDE 9 MG/ML
85 INJECTION, SOLUTION INTRAVENOUS CONTINUOUS
Refills: 0 | Status: SHIPPED | DISCHARGE
Start: 2017-03-28 | End: 2019-02-14

## 2017-03-28 RX ORDER — AMLODIPINE BESYLATE 5 MG/1
5 TABLET ORAL DAILY
Qty: 30 TAB | Status: SHIPPED | DISCHARGE
Start: 2017-03-28 | End: 2019-02-14

## 2017-03-28 RX ORDER — HEPARIN SODIUM 5000 [USP'U]/ML
5000 INJECTION, SOLUTION INTRAVENOUS; SUBCUTANEOUS EVERY 8 HOURS
Refills: 0 | Status: SHIPPED | DISCHARGE
Start: 2017-03-28 | End: 2019-02-14

## 2017-03-28 RX ORDER — CARVEDILOL 25 MG/1
25 TABLET ORAL 3 TIMES DAILY
Qty: 60 TAB | Status: ON HOLD | DISCHARGE
Start: 2017-03-28 | End: 2020-04-20

## 2017-03-28 RX ORDER — FOLIC ACID 1 MG/1
1 TABLET ORAL DAILY
Qty: 30 TAB | Status: SHIPPED | DISCHARGE
Start: 2017-03-28 | End: 2019-02-14

## 2017-03-28 RX ORDER — ASPIRIN 325 MG
325 TABLET ORAL DAILY
Qty: 100 TAB | Status: ON HOLD | DISCHARGE
Start: 2017-03-28 | End: 2020-04-20

## 2017-03-28 RX ORDER — OXCARBAZEPINE 300 MG/1
300 TABLET, FILM COATED ORAL 2 TIMES DAILY
Qty: 60 TAB | Status: SHIPPED | DISCHARGE
Start: 2017-03-28 | End: 2018-07-03 | Stop reason: SDUPTHER

## 2017-03-28 RX ORDER — AMOXICILLIN 250 MG
2 CAPSULE ORAL 2 TIMES DAILY
Qty: 30 TAB | Refills: 0 | Status: SHIPPED | DISCHARGE
Start: 2017-03-28 | End: 2019-02-14

## 2017-03-28 RX ORDER — POLYETHYLENE GLYCOL 3350 17 G/17G
17 POWDER, FOR SOLUTION ORAL
Refills: 3 | Status: SHIPPED | DISCHARGE
Start: 2017-03-28 | End: 2019-02-14

## 2017-03-28 RX ORDER — ACETAMINOPHEN 325 MG/1
650 TABLET ORAL EVERY 6 HOURS PRN
Qty: 30 TAB | Refills: 0 | Status: SHIPPED | DISCHARGE
Start: 2017-03-28 | End: 2020-06-11

## 2017-03-28 RX ORDER — SODIUM BICARBONATE 650 MG/1
650 TABLET ORAL 3 TIMES DAILY
Qty: 120 TAB | Refills: 3 | Status: SHIPPED | DISCHARGE
Start: 2017-03-28 | End: 2020-06-11

## 2017-03-28 RX ORDER — SODIUM CHLORIDE 9 MG/ML
1000 INJECTION, SOLUTION INTRAVENOUS
Refills: 0 | Status: SHIPPED | DISCHARGE
Start: 2017-03-28 | End: 2019-02-14

## 2017-03-28 RX ADMIN — CEFUROXIME SODIUM 750 MG: 7.5 INJECTION, POWDER, FOR SOLUTION INTRAVENOUS at 17:31

## 2017-03-28 RX ADMIN — LORAZEPAM 1 MG: 1 TABLET ORAL at 21:11

## 2017-03-28 RX ADMIN — OXCARBAZEPINE 300 MG: 300 TABLET, FILM COATED ORAL at 09:34

## 2017-03-28 RX ADMIN — SODIUM BICARBONATE TAB 650 MG 650 MG: 650 TAB at 21:11

## 2017-03-28 RX ADMIN — CARVEDILOL 25 MG: 25 TABLET, FILM COATED ORAL at 09:33

## 2017-03-28 RX ADMIN — LORAZEPAM 1 MG: 1 TABLET ORAL at 14:52

## 2017-03-28 RX ADMIN — LORAZEPAM 1 MG: 1 TABLET ORAL at 09:32

## 2017-03-28 RX ADMIN — ALBUTEROL SULFATE 2 PUFF: 90 AEROSOL, METERED RESPIRATORY (INHALATION) at 03:40

## 2017-03-28 RX ADMIN — LISINOPRIL 20 MG: 20 TABLET ORAL at 09:33

## 2017-03-28 RX ADMIN — FOLIC ACID 1 MG: 1 TABLET ORAL at 09:33

## 2017-03-28 RX ADMIN — PHENYTOIN SODIUM 200 MG: 100 CAPSULE, EXTENDED RELEASE ORAL at 09:33

## 2017-03-28 RX ADMIN — PHENYTOIN SODIUM 200 MG: 100 CAPSULE, EXTENDED RELEASE ORAL at 21:11

## 2017-03-28 RX ADMIN — CEFUROXIME SODIUM 750 MG: 7.5 INJECTION, POWDER, FOR SOLUTION INTRAVENOUS at 09:35

## 2017-03-28 RX ADMIN — CEFUROXIME SODIUM 750 MG: 7.5 INJECTION, POWDER, FOR SOLUTION INTRAVENOUS at 02:47

## 2017-03-28 RX ADMIN — SODIUM BICARBONATE TAB 650 MG 650 MG: 650 TAB at 09:34

## 2017-03-28 RX ADMIN — AMLODIPINE BESYLATE 5 MG: 5 TABLET ORAL at 09:33

## 2017-03-28 RX ADMIN — CARVEDILOL 25 MG: 25 TABLET, FILM COATED ORAL at 21:17

## 2017-03-28 RX ADMIN — CARVEDILOL 25 MG: 25 TABLET, FILM COATED ORAL at 14:52

## 2017-03-28 RX ADMIN — SODIUM BICARBONATE TAB 650 MG 650 MG: 650 TAB at 14:52

## 2017-03-28 RX ADMIN — ASPIRIN 325 MG: 325 TABLET, COATED ORAL at 09:32

## 2017-03-28 RX ADMIN — ATORVASTATIN CALCIUM 40 MG: 40 TABLET, FILM COATED ORAL at 09:33

## 2017-03-28 RX ADMIN — OMEPRAZOLE 20 MG: 20 CAPSULE, DELAYED RELEASE ORAL at 09:32

## 2017-03-28 RX ADMIN — LISINOPRIL 20 MG: 20 TABLET ORAL at 21:17

## 2017-03-28 ASSESSMENT — ENCOUNTER SYMPTOMS
EYES NEGATIVE: 1
BLOOD IN STOOL: 0
WHEEZING: 0
RESPIRATORY NEGATIVE: 1
FOCAL WEAKNESS: 0
DIZZINESS: 0
HEARTBURN: 0
SEIZURES: 0
LOSS OF CONSCIOUSNESS: 0
PSYCHIATRIC NEGATIVE: 1
NAUSEA: 0
CARDIOVASCULAR NEGATIVE: 1
PALPITATIONS: 0
HEMOPTYSIS: 0
CHILLS: 0
NERVOUS/ANXIOUS: 0
COUGH: 0
DOUBLE VISION: 0
CONSTIPATION: 0
MUSCULOSKELETAL NEGATIVE: 1
BRUISES/BLEEDS EASILY: 0
VOMITING: 0
DIARRHEA: 0
WEAKNESS: 1
FEVER: 0
DIAPHORESIS: 0
GASTROINTESTINAL NEGATIVE: 1
ABDOMINAL PAIN: 0
HEADACHES: 0

## 2017-03-28 ASSESSMENT — PAIN SCALES - GENERAL
PAINLEVEL_OUTOF10: 0

## 2017-03-28 NOTE — DISCHARGE PLANNING
"Medical Social Work     SW met with pt at bedside to discuss d/c plan.  Pt has been accepted to [facility] and is medically cleared to transfer today.  When SW mentioned this to pt, pt states \"No, I am going back to the facility I was at on [address].\"  SW confirmed that pt is referring to [facility].  Pt states, \"I know they will care for me there.\"  SW contacted [facility] and spoke with Rin Sanchez in admissions who states that pt was discharged from there facility and is not able to return due to combative behavior. SW informed pt of this information and asked to discuss transfer to [facility].  Pt states \"no, no, no leave me alone.\"      SW contacted pt's mother, Andria ([phone]) who states she is pt's medical POA and will bring paperwork in tomorrow.  Andria plans to visit pt tomorrow morning around 1000 and will bring pt's 22 y/o daughter in to discuss transfer to [facility].  Andria states pt has Moyamoya disease which causes inconsistent emotional behavior.  Andria states she is worried that if this transfer is \"forced\" pt will not tolerate it well and will become combative.  [facility] will follow up with pt and family tomorrow regarding d/c plan.      Plan: Pt accepted to [facility]. SW to meet with pt and family tomorrow AM to discuss transfer to [facility].           "

## 2017-03-28 NOTE — DISCHARGE PLANNING
[facility] Transport with [facility] was canceled. Pt will not be discharging today.

## 2017-03-28 NOTE — PSYCHIATRY
"PSYCHIATRIC CONSULTATION:  Reason for admission:     Reason for consult: disruptive behavior   Requesting Physician:  Supervising Physician:         Legal status:  No hold required     Chief Complaint: \"I don't know why I got kicked out\"    HPI:   45 y/o woman with Moyamoya leading to multiple CVAs including a frontal CVA and a seizure disorder. She was recently admitted for foot wound and surgical debridement. She was manic during this admission. During previous admission she absconded to the [facility] and had to be brought back by staff. Her paxil was tapered off and she was continue on ativan. Trileptal added as she had failed multiple mood stabilizing medications in the past and she had good response. Her thinking was clearer and her suzi resolved. She had stopped wandering. She was eventually discharged to [facility] where she started refusing IV abx for unknown reason. She tells me her foot is getting better and she doesn't need it. She became more ill and more confused and became disruptive. She reports she continued to take the trileptal and ativan. She ended up getting \"kicked out\" of [facility] but can't tell me anything about the behavior that led to it. She is smiling and coloring. She denies any si or psychosis. Her affect is mildly inappropriate at times with some smirking, but not grossly manic as in her previous admission. Nursing staff states she is mostly calm but sometimes starts to yell without provocation. She cannot give me a good reason as to why she doesn't want IV abx.     Psychiatric Review of Systems:current symptoms as reported by pt.  Depression:      Decreased sleep at times   Suzi: no major signs/symptoms at this time   Anxiety/Panic Attacks denies   PTSD symptom: denies   Psychosis:none   Other:inappropriate affect, cognitively impaired        Medical Review of Systems: as reported by pt. All systems reviewed. Only those found to be + are noted below. All others are negative. " "  Neurological:    TBIs:denies    SZs: +   Strokes: multiple CVAs including frontal lobe stroke    Other:cognitive impairment   Other medical symptoms: foot pain       Psychiatric Examination: observed phenomenon:  Vitals:  Filed Vitals:    03/27/17 1633 03/27/17 2000 03/28/17 0000 03/28/17 0400   BP: 137/51 151/82 116/53 120/62   Pulse: 66 62 63 64   Temp: 36.4 °C (97.6 °F) 36.3 °C (97.3 °F) 36.3 °C (97.3 °F) 36.3 °C (97.3 °F)   Resp: 20 20 18 18   Height:       Weight:  122.8 kg (270 lb 11.6 oz)     SpO2: 95% 93% 90% 90%       Musculoskeletal(abnormal movements, gait, etc): no psychomotor agitation or retardation   Appearancgrooming wnle:  Thoughts: logical and sequential   Speech:nl tone, rate, volume   Mood:    \"okay\"  Affect:    slightly inappropriate   SI/HI:   Denies   Attention/Alertness:   Awake, alert   Memory:    Mildly impaired  Orientation:    oriented  Fund of Knowledge:   decfreased   Insight/Judgement into psychiatric symptoms: fair   Neurological Testing:( ie clock, cube drawing, MMSE, MOCA,etc.)n/a    Other system(s) examined: (neurological, skin, GI, etc)          Past Psychiatric Hx:   Sees Dr. Morin in neuro. Took paxil and ativan as prescribed by him. In October 2016 she had an episode of \"rage and destructive behavior\" and was combative with her parents. She took risperdal and developed facial swelling and difficulty breating. Donepezil was not helpful. She couldn't tolerarte depakote or abilify.   Family Psychiatric Hx:  Maternal granfather had an unspecified psych condition   Social Hx:  Unmarried, no kids, lives with mom in [place]. Parents have POA.   Drug/Alcohol/Tobacco Hx:   Drugs:denies   Alcohol:denies    Tobacco:former smoker     Medical Hx: labs, MARS, medications, etc were reviewed. Only those findings of potential interest to psychiatry are noted below:  Medical Conditions:     Past Medical History   Diagnosis Date   • Hypertension    • Kidney disease    • Stroke (CMS-HCC)    • " Moyamoya disease 10/3/2013   • H/O: CVA (cerebrovascular accident) 12/24/2015   • Mild mitral regurgitation 7/14/2014   • Seizure disorder, grand mal (CMS-HCC) 3/3/2016   • CKD (chronic kidney disease), stage IV (CMS-HCC) 12/24/2015       Allergies:   Medications (currently prescribed at [facility]):  Labs:  ECG: QTc    Cranial Imaging: reviewed    MRI-Brain 2009: 1) Interval resolution of left frontal acute infarct, left frontal intracerebral hematoma and subdural and subarachnoid hemorrhages on the left. 2) Stable pattern of microvascular ischemic gliosis in the bifrontal supratentorial white matter. 3) Unchanged finding of poor flow signal in the medial middle cerebral artery branches bilaterally consistent with the patient's diagnosis of moyamoya disease.     EEG 12/06/2016: Abnormal EEG showing dysfunction in the Right hemisphere, particularly in the occipital and probably posterior temporal regions. Clinical correlation is suggested. The absence of epileptogenic activity on EEG does not rule out epilepsy.   Other:      ASSESSMENT: (new dx, acuity level)  1. Moyamoya disease with dementia    2. Hx of CVA  3. Seizure disorder    4. Right foot abscess  5. Mood disorder unspecified   6.. Hypoxia, related to pulmonary edema and heart failure.   7. Likely sepsis      PLAN:  No hold required.   Increasing trileptal to 300mg po bid  Avoiding medications that increase risk of stroke.     Signing off, but please call us again if the increase in trileptal isn't enough.   Thank you for the consult.

## 2017-03-28 NOTE — CARE PLAN
Problem: Safety  Goal: Will remain free from injury  Outcome: PROGRESSING AS EXPECTED  Pt assessed at beginning of shift. Pt determined to be standby assist . Fall precautions in place. Call light and personal possessions in reach, bed alarm off . Hourly rounding in place.

## 2017-03-28 NOTE — PROGRESS NOTES
"LIMB PRESERVATION SERVICE     Known to [facility]. Pt discharged to [facility] on 3/23, became disruptive and was released from SNF. Presented to ER for completion of IV abx and wound care.  45 y/o female with moyamoya syndrome with CVA, HTN, CKD. Previous admitted for right foot infection after stepping on rock. S/p I & D of R foot on 2/27 Dr. Monterroso.    s/p I & D R dorsal foot with VAC application, R 2nd and 3rd MTH excision, R GSR by Dr. Lynn on 3/5  S/p  I & D R foot with closure by Dr. Lynn on 3/9    /55 mmHg  Pulse 69  Temp(Src) 36.7 °C (98.1 °F)  Resp 14  Ht 1.753 m (5' 9\")  Wt 119.9 kg (264 lb 5.3 oz)  BMI 39.02 kg/m2  SpO2 96%  Breastfeeding? No  Pain controlled      Pt not wearing boot, stating she cannot sleep with it on, but later states she has insomnia and doesn't sleep anyway.  Discussed importance of wearing boot. Pt states she will wear boot if she can have an acewrap over her wound dressing.    IP wound team has seen wound, initiated honey alginate to plantar forefoot ulcer.    Dorsal incision and calf incision approximated.  Slight maceration in between 2nd and 3rd toe. Dry gauze placed.          PLAN  Wound care per IP wound team. OK to have ace wrap over R foot dressing.  Remove sutures tomorrow to R calf and R dorsal foot incision   Walking boot to RLE continuously d/t calf incision  Heel touch WB RLE in boot  IV abx until 4/23 per ID notes last admission.          "

## 2017-03-28 NOTE — CARE PLAN
Problem: Communication  Goal: The ability to communicate needs accurately and effectively will improve  Outcome: PROGRESSING AS EXPECTED  Pt is alert and oriented x 4 but short and irritable . Pt is oriented to the environment and call light. I have discussed with the pt the plan of care, treatments and medications and the pt verbalizes agreement and understanding. The pt has been able to communicate her needs effectively and has been given the opportunity to ask any questions. All pt needs have been met and questions have been answered at this time. Hourly rounding in place.

## 2017-03-28 NOTE — DISCHARGE SUMMARY
DATE OF ADMISSION:  03/26/2017    DATE OF TRANSFER:  03/28/2017    PRINCIPAL DIAGNOSES:  1.  Sepsis secondary to right lower extremity cellulitis.  2.  Chronic kidney disease, stage IV, not on hemodialysis.  3.  Leukocytosis.  4.  Grand mal seizure disorder, controlled.  5.  Dyslipidemia.  6.  Essential hypertension.  7.  Hypomagnesemia.  8.  Normochromic normocytic anemia.    CONSULTATIONS DONE ON THIS HOSPITAL STAY:  None.    INTERVENTIONS DONE ON THIS HOSPITAL STAY:  None.    BLOOD OR BLOOD PRODUCTS RECEIVED ON THIS HOSPITAL STAY:  None.    DIAGNOSTIC EVALUATIONS DONE ON THIS HOSPITAL STAY:  Includes portable chest   x-ray 03/26/2017, which shows perihilar interstitial opacities suggestive of   mild edema.    COURSE OF HOSPITALIZATION:  The patient is a 46-year-old female, who was   admitted by my partner, Dr. Chalino Bender because the patient failed   outpatient IV antibiotic therapy.  The patient apparently has had a previous   episode where she ran down the street barefooted, she jumped on some rocks,   which caused on the right stole an abscess that then also lead to multiple   abrasions and lacerations on her foot, which were sutured.  The patient   continued, however, to get worse and suffered a MSSA infection.  She was   placed on outpatient IV antibiotics, which she fails, she was then placed an   extended care facility and there again she failed the antibiotics.  She then   went home and was getting worse, so she came back to the hospital for   evaluation.  The patient at this point apparently is not able to be taken care   by her parents whom she still lives with and will at this point need referral   to a alternate source where the patient is able to receive IV antibiotics   until 04/27/2017.  The patient at this point has been accepted to LTAC and   will be transferred there.  Her leukocytosis has improved her normochromic   normocytic anemia has been monitored.  She did not need any transfusions.   Her   blood pressure has been well controlled with a combination of Coreg,   lisinopril, and amlodipine, dyslipidemia, has been treated with low fat diet   and Lipitor combination For her history of seizures.  She has been on Dilantin   and Trileptal and has not had a seizure here.  She has been on   gastroesophageal reflux disease and deep venous thrombosis prophylaxis the   whole time.    DISPOSITION:  Transfer LTAC reason for admission to LTAC, patient will receive   IV antibiotics until 04/27/2017, which she has failed as an outpatient,   antibiotics will be with cefuroxime initially the patient was on cefazolin.    The patient will be receiving cefuroxime at this 750 mg IV every 8 hours.    PAST MEDICAL HISTORY:  Includes history of CVA, dyslipidemia, hypertension,   moyamoya disease, chronic kidney disease.    PAST SURGICAL HISTORY:  None.    SOCIAL HISTORY:  The patient denies using any tobacco products.  Denies using   any alcohol.  Denies using any recreational drugs.  She is full code status.    FAMILY HISTORY:  She says that her mom and dad are healthy.  She has no   pertinent family history.    ALLERGIES:  SHE IS ALLERGIC TO ADVIL, ALLEGRA, AZITHROMYCIN, CLARITIN,   ERYTHROMYCIN, PENICILLIN, PROCARDIA, AND ZYRTEC.    REVIEW OF SYSTEMS:  Currently, patient complains of right lower extremity pain   and discomfort and difficulty ambulating.  Denies any fever, chills, nausea,   vomiting, headache, blurriness of vision, ear discharge, nose discharge, oral   discharge, neck pain, chest pain, shortness of breath, abdominal pain,   constipation, or diarrhea.  All other review of systems are reviewed and are   negative.    PHYSICAL EXAMINATION:  VITAL SIGNS:  Temperature 36.1 degrees Celsius, pulse 69, respirations 18,   blood pressure 146/86.  She is 122.8 kilograms weight, 175 cm tall.  She is   currently alert, awake, oriented x3, pleasant, cooperative female appears her   stated age.  HEENT:  Head is  atraumatic.  Eyes follow normal range of gaze.  Pupils are   equal, round, reactive bilaterally.  Nose midline without discharge.  Ears   bilaterally intact without discharge.  Oral cavity has moist mucous membranes.    No apparent focus infection in the oral cavity.  NECK:  Neck is soft and supple, no JVD, carotid bruit, thyromegaly or   lymphadenopathy appreciated.  CHEST:  Chest wall moves equal with inspiration and expiration.  No   paradoxical motion.  No reproducible chest wall tenderness.  HEART:  S1, S2.  No murmurs or gallops detected.  LUNGS:  Clear to auscultation.  No rales or rhonchi detected.  ABDOMEN:  Soft.  Bowel sounds present in all 4 quadrants.  No hepatomegaly.    No splenomegaly, no rebound, no tenderness elicited.  GENITAL:  Deferred.  RECTAL:  Deferred.  EXTREMITIES:  Upper and lower extremities, positive pulses, no edema, good   muscle strength, good muscle tone, positive deep tendon reflexes.  The left   lower extremity is normal.  Right lower extremity, she has multiple   lacerations, which at this point have been sutured and are in the process of   healing, on the sole of her right foot, there is 0.5 cm x 0.5 cm impression,    which has underneath is some erosions and previous abscess formation that has   been evacuated.  The tissue there is tender and warm and red.  At this point   neurologically, cranial nerves II through XII are grossly within normal limits   without any focal deficits appreciated.    LABORATORY EVALUATION:  WBC count 7.1, hemoglobin 9.2, hematocrit 28.4,   platelets are 186.  Sodium 142, potassium 4.2, chloride 108.  Sodium 142,   potassium 4.2, chloride 108, CO2 is 24, BUN 33, creatinine is 2.38, calcium is   8.3 with a glucose of 85.  Liver function tests at this point are within   normal limits.  Albumin is low at 2.8.    IMAGING STUDIES EVALUATIONS DONE ON THIS HOSPITAL STAY:  Please refer to chart   for complete imaging studies which was a portable chest x-ray  with no acute   cardiopulmonary process except mild edema.    IMPRESSION AND PLAN:  She will be admitted at this point to [facility].  She will be receiving cefuroxime 750 mg every 8 hours.  She   will also continue at this point with wound care management, pain management.    For her chronic kidney disease, avoid nephrotoxins.  She will continue at   this point with fluid management and monitoring of renal functions.  For her   hypoxia, she will continue at this point with oxygen support as needed.  The   patient initially did have some pulmonary edema, which has resolved.    Leukocytosis has improved.  Her anemia has improved at this point monitored H   and H at this point does not need any transfusions.  Blood pressure at this   point is well controlled.  Continue with Coreg, lisinopril, and amlodipine.    For dyslipidemia, continue with atorvastatin and low fat diet.  For her   history of seizures, continue with Dilantin, Trileptal.    MEDICATIONS AT THE TIME OF TRANSFER:  Include, acetaminophen 650 mg every 6   hours p.r.n. for fever or mild pain, albuterol 2 puffs inhaled every 6 hours   p.r.n. for shortness of breath, Dulcolax suppositories 10 mg every 24 hours,   heparin 5000 units subcutaneously every 8 hours, DuoNeb 3 mL nebulized every 6   hours p.r.n. for shortness of breath, normal saline 85 mL per hour,   cefuroxime 750 mg every 8 hours still 04/27/2017, Omeprazole 20 mg p.o. daily,   MiraLax 17 g p.o. daily, Senokot 2 tablets twice daily, lisinopril 20 mg   twice daily, lorazepam 1 g 3 times daily, oxcarbazepine 300 mg twice daily,   phenytoin 200 mg twice daily, sodium bicarbonate 650 mg 3 times daily, Norvasc   5 mg daily, aspirin 325 mg daily, Lipitor 40 mg at bedtime, Coreg 25 three   times daily, folic acid 1 mg daily.    Patient at this point is on a regular diet.    ACTIVITIES:  Per the PT, OT evaluation team.  Patient at this point is stable   for transfer.    This  transfer process lasted 65 minutes.       ____________________________________     MD VIVIANE LOVELACE    DD:  03/28/2017 11:30:21  DT:  03/28/2017 12:09:06    D#:  [id number]  Job#:  [id number]

## 2017-03-28 NOTE — DISCHARGE PLANNING
Received [facility] PCS Form, form emailed to [facility] for transport to [facility].

## 2017-03-28 NOTE — PROGRESS NOTES
"Assumed care of patient at 0715, received bedside report from night shift RN. Bed is locked and in lowest position with call light within reach. Patient updated on plan of care, no complaints or pain at this time. White board updated. Assessment completed. Pt A&Ox4. When asked about pt foot she states \"I've explained this enough I am not telling you too.\" Pt also reluctant to have IV abx hung. Pt states \"I can feel it killing my kidney.\" Education provided- pt agreeable to IV abx at this time. Tele monitor in place and cardiac rhythm being monitored. All needs met at this time.  "

## 2017-03-29 ENCOUNTER — RESOLUTE PROFESSIONAL BILLING HOSPITAL PROF FEE (OUTPATIENT)
Dept: HOSPITALIST | Facility: MEDICAL CENTER | Age: 47
End: 2017-03-29
Payer: MEDICARE

## 2017-03-29 ENCOUNTER — APPOINTMENT (OUTPATIENT)
Dept: RADIOLOGY | Facility: MEDICAL CENTER | Age: 47
DRG: 871 | End: 2017-03-29
Attending: HOSPITALIST
Payer: MEDICARE

## 2017-03-29 ENCOUNTER — HOSPITAL ENCOUNTER (OUTPATIENT)
Facility: MEDICAL CENTER | Age: 47
End: 2017-04-24
Attending: HOSPITALIST | Admitting: HOSPITALIST
Payer: MEDICARE

## 2017-03-29 VITALS
OXYGEN SATURATION: 93 % | WEIGHT: 270.73 LBS | SYSTOLIC BLOOD PRESSURE: 158 MMHG | BODY MASS INDEX: 40.1 KG/M2 | RESPIRATION RATE: 16 BRPM | HEART RATE: 64 BPM | HEIGHT: 69 IN | TEMPERATURE: 97.5 F | DIASTOLIC BLOOD PRESSURE: 98 MMHG

## 2017-03-29 LAB
EKG IMPRESSION: NORMAL
VRE DNA SPEC QL NAA+PROBE: POSITIVE

## 2017-03-29 PROCEDURE — 87641 MR-STAPH DNA AMP PROBE: CPT

## 2017-03-29 PROCEDURE — 700102 HCHG RX REV CODE 250 W/ 637 OVERRIDE(OP): Performed by: HOSPITALIST

## 2017-03-29 PROCEDURE — 700102 HCHG RX REV CODE 250 W/ 637 OVERRIDE(OP): Performed by: PSYCHIATRY & NEUROLOGY

## 2017-03-29 PROCEDURE — 700105 HCHG RX REV CODE 258: Performed by: HOSPITALIST

## 2017-03-29 PROCEDURE — A9270 NON-COVERED ITEM OR SERVICE: HCPCS | Performed by: HOSPITALIST

## 2017-03-29 PROCEDURE — A9270 NON-COVERED ITEM OR SERVICE: HCPCS | Performed by: PSYCHIATRY & NEUROLOGY

## 2017-03-29 PROCEDURE — 87500 VANOMYCIN DNA AMP PROBE: CPT

## 2017-03-29 PROCEDURE — 700111 HCHG RX REV CODE 636 W/ 250 OVERRIDE (IP): Performed by: HOSPITALIST

## 2017-03-29 RX ORDER — HYDRALAZINE HYDROCHLORIDE 20 MG/ML
20 INJECTION INTRAMUSCULAR; INTRAVENOUS EVERY 6 HOURS PRN
Status: DISCONTINUED | OUTPATIENT
Start: 2017-03-29 | End: 2017-03-29 | Stop reason: HOSPADM

## 2017-03-29 RX ADMIN — SODIUM BICARBONATE TAB 650 MG 650 MG: 650 TAB at 08:16

## 2017-03-29 RX ADMIN — LISINOPRIL 20 MG: 20 TABLET ORAL at 08:16

## 2017-03-29 RX ADMIN — OXCARBAZEPINE 300 MG: 300 TABLET, FILM COATED ORAL at 02:20

## 2017-03-29 RX ADMIN — OXCARBAZEPINE 300 MG: 300 TABLET, FILM COATED ORAL at 08:16

## 2017-03-29 RX ADMIN — FOLIC ACID 1 MG: 1 TABLET ORAL at 08:15

## 2017-03-29 RX ADMIN — LORAZEPAM 1 MG: 1 TABLET ORAL at 08:16

## 2017-03-29 RX ADMIN — PHENYTOIN SODIUM 200 MG: 100 CAPSULE, EXTENDED RELEASE ORAL at 08:16

## 2017-03-29 RX ADMIN — ATORVASTATIN CALCIUM 40 MG: 40 TABLET, FILM COATED ORAL at 08:15

## 2017-03-29 RX ADMIN — ASPIRIN 325 MG: 325 TABLET, COATED ORAL at 08:16

## 2017-03-29 RX ADMIN — AMLODIPINE BESYLATE 5 MG: 5 TABLET ORAL at 08:16

## 2017-03-29 RX ADMIN — CEFUROXIME SODIUM 750 MG: 7.5 INJECTION, POWDER, FOR SOLUTION INTRAVENOUS at 02:19

## 2017-03-29 RX ADMIN — CEFUROXIME SODIUM 750 MG: 7.5 INJECTION, POWDER, FOR SOLUTION INTRAVENOUS at 10:46

## 2017-03-29 RX ADMIN — CARVEDILOL 25 MG: 25 TABLET, FILM COATED ORAL at 08:16

## 2017-03-29 RX ADMIN — OMEPRAZOLE 20 MG: 20 CAPSULE, DELAYED RELEASE ORAL at 08:15

## 2017-03-29 ASSESSMENT — PAIN SCALES - GENERAL
PAINLEVEL_OUTOF10: 0

## 2017-03-29 NOTE — PROGRESS NOTES
Pt dc'd to [facility]. Monitor removed; monitor room notified. Pt left unit via gurney with YOLANDA. Personal belongings with pt when leaving unit. Pt given discharge instructions prior to leaving unit including prescription and when to visit with physician; verbalizes understanding. Copy of discharge instructions with pt and in the chart.

## 2017-03-29 NOTE — DISCHARGE PLANNING
Transport was arranged with [facility] for today at 3:30 via [facility] as verbally requested by SAUL Ayers. Pt will be transporting to [facility]. SAUL Ayers notified. Called was placed to [facility] for authorization.

## 2017-03-29 NOTE — CONSULTS
ADULT  INFECTIOUS DISEASES INPATIENT CONSULT NOTE     Date of Service: 03/29/17    Consult Requested By: Manoj Menendez M.D.    Reason for Consultation: Recent right foot infection    History of Present Illness:   Pascale Acevedo is a 46 y.o. Woman with morbid obesity, CKD, Moyamoya syndrome with prior stroke who was recently admitted earlier in March with a traumatic injury to her right foot with subsequent development of cellulitis and abscess. She underwent repeat I&D with 2nd and 3rd metatarsal head excisions and GSR on 3/5/17. Tissues cultures grew MSSA,E coli, strep viridans, group C strep. On 03/09/17, she underwent repeat i&D with primary closure. She was discharged to [facility] to complete cefuroxime through 04/23/17. However while at [facility], she became disruptive and left [facility] and went home. She was without abx for approximately one week. She is somewhat of a poor historian so history is obtained from the medical records. She presented to the hospital on 3/26/17. On presentation, she was afebrile with leukocytosis of 13. She was initially started on cefazolin but then transitioned to IV cefuroxime. She denies any foot pain or swelling. Her sutures were removed yesterday. She is however refusing to go to [facility] however her parents are her legal guardians. She denies any abdominal pain, diarrhea or dysuria. ID service was consulted for further recommendations.    Review Of Systems:  ROS are negative except as noted above in the HPI.    PMH:   Past Medical History   Diagnosis Date   • Hypertension    • Kidney disease    • Stroke (CMS-HCC)    • Moyamoya disease 10/3/2013   • H/O: CVA (cerebrovascular accident) 12/24/2015   • Mild mitral regurgitation 7/14/2014   • Seizure disorder, grand mal (CMS-HCC) 3/3/2016   • CKD (chronic kidney disease), stage IV (CMS-HCC) 12/24/2015         PSH:  Past Surgical History   Procedure Laterality Date   • Primary c section     • Tubal coagulation laparoscopic bilateral      • Irrigation & debridement ortho Right 2/27/2017     Procedure: IRRIGATION & DEBRIDEMENT ORTHO;  Surgeon: Coleman Monterroso M.D.;  Location: SURGERY [facility];  Service:    • Irrigation & debridement ortho Right 3/5/2017     Procedure: IRRIGATION & DEBRIDEMENT ORTHO AND GASTROC RECESSION;  Surgeon: Gerardo Lynn M.D.;  Location: SURGERY [facility];  Service:    • Metatarsal head resection  3/5/2017     Procedure: SECOND METATARSAL HEAD RESECTION;  Surgeon: Gerardo Lynn M.D.;  Location: SURGERY [facility];  Service:    • Irrigation & debridement ortho Right 3/9/2017     Procedure: IRRIGATION & DEBRIDEMENT ORTHO - FOOT;  Surgeon: Gerardo Lynn M.D.;  Location: SURGERY [facility];  Service:        FAMILY HX:  Family History   Problem Relation Age of Onset   • Diabetes Mother    • Hypertension Mother    • Hypertension Father    • Arthritis Father    • Diabetes Maternal Grandmother    • Psychiatry Maternal Grandfather    • Cancer Paternal Grandmother    • Psychiatry Paternal Grandfather        SOCIAL HX:  Social History     Social History   • Marital Status: Single     Spouse Name: N/A   • Number of Children: N/A   • Years of Education: N/A     Occupational History   • Not on file.     Social History Main Topics   • Smoking status: Former Smoker   • Smokeless tobacco: Never Used   • Alcohol Use: No   • Drug Use: No   • Sexual Activity: Not on file     Other Topics Concern   • Not on file     Social History Narrative     History   Smoking status   • Former Smoker   Smokeless tobacco   • Never Used     History   Alcohol Use No       Allergies/Intolerances:  Allergies   Allergen Reactions   • Allegra [Fexofenadine] Unspecified     Rxn = unknown   • Amoxicillin    • Azithromycin Unspecified     Rxn = unknown   • Claritin    • Erythromycin Unspecified     Rxn = unknown   • Ibuprofen & Caffeine-Vitamins Unspecified     Rxn = unknown   • Penicillins Unspecified     Rxn = unknown   • Procardia  [Nifedipine]    • Rosemary Oil Anaphylaxis     Throat starts to close/swell.    • Zyrtec [Cetirizine] Unspecified     Rxn = unknown       History reviewed with the patient    Other Current Medications:    Current facility-administered medications:   •  hydrALAZINE (APRESOLINE) injection 20 mg, 20 mg, Intravenous, Q6HRS PRN, Manoj Menendez M.D.  •  oxcarbazepine (TRILEPTAL) tablet 300 mg, 300 mg, Oral, BID, Lynn Carpenter M.D., 300 mg at 03/29/17 0816  •  amlodipine (NORVASC) tablet 5 mg, 5 mg, Oral, DAILY, Chalino Bender M.D., 5 mg at 03/29/17 0816  •  aspirin (ASA) tablet 325 mg, 325 mg, Oral, DAILY, Chalino Bender M.D., 325 mg at 03/29/17 0816  •  atorvastatin (LIPITOR) tablet 40 mg, 40 mg, Oral, DAILY, Chalino Bender M.D., 40 mg at 03/29/17 0815  •  carvedilol (COREG) tablet 25 mg, 25 mg, Oral, TID, Chalino Bender M.D., 25 mg at 03/29/17 0816  •  folic acid (FOLVITE) tablet 1 mg, 1 mg, Oral, DAILY, Chalino Bender M.D., 1 mg at 03/29/17 0815  •  lisinopril (PRINIVIL) tablet 20 mg, 20 mg, Oral, BID, Chalino Bender M.D., 20 mg at 03/29/17 0816  •  lorazepam (ATIVAN) tablet 1 mg, 1 mg, Oral, TID, Chalino Bender M.D., 1 mg at 03/29/17 0816  •  phenytoin ER (DILANTIN) capsule 200 mg, 200 mg, Oral, BID, Chalino Bender M.D., 200 mg at 03/29/17 0816  •  sodium bicarbonate (SODIUM BICARBONATE) tablet 650 mg, 650 mg, Oral, TID, Chalino Bender M.D., 650 mg at 03/29/17 0816  •  cefUROXime (ZINACEF) 750 mg in NS 50 mL IVPB, 750 mg, Intravenous, Q8HRS, Chalino Bender M.D., Stopped at 03/29/17 9939  •  Respiratory Care per Protocol, , Nebulization, Continuous RT, Chalino Bender M.D.  •  NS (BOLUS) infusion 500 mL, 500 mL, Intravenous, Once PRN, Chalino Bender M.D.  •  NS infusion, , Intravenous, Continuous, Chalino Bender M.D., Last Rate: 50 mL/hr at 03/27/17 9418  •  heparin injection 5,000 Units, 5,000 Units, Subcutaneous, Q8HRS, Chalino Bender M.D., 5,000 Units at  "17 1645  •  acetaminophen (TYLENOL) tablet 650 mg, 650 mg, Oral, Q6HRS PRN, Chalino Bender M.D., 650 mg at 17 1354  •  senna-docusate (PERICOLACE or SENOKOT S) 8.6-50 MG per tablet 2 Tab, 2 Tab, Oral, BID, 2 Tab at 17 2100 **AND** polyethylene glycol/lytes (MIRALAX) PACKET 1 Packet, 1 Packet, Oral, QDAY PRN **AND** magnesium hydroxide (MILK OF MAGNESIA) suspension 30 mL, 30 mL, Oral, QDAY PRN **AND** bisacodyl (DULCOLAX) suppository 10 mg, 10 mg, Rectal, QDAY PRN, Chalino Bender M.D.  •  ipratropium-albuterol (DUONEB) nebulizer solution 3 mL, 3 mL, Nebulization, Q4H PRN (RT), FER Borjas.O.  •  albuterol inhaler 2 Puff, 2 Puff, Inhalation, Q4H PRN (RT), DOMINGUEZ BorjasOBrinda, 2 Puff at 17 0340  •  omeprazole (PRILOSEC) capsule 20 mg, 20 mg, Oral, DAILY, Chalino Bender M.D., 20 mg at 17 0815  •  NS infusion 1,000 mL, 1,000 mL, Intravenous, Once PRN, Chalino Bender M.D.  [unfilled]    Most Recent Vital Signs:  /78 mmHg  Pulse 65  Temp(Src) 36.3 °C (97.3 °F)  Resp 18  Ht 1.753 m (5' 9.02\")  Wt 122.8 kg (270 lb 11.6 oz)  BMI 39.96 kg/m2  SpO2 90%  Temp  Av.4 °C (97.5 °F)  Min: 35.9 °C (96.7 °F)  Max: 37.2 °C (99 °F)    Physical Exam:  General: morbidly obese but well-appearing, no acute distress  HEENT: sclera anicteric, PERRL, EOMI, MMM, no oral lesions  Neck: supple, no lymphadenopathy  Chest: CTAB, no r/r/w, normal work of breathing.  Cardiac: RRR, normal S1 S2, no m/r/g   Abdomen: + bowel sounds, soft, non-tender, non-distended, no HSM  Extremities: R dorsum of foot with surgical site clean without any drainage or erythema. Right calf surgical site clean and healing well without drainage or erythema  Skin: see above  Neuro: Alert and oriented times 3, non-focal exam    Pertinent Lab Results:  Recent Labs      17   1130  17   1411  17   0225   WBC  13.0*  8.4  7.1      Recent Labs      17   1130  17   " "1411  03/28/17 0225   HEMOGLOBIN  10.9*  10.1*  9.2*   HEMATOCRIT  34.0*  31.5*  28.4*   MCV  98.3*  100.0*  99.3*   MCH  31.5  32.1  32.2   PLATELETCT  247  209  186         Recent Labs      03/26/17   1130  03/27/17   1411 03/28/17 0225   SODIUM  141  139  142   POTASSIUM  4.5  4.4  4.2   CHLORIDE  108  107  108   CO2  24  24  24   CREATININE  2.01*  2.39*  2.38*        Recent Labs      03/27/17   1411 03/28/17 0225   ALBUMIN  2.9*  2.6*        Pertinent Micro:  Results     Procedure Component Value Units Date/Time    CULTURE WOUND W/ GRAM STAIN [[id number]] Collected:  03/26/17 1137    Order Status:  Completed Specimen Information:  Wound from Right Foot Updated:  03/28/17 0946     Gram Stain Result --      Result:        Moderate WBCs.  Few Gram positive cocci.       Significant Indicator NEG      Source WND      Site RIGHT FOOT      Culture Result Wound Light growth Mixed skin shawn.     BLOOD CULTURE x2 [[id number]] Collected:  03/26/17 1128    Order Status:  Completed Specimen Information:  Blood from Peripheral Updated:  03/27/17 0939     Significant Indicator NEG      Source BLD      Site PERIPHERAL      Blood Culture --      Result:        No Growth    Note: Blood cultures are incubated for 5 days and  are monitored continuously.Positive blood cultures  are called to the RN and reported as soon as  they are identified.      Narrative:      Per Hospital Policy: Only change Specimen Src: to \"Line\" if  specified by physician order.    BLOOD CULTURE x2 [[id number]] Collected:  03/26/17 1130    Order Status:  Completed Specimen Information:  Blood from Peripheral Updated:  03/27/17 0939     Significant Indicator NEG      Source BLD      Site PERIPHERAL      Blood Culture --      Result:        No Growth    Note: Blood cultures are incubated for 5 days and  are monitored continuously.Positive blood cultures  are called to the RN and reported as soon as  they are identified.      Narrative:      Per Hospital " "Policy: Only change Specimen Src: to \"Line\" if  specified by physician order.    GRAM STAIN [[id number]] Collected:  03/26/17 1137    Order Status:  Completed Specimen Information:  Wound Updated:  03/26/17 2231     Significant Indicator .      Source WND      Site RIGHT FOOT      Gram Stain Result --      Result:        Moderate WBCs.  Few Gram positive cocci.      Culture Respiratory W/ GRM STN [[id number]]     Order Status:  Completed Specimen Information:  Respirate from Sputum     Blood Culture [[id number]] Collected:  03/26/17 0000    Order Status:  Canceled Specimen Information:  Other from Peripheral     Narrative:      ORDER WAS CANCELLED 03/26/2017 22:04, Multiple tests on separate orders -  CONSOLIDATED TO ONE. 03/26/2017  22:04.  From different peripheral sites, if not done within the last  24 hours (Per Hospital Policy: Only change specimen source to  \"Line\" if specified by physician order)    Blood Culture [[id number]] Collected:  03/26/17 0000    Order Status:  Canceled Specimen Information:  Other from Peripheral     Narrative:      ORDER WAS CANCELLED 03/26/2017 22:04, Multiple tests on separate orders -  CONSOLIDATED TO ONE. 03/26/2017  22:04.  From different peripheral sites, if not done within the last  24 hours (Per Hospital Policy: Only change specimen source to  \"Line\" if specified by physician order)        BLOOD CULTURE   Date Value Ref Range Status   03/26/2017   Preliminary    No Growth    Note: Blood cultures are incubated for 5 days and  are monitored continuously.Positive blood cultures  are called to the RN and reported as soon as  they are identified.          Studies: none    IMPRESSION:   1. Recent polymicrobiall right foot abscess and cellulitis  2. Medical noncompliance  3. Moyamoya syndrome  4. H/o stroke  5. Chronic kidney disease        PLAN:   Pascale Acevedo is a 46 y.o. Morbidly obese woman with a history of CKD, Moyamoya syndrome with prior stroke recently admitted earlier in " March with a traumatic injury to her right foot with subsequent development of cellulitis and abscess. She underwent repeat I&D with 2nd and 3rd metatarsal head excisions and GSR on 3/5/17. Tissues cultures grew MSSA,E coli, strep viridans, group C strep. Recently left [facility] after being disruptive. Her right foot appears to be healing well and sutures were removed on 03/28/17. Agree with continuing cefuroxime through original stop date of 04/23/17. Plan for [facility] transfer.       Discussed with IM. Will continue to follow    Darby Maxwell M.D.

## 2017-03-29 NOTE — PROGRESS NOTES
Hospital Medicine Progress Note, Adult, Complex               Author: Manoj Menendez    Chief Complaint Today: 46 year old female admitted with right leg pain Date & Time created: 3/28/2017  5:47 PM     Interval History:  Mrs Acevedo is a 46 year old female who has injured her foot with running bare feet and she has injured her foot on rocks, developed a cellulitis that failed OP antibiotics and she is accepted at [facility] and she will need her parents to agree as she has no capacity with her abnormal mental status with her Moyamoya disease.     Review of Systems:  Review of Systems   Constitutional: Negative for fever, chills and diaphoresis.   HENT: Negative.    Eyes: Negative.  Negative for double vision.   Respiratory: Negative.  Negative for cough, hemoptysis and wheezing.    Cardiovascular: Negative.  Negative for chest pain, palpitations and leg swelling.   Gastrointestinal: Negative.  Negative for heartburn, nausea, vomiting, abdominal pain, diarrhea, constipation and blood in stool.   Genitourinary: Negative.  Negative for urgency, frequency and hematuria.   Musculoskeletal: Negative.  Negative for joint pain.   Skin: Positive for rash. Negative for itching.   Neurological: Positive for weakness. Negative for dizziness, focal weakness, seizures, loss of consciousness and headaches.   Endo/Heme/Allergies: Negative.  Does not bruise/bleed easily.   Psychiatric/Behavioral: Negative.  Negative for suicidal ideas. The patient is not nervous/anxious.        Physical Exam:  Physical Exam   Constitutional: She appears well-developed and well-nourished.   HENT:   Head: Normocephalic and atraumatic.   Right Ear: External ear normal.   Left Ear: External ear normal.   Nose: Nose normal.   Mouth/Throat: Oropharynx is clear and moist.   Eyes: Conjunctivae and EOM are normal. Pupils are equal, round, and reactive to light.   Neck: Normal range of motion. Neck supple. No JVD present. No thyromegaly present.   Cardiovascular:  Normal rate and regular rhythm.    Murmur heard.  Pulmonary/Chest: Effort normal and breath sounds normal. She exhibits no tenderness.   Abdominal: She exhibits no distension and no mass. There is no tenderness. There is no rebound and no guarding.   Genitourinary:   Dey catheter   Musculoskeletal: Normal range of motion. She exhibits no edema or tenderness.   Lymphadenopathy:     She has no cervical adenopathy.   Neurological: She is alert. She has normal reflexes. No cranial nerve deficit.   Skin: Skin is warm and dry. Rash noted. There is erythema.        Psychiatric: She has a normal mood and affect. Her speech is normal. Thought content normal. She is slowed. Cognition and memory are impaired. She expresses impulsivity.   Nursing note and vitals reviewed.      Labs:        Invalid input(s): [id number]  Recent Labs      03/26/17   1130   TROPONINI  0.01   BNPBTYPENAT  1176*     Recent Labs      03/26/17   1130 03/27/17   1411  03/28/17   0225   SODIUM  141  139  142   POTASSIUM  4.5  4.4  4.2   CHLORIDE  108  107  108   CO2  24  24  24   BUN  31*  31*  33*   CREATININE  2.01*  2.39*  2.38*   MAGNESIUM  1.5   --    --    CALCIUM  8.7  8.8  8.3*     Recent Labs      03/26/17   1130 03/27/17   1411  03/28/17   0225   ALTSGPT   --   11  9   ASTSGOT   --   13  12   ALKPHOSPHAT   --   82  72   TBILIRUBIN   --   0.5  0.3   GLUCOSE  152*  115*  85     Recent Labs      03/26/17   1130  03/27/17   1411  03/28/17   0225   RBC  3.46*  3.15*  2.86*   HEMOGLOBIN  10.9*  10.1*  9.2*   HEMATOCRIT  34.0*  31.5*  28.4*   PLATELETCT  247  209  186     Recent Labs      03/26/17   1130 03/27/17   1411  03/28/17   0225   WBC  13.0*  8.4  7.1   NEUTSPOLYS  75.90*  61.60  53.60   LYMPHOCYTES  15.90*  27.00  29.70   MONOCYTES  5.70  9.00  10.70   EOSINOPHILS  1.90  2.10  5.30   BASOPHILS  0.30  0.20  0.40   ASTSGOT   --   13  12   ALTSGPT   --   11  9   ALKPHOSPHAT   --   82  72   TBILIRUBIN   --   0.5  0.3            Hemodynamics:  Temp (24hrs), Av.2 °C (97.2 °F), Min:36.1 °C (97 °F), Max:36.3 °C (97.4 °F)  Temperature: 36.3 °C (97.4 °F)  Pulse  Av.1  Min: 61  Max: 111   Blood Pressure: 125/67 mmHg     Respiratory:    Respiration: 18, Pulse Oximetry: 96 %        RUL Breath Sounds: Clear, RML Breath Sounds: Diminished, RLL Breath Sounds: Diminished, BENNIE Breath Sounds: Clear, LLL Breath Sounds: Diminished  Fluids:    Intake/Output Summary (Last 24 hours) at 17 1747  Last data filed at 17 1300   Gross per 24 hour   Intake    480 ml   Output      0 ml   Net    480 ml     Weight: 122.8 kg (270 lb 11.6 oz)  GI/Nutrition:  Orders Placed This Encounter   Procedures   • Diet Order     Standing Status: Standing      Number of Occurrences: 1      Standing Expiration Date:      Order Specific Question:  Diet:     Answer:  Diabetic [3]     Order Specific Question:  Diet:     Answer:  Cardiac [6]     Medical Decision Making, by Problem:  Active Hospital Problems    Diagnosis   • CKD stage 4 secondary to hypertension (CMS-HCC) [I12.9, N18.4]  -stable and monitored   • Leukocytosis [D72.829]  -resolved   • Seizure disorder, grand mal (CMS-HCC) [G40.409]  -chronic and controlled   • HLD (hyperlipidemia) [E78.5]  -low fat diet   • Essential hypertension, benign [I10]  -medical management   • Cellulitis of foot [L03.119]  -needs antibiotics until    • Sepsis (CMS-HCC) [A41.9]  -resolved   • Acute respiratory failure with hypoxia (CMS-HCC) [J96.01]  -on oxygen as needed       EKG reviewed, Radiology images reviewed, Labs reviewed and Medications reviewed  Dey catheter: No Dey      DVT Prophylaxis: Heparin    Ulcer prophylaxis: Yes  Antibiotics: Treating active infection/contamination beyond 24 hours perioperative coverage  Assessed for rehab: Patient was assess for and/or received rehabilitation services during this hospitalization

## 2017-03-29 NOTE — PROGRESS NOTES
Bedside report received.   POC discussed with pt and verbalizes understanding  Bed in lowest position, call light in reach  All questions answered at this time.

## 2017-03-29 NOTE — DISCHARGE INSTRUCTIONS
Discharge Instructions    Discharged to other by medical transportation with escort. Discharged via ambulance, hospital escort: Yes.  Special equipment needed: Not Applicable    Be sure to schedule a follow-up appointment with your primary care doctor or any specialists as instructed.     Discharge Plan:   Diet Plan: Discussed  Activity Level: Discussed  Confirmed Follow up Appointment: No (Comments) (Patient going to [facility])  Confirmed Symptoms Management: Discussed  Medication Reconciliation Updated: Yes  Influenza Vaccine Indication: Not indicated: Previously immunized this influenza season and > 8 years of age    I understand that a diet low in cholesterol, fat, and sodium is recommended for good health. Unless I have been given specific instructions below for another diet, I accept this instruction as my diet prescription.   Other diet: Cardiac = low sodium, low fat diet, Diabetic diet.     Special Instructions: None    · Is patient discharged on Warfarin / Coumadin?   No     · Is patient Post Blood Transfusion?  No    Depression / Suicide Risk    As you are discharged from this [facility] facility, it is important to learn how to keep safe from harming yourself.    Recognize the warning signs:  · Abrupt changes in personality, positive or negative- including increase in energy   · Giving away possessions  · Change in eating patterns- significant weight changes-  positive or negative  · Change in sleeping patterns- unable to sleep or sleeping all the time   · Unwillingness or inability to communicate  · Depression  · Unusual sadness, discouragement and loneliness  · Talk of wanting to die  · Neglect of personal appearance   · Rebelliousness- reckless behavior  · Withdrawal from people/activities they love  · Confusion- inability to concentrate     If you or a loved one observes any of these behaviors or has concerns about self-harm, here's what you can do:  · Talk about it- your feelings and reasons for  harming yourself  · Remove any means that you might use to hurt yourself (examples: pills, rope, extension cords, firearm)  · Get professional help from the community (Mental Health, Substance Abuse, psychological counseling)  · Do not be alone:Call your Safe Contact- someone whom you trust who will be there for you.  · Call your local CRISIS HOTLINE 424-7352 or 017-835-6174  · Call your local Children's Mobile Crisis Response Team [place] (814) 311-7640 or wwwKeVita  · Call the toll free National Suicide Prevention Hotlines   · National Suicide Prevention Lifeline 236-321-FFST (9815)  · National Hope Line Network 800-SUICIDE (306-7086)        Sepsis, Adult  Sepsis is a serious infection of your blood or tissues that affects your whole body. The infection that causes sepsis may be bacterial, viral, fungal, or parasitic. Sepsis may be life threatening. Sepsis can cause your blood pressure to drop. This may result in shock. Shock causes your central nervous system and your organs to stop working correctly.   RISK FACTORS  Sepsis can happen in anyone, but it is more likely to happen in people who have weakened immune systems.  SIGNS AND SYMPTOMS   Symptoms of sepsis can include:  · Fever or low body temperature (hypothermia).  · Rapid breathing (hyperventilation).  · Chills.  · Rapid heartbeat (tachycardia).  · Confusion or light-headedness.  · Trouble breathing.  · Urinating much less than usual.  · Cool, clammy skin or red, flushed skin.  · Other problems with the heart, kidneys, or brain.  DIAGNOSIS   Your health care provider will likely do tests to look for an infection, to see if the infection has spread to your blood, and to see how serious your condition is. Tests can include:  · Blood tests, including cultures of your blood.  · Cultures of other fluids from your body, such as:  · Urine.  · Pus from wounds.  · Mucus coughed up from your lungs.  · Urine tests other than cultures.  · X-ray exams  or other imaging tests.  TREATMENT   Treatment will begin with elimination of the source of infection. If your sepsis is likely caused by a bacterial or fungal infection, you will be given antibiotic or antifungal medicines.  You may also receive:  · Oxygen.  · Fluids through an IV tube.  · Medicines to increase your blood pressure.  · A machine to clean your blood (dialysis) if your kidneys fail.  · A machine to help you breathe if your lungs fail.  SEEK IMMEDIATE MEDICAL CARE IF:  You get an infection or develop any of the signs and symptoms of sepsis after surgery or a hospitalization.     This information is not intended to replace advice given to you by your health care provider. Make sure you discuss any questions you have with your health care provider.     Document Released: 09/15/2004 Document Revised: 05/03/2016 Document Reviewed: 08/25/2014  [employer] Interactive Patient Education ©2016 [employer]        Cellulitis  Cellulitis is an infection of the skin and the tissue beneath it. The infected area is usually red and tender. Cellulitis occurs most often in the arms and lower legs.   CAUSES   Cellulitis is caused by bacteria that enter the skin through cracks or cuts in the skin. The most common types of bacteria that cause cellulitis are staphylococci and streptococci.  SIGNS AND SYMPTOMS   · Redness and warmth.  · Swelling.  · Tenderness or pain.  · Fever.  DIAGNOSIS   Your health care provider can usually determine what is wrong based on a physical exam. Blood tests may also be done.  TREATMENT   Treatment usually involves taking an antibiotic medicine.  HOME CARE INSTRUCTIONS   · Take your antibiotic medicine as directed by your health care provider. Finish the antibiotic even if you start to feel better.  · Keep the infected arm or leg elevated to reduce swelling.  · Apply a warm cloth to the affected area up to 4 times per day to relieve pain.  · Take medicines only as directed by your health care  provider.  · Keep all follow-up visits as directed by your health care provider.  SEEK MEDICAL CARE IF:   · You notice red streaks coming from the infected area.  · Your red area gets larger or turns dark in color.  · Your bone or joint underneath the infected area becomes painful after the skin has healed.  · Your infection returns in the same area or another area.  · You notice a swollen bump in the infected area.  · You develop new symptoms.  · You have a fever.  SEEK IMMEDIATE MEDICAL CARE IF:   · You feel very sleepy.  · You develop vomiting or diarrhea.  · You have a general ill feeling (malaise) with muscle aches and pains.  MAKE SURE YOU:   · Understand these instructions.  · Will watch your condition.  · Will get help right away if you are not doing well or get worse.     This information is not intended to replace advice given to you by your health care provider. Make sure you discuss any questions you have with your health care provider.     Document Released: 09/27/2006 Document Revised: 01/08/2016 Document Reviewed: 03/04/2013  [employer] Interactive Patient Education ©2016 [employer]

## 2017-03-29 NOTE — PROGRESS NOTES
"LIMB PRESERVATION SERVICE     Known to [facility]. Pt discharged to [facility] on 3/23, became disruptive and was released from SNF. Presented to ER for completion of IV abx and wound care.  45 y/o female with moyamoya syndrome with CVA, HTN, CKD. Previous admitted for right foot infection after stepping on rock. S/p I & D of R foot on 2/27 Dr. Monterroso.     s/p I & D R dorsal foot with VAC application, R 2nd and 3rd MTH excision, R GSR by Dr. Lynn on 3/5  S/p  I & D R foot with closure by Dr. Lynn on 3/9    /78 mmHg  Pulse 65  Temp(Src) 36.3 °C (97.3 °F)  Resp 18  Ht 1.753 m (5' 9.02\")  Wt 122.8 kg (270 lb 11.6 oz)  BMI 39.96 kg/m2  SpO2 90%  Pain controlled      Pt not wearing boot, stating she cannot sleep with it on.  Reinforced importance of wearing boot.   Sutures removed yesterday by staff.    Dressing to foot in place    Pt to transfer to [facility] today         PLAN  Wound care per IP wound team. OK to have ace wrap over R foot dressing.  Walking boot to RLE continuously d/t calf incision  Heel touch WB RLE in boot  IV abx until 4/23 per ID notes last admission.  Pt to f/u at outpatient wound clinic during LPS rounds once she is discharged from [facility]  "

## 2017-03-29 NOTE — CARE PLAN
Problem: Safety  Goal: Will remain free from falls  Pt educated on the importance fall prevention methods, such as treaded sock and the bed alarm. Pt stated they will use the call light prior to any attempts of ambulation. Ambulatory ability assessed, treaded socks in place, bed locked and in low position, frequent trips to bathroom offered, and call light and phone within reach.    Problem: Knowledge Deficit  Goal: Knowledge of the prescribed therapeutic regimen will improve  Outcome: PROGRESSING AS EXPECTED  Pt educated regarding plan of care and medications. All questions answered.

## 2017-03-29 NOTE — DISCHARGE PLANNING
Medical Social Work    Pt's family came to bedside today to meet with pt and discuss d/c options.  After long discussion, pt is now agreeable to transfer to [facility].  Transport arranged for 1530 via [facility].  SW met with pt at bedside and pt signed cobra.  Cobra and transfer packet placed on pt's chart and bedside RN updated.     Plan: Pt to transfer to [facility] today at 1530 via [facility].

## 2017-03-29 NOTE — PROGRESS NOTES
Report called to receiving RN at [facility] , provided my ext. Here so that I may be reached for further questions if any.

## 2017-03-29 NOTE — PROGRESS NOTES
Assumed care at 1900. Bedside report received from Yana. Patient's chart and MAR reviewed. Pt denies pain at this time. Pt is A & O 4. Patient was updated on plan of care for the day. Questions answered and concerns addressed.  Pt denies any additional needs at this time. White board updated. Call light, phone and personal belongings within reach.

## 2017-03-30 LAB
ANION GAP SERPL CALC-SCNC: 6 MMOL/L (ref 0–11.9)
BASOPHILS # BLD AUTO: 0.5 % (ref 0–1.8)
BASOPHILS # BLD: 0.04 K/UL (ref 0–0.12)
BUN SERPL-MCNC: 32 MG/DL (ref 8–22)
CALCIUM SERPL-MCNC: 8.4 MG/DL (ref 8.4–10.2)
CHLORIDE SERPL-SCNC: 110 MMOL/L (ref 96–112)
CO2 SERPL-SCNC: 24 MMOL/L (ref 20–33)
CREAT SERPL-MCNC: 2.39 MG/DL (ref 0.5–1.4)
EOSINOPHIL # BLD AUTO: 0.39 K/UL (ref 0–0.51)
EOSINOPHIL NFR BLD: 4.7 % (ref 0–6.9)
ERYTHROCYTE [DISTWIDTH] IN BLOOD BY AUTOMATED COUNT: 50.6 FL (ref 35.9–50)
GFR SERPL CREATININE-BSD FRML MDRD: 22 ML/MIN/1.73 M 2
GLUCOSE SERPL-MCNC: 112 MG/DL (ref 65–99)
HCT VFR BLD AUTO: 29 % (ref 37–47)
HGB BLD-MCNC: 9.6 G/DL (ref 12–16)
IMM GRANULOCYTES # BLD AUTO: 0.03 K/UL (ref 0–0.11)
IMM GRANULOCYTES NFR BLD AUTO: 0.4 % (ref 0–0.9)
LYMPHOCYTES # BLD AUTO: 2.01 K/UL (ref 1–4.8)
LYMPHOCYTES NFR BLD: 24.4 % (ref 22–41)
MCH RBC QN AUTO: 32.5 PG (ref 27–33)
MCHC RBC AUTO-ENTMCNC: 33.1 G/DL (ref 33.6–35)
MCV RBC AUTO: 98.3 FL (ref 81.4–97.8)
MONOCYTES # BLD AUTO: 0.67 K/UL (ref 0–0.85)
MONOCYTES NFR BLD AUTO: 8.1 % (ref 0–13.4)
MRSA DNA SPEC QL NAA+PROBE: NORMAL
NEUTROPHILS # BLD AUTO: 5.09 K/UL (ref 2–7.15)
NEUTROPHILS NFR BLD: 61.9 % (ref 44–72)
NRBC # BLD AUTO: 0 K/UL
NRBC BLD AUTO-RTO: 0 /100 WBC
PLATELET # BLD AUTO: 206 K/UL (ref 164–446)
PMV BLD AUTO: 9.5 FL (ref 9–12.9)
POTASSIUM SERPL-SCNC: 4.4 MMOL/L (ref 3.6–5.5)
RBC # BLD AUTO: 2.95 M/UL (ref 4.2–5.4)
SIGNIFICANT IND 70042: NORMAL
SITE SITE: NORMAL
SODIUM SERPL-SCNC: 140 MMOL/L (ref 135–145)
SOURCE SOURCE: NORMAL
WBC # BLD AUTO: 8.2 K/UL (ref 4.8–10.8)

## 2017-03-30 PROCEDURE — 85025 COMPLETE CBC W/AUTO DIFF WBC: CPT

## 2017-03-30 PROCEDURE — 80048 BASIC METABOLIC PNL TOTAL CA: CPT

## 2017-03-30 PROCEDURE — 99232 SBSQ HOSP IP/OBS MODERATE 35: CPT | Performed by: HOSPITALIST

## 2017-03-30 ASSESSMENT — ENCOUNTER SYMPTOMS
CHILLS: 0
DIARRHEA: 0
ABDOMINAL PAIN: 0
PALPITATIONS: 0
WHEEZING: 0
COUGH: 0
HEADACHES: 0
CONSTIPATION: 0
NAUSEA: 0
VOMITING: 0
SHORTNESS OF BREATH: 0
FEVER: 0

## 2017-03-30 NOTE — [facility]
Hospital Medicine Progress Note, Adult, Complex               Author: Beronica PATRICIO Martinez Date & Time created: 3/30/2017  3:33 PM     CC: LE wound    Interval History:  Transferred from [facility] for abx  Doesn't want heparin  Wants SCDs  Refused for me to look at wounds, refuses boot for foot    Review of Systems:  Review of Systems   Constitutional: Negative for fever.   Respiratory: Negative for cough, shortness of breath and wheezing.    Cardiovascular: Negative for chest pain and palpitations.   Gastrointestinal: Negative for nausea, vomiting, abdominal pain, diarrhea and constipation.   Neurological: Negative for headaches.       T 97.7 P 67 /89 RR 20 SaO2 94% I/O 1/400 no BM   Physical Exam   Constitutional: She appears well-developed. No distress.   HENT:   Head: Normocephalic.   Mouth/Throat: No oropharyngeal exudate.   Eyes: Conjunctivae are normal. Right eye exhibits no discharge. Left eye exhibits no discharge. No scleral icterus.   Neck: Neck supple. No tracheal deviation present.   Cardiovascular: Normal rate and intact distal pulses.    Pulmonary/Chest: Effort normal. No respiratory distress. She exhibits no tenderness.   Abdominal: Soft. Bowel sounds are normal. She exhibits no distension. There is no tenderness.   obese   Musculoskeletal: She exhibits no edema.   Neurological: She is alert.   Skin:   Refused to let me look at wounds   Vitals reviewed.      Labs:        Invalid input(s): [id number]      Recent Labs      03/28/17 0225 03/30/17 0109   SODIUM  142  140   POTASSIUM  4.2  4.4   CHLORIDE  108  110   CO2  24  24   BUN  33*  32*   CREATININE  2.38*  2.39*   CALCIUM  8.3*  8.4     Recent Labs      03/28/17 0225 03/30/17 0109   ALTSGPT  9   --    ASTSGOT  12   --    ALKPHOSPHAT  72   --    TBILIRUBIN  0.3   --    GLUCOSE  85  112*     Recent Labs      03/28/17 0225 03/30/17 0109   RBC  2.86*  2.95*   HEMOGLOBIN  9.2*  9.6*   HEMATOCRIT  28.4*  29.0*   PLATELETCT  186  206      Recent Labs      03/28/17   0225  03/30/17   0109   WBC  7.1  8.2   NEUTSPOLYS  53.60  61.90   LYMPHOCYTES  29.70  24.40   MONOCYTES  10.70  8.10   EOSINOPHILS  5.30  4.70   BASOPHILS  0.40  0.50   ASTSGOT  12   --    ALTSGPT  9   --    ALKPHOSPHAT  72   --    TBILIRUBIN  0.3   --      Medical Decision Making, by Problem:  Resolved sepsis PTA  R LE traumatic wound/cellulitis   -polymicrobial   -cefuroxime thru 4/23   -eval with wound RN tomorrow  CKD 4   -avoid nephrotoxins   -Na HCo3   -follow on lisinopril  Resolved leukocytosis  Epilepsy   -trileptal, dilantin  Brewer Brewer syndrome   -progressive cognitive decline  HTN   -norvasc, lisinopril  HLD  Anemia of CKD  Nutrition   -po  Debility   -cleared by PT to ambulate  Add SCDs      Labs reviewed and Medications reviewed  Dey catheter: No Dey  Central line in place: Concentrated IV drugs    DVT Prophylaxis: Not indicated at this time, ambulatory (refuses heparin)      Antibiotics: Treating active infection/contamination beyond 24 hours perioperative coverage

## 2017-03-30 NOTE — [facility]
Infectious Disease Progress Note    Author: Rocio Hinson M.D. DOS & Time created: 3/30/2017  1:12 PM    Chief Complaint:  Right foot infection F/U      Interval History:  46-year-old white female with history of morbid obesity,  Moyamoya syndrome with prior stroke, who was admitted to [facility] after with a traumatic injury to her right foot with subsequent development of cellulitis and abscess.  S/p repeat I&D with 2nd and 3rd metatarsal head excisions and GSR on 3/5.  Repeat I and D 3/9.  Went to [facility] but discharge due to behavioral issues-sent to [facility] then [facility]  3/30 AF WBC 8.2 Irritable, somewhat cooperative today-denies SE abx-wants IV taken out. Transferred to [facility] from [facility]  Labs Reviewed, Medications Reviewed and Radiology Reviewed.    Review of Systems:  Review of Systems   Constitutional: Negative for fever and chills.   Respiratory: Negative for cough and shortness of breath.    Cardiovascular: Negative for chest pain.   Gastrointestinal: Negative for nausea, vomiting, abdominal pain and diarrhea.       Hemodynamics:  No data recorded.     No Data Recorded           Physical Exam:  Physical Exam   Constitutional: She is oriented to person, place, and time. She appears well-developed. No distress.   Obese   HENT:   Head: Normocephalic and atraumatic.   Eyes: EOM are normal. Pupils are equal, round, and reactive to light.   Neck: Neck supple.   Cardiovascular: Normal rate.    Pulmonary/Chest: Effort normal. No respiratory distress.   Abdominal: Soft. She exhibits no distension. There is no tenderness.   Musculoskeletal:   Right foot dressed   Neurological: She is alert and oriented to person, place, and time.   Skin: No rash noted.   Vitals reviewed.      Meds:  No current facility-administered medications for this encounter.    Labs:  Recent Labs      03/27/17   1411  03/28/17   0225  03/30/17   0109   WBC  8.4  7.1  8.2   RBC  3.15*  2.86*  2.95*   HEMOGLOBIN  10.1*  9.2*  9.6*   HEMATOCRIT  31.5*   28.4*  29.0*   MCV  100.0*  99.3*  98.3*   MCH  32.1  32.2  32.5   RDW  53.0*  51.0*  50.6*   PLATELETCT  209  186  206   MPV  9.9  9.9  9.5   NEUTSPOLYS  61.60  53.60  61.90   LYMPHOCYTES  27.00  29.70  24.40   MONOCYTES  9.00  10.70  8.10   EOSINOPHILS  2.10  5.30  4.70   BASOPHILS  0.20  0.40  0.50     Recent Labs      03/27/17   1411  03/28/17 0225  03/30/17   0109   SODIUM  139  142  140   POTASSIUM  4.4  4.2  4.4   CHLORIDE  107  108  110   CO2  24  24  24   GLUCOSE  115*  85  112*   BUN  31*  33*  32*     Recent Labs      03/27/17   1411  03/28/17 0225  03/30/17   0109   ALBUMIN  2.9*  2.6*   --    TBILIRUBIN  0.5  0.3   --    ALKPHOSPHAT  82  72   --    TOTPROTEIN  6.8  5.8*   --    ALTSGPT  11  9   --    ASTSGOT  13  12   --    CREATININE  2.39*  2.38*  2.39*       Imaging:  Dx-chest-portable (1 View)  3/26/2017  Perihilar interstitial opacities are most suggestive of pulmonary edema.    Dx-chest-portable (1 View)  3/10/2017  1. Low lung volume with mild hypoventilatory change. 2. Right central venous catheter with tip in the right brachiocephalic vein.      Ir-cvc With Tunnel W/o Port Exchange    3/27/2017  Patency of the vein was documented with real-time ultrasound and the recorded image was entered into the patient?s medical record. Fluoroscopy time: 0.2 minutes. Fluoroscopic images: 1.  3/27/2017  Successful percutaneous placement of tunneled central venous catheter via a right internal jugular approach utilizing ultrasonic and fluoroscopic guidance.    [place] Art/bernadette    3/5/2017   Vascular Laboratory  Conclusions  Normal bilateral lower extremity arterial physiologic study at rest with  triphasic flow seen in the pedal arteries and resting ankle/brachial  indices 1.01 on the right and 1.05 on the left.                         TBI  Findings  Doppler waveforms at the ankle are brisk and triphasic.  Ankle-brachial index is normal.  There is no evidence of major arterial disease demonstrated bilaterally.   Additional testing was not performed in accordance with lower extremity  arterial evaluation protocol.  Dexter Crawford M.D.  (Electronically Signed)  Final Date:      05 March 2017                   13:15      Micro:  BLOOD CULTURE   Date Value Ref Range Status   03/26/2017   Preliminary    No Growth    Note: Blood cultures are incubated for 5 days and  are monitored continuously.Positive blood cultures  are called to the RN and reported as soon as  they are identified.        Results      Procedure  Component  Value  Units  Date/Time      CULTURE TISSUE W/ GRM STAIN [[id number]]  Collected:  03/05/17 1307      Order Status:  Completed  Specimen Information:  Bone  Updated:  03/10/17 1544        Gram Stain Result  --          Result:           Rare WBCs.   No organisms seen.          Significant Indicator  NEG          Source  BONE          Site  Right Metatarsal Heads          Tissue Culture  No growth at 72 hours.        ANAEROBIC CULTURE [[id number]]  Collected:  03/05/17 1307      Order Status:  Completed  Specimen Information:  Bone  Updated:  03/10/17 1544        Significant Indicator  NEG          Source  BONE          Site  Right Metatarsal Heads          Anaerobic Culture, Culture Res  No Anaerobes isolated.        CULTURE TISSUE W/ GRM STAIN [[id number]]  (Abnormal)  (Susceptibility)  Collected:  03/05/17 1302      Order Status:  Completed  Specimen Information:  Tissue  Updated:  03/09/17 1612        Significant Indicator  POS (POS)          Source  TISS          Site  Right Foot Tissue          Tissue Culture  -- (A)          Gram Stain Result  -- (A)          Result:           Few WBCs.   Moderate Gram positive cocci.          Tissue Culture  -- (A)          Result:           Staphylococcus aureus   Rare growth        Culture & Susceptibility      STAPHYLOCOCCUS AUREUS       Antibiotic  Sensitivity  Microscan  Unit  Status      Ampicillin/sulbactam  Sensitive  <=8/4  mcg/mL  Final      Clindamycin   Sensitive  <=0.5  mcg/mL  Final      Daptomycin  Sensitive  <=0.5  mcg/mL  Final      Erythromycin  Sensitive  <=0.5  mcg/mL  Final      Moxifloxacin  Sensitive  <=0.5  mcg/mL  Final      Oxacillin  Sensitive  <=0.25  mcg/mL  Final      Tetracycline  Sensitive  <=4  mcg/mL  Final      Trimeth/Sulfa  Sensitive  <=0.5/9.5  mcg/mL  Final      Vancomycin  Sensitive  1  mcg/mL  Fi                  Assessment:  Active Hospital Problems      Diagnosis    •  Acute on chronic renal failure (CMS-HCC) [N17.9, N18.9]    •  Severe sepsis (CMS-HCC) [A41.9, R65.20]    •  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  Vascular studies normal  Continue IV Zinacef-endpoint 6 weeks from last surgery - 4/23/17  Continue wound care  Adjust dose for renal function    Chronic renal failure- 2.39  Avoiding nephrotoxic drugs  Adjusting abx as needed for renal functionas above    Moyamoya syndrome with prior stroke/dementia  Noncompliant with abx  Barrier to care    Remains at risk for limb loss

## 2017-03-31 LAB
ALBUMIN SERPL BCP-MCNC: 2.3 G/DL (ref 3.2–4.9)
BACTERIA BLD CULT: NORMAL
BACTERIA BLD CULT: NORMAL
BUN SERPL-MCNC: 36 MG/DL (ref 8–22)
CALCIUM SERPL-MCNC: 8.3 MG/DL (ref 8.4–10.2)
CHLORIDE SERPL-SCNC: 108 MMOL/L (ref 96–112)
CO2 SERPL-SCNC: 23 MMOL/L (ref 20–33)
CREAT SERPL-MCNC: 2.36 MG/DL (ref 0.5–1.4)
GFR SERPL CREATININE-BSD FRML MDRD: 22 ML/MIN/1.73 M 2
GLUCOSE SERPL-MCNC: 96 MG/DL (ref 65–99)
PHOSPHATE SERPL-MCNC: 5 MG/DL (ref 2.5–4.5)
POTASSIUM SERPL-SCNC: 4.5 MMOL/L (ref 3.6–5.5)
SIGNIFICANT IND 70042: NORMAL
SIGNIFICANT IND 70042: NORMAL
SITE SITE: NORMAL
SITE SITE: NORMAL
SODIUM SERPL-SCNC: 140 MMOL/L (ref 135–145)
SOURCE SOURCE: NORMAL
SOURCE SOURCE: NORMAL

## 2017-03-31 PROCEDURE — 80069 RENAL FUNCTION PANEL: CPT

## 2017-03-31 PROCEDURE — 99232 SBSQ HOSP IP/OBS MODERATE 35: CPT | Performed by: HOSPITALIST

## 2017-03-31 ASSESSMENT — ENCOUNTER SYMPTOMS
COUGH: 0
CHILLS: 0
SHORTNESS OF BREATH: 0
WHEEZING: 0
HEADACHES: 0
DIARRHEA: 0
NAUSEA: 0
ABDOMINAL PAIN: 0
VOMITING: 0
FEVER: 0

## 2017-03-31 NOTE — [facility]
Hospital Medicine Progress Note, Adult, Complex               Author: Beronica PATRICIO Martinez Date & Time created: 3/31/2017  7:49 AM     CC: LE wound    Interval History:  No events  Told me she refuses to wear cam boot, will throw away so I will not order    Review of Systems:  Review of Systems   Constitutional: Negative for fever.   Respiratory: Negative for cough, shortness of breath and wheezing.    Cardiovascular: Negative for chest pain.   Gastrointestinal: Negative for nausea, vomiting and abdominal pain.   Genitourinary: Negative for dysuria.   Neurological: Negative for headaches.       T 97.8 P 60 /85 [id number] 96% I/O 1.8/brp 1BM   Physical Exam   Constitutional: She appears well-developed. No distress.   Flat affect, coloring   HENT:   Head: Normocephalic and atraumatic.   Mouth/Throat: No oropharyngeal exudate.   Eyes: Conjunctivae are normal. Right eye exhibits no discharge. Left eye exhibits no discharge. No scleral icterus.   Neck: Neck supple. No tracheal deviation present.   Cardiovascular: Normal rate and intact distal pulses.    Pulmonary/Chest: Effort normal. No respiratory distress. She has no wheezes. She exhibits no tenderness.   Abdominal: Soft. Bowel sounds are normal. She exhibits no distension. There is no tenderness.   obese   Musculoskeletal: She exhibits no edema.   Neurological: She is alert.   Skin:   Refused to let me look at wounds   Vitals reviewed.      Labs:        Invalid input(s): [id number]      Recent Labs      03/30/17 0109 03/31/17   0530   SODIUM  140  140   POTASSIUM  4.4  4.5   CHLORIDE  110  108   CO2  24  23   BUN  32*  36*   CREATININE  2.39*  2.36*   PHOSPHORUS   --   5.0*   CALCIUM  8.4  8.3*     Recent Labs      03/30/17 0109 03/31/17   0530   GLUCOSE  112*  96     Recent Labs      03/30/17 0109   RBC  2.95*   HEMOGLOBIN  9.6*   HEMATOCRIT  29.0*   PLATELETCT  206     Recent Labs      03/30/17 0109   WBC  8.2   NEUTSPOLYS  61.90   LYMPHOCYTES  24.40    MONOCYTES  8.10   EOSINOPHILS  4.70   BASOPHILS  0.50     Medical Decision Making, by Problem:  Resolved sepsis PTA  R LE traumatic wound/cellulitis   -polymicrobial   -cefuroxime thru 4/23   -eval with wound RN today   -refuses cam boot  CKD 4   -avoid nephrotoxins   -Na HCo3   -follow on lisinopril  Epilepsy   -trileptal, dilantin  Brewer Brewer syndrome with CVA   -progressive cognitive decline  HTN   -norvasc, lisinopril  HLD  Anemia of CKD  Nutrition   -po  Debility   -cleared by PT to ambulate    Medications reviewed and Labs reviewed  Dey catheter: No Dey      DVT Prophylaxis: Not indicated at this time, ambulatory (refuses heparin)      Antibiotics: Treating active infection/contamination beyond 24 hours perioperative coverage

## 2017-03-31 NOTE — [facility]
Infectious Disease Progress Note    Author: Rocio Hinson M.D. DOS & Time created: 3/31/2017  11:14 AM    Chief Complaint:  Right foot infection F/U      Interval History:  46-year-old white female with history of morbid obesity,  Moyamoya syndrome with prior stroke, who was admitted to [facility] after with a traumatic injury to her right foot with subsequent development of cellulitis and abscess.  S/p repeat I&D with 2nd and 3rd metatarsal head excisions and GSR on 3/5.  Repeat I and D 3/9.  Went to [facility] but discharge due to behavioral issues-sent to [facility] then [facility]  3/30 AF WBC 8.2 Irritable, somewhat cooperative today-denies SE abx-wants IV taken out. Transferred to [facility] from [facility]  3/31 AF, NAD  Labs Reviewed, Medications Reviewed and Radiology Reviewed.    Review of Systems:  Review of Systems   Constitutional: Negative for fever and chills.   Respiratory: Negative for cough and shortness of breath.    Cardiovascular: Negative for chest pain.   Gastrointestinal: Negative for nausea, vomiting, abdominal pain and diarrhea.       Hemodynamics:  T 97.8 P 60 /85 [id number] 96% I/O 1.8/brp 1BM           Physical Exam:  Physical Exam   Constitutional: She is oriented to person, place, and time. She appears well-developed. No distress.   Obese   HENT:   Head: Normocephalic and atraumatic.   Eyes: EOM are normal. Pupils are equal, round, and reactive to light.   Neck: Neck supple.   Cardiovascular: Normal rate.    Pulmonary/Chest: Effort normal. No respiratory distress.   Abdominal: Soft. She exhibits no distension. There is no tenderness.   Musculoskeletal:   Right foot dressed   Neurological: She is alert and oriented to person, place, and time.   Skin: No rash noted.   Vitals reviewed.      Meds:  No current facility-administered medications for this encounter.    Labs:  Recent Labs      03/30/17   0109   WBC  8.2   RBC  2.95*   HEMOGLOBIN  9.6*   HEMATOCRIT  29.0*   MCV  98.3*   MCH  32.5   RDW  50.6*    PLATELETCT  206   MPV  9.5   NEUTSPOLYS  61.90   LYMPHOCYTES  24.40   MONOCYTES  8.10   EOSINOPHILS  4.70   BASOPHILS  0.50     Recent Labs      03/30/17   0109  03/31/17   0530   SODIUM  140  140   POTASSIUM  4.4  4.5   CHLORIDE  110  108   CO2  24  23   GLUCOSE  112*  96   BUN  32*  36*     Recent Labs      03/30/17   0109  03/31/17   0530   ALBUMIN   --   2.3*   CREATININE  2.39*  2.36*       Imaging:  Dx-chest-portable (1 View)  3/26/2017  Perihilar interstitial opacities are most suggestive of pulmonary edema.    Dx-chest-portable (1 View)  3/10/2017  1. Low lung volume with mild hypoventilatory change. 2. Right central venous catheter with tip in the right brachiocephalic vein.      Ir-cvc With Tunnel W/o Port Exchange    3/27/2017  Patency of the vein was documented with real-time ultrasound and the recorded image was entered into the patient?s medical record. Fluoroscopy time: 0.2 minutes. Fluoroscopic images: 1.  3/27/2017  Successful percutaneous placement of tunneled central venous catheter via a right internal jugular approach utilizing ultrasonic and fluoroscopic guidance.    Le Art/bernadette    3/5/2017   Vascular Laboratory  Conclusions  Normal bilateral lower extremity arterial physiologic study at rest with  triphasic flow seen in the pedal arteries and resting ankle/brachial  indices 1.01 on the right and 1.05 on the left.                         TBI  Findings  Doppler waveforms at the ankle are brisk and triphasic.  Ankle-brachial index is normal.  There is no evidence of major arterial disease demonstrated bilaterally.  Additional testing was not performed in accordance with lower extremity  arterial evaluation protocol.  Dexter Crawford M.D.  (Electronically Signed)  Final Date:      05 March 2017                   13:15      Micro:  BLOOD CULTURE   Date Value Ref Range Status   03/26/2017   Preliminary    No Growth    Note: Blood cultures are incubated for 5 days and  are monitored  continuously.Positive blood cultures  are called to the RN and reported as soon as  they are identified.        Results      Procedure  Component  Value  Units  Date/Time      CULTURE TISSUE W/ GRM STAIN [[id number]]  Collected:  03/05/17 1307      Order Status:  Completed  Specimen Information:  Bone  Updated:  03/10/17 1544        Gram Stain Result  --          Result:           Rare WBCs.   No organisms seen.          Significant Indicator  NEG          Source  BONE          Site  Right Metatarsal Heads          Tissue Culture  No growth at 72 hours.        ANAEROBIC CULTURE [[id number]]  Collected:  03/05/17 1307      Order Status:  Completed  Specimen Information:  Bone  Updated:  03/10/17 1544        Significant Indicator  NEG          Source  BONE          Site  Right Metatarsal Heads          Anaerobic Culture, Culture Res  No Anaerobes isolated.        CULTURE TISSUE W/ GRM STAIN [[id number]]  (Abnormal)  (Susceptibility)  Collected:  03/05/17 1302      Order Status:  Completed  Specimen Information:  Tissue  Updated:  03/09/17 1612        Significant Indicator  POS (POS)          Source  TISS          Site  Right Foot Tissue          Tissue Culture  -- (A)          Gram Stain Result  -- (A)          Result:           Few WBCs.   Moderate Gram positive cocci.          Tissue Culture  -- (A)          Result:           Staphylococcus aureus   Rare growth        Culture & Susceptibility      STAPHYLOCOCCUS AUREUS       Antibiotic  Sensitivity  Microscan  Unit  Status      Ampicillin/sulbactam  Sensitive  <=8/4  mcg/mL  Final      Clindamycin  Sensitive  <=0.5  mcg/mL  Final      Daptomycin  Sensitive  <=0.5  mcg/mL  Final      Erythromycin  Sensitive  <=0.5  mcg/mL  Final      Moxifloxacin  Sensitive  <=0.5  mcg/mL  Final      Oxacillin  Sensitive  <=0.25  mcg/mL  Final      Tetracycline  Sensitive  <=4  mcg/mL  Final      Trimeth/Sulfa  Sensitive  <=0.5/9.5  mcg/mL  Final      Vancomycin  Sensitive  1  mcg/mL   Fi                Assessment:  Active Hospital Problems      Diagnosis    •  Acute on chronic renal failure (CMS-HCC) [N17.9, N18.9]    •  Severe sepsis (CMS-HCC) [A41.9, R65.20]    •  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  Vascular studies normal  Continue IV Zinacef-endpoint 6 weeks from last surgery - 4/23/17  Continue wound care  Adjust dose for renal function    Chronic renal failure- 2.36  Avoiding nephrotoxic drugs  Adjusting abx as needed for renal functionas above    Moyamoya syndrome with prior stroke/dementia  Noncompliant with abx  Barrier to care    Remains at risk for limb loss

## 2017-04-01 PROCEDURE — 99232 SBSQ HOSP IP/OBS MODERATE 35: CPT | Performed by: HOSPITALIST

## 2017-04-01 ASSESSMENT — ENCOUNTER SYMPTOMS
FEVER: 0
NAUSEA: 0
VOMITING: 0
COUGH: 0
ABDOMINAL PAIN: 0
SHORTNESS OF BREATH: 1
SHORTNESS OF BREATH: 0
DIARRHEA: 0
HEADACHES: 0
CHILLS: 0
WHEEZING: 1

## 2017-04-01 NOTE — [facility]
Infectious Disease Progress Note    Author: Darby Maxwell M.D. DOS & Time created: 4/1/2017  1:39 PM    Chief Complaint:  Right foot infection F/U    Interval History:  46-year-old white female with history of morbid obesity,  Moyamoya syndrome with prior stroke, who was admitted to [facility] after with a traumatic injury to her right foot with subsequent development of cellulitis and abscess.  S/p repeat I&D with 2nd and 3rd metatarsal head excisions and GSR on 3/5.  Repeat I and D 3/9.  Went to [facility] but discharge due to behavioral issues-sent to [facility] then [facility]  3/30 AF WBC 8.2 Irritable, somewhat cooperative today-denies SE abx-wants IV taken out. Transferred to [facility] from [facility]  3/31 AF, [facility]  4/1 AF, no CBC, no new issues per patient  Labs Reviewed, Medications Reviewed and Radiology Reviewed.    Review of Systems:  Review of Systems   Constitutional: Negative for fever and chills.   Respiratory: Negative for cough and shortness of breath.    Cardiovascular: Negative for chest pain.   Gastrointestinal: Negative for nausea, vomiting, abdominal pain and diarrhea.   All other systems reviewed and are negative.      Hemodynamics:  T 98 P 61 /75 [id number] 91%    Physical Exam:  Physical Exam   Constitutional: She is oriented to person, place, and time. She appears well-developed. No distress.   Obese   HENT:   Head: Normocephalic and atraumatic.   Eyes: EOM are normal. Pupils are equal, round, and reactive to light.   Neck: Neck supple.   Cardiovascular: Normal rate.    Pulmonary/Chest: Effort normal. No respiratory distress.   Abdominal: Soft. She exhibits no distension. There is no tenderness.   Musculoskeletal:   Right ft plantar - dressed   Dorsum - surgical site healing well. No drainage or erythema   Neurological: She is alert and oriented to person, place, and time.   Skin: No rash noted.   Vitals reviewed.      Meds:  No current facility-administered medications for this encounter.    Labs:  Recent Labs       03/30/17 0109   WBC  8.2   RBC  2.95*   HEMOGLOBIN  9.6*   HEMATOCRIT  29.0*   MCV  98.3*   MCH  32.5   RDW  50.6*   PLATELETCT  206   MPV  9.5   NEUTSPOLYS  61.90   LYMPHOCYTES  24.40   MONOCYTES  8.10   EOSINOPHILS  4.70   BASOPHILS  0.50     Recent Labs      03/30/17 0109 03/31/17   0530   SODIUM  140  140   POTASSIUM  4.4  4.5   CHLORIDE  110  108   CO2  24  23   GLUCOSE  112*  96   BUN  32*  36*     Recent Labs      03/30/17 0109 03/31/17   0530   ALBUMIN   --   2.3*   CREATININE  2.39*  2.36*       Imaging:  Dx-chest-portable (1 View)  3/26/2017  Perihilar interstitial opacities are most suggestive of pulmonary edema.    Dx-chest-portable (1 View)  3/10/2017  1. Low lung volume with mild hypoventilatory change. 2. Right central venous catheter with tip in the right brachiocephalic vein.      Ir-cvc With Tunnel W/o Port Exchange    3/27/2017  Patency of the vein was documented with real-time ultrasound and the recorded image was entered into the patient?s medical record. Fluoroscopy time: 0.2 minutes. Fluoroscopic images: 1.  3/27/2017  Successful percutaneous placement of tunneled central venous catheter via a right internal jugular approach utilizing ultrasonic and fluoroscopic guidance.    Le Art/bernadette    3/5/2017   Vascular Laboratory  Conclusions  Normal bilateral lower extremity arterial physiologic study at rest with  triphasic flow seen in the pedal arteries and resting ankle/brachial  indices 1.01 on the right and 1.05 on the left.                         TBI  Findings  Doppler waveforms at the ankle are brisk and triphasic.  Ankle-brachial index is normal.  There is no evidence of major arterial disease demonstrated bilaterally.  Additional testing was not performed in accordance with lower extremity  arterial evaluation protocol.  Dexter Crawford M.D.  (Electronically Signed)  Final Date:      05 March 2017                   13:15      Micro:  BLOOD CULTURE   Date Value Ref Range Status    03/26/2017 No growth after 5 days of incubation.  Final      Results      Procedure  Component  Value  Units  Date/Time      CULTURE TISSUE W/ GRM STAIN [[id number]]  Collected:  03/05/17 1307      Order Status:  Completed  Specimen Information:  Bone  Updated:  03/10/17 1544        Gram Stain Result  --          Result:           Rare WBCs.   No organisms seen.          Significant Indicator  NEG          Source  BONE          Site  Right Metatarsal Heads          Tissue Culture  No growth at 72 hours.        ANAEROBIC CULTURE [[id number]]  Collected:  03/05/17 1307      Order Status:  Completed  Specimen Information:  Bone  Updated:  03/10/17 1544        Significant Indicator  NEG          Source  BONE          Site  Right Metatarsal Heads          Anaerobic Culture, Culture Res  No Anaerobes isolated.        CULTURE TISSUE W/ GRM STAIN [[id number]]  (Abnormal)  (Susceptibility)  Collected:  03/05/17 1302      Order Status:  Completed  Specimen Information:  Tissue  Updated:  03/09/17 1612        Significant Indicator  POS (POS)          Source  TISS          Site  Right Foot Tissue          Tissue Culture  -- (A)          Gram Stain Result  -- (A)          Result:           Few WBCs.   Moderate Gram positive cocci.          Tissue Culture  -- (A)          Result:           Staphylococcus aureus   Rare growth        Culture & Susceptibility      STAPHYLOCOCCUS AUREUS       Antibiotic  Sensitivity  Microscan  Unit  Status      Ampicillin/sulbactam  Sensitive  <=8/4  mcg/mL  Final      Clindamycin  Sensitive  <=0.5  mcg/mL  Final      Daptomycin  Sensitive  <=0.5  mcg/mL  Final      Erythromycin  Sensitive  <=0.5  mcg/mL  Final      Moxifloxacin  Sensitive  <=0.5  mcg/mL  Final      Oxacillin  Sensitive  <=0.25  mcg/mL  Final      Tetracycline  Sensitive  <=4  mcg/mL  Final      Trimeth/Sulfa  Sensitive  <=0.5/9.5  mcg/mL  Final      Vancomycin  Sensitive  1  mcg/mL  Fi                Assessment:  Active Hospital  Problems      Diagnosis    •  Acute on chronic renal failure (CMS-HCC) [N17.9, N18.9]    •  Severe sepsis (CMS-HCC) [A41.9, R65.20]    •  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  Vascular studies normal  Continue IV Zinacef-endpoint 6 weeks from last surgery. Stop date 04/23/17  Continue wound care  Adjust dose for renal function    Chronic renal failure. Stable  Avoiding nephrotoxic drugs  Adjusting abx as needed for renal functionas above    Moyamoya syndrome with prior stroke/dementia  Noncompliant with abx  Barrier to care    Remains at risk for limb loss

## 2017-04-01 NOTE — [facility]
Hospital Medicine Progress Note, Adult, Complex               Author: Beronica PATRICIO Martinez Date & Time created: 4/1/2017  11:31 AM     CC: LE wound    Interval History:  Asthma symptomatic yesterday, resolved with duoneb  No complaints    Review of Systems:  Review of Systems   Constitutional: Negative for fever.   Respiratory: Positive for shortness of breath and wheezing. Negative for cough.    Cardiovascular: Negative for chest pain.   Gastrointestinal: Negative for nausea, vomiting and abdominal pain.   Genitourinary: Negative for dysuria.   Neurological: Negative for headaches.       T 98 P 61 /75 [id number] 91% I/O 3.6/brp 6BM   Physical Exam   Constitutional: She appears well-developed.   HENT:   Head: Normocephalic and atraumatic.   Mouth/Throat: No oropharyngeal exudate.   Eyes: Conjunctivae are normal. Right eye exhibits no discharge. Left eye exhibits no discharge. No scleral icterus.   Neck: Neck supple. No tracheal deviation present.   Cardiovascular: Normal rate and intact distal pulses.    Pulmonary/Chest: Effort normal. No respiratory distress. She has no wheezes. She exhibits no tenderness.   Abdominal: Soft. Bowel sounds are normal. She exhibits no distension. There is no tenderness.   obese   Musculoskeletal: She exhibits no edema.   Neurological: She is alert.   Skin:   Wound pale on bottom of foot   Vitals reviewed.      Labs:        Invalid input(s): [id number]      Recent Labs      03/30/17 0109 03/31/17   0530   SODIUM  140  140   POTASSIUM  4.4  4.5   CHLORIDE  110  108   CO2  24  23   BUN  32*  36*   CREATININE  2.39*  2.36*   PHOSPHORUS   --   5.0*   CALCIUM  8.4  8.3*     Recent Labs      03/30/17 0109 03/31/17   0530   GLUCOSE  112*  96     Recent Labs      03/30/17 0109   RBC  2.95*   HEMOGLOBIN  9.6*   HEMATOCRIT  29.0*   PLATELETCT  206     Recent Labs      03/30/17 0109   WBC  8.2   NEUTSPOLYS  61.90   LYMPHOCYTES  24.40   MONOCYTES  8.10   EOSINOPHILS  4.70   BASOPHILS   0.50     Medical Decision Making, by Problem:  Resolved sepsis PTA  R LE traumatic wound/cellulitis   -polymicrobial   -cefuroxime thru 4/23   -trying santyl to debride slough   -refuses cam boot  CKD 4   -avoid nephrotoxins   -Na HCo3   -follow on lisinopril  Epilepsy   -trileptal, dilantin  Brewer Brewer syndrome with CVA   -progressive cognitive decline  HTN   -norvasc, lisinopril  HLD  Anemia of CKD  Frequent BM   -r/o c. Diff if diarrhea  Nutrition   -po  Debility   -cleared by PT to ambulate    Medications reviewed  Dey catheter: No Dey      DVT Prophylaxis: Not indicated at this time, ambulatory (refuses heparin)      Antibiotics: Treating active infection/contamination beyond 24 hours perioperative coverage

## 2017-04-02 PROCEDURE — 99232 SBSQ HOSP IP/OBS MODERATE 35: CPT | Performed by: HOSPITALIST

## 2017-04-02 ASSESSMENT — ENCOUNTER SYMPTOMS
ABDOMINAL PAIN: 0
VOMITING: 0
COUGH: 0
SHORTNESS OF BREATH: 0
HEADACHES: 0
FEVER: 0
NAUSEA: 0

## 2017-04-02 NOTE — [facility]
Hospital Medicine Progress Note, Adult, Complex               Author: Beronica PATRICIO Gomezen Date & Time created: 4/2/2017  10:21 AM     CC: LE wound    Interval History:  Coloring  Wants a shower    Review of Systems:  Review of Systems   Constitutional: Negative for fever.   Respiratory: Negative for cough and shortness of breath.    Cardiovascular: Negative for chest pain.   Gastrointestinal: Negative for nausea, vomiting and abdominal pain.   Genitourinary: Negative for dysuria.   Neurological: Negative for headaches.       T 98.4 P 60 /84 [id number] 95% I/O 3.3/brp 2BM   Physical Exam   Constitutional: She appears well-developed. No distress.   HENT:   Head: Normocephalic and atraumatic.   Eyes: Conjunctivae are normal. Right eye exhibits no discharge. Left eye exhibits no discharge. No scleral icterus.   Neck: Neck supple. No tracheal deviation present.   Cardiovascular: Normal rate and intact distal pulses.    Pulmonary/Chest: Effort normal. No respiratory distress. She has no wheezes. She exhibits no tenderness.   Abdominal: Soft. Bowel sounds are normal. She exhibits no distension. There is no tenderness.   obese   Musculoskeletal: She exhibits no edema.   Neurological: She is alert.   Skin:   Wound pale on bottom of foot   Vitals reviewed.      Labs:        Invalid input(s): [id number]      Recent Labs      03/31/17   0530   SODIUM  140   POTASSIUM  4.5   CHLORIDE  108   CO2  23   BUN  36*   CREATININE  2.36*   PHOSPHORUS  5.0*   CALCIUM  8.3*     Recent Labs      03/31/17   0530   GLUCOSE  96     No results for input(s): RBC, HEMOGLOBIN, HEMATOCRIT, PLATELETCT, PROTHROMBTM, APTT, INR, IRON, FERRITIN, TOTIRONBC in the last 72 hours.      Medical Decision Making, by Problem:  Resolved sepsis PTA  R LE traumatic wound/cellulitis   -polymicrobial   -cefuroxime thru 4/23   -trying santyl to debride slough   -refuses cam boot  CKD 4   -avoid nephrotoxins   -Na HCo3   -follow on  lisinopril  Epilepsy   -trileptal, dilantin  Brewer Brewer syndrome with CVA   -progressive cognitive decline  HTN   -increase norvasc, lisinopril  HLD  Anemia of CKD  Frequent BM   -r/o c. Diff if diarrhea   -dc bowel care  Nutrition   -po  Debility   -cleared by PT to ambulate  Am labs   Medications reviewed  Dey catheter: No Dey      DVT Prophylaxis: Not indicated at this time, ambulatory (refuses heparin)      Antibiotics: Treating active infection/contamination beyond 24 hours perioperative coverage

## 2017-04-03 ENCOUNTER — APPOINTMENT (OUTPATIENT)
Dept: RADIOLOGY | Facility: MEDICAL CENTER | Age: 47
End: 2017-04-03
Attending: HOSPITALIST
Payer: MEDICARE

## 2017-04-03 PROCEDURE — 99232 SBSQ HOSP IP/OBS MODERATE 35: CPT | Performed by: HOSPITALIST

## 2017-04-03 PROCEDURE — 71010 DX-CHEST-PORTABLE (1 VIEW): CPT

## 2017-04-03 ASSESSMENT — ENCOUNTER SYMPTOMS
SHORTNESS OF BREATH: 1
HEADACHES: 0
NAUSEA: 0
ABDOMINAL PAIN: 0
FEVER: 0
CHILLS: 0
DIARRHEA: 0
DIZZINESS: 0
CONSTIPATION: 0
SHORTNESS OF BREATH: 0
COUGH: 0
VOMITING: 0

## 2017-04-03 NOTE — [facility]
Infectious Disease Progress Note    Author: Rocio Hinson M.D. DOS & Time created: 4/3/2017  10:00 AM    Chief Complaint:  Right foot infection F/U    Interval History:  46-year-old white female with history of morbid obesity,  Moyamoya syndrome with prior stroke, who was admitted to [facility] after with a traumatic injury to her right foot with subsequent development of cellulitis and abscess.  S/p repeat I&D with 2nd and 3rd metatarsal head excisions and GSR on 3/5.  Repeat I and D 3/9.  Went to [facility] but discharge due to behavioral issues-sent to [facility] then [facility]  3/30 AF WBC 8.2 Irritable, somewhat cooperative today-denies SE abx-wants IV taken out. Transferred to [facility] from [facility]  3/31 AF, NAD  4/1 AF, no CBC, no new issues per patient  4/3 AF no WBC coloring books  Labs Reviewed, Medications Reviewed and Radiology Reviewed.    Review of Systems:  Review of Systems   Constitutional: Negative for fever and chills.   Respiratory: Negative for cough and shortness of breath.    Cardiovascular: Negative for chest pain.   Gastrointestinal: Negative for nausea, vomiting, abdominal pain and diarrhea.   All other systems reviewed and are negative.      Hemodynamics:  T 98 P 60 /78 [id number] 97% I/O 2.1/brp 2BM    Physical Exam:  Physical Exam   Constitutional: She is oriented to person, place, and time. She appears well-developed. No distress.   Obese   HENT:   Head: Normocephalic and atraumatic.   Eyes: EOM are normal. Pupils are equal, round, and reactive to light.   Neck: Neck supple.   Cardiovascular: Normal rate.    Pulmonary/Chest: Effort normal. No respiratory distress. She has no wheezes.   Abdominal: Soft. She exhibits no distension. There is no tenderness.   Musculoskeletal:   Right ft plantar - dressed   Dorsum - surgical site healing well. No drainage or erythema   Neurological: She is alert and oriented to person, place, and time.   Skin: No rash noted.   Vitals reviewed.      Meds:  No current  facility-administered medications for this encounter.    Labs:  No results for input(s): WBC, RBC, HEMOGLOBIN, HEMATOCRIT, MCV, MCH, RDW, PLATELETCT, MPV, NEUTSPOLYS, LYMPHOCYTES, MONOCYTES, EOSINOPHILS, BASOPHILS, RBCMORPHOLO in the last 72 hours.  No results for input(s): SODIUM, POTASSIUM, CHLORIDE, CO2, GLUCOSE, BUN, CPKTOTAL in the last 72 hours.  No results for input(s): ALBUMIN, TBILIRUBIN, ALKPHOSPHAT, TOTPROTEIN, ALTSGPT, ASTSGOT, CREATININE in the last 72 hours.    Imaging:  Dx-chest-portable (1 View)  3/26/2017  Perihilar interstitial opacities are most suggestive of pulmonary edema.    Dx-chest-portable (1 View)  3/10/2017  1. Low lung volume with mild hypoventilatory change. 2. Right central venous catheter with tip in the right brachiocephalic vein.      Ir-cvc With Tunnel W/o Port Exchange    3/27/2017  Patency of the vein was documented with real-time ultrasound and the recorded image was entered into the patient?s medical record. Fluoroscopy time: 0.2 minutes. Fluoroscopic images: 1.  3/27/2017  Successful percutaneous placement of tunneled central venous catheter via a right internal jugular approach utilizing ultrasonic and fluoroscopic guidance.    Le Art/bernadette    3/5/2017   Vascular Laboratory  Conclusions  Normal bilateral lower extremity arterial physiologic study at rest with  triphasic flow seen in the pedal arteries and resting ankle/brachial  indices 1.01 on the right and 1.05 on the left.                         TBI  Findings  Doppler waveforms at the ankle are brisk and triphasic.  Ankle-brachial index is normal.  There is no evidence of major arterial disease demonstrated bilaterally.  Additional testing was not performed in accordance with lower extremity  arterial evaluation protocol.  Dexter Crawford M.D.  (Electronically Signed)  Final Date:      05 March 2017                   13:15      Micro:  BLOOD CULTURE   Date Value Ref Range Status   03/26/2017 No growth after 5 days of  incubation.  Final      Results      Procedure  Component  Value  Units  Date/Time      CULTURE TISSUE W/ GRM STAIN [[id number]]  Collected:  03/05/17 1307      Order Status:  Completed  Specimen Information:  Bone  Updated:  03/10/17 1544        Gram Stain Result  --          Result:           Rare WBCs.   No organisms seen.          Significant Indicator  NEG          Source  BONE          Site  Right Metatarsal Heads          Tissue Culture  No growth at 72 hours.        ANAEROBIC CULTURE [[id number]]  Collected:  03/05/17 1307      Order Status:  Completed  Specimen Information:  Bone  Updated:  03/10/17 1544        Significant Indicator  NEG          Source  BONE          Site  Right Metatarsal Heads          Anaerobic Culture, Culture Res  No Anaerobes isolated.        CULTURE TISSUE W/ GRM STAIN [[id number]]  (Abnormal)  (Susceptibility)  Collected:  03/05/17 1302      Order Status:  Completed  Specimen Information:  Tissue  Updated:  03/09/17 1612        Significant Indicator  POS (POS)          Source  TISS          Site  Right Foot Tissue          Tissue Culture  -- (A)          Gram Stain Result  -- (A)          Result:           Few WBCs.   Moderate Gram positive cocci.          Tissue Culture  -- (A)          Result:           Staphylococcus aureus   Rare growth        Culture & Susceptibility      STAPHYLOCOCCUS AUREUS       Antibiotic  Sensitivity  Microscan  Unit  Status      Ampicillin/sulbactam  Sensitive  <=8/4  mcg/mL  Final      Clindamycin  Sensitive  <=0.5  mcg/mL  Final      Daptomycin  Sensitive  <=0.5  mcg/mL  Final      Erythromycin  Sensitive  <=0.5  mcg/mL  Final      Moxifloxacin  Sensitive  <=0.5  mcg/mL  Final      Oxacillin  Sensitive  <=0.25  mcg/mL  Final      Tetracycline  Sensitive  <=4  mcg/mL  Final      Trimeth/Sulfa  Sensitive  <=0.5/9.5  mcg/mL  Final      Vancomycin  Sensitive  1  mcg/mL  Fi                Assessment:  Active Hospital Problems      Diagnosis    •  Acute on  chronic renal failure (CMS-HCC) [N17.9, N18.9]    •  Severe sepsis (CMS-HCC) [A41.9, R65.20]    •  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  Vascular studies normal  Continue IV Zinacef-endpoint 6 weeks from last surgery. Stop date 04/23/17  Continue wound care  Adjust dose for renal function    Chronic renal failure. Stable  Avoiding nephrotoxic drugs  Adjusting abx as needed for renal functionas above    Moyamoya syndrome with prior stroke/dementia  Noncompliant with abx  Barrier to care    Remains at risk for limb loss

## 2017-04-03 NOTE — [facility]
Hospital Medicine Progress Note, Adult, Complex               Author: Beronica Martinez Date & Time created: 4/3/2017  7:52 AM     CC: LE wound    Interval History:  Refused morning labs, will allow draw tomorrow  Says using ventolin for asthma, refuses scheduled inhaler, wanted steroids, no wheezing heard    Review of Systems:  Review of Systems   Constitutional: Negative for fever.   Respiratory: Positive for shortness of breath (occasionally). Negative for cough.    Cardiovascular: Negative for chest pain.   Gastrointestinal: Negative for nausea, vomiting, abdominal pain, diarrhea and constipation.   Genitourinary: Negative for dysuria.   Neurological: Negative for dizziness and headaches.       T 98 P 60 /78 [id number] 97% I/O 2.1/brp 2BM   Physical Exam   Constitutional: She appears well-developed. No distress.   HENT:   Head: Normocephalic and atraumatic.   Mouth/Throat: No oropharyngeal exudate.   Eyes: Conjunctivae are normal. Right eye exhibits no discharge. Left eye exhibits no discharge. No scleral icterus.   Neck: Neck supple. No tracheal deviation present.   Cardiovascular: Normal rate and intact distal pulses.    Pulmonary/Chest: Effort normal. No respiratory distress. She has no wheezes. She exhibits no tenderness.   Abdominal: Soft. Bowel sounds are normal. She exhibits no distension. There is no tenderness.   obese   Musculoskeletal: She exhibits no edema.   Neurological: She is alert.   Skin:   Wound pale on bottom of foot   Vitals reviewed.      Labs:        Invalid input(s): [id number]      No results for input(s): SODIUM, POTASSIUM, CHLORIDE, CO2, BUN, CREATININE, MAGNESIUM, PHOSPHORUS, CALCIUM in the last 72 hours.  No results for input(s): ALTSGPT, ASTSGOT, ALKPHOSPHAT, TBILIRUBIN, DBILIRUBIN, GAMMAGT, AMYLASE, LIPASE, ALB, PREALBUMIN, GLUCOSE in the last 72 hours.  No results for input(s): RBC, HEMOGLOBIN, HEMATOCRIT, PLATELETCT, PROTHROMBTM, APTT, INR, IRON, FERRITIN, TOTIRONBC in the  last 72 hours.      Medical Decision Making, by Problem:  Resolved sepsis PTA  R LE traumatic wound/cellulitis   -polymicrobial   -cefuroxime thru 4/23   -trying santyl to debride slough   -refuses cam boot  CKD 4   -avoid nephrotoxins, outpatient nephro follow up   -Na HCo3   -follow on lisinopril   -eval in am, will allow she says  Epilepsy   -trileptal, dilantin  Brewer Brewer syndrome with CVA   -progressive cognitive decline   -mother to pursue gaurdianship  HTN   -norvasc, lisinopril   -improved control  Asthma   -check peak flow   -declines traditional asthma treatment at this time  HLD  Anemia of CKD  Frequent BM   -better, refuses to let staff evaluate  Nutrition   -po  Debility   -cleared by PT to ambulate  Am labs   Medications reviewed  Dey catheter: No Dey      DVT Prophylaxis: Not indicated at this time, ambulatory (refuses heparin)      Antibiotics: Treating active infection/contamination beyond 24 hours perioperative coverage

## 2017-04-03 NOTE — DOCUMENTATION QUERY
DOCUMENTATION QUERY     PROVIDERS: Please select “Cosign w/ note”to reply to query.     To better represent the severity of illness of your patient, please review the following information and exercise your independent professional judgment in responding to this query.       This patient presented with acute respiratory failure in the setting of sepsis.  Based upon the clinical findings, risk factors, and treatment, can the relationship between these two conditions be further specified? Please specify all that applies:        Acute respiratory failure related to the sepsis  Acute respiratory failure not related to the sepsis     Other explanation for clinical finding (please document)  Unable to determine        The medical record reflects the following:    Clinical Findings    Acute respiratory failure with hypoxia (CMS-[facility])       Temp 36, pulse 75, respiration 18       Treatment    On oxygen as needed          Risk Factors    CKD IV, CHF, sepsis         Location within medical record    Progress note         Thank you,    Haritha Christianson, CCA

## 2017-04-04 LAB
ALBUMIN SERPL BCP-MCNC: 2.3 G/DL (ref 3.2–4.9)
ALBUMIN/GLOB SERPL: 0.7 G/DL
ALP SERPL-CCNC: 69 U/L (ref 30–99)
ALT SERPL-CCNC: 16 U/L (ref 2–50)
ANION GAP SERPL CALC-SCNC: 8 MMOL/L (ref 0–11.9)
AST SERPL-CCNC: 14 U/L (ref 12–45)
BILIRUB SERPL-MCNC: 0.4 MG/DL (ref 0.1–1.5)
BUN SERPL-MCNC: 45 MG/DL (ref 8–22)
CALCIUM SERPL-MCNC: 8.5 MG/DL (ref 8.4–10.2)
CHLORIDE SERPL-SCNC: 108 MMOL/L (ref 96–112)
CO2 SERPL-SCNC: 23 MMOL/L (ref 20–33)
CREAT SERPL-MCNC: 2.67 MG/DL (ref 0.5–1.4)
CRP SERPL HS-MCNC: 0.86 MG/DL (ref 0–0.75)
ERYTHROCYTE [DISTWIDTH] IN BLOOD BY AUTOMATED COUNT: 49.6 FL (ref 35.9–50)
ERYTHROCYTE [SEDIMENTATION RATE] IN BLOOD BY WESTERGREN METHOD: 45 MM/HOUR (ref 0–20)
GFR SERPL CREATININE-BSD FRML MDRD: 19 ML/MIN/1.73 M 2
GLOBULIN SER CALC-MCNC: 3.1 G/DL (ref 1.9–3.5)
GLUCOSE SERPL-MCNC: 110 MG/DL (ref 65–99)
HCT VFR BLD AUTO: 29.2 % (ref 37–47)
HGB BLD-MCNC: 9.8 G/DL (ref 12–16)
MCH RBC QN AUTO: 32.5 PG (ref 27–33)
MCHC RBC AUTO-ENTMCNC: 33.6 G/DL (ref 33.6–35)
MCV RBC AUTO: 96.7 FL (ref 81.4–97.8)
PLATELET # BLD AUTO: 201 K/UL (ref 164–446)
PMV BLD AUTO: 9.7 FL (ref 9–12.9)
POTASSIUM SERPL-SCNC: 4.6 MMOL/L (ref 3.6–5.5)
PROT SERPL-MCNC: 5.4 G/DL (ref 6–8.2)
RBC # BLD AUTO: 3.02 M/UL (ref 4.2–5.4)
SODIUM SERPL-SCNC: 139 MMOL/L (ref 135–145)
WBC # BLD AUTO: 7.2 K/UL (ref 4.8–10.8)

## 2017-04-04 PROCEDURE — 85652 RBC SED RATE AUTOMATED: CPT

## 2017-04-04 PROCEDURE — 86140 C-REACTIVE PROTEIN: CPT

## 2017-04-04 PROCEDURE — 99233 SBSQ HOSP IP/OBS HIGH 50: CPT | Performed by: HOSPITALIST

## 2017-04-04 PROCEDURE — 85027 COMPLETE CBC AUTOMATED: CPT

## 2017-04-04 PROCEDURE — 80053 COMPREHEN METABOLIC PANEL: CPT

## 2017-04-04 ASSESSMENT — ENCOUNTER SYMPTOMS
NAUSEA: 0
FEVER: 0
PALPITATIONS: 0
VOMITING: 0
ABDOMINAL PAIN: 0
EYES NEGATIVE: 1
CHILLS: 0
COUGH: 0
SHORTNESS OF BREATH: 0

## 2017-04-05 PROCEDURE — 99232 SBSQ HOSP IP/OBS MODERATE 35: CPT | Performed by: HOSPITALIST

## 2017-04-05 PROCEDURE — 93971 EXTREMITY STUDY: CPT

## 2017-04-05 ASSESSMENT — ENCOUNTER SYMPTOMS
SHORTNESS OF BREATH: 0
NAUSEA: 0
DIARRHEA: 0
CHILLS: 0
COUGH: 0
ABDOMINAL PAIN: 0
PALPITATIONS: 0
VOMITING: 0
FEVER: 0
EYES NEGATIVE: 1

## 2017-04-05 NOTE — [facility]
"Hospital Medicine Progress Note, Adult, Complex               Author: Soledad Mira Date & Time created: 4/5/2017  2:05 PM     Interval History:  CC - foot infxn  Denies pain.  Allowing me to examine her \"bad\" foot but not her \"good\" foot \"because it's fine.\"    Review of Systems:  Review of Systems   Constitutional: Negative for fever and chills.   HENT: Negative.    Eyes: Negative.    Respiratory: Negative for cough and shortness of breath.    Cardiovascular: Negative for chest pain and palpitations.   Gastrointestinal: Negative for nausea, vomiting and abdominal pain.   Genitourinary: Negative.    Musculoskeletal:        Wound pain   Skin: Negative for itching and rash.   Endo/Heme/Allergies: Negative.        Physical Exam:  Physical Exam   Constitutional: No distress.   HENT:   Head: Normocephalic and atraumatic.   Right Ear: External ear normal.   Left Ear: External ear normal.   Eyes: Conjunctivae and EOM are normal. Right eye exhibits no discharge. Left eye exhibits no discharge.   Neck: Normal range of motion. Neck supple. No tracheal deviation present.   Cardiovascular: Normal rate and regular rhythm.    Pulmonary/Chest: Effort normal and breath sounds normal. No stridor. No respiratory distress. She has no wheezes.   Abdominal: Soft. Bowel sounds are normal. She exhibits no distension. There is no tenderness. There is no rebound and no guarding.   Musculoskeletal:   1+/2+ edema RLE, bilat foot wounds (R>L)   Neurological:   Awake, slowed mentation   Skin: Skin is warm and dry. She is not diaphoretic.   Vitals reviewed.      Labs:        Invalid input(s): [id number]      Recent Labs      04/04/17 0318   SODIUM  139   POTASSIUM  4.6   CHLORIDE  108   CO2  23   BUN  45*   CREATININE  2.67*   CALCIUM  8.5     Recent Labs      04/04/17 0318   ALTSGPT  16   ASTSGOT  14   ALKPHOSPHAT  69   TBILIRUBIN  0.4   GLUCOSE  110*     Recent Labs      04/04/17 0318   RBC  3.02*   HEMOGLOBIN  9.8*   HEMATOCRIT  29.2* "   PLATELETCT  201     Recent Labs      04/04/17   0318   WBC  7.2   ASTSGOT  14   ALTSGPT  16   ALKPHOSPHAT  69   TBILIRUBIN  0.4             Medical Decision Making, by Problem:  RIGHT FOOT INFECTED WOUNDS S/P MULTIPLE I+D - cont Zinacef through 4/23/17 per ID, wound care, pending U/S R/O DVT  ASTHMA - RT protocol  HTN - on Coreg, Lisinopril, and Norvasc  HLD - Lipitor  STAGE IV CKD - follow renal fxn  ANEMIA - follow H/H  PAPI  SZ - Dilantin (check level w/ next draw) and Trileptal  DEVELOPMENTAL DELAY  MULTIPLE ALLERGIES    Reviewed w/ Staff        Medications reviewed and Labs reviewed  Dey catheter: No Dey      DVT Prophylaxis: Not indicated at this time, ambulatory    Ulcer prophylaxis: Yes  Antibiotics: Treating active infection/contamination beyond 24 hours perioperative coverage

## 2017-04-05 NOTE — [facility]
Infectious Disease Progress Note    Author: Rocio Hinson M.D. DOS & Time created: 4/5/2017  11:25 AM    Chief Complaint:  Right foot infection F/U    Interval History:  46-year-old white female with history of morbid obesity,  Moyamoya syndrome with prior stroke, who was admitted to [facility] after with a traumatic injury to her right foot with subsequent development of cellulitis and abscess.  S/p repeat I&D with 2nd and 3rd metatarsal head excisions and GSR on 3/5.  Repeat I and D 3/9.  Went to [facility] but discharge due to behavioral issues-sent to [facility] then [facility]  3/30 AF WBC 8.2 Irritable, somewhat cooperative today-denies SE abx-wants IV taken out. Transferred to [facility] from [facility]  3/31 AF, NAD  4/1 AF, no CBC, no new issues per patient  4/3 AF no WBC coloring books  4/5 AF does not like being on abx but denies SE  Labs Reviewed, Medications Reviewed and Radiology Reviewed.    Review of Systems:  Review of Systems   Constitutional: Negative for fever and chills.   Respiratory: Negative for cough and shortness of breath.    Cardiovascular: Negative for chest pain.   Gastrointestinal: Negative for nausea, vomiting, abdominal pain and diarrhea.   All other systems reviewed and are negative.      Hemodynamics:  T 97 P 60 /72 RR18     Physical Exam:  Physical Exam   Constitutional: She is oriented to person, place, and time. She appears well-developed. No distress.   Obese   HENT:   Head: Normocephalic and atraumatic.   Eyes: EOM are normal. Pupils are equal, round, and reactive to light.   Neck: Neck supple.   Cardiovascular: Normal rate.    Pulmonary/Chest: Effort normal. No respiratory distress. She has no wheezes.   Abdominal: Soft. She exhibits no distension. There is no tenderness.   Musculoskeletal:   Right ft plantar - approx 1 cm ulcer-well-healed dorsal scar  No erythema or edema   Neurological: She is alert and oriented to person, place, and time.   Skin: No rash noted.   Vitals reviewed.      Meds:  No  current facility-administered medications for this encounter.    Labs:  Recent Labs      04/04/17 0318   WBC  7.2   RBC  3.02*   HEMOGLOBIN  9.8*   HEMATOCRIT  29.2*   MCV  96.7   MCH  32.5   RDW  49.6   PLATELETCT  201   MPV  9.7     Recent Labs      04/04/17 0318   SODIUM  139   POTASSIUM  4.6   CHLORIDE  108   CO2  23   GLUCOSE  110*   BUN  45*     Recent Labs      04/04/17 0318   ALBUMIN  2.3*   TBILIRUBIN  0.4   ALKPHOSPHAT  69   TOTPROTEIN  5.4*   ALTSGPT  16   ASTSGOT  14   CREATININE  2.67*       Imaging:  Dx-chest-portable (1 View)  3/26/2017  Perihilar interstitial opacities are most suggestive of pulmonary edema.    Dx-chest-portable (1 View)  3/10/2017  1. Low lung volume with mild hypoventilatory change. 2. Right central venous catheter with tip in the right brachiocephalic vein.      Ir-cvc With Tunnel W/o Port Exchange    3/27/2017  Patency of the vein was documented with real-time ultrasound and the recorded image was entered into the patient?s medical record. Fluoroscopy time: 0.2 minutes. Fluoroscopic images: 1.  3/27/2017  Successful percutaneous placement of tunneled central venous catheter via a right internal jugular approach utilizing ultrasonic and fluoroscopic guidance.    Le Art/bernadette    3/5/2017   Vascular Laboratory  Conclusions  Normal bilateral lower extremity arterial physiologic study at rest with  triphasic flow seen in the pedal arteries and resting ankle/brachial  indices 1.01 on the right and 1.05 on the left.                         TBI  Findings  Doppler waveforms at the ankle are brisk and triphasic.  Ankle-brachial index is normal.  There is no evidence of major arterial disease demonstrated bilaterally.  Additional testing was not performed in accordance with lower extremity  arterial evaluation protocol.  Dexter Crawford M.D.  (Electronically Signed)  Final Date:      05 March 2017                   13:15      Micro:  BLOOD CULTURE   Date Value Ref Range Status    03/26/2017 No growth after 5 days of incubation.  Final      Results      Procedure  Component  Value  Units  Date/Time      CULTURE TISSUE W/ GRM STAIN [[id number]]  Collected:  03/05/17 1307      Order Status:  Completed  Specimen Information:  Bone  Updated:  03/10/17 1544        Gram Stain Result  --          Result:           Rare WBCs.   No organisms seen.          Significant Indicator  NEG          Source  BONE          Site  Right Metatarsal Heads          Tissue Culture  No growth at 72 hours.        ANAEROBIC CULTURE [[id number]]  Collected:  03/05/17 1307      Order Status:  Completed  Specimen Information:  Bone  Updated:  03/10/17 1544        Significant Indicator  NEG          Source  BONE          Site  Right Metatarsal Heads          Anaerobic Culture, Culture Res  No Anaerobes isolated.        CULTURE TISSUE W/ GRM STAIN [[id number]]  (Abnormal)  (Susceptibility)  Collected:  03/05/17 1302      Order Status:  Completed  Specimen Information:  Tissue  Updated:  03/09/17 1612        Significant Indicator  POS (POS)          Source  TISS          Site  Right Foot Tissue          Tissue Culture  -- (A)          Gram Stain Result  -- (A)          Result:           Few WBCs.   Moderate Gram positive cocci.          Tissue Culture  -- (A)          Result:           Staphylococcus aureus   Rare growth        Culture & Susceptibility      STAPHYLOCOCCUS AUREUS       Antibiotic  Sensitivity  Microscan  Unit  Status      Ampicillin/sulbactam  Sensitive  <=8/4  mcg/mL  Final      Clindamycin  Sensitive  <=0.5  mcg/mL  Final      Daptomycin  Sensitive  <=0.5  mcg/mL  Final      Erythromycin  Sensitive  <=0.5  mcg/mL  Final      Moxifloxacin  Sensitive  <=0.5  mcg/mL  Final      Oxacillin  Sensitive  <=0.25  mcg/mL  Final      Tetracycline  Sensitive  <=4  mcg/mL  Final      Trimeth/Sulfa  Sensitive  <=0.5/9.5  mcg/mL  Final      Vancomycin  Sensitive  1  mcg/mL  Fi                Assessment:  Active Hospital  Problems      Diagnosis    •  Acute on chronic renal failure (CMS-HCC) [N17.9, N18.9]    •  Severe sepsis (CMS-HCC) [A41.9, R65.20]    •  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  Vascular studies normal  Continue IV Zinacef-endpoint 6 weeks from last surgery. Stop date 04/23/17  Continue wound care  Adjust dose for renal function    Chronic renal failure. Stable  Avoiding nephrotoxic drugs  Adjusting abx as needed for renal function as above  Creat 2.67    Moyamoya syndrome with prior stroke/dementia  Noncompliant with abx  Barrier to care    Remains at risk for limb loss, but wound currently healing well

## 2017-04-05 NOTE — [facility]
Hospital Medicine Progress Note, Adult, Complex               Author: Rowe Mira Date & Time created: 4/4/2017  6:08 PM     Interval History:  CC - foot infxn  Not very forthcoming, has no complaints, busy coloring.    Review of Systems:  Review of Systems   Constitutional: Negative for fever and chills.   HENT: Negative.    Eyes: Negative.    Respiratory: Negative for cough and shortness of breath.    Cardiovascular: Negative for chest pain and palpitations.   Gastrointestinal: Negative for nausea, vomiting and abdominal pain.   Genitourinary: Negative.    Musculoskeletal:        Wound pain   Skin: Negative for itching and rash.       Physical Exam:  Physical Exam   Constitutional: No distress.   HENT:   Head: Normocephalic and atraumatic.   Right Ear: External ear normal.   Left Ear: External ear normal.   Eyes: Conjunctivae and EOM are normal. Right eye exhibits no discharge. Left eye exhibits no discharge.   Neck: Normal range of motion. Neck supple. No tracheal deviation present.   Cardiovascular: Normal rate and regular rhythm.    Pulmonary/Chest: Effort normal and breath sounds normal. No stridor. No respiratory distress. She has no wheezes.   Abdominal: Soft. Bowel sounds are normal. She exhibits no distension. There is no tenderness. There is no rebound and no guarding.   Musculoskeletal:   1+/2+ edema RLE, bilat foot wounds (R>L)   Neurological:   Awake, slowed mentation   Skin: Skin is warm and dry. She is not diaphoretic.   Vitals reviewed.      Labs:        Invalid input(s): [id number]      Recent Labs      04/04/17 0318   SODIUM  139   POTASSIUM  4.6   CHLORIDE  108   CO2  23   BUN  45*   CREATININE  2.67*   CALCIUM  8.5     Recent Labs      04/04/17 0318   ALTSGPT  16   ASTSGOT  14   ALKPHOSPHAT  69   TBILIRUBIN  0.4   GLUCOSE  110*     Recent Labs      04/04/17 0318   RBC  3.02*   HEMOGLOBIN  9.8*   HEMATOCRIT  29.2*   PLATELETCT  201     Recent Labs      04/04/17 0318   WBC  7.2   ASTSGOT   14   ALTSGPT  16   ALKPHOSPHAT  69   TBILIRUBIN  0.4             Medical Decision Making, by Problem:  RIGHT FOOT INFECTED WOUNDS - cont Zinacef through 4/23/17 per ID, wound care, request U/S R/O DVT  ASTHMA - RT protocol  HTN - on Coreg, Lisinopril, and Norvasc  HLD - Lipitor  STAGE IV CKD - follow renal fxn  ANEMIA - follow H/H  MOYAMOYA  SZ - Dilantin (check level w/ next draw) and Trileptal  DEVELOPMENTAL DELAY  MULTIPLE ALLERGIES  PROPH - needs Culturelle and Zofran          Medications reviewed and Labs reviewed  Dey catheter: No Dey      DVT Prophylaxis: Not indicated at this time, ambulatory    Ulcer prophylaxis: Yes  Antibiotics: Treating active infection/contamination beyond 24 hours perioperative coverage

## 2017-04-06 PROCEDURE — 99232 SBSQ HOSP IP/OBS MODERATE 35: CPT | Performed by: HOSPITALIST

## 2017-04-06 ASSESSMENT — ENCOUNTER SYMPTOMS
MEMORY LOSS: 1
FEVER: 0
CHILLS: 0
PALPITATIONS: 0
NAUSEA: 0
ABDOMINAL PAIN: 0
COUGH: 0
VOMITING: 0
SHORTNESS OF BREATH: 0
EYES NEGATIVE: 1

## 2017-04-07 LAB
ANION GAP SERPL CALC-SCNC: 9 MMOL/L (ref 0–11.9)
BASOPHILS # BLD AUTO: 0.4 % (ref 0–1.8)
BASOPHILS # BLD: 0.03 K/UL (ref 0–0.12)
BUN SERPL-MCNC: 43 MG/DL (ref 8–22)
CALCIUM SERPL-MCNC: 8 MG/DL (ref 8.4–10.2)
CHLORIDE SERPL-SCNC: 109 MMOL/L (ref 96–112)
CO2 SERPL-SCNC: 21 MMOL/L (ref 20–33)
CREAT SERPL-MCNC: 2.74 MG/DL (ref 0.5–1.4)
EOSINOPHIL # BLD AUTO: 0.33 K/UL (ref 0–0.51)
EOSINOPHIL NFR BLD: 4.5 % (ref 0–6.9)
ERYTHROCYTE [DISTWIDTH] IN BLOOD BY AUTOMATED COUNT: 49.9 FL (ref 35.9–50)
GFR SERPL CREATININE-BSD FRML MDRD: 19 ML/MIN/1.73 M 2
GLUCOSE SERPL-MCNC: 99 MG/DL (ref 65–99)
HCT VFR BLD AUTO: 30.6 % (ref 37–47)
HGB BLD-MCNC: 10.2 G/DL (ref 12–16)
IMM GRANULOCYTES # BLD AUTO: 0.02 K/UL (ref 0–0.11)
IMM GRANULOCYTES NFR BLD AUTO: 0.3 % (ref 0–0.9)
LYMPHOCYTES # BLD AUTO: 2 K/UL (ref 1–4.8)
LYMPHOCYTES NFR BLD: 27.5 % (ref 22–41)
MCH RBC QN AUTO: 32.4 PG (ref 27–33)
MCHC RBC AUTO-ENTMCNC: 33.3 G/DL (ref 33.6–35)
MCV RBC AUTO: 97.1 FL (ref 81.4–97.8)
MONOCYTES # BLD AUTO: 0.72 K/UL (ref 0–0.85)
MONOCYTES NFR BLD AUTO: 9.9 % (ref 0–13.4)
NEUTROPHILS # BLD AUTO: 4.18 K/UL (ref 2–7.15)
NEUTROPHILS NFR BLD: 57.4 % (ref 44–72)
NRBC # BLD AUTO: 0 K/UL
NRBC BLD AUTO-RTO: 0 /100 WBC
PHENYTOIN SERPL-MCNC: 10.3 UG/ML (ref 10–20)
PLATELET # BLD AUTO: 199 K/UL (ref 164–446)
PMV BLD AUTO: 10.1 FL (ref 9–12.9)
POTASSIUM SERPL-SCNC: 4.8 MMOL/L (ref 3.6–5.5)
RBC # BLD AUTO: 3.15 M/UL (ref 4.2–5.4)
SODIUM SERPL-SCNC: 139 MMOL/L (ref 135–145)
WBC # BLD AUTO: 7.3 K/UL (ref 4.8–10.8)

## 2017-04-07 PROCEDURE — 80185 ASSAY OF PHENYTOIN TOTAL: CPT

## 2017-04-07 PROCEDURE — 85025 COMPLETE CBC W/AUTO DIFF WBC: CPT

## 2017-04-07 PROCEDURE — 80048 BASIC METABOLIC PNL TOTAL CA: CPT

## 2017-04-07 PROCEDURE — 99232 SBSQ HOSP IP/OBS MODERATE 35: CPT | Performed by: HOSPITALIST

## 2017-04-07 ASSESSMENT — ENCOUNTER SYMPTOMS
ABDOMINAL PAIN: 0
MEMORY LOSS: 1
COUGH: 0
PALPITATIONS: 0
NAUSEA: 0
SHORTNESS OF BREATH: 0
EYES NEGATIVE: 1
CHILLS: 0
VOMITING: 0
FEVER: 0

## 2017-04-07 NOTE — [facility]
Hospital Medicine Progress Note, Adult, Complex               Author: Soledad Meyers Date & Time created: 4/6/2017  5:59 PM     Interval History:  CC - foot infxn  No complaints.  Wants to be left alone.    Review of Systems:  Review of Systems   Constitutional: Negative for fever and chills.   HENT: Negative.    Eyes: Negative.    Respiratory: Negative for cough and shortness of breath.    Cardiovascular: Negative for chest pain and palpitations.   Gastrointestinal: Negative for nausea, vomiting and abdominal pain.   Genitourinary: Negative.    Musculoskeletal:        Wound pain   Skin: Negative for itching and rash.   Endo/Heme/Allergies: Negative.    Psychiatric/Behavioral: Positive for memory loss.       Physical Exam:  Physical Exam   Constitutional: No distress.   HENT:   Head: Normocephalic and atraumatic.   Right Ear: External ear normal.   Left Ear: External ear normal.   Eyes: Conjunctivae and EOM are normal. Right eye exhibits no discharge. Left eye exhibits no discharge.   Neck: Normal range of motion. Neck supple. No tracheal deviation present.   Cardiovascular: Normal rate and regular rhythm.    Pulmonary/Chest: Effort normal and breath sounds normal. No stridor. No respiratory distress. She has no wheezes.   Abdominal: Soft. Bowel sounds are normal. She exhibits no distension. There is no tenderness. There is no rebound and no guarding.   Musculoskeletal:   1+/2+ edema RLE, bilat foot wounds (R>L)   Neurological:   Awake, slowed mentation   Skin: Skin is warm and dry. She is not diaphoretic.   Vitals reviewed.      Labs:        Invalid input(s): [id number]      Recent Labs      04/04/17 0318   SODIUM  139   POTASSIUM  4.6   CHLORIDE  108   CO2  23   BUN  45*   CREATININE  2.67*   CALCIUM  8.5     Recent Labs      04/04/17 0318   ALTSGPT  16   ASTSGOT  14   ALKPHOSPHAT  69   TBILIRUBIN  0.4   GLUCOSE  110*     Recent Labs      04/04/17 0318   RBC  3.02*   HEMOGLOBIN  9.8*   HEMATOCRIT  29.2*    PLATELETCT  201     Recent Labs      04/04/17   0318   WBC  7.2   ASTSGOT  14   ALTSGPT  16   ALKPHOSPHAT  69   TBILIRUBIN  0.4             Medical Decision Making, by Problem:  RIGHT FOOT INFECTED WOUNDS S/P MULTIPLE I+D - cont Zinacef through 4/23/17 per ID, wound care, U/S negative DVT  ASTHMA - RT protocol  HTN - on Coreg, Lisinopril, and Norvasc  HLD - Lipitor  STAGE IV CKD - on bicarb, follow renal fxn  ANEMIA - follow H/H  MOYAMOYA - deteriorating mentation  SZ - Dilantin (check drug level) and Trileptal  DEVELOPMENTAL DELAY  MULTIPLE ALLERGIES            Medications reviewed and Labs reviewed  Dey catheter: No Dey      DVT Prophylaxis: Not indicated at this time, ambulatory    Ulcer prophylaxis: Yes  Antibiotics: Treating active infection/contamination beyond 24 hours perioperative coverage

## 2017-04-08 PROCEDURE — 99232 SBSQ HOSP IP/OBS MODERATE 35: CPT | Performed by: HOSPITALIST

## 2017-04-08 ASSESSMENT — ENCOUNTER SYMPTOMS
COUGH: 0
NAUSEA: 0
EYES NEGATIVE: 1
CHILLS: 0
PALPITATIONS: 0
VOMITING: 0
ABDOMINAL PAIN: 0
FEVER: 0
SHORTNESS OF BREATH: 0
MEMORY LOSS: 1

## 2017-04-08 NOTE — [facility]
Hospital Medicine Progress Note, Adult, Complex               Author: Rowe Mira Date & Time created: 4/7/2017  8:09 PM     Interval History:  CC - foot infxn  Pt unusually pleasant today.  Denies complaints.  Wound examined w/ Wound Care.    Review of Systems:  Review of Systems   Constitutional: Negative for fever and chills.   HENT: Negative.    Eyes: Negative.    Respiratory: Negative for cough and shortness of breath.    Cardiovascular: Negative for chest pain and palpitations.   Gastrointestinal: Negative for nausea, vomiting and abdominal pain.   Genitourinary: Negative.    Musculoskeletal:        Wound pain   Skin: Negative for itching and rash.   Endo/Heme/Allergies: Negative.    Psychiatric/Behavioral: Positive for memory loss.       Physical Exam:  Physical Exam   Constitutional: No distress.   HENT:   Head: Normocephalic and atraumatic.   Right Ear: External ear normal.   Left Ear: External ear normal.   Eyes: Conjunctivae and EOM are normal. Right eye exhibits no discharge. Left eye exhibits no discharge.   Neck: Normal range of motion. Neck supple. No tracheal deviation present.   Cardiovascular: Normal rate and regular rhythm.    Pulmonary/Chest: Effort normal and breath sounds normal. No stridor. No respiratory distress. She has no wheezes.   Abdominal: Soft. Bowel sounds are normal. She exhibits no distension. There is no tenderness. There is no rebound and no guarding.   Musculoskeletal:   1+ edema RLE, right plantar foot wound smaller   Neurological:   Awake, slowed mentation   Skin: Skin is warm and dry. She is not diaphoretic.   Vitals reviewed.      Labs:        Invalid input(s): [id number]      Recent Labs      04/07/17   0600   SODIUM  139   POTASSIUM  4.8   CHLORIDE  109   CO2  21   BUN  43*   CREATININE  2.74*   CALCIUM  8.0*     Recent Labs      04/07/17   0600   GLUCOSE  99     Recent Labs      04/07/17   0600   RBC  3.15*   HEMOGLOBIN  10.2*   HEMATOCRIT  30.6*   PLATELETCT  199      Recent Labs      04/07/17   0600   WBC  7.3   NEUTSPOLYS  57.40   LYMPHOCYTES  27.50   MONOCYTES  9.90   EOSINOPHILS  4.50   BASOPHILS  0.40             Medical Decision Making, by Problem:  RIGHT FOOT INFECTED WOUNDS S/P MULTIPLE I+D - cont Zinacef through 4/23/17 per ID, wound care, U/S negative DVT  ASTHMA - RT protocol  HTN - on Coreg, Lisinopril, and Norvasc  HLD - Lipitor  STAGE IV CKD - on bicarb, follow renal fxn  ANEMIA - follow H/H  MOYAMOYA - deteriorating mentation  SZ - Dilantin (level nontoxic) and Trileptal  DEVELOPMENTAL DELAY  MULTIPLE ALLERGIES    Reviewed w/ Wound RN        Medications reviewed and Labs reviewed  Dey catheter: No Dey      DVT Prophylaxis: Not indicated at this time, ambulatory    Ulcer prophylaxis: Yes  Antibiotics: Treating active infection/contamination beyond 24 hours perioperative coverage

## 2017-04-08 NOTE — [facility]
Hospital Medicine Progress Note, Adult, Complex               Author: Soledad Meyers Date & Time created: 4/8/2017  11:44 AM     Interval History:  CC - foot infxn  Pt lost IV access overnight, replaced this am.    Review of Systems:  Review of Systems   Constitutional: Negative for fever and chills.   HENT: Negative.    Eyes: Negative.    Respiratory: Negative for cough and shortness of breath.    Cardiovascular: Negative for chest pain and palpitations.   Gastrointestinal: Negative for nausea, vomiting and abdominal pain.   Genitourinary: Negative.    Musculoskeletal:        Wound pain   Skin: Negative for itching and rash.   Endo/Heme/Allergies: Negative.    Psychiatric/Behavioral: Positive for memory loss.       Physical Exam:  Physical Exam   Constitutional: No distress.   HENT:   Head: Normocephalic and atraumatic.   Right Ear: External ear normal.   Left Ear: External ear normal.   Eyes: Conjunctivae and EOM are normal. Right eye exhibits no discharge. Left eye exhibits no discharge.   Neck: Normal range of motion. Neck supple. No tracheal deviation present.   Cardiovascular: Normal rate and regular rhythm.    Pulmonary/Chest: Effort normal and breath sounds normal. No stridor. No respiratory distress. She has no wheezes.   Abdominal: Soft. Bowel sounds are normal. She exhibits no distension. There is no tenderness. There is no rebound and no guarding.   Musculoskeletal:   1+ edema RLE, right plantar foot wound   Neurological:   Awake, slowed mentation   Skin: Skin is warm and dry. She is not diaphoretic.   Vitals reviewed.      Labs:        Invalid input(s): [id number]      Recent Labs      04/07/17   0600   SODIUM  139   POTASSIUM  4.8   CHLORIDE  109   CO2  21   BUN  43*   CREATININE  2.74*   CALCIUM  8.0*     Recent Labs      04/07/17   0600   GLUCOSE  99     Recent Labs      04/07/17   0600   RBC  3.15*   HEMOGLOBIN  10.2*   HEMATOCRIT  30.6*   PLATELETCT  199     Recent Labs      04/07/17   0600   WBC  7.3    NEUTSPOLYS  57.40   LYMPHOCYTES  27.50   MONOCYTES  9.90   EOSINOPHILS  4.50   BASOPHILS  0.40             Medical Decision Making, by Problem:  RIGHT FOOT INFECTED WOUNDS S/P MULTIPLE I+D - cont Zinacef through 4/23/17 per ID, wound care, U/S negative DVT  ASTHMA - RT protocol  HTN - on Coreg, Lisinopril, and Norvasc  HLD - Lipitor  STAGE IV CKD - on bicarb, follow renal fxn  ANEMIA - follow H/H  MOYAMOYA - deteriorating mentation  SZ - Dilantin (level nontoxic) and Trileptal  DEVELOPMENTAL DELAY  MULTIPLE ALLERGIES    Reviewed w/ pt and RN        Medications reviewed and Labs reviewed  Dey catheter: No Dey      DVT Prophylaxis: Not indicated at this time, ambulatory    Ulcer prophylaxis: Yes  Antibiotics: Treating active infection/contamination beyond 24 hours perioperative coverage

## 2017-04-09 PROCEDURE — 99232 SBSQ HOSP IP/OBS MODERATE 35: CPT | Performed by: HOSPITALIST

## 2017-04-09 ASSESSMENT — ENCOUNTER SYMPTOMS
SHORTNESS OF BREATH: 0
COUGH: 0
FEVER: 0
CHILLS: 0
PALPITATIONS: 0
NAUSEA: 0
VOMITING: 0
ABDOMINAL PAIN: 0
EYES NEGATIVE: 1
MEMORY LOSS: 1

## 2017-04-09 NOTE — [facility]
Hospital Medicine Progress Note, Adult, Complex               Author: Soledad Meyers Date & Time created: 4/9/2017  12:30 PM     Interval History:  CC - foot infxn  Pt in very good spirits today because it's her birthday!  BP trending high.    Review of Systems:  Review of Systems   Constitutional: Negative for fever and chills.   HENT: Negative.    Eyes: Negative.    Respiratory: Negative for cough and shortness of breath.    Cardiovascular: Negative for chest pain and palpitations.   Gastrointestinal: Negative for nausea, vomiting and abdominal pain.   Genitourinary: Negative.    Musculoskeletal:        Wound pain   Skin: Negative for itching and rash.   Endo/Heme/Allergies: Negative.    Psychiatric/Behavioral: Positive for memory loss.       Physical Exam:  Physical Exam   Constitutional: No distress.   HENT:   Head: Normocephalic and atraumatic.   Right Ear: External ear normal.   Left Ear: External ear normal.   Eyes: Conjunctivae and EOM are normal. Right eye exhibits no discharge. Left eye exhibits no discharge.   Neck: Normal range of motion. Neck supple. No tracheal deviation present.   Cardiovascular: Normal rate and regular rhythm.    Pulmonary/Chest: Effort normal and breath sounds normal. No stridor. No respiratory distress. She has no wheezes.   Abdominal: Soft. Bowel sounds are normal. She exhibits no distension. There is no tenderness. There is no rebound and no guarding.   Musculoskeletal:   1+ edema RLE, right plantar foot wound   Neurological: She is alert.   Skin: Skin is warm and dry. She is not diaphoretic.   Vitals reviewed.      Labs:        Invalid input(s): [id number]      Recent Labs      04/07/17   0600   SODIUM  139   POTASSIUM  4.8   CHLORIDE  109   CO2  21   BUN  43*   CREATININE  2.74*   CALCIUM  8.0*     Recent Labs      04/07/17   0600   GLUCOSE  99     Recent Labs      04/07/17   0600   RBC  3.15*   HEMOGLOBIN  10.2*   HEMATOCRIT  30.6*   PLATELETCT  199     Recent Labs       04/07/17   0600   WBC  7.3   NEUTSPOLYS  57.40   LYMPHOCYTES  27.50   MONOCYTES  9.90   EOSINOPHILS  4.50   BASOPHILS  0.40             Medical Decision Making, by Problem:  RIGHT FOOT INFECTED WOUNDS S/P MULTIPLE I+D - cont Zinacef through 4/23/17 per ID, wound care, U/S negative DVT  ASTHMA - RT protocol  HTN - on Coreg, Lisinopril, and Norvasc, increase CCB given elevated BP trends  HLD - Lipitor  STAGE IV CKD - on bicarb, follow renal fxn  ANEMIA - follow H/H  MOYAMOYA - deteriorating mentation  SZ - Dilantin (level nontoxic) and Trileptal  DEVELOPMENTAL DELAY  MULTIPLE ALLERGIES    Reviewed w/ pt and RN        Medications reviewed and Labs reviewed  Dey catheter: No Dey      DVT Prophylaxis: Not indicated at this time, ambulatory    Ulcer prophylaxis: Yes  Antibiotics: Treating active infection/contamination beyond 24 hours perioperative coverage

## 2017-04-10 PROCEDURE — 99232 SBSQ HOSP IP/OBS MODERATE 35: CPT | Performed by: HOSPITALIST

## 2017-04-10 ASSESSMENT — ENCOUNTER SYMPTOMS
CHILLS: 0
COUGH: 0
EYES NEGATIVE: 1
ABDOMINAL PAIN: 0
DIARRHEA: 0
PALPITATIONS: 0
FEVER: 0
MEMORY LOSS: 1
VOMITING: 0
SHORTNESS OF BREATH: 0
NAUSEA: 0

## 2017-04-10 NOTE — [facility]
Infectious Disease Progress Note    Author: Darby Maxwell M.D. DOS & Time created: 4/10/2017  8:39 AM    Chief Complaint:  Right foot infection F/U    Interval History:  46-year-old white female with history of morbid obesity,  Moyamoya syndrome with prior stroke, who [place] [facility] [place] with [place] to her right foot with subsequent development of cellulitis and abscess.  S/p repeat I&D with 2nd and 3rd metatarsal head excisions and GSR on 3/5.  Repeat I and D 3/9.  Went to [facility] but discharge due to behavioral issues-[place] [facility]  3/30 AF WBC 8.2 Irritable, somewhat cooperative today-denies SE abx-wants IV taken out. Transferred to [facility] from [facility]  3/31 AF, NAD  4/1 AF, no CBC, no new issues per patient  4/3 AF no WBC coloring books  4/5 AF does not like being on abx but denies SE  4/10 AF, no CBC, pt lost PIV access again last night, refusing PIV or PICC line due to bad veins, adamant about not getting a line and states she's getting PO abx  Labs Reviewed, Medications Reviewed and Radiology Reviewed.    Review of Systems:  Review of Systems   Constitutional: Negative for fever and chills.   Respiratory: Negative for cough and shortness of breath.    Cardiovascular: Negative for chest pain.   Gastrointestinal: Negative for nausea, vomiting, abdominal pain and diarrhea.   All other systems reviewed and are negative.      Hemodynamics:  T 98 P 61 /52 RR 20 94%RA    Physical Exam:  Physical Exam   Constitutional: She is oriented to person, place, and time. She appears well-developed. No distress.   Obese   HENT:   Head: Normocephalic and atraumatic.   Eyes: EOM are normal. Pupils are equal, round, and reactive to light.   Neck: Neck supple.   Cardiovascular: Normal rate.    Pulmonary/Chest: Effort normal. No respiratory distress. She has no wheezes.   Abdominal: Soft. She exhibits no distension. There is no tenderness.   Musculoskeletal:   Right ft plantar - approx 1 cm  ulcer-well-healed dorsal scar  No erythema or edema   Neurological: She is alert and oriented to person, place, and time.   Skin: No rash noted.   Vitals reviewed.      Meds:  No current facility-administered medications for this encounter.    Labs:  No results for input(s): WBC, RBC, HEMOGLOBIN, HEMATOCRIT, MCV, MCH, RDW, PLATELETCT, MPV, NEUTSPOLYS, LYMPHOCYTES, MONOCYTES, EOSINOPHILS, BASOPHILS, RBCMORPHOLO in the last 72 hours.  No results for input(s): SODIUM, POTASSIUM, CHLORIDE, CO2, GLUCOSE, BUN, CPKTOTAL in the last 72 hours.  No results for input(s): ALBUMIN, TBILIRUBIN, ALKPHOSPHAT, TOTPROTEIN, ALTSGPT, ASTSGOT, CREATININE in the last 72 hours.    Imaging:  Dx-chest-portable (1 View)  3/26/2017  Perihilar interstitial opacities are most suggestive of pulmonary edema.    Dx-chest-portable (1 View)  3/10/2017  1. Low lung volume with mild hypoventilatory change. 2. Right central venous catheter with tip in the right brachiocephalic vein.      Ir-cvc With Tunnel W/o Port Exchange    3/27/2017  Patency of the vein was documented with real-time ultrasound and the recorded image was entered into the patient?s medical record. Fluoroscopy time: 0.2 minutes. Fluoroscopic images: 1.  3/27/2017  Successful percutaneous placement of tunneled central venous catheter via a right internal jugular approach utilizing ultrasonic and fluoroscopic guidance.    [place]    3/5/2017   Vascular Laboratory  Conclusions  Normal bilateral lower extremity arterial physiologic study at rest with  triphasic flow seen in the pedal arteries and resting ankle/brachial  indices 1.01 on the right and 1.05 on the left.                         TBI  Findings  Doppler waveforms at the ankle are brisk and triphasic.  Ankle-brachial index is normal.  There is no evidence of major arterial disease demonstrated bilaterally.  Additional testing was not performed in accordance with lower extremity  arterial evaluation protocol.  Dexter Crawford  M.D.  (Electronically Signed)  Final Date:      05 March 2017                   13:15      Micro:  BLOOD CULTURE   Date Value Ref Range Status   03/26/2017 No growth after 5 days of incubation.  Final      Results      Procedure  Component  Value  Units  Date/Time      CULTURE TISSUE W/ GRM STAIN [[id number]]  Collected:  03/05/17 1307      Order Status:  Completed  Specimen Information:  Bone  Updated:  03/10/17 1544        Gram Stain Result  --          Result:           Rare WBCs.   No organisms seen.          Significant Indicator  NEG          Source  BONE          Site  Right Metatarsal Heads          Tissue Culture  No growth at 72 hours.        ANAEROBIC CULTURE [[id number]]  Collected:  03/05/17 1307      Order Status:  Completed  Specimen Information:  Bone  Updated:  03/10/17 1544        Significant Indicator  NEG          Source  BONE          Site  Right Metatarsal Heads          Anaerobic Culture, Culture Res  No Anaerobes isolated.        CULTURE TISSUE W/ GRM STAIN [[id number]]  (Abnormal)  (Susceptibility)  Collected:  03/05/17 1302      Order Status:  Completed  Specimen Information:  Tissue  Updated:  03/09/17 1612        Significant Indicator  POS (POS)          Source  TISS          Site  Right Foot Tissue          Tissue Culture  -- (A)          Gram Stain Result  -- (A)          Result:           Few WBCs.   Moderate Gram positive cocci.          Tissue Culture  -- (A)          Result:           Staphylococcus aureus   Rare growth        Culture & Susceptibility      STAPHYLOCOCCUS AUREUS       Antibiotic  Sensitivity  Microscan  Unit  Status      Ampicillin/sulbactam  Sensitive  <=8/4  mcg/mL  Final      Clindamycin  Sensitive  <=0.5  mcg/mL  Final      Daptomycin  Sensitive  <=0.5  mcg/mL  Final      Erythromycin  Sensitive  <=0.5  mcg/mL  Final      Moxifloxacin  Sensitive  <=0.5  mcg/mL  Final      Oxacillin  Sensitive  <=0.25  mcg/mL  Final      Tetracycline  Sensitive  <=4  mcg/mL  Final       Trimeth/Sulfa  Sensitive  <=0.5/9.5  mcg/mL  Final      Vancomycin  Sensitive  1  mcg/mL  Fi                Assessment:  Active Hospital Problems      Diagnosis    •  Acute on chronic renal failure (CMS-HCC) [N17.9, N18.9]    •  Severe sepsis (CMS-HCC) [A41.9, R65.20]    •  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  Vascular studies normal  Lost PIV access and refusing PICC or PIV  Pt been given PO keflex per report.  If patient continues to refuse IV access, transition to PO Augmentin (renally dosed) to complete abx course  Continue IV Zinacef-endpoint 6 weeks from last surgery. Stop date 04/23/17  Continue wound care  Adjust dose for renal function    Chronic renal failure. Stable  Avoiding nephrotoxic drugs  Adjusting abx as needed for renal function as above  Creat 2.67    Moyamoya syndrome with prior stroke/dementia  Noncompliant with abx  Barrier to care    Remains at risk for limb loss, but wound currently healing well    DW IM    Addendum:  Received a call from nursing. Pt agreeable to do IM abx. Will transition to IM ceftriaxone to complete her course as long as she tolerates it.

## 2017-04-11 LAB
ANION GAP SERPL CALC-SCNC: 8 MMOL/L (ref 0–11.9)
BUN SERPL-MCNC: 45 MG/DL (ref 8–22)
CALCIUM SERPL-MCNC: 8 MG/DL (ref 8.4–10.2)
CHLORIDE SERPL-SCNC: 109 MMOL/L (ref 96–112)
CO2 SERPL-SCNC: 22 MMOL/L (ref 20–33)
CREAT SERPL-MCNC: 2.57 MG/DL (ref 0.5–1.4)
ERYTHROCYTE [DISTWIDTH] IN BLOOD BY AUTOMATED COUNT: 48.9 FL (ref 35.9–50)
GFR SERPL CREATININE-BSD FRML MDRD: 20 ML/MIN/1.73 M 2
GLUCOSE SERPL-MCNC: 81 MG/DL (ref 65–99)
HCT VFR BLD AUTO: 30.8 % (ref 37–47)
HGB BLD-MCNC: 10 G/DL (ref 12–16)
MCH RBC QN AUTO: 31.8 PG (ref 27–33)
MCHC RBC AUTO-ENTMCNC: 32.5 G/DL (ref 33.6–35)
MCV RBC AUTO: 98.1 FL (ref 81.4–97.8)
PLATELET # BLD AUTO: 183 K/UL (ref 164–446)
PMV BLD AUTO: 10.4 FL (ref 9–12.9)
POTASSIUM SERPL-SCNC: 4.6 MMOL/L (ref 3.6–5.5)
RBC # BLD AUTO: 3.14 M/UL (ref 4.2–5.4)
SODIUM SERPL-SCNC: 139 MMOL/L (ref 135–145)
WBC # BLD AUTO: 6.9 K/UL (ref 4.8–10.8)

## 2017-04-11 PROCEDURE — 85027 COMPLETE CBC AUTOMATED: CPT

## 2017-04-11 PROCEDURE — 99232 SBSQ HOSP IP/OBS MODERATE 35: CPT | Performed by: HOSPITALIST

## 2017-04-11 PROCEDURE — 80048 BASIC METABOLIC PNL TOTAL CA: CPT

## 2017-04-11 ASSESSMENT — ENCOUNTER SYMPTOMS
VOMITING: 1
CONSTIPATION: 0
FEVER: 0
CHILLS: 0
DIARRHEA: 0
VOMITING: 0
DIZZINESS: 0
COUGH: 0
ABDOMINAL PAIN: 0
HEADACHES: 0
SHORTNESS OF BREATH: 0
NAUSEA: 0

## 2017-04-11 NOTE — [facility]
Hospital Medicine Progress Note, Adult, Complex               Author: Soledad Meyers Date & Time created: 4/10/2017  10:55 PM     Interval History:  CC - foot infxn  Pt lost IV access again and refusing to allow further attempts at PIV or PICC.  ID aware.    Review of Systems:  Review of Systems   Constitutional: Negative for fever and chills.   HENT: Negative.    Eyes: Negative.    Respiratory: Negative for cough and shortness of breath.    Cardiovascular: Negative for chest pain and palpitations.   Gastrointestinal: Negative for nausea, vomiting and abdominal pain.   Genitourinary: Negative.    Musculoskeletal:        Wound pain   Skin: Negative for itching and rash.   Endo/Heme/Allergies: Negative.    Psychiatric/Behavioral: Positive for memory loss.       Physical Exam:  Physical Exam   Constitutional: No distress.   HENT:   Head: Normocephalic and atraumatic.   Right Ear: External ear normal.   Left Ear: External ear normal.   Eyes: Conjunctivae and EOM are normal. Right eye exhibits no discharge. Left eye exhibits no discharge.   Neck: Normal range of motion. Neck supple. No tracheal deviation present.   Cardiovascular: Normal rate and regular rhythm.    Pulmonary/Chest: Effort normal and breath sounds normal. No stridor. No respiratory distress. She has no wheezes.   Abdominal: Soft. Bowel sounds are normal. She exhibits no distension. There is no tenderness. There is no rebound and no guarding.   Musculoskeletal:   1+ edema RLE, right plantar foot wound   Neurological: She is alert.   Skin: Skin is warm and dry. She is not diaphoretic.   Vitals reviewed.      Labs:        Invalid input(s): [id number]      No results for input(s): SODIUM, POTASSIUM, CHLORIDE, CO2, BUN, CREATININE, MAGNESIUM, PHOSPHORUS, CALCIUM in the last 72 hours.  No results for input(s): ALTSGPT, ASTSGOT, ALKPHOSPHAT, TBILIRUBIN, DBILIRUBIN, GAMMAGT, AMYLASE, LIPASE, ALB, PREALBUMIN, GLUCOSE in the last 72 hours.  No results for input(s):  RBC, HEMOGLOBIN, HEMATOCRIT, PLATELETCT, PROTHROMBTM, APTT, INR, IRON, FERRITIN, TOTIRONBC in the last 72 hours.              Medical Decision Making, by Problem:  RIGHT FOOT INFECTED WOUNDS S/P MULTIPLE I+D - now on IM abx as pt refuses IV access, cont Zinacef through 4/23/17 per ID, wound care, U/S negative DVT  ASTHMA - RT protocol  HTN - on Coreg, Lisinopril, and Norvasc, increase CCB given elevated BP trends  HLD - Lipitor  STAGE IV CKD - on bicarb, follow renal fxn  ANEMIA - follow H/H  MOYAMOYA - deteriorating mentation  SZ - Dilantin (level nontoxic) and Trileptal  DEVELOPMENTAL DELAY  MULTIPLE ALLERGIES    Reviewed w/ pt, RN, and Dr. Maxwell        Medications reviewed and Labs reviewed  Dey catheter: No Dey      DVT Prophylaxis: Not indicated at this time, ambulatory    Ulcer prophylaxis: Yes  Antibiotics: Treating active infection/contamination beyond 24 hours perioperative coverage

## 2017-04-11 NOTE — [facility]
Infectious Disease Progress Note    Author: Darby Maxwell M.D. DOS & Time created: 4/11/2017  8:52 AM    Chief Complaint:  Right foot infection F/U    Interval History:  46-year-old white female with history of morbid obesity,  Moyamoya syndrome with prior stroke, who was admitted to [facility] after with a traumatic injury to her right foot with subsequent development of cellulitis and abscess.  S/p repeat I&D with 2nd and 3rd metatarsal head excisions and GSR on 3/5.  Repeat I and D 3/9.  Went to [facility] but discharge due to behavioral issues-sent to [facility] then [facility]  3/30 AF WBC 8.2 Irritable, somewhat cooperative today-denies SE abx-wants IV taken out. Transferred to [facility] from [facility]  3/31 AF, NAD  4/1 AF, no CBC, no new issues per patient  4/3 AF no WBC coloring books  4/5 AF does not like being on abx but denies SE  4/10 AF, no CBC, pt lost PIV access again last night, refusing PIV or PICC line due to bad veins, adamant about not getting a line and states she's getting PO abx  4/11 AF, WBC 6.9, sleeping but arousable to voice, tolerating IM abx without issues, wound improving  Labs Reviewed, Medications Reviewed and Radiology Reviewed.    Review of Systems:  Review of Systems   Constitutional: Negative for fever and chills.   Respiratory: Negative for cough and shortness of breath.    Cardiovascular: Negative for chest pain.   Gastrointestinal: Negative for nausea, vomiting, abdominal pain and diarrhea.   All other systems reviewed and are negative.      Hemodynamics:  T 97.5 P 60 /67 [id number] 93% RA    Physical Exam:  Physical Exam   Constitutional: She is oriented to person, place, and time. She appears well-developed. No distress.   Obese   HENT:   Head: Normocephalic and atraumatic.   Eyes: EOM are normal. Pupils are equal, round, and reactive to light.   Neck: Neck supple.   Cardiovascular: Normal rate.    Pulmonary/Chest: Effort normal. No respiratory distress. She has no wheezes.   Abdominal: Soft. She  exhibits no distension. There is no tenderness.   Musculoskeletal:   Right ft plantar - approx 1 cm ulcer-well-healed dorsal scar  No erythema or edema  Wound care notes/photos reviewed - healing overall   Neurological: She is alert and oriented to person, place, and time.   Skin: No rash noted.   Vitals reviewed.      Meds:  No current facility-administered medications for this encounter.    Labs:  Recent Labs      04/11/17 0425   WBC  6.9   RBC  3.14*   HEMOGLOBIN  10.0*   HEMATOCRIT  30.8*   MCV  98.1*   MCH  31.8   RDW  48.9   PLATELETCT  183   MPV  10.4     Recent Labs      04/11/17 0425   SODIUM  139   POTASSIUM  4.6   CHLORIDE  109   CO2  22   GLUCOSE  81   BUN  45*     Recent Labs      04/11/17 0425   CREATININE  2.57*       Imaging:  Dx-chest-portable (1 View)  3/26/2017  Perihilar interstitial opacities are most suggestive of pulmonary edema.    Dx-chest-portable (1 View)  3/10/2017  1. Low lung volume with mild hypoventilatory change. 2. Right central venous catheter with tip in the right brachiocephalic vein.      Ir-cvc With Tunnel W/o Port Exchange    3/27/2017  Patency of the vein was documented with real-time ultrasound and the recorded image was entered into the patient?s medical record. Fluoroscopy time: 0.2 minutes. Fluoroscopic images: 1.  3/27/2017  Successful percutaneous placement of tunneled central venous catheter via a right internal jugular approach utilizing ultrasonic and fluoroscopic guidance.    Le Art/bernadette    3/5/2017   Vascular Laboratory  Conclusions  Normal bilateral lower extremity arterial physiologic study at rest with  triphasic flow seen in the pedal arteries and resting ankle/brachial  indices 1.01 on the right and 1.05 on the left.                         TBI  Findings  Doppler waveforms at the ankle are brisk and triphasic.  Ankle-brachial index is normal.  There is no evidence of major arterial disease demonstrated bilaterally.  Additional testing was not performed in  accordance with lower extremity  arterial evaluation protocol.  Dexter Crawford M.D.  (Electronically Signed)  Final Date:      05 March 2017                   13:15      Micro:  BLOOD CULTURE   Date Value Ref Range Status   03/26/2017 No growth after 5 days of incubation.  Final      Results      Procedure  Component  Value  Units  Date/Time      CULTURE TISSUE W/ GRM STAIN [[id number]]  Collected:  03/05/17 1307      Order Status:  Completed  Specimen Information:  Bone  Updated:  03/10/17 1544        Gram Stain Result  --          Result:           Rare WBCs.   No organisms seen.          Significant Indicator  NEG          Source  BONE          Site  Right Metatarsal Heads          Tissue Culture  No growth at 72 hours.        ANAEROBIC CULTURE [[id number]]  Collected:  03/05/17 1307      Order Status:  Completed  Specimen Information:  Bone  Updated:  03/10/17 1544        Significant Indicator  NEG          Source  BONE          Site  Right Metatarsal Heads          Anaerobic Culture, Culture Res  No Anaerobes isolated.        CULTURE TISSUE W/ GRM STAIN [[id number]]  (Abnormal)  (Susceptibility)  Collected:  03/05/17 1302      Order Status:  Completed  Specimen Information:  Tissue  Updated:  03/09/17 1612        Significant Indicator  POS (POS)          Source  TISS          Site  Right Foot Tissue          Tissue Culture  -- (A)          Gram Stain Result  -- (A)          Result:           Few WBCs.   Moderate Gram positive cocci.          Tissue Culture  -- (A)          Result:           Staphylococcus aureus   Rare growth        Culture & Susceptibility      STAPHYLOCOCCUS AUREUS       Antibiotic  Sensitivity  Microscan  Unit  Status      Ampicillin/sulbactam  Sensitive  <=8/4  mcg/mL  Final      Clindamycin  Sensitive  <=0.5  mcg/mL  Final      Daptomycin  Sensitive  <=0.5  mcg/mL  Final      Erythromycin  Sensitive  <=0.5  mcg/mL  Final      Moxifloxacin  Sensitive  <=0.5  mcg/mL  Final      Oxacillin   Sensitive  <=0.25  mcg/mL  Final      Tetracycline  Sensitive  <=4  mcg/mL  Final      Trimeth/Sulfa  Sensitive  <=0.5/9.5  mcg/mL  Final      Vancomycin  Sensitive  1  mcg/mL  Fi                Assessment:  Active Hospital Problems      Diagnosis    •  Acute on chronic renal failure (CMS-HCC) [N17.9, N18.9]    •  Severe sepsis (CMS-HCC) [A41.9, R65.20]    •  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  Vascular studies normal  Lost PIV access and refusing PICC or PIV  Pt been given PO keflex per report.  Patient continues to refuse IV access, continue IM ceftriaxone.  Stop date 04/23/17  Continue wound care - overall healing  Adjust dose for renal function    Chronic renal failure. Stable  Avoiding nephrotoxic drugs  Adjusting abx as needed for renal function as above  Creat 2.57 today    Moyamoya syndrome with prior stroke/dementia  Noncompliant with abx  Barrier to care    Remains at risk for limb loss, but wound currently healing well    DW IM

## 2017-04-11 NOTE — [facility]
Hospital Medicine Progress Note, Adult, Complex               Author: Beronica CURRY Michelle Date & Time created: 4/11/2017  8:08 AM     CC: LE wound    Interval History:  No events  Emesis after my exam  Wound improving    Review of Systems:  Review of Systems   Constitutional: Negative for fever.   Respiratory: Negative for cough and shortness of breath.    Cardiovascular: Negative for chest pain.   Gastrointestinal: Positive for vomiting (after breakfast). Negative for nausea, abdominal pain, diarrhea and constipation.   Genitourinary: Negative for dysuria.   Neurological: Negative for dizziness and headaches.       T 97.5 P 60 /67 [id number] 93% Martinez/O3.5/brp 1BM   Physical Exam   Constitutional: She appears well-developed.   HENT:   Head: Normocephalic and atraumatic.   Eyes: Conjunctivae are normal. Right eye exhibits no discharge. Left eye exhibits no discharge. No scleral icterus.   Neck: Neck supple. No tracheal deviation present.   Cardiovascular: Normal rate and intact distal pulses.    Pulmonary/Chest: Effort normal. No respiratory distress. She exhibits no tenderness.   Abdominal: Soft. Bowel sounds are normal. She exhibits no distension. There is no tenderness.   obese   Musculoskeletal: She exhibits no edema.   Neurological: She is alert.   Skin: Skin is warm.   Wound with dressing   Vitals reviewed.      Labs:        Invalid input(s): [id number]      Recent Labs      04/11/17   0425   SODIUM  139   POTASSIUM  4.6   CHLORIDE  109   CO2  22   BUN  45*   CREATININE  2.57*   CALCIUM  8.0*     Recent Labs      04/11/17   0425   GLUCOSE  81     Recent Labs      04/11/17   0425   RBC  3.14*   HEMOGLOBIN  10.0*   HEMATOCRIT  30.8*   PLATELETCT  183     Recent Labs      04/11/17   0425   WBC  6.9     Medical Decision Making, by Problem:  Resolved sepsis PTA  R LE traumatic wound/cellulitis   -polymicrobial   -cefuroxime thru 4/23, IM as refuses IV   -refuses cam boot  CKD 4   -avoid nephrotoxins, outpatient  nephro follow up   -Na HCo3  Epilepsy   -trileptal, dilantin  Brewer Brewer syndrome with CVA   -progressive cognitive decline   -mother to pursue gaurdianship  HTN   -norvasc, lisinopril, coreg  Asthma   -peak flow meter  HLD   -lipitor  Anemia of CKD  Nutrition   -po  Debility   -cleared by PT to ambulate    Medications reviewed and Labs reviewed  Dey catheter: No Dey      DVT Prophylaxis: Not indicated at this time, ambulatory (refuses heparin)      Antibiotics: Treating active infection/contamination beyond 24 hours perioperative coverage

## 2017-04-12 PROCEDURE — 99232 SBSQ HOSP IP/OBS MODERATE 35: CPT | Performed by: HOSPITALIST

## 2017-04-12 ASSESSMENT — ENCOUNTER SYMPTOMS
DIZZINESS: 0
FEVER: 0
HEADACHES: 0
COUGH: 0
ABDOMINAL PAIN: 0
CHILLS: 0
VOMITING: 0
NAUSEA: 0
DIARRHEA: 0
SHORTNESS OF BREATH: 0
CONSTIPATION: 0

## 2017-04-12 NOTE — [facility]
Hospital Medicine Progress Note, Adult, Complex               Author: Beronica Martinez Date & Time created: 4/12/2017  8:47 AM     CC: LE wound    Interval History:  Wound scabbing over  No further emesis or nausea  Took a breathing treatment overnight    Review of Systems:  Review of Systems   Constitutional: Negative for fever.   Respiratory: Negative for cough and shortness of breath.    Cardiovascular: Negative for chest pain.   Gastrointestinal: Negative for nausea, vomiting, abdominal pain, diarrhea and constipation.   Neurological: Negative for dizziness and headaches.       T 97.6P 55 /[id number] 97% Martinez/O2.6/brp 2BM   Physical Exam   Constitutional: She appears well-developed. No distress.   HENT:   Head: Normocephalic and atraumatic.   Eyes: Conjunctivae are normal. Right eye exhibits no discharge. Left eye exhibits no discharge.   Neck: Neck supple. No tracheal deviation present.   Cardiovascular: Normal rate and intact distal pulses.    Pulmonary/Chest: Effort normal. No respiratory distress. She has no wheezes. She exhibits no tenderness.   Abdominal: Soft. Bowel sounds are normal. She exhibits no distension. There is no tenderness.   obese   Musculoskeletal: She exhibits no edema.   Neurological: She is alert.   Skin: Skin is warm.   Wound with dressing   Vitals reviewed.      Labs:        Invalid input(s): [id number]      Recent Labs      04/11/17   0425   SODIUM  139   POTASSIUM  4.6   CHLORIDE  109   CO2  22   BUN  45*   CREATININE  2.57*   CALCIUM  8.0*     Recent Labs      04/11/17   0425   GLUCOSE  81     Recent Labs      04/11/17   0425   RBC  3.14*   HEMOGLOBIN  10.0*   HEMATOCRIT  30.8*   PLATELETCT  183     Recent Labs      04/11/17   0425   WBC  6.9     Medical Decision Making, by Problem:  Resolved sepsis PTA  R LE traumatic wound/cellulitis   -polymicrobial   -cefuroxime thru 4/23, IM as refuses IV   -refuses cam boot, healing despite  CKD 4   -avoid nephrotoxins, outpatient  nephro follow up   -Na HCo3  Epilepsy   -trileptal, dilantin  Brewer Brewer syndrome with CVA   -progressive cognitive decline   -mother to pursue gaurdianship  HTN   -norvasc, lisinopril, coreg  Asthma   -peak flow meter   -prn nebs, ventolin   -declines regimen with steroid, LABA  HLD   -lipitor  Anemia of CKD  Nutrition   -po  Debility   -cleared by PT to ambulate    Medications reviewed  Dey catheter: No Dey      DVT Prophylaxis: Not indicated at this time, ambulatory (refuses heparin)      Antibiotics: Treating active infection/contamination beyond 24 hours perioperative coverage

## 2017-04-12 NOTE — [facility]
Infectious Disease Progress Note    Author: Darby Maxwell M.D. DOS & Time created: 4/12/2017  1:51 PM    Chief Complaint:  Right foot infection F/U    Interval History:  46-year-old white female with history of morbid obesity,  Moyamoya syndrome with prior stroke, who was admitted to [facility] after with a traumatic injury to her right foot with subsequent development of cellulitis and abscess.  S/p repeat I&D with 2nd and 3rd metatarsal head excisions and GSR on 3/5.  Repeat I and D 3/9.  Went to [facility] but discharge due to behavioral issues-sent to [facility] then [facility]  3/30 AF WBC 8.2 Irritable, somewhat cooperative today-denies SE abx-wants IV taken out. Transferred to [facility] from [facility]  3/31 AF, NAD  4/1 AF, no CBC, no new issues per patient  4/3 AF no WBC coloring books  4/5 AF does not like being on abx but denies SE  4/10 AF, no CBC, pt lost PIV access again last night, refusing PIV or PICC line due to bad veins, adamant about not getting a line and states she's getting PO abx  4/11 AF, WBC 6.9, sleeping but arousable to voice, tolerating IM abx without issues, wound improving  4/12 AF, no CBC, complaining of a headache, wound examined and improving  Labs Reviewed, Medications Reviewed and Radiology Reviewed.    Review of Systems:  Review of Systems   Constitutional: Negative for fever and chills.   Respiratory: Negative for cough and shortness of breath.    Cardiovascular: Negative for chest pain.   Gastrointestinal: Negative for nausea, vomiting, abdominal pain and diarrhea.   All other systems reviewed and are negative.      Hemodynamics:  T 97.5 P 60 /67 [id number] 93% RA    Physical Exam:  Physical Exam   Constitutional: She is oriented to person, place, and time. She appears well-developed. No distress.   Obese   HENT:   Head: Normocephalic and atraumatic.   Eyes: EOM are normal. Pupils are equal, round, and reactive to light.   Neck: Neck supple.   Cardiovascular: Normal rate.    Pulmonary/Chest: Effort  normal. No respiratory distress. She has no wheezes.   Abdominal: Soft. She exhibits no distension. There is no tenderness.   Musculoskeletal:   Right ft plantar ulcer healing well  No erythema or edema  Wound care notes/photos reviewed - healing overall   Neurological: She is alert and oriented to person, place, and time.   Skin: No rash noted.   Vitals reviewed.      Meds:  No current facility-administered medications for this encounter.    Labs:  Recent Labs      04/11/17 0425   WBC  6.9   RBC  3.14*   HEMOGLOBIN  10.0*   HEMATOCRIT  30.8*   MCV  98.1*   MCH  31.8   RDW  48.9   PLATELETCT  183   MPV  10.4     Recent Labs      04/11/17 0425   SODIUM  139   POTASSIUM  4.6   CHLORIDE  109   CO2  22   GLUCOSE  81   BUN  45*     Recent Labs      04/11/17 0425   CREATININE  2.57*       Imaging:  Dx-chest-portable (1 View)  3/26/2017  Perihilar interstitial opacities are most suggestive of pulmonary edema.    Dx-chest-portable (1 View)  3/10/2017  1. Low lung volume with mild hypoventilatory change. 2. Right central venous catheter with tip in the right brachiocephalic vein.      Ir-cvc With Tunnel W/o Port Exchange    3/27/2017  Patency of the vein was documented with real-time ultrasound and the recorded image was entered into the patient?s medical record. Fluoroscopy time: 0.2 minutes. Fluoroscopic images: 1.  3/27/2017  Successful percutaneous placement of tunneled central venous catheter via a right internal jugular approach utilizing ultrasonic and fluoroscopic guidance.    Le Art/bernadette    3/5/2017   Vascular Laboratory  Conclusions  Normal bilateral lower extremity arterial physiologic study at rest with  triphasic flow seen in the pedal arteries and resting ankle/brachial  indices 1.01 on the right and 1.05 on the left.                         TBI  Findings  Doppler waveforms at the ankle are brisk and triphasic.  Ankle-brachial index is normal.  There is no evidence of major arterial disease demonstrated  bilaterally.  Additional testing was not performed in accordance with lower extremity  arterial evaluation protocol.  Dexter Crawford M.D.  (Electronically Signed)  Final Date:      05 March 2017                   13:15      Micro:  BLOOD CULTURE   Date Value Ref Range Status   03/26/2017 No growth after 5 days of incubation.  Final      Results      Procedure  Component  Value  Units  Date/Time      CULTURE TISSUE W/ GRM STAIN [[id number]]  Collected:  03/05/17 1307      Order Status:  Completed  Specimen Information:  Bone  Updated:  03/10/17 1544        Gram Stain Result  --          Result:           Rare WBCs.   No organisms seen.          Significant Indicator  NEG          Source  BONE          Site  Right Metatarsal Heads          Tissue Culture  No growth at 72 hours.        ANAEROBIC CULTURE [[id number]]  Collected:  03/05/17 1307      Order Status:  Completed  Specimen Information:  Bone  Updated:  03/10/17 1544        Significant Indicator  NEG          Source  BONE          Site  Right Metatarsal Heads          Anaerobic Culture, Culture Res  No Anaerobes isolated.        CULTURE TISSUE W/ GRM STAIN [[id number]]  (Abnormal)  (Susceptibility)  Collected:  03/05/17 1302      Order Status:  Completed  Specimen Information:  Tissue  Updated:  03/09/17 1612        Significant Indicator  POS (POS)          Source  TISS          Site  Right Foot Tissue          Tissue Culture  -- (A)          Gram Stain Result  -- (A)          Result:           Few WBCs.   Moderate Gram positive cocci.          Tissue Culture  -- (A)          Result:           Staphylococcus aureus   Rare growth        Culture & Susceptibility      STAPHYLOCOCCUS AUREUS       Antibiotic  Sensitivity  Microscan  Unit  Status      Ampicillin/sulbactam  Sensitive  <=8/4  mcg/mL  Final      Clindamycin  Sensitive  <=0.5  mcg/mL  Final      Daptomycin  Sensitive  <=0.5  mcg/mL  Final      Erythromycin  Sensitive  <=0.5  mcg/mL  Final       Moxifloxacin  Sensitive  <=0.5  mcg/mL  Final      Oxacillin  Sensitive  <=0.25  mcg/mL  Final      Tetracycline  Sensitive  <=4  mcg/mL  Final      Trimeth/Sulfa  Sensitive  <=0.5/9.5  mcg/mL  Final      Vancomycin  Sensitive  1  mcg/mL  Fi                Assessment:  Active Hospital Problems      Diagnosis    •  Acute on chronic renal failure (CMS-HCC) [N17.9, N18.9]    •  Severe sepsis (CMS-HCC) [A41.9, R65.20]    •  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  Vascular studies normal  Lost PIV access and refusing PICC or PIV  Pt been given PO keflex per report.  Patient continues to refuse IV access  Continue IM ceftriaxone.  Stop date 04/23/17  Continue wound care - overall healing  Adjust dose for renal function    Chronic renal failure. Stable  Avoiding nephrotoxic drugs  Adjusting abx as needed for renal function as above  Creat 2.57 at last check    Moyamoya syndrome with prior stroke/dementia  Noncompliant with abx  Barrier to care    Remains at risk for limb loss, but wound currently healing well    DW IM

## 2017-04-13 PROCEDURE — 99232 SBSQ HOSP IP/OBS MODERATE 35: CPT | Performed by: HOSPITALIST

## 2017-04-13 ASSESSMENT — ENCOUNTER SYMPTOMS
NAUSEA: 0
DIZZINESS: 0
SHORTNESS OF BREATH: 0
COUGH: 0
CHILLS: 0
DIARRHEA: 0
ABDOMINAL PAIN: 0
VOMITING: 0
HEADACHES: 0
SORE THROAT: 0
FEVER: 0

## 2017-04-13 NOTE — [facility]
Hospital Medicine Progress Note, Adult, Complex               Author: Beronica Martinez Date & Time created: 4/13/2017  3:24 PM     CC: LE wound    Interval History:  coloring  No complaints  Denies SOB    Review of Systems:  Review of Systems   Constitutional: Negative for fever.   HENT: Negative for congestion and sore throat.    Respiratory: Negative for cough and shortness of breath.    Cardiovascular: Negative for chest pain.   Gastrointestinal: Negative for nausea, vomiting and abdominal pain.   Neurological: Negative for dizziness and headaches.       T 98.2P61 /05CC22ZsY4 92% Martinez/O2.4/brp   Physical Exam   Constitutional: She is oriented to person, place, and time. She appears well-developed.   HENT:   Head: Normocephalic and atraumatic.   Eyes: Conjunctivae are normal. Right eye exhibits no discharge. Left eye exhibits no discharge.   Neck: Neck supple. No tracheal deviation present.   Cardiovascular: Normal rate and intact distal pulses.    Pulmonary/Chest: Effort normal. No respiratory distress. She exhibits no tenderness.   Abdominal: Soft. Bowel sounds are normal. She exhibits no distension. There is no tenderness. There is no rebound.   obese   Musculoskeletal: She exhibits no edema.   Neurological: She is alert and oriented to person, place, and time.   Skin: Skin is warm.   Wound with dressing   Vitals reviewed.      Labs:        Invalid input(s): [id number]      Recent Labs      04/11/17   0425   SODIUM  139   POTASSIUM  4.6   CHLORIDE  109   CO2  22   BUN  45*   CREATININE  2.57*   CALCIUM  8.0*     Recent Labs      04/11/17   0425   GLUCOSE  81     Recent Labs      04/11/17   0425   RBC  3.14*   HEMOGLOBIN  10.0*   HEMATOCRIT  30.8*   PLATELETCT  183     Recent Labs      04/11/17   0425   WBC  6.9     Medical Decision Making, by Problem:  Resolved sepsis PTA  R LE traumatic wound/cellulitis   -polymicrobial   -cefuroxime thru 4/23, IM as refuses IV   -refuses cam boot  CKD 4   -avoid  nephrotoxins, outpatient nephro follow up   -Na HCo3  Epilepsy   -trileptal, dilantin  Brewer Brewer syndrome with CVA   -progressive cognitive decline   -mother to pursue gaurdianship  HTN   -norvasc, lisinopril, coreg  Asthma   -peak flow meter when complies   -prn nebs, ventolin   -declines regimen with steroid, LABA  HLD   -lipitor  Anemia of CKD  Nutrition   -po  Debility   -cleared by PT to ambulate    Medications reviewed  Dey catheter: No Dey      DVT Prophylaxis: Not indicated at this time, ambulatory (refuses heparin)      Antibiotics: Treating active infection/contamination beyond 24 hours perioperative coverage

## 2017-04-14 PROCEDURE — 99232 SBSQ HOSP IP/OBS MODERATE 35: CPT | Performed by: HOSPITALIST

## 2017-04-14 ASSESSMENT — ENCOUNTER SYMPTOMS
SORE THROAT: 0
FEVER: 0
CONSTIPATION: 0
COUGH: 0
CHILLS: 0
NAUSEA: 0
DIARRHEA: 0
HEADACHES: 0
VOMITING: 0
SHORTNESS OF BREATH: 0
DIZZINESS: 0
ABDOMINAL PAIN: 0

## 2017-04-14 NOTE — [facility]
Infectious Disease Progress Note    Author: Rocio Hinson M.D. DOS & Time created: 4/14/2017  12:43 PM    Chief Complaint:  Right foot infection F/U    Interval History:  46-year-old white female with history of morbid obesity,  Moyamoya syndrome with prior stroke, who was admitted to [facility] after with a traumatic injury to her right foot with subsequent development of cellulitis and abscess.  S/p repeat I&D with 2nd and 3rd metatarsal head excisions and GSR on 3/5.  Repeat I and D 3/9.  Went to [facility] but discharge due to behavioral issues-sent to [facility] then [facility]  3/30 AF WBC 8.2 Irritable, somewhat cooperative today-denies SE abx-wants IV taken out. Transferred to [facility] from [facility]  3/31 AF, NAD  4/1 AF, no CBC, no new issues per patient  4/3 AF no WBC coloring books  4/5 AF does not like being on abx but denies SE  4/10 AF, no CBC, pt lost PIV access again last night, refusing PIV or PICC line due to bad veins, adamant about not getting a line and states she's getting PO abx  4/11 AF, WBC 6.9, sleeping but arousable to voice, tolerating IM abx without issues, wound improving  4/12 AF, no CBC, complaining of a headache, wound examined and improving  4/13, AF, no CBC, in good spirits today, rates foot pain 4/10 in severity, happy she took a shower, tolerating IM ceftriaxone  4/14 AF WBC not done no new complaints  Labs Reviewed, Medications Reviewed and Radiology Reviewed.    Review of Systems:  Review of Systems   Constitutional: Negative for fever and chills.   Respiratory: Negative for cough and shortness of breath.    Cardiovascular: Negative for chest pain.   Gastrointestinal: Negative for nausea, vomiting, abdominal pain and diarrhea.   All other systems reviewed and are negative.      Hemodynamics:    T 98.2 P54 BP99/58 RR18 SaO2 96% RA   Physical Exam:  Physical Exam   Constitutional: She is oriented to person, place, and time. She appears well-developed. No distress.   Obese   HENT:   Head: Normocephalic  and atraumatic.   Eyes: EOM are normal. Pupils are equal, round, and reactive to light.   Neck: Neck supple.   Cardiovascular: Normal rate.    Pulmonary/Chest: Effort normal. No respiratory distress. She has no wheezes.   Abdominal: Soft. She exhibits no distension. There is no tenderness.   Musculoskeletal:   Right ft plantar ulcer healing well  No erythema or edema  Wound care notes/photos reviewed - healing overall   Neurological: She is alert and oriented to person, place, and time.   Skin: No rash noted.   Vitals reviewed.      Meds:  No current facility-administered medications for this encounter.    Labs:  No results for input(s): WBC, RBC, HEMOGLOBIN, HEMATOCRIT, MCV, MCH, RDW, PLATELETCT, MPV, NEUTSPOLYS, LYMPHOCYTES, MONOCYTES, EOSINOPHILS, BASOPHILS, RBCMORPHOLO in the last 72 hours.  No results for input(s): SODIUM, POTASSIUM, CHLORIDE, CO2, GLUCOSE, BUN, CPKTOTAL in the last 72 hours.  No results for input(s): ALBUMIN, TBILIRUBIN, ALKPHOSPHAT, TOTPROTEIN, ALTSGPT, ASTSGOT, CREATININE in the last 72 hours.    Imaging:  Dx-chest-portable (1 View)  3/26/2017  Perihilar interstitial opacities are most suggestive of pulmonary edema.    Dx-chest-portable (1 View)  3/10/2017  1. Low lung volume with mild hypoventilatory change. 2. Right central venous catheter with tip in the right brachiocephalic vein.      Ir-cvc With Tunnel W/o Port Exchange    3/27/2017  Patency of the vein was documented with real-time ultrasound and the recorded image was entered into the patient?s medical record. Fluoroscopy time: 0.2 minutes. Fluoroscopic images: 1.  3/27/2017  Successful percutaneous placement of tunneled central venous catheter via a right internal jugular approach utilizing ultrasonic and fluoroscopic guidance.    Le Art/bernadette    3/5/2017   Vascular Laboratory  Conclusions  Normal bilateral lower extremity arterial physiologic study at rest with  triphasic flow seen in the pedal arteries and resting ankle/brachial   indices 1.01 on the right and 1.05 on the left.                         TBI  Findings  Doppler waveforms at the ankle are brisk and triphasic.  Ankle-brachial index is normal.  There is no evidence of major arterial disease demonstrated bilaterally.  Additional testing was not performed in accordance with lower extremity  arterial evaluation protocol.  Dexter Crawford M.D.  (Electronically Signed)  Final Date:      05 March 2017                   13:15      Micro:  BLOOD CULTURE   Date Value Ref Range Status   03/26/2017 No growth after 5 days of incubation.  Final      Results      Procedure  Component  Value  Units  Date/Time      CULTURE TISSUE W/ GRM STAIN [[id number]]  Collected:  03/05/17 1307      Order Status:  Completed  Specimen Information:  Bone  Updated:  03/10/17 1544        Gram Stain Result  --          Result:           Rare WBCs.   No organisms seen.          Significant Indicator  NEG          Source  BONE          Site  Right Metatarsal Heads          Tissue Culture  No growth at 72 hours.        ANAEROBIC CULTURE [[id number]]  Collected:  03/05/17 1307      Order Status:  Completed  Specimen Information:  Bone  Updated:  03/10/17 1544        Significant Indicator  NEG          Source  BONE          Site  Right Metatarsal Heads          Anaerobic Culture, Culture Res  No Anaerobes isolated.        CULTURE TISSUE W/ GRM STAIN [[id number]]  (Abnormal)  (Susceptibility)  Collected:  03/05/17 1302      Order Status:  Completed  Specimen Information:  Tissue  Updated:  03/09/17 1612        Significant Indicator  POS (POS)          Source  TISS          Site  Right Foot Tissue          Tissue Culture  -- (A)          Gram Stain Result  -- (A)          Result:           Few WBCs.   Moderate Gram positive cocci.          Tissue Culture  -- (A)          Result:           Staphylococcus aureus   Rare growth        Culture & Susceptibility      STAPHYLOCOCCUS AUREUS       Antibiotic  Sensitivity  Microscan   Unit  Status      Ampicillin/sulbactam  Sensitive  <=8/4  mcg/mL  Final      Clindamycin  Sensitive  <=0.5  mcg/mL  Final      Daptomycin  Sensitive  <=0.5  mcg/mL  Final      Erythromycin  Sensitive  <=0.5  mcg/mL  Final      Moxifloxacin  Sensitive  <=0.5  mcg/mL  Final      Oxacillin  Sensitive  <=0.25  mcg/mL  Final      Tetracycline  Sensitive  <=4  mcg/mL  Final      Trimeth/Sulfa  Sensitive  <=0.5/9.5  mcg/mL  Final      Vancomycin  Sensitive  1  mcg/mL  Fi                Assessment:  Active Hospital Problems      Diagnosis    •  Acute on chronic renal failure (CMS-HCC) [N17.9, N18.9]    •  Severe sepsis (CMS-HCC) [A41.9, R65.20]    •  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  Vascular studies normal  Lost PIV access and refusing PICC or PIV  Pt been given PO keflex per report.  Patient continues to refuse IV access  Continue IM ceftriaxone.  Stop date 04/23/17  Continue wound care - overall healing      Chronic renal failure. Stable  Avoiding nephrotoxic drugs  Adjusting abx as needed for renal function as above  Creat 2.57 at last check    Moyamoya syndrome with prior stroke/dementia  Noncompliant with abx  Barrier to care    Remains at risk for limb loss, but wound currently healing well    DW IM

## 2017-04-15 LAB
ALBUMIN SERPL BCP-MCNC: 2.4 G/DL (ref 3.2–4.9)
ALBUMIN/GLOB SERPL: 0.8 G/DL
ALP SERPL-CCNC: 56 U/L (ref 30–99)
ALT SERPL-CCNC: 19 U/L (ref 2–50)
ANION GAP SERPL CALC-SCNC: 7 MMOL/L (ref 0–11.9)
AST SERPL-CCNC: 15 U/L (ref 12–45)
BASOPHILS # BLD AUTO: 0.4 % (ref 0–1.8)
BASOPHILS # BLD: 0.03 K/UL (ref 0–0.12)
BILIRUB SERPL-MCNC: 0.4 MG/DL (ref 0.1–1.5)
BUN SERPL-MCNC: 58 MG/DL (ref 8–22)
CALCIUM SERPL-MCNC: 8.4 MG/DL (ref 8.4–10.2)
CHLORIDE SERPL-SCNC: 109 MMOL/L (ref 96–112)
CO2 SERPL-SCNC: 23 MMOL/L (ref 20–33)
CREAT SERPL-MCNC: 2.8 MG/DL (ref 0.5–1.4)
EOSINOPHIL # BLD AUTO: 0.17 K/UL (ref 0–0.51)
EOSINOPHIL NFR BLD: 2.4 % (ref 0–6.9)
ERYTHROCYTE [DISTWIDTH] IN BLOOD BY AUTOMATED COUNT: 46.9 FL (ref 35.9–50)
GFR SERPL CREATININE-BSD FRML MDRD: 18 ML/MIN/1.73 M 2
GLOBULIN SER CALC-MCNC: 3.1 G/DL (ref 1.9–3.5)
GLUCOSE SERPL-MCNC: 79 MG/DL (ref 65–99)
HCT VFR BLD AUTO: 29.9 % (ref 37–47)
HGB BLD-MCNC: 10 G/DL (ref 12–16)
IMM GRANULOCYTES # BLD AUTO: 0.01 K/UL (ref 0–0.11)
IMM GRANULOCYTES NFR BLD AUTO: 0.1 % (ref 0–0.9)
LYMPHOCYTES # BLD AUTO: 2.04 K/UL (ref 1–4.8)
LYMPHOCYTES NFR BLD: 28.3 % (ref 22–41)
MCH RBC QN AUTO: 31.7 PG (ref 27–33)
MCHC RBC AUTO-ENTMCNC: 33.4 G/DL (ref 33.6–35)
MCV RBC AUTO: 94.9 FL (ref 81.4–97.8)
MONOCYTES # BLD AUTO: 0.61 K/UL (ref 0–0.85)
MONOCYTES NFR BLD AUTO: 8.5 % (ref 0–13.4)
NEUTROPHILS # BLD AUTO: 4.35 K/UL (ref 2–7.15)
NEUTROPHILS NFR BLD: 60.3 % (ref 44–72)
NRBC # BLD AUTO: 0 K/UL
NRBC BLD AUTO-RTO: 0 /100 WBC
PLATELET # BLD AUTO: 169 K/UL (ref 164–446)
PMV BLD AUTO: 10.6 FL (ref 9–12.9)
POTASSIUM SERPL-SCNC: 4.8 MMOL/L (ref 3.6–5.5)
PROT SERPL-MCNC: 5.5 G/DL (ref 6–8.2)
RBC # BLD AUTO: 3.15 M/UL (ref 4.2–5.4)
SODIUM SERPL-SCNC: 139 MMOL/L (ref 135–145)
WBC # BLD AUTO: 7.2 K/UL (ref 4.8–10.8)

## 2017-04-15 PROCEDURE — 99232 SBSQ HOSP IP/OBS MODERATE 35: CPT | Performed by: HOSPITALIST

## 2017-04-15 PROCEDURE — 80053 COMPREHEN METABOLIC PANEL: CPT

## 2017-04-15 PROCEDURE — 85025 COMPLETE CBC W/AUTO DIFF WBC: CPT

## 2017-04-15 ASSESSMENT — ENCOUNTER SYMPTOMS
ABDOMINAL PAIN: 0
HEADACHES: 0
VOMITING: 0
DIARRHEA: 0
COUGH: 0
FEVER: 0
NAUSEA: 0
SHORTNESS OF BREATH: 0
CHILLS: 0

## 2017-04-15 NOTE — [facility]
Infectious Disease Progress Note    Author: Rocio Hinson M.D. DOS & Time created: 4/15/2017  10:42 AM    Chief Complaint:  Right foot infection F/U    Interval History:  46-year-old white female with history of morbid obesity,  Moyamoya syndrome with prior stroke, who was admitted to [facility] after with a traumatic injury to her right foot with subsequent development of cellulitis and abscess.  S/p repeat I&D with 2nd and 3rd metatarsal head excisions and GSR on 3/5.  Repeat I and D 3/9.  Went to [facility] but discharge due to behavioral issues-sent to [facility] then [facility]  3/30 AF WBC 8.2 Irritable, somewhat cooperative today-denies SE abx-wants IV taken out. Transferred to [facility] from [facility]  3/31 AF, NAD  4/1 AF, no CBC, no new issues per patient  4/3 AF no WBC coloring books  4/5 AF does not like being on abx but denies SE  4/10 AF, no CBC, pt lost PIV access again last night, refusing PIV or PICC line due to bad veins, adamant about not getting a line and states she's getting PO abx  4/11 AF, WBC 6.9, sleeping but arousable to voice, tolerating IM abx without issues, wound improving  4/12 AF, no CBC, complaining of a headache, wound examined and improving  4/13, AF, no CBC, in good spirits today, rates foot pain 4/10 in severity, happy she took a shower, tolerating IM ceftriaxone  4/14 AF WBC not done no new complaints  Labs Reviewed, Medications Reviewed and Radiology Reviewed.    Review of Systems:  Review of Systems   Constitutional: Negative for fever and chills.   Respiratory: Negative for cough and shortness of breath.    Cardiovascular: Negative for chest pain.   Gastrointestinal: Negative for nausea, vomiting, abdominal pain and diarrhea.   All other systems reviewed and are negative.      Hemodynamics:  AF     Physical Exam:  Physical Exam   Constitutional: She is oriented to person, place, and time. She appears well-developed. No distress.   Obese   HENT:   Head: Normocephalic and atraumatic.   Eyes: EOM are  normal. Pupils are equal, round, and reactive to light.   Neck: Neck supple.   Cardiovascular: Normal rate.    Pulmonary/Chest: Effort normal. No respiratory distress. She has no wheezes.   Abdominal: Soft. She exhibits no distension. There is no tenderness.   Musculoskeletal:   Right ft plantar ulcer healing: 0.7X0.5 X 0.5 cm No erythema or edema     Neurological: She is alert and oriented to person, place, and time.   Skin: No rash noted.   Vitals reviewed.      Meds:  No current facility-administered medications for this encounter.    Labs:  Recent Labs      04/15/17   0430   WBC  7.2   RBC  3.15*   HEMOGLOBIN  10.0*   HEMATOCRIT  29.9*   MCV  94.9   MCH  31.7   RDW  46.9   PLATELETCT  169   MPV  10.6   NEUTSPOLYS  60.30   LYMPHOCYTES  28.30   MONOCYTES  8.50   EOSINOPHILS  2.40   BASOPHILS  0.40     Recent Labs      04/15/17   0430   SODIUM  139   POTASSIUM  4.8   CHLORIDE  109   CO2  23   GLUCOSE  79   BUN  58*     Recent Labs      04/15/17   0430   ALBUMIN  2.4*   TBILIRUBIN  0.4   ALKPHOSPHAT  56   TOTPROTEIN  5.5*   ALTSGPT  19   ASTSGOT  15   CREATININE  2.80*       Imaging:  Dx-chest-portable (1 View)  3/26/2017  Perihilar interstitial opacities are most suggestive of pulmonary edema.    Dx-chest-portable (1 View)  3/10/2017  1. Low lung volume with mild hypoventilatory change. 2. Right central venous catheter with tip in the right brachiocephalic vein.      Ir-cvc With Tunnel W/o Port Exchange    3/27/2017  Patency of the vein was documented with real-time ultrasound and the recorded image was entered into the patient?s medical record. Fluoroscopy time: 0.2 minutes. Fluoroscopic images: 1.  3/27/2017  Successful percutaneous placement of tunneled central venous catheter via a right internal jugular approach utilizing ultrasonic and fluoroscopic guidance.    Le Art/bernadette    3/5/2017   Vascular Laboratory  Conclusions  Normal bilateral lower extremity arterial physiologic study at rest with  triphasic flow  seen in the pedal arteries and resting ankle/brachial  indices 1.01 on the right and 1.05 on the left.                         TBI  Findings  Doppler waveforms at the ankle are brisk and triphasic.  Ankle-brachial index is normal.  There is no evidence of major arterial disease demonstrated bilaterally.  Additional testing was not performed in accordance with lower extremity  arterial evaluation protocol.  Dexter Crawford M.D.  (Electronically Signed)  Final Date:      05 March 2017                   13:15      Micro:  BLOOD CULTURE   Date Value Ref Range Status   03/26/2017 No growth after 5 days of incubation.  Final      Results      Procedure  Component  Value  Units  Date/Time      CULTURE TISSUE W/ GRM STAIN [[id number]]  Collected:  03/05/17 1307      Order Status:  Completed  Specimen Information:  Bone  Updated:  03/10/17 1544        Gram Stain Result  --          Result:           Rare WBCs.   No organisms seen.          Significant Indicator  NEG          Source  BONE          Site  Right Metatarsal Heads          Tissue Culture  No growth at 72 hours.        ANAEROBIC CULTURE [[id number]]  Collected:  03/05/17 1307      Order Status:  Completed  Specimen Information:  Bone  Updated:  03/10/17 1544        Significant Indicator  NEG          Source  BONE          Site  Right Metatarsal Heads          Anaerobic Culture, Culture Res  No Anaerobes isolated.        CULTURE TISSUE W/ GRM STAIN [[id number]]  (Abnormal)  (Susceptibility)  Collected:  03/05/17 1302      Order Status:  Completed  Specimen Information:  Tissue  Updated:  03/09/17 1612        Significant Indicator  POS (POS)          Source  TISS          Site  Right Foot Tissue          Tissue Culture  -- (A)          Gram Stain Result  -- (A)          Result:           Few WBCs.   Moderate Gram positive cocci.          Tissue Culture  -- (A)          Result:           Staphylococcus aureus   Rare growth        Culture & Susceptibility       STAPHYLOCOCCUS AUREUS       Antibiotic  Sensitivity  Microscan  Unit  Status      Ampicillin/sulbactam  Sensitive  <=8/4  mcg/mL  Final      Clindamycin  Sensitive  <=0.5  mcg/mL  Final      Daptomycin  Sensitive  <=0.5  mcg/mL  Final      Erythromycin  Sensitive  <=0.5  mcg/mL  Final      Moxifloxacin  Sensitive  <=0.5  mcg/mL  Final      Oxacillin  Sensitive  <=0.25  mcg/mL  Final      Tetracycline  Sensitive  <=4  mcg/mL  Final      Trimeth/Sulfa  Sensitive  <=0.5/9.5  mcg/mL  Final      Vancomycin  Sensitive  1  mcg/mL  Fi                Assessment:  Active Hospital Problems      Diagnosis    •  Acute on chronic renal failure (CMS-HCC) [N17.9, N18.9]    •  Severe sepsis (CMS-HCC) [A41.9, R65.20]    •  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  Vascular studies normal  Lost PIV access and refusing PICC or PIV  Patient continues to refuse IV access  Continue IM ceftriaxone.  Stop date 04/23/17  Continue wound care - overall healing      Chronic renal failure.   Avoiding nephrotoxic drugs  Creat 2.8 at last check    Moyamoya syndrome with prior stroke/dementia  Noncompliant with abx  Barrier to care    Remains at risk for limb loss, but wound currently healing well    Will sign off-please reconsult if needed

## 2017-04-16 PROCEDURE — 99232 SBSQ HOSP IP/OBS MODERATE 35: CPT | Performed by: HOSPITALIST

## 2017-04-16 ASSESSMENT — ENCOUNTER SYMPTOMS
FEVER: 0
HEADACHES: 0
CONSTIPATION: 0
DIARRHEA: 0
ABDOMINAL PAIN: 0
VOMITING: 0
NAUSEA: 0
CHILLS: 0
COUGH: 0
SHORTNESS OF BREATH: 0

## 2017-04-16 NOTE — [facility]
Hospital Medicine Progress Note, Adult, Complex               Author: Beronica Martinez Date & Time created: 4/16/2017  7:57 AM     CC: LE wound    Interval History:  No events  Breathing treatment overnight, no distress    Review of Systems:  Review of Systems   Constitutional: Negative for fever and chills.   Respiratory: Negative for cough and shortness of breath.    Cardiovascular: Negative for chest pain.   Gastrointestinal: Negative for nausea, vomiting, abdominal pain, diarrhea and constipation.   Genitourinary: Positive for frequency. Negative for dysuria.   Neurological: Negative for headaches.       T 97.9P52 /20ZP55ScQ6 97% ra I/O2.7/brp   Physical Exam   Constitutional: She appears well-developed.   Eating breakfast   HENT:   Head: Normocephalic and atraumatic.   Eyes: Conjunctivae are normal. Right eye exhibits no discharge. Left eye exhibits no discharge.   Neck: Neck supple. No tracheal deviation present.   Cardiovascular: Normal rate and intact distal pulses.    Pulmonary/Chest: Effort normal. No respiratory distress. She exhibits no tenderness.   Abdominal: Soft. Bowel sounds are normal. She exhibits no distension. There is no tenderness.   obese   Musculoskeletal: She exhibits no edema.   Neurological: She is alert.   Skin: Skin is warm.   Wound with dressing   Vitals reviewed.      Labs:        Invalid input(s): [id number]      Recent Labs      04/15/17   0430   SODIUM  139   POTASSIUM  4.8   CHLORIDE  109   CO2  23   BUN  58*   CREATININE  2.80*   CALCIUM  8.4     Recent Labs      04/15/17   0430   ALTSGPT  19   ASTSGOT  15   ALKPHOSPHAT  56   TBILIRUBIN  0.4   GLUCOSE  79     Recent Labs      04/15/17   0430   RBC  3.15*   HEMOGLOBIN  10.0*   HEMATOCRIT  29.9*   PLATELETCT  169     Recent Labs      04/15/17   0430   WBC  7.2   NEUTSPOLYS  60.30   LYMPHOCYTES  28.30   MONOCYTES  8.50   EOSINOPHILS  2.40   BASOPHILS  0.40   ASTSGOT  15   ALTSGPT  19   ALKPHOSPHAT  56   TBILIRUBIN  0.4      Medical Decision Making, by Problem:  Resolved sepsis PTA  R LE traumatic wound/cellulitis   -polymicrobial   -cefuroxime thru 4/23, IM as refuses IV   -refuses cam boot  CKD 4   -avoid nephrotoxins, outpatient nephro follow up   -Na HCo3   -encourage po fluids  Epilepsy   -trileptal, dilantin  Brewer Brewer syndrome with CVA   -progressive cognitive decline   -mother to pursue gaurdianship  HTN   -norvasc, lisinopril, coreg   -high in am before meds than normalizes  Asthma   -peak flow meter when complies   -prn nebs, ventolin   -declines regimen with steroid, LABA  HLD   -lipitor  Anemia of CKD  Nutrition   -po    Medications reviewed  Dey catheter: No Dey      DVT Prophylaxis: Not indicated at this time, ambulatory (refuses heparin)      Antibiotics: Treating active infection/contamination beyond 24 hours perioperative coverage

## 2017-04-17 ENCOUNTER — RESOLUTE PROFESSIONAL BILLING HOSPITAL PROF FEE (OUTPATIENT)
Dept: HOSPITALIST | Facility: MEDICAL CENTER | Age: 47
End: 2017-04-17
Payer: MEDICARE

## 2017-04-17 PROCEDURE — 99232 SBSQ HOSP IP/OBS MODERATE 35: CPT | Performed by: HOSPITALIST

## 2017-04-17 ASSESSMENT — ENCOUNTER SYMPTOMS
HEADACHES: 1
DIARRHEA: 0
COUGH: 0
VOMITING: 0
NAUSEA: 0
FEVER: 0
CONSTIPATION: 0
SHORTNESS OF BREATH: 0
ABDOMINAL PAIN: 1

## 2017-04-17 NOTE — [facility]
Hospital Medicine Progress Note, Adult, Complex               Author: Beronica PATRICIO Martinez Date & Time created: 4/17/2017  8:41 AM     CC: LE wound    Interval History:  Mild AP after breakfast and BM, benign exam  Mild HA  No other events    Review of Systems:  Review of Systems   Constitutional: Negative for fever.   Respiratory: Negative for cough and shortness of breath.    Cardiovascular: Negative for chest pain.   Gastrointestinal: Positive for abdominal pain (mild). Negative for nausea, vomiting, diarrhea and constipation.   Genitourinary: Negative for dysuria.   Neurological: Positive for headaches.       T 98.1P57BP 101/21PQ48ZsB2 94% ra I/O3.9/brp   Physical Exam   Constitutional: She appears well-developed.   Eating breakfast   HENT:   Head: Normocephalic and atraumatic.   Eyes: Conjunctivae are normal. Right eye exhibits no discharge. Left eye exhibits no discharge.   Neck: Neck supple. No tracheal deviation present.   Cardiovascular: Normal rate and intact distal pulses.    Pulmonary/Chest: Effort normal. No respiratory distress. She has no wheezes. She exhibits no tenderness.   Abdominal: Soft. Bowel sounds are normal. She exhibits no distension. There is no tenderness. There is no rebound and no guarding.   obese   Musculoskeletal: She exhibits no edema.   Neurological: She is alert.   Skin: Skin is warm.   Wound with dressing   Vitals reviewed.      Labs:        Invalid input(s): [id number]      Recent Labs      04/15/17   0430   SODIUM  139   POTASSIUM  4.8   CHLORIDE  109   CO2  23   BUN  58*   CREATININE  2.80*   CALCIUM  8.4     Recent Labs      04/15/17   0430   ALTSGPT  19   ASTSGOT  15   ALKPHOSPHAT  56   TBILIRUBIN  0.4   GLUCOSE  79     Recent Labs      04/15/17   0430   RBC  3.15*   HEMOGLOBIN  10.0*   HEMATOCRIT  29.9*   PLATELETCT  169     Recent Labs      04/15/17   0430   WBC  7.2   NEUTSPOLYS  60.30   LYMPHOCYTES  28.30   MONOCYTES  8.50   EOSINOPHILS  2.40   BASOPHILS  0.40   ASTSGOT   15   ALTSGPT  19   ALKPHOSPHAT  56   TBILIRUBIN  0.4     Medical Decision Making, by Problem:  Resolved sepsis PTA  R LE traumatic wound/cellulitis   -polymicrobial   -cefuroxime thru 4/23, IM as refuses IV   -refuses cam boot  CKD 4   -avoid nephrotoxins, outpatient nephro follow up   -Na HCo3   -encourage po fluids, improved intake-repeat BMP this week  Epilepsy   -trileptal, dilantin  Brewer Brewer syndrome with CVA   -progressive cognitive decline   -mother pursuing gaurdianship  HTN   -norvasc, lisinopril, coreg  AP   -benign exam, follow  Asthma   -peak flow meter when complies   -prn nebs, ventolin   -declines regimen with steroid, LABA  HLD   -lipitor  Anemia of CKD  Nutrition   -po    Medications reviewed  Dey catheter: No Dey      DVT Prophylaxis: Not indicated at this time, ambulatory (refuses heparin)      Antibiotics: Treating active infection/contamination beyond 24 hours perioperative coverage

## 2017-04-18 PROCEDURE — 99232 SBSQ HOSP IP/OBS MODERATE 35: CPT | Performed by: HOSPITALIST

## 2017-04-18 ASSESSMENT — ENCOUNTER SYMPTOMS
MEMORY LOSS: 1
COUGH: 0
VOMITING: 0
FEVER: 0
ABDOMINAL PAIN: 0
NAUSEA: 0
CHILLS: 0
SHORTNESS OF BREATH: 0
EYES NEGATIVE: 1
PALPITATIONS: 0

## 2017-04-19 LAB
ANION GAP SERPL CALC-SCNC: 8 MMOL/L (ref 0–11.9)
BASOPHILS # BLD AUTO: 0.5 % (ref 0–1.8)
BASOPHILS # BLD: 0.03 K/UL (ref 0–0.12)
BUN SERPL-MCNC: 60 MG/DL (ref 8–22)
CALCIUM SERPL-MCNC: 8.4 MG/DL (ref 8.4–10.2)
CHLORIDE SERPL-SCNC: 109 MMOL/L (ref 96–112)
CO2 SERPL-SCNC: 21 MMOL/L (ref 20–33)
CREAT SERPL-MCNC: 2.85 MG/DL (ref 0.5–1.4)
EOSINOPHIL # BLD AUTO: 0.22 K/UL (ref 0–0.51)
EOSINOPHIL NFR BLD: 3.6 % (ref 0–6.9)
ERYTHROCYTE [DISTWIDTH] IN BLOOD BY AUTOMATED COUNT: 47.5 FL (ref 35.9–50)
GFR SERPL CREATININE-BSD FRML MDRD: 18 ML/MIN/1.73 M 2
GLUCOSE SERPL-MCNC: 82 MG/DL (ref 65–99)
HCT VFR BLD AUTO: 30.8 % (ref 37–47)
HGB BLD-MCNC: 10.3 G/DL (ref 12–16)
IMM GRANULOCYTES # BLD AUTO: 0.01 K/UL (ref 0–0.11)
IMM GRANULOCYTES NFR BLD AUTO: 0.2 % (ref 0–0.9)
LYMPHOCYTES # BLD AUTO: 1.65 K/UL (ref 1–4.8)
LYMPHOCYTES NFR BLD: 27.2 % (ref 22–41)
MCH RBC QN AUTO: 32.5 PG (ref 27–33)
MCHC RBC AUTO-ENTMCNC: 33.4 G/DL (ref 33.6–35)
MCV RBC AUTO: 97.2 FL (ref 81.4–97.8)
MONOCYTES # BLD AUTO: 0.65 K/UL (ref 0–0.85)
MONOCYTES NFR BLD AUTO: 10.7 % (ref 0–13.4)
NEUTROPHILS # BLD AUTO: 3.51 K/UL (ref 2–7.15)
NEUTROPHILS NFR BLD: 57.8 % (ref 44–72)
NRBC # BLD AUTO: 0 K/UL
NRBC BLD AUTO-RTO: 0 /100 WBC
PLATELET # BLD AUTO: 150 K/UL (ref 164–446)
PMV BLD AUTO: 10.6 FL (ref 9–12.9)
POTASSIUM SERPL-SCNC: 5.1 MMOL/L (ref 3.6–5.5)
RBC # BLD AUTO: 3.17 M/UL (ref 4.2–5.4)
SODIUM SERPL-SCNC: 138 MMOL/L (ref 135–145)
WBC # BLD AUTO: 6.1 K/UL (ref 4.8–10.8)

## 2017-04-19 PROCEDURE — 85025 COMPLETE CBC W/AUTO DIFF WBC: CPT

## 2017-04-19 PROCEDURE — 99232 SBSQ HOSP IP/OBS MODERATE 35: CPT | Performed by: HOSPITALIST

## 2017-04-19 PROCEDURE — 80048 BASIC METABOLIC PNL TOTAL CA: CPT

## 2017-04-19 ASSESSMENT — ENCOUNTER SYMPTOMS
FEVER: 0
NAUSEA: 0
ABDOMINAL PAIN: 0
EYES NEGATIVE: 1
MEMORY LOSS: 1
COUGH: 0
SHORTNESS OF BREATH: 0
CHILLS: 0
VOMITING: 0
PALPITATIONS: 0

## 2017-04-19 NOTE — [facility]
"Hospital Medicine Progress Note, Adult, Complex               Author: Soledad Mira Date & Time created: 4/18/2017  5:43 PM     Interval History:  CC - foot infxn  Doing \"ok;\" no complaints.    Review of Systems:  Review of Systems   Constitutional: Negative for fever and chills.   HENT: Negative.    Eyes: Negative.    Respiratory: Negative for cough and shortness of breath.    Cardiovascular: Negative for chest pain and palpitations.   Gastrointestinal: Negative for nausea, vomiting and abdominal pain.   Genitourinary: Negative.    Musculoskeletal:        Wound pain   Skin: Negative for itching and rash.   Endo/Heme/Allergies: Negative.    Psychiatric/Behavioral: Positive for memory loss.       Physical Exam:  Physical Exam   Constitutional: She is oriented to person, place, and time. No distress.   HENT:   Head: Normocephalic and atraumatic.   Right Ear: External ear normal.   Left Ear: External ear normal.   Eyes: Conjunctivae and EOM are normal. Right eye exhibits no discharge. Left eye exhibits no discharge.   Neck: Normal range of motion. Neck supple. No tracheal deviation present.   Cardiovascular: Normal rate and regular rhythm.    Pulmonary/Chest: Effort normal and breath sounds normal. No stridor. No respiratory distress. She has no wheezes.   Abdominal: Soft. Bowel sounds are normal. She exhibits no distension. There is no tenderness. There is no rebound and no guarding.   Musculoskeletal:   trace edema RLE, right plantar foot wound dressed   Neurological: She is alert and oriented to person, place, and time.   Skin: Skin is warm and dry. She is not diaphoretic.   Vitals reviewed.      Labs:        Invalid input(s): [id number]      No results for input(s): SODIUM, POTASSIUM, CHLORIDE, CO2, BUN, CREATININE, MAGNESIUM, PHOSPHORUS, CALCIUM in the last 72 hours.  No results for input(s): ALTSGPT, ASTSGOT, ALKPHOSPHAT, TBILIRUBIN, DBILIRUBIN, GAMMAGT, AMYLASE, LIPASE, ALB, PREALBUMIN, GLUCOSE in the last 72 " hours.  No results for input(s): RBC, HEMOGLOBIN, HEMATOCRIT, PLATELETCT, PROTHROMBTM, APTT, INR, IRON, FERRITIN, TOTIRONBC in the last 72 hours.              Medical Decision Making, by Problem:  RIGHT FOOT INFECTED WOUNDS S/P MULTIPLE I+D - cont Zinacef through 4/23/17 per ID, wound care, U/S negative DVT  ASTHMA - RT protocol  HTN - on Coreg, Lisinopril, and Norvasc  HLD - Lipitor  STAGE IV CKD - on bicarb, follow renal fxn  ANEMIA - follow H/H  MOYAMOYA - deteriorating mentation  SZ - Dilantin (level nontoxic) and Trileptal  DEVELOPMENTAL DELAY  MULTIPLE ALLERGIES    Reviewed w/ pt        Medications reviewed and Labs reviewed  Dey catheter: No Dey      DVT Prophylaxis: Not indicated at this time, ambulatory    Ulcer prophylaxis: Yes  Antibiotics: Treating active infection/contamination beyond 24 hours perioperative coverage

## 2017-04-19 NOTE — [facility]
Hospital Medicine Progress Note, Adult, Complex               Author: Soledad Meyers Date & Time created: 4/19/2017  3:53 PM     Interval History:  CC - foot infxn  Wants to be left alone today.  No other complaints.    Review of Systems:  Review of Systems   Constitutional: Negative for fever and chills.   HENT: Negative.    Eyes: Negative.    Respiratory: Negative for cough and shortness of breath.    Cardiovascular: Negative for chest pain and palpitations.   Gastrointestinal: Negative for nausea, vomiting and abdominal pain.   Genitourinary: Negative.    Musculoskeletal:        Wound pain   Skin: Negative for itching and rash.   Endo/Heme/Allergies: Negative.    Psychiatric/Behavioral: Positive for memory loss.       Physical Exam:  Physical Exam   Constitutional: She is oriented to person, place, and time. No distress.   HENT:   Head: Normocephalic and atraumatic.   Right Ear: External ear normal.   Left Ear: External ear normal.   Eyes: Conjunctivae and EOM are normal. Right eye exhibits no discharge. Left eye exhibits no discharge.   Neck: Normal range of motion. Neck supple. No tracheal deviation present.   Cardiovascular: Normal rate and regular rhythm.    Pulmonary/Chest: Effort normal and breath sounds normal. No stridor. No respiratory distress. She has no wheezes.   Abdominal: Soft. Bowel sounds are normal. She exhibits no distension. There is no tenderness. There is no rebound and no guarding.   Musculoskeletal:   trace edema RLE, right plantar foot wound dressed   Neurological: She is alert and oriented to person, place, and time.   Skin: Skin is warm and dry. She is not diaphoretic.   Vitals reviewed.      Labs:        Invalid input(s): [id number]      Recent Labs      04/19/17   0414   SODIUM  138   POTASSIUM  5.1   CHLORIDE  109   CO2  21   BUN  60*   CREATININE  2.85*   CALCIUM  8.4     Recent Labs      04/19/17   0414   GLUCOSE  82     Recent Labs      04/19/17   0414   RBC  3.17*   HEMOGLOBIN   10.3*   HEMATOCRIT  30.8*   PLATELETCT  150*     Recent Labs      04/19/17   0414   WBC  6.1   NEUTSPOLYS  57.80   LYMPHOCYTES  27.20   MONOCYTES  10.70   EOSINOPHILS  3.60   BASOPHILS  0.50             Medical Decision Making, by Problem:  RIGHT FOOT INFECTED WOUNDS S/P MULTIPLE I+D - cont Zinacef through 4/23/17 per ID, healing well w/ wound care, U/S negative DVT  ASTHMA - RT protocol  HTN - on Coreg, Lisinopril, and Norvasc  HLD - Lipitor  STAGE IV CKD - follow renal fxn and K+, increase bicarb  ANEMIA - follow H/H  MILD THROMBOCYTOPENIA - follow plts  MOYAMOYA - deteriorating mentation  SZ - Dilantin (level nontoxic) and Trileptal  DEVELOPMENTAL DELAY  MULTIPLE ALLERGIES    Reviewed w/ Staff        Medications reviewed and Labs reviewed  Dey catheter: No Dey      DVT Prophylaxis: Not indicated at this time, ambulatory    Ulcer prophylaxis: Yes  Antibiotics: Treating active infection/contamination beyond 24 hours perioperative coverage

## 2017-04-20 PROCEDURE — 99232 SBSQ HOSP IP/OBS MODERATE 35: CPT | Performed by: HOSPITALIST

## 2017-04-20 ASSESSMENT — ENCOUNTER SYMPTOMS
EYES NEGATIVE: 1
PALPITATIONS: 0
COUGH: 0
SHORTNESS OF BREATH: 0
ABDOMINAL PAIN: 0
CHILLS: 0
NAUSEA: 0
FEVER: 0
VOMITING: 0
MEMORY LOSS: 1

## 2017-04-21 PROCEDURE — 99232 SBSQ HOSP IP/OBS MODERATE 35: CPT | Performed by: HOSPITALIST

## 2017-04-21 ASSESSMENT — ENCOUNTER SYMPTOMS
MEMORY LOSS: 1
ABDOMINAL PAIN: 0
COUGH: 0
CHILLS: 0
PALPITATIONS: 0
VOMITING: 0
SHORTNESS OF BREATH: 0
NAUSEA: 0
EYES NEGATIVE: 1
FEVER: 0

## 2017-04-21 NOTE — [facility]
Hospital Medicine Progress Note, Adult, Complex               Author: Rowe Mira Date & Time created: 4/20/2017  8:32 PM     Interval History:  CC - foot infxn  In good spirits today, no complaints.    Review of Systems:  Review of Systems   Constitutional: Negative for fever and chills.   HENT: Negative.    Eyes: Negative.    Respiratory: Negative for cough and shortness of breath.    Cardiovascular: Negative for chest pain and palpitations.   Gastrointestinal: Negative for nausea, vomiting and abdominal pain.   Genitourinary: Negative.    Musculoskeletal:        Wound pain   Skin: Negative for itching and rash.   Endo/Heme/Allergies: Negative.    Psychiatric/Behavioral: Positive for memory loss.       Physical Exam:  Physical Exam   Constitutional: She is oriented to person, place, and time. No distress.   HENT:   Head: Normocephalic and atraumatic.   Right Ear: External ear normal.   Left Ear: External ear normal.   Eyes: Conjunctivae and EOM are normal. Right eye exhibits no discharge. Left eye exhibits no discharge.   Neck: Normal range of motion. Neck supple. No tracheal deviation present.   Cardiovascular: Normal rate and regular rhythm.    Pulmonary/Chest: Effort normal and breath sounds normal. No stridor. No respiratory distress. She has no wheezes.   Abdominal: Soft. Bowel sounds are normal. She exhibits no distension. There is no tenderness. There is no rebound and no guarding.   Musculoskeletal:   trace edema RLE, right plantar foot wound dressed   Neurological: She is alert and oriented to person, place, and time.   Skin: Skin is warm and dry. She is not diaphoretic.   Vitals reviewed.      Labs:        Invalid input(s): [id number]      Recent Labs      04/19/17 0414   SODIUM  138   POTASSIUM  5.1   CHLORIDE  109   CO2  21   BUN  60*   CREATININE  2.85*   CALCIUM  8.4     Recent Labs      04/19/17 0414   GLUCOSE  82     Recent Labs      04/19/17 0414   RBC  3.17*   HEMOGLOBIN  10.3*   HEMATOCRIT   30.8*   PLATELETCT  150*     Recent Labs      04/19/17   0414   WBC  6.1   NEUTSPOLYS  57.80   LYMPHOCYTES  27.20   MONOCYTES  10.70   EOSINOPHILS  3.60   BASOPHILS  0.50             Medical Decision Making, by Problem:  RIGHT FOOT INFECTED WOUNDS S/P MULTIPLE I+D - cont Zinacef through 4/23/17 per ID, healing well w/ wound care, U/S negative DVT  ASTHMA - RT protocol  HTN - on Coreg, Lisinopril, and Norvasc  HLD - Lipitor  STAGE IV CKD - follow renal fxn and K+, increased bicarb  ANEMIA - following H/H  MILD THROMBOCYTOPENIA - following plts  MOYAMOYA - deteriorating mentation  SZ - Dilantin (level nontoxic) and Trileptal  DEVELOPMENTAL DELAY  MULTIPLE ALLERGIES  DISPO - home when abx done    Reviewed w/ RN        Medications reviewed and Labs reviewed  Dye catheter: No Dey      DVT Prophylaxis: Not indicated at this time, ambulatory    Ulcer prophylaxis: Yes  Antibiotics: Treating active infection/contamination beyond 24 hours perioperative coverage

## 2017-04-22 LAB
ALBUMIN SERPL BCP-MCNC: 2.7 G/DL (ref 3.2–4.9)
ALBUMIN/GLOB SERPL: 3.9 G/DL
ALP SERPL-CCNC: 62 U/L (ref 30–99)
ALT SERPL-CCNC: 18 U/L (ref 2–50)
ANION GAP SERPL CALC-SCNC: 10 MMOL/L (ref 0–11.9)
AST SERPL-CCNC: 16 U/L (ref 12–45)
BILIRUB SERPL-MCNC: 0.4 MG/DL (ref 0.1–1.5)
BUN SERPL-MCNC: 66 MG/DL (ref 8–22)
CALCIUM SERPL-MCNC: 8.5 MG/DL (ref 8.4–10.2)
CHLORIDE SERPL-SCNC: 107 MMOL/L (ref 96–112)
CO2 SERPL-SCNC: 22 MMOL/L (ref 20–33)
CREAT SERPL-MCNC: 3.02 MG/DL (ref 0.5–1.4)
ERYTHROCYTE [DISTWIDTH] IN BLOOD BY AUTOMATED COUNT: 46.8 FL (ref 35.9–50)
GFR SERPL CREATININE-BSD FRML MDRD: 17 ML/MIN/1.73 M 2
GLOBULIN SER CALC-MCNC: 0.7 G/DL (ref 1.9–3.5)
GLUCOSE SERPL-MCNC: 97 MG/DL (ref 65–99)
HCT VFR BLD AUTO: 31.4 % (ref 37–47)
HGB BLD-MCNC: 10.6 G/DL (ref 12–16)
MCH RBC QN AUTO: 32.2 PG (ref 27–33)
MCHC RBC AUTO-ENTMCNC: 33.8 G/DL (ref 33.6–35)
MCV RBC AUTO: 95.4 FL (ref 81.4–97.8)
PLATELET # BLD AUTO: 164 K/UL (ref 164–446)
PMV BLD AUTO: 10.5 FL (ref 9–12.9)
POTASSIUM SERPL-SCNC: 4.9 MMOL/L (ref 3.6–5.5)
PROT SERPL-MCNC: 3.4 G/DL (ref 6–8.2)
RBC # BLD AUTO: 3.29 M/UL (ref 4.2–5.4)
SODIUM SERPL-SCNC: 139 MMOL/L (ref 135–145)
WBC # BLD AUTO: 6.7 K/UL (ref 4.8–10.8)

## 2017-04-22 PROCEDURE — 85027 COMPLETE CBC AUTOMATED: CPT

## 2017-04-22 PROCEDURE — 99232 SBSQ HOSP IP/OBS MODERATE 35: CPT | Performed by: HOSPITALIST

## 2017-04-22 PROCEDURE — 80053 COMPREHEN METABOLIC PANEL: CPT

## 2017-04-22 ASSESSMENT — ENCOUNTER SYMPTOMS
MEMORY LOSS: 1
CHILLS: 0
FEVER: 0
SHORTNESS OF BREATH: 0
COUGH: 0
PALPITATIONS: 0
VOMITING: 0
NAUSEA: 0
EYES NEGATIVE: 1
ABDOMINAL PAIN: 0

## 2017-04-22 NOTE — [facility]
Hospital Medicine Progress Note, Adult, Complex               Author: Soledad Meyers Date & Time created: 4/21/2017  7:59 PM     Interval History:  CC - foot infxn  Enjoying adult coloring book, no complaints.    Review of Systems:  Review of Systems   Constitutional: Negative for fever and chills.   HENT: Negative.    Eyes: Negative.    Respiratory: Negative for cough and shortness of breath.    Cardiovascular: Negative for chest pain and palpitations.   Gastrointestinal: Negative for nausea, vomiting and abdominal pain.   Genitourinary: Negative.    Musculoskeletal:        Wound pain   Skin: Negative for itching and rash.   Endo/Heme/Allergies: Negative.    Psychiatric/Behavioral: Positive for memory loss.       Physical Exam:  Physical Exam   Constitutional: She is oriented to person, place, and time. No distress.   HENT:   Head: Normocephalic and atraumatic.   Right Ear: External ear normal.   Left Ear: External ear normal.   Eyes: Conjunctivae and EOM are normal. Right eye exhibits no discharge. Left eye exhibits no discharge.   Neck: Normal range of motion. Neck supple. No tracheal deviation present.   Cardiovascular: Normal rate and regular rhythm.    Pulmonary/Chest: Effort normal and breath sounds normal. No stridor. No respiratory distress. She has no wheezes.   Abdominal: Soft. Bowel sounds are normal. She exhibits no distension. There is no tenderness. There is no rebound and no guarding.   Musculoskeletal:   trace edema RLE, right plantar foot wound dressed   Neurological: She is alert and oriented to person, place, and time.   Skin: Skin is warm and dry. She is not diaphoretic.   Vitals reviewed.      Labs:        Invalid input(s): [id number]      Recent Labs      04/19/17   0414   SODIUM  138   POTASSIUM  5.1   CHLORIDE  109   CO2  21   BUN  60*   CREATININE  2.85*   CALCIUM  8.4     Recent Labs      04/19/17   0414   GLUCOSE  82     Recent Labs      04/19/17 0414   RBC  3.17*   HEMOGLOBIN  10.3*    HEMATOCRIT  30.8*   PLATELETCT  150*     Recent Labs      04/19/17   0414   WBC  6.1   NEUTSPOLYS  57.80   LYMPHOCYTES  27.20   MONOCYTES  10.70   EOSINOPHILS  3.60   BASOPHILS  0.50             Medical Decision Making, by Problem:  RIGHT FOOT INFECTED WOUNDS S/P MULTIPLE I+D - cont Zinacef through 4/23/17 per ID, healing well w/ wound care, U/S negative DVT  ASTHMA - RT protocol  HTN - on Coreg, Lisinopril, and Norvasc  HLD - Lipitor  STAGE IV CKD - follow renal fxn and K+, increased bicarb  ANEMIA - following H/H  MILD THROMBOCYTOPENIA - following plts  MOYAMOYA - deteriorating mentation  SZ - Dilantin (level nontoxic) and Trileptal  DEVELOPMENTAL DELAY  MULTIPLE ALLERGIES  DISPO - home w/ mother when abx done    Reviewed w/ CM        Medications reviewed and Labs reviewed  Dey catheter: No Dey      DVT Prophylaxis: Not indicated at this time, ambulatory    Ulcer prophylaxis: Yes  Antibiotics: Treating active infection/contamination beyond 24 hours perioperative coverage

## 2017-04-23 LAB
ALBUMIN SERPL BCP-MCNC: 2.7 G/DL (ref 3.2–4.9)
ALBUMIN/GLOB SERPL: 0.8 G/DL
ALP SERPL-CCNC: 67 U/L (ref 30–99)
ALT SERPL-CCNC: 18 U/L (ref 2–50)
ANION GAP SERPL CALC-SCNC: 9 MMOL/L (ref 0–11.9)
AST SERPL-CCNC: 17 U/L (ref 12–45)
BILIRUB SERPL-MCNC: 0.4 MG/DL (ref 0.1–1.5)
BUN SERPL-MCNC: 64 MG/DL (ref 8–22)
CALCIUM SERPL-MCNC: 8.6 MG/DL (ref 8.4–10.2)
CHLORIDE SERPL-SCNC: 107 MMOL/L (ref 96–112)
CO2 SERPL-SCNC: 22 MMOL/L (ref 20–33)
CREAT SERPL-MCNC: 2.75 MG/DL (ref 0.5–1.4)
ERYTHROCYTE [DISTWIDTH] IN BLOOD BY AUTOMATED COUNT: 46.9 FL (ref 35.9–50)
GFR SERPL CREATININE-BSD FRML MDRD: 19 ML/MIN/1.73 M 2
GLOBULIN SER CALC-MCNC: 3.5 G/DL (ref 1.9–3.5)
GLUCOSE SERPL-MCNC: 121 MG/DL (ref 65–99)
HCT VFR BLD AUTO: 34.2 % (ref 37–47)
HGB BLD-MCNC: 11.4 G/DL (ref 12–16)
MCH RBC QN AUTO: 32 PG (ref 27–33)
MCHC RBC AUTO-ENTMCNC: 33.3 G/DL (ref 33.6–35)
MCV RBC AUTO: 96.1 FL (ref 81.4–97.8)
PLATELET # BLD AUTO: 181 K/UL (ref 164–446)
PMV BLD AUTO: 10.1 FL (ref 9–12.9)
POTASSIUM SERPL-SCNC: 5.1 MMOL/L (ref 3.6–5.5)
PROT SERPL-MCNC: 6.2 G/DL (ref 6–8.2)
RBC # BLD AUTO: 3.56 M/UL (ref 4.2–5.4)
SODIUM SERPL-SCNC: 138 MMOL/L (ref 135–145)
WBC # BLD AUTO: 6.8 K/UL (ref 4.8–10.8)

## 2017-04-23 PROCEDURE — 80053 COMPREHEN METABOLIC PANEL: CPT

## 2017-04-23 PROCEDURE — 99232 SBSQ HOSP IP/OBS MODERATE 35: CPT | Performed by: HOSPITALIST

## 2017-04-23 PROCEDURE — 85027 COMPLETE CBC AUTOMATED: CPT

## 2017-04-23 ASSESSMENT — ENCOUNTER SYMPTOMS
PALPITATIONS: 0
EYES NEGATIVE: 1
MEMORY LOSS: 1
FEVER: 0
SHORTNESS OF BREATH: 0
COUGH: 0
NAUSEA: 0
VOMITING: 0
CHILLS: 0
ABDOMINAL PAIN: 0

## 2017-04-23 NOTE — [facility]
Hospital Medicine Progress Note, Adult, Complex               Author: Rowe Mira Date & Time created: 4/22/2017  8:23 PM     Interval History:  CC - foot infxn  Resting comfortably, no new issues.    Review of Systems:  Review of Systems   Constitutional: Negative for fever and chills.   HENT: Negative.    Eyes: Negative.    Respiratory: Negative for cough and shortness of breath.    Cardiovascular: Negative for chest pain and palpitations.   Gastrointestinal: Negative for nausea, vomiting and abdominal pain.   Genitourinary: Negative.    Musculoskeletal:        Wound pain   Skin: Negative for itching and rash.   Endo/Heme/Allergies: Negative.    Psychiatric/Behavioral: Positive for memory loss.       Physical Exam:  Physical Exam   Constitutional: She is oriented to person, place, and time. No distress.   HENT:   Head: Normocephalic and atraumatic.   Right Ear: External ear normal.   Left Ear: External ear normal.   Eyes: Conjunctivae and EOM are normal. Right eye exhibits no discharge. Left eye exhibits no discharge.   Neck: Normal range of motion. Neck supple. No tracheal deviation present.   Cardiovascular: Normal rate and regular rhythm.    Pulmonary/Chest: Effort normal and breath sounds normal. No stridor. No respiratory distress. She has no wheezes.   Abdominal: Soft. Bowel sounds are normal. She exhibits no distension. There is no tenderness. There is no rebound and no guarding.   Musculoskeletal:   trace edema RLE, right plantar foot wound dressed   Neurological: She is alert and oriented to person, place, and time.   Skin: Skin is warm and dry. She is not diaphoretic.   Vitals reviewed.      Labs:        Invalid input(s): [id number]      Recent Labs      04/22/17   0420   SODIUM  139   POTASSIUM  4.9   CHLORIDE  107   CO2  22   BUN  66*   CREATININE  3.02*   CALCIUM  8.5     Recent Labs      04/22/17   0420   ALTSGPT  18   ASTSGOT  16   ALKPHOSPHAT  62   TBILIRUBIN  0.4   GLUCOSE  97     Recent Labs       04/22/17   0420   RBC  3.29*   HEMOGLOBIN  10.6*   HEMATOCRIT  31.4*   PLATELETCT  164     Recent Labs      04/22/17   0420   WBC  6.7   ASTSGOT  16   ALTSGPT  18   ALKPHOSPHAT  62   TBILIRUBIN  0.4             Medical Decision Making, by Problem:  RIGHT FOOT INFECTED WOUNDS S/P MULTIPLE I+D - cont Zinacef through 4/23/17 per ID, healing well w/ wound care, U/S negative DVT  ASTHMA - RT protocol  HTN - on Coreg, Lisinopril, and Norvasc  HLD - Lipitor  STAGE IV CKD - following renal fxn, borderline high K+ improved w/ increased bicarb  ANEMIA - following H/H  MILD THROMBOCYTOPENIA - resolved  MOYAMOYA - deteriorating mentation  SZ - Dilantin (level nontoxic) and Trileptal  DEVELOPMENTAL DELAY  MULTIPLE ALLERGIES  DISPO - home w/ mother when abx done            Medications reviewed and Labs reviewed  Dey catheter: No Dey      DVT Prophylaxis: Not indicated at this time, ambulatory    Ulcer prophylaxis: Yes  Antibiotics: Treating active infection/contamination beyond 24 hours perioperative coverage

## 2017-04-24 PROCEDURE — 99239 HOSP IP/OBS DSCHRG MGMT >30: CPT | Performed by: HOSPITALIST

## 2017-04-24 ASSESSMENT — ENCOUNTER SYMPTOMS
VOMITING: 0
EYES NEGATIVE: 1
PALPITATIONS: 0
COUGH: 0
MEMORY LOSS: 1
SHORTNESS OF BREATH: 0
CHILLS: 0
ABDOMINAL PAIN: 0
FEVER: 0
NAUSEA: 0

## 2017-04-24 NOTE — [facility]
"Hospital Medicine Progress Note, Adult, Complex               Author: Soledad Mira Date & Time created: 4/24/2017  11:27 AM     Interval History:  CC - foot infxn  C/o \"a little bit\" of pain in right foot.  Enjoyed breakfast this am.    Review of Systems:  Review of Systems   Constitutional: Negative for fever and chills.   HENT: Negative.    Eyes: Negative.    Respiratory: Negative for cough and shortness of breath.    Cardiovascular: Negative for chest pain and palpitations.   Gastrointestinal: Negative for nausea, vomiting and abdominal pain.   Genitourinary: Negative.    Musculoskeletal:        Wound pain   Skin: Negative for itching and rash.   Endo/Heme/Allergies: Negative.    Psychiatric/Behavioral: Positive for memory loss.       Physical Exam:  Physical Exam   Constitutional: She is oriented to person, place, and time. No distress.   HENT:   Head: Normocephalic and atraumatic.   Right Ear: External ear normal.   Left Ear: External ear normal.   Eyes: Conjunctivae and EOM are normal. Right eye exhibits no discharge. Left eye exhibits no discharge.   Neck: Normal range of motion. Neck supple. No tracheal deviation present.   Cardiovascular: Normal rate and regular rhythm.    Pulmonary/Chest: Effort normal and breath sounds normal. No stridor. No respiratory distress. She has no wheezes.   Abdominal: Soft. Bowel sounds are normal. She exhibits no distension. There is no tenderness. There is no rebound and no guarding.   Musculoskeletal:   trace edema RLE, right plantar foot wound closed   Neurological: She is alert and oriented to person, place, and time.   Skin: Skin is warm and dry. She is not diaphoretic.   Vitals reviewed.      Labs:        Invalid input(s): [id number]      Recent Labs      04/22/17   0420  04/23/17   0900   SODIUM  139  138   POTASSIUM  4.9  5.1   CHLORIDE  107  107   CO2  22  22   BUN  66*  64*   CREATININE  3.02*  2.75*   CALCIUM  8.5  8.6     Recent Labs      04/22/17   0420  04/23/17   " 0900   ALTSGPT  18  18   ASTSGOT  16  17   ALKPHOSPHAT  62  67   TBILIRUBIN  0.4  0.4   GLUCOSE  97  121*     Recent Labs      04/22/17   0420  04/23/17   0900   RBC  3.29*  3.56*   HEMOGLOBIN  10.6*  11.4*   HEMATOCRIT  31.4*  34.2*   PLATELETCT  164  181     Recent Labs      04/22/17   0420  04/23/17   0900   WBC  6.7  6.8   ASTSGOT  16  17   ALTSGPT  18  18   ALKPHOSPHAT  62  67   TBILIRUBIN  0.4  0.4             Medical Decision Making, by Problem:  RIGHT FOOT INFECTED WOUNDS S/P MULTIPLE I+D - completed Zinacef 4/23/17 per ID, healing well w/ wound care, U/S negative DVT  ASTHMA - RT protocol  HTN - on Coreg, Lisinopril, and Norvasc  HLD - Lipitor  STAGE IV CKD - following renal fxn, increased bicarb  ANEMIA - following H/H  MOYAMOYA - deteriorating mentation  SZ - Dilantin (level nontoxic) and Trileptal  DEVELOPMENTAL DELAY  MULTIPLE ALLERGIES  DISPO - home w/ mother today            Medications reviewed and Labs reviewed  Dey catheter: No Dey      DVT Prophylaxis: Not indicated at this time, ambulatory    Ulcer prophylaxis: Yes

## 2017-04-24 NOTE — [facility]
Hospital Medicine Progress Note, Adult, Complex               Author: Rowe Mira Date & Time created: 4/23/2017  7:44 PM     Interval History:  CC - foot infxn  Labs done despite not ordered.    Review of Systems:  Review of Systems   Constitutional: Negative for fever and chills.   HENT: Negative.    Eyes: Negative.    Respiratory: Negative for cough and shortness of breath.    Cardiovascular: Negative for chest pain and palpitations.   Gastrointestinal: Negative for nausea, vomiting and abdominal pain.   Genitourinary: Negative.    Musculoskeletal:        Wound pain   Skin: Negative for itching and rash.   Endo/Heme/Allergies: Negative.    Psychiatric/Behavioral: Positive for memory loss.       Physical Exam:  Physical Exam   Constitutional: She is oriented to person, place, and time. No distress.   HENT:   Head: Normocephalic and atraumatic.   Right Ear: External ear normal.   Left Ear: External ear normal.   Eyes: Conjunctivae and EOM are normal. Right eye exhibits no discharge. Left eye exhibits no discharge.   Neck: Normal range of motion. Neck supple. No tracheal deviation present.   Cardiovascular: Normal rate and regular rhythm.    Pulmonary/Chest: Effort normal and breath sounds normal. No stridor. No respiratory distress. She has no wheezes.   Abdominal: Soft. Bowel sounds are normal. She exhibits no distension. There is no tenderness. There is no rebound and no guarding.   Musculoskeletal:   trace edema RLE, right plantar foot wound dressed   Neurological: She is alert and oriented to person, place, and time.   Skin: Skin is warm and dry. She is not diaphoretic.   Vitals reviewed.      Labs:        Invalid input(s): [id number]      Recent Labs      04/22/17   0420  04/23/17   0900   SODIUM  139  138   POTASSIUM  4.9  5.1   CHLORIDE  107  107   CO2  22  22   BUN  66*  64*   CREATININE  3.02*  2.75*   CALCIUM  8.5  8.6     Recent Labs      04/22/17   0420  04/23/17   0900   ALTSGPT  18  18   ASTSGOT  16   17   ALKPHOSPHAT  62  67   TBILIRUBIN  0.4  0.4   GLUCOSE  97  121*     Recent Labs      04/22/17   0420  04/23/17   0900   RBC  3.29*  3.56*   HEMOGLOBIN  10.6*  11.4*   HEMATOCRIT  31.4*  34.2*   PLATELETCT  164  181     Recent Labs      04/22/17   0420  04/23/17   0900   WBC  6.7  6.8   ASTSGOT  16  17   ALTSGPT  18  18   ALKPHOSPHAT  62  67   TBILIRUBIN  0.4  0.4             Medical Decision Making, by Problem:  RIGHT FOOT INFECTED WOUNDS S/P MULTIPLE I+D - cont Zinacef through 4/23/17 per ID, healing well w/ wound care, U/S negative DVT  ASTHMA - RT protocol  HTN - on Coreg, Lisinopril, and Norvasc  HLD - Lipitor  STAGE IV CKD - following renal fxn, borderline high K+ improved w/ increased bicarb  ANEMIA - following H/H  MILD THROMBOCYTOPENIA - resolved  MOYAMOYA - deteriorating mentation  SZ - Dilantin (level nontoxic) and Trileptal  DEVELOPMENTAL DELAY  MULTIPLE ALLERGIES  DISPO - home w/ mother when abx done            Medications reviewed and Labs reviewed  Dey catheter: No Dey      DVT Prophylaxis: Not indicated at this time, ambulatory    Ulcer prophylaxis: Yes  Antibiotics: Treating active infection/contamination beyond 24 hours perioperative coverage

## 2017-06-23 NOTE — [facility]
Infectious Disease Progress Note    Author: Darby Maxwell M.D. DOS & Time created: 4/13/2017  2:38 PM    Chief Complaint:  Right foot infection F/U    Interval History:  46-year-old white female with history of morbid obesity,  Moyamoya syndrome with prior stroke, who was admitted to [facility] after with a traumatic injury to her right foot with subsequent development of cellulitis and abscess.  S/p repeat I&D with 2nd and 3rd metatarsal head excisions and GSR on 3/5.  Repeat I and D 3/9.  Went to [facility] but discharge due to behavioral issues-sent to [facility] then [facility]  3/30 AF WBC 8.2 Irritable, somewhat cooperative today-denies SE abx-wants IV taken out. Transferred to [facility] from [facility]  3/31 AF, NAD  4/1 AF, no CBC, no new issues per patient  4/3 AF no WBC coloring books  4/5 AF does not like being on abx but denies SE  4/10 AF, no CBC, pt lost PIV access again last night, refusing PIV or PICC line due to bad veins, adamant about not getting a line and states she's getting PO abx  4/11 AF, WBC 6.9, sleeping but arousable to voice, tolerating IM abx without issues, wound improving  4/12 AF, no CBC, complaining of a headache, wound examined and improving  4/13, AF, no CBC, in good spirits today, rates foot pain 4/10 in severity, happy she took a shower, tolerating IM ceftriaxone  Labs Reviewed, Medications Reviewed and Radiology Reviewed.    Review of Systems:  Review of Systems   Constitutional: Negative for fever and chills.   Respiratory: Negative for cough and shortness of breath.    Cardiovascular: Negative for chest pain.   Gastrointestinal: Negative for nausea, vomiting, abdominal pain and diarrhea.   All other systems reviewed and are negative.      Hemodynamics:  Afebrile, on RA    Physical Exam:  Physical Exam   Constitutional: She is oriented to person, place, and time. She appears well-developed. No distress.   Obese   HENT:   Head: Normocephalic and atraumatic.   Eyes: EOM are normal. Pupils are equal, round,  Patient notified of results and recommendations, patient expressed understanding and thanks. and reactive to light.   Neck: Neck supple.   Cardiovascular: Normal rate.    Pulmonary/Chest: Effort normal. No respiratory distress. She has no wheezes.   Abdominal: Soft. She exhibits no distension. There is no tenderness.   Musculoskeletal:   Right ft plantar ulcer healing well  No erythema or edema  Wound care notes/photos reviewed - healing overall   Neurological: She is alert and oriented to person, place, and time.   Skin: No rash noted.   Vitals reviewed.      Meds:  No current facility-administered medications for this encounter.    Labs:  Recent Labs      04/11/17 0425   WBC  6.9   RBC  3.14*   HEMOGLOBIN  10.0*   HEMATOCRIT  30.8*   MCV  98.1*   MCH  31.8   RDW  48.9   PLATELETCT  183   MPV  10.4     Recent Labs      04/11/17 0425   SODIUM  139   POTASSIUM  4.6   CHLORIDE  109   CO2  22   GLUCOSE  81   BUN  45*     Recent Labs      04/11/17 0425   CREATININE  2.57*       Imaging:  Dx-chest-portable (1 View)  3/26/2017  Perihilar interstitial opacities are most suggestive of pulmonary edema.    Dx-chest-portable (1 View)  3/10/2017  1. Low lung volume with mild hypoventilatory change. 2. Right central venous catheter with tip in the right brachiocephalic vein.      Ir-cvc With Tunnel W/o Port Exchange    3/27/2017  Patency of the vein was documented with real-time ultrasound and the recorded image was entered into the patient?s medical record. Fluoroscopy time: 0.2 minutes. Fluoroscopic images: 1.  3/27/2017  Successful percutaneous placement of tunneled central venous catheter via a right internal jugular approach utilizing ultrasonic and fluoroscopic guidance.    [place]/bernadette    3/5/2017   Vascular Laboratory  Conclusions  Normal bilateral lower extremity arterial physiologic study at rest with  triphasic flow seen in the pedal arteries and resting ankle/brachial  indices 1.01 on the right and 1.05 on the left.                         TBI  Findings  Doppler waveforms at the ankle are brisk and  triphasic.  Ankle-brachial index is normal.  There is no evidence of major arterial disease demonstrated bilaterally.  Additional testing was not performed in accordance with lower extremity  arterial evaluation protocol.  Dexter Crawford M.D.  (Electronically Signed)  Final Date:      05 March 2017                   13:15      Micro:  BLOOD CULTURE   Date Value Ref Range Status   03/26/2017 No growth after 5 days of incubation.  Final      Results      Procedure  Component  Value  Units  Date/Time      CULTURE TISSUE W/ GRM STAIN [[id number]]  Collected:  03/05/17 1307      Order Status:  Completed  Specimen Information:  Bone  Updated:  03/10/17 1544        Gram Stain Result  --          Result:           Rare WBCs.   No organisms seen.          Significant Indicator  NEG          Source  BONE          Site  Right Metatarsal Heads          Tissue Culture  No growth at 72 hours.        ANAEROBIC CULTURE [[id number]]  Collected:  03/05/17 1307      Order Status:  Completed  Specimen Information:  Bone  Updated:  03/10/17 1544        Significant Indicator  NEG          Source  BONE          Site  Right Metatarsal Heads          Anaerobic Culture, Culture Res  No Anaerobes isolated.        CULTURE TISSUE W/ GRM STAIN [[id number]]  (Abnormal)  (Susceptibility)  Collected:  03/05/17 1302      Order Status:  Completed  Specimen Information:  Tissue  Updated:  03/09/17 1612        Significant Indicator  POS (POS)          Source  TISS          Site  Right Foot Tissue          Tissue Culture  -- (A)          Gram Stain Result  -- (A)          Result:           Few WBCs.   Moderate Gram positive cocci.          Tissue Culture  -- (A)          Result:           Staphylococcus aureus   Rare growth        Culture & Susceptibility      STAPHYLOCOCCUS AUREUS       Antibiotic  Sensitivity  Microscan  Unit  Status      Ampicillin/sulbactam  Sensitive  <=8/4  mcg/mL  Final      Clindamycin  Sensitive  <=0.5  mcg/mL  Final       Daptomycin  Sensitive  <=0.5  mcg/mL  Final      Erythromycin  Sensitive  <=0.5  mcg/mL  Final      Moxifloxacin  Sensitive  <=0.5  mcg/mL  Final      Oxacillin  Sensitive  <=0.25  mcg/mL  Final      Tetracycline  Sensitive  <=4  mcg/mL  Final      Trimeth/Sulfa  Sensitive  <=0.5/9.5  mcg/mL  Final      Vancomycin  Sensitive  1  mcg/mL  Fi                Assessment:  Active Hospital Problems      Diagnosis    •  Acute on chronic renal failure (CMS-HCC) [N17.9, N18.9]    •  Severe sepsis (CMS-HCC) [A41.9, R65.20]    •  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  Vascular studies normal  Lost PIV access and refusing PICC or PIV  Pt been given PO keflex per report.  Patient continues to refuse IV access  Continue IM ceftriaxone.  Stop date 04/23/17  Continue wound care - overall healing  Adjust dose for renal function    Chronic renal failure. Stable  Avoiding nephrotoxic drugs  Adjusting abx as needed for renal function as above  Creat 2.57 at last check    Moyamoya syndrome with prior stroke/dementia  Noncompliant with abx  Barrier to care    Remains at risk for limb loss, but wound currently healing well    DW IM

## 2017-06-27 NOTE — [facility]
Hospital Medicine Progress Note, Adult, Complex               Author: Beronica PATRICIO Martinez Date & Time created: 4/15/2017  10:53 AM     CC: LE wound    Interval History:  Received 1 breathing treatment  Wound .7x.5x.5 cm    Review of Systems:  Review of Systems   Constitutional: Negative for fever.   Respiratory: Negative for cough and shortness of breath.    Cardiovascular: Negative for chest pain.   Gastrointestinal: Negative for nausea, vomiting and abdominal pain.   Genitourinary: Negative for dysuria.   Neurological: Negative for headaches.       T 98.2P58 /[id number] 96% ra I/O1.8/brp   Physical Exam   Constitutional: She appears well-developed. No distress.   coloring   HENT:   Head: Normocephalic and atraumatic.   Eyes: Conjunctivae are normal. Right eye exhibits no discharge. Left eye exhibits no discharge. No scleral icterus.   Neck: Neck supple. No tracheal deviation present.   Cardiovascular: Normal rate and intact distal pulses.    Pulmonary/Chest: Effort normal. No respiratory distress. She has no wheezes. She exhibits no tenderness.   Abdominal: Soft. Bowel sounds are normal. She exhibits no distension. There is no tenderness.   obese   Musculoskeletal: She exhibits no edema.   Neurological: She is alert.   Skin: Skin is warm.   Wound with dressing   Vitals reviewed.      Labs:        Invalid input(s): [id number]      Recent Labs      04/15/17   0430   SODIUM  139   POTASSIUM  4.8   CHLORIDE  109   CO2  23   BUN  58*   CREATININE  2.80*   CALCIUM  8.4     Recent Labs      04/15/17   0430   ALTSGPT  19   ASTSGOT  15   ALKPHOSPHAT  56   TBILIRUBIN  0.4   GLUCOSE  79     Recent Labs      04/15/17   0430   RBC  3.15*   HEMOGLOBIN  10.0*   HEMATOCRIT  29.9*   PLATELETCT  169     Recent Labs      04/15/17   0430   WBC  7.2   NEUTSPOLYS  60.30   LYMPHOCYTES  28.30   MONOCYTES  8.50   EOSINOPHILS  2.40   BASOPHILS  0.40   ASTSGOT  15   ALTSGPT  19   ALKPHOSPHAT  56   TBILIRUBIN  0.4     Medical Decision  Making, by Problem:  Resolved sepsis PTA  R LE traumatic wound/cellulitis   -polymicrobial   -cefuroxime thru 4/23, IM as refuses IV   -refuses cam boot  CKD 4   -avoid nephrotoxins, outpatient nephro follow up   -Na HCo3   -encourage po fluids  Epilepsy   -trileptal, dilantin  Brewer Brewer syndrome with CVA   -progressive cognitive decline   -mother to pursue gaurdianship  HTN   -norvasc (decrease to 5mg), lisinopril, coreg (decrease to BID)  Asthma   -peak flow meter when complies   -prn nebs, ventolin   -declines regimen with steroid, LABA  HLD   -lipitor  Anemia of CKD  Nutrition   -po    Medications reviewed and Labs reviewed  Dey catheter: No Dey      DVT Prophylaxis: Not indicated at this time, ambulatory (refuses heparin)      Antibiotics: Treating active infection/contamination beyond 24 hours perioperative coverage           left normal/right normal

## 2017-07-20 DIAGNOSIS — R56.9 SEIZURE (HCC): ICD-10-CM

## 2017-07-21 RX ORDER — LEVETIRACETAM 500 MG/1
TABLET ORAL
Qty: 90 TAB | Refills: 5 | Status: SHIPPED | OUTPATIENT
Start: 2017-07-21 | End: 2018-01-10 | Stop reason: SDUPTHER

## 2017-11-09 ENCOUNTER — OFFICE VISIT (OUTPATIENT)
Dept: NEUROLOGY | Facility: MEDICAL CENTER | Age: 47
End: 2017-11-09
Payer: MEDICARE

## 2017-11-09 VITALS
WEIGHT: 244 LBS | BODY MASS INDEX: 36.14 KG/M2 | DIASTOLIC BLOOD PRESSURE: 80 MMHG | HEART RATE: 50 BPM | HEIGHT: 69 IN | TEMPERATURE: 97.9 F | SYSTOLIC BLOOD PRESSURE: 110 MMHG | OXYGEN SATURATION: 97 %

## 2017-11-09 DIAGNOSIS — R56.9 SEIZURE (HCC): ICD-10-CM

## 2017-11-09 DIAGNOSIS — G40.409 SEIZURE DISORDER, GRAND MAL (HCC): ICD-10-CM

## 2017-11-09 DIAGNOSIS — F23 BRIEF PSYCHOTIC DISORDER (HCC): ICD-10-CM

## 2017-11-09 PROCEDURE — 99212 OFFICE O/P EST SF 10 MIN: CPT

## 2017-11-09 RX ORDER — LORAZEPAM 1 MG/1
1 TABLET ORAL 3 TIMES DAILY
Qty: 90 TAB | Refills: 5 | Status: SHIPPED | OUTPATIENT
Start: 2017-11-09 | End: 2018-04-20 | Stop reason: SDUPTHER

## 2017-11-09 NOTE — PROGRESS NOTES
Neurology Progress Note  11/9/2017      Referring MD:  Dr. Dino BARRY  Patient ID:  Name:             Pascale Acevedo     YOB: 1970  Age:                 47 y.o.  female   MRN:               [id number]                                              Reason for Consult:      Follow up on moyamoya disease and seizure disorder    History of Present Illness:    This unfortunate [age]-year-old woman is documented moyamoya disease with multiple infarcts and has had seizure disorder and behavioral problems related to that. She is currently on Trileptal and other antiseizure medications and on Portia Glenys milligrams 3 times a day which has helped considerably in addition to counseling which was started because of behavioral abnormalities related to strokes related to the moyamoya disease.    Review of Systems: Relates to her seizure disorder and behavioral problems related to the cerebrovascular disorder.  Constitutional: Denies fevers, Denies weight changes  Eyes: Denies changes in vision, no eye pain  Ears/Nose/Throat/Mouth: Denies nasal congestion or sore throat   Cardiovascular: Denies chest pain, Denies palpitations   Respiratory: Denies shortness of breath , Denies cough  Gastrointestinal/Hepatic: Denies abdominal pain, nausea, vomiting, diarrhea, constipation or GI bleeding   Genitourinary: Denies dysuria or frequency  Musculoskeletal/Rheum: Denies  joint pain and swelling, No edema  Skin: Denies rash  Neurological: Multiple issues related to moyamoya disease.  Psychiatric: denies mood disorder   Endocrine: Radha thyroid problems  Heme/Oncology/Lymph Nodes: Denies enlarged lymph nodes, denies brusing or known bleeding disorder  All other systems were reviewed and are negative (AMA/CMS criteria)                Past Medical History:     Past Medical History:   Diagnosis Date   • CKD (chronic kidney disease), stage IV (CMS-HCC) 12/24/2015   • H/O: CVA (cerebrovascular accident) 12/24/2015   •  Hypertension    • Kidney disease    • Mild mitral regurgitation 7/14/2014   • Moyamoya disease 10/3/2013   • Seizure disorder, grand mal (CMS-[facility]) 3/3/2016   • Stroke (CMS-HCC)        Problems addressed this visit:    Problem List Items Addressed This Visit     Seizure disorder, grand mal (CMS-HCC)      Other Visit Diagnoses     Seizure (CMS-HCC)        Brief psychotic disorder              Past Surgical History:   Past Surgical History:   Procedure Laterality Date   • IRRIGATION & DEBRIDEMENT ORTHO Right 3/9/2017    Procedure: IRRIGATION & DEBRIDEMENT ORTHO - FOOT;  Surgeon: Gerardo Lynn M.D.;  Location: SURGERY [facility];  Service:    • IRRIGATION & DEBRIDEMENT ORTHO Right 3/5/2017    Procedure: IRRIGATION & DEBRIDEMENT ORTHO AND GASTROC RECESSION;  Surgeon: Gerardo Lynn M.D.;  Location: SURGERY [facility];  Service:    • METATARSAL HEAD RESECTION  3/5/2017    Procedure: SECOND METATARSAL HEAD RESECTION;  Surgeon: Gerardo Lynn M.D.;  Location: SURGERY [facility];  Service:    • IRRIGATION & DEBRIDEMENT ORTHO Right 2/27/2017    Procedure: IRRIGATION & DEBRIDEMENT ORTHO;  Surgeon: Coleman Monterroso M.D.;  Location: SURGERY [facility];  Service:    • PRIMARY C SECTION     • TUBAL COAGULATION LAPAROSCOPIC BILATERAL           Current Outpatient Medications:  Current Outpatient Prescriptions   Medication   • lorazepam (ATIVAN) 1 MG Tab   • levetiracetam (KEPPRA) 500 MG Tab   • carvedilol (COREG) 25 MG Tab   • lisinopril (PRINIVIL) 20 MG Tab   • phenytoin ER (DILANTIN) 200 MG ER capsule   • aspirin (ASA) 325 MG Tab   • amlodipine (NORVASC) 5 MG Tab   • atorvastatin (LIPITOR) 40 MG Tab   • folic acid (FOLVITE) 1 MG Tab   • lorazepam (ATIVAN) 1 MG Tab   • oxcarbazepine (TRILEPTAL) 300 MG Tab   • sodium bicarbonate (SODIUM BICARBONATE) 650 MG Tab   • NS SOLN 50 mL with cefUROXime 7.5 GM SOLR 750 mg   • Sodium Chloride (NS) Solution   • heparin 5000 UNIT/ML Solution   • acetaminophen  (TYLENOL) 325 MG Tab   • senna-docusate (PERICOLACE OR SENOKOT S) 8.6-50 MG Tab   • polyethylene glycol/lytes (MIRALAX) Pack   • magnesium hydroxide (MILK OF MAGNESIA) 400 MG/5ML Suspension   • bisacodyl (DULCOLAX) 10 MG Suppos   • ipratropium-albuterol (DUONEB) 0.5-2.5 (3) MG/3ML nebulizer solution   • albuterol 108 (90 BASE) MCG/ACT Aero Soln inhalation aerosol   • omeprazole (PRILOSEC) 20 MG delayed-release capsule   • Sodium Chloride (NS) Solution     No current facility-administered medications for this visit.        Medication Allergy:  Allergies   Allergen Reactions   • Allegra [Fexofenadine] Unspecified     Rxn = unknown   • Amoxicillin    • Azithromycin Unspecified     Rxn = unknown   • Claritin    • Erythromycin Unspecified     Rxn = unknown   • Ibuprofen & Caffeine-Vitamins Unspecified     Rxn = unknown   • Penicillins Unspecified     Rxn = unknown   • Procardia [Nifedipine]    • Rosemary Oil Anaphylaxis     Throat starts to close/swell.    • Zyrtec [Cetirizine] Unspecified     Rxn = unknown       Family History:  Family History   Problem Relation Age of Onset   • Diabetes Mother    • Hypertension Mother    • Hypertension Father    • Arthritis Father    • Diabetes Maternal Grandmother    • Psychiatry Maternal Grandfather    • Cancer Paternal Grandmother    • Psychiatry Paternal Grandfather        Social History:  Social History     Social History   • Marital status: Single     Spouse name: N/A   • Number of children: N/A   • Years of education: N/A     Occupational History   • Not on file.     Social History Main Topics   • Smoking status: Former Smoker   • Smokeless tobacco: Never Used   • Alcohol use No   • Drug use: No   • Sexual activity: Not on file     Other Topics Concern   • Not on file     Social History Narrative   • No narrative on file       Physical Exam:  Vitals:   Vitals:    11/09/17 0942   BP: 110/80   Pulse: (!) 50   Temp: 36.6 °C (97.9 °F)   SpO2: 97%   Weight: 110.7 kg (244 lb)  "  Height: 1.753 m (5' 9\")     General Appearance:She is well-developed somewhat obese lady who is quite pleasant and does not show a lot of anger and irritability that was formerly a problem.  Weight/BMI: Body mass index is 36.03 kg/m².      Eyes:  Normal optic discs: Yes  Visual field: Normal  Pupillary responses: Normal  Extraocular movement: Normal    Cardiovascular:  Normal carotid pulses bilaterally: Yes  RRR with no MRGs: Yes  No peripheral edema, pulses intact: Yes    Musculoskeletal:  Normal gait and station: No somewhat spastic gait  Normal muscle strength: Yes  Normal muscle tone, no atrophy: Yes  No abnormal movements: Yes    Plan:   Her lorazepam was renewed other medications are unchanged. She'll be seen again in 6 months by another member of this department.    Total length of time of this visit was 20 minutes of which greater than 50% was spent counseling the patient/family and coordinating care.    Thank you for allowing me to participate in his care.    Sincerely yours,        Jef Harrington MD, PhD          "

## 2018-01-10 DIAGNOSIS — R56.9 SEIZURE (HCC): ICD-10-CM

## 2018-01-10 NOTE — TELEPHONE ENCOUNTER
Pt requesting a refill on Levetiracetam 500 MG tab, 1 am and 2 pm. Pt was last seen by Dr. Hendricks on 11/9/17, has a f/v on 5/25/18.

## 2018-01-11 RX ORDER — LEVETIRACETAM 500 MG/1
TABLET ORAL
Qty: 90 TAB | Refills: 5 | Status: SHIPPED | OUTPATIENT
Start: 2018-01-11 | End: 2018-08-17 | Stop reason: SDUPTHER

## 2018-04-19 ENCOUNTER — TELEPHONE (OUTPATIENT)
Dept: NEUROLOGY | Facility: MEDICAL CENTER | Age: 48
End: 2018-04-19

## 2018-04-19 NOTE — TELEPHONE ENCOUNTER
Patient's mom called patient was a ace patient and has new appoint with you on 05/11/2018. She takes Ativan and had 1 refill but pharmacy won't refill since he is no longer here. Needs enough to last until her appointment 05/11/2018.    Please advise    Thank you    Spoke to mom informed script sent in.

## 2018-04-20 DIAGNOSIS — G40.909 SEIZURE CEREBRAL (HCC): ICD-10-CM

## 2018-04-20 RX ORDER — LORAZEPAM 1 MG/1
1 TABLET ORAL 3 TIMES DAILY
Qty: 90 TAB | Refills: 5 | Status: SHIPPED | OUTPATIENT
Start: 2018-04-20 | End: 2018-05-11 | Stop reason: SDUPTHER

## 2018-05-11 ENCOUNTER — OFFICE VISIT (OUTPATIENT)
Dept: NEUROLOGY | Facility: MEDICAL CENTER | Age: 48
End: 2018-05-11
Payer: MEDICARE

## 2018-05-11 VITALS
HEIGHT: 69 IN | OXYGEN SATURATION: 97 % | HEART RATE: 60 BPM | WEIGHT: 251.32 LBS | BODY MASS INDEX: 37.22 KG/M2 | TEMPERATURE: 97.5 F | DIASTOLIC BLOOD PRESSURE: 78 MMHG | SYSTOLIC BLOOD PRESSURE: 122 MMHG

## 2018-05-11 DIAGNOSIS — Z86.73 H/O: CVA (CEREBROVASCULAR ACCIDENT): ICD-10-CM

## 2018-05-11 DIAGNOSIS — I42.0 DILATED CARDIOMYOPATHY (HCC): ICD-10-CM

## 2018-05-11 DIAGNOSIS — E56.9 VITAMIN DEFICIENCY: ICD-10-CM

## 2018-05-11 DIAGNOSIS — G40.909 SEIZURE CEREBRAL (HCC): ICD-10-CM

## 2018-05-11 DIAGNOSIS — G40.409 SEIZURE DISORDER, GRAND MAL (HCC): ICD-10-CM

## 2018-05-11 DIAGNOSIS — I67.5 MOYAMOYA: ICD-10-CM

## 2018-05-11 PROCEDURE — 99215 OFFICE O/P EST HI 40 MIN: CPT | Performed by: PSYCHIATRY & NEUROLOGY

## 2018-05-11 RX ORDER — RANITIDINE 150 MG/1
150 TABLET ORAL 2 TIMES DAILY
COMMUNITY
End: 2019-02-14

## 2018-05-11 RX ORDER — IBUPROFEN 200 MG
950 CAPSULE ORAL DAILY
COMMUNITY
End: 2020-06-11

## 2018-05-11 RX ORDER — MULTIVIT WITH MINERALS/LUTEIN
1000 TABLET ORAL DAILY
Status: ON HOLD | COMMUNITY
End: 2020-06-13

## 2018-05-11 RX ORDER — CHOLECALCIFEROL (VITAMIN D3) 125 MCG
5 CAPSULE ORAL
COMMUNITY

## 2018-05-11 RX ORDER — LORAZEPAM 1 MG/1
1 TABLET ORAL 2 TIMES DAILY
Qty: 120 TAB | Refills: 0 | Status: SHIPPED | OUTPATIENT
Start: 2018-05-11 | End: 2018-07-10

## 2018-05-11 ASSESSMENT — PATIENT HEALTH QUESTIONNAIRE - PHQ9: CLINICAL INTERPRETATION OF PHQ2 SCORE: 0

## 2018-05-11 NOTE — PROGRESS NOTES
NEUROLOGY NOTE    Referring Physician  SILKE Hayes      CHIEF COMPLAINT:    Seizure and anxiety  Old stroke  Used to get lorazepam tid   Chief Complaint   Patient presents with   • \[facility] [place] [facility]\"" Care     Nu2U seizure disorder       PRESENT ILLNESS:     Seizure and anxiety  Old stroke  Used to get lorazepam tid     The patient is a 48-year-old woman, who had a stroke in 2004,   which per subsequent notes by Dr. Ruby, was associated with hemorrhage or   hemorrhagic conversion.  She was found to have Moyamoya disease.  She also has   a history of dilated cardiomyopathy    1.  Status post stroke/intracerebral hemorrhage in 2004.  2.  Diagnosis of Moyamoya disease.  3.  Incidental ophthalmic artery aneurysm.  4.  Chronic renal failure.  5.  Dilated cardiomyopathy with mitral regurgitation and aortic valve   sclerosis.    PAST MEDICAL HISTORY:  Past Medical History:   Diagnosis Date   • CKD (chronic kidney disease), stage IV ([facility]) 12/24/2015   • H/O: CVA (cerebrovascular accident) 12/24/2015   • Hypertension    • Kidney disease    • Mild mitral regurgitation 7/14/2014   • Moyamoya disease 10/3/2013   • Seizure disorder, grand mal ([facility]) 3/3/2016   • Stroke ([facility])        PAST SURGICAL HISTORY:  Past Surgical History:   Procedure Laterality Date   • IRRIGATION & DEBRIDEMENT ORTHO Right 3/9/2017    Procedure: IRRIGATION & DEBRIDEMENT ORTHO - FOOT;  Surgeon: Gerardo Lynn M.D.;  Location: SURGERY [facility];  Service:    • IRRIGATION & DEBRIDEMENT ORTHO Right 3/5/2017    Procedure: IRRIGATION & DEBRIDEMENT ORTHO AND GASTROC RECESSION;  Surgeon: Gerardo Lynn M.D.;  Location: SURGERY [facility];  Service:    • METATARSAL HEAD RESECTION  3/5/2017    Procedure: SECOND METATARSAL HEAD RESECTION;  Surgeon: Gerardo Lynn M.D.;  Location: SURGERY [facility];  Service:    • IRRIGATION & DEBRIDEMENT ORTHO Right 2/27/2017    Procedure: IRRIGATION & DEBRIDEMENT ORTHO;  Surgeon: Coleman Monterroso M.D.;   Location: SURGERY [facility];  Service:    • PRIMARY C SECTION     • TUBAL COAGULATION LAPAROSCOPIC BILATERAL         FAMILY HISTORY:  Family History   Problem Relation Age of Onset   • Diabetes Mother    • Hypertension Mother    • Hypertension Father    • Arthritis Father    • Diabetes Maternal Grandmother    • Psychiatry Maternal Grandfather    • Cancer Paternal Grandmother    • Psychiatry Paternal Grandfather        SOCIAL HISTORY:  Social History     Social History   • Marital status: Single     Spouse name: N/A   • Number of children: N/A   • Years of education: N/A     Occupational History   • Not on file.     Social History Main Topics   • Smoking status: Former Smoker   • Smokeless tobacco: Never Used   • Alcohol use No   • Drug use: No   • Sexual activity: Not on file     Other Topics Concern   • Not on file     Social History Narrative   • No narrative on file     ALLERGIES:  Allergies   Allergen Reactions   • Allegra [Fexofenadine] Unspecified     Rxn = unknown   • Amoxicillin    • Azithromycin Unspecified     Rxn = unknown   • Claritin    • Erythromycin Unspecified     Rxn = unknown   • Ibuprofen & Caffeine-Vitamins Unspecified     Rxn = unknown   • Penicillins Unspecified     Rxn = unknown   • Procardia [Nifedipine]    • Rosemary Oil Anaphylaxis     Throat starts to close/swell.    • Zyrtec [Cetirizine] Unspecified     Rxn = unknown     TOBHX  History   Smoking Status   • Former Smoker   Smokeless Tobacco   • Never Used     ALCHX  History   Alcohol Use No     DRUGHX  History   Drug Use No           MEDICATIONS:  Current Outpatient Prescriptions   Medication   • Melatonin 5 MG Tab   • coenzyme Q-10 30 MG capsule   • Esomeprazole Magnesium (NEXIUM 24HR PO)   • Ascorbic Acid (VITAMIN C) 1000 MG Tab   • calcium citrate (CALCITRATE) 950 MG Tab   • raNITidine (ZANTAC) 150 MG Tab   • LORazepam (ATIVAN) 1 MG Tab   • levetiracetam (KEPPRA) 500 MG Tab   • carvedilol (COREG) 25 MG Tab   • lisinopril  "(PRINIVIL) 20 MG Tab   • phenytoin ER (DILANTIN) 200 MG ER capsule   • aspirin (ASA) 325 MG Tab   • amlodipine (NORVASC) 5 MG Tab   • atorvastatin (LIPITOR) 40 MG Tab   • folic acid (FOLVITE) 1 MG Tab   • oxcarbazepine (TRILEPTAL) 300 MG Tab   • sodium bicarbonate (SODIUM BICARBONATE) 650 MG Tab   • acetaminophen (TYLENOL) 325 MG Tab   • NS SOLN 50 mL with cefUROXime 7.5 GM SOLR 750 mg   • Sodium Chloride (NS) Solution   • heparin 5000 UNIT/ML Solution   • senna-docusate (PERICOLACE OR SENOKOT S) 8.6-50 MG Tab   • polyethylene glycol/lytes (MIRALAX) Pack   • magnesium hydroxide (MILK OF MAGNESIA) 400 MG/5ML Suspension   • bisacodyl (DULCOLAX) 10 MG Suppos   • ipratropium-albuterol (DUONEB) 0.5-2.5 (3) MG/3ML nebulizer solution   • albuterol 108 (90 BASE) MCG/ACT Aero Soln inhalation aerosol   • omeprazole (PRILOSEC) 20 MG delayed-release capsule   • Sodium Chloride (NS) Solution     No current facility-administered medications for this visit.        REVIEW OF SYSTEM:    Constitutional: Denies fevers, Denies weight changes   Eyes: Denies changes in vision, no eye pain   Ears/Nose/Throat/Mouth: Denies nasal congestion or sore throat   Cardiovascular: cardiomyopathy, HTN  Respiratory: asthma  Gastrointestinal/Hepatic: Denies abdominal pain, nausea, vomiting, diarrhea, constipation or GI bleeding   Genitourinary: kidney dysfunction  Musculoskeletal/Rheum: Denies joint pain and swelling   Skin/Breast: Denies rash, denies breast lumps or discharge   Neurological: seizure, stroke  Psychiatric: anxiety,   Endocrine: denies hx of diabetes or thyroid dysfunction   Heme/Oncology/Lymph Nodes: Denies enlarged lymph nodes, denies brusing or known bleeding disorder   Allergic/Immunologic: Denies hx of allergies         PHYSICAL AND NEUROLOGICAL EXMAINATIONS:  VITAL SIGNS: /78   Pulse 60   Temp 36.4 °C (97.5 °F)   Ht 1.753 m (5' 9\")   Wt 114 kg (251 lb 5.2 oz)   SpO2 97%   BMI 37.11 kg/m²   CURRENT WEIGHT:   BMI: Body " mass index is 37.11 kg/m².  PREVIOUS WEIGHTS:  Wt Readings from Last 25 Encounters:   05/11/18 114 kg (251 lb 5.2 oz)   11/09/17 110.7 kg (244 lb)   03/27/17 122.8 kg (270 lb 11.6 oz)   03/11/17 119.9 kg (264 lb 5.3 oz)   12/21/16 118.4 kg (261 lb)   10/12/16 114.3 kg (252 lb)   08/10/16 115.7 kg (255 lb)   06/08/16 117.5 kg (259 lb)   03/31/16 116.1 kg (256 lb)   03/03/16 119.3 kg (263 lb)   12/25/15 118.3 kg (260 lb 12.9 oz)   11/29/15 117.3 kg (258 lb 9.6 oz)   07/14/14 130.6 kg (288 lb)   01/27/14 123.4 kg (272 lb)   10/03/13 117 kg (258 lb)   05/15/13 110.7 kg (244 lb)   08/03/11 107.9 kg (237 lb 14.4 oz)       General appearance of patient: WDWN(+) NAD(+)    EYES  o Fundus : Papilledem(-) Exudates(-) Hemorrhage(-)  Nervous System  Orientation to time, place and person(+)  Memory normal(-)  Language: aphasia(-)  Knowledge: past(+) Current(+)  Attention(+)  Cranial Nerves  • Nerve 2: intact  • Nerve 3,4,6: intact  • Nerve 5 : intact  • Nerve 7: intact  • Nerve 8: intact  • Nerve 9 & 10: intact  • Nerve 11: intact  • Nerve 12: intact  Muscle Power and muscle tone: symmetric, normal in upper and lower  Sensory System: Pin sensation intact(+)  Reflexes: symmetric throughout  Cerebellar Function FNP normal   Gait : able to walk without help  Heart and Vascular  Peripheral Vasucular system : Edema (-) Swelling(-)  RHB, Breathing sound clear  abdomen bowel sound normoactive  Extremities freely moveable  Joints no contracture       NEUROIMAGING: I reviewed the MRI/CT of brain       LAB:            IMPRESSION:    1.  Status post stroke/intracerebral hemorrhage in 2004.  2.  Diagnosis of Moyamoya disease.  3.  Incidental ophthalmic artery aneurysm.  4.  Chronic renal failure.Heart failure  5.  Dilated cardiomyopathy with mitral regurgitation and aortic valve sclerosis. asthma  6.  Anxiety and on Lorazepam 1mg TID for 5 years(  initially prescribed by neurologist in [place])  7.   Epilepsy    PLAN/RECOMMENDATIONS:      We will get MRI of brain, MRA of head and neck regarding the Moyamoya and old strokes  We will offer EEG and blood tests  Please contact us 2 weeks after these tests-- we might change the medication based on these tests    Please contact us if any more seizures    We give the patient a plan to taper off Lorazepam  Has been on 1mg TID for 5 years, we will slowly reduce to 1mg BID for 2 months, then 1mg QD for another 2 months-- slowly taper ( explain to the patient and family that , lorazepam is not the ideal medication for the long term control of her symptoms)    We will offer the patient a referral to psychiatrist for long term anxiety management  Continue Dilantin 100mg three times per day  Continue Keppra 500mg one AM 2 # night  This is the first time for us to see the patient, we will not change seizure medication       SIGNATURE:  Heidi Rhoades      CC:  SILKE Hayes    2015 MRI of brain  2.  Multifocal chronic infarcts.  3.  Chronic hemosiderin deposits in the bilateral frontal subarachnoid spaces.  There is nonvisualization of the supraclinoid portions of the bilateral internal carotid arteries with multiple basilar moyamoya collateral vessels consistent with moyamoya disease.        There is nonvisualization of the supraclinoid portions of the bilateral internal carotid arteries with multiple basilar moyamoya collateral vessels consistent with moyamoya disease.

## 2018-05-11 NOTE — PATIENT INSTRUCTIONS
IMPRESSION:    1.  Status post stroke/intracerebral hemorrhage in 2004.  2.  Diagnosis of Moyamoya disease.  3.  Incidental ophthalmic artery aneurysm.  4.  Chronic renal failure.Heart failure  5.  Dilated cardiomyopathy with mitral regurgitation and aortic valve sclerosis. asthma  6.  Anxiety and on Lorazepam 1mg TID for 5 years(  initially prescribed by neurologist in [place])  7.  Epilepsy    PLAN/RECOMMENDATIONS:      We will get MRI of brain, MRA of head and neck regarding the Moyamoya and old strokes  We will offer EEG and blood tests  Please contact us if any more seizures    We give the patient a plan to taper off Lorazepam  Has been on 1mg TID for 5 years, we will slowly reduce to 1mg BID for 2 months, then 1mg QD for another 2 months-- slowly taper ( explain to the patient and family that , lorazepam is not the ideal medication for the long term control of her symptoms)    We will offer the patient a referral to psychiatrist for long term anxiety management  Continue Dilantin 100mg three times per day  Continue Keppra 500mg one AM 2 # night  This is the first time for us to see the patient, we will not change seizure medication       SIGNATURE:  Heidi Rhoades      CC:  MELISSA Hayes.    2015 MRI of brain  2.  Multifocal chronic infarcts.  3.  Chronic hemosiderin deposits in the bilateral frontal subarachnoid spaces.  There is nonvisualization of the supraclinoid portions of the bilateral internal carotid arteries with multiple basilar moyamoya collateral vessels consistent with moyamoya disease.        There is nonvisualization of the supraclinoid portions of the bilateral internal carotid arteries with multiple basilar moyamoya collateral vessels consistent with moyamoya disease.

## 2018-06-05 ENCOUNTER — HOSPITAL ENCOUNTER (OUTPATIENT)
Dept: RADIOLOGY | Facility: MEDICAL CENTER | Age: 48
End: 2018-06-05
Attending: PSYCHIATRY & NEUROLOGY
Payer: MEDICARE

## 2018-06-05 DIAGNOSIS — G40.909 SEIZURE CEREBRAL (HCC): ICD-10-CM

## 2018-06-05 DIAGNOSIS — I67.5 MOYAMOYA: ICD-10-CM

## 2018-06-05 DIAGNOSIS — E56.9 VITAMIN DEFICIENCY: ICD-10-CM

## 2018-06-05 DIAGNOSIS — I42.0 DILATED CARDIOMYOPATHY (HCC): ICD-10-CM

## 2018-06-05 DIAGNOSIS — G40.409 SEIZURE DISORDER, GRAND MAL (HCC): ICD-10-CM

## 2018-06-05 DIAGNOSIS — Z86.73 H/O: CVA (CEREBROVASCULAR ACCIDENT): ICD-10-CM

## 2018-06-05 PROCEDURE — 70551 MRI BRAIN STEM W/O DYE: CPT

## 2018-06-05 PROCEDURE — 70547 MR ANGIOGRAPHY NECK W/O DYE: CPT

## 2018-06-05 PROCEDURE — 70544 MR ANGIOGRAPHY HEAD W/O DYE: CPT

## 2018-06-14 ENCOUNTER — APPOINTMENT (OUTPATIENT)
Dept: NEUROLOGY | Facility: MEDICAL CENTER | Age: 48
End: 2018-06-14
Payer: MEDICARE

## 2018-07-03 RX ORDER — OXCARBAZEPINE 300 MG/1
300 TABLET, FILM COATED ORAL 2 TIMES DAILY
Qty: 180 TAB | Refills: 1 | Status: SHIPPED | OUTPATIENT
Start: 2018-07-03 | End: 2018-10-01 | Stop reason: SDUPTHER

## 2018-07-10 ENCOUNTER — OFFICE VISIT (OUTPATIENT)
Dept: URGENT CARE | Facility: PHYSICIAN GROUP | Age: 48
End: 2018-07-10
Payer: MEDICARE

## 2018-07-10 VITALS
HEART RATE: 53 BPM | OXYGEN SATURATION: 97 % | HEIGHT: 69 IN | DIASTOLIC BLOOD PRESSURE: 84 MMHG | BODY MASS INDEX: 35.7 KG/M2 | TEMPERATURE: 98.1 F | SYSTOLIC BLOOD PRESSURE: 130 MMHG | WEIGHT: 241 LBS | RESPIRATION RATE: 16 BRPM

## 2018-07-10 DIAGNOSIS — E66.9 OBESITY (BMI 30-39.9): ICD-10-CM

## 2018-07-10 DIAGNOSIS — S91.331A PUNCTURE WOUND OF RIGHT FOOT, INITIAL ENCOUNTER: Primary | ICD-10-CM

## 2018-07-10 PROCEDURE — 99203 OFFICE O/P NEW LOW 30 MIN: CPT | Performed by: PHYSICIAN ASSISTANT

## 2018-07-10 NOTE — PROGRESS NOTES
Chief Complaint   Patient presents with   • Puncture Wound     R foot       HISTORY OF PRESENT ILLNESS: Patient is a 48 y.o. female who presents today because she has a puncture wound on the bottom of her right foot.  It has been there for the last 2-3 months.  She has not sought medical treatment for this.  It seems to be getting worse.  It is tender but has not been draining.  She has been using antibiotic ointment on it    Patient Active Problem List    Diagnosis Date Noted   • Cellulitis of right foot 02/26/2017     Priority: High   • Chest pain 12/24/2015     Priority: High   • Moyamoya 02/26/2017     Priority: Medium   • CKD stage 4 secondary to hypertension ([facility]) 02/26/2017     Priority: Medium   • Leukocytosis 12/24/2015     Priority: Medium   • Bronchitis 12/24/2015     Priority: Medium   • Seizure disorder, grand mal ([facility]) 03/03/2016     Priority: Low   • HLD (hyperlipidemia) 12/24/2015     Priority: Low   • HTN (hypertension) 12/24/2015     Priority: Low   • H/O: CVA (cerebrovascular accident) 12/24/2015     Priority: Low   • Anxiety 12/24/2015     Priority: Low   • Essential hypertension, benign 10/03/2013     Priority: Low   • Dilated cardiomyopathy (CMS-HCC) 10/03/2013     Priority: Low   • Obesity (BMI 30-39.9) 07/10/2018   • Vitamin deficiency 05/11/2018   • Cellulitis of foot 03/26/2017   • Sepsis ([facility]) 03/26/2017   • Acute respiratory failure with hypoxia ([facility]) 03/26/2017   • Acute cystitis without hematuria 11/29/2015   • Aphasia complicating stroke 11/28/2015   • CKD (chronic kidney disease), stage V ([facility]) 07/14/2014   • Mild mitral regurgitation 07/14/2014   • Aortic valve sclerosis 07/14/2014   • Pure hypercholesterolemia 01/27/2014   • Morbid obesity ([facility]) 10/03/2013       Allergies:Allegra [fexofenadine]; Amoxicillin; Azithromycin; Claritin; Erythromycin; Ibuprofen & caffeine-vitamins; Penicillins; Procardia [nifedipine]; Rosemary oil; and Zyrtec [cetirizine]    Current Outpatient  Prescriptions Ordered in Epic   Medication Sig Dispense Refill   • OXcarbazepine (TRILEPTAL) 300 MG Tab Take 1 Tab by mouth 2 Times a Day. 180 Tab 1   • Melatonin 5 MG Tab Take  by mouth.     • coenzyme Q-10 30 MG capsule Take 60 mg by mouth every day.     • Esomeprazole Magnesium (NEXIUM 24HR PO) Take  by mouth.     • Ascorbic Acid (VITAMIN C) 1000 MG Tab Take  by mouth.     • calcium citrate (CALCITRATE) 950 MG Tab Take 950 mg by mouth every day.     • raNITidine (ZANTAC) 150 MG Tab Take 150 mg by mouth 2 times a day.     • LORazepam (ATIVAN) 1 MG Tab Take 1 Tab by mouth 2 Times a Day for 60 days. 120 Tab 0   • levetiracetam (KEPPRA) 500 MG Tab TAKE ONE TABLET BY MOUTH IN THE MORNING, AND TAKE TWO TABLETS AT NIGHT 90 Tab 5   • carvedilol (COREG) 25 MG Tab Take 1 Tab by mouth 3 times a day. 60 Tab    • lisinopril (PRINIVIL) 20 MG Tab Take 1 Tab by mouth 2 Times a Day. 30 Tab    • phenytoin ER (DILANTIN) 200 MG ER capsule Take 1 Cap by mouth 2 Times a Day. 90 Cap 11   • aspirin (ASA) 325 MG Tab Take 1 Tab by mouth every day. 100 Tab    • amlodipine (NORVASC) 5 MG Tab Take 1 Tab by mouth every day. 30 Tab    • atorvastatin (LIPITOR) 40 MG Tab Take 1 Tab by mouth every day. 30 Tab    • folic acid (FOLVITE) 1 MG Tab Take 1 Tab by mouth every day. 30 Tab    • sodium bicarbonate (SODIUM BICARBONATE) 650 MG Tab Take 1 Tab by mouth 3 times a day. 120 Tab 3   • NS SOLN 50 mL with cefUROXime 7.5 GM SOLR 750 mg 750 mg by Intravenous route every 8 hours.     • Sodium Chloride (NS) Solution 85 mL by Intravenous route Continuous.  0   • heparin 5000 UNIT/ML Solution Inject 1 mL as instructed every 8 hours.  0   • acetaminophen (TYLENOL) 325 MG Tab Take 2 Tabs by mouth every 6 hours as needed (Mild Pain; (Pain scale 1-3); Temp greater than 100.5 F). 30 Tab 0   • senna-docusate (PERICOLACE OR SENOKOT S) 8.6-50 MG Tab Take 2 Tabs by mouth 2 Times a Day. 30 Tab 0   • polyethylene glycol/lytes (MIRALAX) Pack Take 1 Packet by mouth 1  time daily as needed (if sennosides and docusate ineffective after 24 hours).  3   • magnesium hydroxide (MILK OF MAGNESIA) 400 MG/5ML Suspension Take 30 mL by mouth 1 time daily as needed (if polyethylene glycol ineffective after 24 hours). 1 Bottle    • bisacodyl (DULCOLAX) 10 MG Suppos Insert 1 Suppository in rectum 1 time daily as needed (if magnesium hydroxide ineffective after 24 hours).  0   • ipratropium-albuterol (DUONEB) 0.5-2.5 (3) MG/3ML nebulizer solution 3 mL by Nebulization route every four hours as needed for Shortness of Breath.     • albuterol 108 (90 BASE) MCG/ACT Aero Soln inhalation aerosol Inhale 2 Puffs by mouth every four hours as needed for Shortness of Breath. 8.5 g    • omeprazole (PRILOSEC) 20 MG delayed-release capsule Take 1 Cap by mouth every day. 30 Cap    • Sodium Chloride (NS) Solution 1,000 mL by Intravenous route Once PRN (If not already previously administered (THIS IS THE FIRST BOLUS)).  0     No current [facility]-ordered facility-administered medications on file.        Past Medical History:   Diagnosis Date   • CKD (chronic kidney disease), stage IV ([facility]) 12/24/2015   • H/O: CVA (cerebrovascular accident) 12/24/2015   • Hypertension    • Kidney disease    • Mild mitral regurgitation 7/14/2014   • Moyamoya disease 10/3/2013   • Seizure disorder, grand mal ([facility]) 3/3/2016   • Stroke ([facility])        Social History   Substance Use Topics   • Smoking status: Former Smoker   • Smokeless tobacco: Never Used   • Alcohol use No       Family Status   Relation Status   • Mother Alive   • Father Alive   • Maternal Grandmother Other   • Maternal Grandfather Other   • Paternal Grandmother Other   • Paternal Grandfather Other     Family History   Problem Relation Age of Onset   • Diabetes Mother    • Hypertension Mother    • Hypertension Father    • Arthritis Father    • Diabetes Maternal Grandmother    • Psychiatry Maternal Grandfather    • Cancer Paternal Grandmother    • Psychiatry Paternal  "Grandfather        ROS:  Review of Systems   Constitutional: Negative for fever, chills, weight loss and malaise/fatigue.   HENT: Negative for ear pain, nosebleeds, congestion, sore throat and neck pain.    Eyes: Negative for blurred vision.   Respiratory: Negative for cough, sputum production, shortness of breath and wheezing.    Cardiovascular: Negative for chest pain, palpitations, orthopnea and leg swelling.   Gastrointestinal: Negative for heartburn, nausea, vomiting and abdominal pain.   Genitourinary: Negative for dysuria, urgency and frequency.     Exam:  Blood pressure 130/84, pulse (!) 53, temperature 36.7 °C (98.1 °F), resp. rate 16, height 1.753 m (5' 9\"), weight 109.3 kg (241 lb), SpO2 97 %.  General:  Well nourished, well developed female in NAD  Head:Normocephalic, atraumatic  Eyes: PERRLA, EOM within normal limits, no conjunctival injection, no scleral icterus, visual fields and acuity grossly intact.  Extremities: no clubbing, cyanosis, or edema.  On the plantar surface of her right foot, just underneath the first MTP joint there is a 1 cm diameter ulceration extending through the dermis.  There is no discolored drainage, there is no surrounding erythema but there is surrounding callus formation    Please note that this dictation was created using voice recognition software. I have made every reasonable attempt to correct obvious errors, but I expect that there are errors of grammar and possibly content that I did not discover before finalizing the note.    Assessment/Plan:  1. Puncture wound of right foot, initial encounter  REFERRAL TO WOUND CLINIC   2. Obesity (BMI 30-39.9)  Patient identified as having weight management issue.  Appropriate orders and counseling given.   Wound care instructions given.    Followup with primary care in the next 7-10 days if not significantly improving, return to the urgent care or go to the emergency room sooner for any worsening of symptoms.       "

## 2018-07-26 ENCOUNTER — TELEPHONE (OUTPATIENT)
Dept: NEUROLOGY | Facility: MEDICAL CENTER | Age: 48
End: 2018-07-26

## 2018-07-26 NOTE — TELEPHONE ENCOUNTER
LORazepam (ATIVAN) 1 MG Tab      (1 dispense in past 3 months) 7/25/2018 9/23/2018 7/25/2018          Take 1 Tab by mouth every day for 60 days        Above script called into [facility] spoke to [place].

## 2018-08-17 DIAGNOSIS — R56.9 SEIZURE (HCC): ICD-10-CM

## 2018-08-21 RX ORDER — LEVETIRACETAM 500 MG/1
TABLET ORAL
Qty: 90 TAB | Refills: 5 | Status: SHIPPED | OUTPATIENT
Start: 2018-08-21 | End: 2018-10-01 | Stop reason: SDUPTHER

## 2018-09-25 ENCOUNTER — APPOINTMENT (OUTPATIENT)
Dept: NEUROLOGY | Facility: MEDICAL CENTER | Age: 48
End: 2018-09-25
Payer: MEDICARE

## 2018-10-01 ENCOUNTER — OFFICE VISIT (OUTPATIENT)
Dept: NEUROLOGY | Facility: MEDICAL CENTER | Age: 48
End: 2018-10-01
Payer: MEDICARE

## 2018-10-01 VITALS
HEIGHT: 69 IN | WEIGHT: 245.4 LBS | OXYGEN SATURATION: 96 % | HEART RATE: 51 BPM | BODY MASS INDEX: 36.35 KG/M2 | RESPIRATION RATE: 16 BRPM

## 2018-10-01 DIAGNOSIS — R56.9 SEIZURE (HCC): ICD-10-CM

## 2018-10-01 DIAGNOSIS — I42.0 DILATED CARDIOMYOPATHY (HCC): ICD-10-CM

## 2018-10-01 DIAGNOSIS — J96.01 ACUTE RESPIRATORY FAILURE WITH HYPOXIA (HCC): ICD-10-CM

## 2018-10-01 DIAGNOSIS — G40.409 SEIZURE DISORDER, GRAND MAL (HCC): ICD-10-CM

## 2018-10-01 DIAGNOSIS — Z86.73 H/O: CVA (CEREBROVASCULAR ACCIDENT): ICD-10-CM

## 2018-10-01 PROCEDURE — 99214 OFFICE O/P EST MOD 30 MIN: CPT | Performed by: PSYCHIATRY & NEUROLOGY

## 2018-10-01 RX ORDER — LORAZEPAM 1 MG/1
1 TABLET ORAL 2 TIMES DAILY
Qty: 60 TAB | Refills: 3 | Status: SHIPPED | OUTPATIENT
Start: 2018-10-01 | End: 2019-01-29

## 2018-10-01 RX ORDER — OXCARBAZEPINE 300 MG/1
300 TABLET, FILM COATED ORAL 2 TIMES DAILY
Qty: 180 TAB | Refills: 1 | Status: SHIPPED | OUTPATIENT
Start: 2018-10-01 | End: 2019-02-14 | Stop reason: SDUPTHER

## 2018-10-01 RX ORDER — PHENYTOIN SODIUM 100 MG/1
100 CAPSULE, EXTENDED RELEASE ORAL 3 TIMES DAILY
Qty: 270 CAP | Refills: 1 | Status: SHIPPED | OUTPATIENT
Start: 2018-10-01 | End: 2019-02-14 | Stop reason: SDUPTHER

## 2018-10-01 RX ORDER — LEVETIRACETAM 500 MG/1
500 TABLET ORAL 3 TIMES DAILY
Qty: 270 TAB | Refills: 1 | Status: SHIPPED | OUTPATIENT
Start: 2018-10-01 | End: 2019-02-14 | Stop reason: SDUPTHER

## 2018-10-01 NOTE — PATIENT INSTRUCTIONS
IMPRESSION:    1.  Status post stroke/intracerebral hemorrhage in 2004.  2.  Diagnosis of Moyamoya disease.  3.  Incidental ophthalmic artery aneurysm.  4.  Chronic renal failure.Heart failure  5.  Dilated cardiomyopathy with mitral regurgitation and aortic valve sclerosis. Asthma, Bradycardia  6.  Anxiety and on Lorazepam 1mg BID (  initially prescribed by neurologist in [place])  7.  Epilepsy    PLAN/RECOMMENDATIONS:      We will offer EEG   Please contact us 2 weeks after these tests-- we might change the medication based on these tests    Please contact us if any more seizures    We give the patient a plan to taper off Lorazepam  Lorazepam now is  1mg BID -- advise slowly taper ( explain to the patient and family that , lorazepam is not the ideal medication for the long term control of her symptoms)  Such as 1.5mg daily for 2 month, then reduce again to 1mg daily     Advise weight loss and exercise    Continue Dilantin 100mg three times per day  Continue Keppra 500mg one AM 2 # night      We will refer the patient to a new cardiologist regarding hx of heart failure and bradycardia   ________________________________________________________________________    REFERENCE CAD and MOYAMOYA    Eur Heart J Cardiovasc Imaging. 2016 May;17(5):575. doi: 10.1093/jci/[email or web address]. Epub 2016 Feb 9.  Peripheral pulmonary artery stenoses in the setting of Moyamoya.  Moceri P1, Laïk J2, Bonoe H3, Fradree A4, Richard E2.    Send to  J [place] Med Sci. 2015 Apr;30(4):470-4. doi: 10.3346/[email or web address]. Epub 2015 Mar 19.  Coronary heart disease in moyamoya disease: are they concomitant or coincidence?  Vernon TM1, Niyah KI1, Yeon JY1, Van SC1, Radha JS1.    Circulation. 2013 May 21;127(20):2063-5. doi: [id number].  Coronary artery stenosis in moyamoya disease: tissue characterization by 256-slice multi-detector CT and virtual histology.  Stephane JH1, Ella TJ, Madelyn YE, Evon JJ, Van SJ, Roosevelt EJ, Crow SI, Ovi  YS, Ovi GY, Jazz IH, Maria DJ.  ________________________________________________________________________              SIGNATURE:  WandaLacey Jf      CC:  SILKE Hayes    2018 MRI-- the same old strokes  1.  Nonvisualization of distal supraclinoid segments of the bilateral internal carotid arteries and proximal segments of bilateral middle cerebral arteries consistent with moyamoya disease. There is reconstitution of bilateral middle and anterior   cerebral arteries via leptomeningeal collaterals.  2.  Chronic infarcts in the right basal ganglia and left frontal lobe.  3.  Chronic hemosiderin deposition in the bilateral frontal subarachnoid space can be seen in the patients with moyamoya disease.  4.  Mild cerebral atrophy.  1.  There is nonvisualization of supraclinoid portion of the bilateral internal carotid arteries consistent with the clinical diagnosis of moyamoya disease.  2.  There are multiple leptomeningeal collaterals reconstituting the distal segments of bilateral anterior and middle cerebral arteries.  3.  Mild diffuse luminal irregularities noted throughout the visualized intracranial vasculature.

## 2018-10-01 NOTE — PROGRESS NOTES
NEUROLOGY NOTE    Referring Physician  SILKE Hayes      CHIEF COMPLAINT:    Seizure and anxiety  Old stroke  Used to get lorazepam tid   On the process of tapering Lorazepam  Chief Complaint   Patient presents with   • Follow-Up     H/O CVA       PRESENT ILLNESS:   On the process of tapering Lorazepam  Seizure and anxiety  Old stroke  Used to get lorazepam tid     The patient is a 48-year-old woman, who had a stroke in 2004,   which per subsequent notes by Dr. Ruby, was associated with hemorrhage or   hemorrhagic conversion.  She was found to have Moyamoya disease.  She also has   a history of dilated cardiomyopathy    1.  Status post stroke/intracerebral hemorrhage in 2004.  2.  Diagnosis of Moyamoya disease.  3.  Incidental ophthalmic artery aneurysm.  4.  Chronic renal failure.  5.  Dilated cardiomyopathy with mitral regurgitation and aortic valve   sclerosis.    PAST MEDICAL HISTORY:  Past Medical History:   Diagnosis Date   • CKD (chronic kidney disease), stage IV ([facility]) 12/24/2015   • H/O: CVA (cerebrovascular accident) 12/24/2015   • Hypertension    • Kidney disease    • Mild mitral regurgitation 7/14/2014   • Moyamoya disease 10/3/2013   • Seizure disorder, grand mal ([facility]) 3/3/2016   • Stroke ([facility])        PAST SURGICAL HISTORY:  Past Surgical History:   Procedure Laterality Date   • IRRIGATION & DEBRIDEMENT ORTHO Right 3/9/2017    Procedure: IRRIGATION & DEBRIDEMENT ORTHO - FOOT;  Surgeon: Gerardo Lynn M.D.;  Location: SURGERY [facility];  Service:    • IRRIGATION & DEBRIDEMENT ORTHO Right 3/5/2017    Procedure: IRRIGATION & DEBRIDEMENT ORTHO AND GASTROC RECESSION;  Surgeon: Gerardo Lynn M.D.;  Location: SURGERY [facility];  Service:    • METATARSAL HEAD RESECTION  3/5/2017    Procedure: SECOND METATARSAL HEAD RESECTION;  Surgeon: Gerardo Lynn M.D.;  Location: SURGERY [facility];  Service:    • IRRIGATION & DEBRIDEMENT ORTHO Right 2/27/2017    Procedure: IRRIGATION &  DEBRIDEMENT ORTHO;  Surgeon: Coleman Monterroso M.D.;  Location: SURGERY [facility];  Service:    • PRIMARY C SECTION     • TUBAL COAGULATION LAPAROSCOPIC BILATERAL         FAMILY HISTORY:  Family History   Problem Relation Age of Onset   • Diabetes Mother    • Hypertension Mother    • Hypertension Father    • Arthritis Father    • Diabetes Maternal Grandmother    • Psychiatry Maternal Grandfather    • Cancer Paternal Grandmother    • Psychiatry Paternal Grandfather        SOCIAL HISTORY:  Social History     Social History   • Marital status: Single     Spouse name: N/A   • Number of children: N/A   • Years of education: N/A     Occupational History   • Not on file.     Social History Main Topics   • Smoking status: Former Smoker   • Smokeless tobacco: Never Used   • Alcohol use No   • Drug use: No   • Sexual activity: Not on file     Other Topics Concern   • Not on file     Social History Narrative   • No narrative on file     ALLERGIES:  Allergies   Allergen Reactions   • Allegra [Fexofenadine] Unspecified     Rxn = unknown   • Amoxicillin    • Azithromycin Unspecified     Rxn = unknown   • Claritin    • Erythromycin Unspecified     Rxn = unknown   • Ibuprofen & Caffeine-Vitamins Unspecified     Rxn = unknown   • Penicillins Unspecified     Rxn = unknown   • Procardia [Nifedipine]    • Rosemary Oil Anaphylaxis     Throat starts to close/swell.    • Zyrtec [Cetirizine] Unspecified     Rxn = unknown     TOBHX  History   Smoking Status   • Former Smoker   Smokeless Tobacco   • Never Used     ALCHX  History   Alcohol Use No     DRUGHX  History   Drug Use No           MEDICATIONS:  Current Outpatient Prescriptions   Medication   • levETIRAcetam (KEPPRA) 500 MG Tab   • OXcarbazepine (TRILEPTAL) 300 MG Tab   • Melatonin 5 MG Tab   • coenzyme Q-10 30 MG capsule   • Esomeprazole Magnesium (NEXIUM 24HR PO)   • Ascorbic Acid (VITAMIN C) 1000 MG Tab   • calcium citrate (CALCITRATE) 950 MG Tab   • raNITidine (ZANTAC) 150  "MG Tab   • carvedilol (COREG) 25 MG Tab   • lisinopril (PRINIVIL) 20 MG Tab   • phenytoin ER (DILANTIN) 200 MG ER capsule   • aspirin (ASA) 325 MG Tab   • amlodipine (NORVASC) 5 MG Tab   • folic acid (FOLVITE) 1 MG Tab   • sodium bicarbonate (SODIUM BICARBONATE) 650 MG Tab   • atorvastatin (LIPITOR) 40 MG Tab   • NS SOLN 50 mL with cefUROXime 7.5 GM SOLR 750 mg   • Sodium Chloride (NS) Solution   • heparin 5000 UNIT/ML Solution   • acetaminophen (TYLENOL) 325 MG Tab   • senna-docusate (PERICOLACE OR SENOKOT S) 8.6-50 MG Tab   • polyethylene glycol/lytes (MIRALAX) Pack   • magnesium hydroxide (MILK OF MAGNESIA) 400 MG/5ML Suspension   • bisacodyl (DULCOLAX) 10 MG Suppos   • ipratropium-albuterol (DUONEB) 0.5-2.5 (3) MG/3ML nebulizer solution   • albuterol 108 (90 BASE) MCG/ACT Aero Soln inhalation aerosol   • omeprazole (PRILOSEC) 20 MG delayed-release capsule   • Sodium Chloride (NS) Solution     No current facility-administered medications for this visit.        REVIEW OF SYSTEM:    Constitutional: Denies fevers, Denies weight changes   Eyes: Denies changes in vision, no eye pain   Ears/Nose/Throat/Mouth: Denies nasal congestion or sore throat   Cardiovascular: cardiomyopathy, HTN  Respiratory: asthma  Gastrointestinal/Hepatic: Denies abdominal pain, nausea, vomiting, diarrhea, constipation or GI bleeding   Genitourinary: kidney dysfunction  Musculoskeletal/Rheum: Denies joint pain and swelling   Skin/Breast: Denies rash, denies breast lumps or discharge   Neurological: seizure, stroke  Psychiatric: anxiety,   Endocrine: denies hx of diabetes or thyroid dysfunction   Heme/Oncology/Lymph Nodes: Denies enlarged lymph nodes, denies brusing or known bleeding disorder   Allergic/Immunologic: Denies hx of allergies         PHYSICAL AND NEUROLOGICAL EXMAINATIONS:  VITAL SIGNS: Pulse (!) 51   Resp 16   Ht 1.753 m (5' 9\")   Wt 111.3 kg (245 lb 6.4 oz)   SpO2 96%   BMI 36.24 kg/m²   CURRENT WEIGHT:   BMI: Body mass " index is 36.24 kg/m².  PREVIOUS WEIGHTS:  Wt Readings from Last 25 Encounters:   10/01/18 111.3 kg (245 lb 6.4 oz)   07/10/18 109.3 kg (241 lb)   05/11/18 114 kg (251 lb 5.2 oz)   11/09/17 110.7 kg (244 lb)   03/27/17 122.8 kg (270 lb 11.6 oz)   03/11/17 119.9 kg (264 lb 5.3 oz)   12/21/16 118.4 kg (261 lb)   10/12/16 114.3 kg (252 lb)   08/10/16 115.7 kg (255 lb)   06/08/16 117.5 kg (259 lb)   03/31/16 116.1 kg (256 lb)   03/03/16 119.3 kg (263 lb)   12/25/15 118.3 kg (260 lb 12.9 oz)   11/29/15 117.3 kg (258 lb 9.6 oz)   07/14/14 130.6 kg (288 lb)   01/27/14 123.4 kg (272 lb)   10/03/13 117 kg (258 lb)   05/15/13 110.7 kg (244 lb)   08/03/11 107.9 kg (237 lb 14.4 oz)       General appearance of patient: WDWN(+) NAD(+)    EYES  o Fundus : Papilledem(-) Exudates(-) Hemorrhage(-)  Nervous System  Orientation to time, place and person(+)  Memory normal(-)  Language: aphasia(-)  Knowledge: past(+) Current(+)  Attention(+)  Cranial Nerves  • Nerve 2: intact  • Nerve 3,4,6: intact  • Nerve 5 : intact  • Nerve 7: intact  • Nerve 8: intact  • Nerve 9 & 10: intact  • Nerve 11: intact  • Nerve 12: intact  Muscle Power and muscle tone: symmetric, normal in upper and lower  Sensory System: Pin sensation intact(+)  Reflexes: symmetric throughout  Cerebellar Function FNP normal   Gait : able to walk without help  Heart and Vascular  Peripheral Vasucular system : Edema (-) Swelling(-)  RHB, Breathing sound clear  abdomen bowel sound normoactive  Extremities freely moveable  Joints no contracture       NEUROIMAGING: I reviewed the MRI/CT of brain       LAB:          The patient did not want to see psychiatrist    Seeing     PCP  Kidney Doctor--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  Status post stroke/intracerebral hemorrhage in 2004.  2.  Diagnosis of Moyamoya disease.  3.  Incidental ophthalmic artery aneurysm.  4.  Chronic renal  failure.Heart failure  5.  Dilated cardiomyopathy with mitral regurgitation and aortic valve sclerosis. Asthma, Bradycardia  6.  Anxiety and on Lorazepam 1mg BID (  initially prescribed by neurologist in [place])  7.  Epilepsy    PLAN/RECOMMENDATIONS:      We will offer EEG   Please contact us 2 weeks after these tests-- we might change the medication based on these tests    Please contact us if any more seizures    We give the patient a plan to taper off Lorazepam  Lorazepam now is  1mg BID -- advise slowly taper ( explain to the patient and family that , lorazepam is not the ideal medication for the long term control of her symptoms)  Such as 1.5mg daily for 2 month, then reduce again to 1mg daily     Advise weight loss and exercise    Continue Dilantin 100mg three times per day  Continue Keppra 500mg one AM 2 # night      We will refer the patient to a new cardiologist regarding hx of heart failure and bradycardia   ________________________________________________________________________    REFERENCE CAD and MOYAMOYA    Eur Heart J Cardiovasc Imaging. 2016 May;17(5):575. doi: 10.1093/[facility][email or web address]. Epub 2016 Feb 9.  Peripheral pulmonary artery stenoses in the setting of Moyamoya.  Moceri P1, Laïk J2, Bouvaist H3, Fraisse A4, Richard E2.    Send to  J Spanish Med Sci. 2015 Apr;30(4):470-4. doi: 10.3346/[email or web address]. Epub 2015 Mar 19.  Coronary heart disease in moyamoya disease: are they concomitant or coincidence?  Vernon TM1, Niyah KI1, Yeon JY1, Van SC1, Radha JS1.    Circulation. 2013 May 21;127(20):2063-5. doi: 10.1161/[email or web address].  Coronary artery stenosis in moyamoya disease: tissue characterization by 256-slice multi-detector CT and virtual histology.  Stephane JH1, Ella TJ, Madelyn YE, Evon JJ, Van SJ, Roosevelt EJ, Crow SI, Cho YS, Cho GY, Jazz IH, Maria DJ.  ________________________________________________________________________              SIGNATURE:  Heidi Rhoades      CC:  Dino Morin,  A.P.N.    2018 MRI-- the same old strokes  1.  Nonvisualization of distal supraclinoid segments of the bilateral internal carotid arteries and proximal segments of bilateral middle cerebral arteries consistent with moyamoya disease. There is reconstitution of bilateral middle and anterior   cerebral arteries via leptomeningeal collaterals.  2.  Chronic infarcts in the right basal ganglia and left frontal lobe.  3.  Chronic hemosiderin deposition in the bilateral frontal subarachnoid space can be seen in the patients with moyamoya disease.  4.  Mild cerebral atrophy.  1.  There is nonvisualization of supraclinoid portion of the bilateral internal carotid arteries consistent with the clinical diagnosis of moyamoya disease.  2.  There are multiple leptomeningeal collaterals reconstituting the distal segments of bilateral anterior and middle cerebral arteries.  3.  Mild diffuse luminal irregularities noted throughout the visualized intracranial vasculature.        2015 MRI of brain  2.  Multifocal chronic infarcts.  3.  Chronic hemosiderin deposits in the bilateral frontal subarachnoid spaces.  There is nonvisualization of the supraclinoid portions of the bilateral internal carotid arteries with multiple basilar moyamoya collateral vessels consistent with moyamoya disease.        There is nonvisualization of the supraclinoid portions of the bilateral internal carotid arteries with multiple basilar moyamoya collateral vessels consistent with moyamoya disease.

## 2018-10-11 ENCOUNTER — NON-PROVIDER VISIT (OUTPATIENT)
Dept: NEUROLOGY | Facility: MEDICAL CENTER | Age: 48
End: 2018-10-11
Payer: MEDICARE

## 2018-10-11 DIAGNOSIS — I67.5 MOYAMOYA: ICD-10-CM

## 2018-10-11 DIAGNOSIS — G40.409 SEIZURE DISORDER, GRAND MAL (HCC): ICD-10-CM

## 2018-10-11 DIAGNOSIS — I42.0 DILATED CARDIOMYOPATHY (HCC): ICD-10-CM

## 2018-10-11 DIAGNOSIS — G40.909 SEIZURE CEREBRAL (HCC): ICD-10-CM

## 2018-10-11 DIAGNOSIS — Z86.73 H/O: CVA (CEREBROVASCULAR ACCIDENT): ICD-10-CM

## 2018-10-11 PROCEDURE — 95816 EEG AWAKE AND DROWSY: CPT | Performed by: PSYCHIATRY & NEUROLOGY

## 2018-10-11 NOTE — PROCEDURES
ELECTROENCEPHALOGRAM REPORT      Referring provider: Dr. ALSTON    DOS:   10/11/2018      INDICATION:  Pascale Acevedo 48 y.o. female presenting with EEG for pt w/ history of seizure secondary to stroke/intracerebral hemmorrhage in 2014.  L frontal and R basil ganglia. Pt report since on dilantin she has not had seizures. EEG to see if meds need to be adjusted.     HX CVA, HTN. Moyamoya Disease, opthalmic artery aneurysm, chronic renal faliure, cardiomyopathy.     CURRENT ANTIEPILEPTIC REGIMEN: Dilantin 300mg QD, Keppra     DURATION: 25 minutes      TECHNIQUE: A 30-channel video electroencephalogram (VEEG) was performed in accordance with the international 10-20 system. This digital study was reviewed in bipolar and referential montages. The recording examined the patient during wakeful, drowsy and sleep states.         DESCRIPTION OF THE RECORD:  During the awake state, background shows symmetrical 9-10 Hz alpha activity posteriorly with amplitude of 70 mV.  There was reactivity with eye opening/closure.  Normal anterior-posterior gradient was noted with faster beta frequencies seen anteriorly.  During drowsiness, high-amplitude delta frequencies were seen. There are few left temporal sharp    During the sleep state, background shows diffuse high-amplitude 4-5 Hz delta activity.  Symmetrical high-amplitude sleep spindles and vertex sharp activities were seen in the central leads.    ACTIVATION PROCEDURES:   Hyperventilation was not performed by the patient.   Intermittent Photic stimulation was  performed in a stepwise fashion from 1 to 30 Hz and elicited a normal response (photic driving), most noticeable in the posterior leads.      ICTAL AND/OR INTERICTAL FINDINGS:   Some focal left sharp. No regional slowing was seen during this study.  No seizures were recorded during the study.     EVENT(S):  NONE    EKG: sampling review of EKG recording demonstrated regular rhythm most of the time      INTERPRETATION:        ________________________________________________________________________     This is mildly abnormal routine EEG recording in the awake and drowsy/sleep state(s).    This scalp EEG is consistent with      1. Potential mild focal irritability over left ( this would increase the risk of focal seizure)       ________________________________________________________________________

## 2018-12-10 ENCOUNTER — TELEPHONE (OUTPATIENT)
Dept: CARDIOLOGY | Facility: MEDICAL CENTER | Age: 48
End: 2018-12-10

## 2018-12-10 NOTE — TELEPHONE ENCOUNTER
The patients mother called back regarding message I left. She said that she was recently hospitalized at [facility] in [place] and [facility]. I will call to obtain.

## 2019-02-14 ENCOUNTER — OFFICE VISIT (OUTPATIENT)
Dept: NEUROLOGY | Facility: MEDICAL CENTER | Age: 49
End: 2019-02-14
Payer: MEDICARE

## 2019-02-14 VITALS
HEIGHT: 69 IN | BODY MASS INDEX: 37.65 KG/M2 | DIASTOLIC BLOOD PRESSURE: 80 MMHG | HEART RATE: 68 BPM | WEIGHT: 254.2 LBS | SYSTOLIC BLOOD PRESSURE: 124 MMHG | TEMPERATURE: 98.8 F | OXYGEN SATURATION: 98 % | RESPIRATION RATE: 16 BRPM

## 2019-02-14 DIAGNOSIS — G40.909 SEIZURE CEREBRAL (HCC): ICD-10-CM

## 2019-02-14 DIAGNOSIS — R56.9 SEIZURE (HCC): ICD-10-CM

## 2019-02-14 DIAGNOSIS — G40.409 SEIZURE DISORDER, GRAND MAL (HCC): ICD-10-CM

## 2019-02-14 DIAGNOSIS — Z86.73 H/O: CVA (CEREBROVASCULAR ACCIDENT): ICD-10-CM

## 2019-02-14 DIAGNOSIS — I42.0 DILATED CARDIOMYOPATHY (HCC): ICD-10-CM

## 2019-02-14 DIAGNOSIS — J96.01 ACUTE RESPIRATORY FAILURE WITH HYPOXIA (HCC): ICD-10-CM

## 2019-02-14 DIAGNOSIS — F20.3 UNDIFFERENTIATED SCHIZOPHRENIA (HCC): ICD-10-CM

## 2019-02-14 PROBLEM — F29 PSYCHOSIS (HCC): Status: ACTIVE | Noted: 2019-02-14

## 2019-02-14 PROCEDURE — 99214 OFFICE O/P EST MOD 30 MIN: CPT | Performed by: PSYCHIATRY & NEUROLOGY

## 2019-02-14 RX ORDER — PHENYTOIN SODIUM 100 MG/1
100 CAPSULE, EXTENDED RELEASE ORAL 3 TIMES DAILY
Qty: 270 CAP | Refills: 1 | Status: SHIPPED | OUTPATIENT
Start: 2019-02-14 | End: 2019-07-27 | Stop reason: SDUPTHER

## 2019-02-14 RX ORDER — ESOMEPRAZOLE MAGNESIUM 20 MG
CAPSULE,DELAYED RELEASE (ENTERIC COATED) ORAL
Status: ON HOLD | COMMUNITY
Start: 2019-02-01 | End: 2020-04-16

## 2019-02-14 RX ORDER — ESCITALOPRAM OXALATE 10 MG/1
10 TABLET ORAL DAILY
Qty: 30 TAB | Refills: 6 | Status: SHIPPED | OUTPATIENT
Start: 2019-02-14 | End: 2019-08-19 | Stop reason: SDUPTHER

## 2019-02-14 RX ORDER — LORAZEPAM 1 MG/1
1 TABLET ORAL 2 TIMES DAILY
Qty: 180 TAB | Refills: 1 | Status: SHIPPED | OUTPATIENT
Start: 2019-02-14 | End: 2019-08-13

## 2019-02-14 RX ORDER — OXCARBAZEPINE 300 MG/1
300 TABLET, FILM COATED ORAL 2 TIMES DAILY
Qty: 180 TAB | Refills: 1 | Status: SHIPPED | OUTPATIENT
Start: 2019-02-14 | End: 2019-08-19 | Stop reason: SDUPTHER

## 2019-02-14 RX ORDER — LEVETIRACETAM 500 MG/1
500 TABLET ORAL 3 TIMES DAILY
Qty: 270 TAB | Refills: 1 | Status: SHIPPED | OUTPATIENT
Start: 2019-02-14 | End: 2019-07-27 | Stop reason: SDUPTHER

## 2019-02-14 ASSESSMENT — PATIENT HEALTH QUESTIONNAIRE - PHQ9: CLINICAL INTERPRETATION OF PHQ2 SCORE: 0

## 2019-02-14 NOTE — PATIENT INSTRUCTIONS
IMPRESSION:    1.  Status post stroke/intracerebral hemorrhage in 2004.  2.  Diagnosis of Moyamoya disease.  3.  Incidental ophthalmic artery aneurysm.  4.  Chronic renal failure.Heart failure  5.  Dilated cardiomyopathy with mitral regurgitation and aortic valve sclerosis. Asthma, Bradycardia  6.  Anxiety and on Lorazepam 1mg BID (  initially prescribed by neurologist in [place])  7.  Epilepsy  8.  Worsened psychosis-- worsened hallucinations since 2018  PLAN/RECOMMENDATIONS:      Please contact us if any more seizures    Again We give the patient a plan to taper off Lorazepam  Lorazepam now is  1mg BID -- advise slowly taper ( explain to the patient and family that , lorazepam is not the ideal medication for the long term control of her symptoms)  Such as 1.5mg daily for 2 month, then reduce again to 1mg daily     Advise weight loss and exercise    Continue Dilantin 100mg three times per day  Continue Keppra 500mg one AM 2 # night  Continue Trielptal 300mg two times per day  Add Lexapro 10mg daily for anxiety    Will refer the patient to a psychiatrist regarding her hallucinations  ________________________________________________________________________    REFERENCE CAD and MOYAMOYA    Eur Heart J Cardiovasc Imaging. 2016 May;17(5):575. doi: 10.1093/jci/[email or web address]. Epub 2016 Feb 9.  Peripheral pulmonary artery stenoses in the setting of Moyamoya.  Moceri P1, Laïk J2, Bouvaist H3, Fraisse A4, Richard E2.    Send to  J English Med Sci. 2015 Apr;30(4):470-4. doi: 10.3346/[email or web address]. Epub 2015 Mar 19.  Coronary heart disease in moyamoya disease: are they concomitant or coincidence?  Vernon TM1, Niyah KI1, Yeon JY1, Van SC1, Radha JS1.    Circulation. 2013 May 21;127(20):2063-5. doi: 10.1161/[email or web address].  Coronary artery stenosis in moyamoya disease: tissue characterization by 256-slice multi-detector CT and virtual histology.  Stephane JH1, Ella TJ, Madelyn YE, Evon JJ, Van SJ, Roosevelt EJ, Crow SI, Ovi YS, Cho  GY, Jazz IH, Maria DJ.  ________________________________________________________________________              SIGNATURE:  Heidi Rhoades

## 2019-02-14 NOTE — PROGRESS NOTES
NEUROLOGY NOTE    Referring Physician  SILKE Hayes      CHIEF COMPLAINT:    Seizure and anxiety  Old stroke  Used to get lorazepam tid   On the process of tapering Lorazepam    The patient's parents are currently worrying about the active hallucinations-- picking her skin -- worsened in the past     Chief Complaint   Patient presents with   • Follow-Up     Acute respiratory failure with hypoxia        PRESENT ILLNESS:       The patient's parents are currently worrying about the active hallucinations-- picking her skin -- worsened in the past     On the process of tapering Lorazepam  Seizure and anxiety  Old stroke  Used to get lorazepam tid     The patient is a 48-year-old woman, who had a stroke in 2004,   which per subsequent notes by Dr. Ruby, was associated with hemorrhage or   hemorrhagic conversion.  She was found to have Moyamoya disease.  She also has   a history of dilated cardiomyopathy    1.  Status post stroke/intracerebral hemorrhage in 2004.  2.  Diagnosis of Moyamoya disease.  3.  Incidental ophthalmic artery aneurysm.  4.  Chronic renal failure.  5.  Dilated cardiomyopathy with mitral regurgitation and aortic valve   sclerosis.    PAST MEDICAL HISTORY:  Past Medical History:   Diagnosis Date   • CKD (chronic kidney disease), stage IV ([facility]) 12/24/2015   • H/O: CVA (cerebrovascular accident) 12/24/2015   • Hypertension    • Kidney disease    • Mild mitral regurgitation 7/14/2014   • Moyamoya disease 10/3/2013   • Seizure disorder, grand mal ([facility]) 3/3/2016   • Stroke ([facility])        PAST SURGICAL HISTORY:  Past Surgical History:   Procedure Laterality Date   • IRRIGATION & DEBRIDEMENT ORTHO Right 3/9/2017    Procedure: IRRIGATION & DEBRIDEMENT ORTHO - FOOT;  Surgeon: Gerardo Lynn M.D.;  Location: SURGERY [facility];  Service:    • IRRIGATION & DEBRIDEMENT ORTHO Right 3/5/2017    Procedure: IRRIGATION & DEBRIDEMENT ORTHO AND GASTROC RECESSION;  Surgeon: Gerardo Lynn M.D.;  Location:  SURGERY [facility];  Service:    • METATARSAL HEAD RESECTION  3/5/2017    Procedure: SECOND METATARSAL HEAD RESECTION;  Surgeon: Gerardo Lynn M.D.;  Location: SURGERY [facility];  Service:    • IRRIGATION & DEBRIDEMENT ORTHO Right 2/27/2017    Procedure: IRRIGATION & DEBRIDEMENT ORTHO;  Surgeon: Coleman Monterroso M.D.;  Location: SURGERY [facility];  Service:    • PRIMARY C SECTION     • TUBAL COAGULATION LAPAROSCOPIC BILATERAL         FAMILY HISTORY:  Family History   Problem Relation Age of Onset   • Diabetes Mother    • Hypertension Mother    • Hypertension Father    • Arthritis Father    • Diabetes Maternal Grandmother    • Psychiatry Maternal Grandfather    • Cancer Paternal Grandmother    • Psychiatry Paternal Grandfather        SOCIAL HISTORY:  Social History     Social History   • Marital status: Single     Spouse name: N/A   • Number of children: N/A   • Years of education: N/A     Occupational History   • Not on file.     Social History Main Topics   • Smoking status: Former Smoker   • Smokeless tobacco: Never Used   • Alcohol use No   • Drug use: No   • Sexual activity: Not on file     Other Topics Concern   • Not on file     Social History Narrative   • No narrative on file     ALLERGIES:  Allergies   Allergen Reactions   • Allegra [Fexofenadine] Unspecified     Rxn = unknown   • Amoxicillin    • Azithromycin Unspecified     Rxn = unknown   • Claritin    • Erythromycin Unspecified     Rxn = unknown   • Ibuprofen & Caffeine-Vitamins Unspecified     Rxn = unknown   • Penicillins Unspecified     Rxn = unknown   • Procardia [Nifedipine]    • Rosemary Oil Anaphylaxis     Throat starts to close/swell.  Oil and the herb.    • Zyrtec [Cetirizine] Unspecified     Rxn = unknown     TOBHX  History   Smoking Status   • Former Smoker   Smokeless Tobacco   • Never Used     ALCHX  History   Alcohol Use No     DRUGHX  History   Drug Use No           MEDICATIONS:  Current Outpatient Prescriptions      Medication   • phenytoin ER (DILANTIN) 100 MG Cap   • Melatonin 5 MG Tab   • coenzyme Q-10 30 MG capsule   • Esomeprazole Magnesium (NEXIUM 24HR PO)   • Ascorbic Acid (VITAMIN C) 1000 MG Tab   • calcium citrate (CALCITRATE) 950 MG Tab   • carvedilol (COREG) 25 MG Tab   • lisinopril (PRINIVIL) 20 MG Tab   • aspirin (ASA) 325 MG Tab   • atorvastatin (LIPITOR) 40 MG Tab   • sodium bicarbonate (SODIUM BICARBONATE) 650 MG Tab   • NS SOLN 50 mL with cefUROXime 7.5 GM SOLR 750 mg   • acetaminophen (TYLENOL) 325 MG Tab   • magnesium hydroxide (MILK OF MAGNESIA) 400 MG/5ML Suspension   • albuterol 108 (90 BASE) MCG/ACT Aero Soln inhalation aerosol   • NEXIUM 20 MG capsule   • levETIRAcetam (KEPPRA) 500 MG Tab   • OXcarbazepine (TRILEPTAL) 300 MG Tab   • raNITidine (ZANTAC) 150 MG Tab   • amlodipine (NORVASC) 5 MG Tab   • folic acid (FOLVITE) 1 MG Tab   • Sodium Chloride (NS) Solution   • heparin 5000 UNIT/ML Solution   • senna-docusate (PERICOLACE OR SENOKOT S) 8.6-50 MG Tab   • polyethylene glycol/lytes (MIRALAX) Pack   • bisacodyl (DULCOLAX) 10 MG Suppos   • ipratropium-albuterol (DUONEB) 0.5-2.5 (3) MG/3ML nebulizer solution   • omeprazole (PRILOSEC) 20 MG delayed-release capsule   • Sodium Chloride (NS) Solution     No current facility-administered medications for this visit.        REVIEW OF SYSTEM:    Constitutional: Denies fevers, Denies weight changes   Eyes: Denies changes in vision, no eye pain   Ears/Nose/Throat/Mouth: Denies nasal congestion or sore throat   Cardiovascular: cardiomyopathy, HTN  Respiratory: asthma  Gastrointestinal/Hepatic: Denies abdominal pain, nausea, vomiting, diarrhea, constipation or GI bleeding   Genitourinary: kidney dysfunction  Musculoskeletal/Rheum: Denies joint pain and swelling   Skin/Breast: Denies rash, denies breast lumps or discharge   Neurological: seizure, stroke  Psychiatric: anxiety,   Endocrine: denies hx of diabetes or thyroid dysfunction   Heme/Oncology/Lymph Nodes:  "Denies enlarged lymph nodes, denies brusing or known bleeding disorder   Allergic/Immunologic: Denies hx of allergies         PHYSICAL AND NEUROLOGICAL EXMAINATIONS:  VITAL SIGNS: /80 (BP Location: Left arm, Patient Position: Sitting, BP Cuff Size: Adult)   Pulse 68   Temp 37.1 °C (98.8 °F) (Temporal)   Resp 16   Ht 1.753 m (5' 9.02\")   Wt 115.3 kg (254 lb 3.2 oz)   SpO2 98%   BMI 37.52 kg/m²   CURRENT WEIGHT:   BMI: Body mass index is 37.52 kg/m².  PREVIOUS WEIGHTS:  Wt Readings from Last 25 Encounters:   02/14/19 115.3 kg (254 lb 3.2 oz)   10/01/18 111.3 kg (245 lb 6.4 oz)   07/10/18 109.3 kg (241 lb)   05/11/18 114 kg (251 lb 5.2 oz)   11/09/17 110.7 kg (244 lb)   03/27/17 122.8 kg (270 lb 11.6 oz)   03/11/17 119.9 kg (264 lb 5.3 oz)   12/21/16 118.4 kg (261 lb)   10/12/16 114.3 kg (252 lb)   08/10/16 115.7 kg (255 lb)   06/08/16 117.5 kg (259 lb)   03/31/16 116.1 kg (256 lb)   03/03/16 119.3 kg (263 lb)   12/25/15 118.3 kg (260 lb 12.9 oz)   11/29/15 117.3 kg (258 lb 9.6 oz)   07/14/14 130.6 kg (288 lb)   01/27/14 123.4 kg (272 lb)   10/03/13 117 kg (258 lb)   05/15/13 110.7 kg (244 lb)   08/03/11 107.9 kg (237 lb 14.4 oz)       General appearance of patient: WDWN(+) NAD(+)    EYES  o Fundus : Papilledem(-) Exudates(-) Hemorrhage(-)  Nervous System  Orientation to time, place and person(+)  Memory normal(-)  Language: aphasia(-)  Knowledge: past(+) Current(+)  Attention(+)  Cranial Nerves  • Nerve 2: intact  • Nerve 3,4,6: intact  • Nerve 5 : intact  • Nerve 7: intact  • Nerve 8: intact  • Nerve 9 & 10: intact  • Nerve 11: intact  • Nerve 12: intact  Muscle Power and muscle tone: symmetric, normal in upper and lower  Sensory System: Pin sensation intact(+)  Reflexes: symmetric throughout  Cerebellar Function FNP normal   Gait : able to walk without help  Heart and Vascular  Peripheral Vasucular system : Edema (-) Swelling(-)  RHB, Breathing sound clear  abdomen bowel sound normoactive  Extremities " freely moveable  Joints no contracture       NEUROIMAGING: I reviewed the MRI/CT of brain       LAB:          The patient did not want to see psychiatrist    Seeing     PCP  Kidney Doctor--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  Status post stroke/intracerebral hemorrhage in 2004.  2.  Diagnosis of Moyamoya disease.  3.  Incidental ophthalmic artery aneurysm.  4.  Chronic renal failure.Heart failure  5.  Dilated cardiomyopathy with mitral regurgitation and aortic valve sclerosis. Asthma, Bradycardia  6.  Anxiety and on Lorazepam 1mg BID (  initially prescribed by neurologist in [place])  7.  Epilepsy  8.  Worsened psychosis-- worsened hallucinations since 2018  PLAN/RECOMMENDATIONS:      Please contact us if any more seizures    Again We give the patient a plan to taper off Lorazepam  Lorazepam now is  1mg BID -- advise slowly taper ( explain to the patient and family that , lorazepam is not the ideal medication for the long term control of her symptoms)  Such as 1.5mg daily for 2 month, then reduce again to 1mg daily     Advise weight loss and exercise    Continue Dilantin 100mg three times per day  Continue Keppra 500mg one AM 2 # night  Continue Trielptal 300mg two times per day  Add Lexapro 10mg daily for anxiety    Will refer the patient to a psychiatrist regarding her hallucinations  ________________________________________________________________________    REFERENCE CAD and MOYAMOYA    Eur Heart J Cardiovasc Imaging. 2016 May;17(5):575. doi: 10.1093/[email or web address]. Epub 2016 Feb 9.  Peripheral pulmonary artery stenoses in the setting of Moyamoya.  Bretti P1, Spencer J2, Arturo H3, Malou A4, Richard E2.    Send to  J Upper sorbian Med Sci. 2015 Apr;30(4):470-4. doi: [id number]/[id number]. Epub 2015 Mar 19.  Coronary heart disease in moyamoya disease: are they concomitant or coincidence?  Vernon TM1, Niyah KI1, Yeon JY1, Araujo  SC1, Radha JS1.    Circulation. 2013 May 21;127(20):2063-5. doi: [id number].  Coronary artery stenosis in moyamoya disease: tissue characterization by 256-slice multi-detector CT and virtual histology.  Stephane JH1, Ella TJ, Madelyn YE, Evon JJ, Araujo SJ, Roosevelt EJ, Maria SI, Cho YS, Ovi GY, Jazz IH, Maria DJ.  ________________________________________________________________________              SIGNATURE:  Heidi Rhoades      CC:  MELISSA Hayes.    2018 MRI-- the same old strokes  1.  Nonvisualization of distal supraclinoid segments of the bilateral internal carotid arteries and proximal segments of bilateral middle cerebral arteries consistent with moyamoya disease. There is reconstitution of bilateral middle and anterior   cerebral arteries via leptomeningeal collaterals.  2.  Chronic infarcts in the right basal ganglia and left frontal lobe.  3.  Chronic hemosiderin deposition in the bilateral frontal subarachnoid space can be seen in the patients with moyamoya disease.  4.  Mild cerebral atrophy.  1.  There is nonvisualization of supraclinoid portion of the bilateral internal carotid arteries consistent with the clinical diagnosis of moyamoya disease.  2.  There are multiple leptomeningeal collaterals reconstituting the distal segments of bilateral anterior and middle cerebral arteries.  3.  Mild diffuse luminal irregularities noted throughout the visualized intracranial vasculature.        2015 MRI of brain  2.  Multifocal chronic infarcts.  3.  Chronic hemosiderin deposits in the bilateral frontal subarachnoid spaces.  There is nonvisualization of the supraclinoid portions of the bilateral internal carotid arteries with multiple basilar moyamoya collateral vessels consistent with moyamoya disease.        There is nonvisualization of the supraclinoid portions of the bilateral internal carotid arteries with multiple basilar moyamoya collateral vessels consistent with moyamoya  disease.    INTERPRETATION:         ________________________________________________________________________      This is mildly abnormal routine EEG recording in the awake and drowsy/sleep state(s).     This scalp EEG is consistent with                  1. Potential mild focal irritability over left ( this would increase the risk of focal seizure)         ________________________________________________________________________

## 2019-06-12 NOTE — OR NURSING
0750: Called and spoke with DIANE Abraham.                Request made for an HCG to be collected prior to pt coming to surgery.                Per RN, a urine HCG is preferred. Will collect asap.                Order entered.      Reason for Call:  Form, our goal is to have forms completed with 72 hours, however, some forms may require a visit or additional information.    Type of letter, form or note:  medical    Who is the form from?:patient    Where did the form come from: Patient or family brought in       What clinic location was the form placed at?:  [place]      Where the form was placed: dr. Engel Box/Folder    What number is listed as a contact on the form?: [phone]       Additional comments: please call when ready to be picked up    Call taken on 6/12/2019 at 3:04 PM by Jen Jones

## 2019-07-27 DIAGNOSIS — J96.01 ACUTE RESPIRATORY FAILURE WITH HYPOXIA (HCC): ICD-10-CM

## 2019-07-27 DIAGNOSIS — R56.9 SEIZURE (HCC): ICD-10-CM

## 2019-07-27 DIAGNOSIS — Z86.73 H/O: CVA (CEREBROVASCULAR ACCIDENT): ICD-10-CM

## 2019-07-27 DIAGNOSIS — G40.409 SEIZURE DISORDER, GRAND MAL (HCC): ICD-10-CM

## 2019-07-27 DIAGNOSIS — I42.0 DILATED CARDIOMYOPATHY (HCC): ICD-10-CM

## 2019-07-29 NOTE — TELEPHONE ENCOUNTER
Was the patient seen in the last year in this department? Yes    Does patient have an active prescription for medications requested? Yes    Received Request Via: Pharmacy     Was Jf garcia, scheduled to see dr Weems 12/4/19

## 2019-08-01 RX ORDER — LEVETIRACETAM 500 MG/1
TABLET ORAL
Qty: 270 TAB | Refills: 0 | Status: SHIPPED | OUTPATIENT
Start: 2019-08-01 | End: 2019-08-19 | Stop reason: SDUPTHER

## 2019-08-01 RX ORDER — PHENYTOIN SODIUM 100 MG/1
CAPSULE, EXTENDED RELEASE ORAL
Qty: 270 CAP | Refills: 0 | Status: SHIPPED | OUTPATIENT
Start: 2019-08-01 | End: 2019-08-19 | Stop reason: SDUPTHER

## 2019-08-01 NOTE — TELEPHONE ENCOUNTER
Dr. Rhoades note reviewed.  Patient with epilepsy and moyamoya disease.  She seems to be a complicated neurological patient.  There is also report of cardiomyopathy.  I prefer to see patient sooner than December.  Will send message to Hilda to get her in sooner.  No labs done in our system since 2017.

## 2019-08-19 ENCOUNTER — OFFICE VISIT (OUTPATIENT)
Dept: NEUROLOGY | Facility: MEDICAL CENTER | Age: 49
End: 2019-08-19
Payer: MEDICARE

## 2019-08-19 VITALS
HEART RATE: 60 BPM | SYSTOLIC BLOOD PRESSURE: 104 MMHG | TEMPERATURE: 97.1 F | RESPIRATION RATE: 16 BRPM | WEIGHT: 246 LBS | BODY MASS INDEX: 36.31 KG/M2 | DIASTOLIC BLOOD PRESSURE: 60 MMHG | OXYGEN SATURATION: 97 %

## 2019-08-19 DIAGNOSIS — Z86.73 H/O: CVA (CEREBROVASCULAR ACCIDENT): ICD-10-CM

## 2019-08-19 DIAGNOSIS — I67.5 MOYAMOYA DISEASE: ICD-10-CM

## 2019-08-19 DIAGNOSIS — G40.909 NONINTRACTABLE EPILEPSY WITHOUT STATUS EPILEPTICUS, UNSPECIFIED EPILEPSY TYPE (HCC): ICD-10-CM

## 2019-08-19 DIAGNOSIS — F41.9 ANXIETY: ICD-10-CM

## 2019-08-19 DIAGNOSIS — I42.0 DILATED CARDIOMYOPATHY (HCC): ICD-10-CM

## 2019-08-19 PROCEDURE — 99215 OFFICE O/P EST HI 40 MIN: CPT | Performed by: PSYCHIATRY & NEUROLOGY

## 2019-08-19 RX ORDER — PHENYTOIN SODIUM 100 MG/1
100 CAPSULE, EXTENDED RELEASE ORAL 3 TIMES DAILY
Qty: 270 CAP | Refills: 4 | Status: SHIPPED | OUTPATIENT
Start: 2019-08-19 | End: 2020-06-11

## 2019-08-19 RX ORDER — LORAZEPAM 1 MG/1
1 TABLET ORAL 2 TIMES DAILY
Qty: 60 TAB | Refills: 5 | Status: SHIPPED | OUTPATIENT
Start: 2019-08-19 | End: 2019-08-19 | Stop reason: SDUPTHER

## 2019-08-19 RX ORDER — LEVETIRACETAM 500 MG/1
TABLET ORAL
Qty: 270 TAB | Refills: 4 | Status: SHIPPED | OUTPATIENT
Start: 2019-08-19 | End: 2019-12-17 | Stop reason: SDUPTHER

## 2019-08-19 RX ORDER — LORAZEPAM 1 MG/1
1 TABLET ORAL 2 TIMES DAILY
Qty: 60 TAB | Refills: 4 | Status: SHIPPED | OUTPATIENT
Start: 2019-08-19 | End: 2020-01-20 | Stop reason: SDUPTHER

## 2019-08-19 RX ORDER — ATORVASTATIN CALCIUM 40 MG/1
40 TABLET, FILM COATED ORAL DAILY
Qty: 90 TAB | Refills: 2 | Status: SHIPPED | OUTPATIENT
Start: 2019-08-19 | End: 2020-06-11

## 2019-08-19 RX ORDER — ESCITALOPRAM OXALATE 10 MG/1
10 TABLET ORAL DAILY
Qty: 90 TAB | Refills: 2 | Status: SHIPPED | OUTPATIENT
Start: 2019-08-19 | End: 2020-01-20 | Stop reason: SDUPTHER

## 2019-08-19 RX ORDER — OXCARBAZEPINE 300 MG/1
300 TABLET, FILM COATED ORAL 2 TIMES DAILY
Qty: 180 TAB | Refills: 4 | Status: ON HOLD | OUTPATIENT
Start: 2019-08-19 | End: 2020-04-16

## 2019-08-19 ASSESSMENT — ENCOUNTER SYMPTOMS
BRUISES/BLEEDS EASILY: 0
SORE THROAT: 0
SHORTNESS OF BREATH: 0
FEVER: 0
EYE DISCHARGE: 0
FALLS: 0
WEIGHT LOSS: 0
HALLUCINATIONS: 0
ABDOMINAL PAIN: 0

## 2019-08-19 NOTE — PROGRESS NOTES
Chief Complaint   Patient presents with   • New Patient     Seizure, Hx of CVA     Patient is referred by Dr. Rhoades for initial consult.    History of present illness:  Pascale Acevedo 49 y.o. female presents today for seizures.   History is obtained from patient and mom.  and Patient is accompanied by mom Andria. On disability, used to work [employer]. Lives with mom, aunt, daughter.     Duration/timing: as eblow  Context:   Seizures: described as some altered consciousness at times, without abnormal movements. Duration of 45 seconds. 2016 last seizure. She has had grand mal seizures. Also hx of grand mal at 9 months old and treated with phenobarbital.   Hx of stroke w/Moyamoya disease: 2004, caused right foot numbness toes, occasional trouble with getting words out and slurring, cognition expressing and relaying. Last stroke was 2004.   Location: Brain  Quality: Altered mentation  Severity: Mild to moderate  Modifying factors: Improved with treatment  Associated signs/symptoms: As above  Denies: bladder incontinence, bowel incontinence, headaches, head trauma , weakness, other numbness/tingling, swallowing difficulties, speech disturbance, hearing loss, loss of consciousness, hallucinations, abnormal movements, aura, falls, family hx seizure, febrile seizure as child and snoring/apnea during sleep     Patient has tried:  -Dilantin 100 mg 3 times daily  -Keppra 500 mg in the morning and 1000 g at night  -Trileptal 300 mg twice daily  -Ativan-for anxiety, was prescribed by [facility] neurologist at 1 mg twice daily, very severe, was prescribed by Dr. Rhoades pending psychiatry appointment    -ASA 325mg daily for moyamoya  -Topiramate - didn't tolerate  -Depakote - caused freaking out  -Phenobarbital - as child    She has hx of dilated cardiomyopathy -she still in the process of getting a cardiologist and has a referral.  Psychiatry  -never established.    Past medical history:   Past Medical History:    Diagnosis Date   • CKD (chronic kidney disease), stage IV (HCC) 12/24/2015   • H/O: CVA (cerebrovascular accident) 12/24/2015   • Hypertension    • Kidney disease    • Mild mitral regurgitation 7/14/2014   • Moyamoya disease 10/3/2013   • Seizure disorder, grand mal (HCC) 3/3/2016   • Stroke (HCC)        Past surgical history:   Past Surgical History:   Procedure Laterality Date   • IRRIGATION & DEBRIDEMENT ORTHO Right 3/9/2017    Procedure: IRRIGATION & DEBRIDEMENT ORTHO - FOOT;  Surgeon: Gerardo Lynn M.D.;  Location: SURGERY [facility];  Service:    • IRRIGATION & DEBRIDEMENT ORTHO Right 3/5/2017    Procedure: IRRIGATION & DEBRIDEMENT ORTHO AND GASTROC RECESSION;  Surgeon: Gerardo Lynn M.D.;  Location: SURGERY [facility];  Service:    • METATARSAL HEAD RESECTION  3/5/2017    Procedure: SECOND METATARSAL HEAD RESECTION;  Surgeon: Gerardo Lynn M.D.;  Location: SURGERY [facility];  Service:    • IRRIGATION & DEBRIDEMENT ORTHO Right 2/27/2017    Procedure: IRRIGATION & DEBRIDEMENT ORTHO;  Surgeon: Coleman Monterroso M.D.;  Location: SURGERY [facility];  Service:    • PRIMARY C SECTION     • TUBAL COAGULATION LAPAROSCOPIC BILATERAL         Family history:   Family History   Problem Relation Age of Onset   • Diabetes Mother    • Hypertension Mother    • Hypertension Father    • Arthritis Father    • Diabetes Maternal Grandmother    • Psychiatric Illness Maternal Grandfather    • Cancer Paternal Grandmother    • Psychiatric Illness Paternal Grandfather        Social history:   Tobacco Use   • Smoking status: Former Smoker   • Smokeless tobacco: Never Used   Substance and Sexual Activity   • Alcohol use: No   • Drug use: No     Current medications:   Current Outpatient Medications   Medication   • phenytoin ER (DILANTIN) 100 MG Cap   • OXcarbazepine (TRILEPTAL) 300 MG Tab   • levETIRAcetam (KEPPRA) 500 MG Tab   • escitalopram (LEXAPRO) 10 MG Tab   • atorvastatin (LIPITOR) 40 MG Tab   •  LORazepam (ATIVAN) 1 MG Tab   • NEXIUM 20 MG capsule   • Melatonin 5 MG Tab   • coenzyme Q-10 30 MG capsule   • Esomeprazole Magnesium (NEXIUM 24HR PO)   • Ascorbic Acid (VITAMIN C) 1000 MG Tab   • calcium citrate (CALCITRATE) 950 MG Tab   • carvedilol (COREG) 25 MG Tab   • lisinopril (PRINIVIL) 20 MG Tab   • aspirin (ASA) 325 MG Tab   • sodium bicarbonate (SODIUM BICARBONATE) 650 MG Tab   • NS SOLN 50 mL with cefUROXime 7.5 GM SOLR 750 mg   • acetaminophen (TYLENOL) 325 MG Tab   • magnesium hydroxide (MILK OF MAGNESIA) 400 MG/5ML Suspension   • albuterol 108 (90 BASE) MCG/ACT Aero Soln inhalation aerosol     No current facility-administered medications for this visit.        Medication Allergy:  Allergies   Allergen Reactions   • Allegra [Fexofenadine] Unspecified     Rxn = unknown   • Amoxicillin    • Azithromycin Unspecified     Rxn = unknown   • Claritin    • Erythromycin Unspecified     Rxn = unknown   • Ibuprofen & Caffeine-Vitamins Unspecified     Rxn = unknown   • Penicillins Unspecified     Rxn = unknown   • Procardia [Nifedipine]    • Rosemary Oil Anaphylaxis     Throat starts to close/swell.  Oil and the herb.    • Zyrtec [Cetirizine] Unspecified     Rxn = unknown       Review of Systems   Constitutional: Negative for fever and weight loss.   HENT: Negative for sore throat.    Eyes: Negative for discharge.   Respiratory: Negative for shortness of breath.    Cardiovascular: Negative for leg swelling.   Gastrointestinal: Negative for abdominal pain.   Genitourinary: Negative for dysuria.   Musculoskeletal: Negative for falls.   Skin: Positive for rash.        Bilateral arms due to allergies and itching   Neurological:        As per HPI   Endo/Heme/Allergies: Does not bruise/bleed easily.   Psychiatric/Behavioral: Negative for hallucinations.       Physical examination:   Vitals:    08/19/19 1334   BP: 104/60   BP Location: Right arm   Patient Position: Sitting   BP Cuff Size: Adult long   Pulse: 60   Resp:  16   Temp: 36.2 °C (97.1 °F)   TempSrc: Temporal   SpO2: 97%   Weight: 111.6 kg (246 lb)     General: Patient in well nourished in no apparent distress. Elevated BMI.   Eyes: Ophthalmoscopic examination performed but discs cannot be visualized well enough to characterize bilaterally.  HENT: Normocephalic, atraumatic.   Cardiovascular: No lower extremity edema.  Respiratory: Normal respiratory effort.   Skin: No appreciable signs of acute rashes or bruising.   Musculoskeletal: No signs of joint or muscle swelling.   Psychiatric: Pleasant.     NEUROLOGICAL EXAM:   Mental status: Awake, alert and fully oriented to person, place, time and situation. Normal attention, concentration and fund of knowledge for education level.   Speech and language: Speech is fluent without errors and clear.  Cranial nerve exam:  II: Pupils are equally round and reactive to light. Visual fields are intact by confrontation.  III, IV, VI: EOMI, no diplopia, no ptosis.  V: Sensation to light touch is normal over V1-3 distributions bilaterally.  .  VII: Facial movements are symmetrical. There is no facial droop. .  VIII: Hearing intact to soft speech and finger rub bilaterally  IX: Palate elevates symmetrically, uvula is midline. Dysarthria is not present.  XI: Shoulder shrug are symmetrical and strong.   XII: Tongue protrudes midline.    Motor exam:  Muscle tone is normal in all 4 limbs. and No abnormal movements appreciated.    Muscle strength: 5 out of 5 proximal upper and lower extremity strength.  She is distally 5 out of 5 in upper extremities as well.    Sensory exam:  Intact to Light touch in bilateral upper and lower extremity.    Deep tendon reflexes:       Right  Left  Biceps   0/4  0/4  Triceps  0/4  0/4  Brachioradialis 0/4  0/4  Knee jerk  0/4  0/4  Ankle jerk  0/4  0/4   bilateral toes are downgoing to plantar stimulation..    Coordination: shows a normal finger-nose-finger and heel to shin bilaterally.   Gait: Casual gait is  normal.      ANCILLARY DATA REVIEWED:   Lab Data Review:  Lab Results   Component Value Date/Time    WBC 6.8 04/23/2017 09:00 AM    RBC 3.56 (L) 04/23/2017 09:00 AM    HEMOGLOBIN 11.4 (L) 04/23/2017 09:00 AM    HEMATOCRIT 34.2 (L) 04/23/2017 09:00 AM    MCV 96.1 04/23/2017 09:00 AM    MCH 32.0 04/23/2017 09:00 AM    MCHC 33.3 (L) 04/23/2017 09:00 AM    MPV 10.1 04/23/2017 09:00 AM    NEUTSPOLYS 57.80 04/19/2017 04:14 AM    LYMPHOCYTES 27.20 04/19/2017 04:14 AM    MONOCYTES 10.70 04/19/2017 04:14 AM    EOSINOPHILS 3.60 04/19/2017 04:14 AM    BASOPHILS 0.50 04/19/2017 04:14 AM    HYPOCHROMIA 1+ 07/29/2011 07:05 AM      Lab Results   Component Value Date/Time    SODIUM 138 04/23/2017 09:00 AM    POTASSIUM 5.1 04/23/2017 09:00 AM    CHLORIDE 107 04/23/2017 09:00 AM    CO2 22 04/23/2017 09:00 AM    GLUCOSE 121 (H) 04/23/2017 09:00 AM    BUN 64 (H) 04/23/2017 09:00 AM    CREATININE 2.75 (H) 04/23/2017 09:00 AM    CREATININE 0.9 11/19/2006 05:48 AM     Lab Results   Component Value Date/Time    ASTSGOT 17 04/23/2017 0900    ALTSGPT 18 04/23/2017 0900    ALKPHOSPHAT 67 04/23/2017 0900    ALBUMIN 2.7 (L) 04/23/2017 0900     Lab Results   Component Value Date/Time    HBA1C 5.5 07/29/2011 07:05 AM      EEG December 2, 2016, Dr. Harrington: This is an abnormal EEG showing dysfunction in the right hemisphere, particularly in the occipital and probably posterior temporal regions.  Clinical correlation is suggested.  The absence of epileptogenic activity on EEG does not rule out epilepsy.    Imaging:   MRI brain without contrast June 2018:  1.  Nonvisualization of distal supraclinoid segments of the bilateral internal carotid arteries and proximal segments of bilateral middle cerebral arteries consistent with moyamoya disease. There is reconstitution of bilateral middle and anterior   cerebral arteries via leptomeningeal collaterals.  2.  Chronic infarcts in the right basal ganglia and left frontal lobe.  3.  Chronic hemosiderin  deposition in the bilateral frontal subarachnoid space can be seen in the patients with moyamoya disease.  4.  Mild cerebral atrophy.    I have personally reviewed the patient's imaging as above and agree with the radiologist's interpretation.     MRA head and neck without contrast June 2018:  1.  There is nonvisualization of supraclinoid portion of the bilateral internal carotid arteries consistent with the clinical diagnosis of moyamoya disease.  2.  There are multiple leptomeningeal collaterals reconstituting the distal segments of bilateral anterior and middle cerebral arteries.  3.  Mild diffuse luminal irregularities noted throughout the visualized intracranial vasculature.  1.  There is significant motion artifact degrading the study.  2.  There is no significant stenosis in the visualized extracranial neck arteries.    Records reviewed: Patient has been seen by Juany Yang Rothrock.  Dr. Simon note reviewed May 2013.       ASSESSMENT AND PLAN:    1. Nonintractable epilepsy without status epilepticus, unspecified epilepsy type (HCC): History of seizures as an infant with recurrent seizures after cerebrovascular accident/moyamoya disease.  Treated with 3 AEDs and well-controlled without issue.  Sounds complex partial with some secondary generalization.  No change to medication regimen, will monitor blood levels and basic blood work.  MRI brain has been abnormal due to prior stroke and evidence of subarachnoid hemorrhage.  Prior EEGs that showed low right hemispheric cortical dysfunction (interpreted by Dr. Harrington). Patient has a tubal ligation.  - OXCARBAZEPINE  - DILANTIN; Future  - CBC WITH DIFFERENTIAL; Future  - Comp Metabolic Panel; Future  -Continue Keppra 500 mg in the morning and 1000 mg in the evening  -Continue Dilantin 100 mg 3 times daily  -Continue oxcarbazepine 300 mg twice daily  -Vitamin D level on follow-up with other repeat labs    2. Moyamoya disease: Diagnosed in 2004 after stroke.   Evident on MRA.  Compliant with daily aspirin.  No recurrent symptoms of stroke.  Due to possibility of developing cerebral aneurysms a repeat MRA without since last was in June 2018.  No history of prior aneurysm.  Blood pressure at goal.  - MR-MRA HEAD-W/O; Future  -Continue aspirin 325 mg daily for stroke prevention    3. H/O: CVA (cerebrovascular accident): Chronic infarcts in the right basal ganglia and left frontal lobe with chronic hemosiderin deposition in bilateral subarachnoid spaces which can be seen and moyamoya disease.    - Lipid Profile; Future  -atorvastatin (LIPITOR) 40 MG Tab; Take 1 Tab by mouth every day.  Dispense: 90 Tab; Refill: 2    4. Anxiety: Chronic and uncontrolled when off of benzodiazepines.  Benzodiazepines and SSRI was started by neurology at [facility] and continued by Dr. Rhoades.  There was an attempt to wean patient off of daily Ativan but was unsuccessful due to severe stress reaction.  Given her well-controlled epilepsy increased stress and uncontrolled anxiety may worsen her sleep and therefore precipitate seizures.  I stressed the importance of a psychiatry evaluation and further management.  We discussed the risks of chronic benzodiazepines in the setting of seizure disorder.  Due to the risks of uncontrolled anxiety and stress will continue medications for now without further changes until establish care with psychiatry.  - REFERRAL TO PSYCHIATRY  - Controlled Substance Treatment Agreement - signed 8/2019  - LORazepam (ATIVAN) 1 MG Tab; Take 1 Tab by mouth 2 Times a Day for 180 days.  Dispense: 60 Tab; Refill: 4  - escitalopram (LEXAPRO) 10 MG Tab; Take 1 Tab by mouth every day.  Dispense: 90 Tab; Refill: 2  -We will have Hilda follow-up in 2 weeks to ensure that patient has established appointment with psychiatry    NARCOTIC 120       SEDATIVE 281       STIMULANT 000           5. Dilated cardiomyopathy (CMS-HCC): Recommend follow-up with cardiology  6. CKD: 2017 creatinine  2.75 with GFR 19, need renal dosing for Keppra    FOLLOW-UP: Return for 4 months - before end of January.  For further refills  EDUCATION AND COUNSELING:  -I personally discussed the following with the patient:   Risks/benefits/side effects/alternatives of medication including but not limited to drowsiness, sedation, dizziness, increased risk for falls, cardiovascular effects (hypotension, cardiac arrhythmias, death), hypersensitivity reactions including rash (which may be fatal), weight changes, liver dysfunction, renal dysfunction, increased risk for bleeding, increased risk for depression, anxiety, suicide, psychosis and mood changes and avoid abrupt cessation of medication., Diagnosis, prognosis, and treatment options discussed with patient at length.  , Recommend regular exercise, proper hydration, healthy diet and stress reduction. , Usefulness of anti-platelets in secondary stroke prevention was discussed. The side effects, including increased risk for bleeding (both traumatic and spontaneous) were reviewed with the patient at length. , Recommended goal for LDL is 70 or less. Side effects of statin, including idiosyncratic reactions as well as more common myalgias, and liver injury  were discussed. Monitoring of LFT's will be deferred to the patient's primary care physician. , Secondary stroke prevention education was provided, including; lifestyle modification with healthy diet, and exercise, as well as recommendations for optimal control of risk factors. , I have made the patient aware of mandatory reporting required by the law in the State of [place] regarding episodes of seizures, loss of consciousness, alteration of awareness, and/or concerns for driving safety as discussed today. The patient and family are responsible for reporting events to the DMV, instructions were provided. The patient verbalized understanding.  and Other seizure precautions were discussed at length, including but not limited to no  diving, no skydiving, no unsupervised swimming, no Jacuzzi or bathing in bathtubs.     The patient understands and agrees that due to the complexity of his/her diagnosis, results of any testing and further recommendations will typically be discussed/made during a face to face encounter in my office. The patient and/or family further understands it is their responsibility to keep proper follow up.     Disclaimer  This dictation was created using voice recognition software. I have made every reasonable attempt to avoid dictation errors, but this document may contain an error not identified before finalizing. If the error changes the accuracy of the document, I would appreciate it being brought to my attention. Thank you very much.     Pham Weems MD  Neurology, Neurophysiology  [facility]

## 2019-08-20 ENCOUNTER — TELEPHONE (OUTPATIENT)
Dept: NEUROLOGY | Facility: MEDICAL CENTER | Age: 49
End: 2019-08-20

## 2019-08-20 NOTE — TELEPHONE ENCOUNTER
Clarified with pharmacist at [facility] per Dr. Weems Lorazepam script is to last 30 days not 180 between refills.
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
## 2019-10-01 ENCOUNTER — DOCUMENTATION (OUTPATIENT)
Dept: NEUROLOGY | Facility: MEDICAL CENTER | Age: 49
End: 2019-10-01

## 2019-10-01 NOTE — PROGRESS NOTES
September 3, 2019 cholesterol obtained.    Total cholesterol 224, HDL 42, , triglycerides 328.    We need to confirm if this was a truly fasting result and while she was taking atorvastatin regularly.  It was collected at 11 AM.    Pham Weems MD  Neurology - Neurophysiology  [facility]

## 2019-10-03 ENCOUNTER — DOCUMENTATION (OUTPATIENT)
Dept: NEUROLOGY | Facility: MEDICAL CENTER | Age: 49
End: 2019-10-03

## 2019-10-03 NOTE — PROGRESS NOTES
Lipid panel received.  Patient has a history of moyamoya disease with multiple strokes.  Goal LDL should be less than 70 for secondary stroke prevention.    Most recent labs performed in fall 2019 with LDL above goal of 113.  This is confirmed fasting as well as compliance with atorvastatin 40 mg nightly.  Call placed, no answer, voicemail left.    Recommend patient follow-up with primary caregiver she is in [place] discussion of further lipid management with goals as described above.  Options include diet and exercise since she is close to her goal however increasing atorvastatin to 80 mg nightly may be reasonable as well.  Will have medical assistant follow-up.    Pham Weems MD  Neurology - Neurophysiology  [facility]

## 2019-10-04 ENCOUNTER — TELEPHONE (OUTPATIENT)
Dept: NEUROLOGY | Facility: MEDICAL CENTER | Age: 49
End: 2019-10-04

## 2019-10-04 NOTE — TELEPHONE ENCOUNTER
----- Message from Pham Weems M.D. sent at 10/3/2019 12:46 PM PDT -----  Hey,    Her cholesterol is higher than it should be given her hx of strokes. She should have an LDL < 70. Since the test was fasting and on atorvastatin 40mg, a change in medications vs. Diet exercise is needed.    She lives in [place], can you see if she can follow up with PCP for management? Also fax the result to PCP if not already. Please let me know. Nathalie PAK

## 2019-10-04 NOTE — TELEPHONE ENCOUNTER
Spoke to Mother Andria- advised results and recommendations from Dr. Weems. Andria agrees to call PCP regarding medication adjustment.

## 2019-12-17 DIAGNOSIS — I42.0 DILATED CARDIOMYOPATHY (HCC): ICD-10-CM

## 2019-12-17 DIAGNOSIS — Z86.73 H/O: CVA (CEREBROVASCULAR ACCIDENT): ICD-10-CM

## 2019-12-17 RX ORDER — LEVETIRACETAM 500 MG/1
TABLET ORAL
Qty: 270 TAB | Refills: 2 | Status: SHIPPED | OUTPATIENT
Start: 2019-12-17 | End: 2020-05-22 | Stop reason: SDUPTHER

## 2020-01-20 ENCOUNTER — OFFICE VISIT (OUTPATIENT)
Dept: NEUROLOGY | Facility: MEDICAL CENTER | Age: 50
End: 2020-01-20
Payer: MEDICARE

## 2020-01-20 VITALS
OXYGEN SATURATION: 99 % | HEART RATE: 63 BPM | TEMPERATURE: 97.5 F | WEIGHT: 236 LBS | DIASTOLIC BLOOD PRESSURE: 78 MMHG | SYSTOLIC BLOOD PRESSURE: 128 MMHG | BODY MASS INDEX: 34.96 KG/M2 | HEIGHT: 69 IN

## 2020-01-20 DIAGNOSIS — G40.409 SEIZURE DISORDER, GRAND MAL (HCC): ICD-10-CM

## 2020-01-20 DIAGNOSIS — F41.9 ANXIETY: ICD-10-CM

## 2020-01-20 DIAGNOSIS — E78.5 HYPERLIPIDEMIA, UNSPECIFIED HYPERLIPIDEMIA TYPE: ICD-10-CM

## 2020-01-20 DIAGNOSIS — I42.0 DILATED CARDIOMYOPATHY (HCC): ICD-10-CM

## 2020-01-20 DIAGNOSIS — I10 HYPERTENSION, UNSPECIFIED TYPE: ICD-10-CM

## 2020-01-20 DIAGNOSIS — I67.5 MOYAMOYA: ICD-10-CM

## 2020-01-20 DIAGNOSIS — N18.5 CKD (CHRONIC KIDNEY DISEASE), STAGE V (HCC): ICD-10-CM

## 2020-01-20 DIAGNOSIS — Z86.73 H/O: CVA (CEREBROVASCULAR ACCIDENT): ICD-10-CM

## 2020-01-20 PROCEDURE — 99214 OFFICE O/P EST MOD 30 MIN: CPT | Performed by: PSYCHIATRY & NEUROLOGY

## 2020-01-20 RX ORDER — ESCITALOPRAM OXALATE 10 MG/1
10 TABLET ORAL DAILY
Qty: 90 TAB | Refills: 2 | Status: SHIPPED | OUTPATIENT
Start: 2020-01-20 | End: 2020-06-11

## 2020-01-20 RX ORDER — LORAZEPAM 1 MG/1
1 TABLET ORAL 2 TIMES DAILY
Qty: 60 TAB | Refills: 3 | Status: SHIPPED | OUTPATIENT
Start: 2020-01-20 | End: 2020-05-19 | Stop reason: SDUPTHER

## 2020-01-20 ASSESSMENT — ENCOUNTER SYMPTOMS
ABDOMINAL PAIN: 0
FALLS: 0
FEVER: 0
SHORTNESS OF BREATH: 0
WEIGHT LOSS: 0
HALLUCINATIONS: 0

## 2020-01-20 NOTE — PROGRESS NOTES
Chief Complaint   Patient presents with   • Follow-Up     nonintractable epilepsy     History of present illness:  Pascale Acevedo 49 y.o. female presents today for seizure follow-up.   History is obtained from patient, daughter and Father.  and Patient is accompanied by father Renan and daughter Deedee. She is a prior patient of Dr. Rhoades.   On disability, used to work [employer]. Lives with mom, aunt, daughter (Deedee - [phone]).  Patient prefers I speak with daughter Deedee with guarding any medical issues/results and management.  She has limited memory.    Duration/timing: as below  Context:   Seizures: described as some altered consciousness at times, without abnormal movements. Duration of 45 seconds. 2016 last seizure. She has had grand mal seizures. Also hx of grand mal at 9 months old and treated with phenobarbital.   Hx of stroke w/Moyamoya disease: 2004, caused right foot numbness toes, occasional trouble with getting words out and slurring, cognition expressing and relaying. Last stroke was 2004.   Location: Brain  Quality: Altered mentation  Severity: Mild to moderate  Modifying factors: Improved with treatment  Associated signs/symptoms: As above  Denies: bladder incontinence, bowel incontinence, headaches, head trauma , weakness, other numbness/tingling, swallowing difficulties, speech disturbance, hearing loss, loss of consciousness, hallucinations, abnormal movements, aura, falls, family hx seizure, febrile seizure as child and snoring/apnea during sleep     Patient has tried:  -Dilantin 300mg QHS  -Keppra 500 mg in the morning and 1000 mg at night  -Trileptal 300 mg twice daily  -Ativan-for anxiety, was prescribed by [facility] neurologist at 1 mg twice daily, very severe, was prescribed by Dr. Rhoades pending psychiatry appointment    -ASA 325mg daily for moyamoya  -Topiramate - didn't tolerate  -Depakote - caused freaking out  -Phenobarbital - as child    She has hx of dilated  cardiomyopathy -she still in the process of getting a cardiologist and has a referral.  Psychiatry  -never established.     Subjective: Patient was last seen in neurology clinic August 2019 by me.    Epilepsy: no problems with medications, not driving, no seizures since 2016, no incontinence or tongue biting      Moyamoya disease/history of stroke: no stroke symtoms since last visit, no hospitalizations, aspirin compliant.  Does not remember any discussion that we had over telephone or in person with regards to her lipids and medications.  She does not even remember if she followed up with her primary care or who her primary care is.  Does not know if she had her MRA done for aneurysm evaluation.    Anxiety: anxiety is stable.  She received a psychiatry referral from me, has filled out paperwork and has not made any appointment in the last 5 months.  She requests Ativan refills again today.      Past medical history:   Past Medical History:   Diagnosis Date   • CKD (chronic kidney disease), stage IV ([facility]) 12/24/2015   • H/O: CVA (cerebrovascular accident) 12/24/2015   • Hypertension    • Kidney disease    • Mild mitral regurgitation 7/14/2014   • Moyamoya disease 10/3/2013   • Seizure disorder, grand mal (HCC) 3/3/2016   • Stroke ([facility])        Past surgical history:   Past Surgical History:   Procedure Laterality Date   • IRRIGATION & DEBRIDEMENT ORTHO Right 3/9/2017    Procedure: IRRIGATION & DEBRIDEMENT ORTHO - FOOT;  Surgeon: Gerardo Lynn M.D.;  Location: SURGERY [facility];  Service:    • IRRIGATION & DEBRIDEMENT ORTHO Right 3/5/2017    Procedure: IRRIGATION & DEBRIDEMENT ORTHO AND GASTROC RECESSION;  Surgeon: Gerardo Lynn M.D.;  Location: SURGERY [facility];  Service:    • METATARSAL HEAD RESECTION  3/5/2017    Procedure: SECOND METATARSAL HEAD RESECTION;  Surgeon: Gerardo Lynn M.D.;  Location: SURGERY [facility];  Service:    • IRRIGATION & DEBRIDEMENT ORTHO Right 2/27/2017     Procedure: IRRIGATION & DEBRIDEMENT ORTHO;  Surgeon: Coleman Monterroso M.D.;  Location: SURGERY [facility];  Service:    • PRIMARY C SECTION     • TUBAL COAGULATION LAPAROSCOPIC BILATERAL         Family history:   Family History   Problem Relation Age of Onset   • Diabetes Mother    • Hypertension Mother    • Hypertension Father    • Arthritis Father    • Diabetes Maternal Grandmother    • Psychiatric Illness Maternal Grandfather    • Cancer Paternal Grandmother    • Psychiatric Illness Paternal Grandfather        Social history:   Tobacco Use   • Smoking status: Former Smoker   • Smokeless tobacco: Never Used   Substance and Sexual Activity   • Alcohol use: No   • Drug use: No     Current medications:   Current Outpatient Medications   Medication   • Lysine 500 MG Cap   • LORazepam (ATIVAN) 1 MG Tab   • levETIRAcetam (KEPPRA) 500 MG Tab   • phenytoin ER (DILANTIN) 100 MG Cap   • OXcarbazepine (TRILEPTAL) 300 MG Tab   • escitalopram (LEXAPRO) 10 MG Tab   • atorvastatin (LIPITOR) 40 MG Tab   • Melatonin 5 MG Tab   • coenzyme Q-10 30 MG capsule   • Esomeprazole Magnesium (NEXIUM 24HR PO)   • Ascorbic Acid (VITAMIN C) 1000 MG Tab   • calcium citrate (CALCITRATE) 950 MG Tab   • lisinopril (PRINIVIL) 20 MG Tab   • aspirin (ASA) 325 MG Tab   • sodium bicarbonate (SODIUM BICARBONATE) 650 MG Tab   • NS SOLN 50 mL with cefUROXime 7.5 GM SOLR 750 mg   • acetaminophen (TYLENOL) 325 MG Tab   • magnesium hydroxide (MILK OF MAGNESIA) 400 MG/5ML Suspension   • albuterol 108 (90 BASE) MCG/ACT Aero Soln inhalation aerosol   • NEXIUM 20 MG capsule   • carvedilol (COREG) 25 MG Tab     No current facility-administered medications for this visit.        Medication Allergy:  Allergies   Allergen Reactions   • Allegra [Fexofenadine] Unspecified     Rxn = unknown   • Amoxicillin    • Azithromycin Unspecified     Rxn = unknown   • Claritin    • Erythromycin Unspecified     Rxn = unknown   • Ibuprofen & Caffeine-Vitamins Unspecified     " Rxn = unknown   • Penicillins Unspecified     Rxn = unknown   • Procardia [Nifedipine]    • Rosemary Oil Anaphylaxis     Throat starts to close/swell.  Oil and the herb.    • Zyrtec [Cetirizine] Unspecified     Rxn = unknown       Review of Systems   Constitutional: Negative for fever and weight loss.   Respiratory: Negative for shortness of breath.    Cardiovascular: Negative for leg swelling.   Gastrointestinal: Negative for abdominal pain.   Genitourinary: Negative for dysuria.   Musculoskeletal: Negative for falls.   Skin:        Bilateral arms due to allergies and itching   Neurological:        As per HPI   Psychiatric/Behavioral: Negative for hallucinations.       Physical examination:   Vitals:    01/20/20 0935   BP: 128/78   BP Location: Right arm   Patient Position: Sitting   Pulse: 63   Temp: 36.4 °C (97.5 °F)   TempSrc: Temporal   SpO2: 99%   Weight: 107 kg (236 lb)   Height: 1.753 m (5' 9\")     General: Patient in well nourished in no apparent distress. Elevated BMI.   Eyes: Ophthalmoscopic examination performed but discs cannot be visualized well enough to characterize bilaterally. Prior exam  HENT: Normocephalic, atraumatic.   Cardiovascular: No lower extremity edema.  Respiratory: Normal respiratory effort.   Skin: No appreciable signs of acute rashes or bruising.   Musculoskeletal: No signs of joint or muscle swelling.   Psychiatric: Pleasant.     NEUROLOGICAL EXAM:   Mental status: Awake, alert and fully oriented to person, place, time and situation. Normal attention, concentration and fund of knowledge for education level. She has limited memory about what was discussed last time.  Speech and language: Speech is fluent without errors and clear.  Cranial nerve exam:  II: Pupils are equally round and reactive to light. Prior exam.  Today, visual fields are intact by confrontation.  III, IV, VI: EOMI, no diplopia, no ptosis.  Unchanged from prior  V: Sensation to light touch is normal over V1-3 " distributions bilaterally.   Prior exam  VII: Facial movements are symmetrical. There is no facial droop.   VIII: Hearing intact to soft speech   IX: Dysarthria is not present.  XI: Shoulder shrug are symmetrical and strong. Prior exam  XII: Tongue protrudes midline. Prior exam    Motor exam: Prior exam as documented below, today she has 5-5 proximal upper and lower extremity strength  Muscle tone is normal in all 4 limbs. and No abnormal movements appreciated.    Muscle strength: 5 out of 5 proximal upper and lower extremity strength.  She is distally 5 out of 5 in upper extremities as well.    Sensory exam: Unchanged today  Intact to Light touch in bilateral upper and lower extremity.    Deep tendon reflexes: Unchanged today     Right  Left  Biceps   0/4  0/4  Triceps  0/4  0/4  Brachioradialis 0/4  0/4  Knee jerk  0/4  0/4  Ankle jerk  0/4  0/4   bilateral toes are downgoing to plantar stimulation..    Coordination: shows a normal finger-nose-finger, unchanged today  Gait: Casual gait is normal.  Unchanged today      ANCILLARY DATA REVIEWED:   Lab Data Review:  Lab Results   Component Value Date/Time    WBC 6.8 04/23/2017 09:00 AM    RBC 3.56 (L) 04/23/2017 09:00 AM    HEMOGLOBIN 11.4 (L) 04/23/2017 09:00 AM    HEMATOCRIT 34.2 (L) 04/23/2017 09:00 AM    MCV 96.1 04/23/2017 09:00 AM    MCH 32.0 04/23/2017 09:00 AM    MCHC 33.3 (L) 04/23/2017 09:00 AM    MPV 10.1 04/23/2017 09:00 AM    NEUTSPOLYS 57.80 04/19/2017 04:14 AM    LYMPHOCYTES 27.20 04/19/2017 04:14 AM    MONOCYTES 10.70 04/19/2017 04:14 AM    EOSINOPHILS 3.60 04/19/2017 04:14 AM    BASOPHILS 0.50 04/19/2017 04:14 AM    HYPOCHROMIA 1+ 07/29/2011 07:05 AM      Lab Results   Component Value Date/Time    SODIUM 138 04/23/2017 09:00 AM    POTASSIUM 5.1 04/23/2017 09:00 AM    CHLORIDE 107 04/23/2017 09:00 AM    CO2 22 04/23/2017 09:00 AM    GLUCOSE 121 (H) 04/23/2017 09:00 AM    BUN 64 (H) 04/23/2017 09:00 AM    CREATININE 2.75 (H) 04/23/2017 09:00 AM     CREATININE 0.9 11/19/2006 05:48 AM     Lab Results   Component Value Date/Time    ASTSGOT 17 04/23/2017 0900    ALTSGPT 18 04/23/2017 0900    ALKPHOSPHAT 67 04/23/2017 0900    ALBUMIN 2.7 (L) 04/23/2017 0900     Lab Results   Component Value Date/Time    HBA1C 5.5 07/29/2011 07:05 AM      September 3, 2019 Total cholesterol 224, HDL 42, , triglycerides 328. -Fasting, compliant with atorvastatin 40 mg nightly    Imaging: None  Records reviewed: Chart reviewed.  Telephone note with patient with regards to her LDL goals and confirmation that mother Andria agreed to have patient follow-up with PCP regarding medication adjustments and lipid management.      ASSESSMENT AND PLAN:    1. Nonintractable epilepsy without status epilepticus, unspecified epilepsy type (HCC): History of seizures as an infant with recurrent seizures after cerebrovascular accident/moyamoya disease.  Treated with 3 AEDs and well-controlled without issue.  Sounds complex partial with some secondary generalization.  No change to medication regimen, will monitor blood levels and basic blood work -patient is very noncompliant but communicated very thoroughly with family today.  MRI brain has been abnormal due to prior stroke and evidence of subarachnoid hemorrhage.  Prior EEGs that showed low right hemispheric cortical dysfunction (interpreted by Dr. Harrington). Patient has a tubal ligation.  - OXCARBAZEPINE -reordered, not done by patient  - DILANTIN; Future -reordered, not done by patient  - CBC WITH DIFFERENTIAL; Future -reordered, not done by patient  -Keppra level ordered in the setting of CKD  - Comp Metabolic Panel; Future -reordered, not done by patient  -Continue Keppra 500 mg in the morning and 1000 mg in the evening  -Continue Dilantin 300 mg QHS  -Continue oxcarbazepine 300 mg twice daily  -Vitamin D level ordered    2. Moyamoya disease: Diagnosed in 2004 after stroke.  Evident on MRA, last performed 2018.  Compliant with daily aspirin.   No recurrent symptoms of stroke.  Due to possibility of developing cerebral aneurysms a repeat MRA without.  No history of prior aneurysm.  Blood pressure at goal.  - MR-MRA HEAD-W/O; Future -not completed, reordered today  -Continue aspirin 325 mg daily for stroke prevention    3. H/O: CVA (cerebrovascular accident): Chronic infarcts in the right basal ganglia and left frontal lobe with chronic hemosiderin deposition in bilateral subarachnoid spaces which can be seen and moyamoya disease.    - Lipid Profile; Future, reordered today  -Continue atorvastatin (LIPITOR) 40 MG Tab; Take 1 Tab by mouth every day.  Dispense: 90 Tab; Refill: 2  -We will reevaluate based on follow-up lipid panel    4. Anxiety: Chronic and uncontrolled when off of benzodiazepines.  Benzodiazepines and SSRI was started by neurology at [place] and continued by Dr. Rhoades.  There was an attempt to wean patient off of daily Ativan but was unsuccessful due to severe stress reaction.  Given her well-controlled epilepsy increased stress and uncontrolled anxiety may worsen her sleep and therefore precipitate seizures.  I re-stressed the importance of a psychiatry evaluation and further management.  We re-discussed the risks of chronic benzodiazepines in the setting of seizure disorder.  Due to the risks of uncontrolled anxiety and stress will continue medications for now without further changes until establish care with psychiatry.  - REFERRAL TO PSYCHIATRY -highly recommend patient follow-up by making an appointment  - Controlled Substance Treatment Agreement - signed 8/2019  -Refill for lORazepam (ATIVAN) 1 MG Tab; Take 1 Tab by mouth 2 Times a Day for 180 days.  Dispense: 60 Tab; Refill: 3, I reiterated that I do not plan on continuing this long-term and recommend she seek psychiatric care  -Continue escitalopram (LEXAPRO) 10 MG Tab; Take 1 Tab by mouth every day.  Refilled today.  -Family communicated understanding    5. Dilated cardiomyopathy  (CMS-[facility]): Recommend follow-up with cardiology  6. CKD: 2017 creatinine 2.75 with GFR 19, need renal dosing for Keppra    FOLLOW-UP: Return for 5/2020 to review workup.  Due to patient's history of noncompliance with follow-up and work-up  EDUCATION AND COUNSELING:  -I personally discussed the following with the patient:   Recommend regular exercise, proper hydration, healthy diet and stress reduction. , Usefulness of anti-platelets in secondary stroke prevention was discussed. The side effects, including increased risk for bleeding (both traumatic and spontaneous) were reviewed with the patient at length. , Recommended goal for LDL is 70 or less. Side effects of statin, including idiosyncratic reactions as well as more common myalgias, and liver injury  were discussed. Monitoring of LFT's will be deferred to the patient's primary care physician. , Secondary stroke prevention education was provided, including; lifestyle modification with healthy diet, and exercise, as well as recommendations for optimal control of risk factors. , I have made the patient aware of mandatory reporting required by the law in the State of [place] regarding episodes of seizures, loss of consciousness, alteration of awareness, and/or concerns for driving safety as discussed today. The patient and family are responsible for reporting events to the DMV, instructions were provided. The patient verbalized understanding.  and Other seizure precautions were discussed at length, including but not limited to no diving, no skydiving, no unsupervised swimming, no Jacuzzi or bathing in bathtubs.     The patient understands and agrees that due to the complexity of his/her diagnosis, results of any testing and further recommendations will typically be discussed/made during a face to face encounter in my office. The patient and/or family further understands it is their responsibility to keep proper follow up.     Disclaimer  This dictation was created  using voice recognition software. I have made every reasonable attempt to avoid dictation errors, but this document may contain an error not identified before finalizing. If the error changes the accuracy of the document, I would appreciate it being brought to my attention. Thank you very much.     Pham Weems MD  Neurology, Neurophysiology  [facility]

## 2020-01-23 ENCOUNTER — TELEPHONE (OUTPATIENT)
Dept: NEUROLOGY | Facility: MEDICAL CENTER | Age: 50
End: 2020-01-23

## 2020-01-23 DIAGNOSIS — N18.9 CHRONIC KIDNEY DISEASE, UNSPECIFIED CKD STAGE: ICD-10-CM

## 2020-01-23 NOTE — TELEPHONE ENCOUNTER
Saima from lab in [place] called states pt opted out in doing VItamin D labs because it was not covered by insurance, maybe a change in Dx code. FYI wanted to let dr Weems know. Pt did all other labs but the Vit D

## 2020-01-27 ENCOUNTER — DOCUMENTATION (OUTPATIENT)
Dept: NEUROLOGY | Facility: MEDICAL CENTER | Age: 50
End: 2020-01-27

## 2020-01-27 DIAGNOSIS — R56.9 SEIZURE (HCC): ICD-10-CM

## 2020-01-27 NOTE — PROGRESS NOTES
Labs received dated January 23, 2020.    CBC unremarkable.  Dilantin level 13.8, albumin 3.1, corrected Dilantin level 19.2 ug/mL.  Lipid panel: LDL 69, triglycerides 92, HDL 49, total cholesterol 136  LFTs normal  Keppra level 39.1 (normal)    CMP with potassium 5.6, BUN 66, creatinine 4.58, chloride 115, GFR 11.  Oxcarbazepine level 4 (normal 8-35)     Compared to patient's prior baseline in 2017 renal function has deteriorated. Otherwise, unknown priors.  Personally spoke with daughter and patient via telephone today.  Per daughter patient is refusing to go to see nephrology even though they have repeatedly reached out to her. Patient is agreeable that I fax over labs to Dr. Ortiz nephrology. I highly encouraged follow up due to recent labs renal dysfunction can be life-threatening as well as affect management of her seizure medications.  Patient and daughter communicated understanding and agreed to follow-up with nephrology.    Discussed subtherapeutic levels of oxcarbazepine.  Deedee pulled out her pill bottles and there is a full bottle that looks like it has not been taken.  Patient admits that she has not been taking the oxcarbazepine twice a day.  Instead she has been taking it once daily.  I encouraged her to increase to 1 tablet twice daily as recommended with a repeat level in approximately 6 weeks.  A lab for oxcarbazepine level has been mailed to her.  Again the communicated understanding of the above.    Pham Weems MD  Neurology - Neurophysiology  [facility]

## 2020-01-28 ENCOUNTER — TELEPHONE (OUTPATIENT)
Dept: NEUROLOGY | Facility: MEDICAL CENTER | Age: 50
End: 2020-01-28

## 2020-04-16 ENCOUNTER — APPOINTMENT (OUTPATIENT)
Dept: RADIOLOGY | Facility: MEDICAL CENTER | Age: 50
DRG: 291 | End: 2020-04-16
Attending: INTERNAL MEDICINE
Payer: MEDICARE

## 2020-04-16 ENCOUNTER — APPOINTMENT (OUTPATIENT)
Dept: RADIOLOGY | Facility: MEDICAL CENTER | Age: 50
DRG: 291 | End: 2020-04-16
Attending: EMERGENCY MEDICINE
Payer: MEDICARE

## 2020-04-16 ENCOUNTER — HOSPITAL ENCOUNTER (INPATIENT)
Facility: MEDICAL CENTER | Age: 50
LOS: 3 days | DRG: 291 | End: 2020-04-20
Attending: EMERGENCY MEDICINE | Admitting: INTERNAL MEDICINE
Payer: MEDICARE

## 2020-04-16 ENCOUNTER — APPOINTMENT (OUTPATIENT)
Dept: CARDIOLOGY | Facility: MEDICAL CENTER | Age: 50
DRG: 291 | End: 2020-04-16
Attending: HOSPITALIST
Payer: MEDICARE

## 2020-04-16 ENCOUNTER — HOSPITAL ENCOUNTER (OUTPATIENT)
Dept: RADIOLOGY | Facility: MEDICAL CENTER | Age: 50
End: 2020-04-16
Payer: MEDICARE

## 2020-04-16 DIAGNOSIS — R06.02 SHORTNESS OF BREATH: ICD-10-CM

## 2020-04-16 DIAGNOSIS — R07.9 ACUTE CHEST PAIN: ICD-10-CM

## 2020-04-16 PROBLEM — N17.9 ACUTE-ON-CHRONIC KIDNEY INJURY (HCC): Status: ACTIVE | Noted: 2020-04-16

## 2020-04-16 PROBLEM — N18.9 ACUTE-ON-CHRONIC KIDNEY INJURY (HCC): Status: ACTIVE | Noted: 2020-04-16

## 2020-04-16 LAB
ALBUMIN SERPL BCP-MCNC: 3.2 G/DL (ref 3.2–4.9)
ALBUMIN/GLOB SERPL: 1.1 G/DL
ALP SERPL-CCNC: 132 U/L (ref 30–99)
ALT SERPL-CCNC: 31 U/L (ref 2–50)
ANION GAP SERPL CALC-SCNC: 20 MMOL/L (ref 7–16)
AST SERPL-CCNC: 16 U/L (ref 12–45)
BASOPHILS # BLD AUTO: 0.1 % (ref 0–1.8)
BASOPHILS # BLD: 0.01 K/UL (ref 0–0.12)
BILIRUB SERPL-MCNC: 0.3 MG/DL (ref 0.1–1.5)
BUN SERPL-MCNC: 91 MG/DL (ref 8–22)
CALCIUM SERPL-MCNC: 8.6 MG/DL (ref 8.5–10.5)
CHLORIDE SERPL-SCNC: 106 MMOL/L (ref 96–112)
CO2 SERPL-SCNC: 14 MMOL/L (ref 20–33)
CREAT SERPL-MCNC: 5.69 MG/DL (ref 0.5–1.4)
EKG IMPRESSION: NORMAL
EKG IMPRESSION: NORMAL
EOSINOPHIL # BLD AUTO: 0.09 K/UL (ref 0–0.51)
EOSINOPHIL NFR BLD: 1.2 % (ref 0–6.9)
ERYTHROCYTE [DISTWIDTH] IN BLOOD BY AUTOMATED COUNT: 53.2 FL (ref 35.9–50)
GLOBULIN SER CALC-MCNC: 2.9 G/DL (ref 1.9–3.5)
GLUCOSE SERPL-MCNC: 99 MG/DL (ref 65–99)
HCT VFR BLD AUTO: 33.6 % (ref 37–47)
HGB BLD-MCNC: 11.3 G/DL (ref 12–16)
IMM GRANULOCYTES # BLD AUTO: 0.03 K/UL (ref 0–0.11)
IMM GRANULOCYTES NFR BLD AUTO: 0.4 % (ref 0–0.9)
LV EJECT FRACT  99904: 30
LV EJECT FRACT MOD 2C 99903: 48.34
LV EJECT FRACT MOD 4C 99902: 44.49
LV EJECT FRACT MOD BP 99901: 45.84
LYMPHOCYTES # BLD AUTO: 1.71 K/UL (ref 1–4.8)
LYMPHOCYTES NFR BLD: 22.2 % (ref 22–41)
MCH RBC QN AUTO: 32.8 PG (ref 27–33)
MCHC RBC AUTO-ENTMCNC: 33.6 G/DL (ref 33.6–35)
MCV RBC AUTO: 97.4 FL (ref 81.4–97.8)
MONOCYTES # BLD AUTO: 0.7 K/UL (ref 0–0.85)
MONOCYTES NFR BLD AUTO: 9.1 % (ref 0–13.4)
NEUTROPHILS # BLD AUTO: 5.16 K/UL (ref 2–7.15)
NEUTROPHILS NFR BLD: 67 % (ref 44–72)
NRBC # BLD AUTO: 0 K/UL
NRBC BLD-RTO: 0 /100 WBC
NT-PROBNP SERPL IA-MCNC: ABNORMAL PG/ML (ref 0–125)
PLATELET # BLD AUTO: 147 K/UL (ref 164–446)
PMV BLD AUTO: 10.7 FL (ref 9–12.9)
POTASSIUM SERPL-SCNC: 4.1 MMOL/L (ref 3.6–5.5)
PROT SERPL-MCNC: 6.1 G/DL (ref 6–8.2)
RBC # BLD AUTO: 3.45 M/UL (ref 4.2–5.4)
SODIUM SERPL-SCNC: 140 MMOL/L (ref 135–145)
TROPONIN T SERPL-MCNC: 61 NG/L (ref 6–19)
TROPONIN T SERPL-MCNC: 66 NG/L (ref 6–19)
TROPONIN T SERPL-MCNC: 70 NG/L (ref 6–19)
WBC # BLD AUTO: 7.7 K/UL (ref 4.8–10.8)

## 2020-04-16 PROCEDURE — 96372 THER/PROPH/DIAG INJ SC/IM: CPT

## 2020-04-16 PROCEDURE — 700102 HCHG RX REV CODE 250 W/ 637 OVERRIDE(OP): Performed by: INTERNAL MEDICINE

## 2020-04-16 PROCEDURE — 700111 HCHG RX REV CODE 636 W/ 250 OVERRIDE (IP): Performed by: INTERNAL MEDICINE

## 2020-04-16 PROCEDURE — 36415 COLL VENOUS BLD VENIPUNCTURE: CPT

## 2020-04-16 PROCEDURE — 700111 HCHG RX REV CODE 636 W/ 250 OVERRIDE (IP)

## 2020-04-16 PROCEDURE — 93306 TTE W/DOPPLER COMPLETE: CPT

## 2020-04-16 PROCEDURE — 96374 THER/PROPH/DIAG INJ IV PUSH: CPT

## 2020-04-16 PROCEDURE — 99205 OFFICE O/P NEW HI 60 MIN: CPT | Performed by: INTERNAL MEDICINE

## 2020-04-16 PROCEDURE — 93010 ELECTROCARDIOGRAM REPORT: CPT | Performed by: INTERNAL MEDICINE

## 2020-04-16 PROCEDURE — A9502 TC99M TETROFOSMIN: HCPCS

## 2020-04-16 PROCEDURE — 99358 PROLONG SERVICE W/O CONTACT: CPT | Performed by: INTERNAL MEDICINE

## 2020-04-16 PROCEDURE — A9270 NON-COVERED ITEM OR SERVICE: HCPCS | Performed by: INTERNAL MEDICINE

## 2020-04-16 PROCEDURE — G0378 HOSPITAL OBSERVATION PER HR: HCPCS

## 2020-04-16 PROCEDURE — 99285 EMERGENCY DEPT VISIT HI MDM: CPT

## 2020-04-16 PROCEDURE — 700111 HCHG RX REV CODE 636 W/ 250 OVERRIDE (IP): Performed by: HOSPITALIST

## 2020-04-16 PROCEDURE — 93005 ELECTROCARDIOGRAM TRACING: CPT | Performed by: EMERGENCY MEDICINE

## 2020-04-16 PROCEDURE — 80053 COMPREHEN METABOLIC PANEL: CPT

## 2020-04-16 PROCEDURE — 83880 ASSAY OF NATRIURETIC PEPTIDE: CPT

## 2020-04-16 PROCEDURE — 83036 HEMOGLOBIN GLYCOSYLATED A1C: CPT

## 2020-04-16 PROCEDURE — 700117 HCHG RX CONTRAST REV CODE 255: Performed by: HOSPITALIST

## 2020-04-16 PROCEDURE — 93005 ELECTROCARDIOGRAM TRACING: CPT | Performed by: INTERNAL MEDICINE

## 2020-04-16 PROCEDURE — 84484 ASSAY OF TROPONIN QUANT: CPT | Mod: 91

## 2020-04-16 PROCEDURE — 85025 COMPLETE CBC W/AUTO DIFF WBC: CPT

## 2020-04-16 PROCEDURE — 93005 ELECTROCARDIOGRAM TRACING: CPT

## 2020-04-16 PROCEDURE — 71045 X-RAY EXAM CHEST 1 VIEW: CPT

## 2020-04-16 PROCEDURE — 93306 TTE W/DOPPLER COMPLETE: CPT | Mod: 26 | Performed by: INTERNAL MEDICINE

## 2020-04-16 PROCEDURE — 99220 PR INITIAL OBSERVATION CARE,LEVL III: CPT | Mod: AI | Performed by: INTERNAL MEDICINE

## 2020-04-16 PROCEDURE — 700102 HCHG RX REV CODE 250 W/ 637 OVERRIDE(OP): Performed by: HOSPITALIST

## 2020-04-16 PROCEDURE — 94760 N-INVAS EAR/PLS OXIMETRY 1: CPT

## 2020-04-16 PROCEDURE — A9270 NON-COVERED ITEM OR SERVICE: HCPCS | Performed by: HOSPITALIST

## 2020-04-16 RX ORDER — BISACODYL 10 MG
10 SUPPOSITORY, RECTAL RECTAL
Status: DISCONTINUED | OUTPATIENT
Start: 2020-04-16 | End: 2020-04-20 | Stop reason: HOSPADM

## 2020-04-16 RX ORDER — HEPARIN SODIUM 5000 [USP'U]/ML
5000 INJECTION, SOLUTION INTRAVENOUS; SUBCUTANEOUS EVERY 8 HOURS
Status: DISCONTINUED | OUTPATIENT
Start: 2020-04-16 | End: 2020-04-20 | Stop reason: HOSPADM

## 2020-04-16 RX ORDER — HYDRALAZINE HYDROCHLORIDE 10 MG/1
10 TABLET, FILM COATED ORAL EVERY 8 HOURS
Status: DISCONTINUED | OUTPATIENT
Start: 2020-04-16 | End: 2020-04-17

## 2020-04-16 RX ORDER — ESCITALOPRAM OXALATE 10 MG/1
10 TABLET ORAL DAILY
Status: DISCONTINUED | OUTPATIENT
Start: 2020-04-16 | End: 2020-04-20 | Stop reason: HOSPADM

## 2020-04-16 RX ORDER — ASPIRIN 325 MG
325 TABLET ORAL DAILY
Status: DISCONTINUED | OUTPATIENT
Start: 2020-04-16 | End: 2020-04-20

## 2020-04-16 RX ORDER — FUROSEMIDE 10 MG/ML
40 INJECTION INTRAMUSCULAR; INTRAVENOUS
Status: DISCONTINUED | OUTPATIENT
Start: 2020-04-16 | End: 2020-04-16

## 2020-04-16 RX ORDER — HYDRALAZINE HYDROCHLORIDE 20 MG/ML
20 INJECTION INTRAMUSCULAR; INTRAVENOUS EVERY 6 HOURS PRN
Status: DISCONTINUED | OUTPATIENT
Start: 2020-04-16 | End: 2020-04-20 | Stop reason: HOSPADM

## 2020-04-16 RX ORDER — HEPARIN SODIUM 5000 [USP'U]/100ML
INJECTION, SOLUTION INTRAVENOUS CONTINUOUS
Status: DISCONTINUED | OUTPATIENT
Start: 2020-04-16 | End: 2020-04-16

## 2020-04-16 RX ORDER — DIPHENHYDRAMINE HCL 25 MG
25 TABLET ORAL ONCE
Status: COMPLETED | OUTPATIENT
Start: 2020-04-16 | End: 2020-04-16

## 2020-04-16 RX ORDER — OMEPRAZOLE 20 MG/1
20 CAPSULE, DELAYED RELEASE ORAL
Status: DISCONTINUED | OUTPATIENT
Start: 2020-04-16 | End: 2020-04-20 | Stop reason: HOSPADM

## 2020-04-16 RX ORDER — HYDRALAZINE HYDROCHLORIDE 10 MG/1
10 TABLET, FILM COATED ORAL EVERY 8 HOURS
Status: DISCONTINUED | OUTPATIENT
Start: 2020-04-16 | End: 2020-04-16

## 2020-04-16 RX ORDER — REGADENOSON 0.08 MG/ML
INJECTION, SOLUTION INTRAVENOUS
Status: COMPLETED
Start: 2020-04-16 | End: 2020-04-16

## 2020-04-16 RX ORDER — FUROSEMIDE 10 MG/ML
80 INJECTION INTRAMUSCULAR; INTRAVENOUS ONCE
Status: COMPLETED | OUTPATIENT
Start: 2020-04-16 | End: 2020-04-16

## 2020-04-16 RX ORDER — ALBUTEROL SULFATE 90 UG/1
2 AEROSOL, METERED RESPIRATORY (INHALATION) EVERY 4 HOURS PRN
Status: DISCONTINUED | OUTPATIENT
Start: 2020-04-16 | End: 2020-04-20 | Stop reason: HOSPADM

## 2020-04-16 RX ORDER — OXCARBAZEPINE 300 MG/1
300 TABLET, FILM COATED ORAL 2 TIMES DAILY
Status: DISCONTINUED | OUTPATIENT
Start: 2020-04-16 | End: 2020-04-16

## 2020-04-16 RX ORDER — FOLIC ACID 1 MG/1
1 TABLET ORAL DAILY
COMMUNITY

## 2020-04-16 RX ORDER — ATORVASTATIN CALCIUM 40 MG/1
40 TABLET, FILM COATED ORAL DAILY
Status: DISCONTINUED | OUTPATIENT
Start: 2020-04-16 | End: 2020-04-20 | Stop reason: HOSPADM

## 2020-04-16 RX ORDER — PHENYTOIN SODIUM 100 MG/1
100 CAPSULE, EXTENDED RELEASE ORAL 3 TIMES DAILY
Status: DISCONTINUED | OUTPATIENT
Start: 2020-04-16 | End: 2020-04-20 | Stop reason: HOSPADM

## 2020-04-16 RX ORDER — CARVEDILOL 25 MG/1
25 TABLET ORAL 3 TIMES DAILY
Status: DISCONTINUED | OUTPATIENT
Start: 2020-04-16 | End: 2020-04-16

## 2020-04-16 RX ORDER — AMOXICILLIN 250 MG
2 CAPSULE ORAL 2 TIMES DAILY
Status: DISCONTINUED | OUTPATIENT
Start: 2020-04-16 | End: 2020-04-20 | Stop reason: HOSPADM

## 2020-04-16 RX ORDER — REGADENOSON 0.08 MG/ML
0.4 INJECTION, SOLUTION INTRAVENOUS
Status: COMPLETED | OUTPATIENT
Start: 2020-04-16 | End: 2020-04-16

## 2020-04-16 RX ORDER — METOPROLOL SUCCINATE 25 MG/1
25 TABLET, EXTENDED RELEASE ORAL
Status: DISCONTINUED | OUTPATIENT
Start: 2020-04-17 | End: 2020-04-17

## 2020-04-16 RX ORDER — ISOSORBIDE DINITRATE 10 MG/1
10 TABLET ORAL 3 TIMES DAILY
Status: DISCONTINUED | OUTPATIENT
Start: 2020-04-16 | End: 2020-04-17

## 2020-04-16 RX ORDER — AMINOPHYLLINE 25 MG/ML
100 INJECTION, SOLUTION INTRAVENOUS
Status: DISCONTINUED | OUTPATIENT
Start: 2020-04-16 | End: 2020-04-20 | Stop reason: HOSPADM

## 2020-04-16 RX ORDER — ACETAMINOPHEN 325 MG/1
650 TABLET ORAL EVERY 6 HOURS PRN
Status: DISCONTINUED | OUTPATIENT
Start: 2020-04-16 | End: 2020-04-20 | Stop reason: HOSPADM

## 2020-04-16 RX ORDER — SPIRONOLACTONE 25 MG/1
25 TABLET ORAL
Status: DISCONTINUED | OUTPATIENT
Start: 2020-04-16 | End: 2020-04-16

## 2020-04-16 RX ORDER — LEVETIRACETAM 500 MG/1
500 TABLET ORAL EVERY MORNING
Status: DISCONTINUED | OUTPATIENT
Start: 2020-04-16 | End: 2020-04-20 | Stop reason: HOSPADM

## 2020-04-16 RX ORDER — VALSARTAN 80 MG/1
40 TABLET ORAL
Status: DISCONTINUED | OUTPATIENT
Start: 2020-04-16 | End: 2020-04-16

## 2020-04-16 RX ORDER — FUROSEMIDE 10 MG/ML
20 INJECTION INTRAMUSCULAR; INTRAVENOUS
Status: DISCONTINUED | OUTPATIENT
Start: 2020-04-16 | End: 2020-04-16

## 2020-04-16 RX ORDER — LEVETIRACETAM 500 MG/1
1000 TABLET ORAL EVERY EVENING
Status: DISCONTINUED | OUTPATIENT
Start: 2020-04-16 | End: 2020-04-20 | Stop reason: HOSPADM

## 2020-04-16 RX ORDER — POLYETHYLENE GLYCOL 3350 17 G/17G
1 POWDER, FOR SOLUTION ORAL
Status: DISCONTINUED | OUTPATIENT
Start: 2020-04-16 | End: 2020-04-20 | Stop reason: HOSPADM

## 2020-04-16 RX ORDER — HEPARIN SODIUM 1000 [USP'U]/ML
3200 INJECTION, SOLUTION INTRAVENOUS; SUBCUTANEOUS PRN
Status: DISCONTINUED | OUTPATIENT
Start: 2020-04-16 | End: 2020-04-16

## 2020-04-16 RX ADMIN — ATORVASTATIN CALCIUM 40 MG: 40 TABLET, FILM COATED ORAL at 06:36

## 2020-04-16 RX ADMIN — HUMAN ALBUMIN MICROSPHERES AND PERFLUTREN 3 ML: 10; .22 INJECTION, SOLUTION INTRAVENOUS at 13:39

## 2020-04-16 RX ADMIN — REGADENOSON 0.4 MG: 0.08 INJECTION, SOLUTION INTRAVENOUS at 14:42

## 2020-04-16 RX ADMIN — SENNOSIDES AND DOCUSATE SODIUM 2 TABLET: 8.6; 5 TABLET ORAL at 06:36

## 2020-04-16 RX ADMIN — PHENYTOIN SODIUM 100 MG: 100 CAPSULE ORAL at 13:11

## 2020-04-16 RX ADMIN — ESCITALOPRAM OXALATE 10 MG: 10 TABLET ORAL at 06:36

## 2020-04-16 RX ADMIN — LEVETIRACETAM 1000 MG: 500 TABLET ORAL at 17:26

## 2020-04-16 RX ADMIN — SENNOSIDES AND DOCUSATE SODIUM 2 TABLET: 8.6; 5 TABLET ORAL at 17:26

## 2020-04-16 RX ADMIN — ASPIRIN 325 MG: 325 TABLET, FILM COATED ORAL at 06:36

## 2020-04-16 RX ADMIN — OMEPRAZOLE 20 MG: 20 CAPSULE, DELAYED RELEASE ORAL at 06:37

## 2020-04-16 RX ADMIN — HYDRALAZINE HYDROCHLORIDE 10 MG: 10 TABLET ORAL at 18:36

## 2020-04-16 RX ADMIN — HEPARIN SODIUM 5000 UNITS: 5000 INJECTION, SOLUTION INTRAVENOUS; SUBCUTANEOUS at 13:10

## 2020-04-16 RX ADMIN — ISOSORBIDE DINITRATE 10 MG: 10 TABLET ORAL at 18:35

## 2020-04-16 RX ADMIN — DIPHENHYDRAMINE HYDROCHLORIDE 25 MG: 25 TABLET ORAL at 23:03

## 2020-04-16 RX ADMIN — PHENYTOIN SODIUM 100 MG: 100 CAPSULE ORAL at 06:37

## 2020-04-16 RX ADMIN — HEPARIN SODIUM 5000 UNITS: 5000 INJECTION, SOLUTION INTRAVENOUS; SUBCUTANEOUS at 06:37

## 2020-04-16 RX ADMIN — CARVEDILOL 25 MG: 25 TABLET, FILM COATED ORAL at 06:30

## 2020-04-16 RX ADMIN — LEVETIRACETAM 500 MG: 500 TABLET ORAL at 06:37

## 2020-04-16 RX ADMIN — PHENYTOIN SODIUM 100 MG: 100 CAPSULE ORAL at 17:27

## 2020-04-16 RX ADMIN — FUROSEMIDE 80 MG: 10 INJECTION, SOLUTION INTRAMUSCULAR; INTRAVENOUS at 17:29

## 2020-04-16 ASSESSMENT — LIFESTYLE VARIABLES
TOTAL SCORE: 0
CONSUMPTION TOTAL: NEGATIVE
HOW MANY TIMES IN THE PAST YEAR HAVE YOU HAD 5 OR MORE DRINKS IN A DAY: 0
ALCOHOL_USE: NO
EVER_SMOKED: YES
EVER HAD A DRINK FIRST THING IN THE MORNING TO STEADY YOUR NERVES TO GET RID OF A HANGOVER: NO
EVER_SMOKED: YES
TOTAL SCORE: 0
HAVE PEOPLE ANNOYED YOU BY CRITICIZING YOUR DRINKING: NO
EVER FELT BAD OR GUILTY ABOUT YOUR DRINKING: NO
ON A TYPICAL DAY WHEN YOU DRINK ALCOHOL HOW MANY DRINKS DO YOU HAVE: 0
TOTAL SCORE: 0
AVERAGE NUMBER OF DAYS PER WEEK YOU HAVE A DRINK CONTAINING ALCOHOL: 0
HAVE YOU EVER FELT YOU SHOULD CUT DOWN ON YOUR DRINKING: NO
DOES PATIENT WANT TO STOP DRINKING: NO

## 2020-04-16 ASSESSMENT — COGNITIVE AND FUNCTIONAL STATUS - GENERAL
DAILY ACTIVITIY SCORE: 24
SUGGESTED CMS G CODE MODIFIER DAILY ACTIVITY: CH
MOBILITY SCORE: 24
SUGGESTED CMS G CODE MODIFIER MOBILITY: CH

## 2020-04-16 ASSESSMENT — ENCOUNTER SYMPTOMS
WHEEZING: 0
BACK PAIN: 0
SEIZURES: 0
BLOOD IN STOOL: 0
SORE THROAT: 0
SPUTUM PRODUCTION: 0
ABDOMINAL PAIN: 0
SHORTNESS OF BREATH: 1
BLURRED VISION: 0
DIZZINESS: 0
FEVER: 0
DIARRHEA: 0
PALPITATIONS: 0
DIAPHORESIS: 0
CHILLS: 0
MYALGIAS: 0
FLANK PAIN: 0
HEADACHES: 0
NECK PAIN: 0
NAUSEA: 0
COUGH: 0
BRUISES/BLEEDS EASILY: 0
VOMITING: 0
FOCAL WEAKNESS: 0

## 2020-04-16 ASSESSMENT — COPD QUESTIONNAIRES
COPD SCREENING SCORE: 3
HAVE YOU SMOKED AT LEAST 100 CIGARETTES IN YOUR ENTIRE LIFE: YES
DURING THE PAST 4 WEEKS HOW MUCH DID YOU FEEL SHORT OF BREATH: SOME OF THE TIME
DO YOU EVER COUGH UP ANY MUCUS OR PHLEGM?: NO/ONLY WITH OCCASIONAL COLDS OR INFECTIONS

## 2020-04-16 ASSESSMENT — FIBROSIS 4 INDEX
FIB4 SCORE: 0.96
FIB4 SCORE: 0.96

## 2020-04-16 ASSESSMENT — PATIENT HEALTH QUESTIONNAIRE - PHQ9
1. LITTLE INTEREST OR PLEASURE IN DOING THINGS: NOT AT ALL
SUM OF ALL RESPONSES TO PHQ9 QUESTIONS 1 AND 2: 0
2. FEELING DOWN, DEPRESSED, IRRITABLE, OR HOPELESS: NOT AT ALL

## 2020-04-16 NOTE — ASSESSMENT & PLAN NOTE
Nephrologist Dr. San consulted  Diuresing with Lasix if possible  Temporary dialysis catheter placed  Dialysis given inpatient to remove fluid  Looking for outpatient dialysis chair

## 2020-04-16 NOTE — ASSESSMENT & PLAN NOTE
Body mass index is 37.36 kg/m².  Patient has been counseled on diet and lifestyle modifications  Recommended outpatient weight management program and bariatric surgery evaluation

## 2020-04-16 NOTE — PROGRESS NOTES
Pt back from stress test via wheelchair, pt back in bed, monitors notified. Safety and fall precautions in place.

## 2020-04-16 NOTE — PROGRESS NOTES
Assumed care of pt, Pt wheeled to the room 828-1 via stretcher.  Heart monitor placed and verified. Pt weighted see chart. Pt oriented to room and call light. vitals are stable. Admission assessment completed, pt noted to have scattered bruising and scabs all over her body, she also has redness underneath her breast and under abdominal folds. Pt is able to self transfer for stretcher to bed. Pt is also able to verbalize understanding of treatment and plan of care. Fall precautions are in place, bed alarm activated. Pt denies wants will cont to monitor.

## 2020-04-16 NOTE — ED PROVIDER NOTES
ED Provider Note    Chief Complaint:   Renal insufficiency, chest pain    HPI:  Pascale Acevedo is a 50 y.o. female who presents as a transfer from [facility] for cardiology services not available at that facility.  She has a past medical history significant for a recent diagnosis of congestive heart failure.  She came to the emergency department at [facility] today because of 2 to 3 hours of chest discomfort and shortness of breath.  She reports symptoms were similar to when she was previously diagnosed with a CVA, however she has no stroke symptoms at this time.  Evaluation at [facility] showed elevated creatinine of 5.5, she does have a known history of chronic kidney disease.  Additionally her troponin was found to be elevated, though there is note that this is similar to her prior presentation.  She reports a recent hospitalization at [facility] where she was treated with diuretics, and received further cardiac diagnostics.  She is uncertain as to why they elected to transfer her to [facility] today when her medical records and prior care was all at [facility].  She is not currently followed on outpatient basis by cardiology.  She reports that she has had some lower extremity swelling over the past few days.  She denies any associated fevers.  She states she was tested for COVID-19 during her stay at [facility], and that testing resulted negative.  She is unable to identify any alleviating factors, symptoms are exacerbated by activity.     Review of Systems:  See HPI for pertinent positives and negatives. All other systems negative.    Past Medical History:   has a past medical history of CKD (chronic kidney disease), stage IV ([facility]) (12/24/2015), H/O: CVA (cerebrovascular accident) (12/24/2015), Hypertension, Kidney disease, Mild mitral regurgitation (7/14/2014), Moyamoya disease (10/3/2013), Seizure disorder, grand mal (HCC) (3/3/2016), and Stroke  (HCC).    Social History:  Social History     Tobacco Use   • Smoking status: Former Smoker   • Smokeless tobacco: Never Used   Substance and Sexual Activity   • Alcohol use: No   • Drug use: No   • Sexual activity: Not on file       Surgical History:   has a past surgical history that includes primary c section; tubal coagulation laparoscopic bilateral; irrigation & debridement ortho (Right, 2/27/2017); irrigation & debridement ortho (Right, 3/5/2017); metatarsal head resection (3/5/2017); and irrigation & debridement ortho (Right, 3/9/2017).    Current Medications:  Home Medications     Reviewed by Ayaz Dyer R.N. (Registered Nurse) on 04/16/20 at 0213  Med List Status: Partial   Medication Last Dose Status   acetaminophen (TYLENOL) 325 MG Tab  Active   albuterol 108 (90 BASE) MCG/ACT Aero Soln inhalation aerosol  Active   Ascorbic Acid (VITAMIN C) 1000 MG Tab  Active   aspirin (ASA) 325 MG Tab  Active   atorvastatin (LIPITOR) 40 MG Tab  Active   calcium citrate (CALCITRATE) 950 MG Tab  Active   carvedilol (COREG) 25 MG Tab  Active   coenzyme Q-10 30 MG capsule  Active   escitalopram (LEXAPRO) 10 MG Tab  Active   Esomeprazole Magnesium (NEXIUM 24HR PO)  Active   levETIRAcetam (KEPPRA) 500 MG Tab  Active   lisinopril (PRINIVIL) 20 MG Tab  Active   LORazepam (ATIVAN) 1 MG Tab  Active   Lysine 500 MG Cap  Active   magnesium hydroxide (MILK OF MAGNESIA) 400 MG/5ML Suspension  Active   Melatonin 5 MG Tab  Active   NEXIUM 20 MG capsule  Active   NS SOLN 50 mL with cefUROXime 7.5 GM SOLR 750 mg  Active   OXcarbazepine (TRILEPTAL) 300 MG Tab  Active   phenytoin ER (DILANTIN) 100 MG Cap  Active   sodium bicarbonate (SODIUM BICARBONATE) 650 MG Tab  Active                Allergies:  Allergies   Allergen Reactions   • Allegra [Fexofenadine] Unspecified     Rxn = unknown   • Amoxicillin    • Azithromycin Unspecified     Rxn = unknown   • Claritin    • Erythromycin Unspecified     Rxn = unknown   • Ibuprofen &  "Caffeine-Vitamins Unspecified     Rxn = unknown   • Penicillins Unspecified     Rxn = unknown   • Procardia [Nifedipine]    • Rosemary Oil Anaphylaxis     Throat starts to close/swell.  Oil and the herb.    • Zyrtec [Cetirizine] Unspecified     Rxn = unknown       Physical Exam:  Vital Signs: BP (!) 168/87   Pulse 65   Temp 36.7 °C (98.1 °F) (Temporal)   Resp 18   Ht 1.753 m (5' 9\")   Wt 114.8 kg (253 lb)   LMP 04/14/2020   SpO2 98%   BMI 37.36 kg/m²   Constitutional: Alert, no acute distress  HENT: Moist mucus membranes, normal posterior pharynx, no intraoral lesions  Eyes: Pupils equal and reactive, normal conjunctiva  Neck: Supple, normal range of motion, no stridor  Cardiovascular: Extremities are warm and well perfused, no murmur appreciated, normal cardiac auscultation, bilateral lower extremity edema, exam limited by body habitus  Pulmonary: No respiratory distress, normal work of breathing, no accessory muscule usage, breath sounds clear and equal bilaterally, no wheezing, exam limited by body habitus  Abdomen: Soft, non-distended, non-tender to palpation, no peritoneal signs  Skin: Warm, dry, no rashes or lesions  Musculoskeletal: Normal range of motion in all extremities, no swelling or deformity noted  Neurologic: Alert, oriented, normal speech, normal motor function  Psychiatric: Normal and appropriate mood and affect    Medical records reviewed for continuity of care.  Medical records reviewed from referring facility, [facility].    EKG: Reviewed from outlying facility, this demonstrated sinus rhythm, low voltage throughout all leads, no ST elevation or depression, no T wave inversions.  No significant changes compared to prior EKG from 4/6/2020.  Labs reviewed from outlying facility, troponin resulted at 0.19.  Creatinine is elevated to 5.58, potassium within normal limits at 4.1.  Prior to transfer she was treated with Lasix, and 324 mg of aspirin, as well as heparin drip. Patient " presented for chest pain and tightness, described as substernal that began around 8:00 yesterday evening.  Noted that her troponin was elevated to 0.19 which was similar to the level obtained last week.  At that time she had been transferred to [facility], reported getting an echocardiogram and diuresis.  Noted they were unable to do stress testing, angiography, or other studies due to chronic kidney disease.  She was then transferred to [facility] today for cardiac evaluation.  Cardiology services are not available at referring facility.  Lower extremity ultrasound was performed, this was negative for evidence of DVT.  Noted the chest x-ray was ordered, no result was provided.    Labs:  Labs Reviewed   CBC WITH DIFFERENTIAL   COMP METABOLIC PANEL   PROBRAIN NATRIURETIC PEPTIDE, NT   TROPONIN       Radiology:  DX-CHEST-PORTABLE (1 VIEW)    (Results Pending)        ED Medications Administered:  Medications   heparin injection 3,200 Units (has no administration in time range)     And   heparin infusion 25,000 units in 500 mL 0.45% NACL (has no administration in time range)   MD Alert...HEPARIN WEIGHT BASED PROTOCOL Pharmacist to implement (has no administration in time range)       Differential diagnosis:  ACS, non-ST elevation MI, acute on chronic renal insufficiency, fluid overload    MDM:  Patient presents as a transfer for further evaluation of chest pain in the setting of acute on chronic renal insufficiency.  On my assessment, she is calm, she has no increased work of breathing, no hypoxia.  She does report some persistent chest discomfort.  EKG from outlying facility demonstrates no ischemic changes.  Troponin is elevated, though renal insufficiency may contribute to this abnormality.    On laboratory evaluation BNP is elevated to over 35,000.  I suspect her shortness of breath is due to fluid overload.  High-sensitivity troponin is 66, she has no ischemic changes on EKG, I  believe this is likely again due to fluid overload in the setting of renal insufficiency.  Chest x-ray demonstrates moderate cardiomegaly, and pulmonary vascular congestion.    I did consider pulmonary embolism as a possible cause for her shortness of breath.  She did have negative bilateral lower extremity ultrasound done prior to arrival.  Due to low GFR, CTA is not an option at this time.  She remains hemodynamically stable, no indication for thrombolytics.  Will defer decision for anticoagulation to admitting service.  At this time I believe her shortness of breath is more likely due to fluid overload.      Medical records request has been submitted to [facility].    Plan at this time is admission to hospitalist service for continued evaluation, and diuresis.    Disposition:  Admit to hospitalist in guarded condition    Final Impression:  1. Acute chest pain    2. Shortness of breath        Electronically signed by: Ameena Morales MD, 4/16/2020 4:10 AM

## 2020-04-16 NOTE — PROGRESS NOTES
Assumed care of pt, received bedside report from DIANE Donovan. Pt A/Ox4, on room air, safety and fall precautions in place, strip alarm on. Discussed POC with pt, pt verbalizes understanding. No further needs at this time.

## 2020-04-16 NOTE — PROGRESS NOTES
Admitted this morning for chest pain rule out.  Leg swelling started a few days ago  BNP very elevated  MPI completed, results pending  Echo completed, EF 30% this visit, down from 65% 2/2017  Discussed with patient, she normally follows with Dr. Charles outpatient and has her next appointment in July of this year  Consulted cardiologist Dr. Palma  Consulted SN nephro Dr. San  Giving IV lasix 80mg once now per Dr. Enciso, and he will see in AM and re-eval further doses of diuretics

## 2020-04-16 NOTE — ED TRIAGE NOTES
"Chief Complaint   Patient presents with   • Chest Pain    BP (!) 177/95   Pulse 63   Temp 36.7 °C (98.1 °F) (Temporal)   Resp (!) 22   Ht 1.753 m (5' 9\")   Wt 114.8 kg (253 lb)   SpO2 99%     Pt is a 51yo female presenting to ED via EMS as a trn from [facility]. Pt presents w c/o CP/ SOB. Reports onset of symptoms x 2 days ago. Reports increased edema in her lower extremities. Denies fever, cough, n/v/d, contact with ill person. Pt presents on a Heparin gtt. IVx 2.     Original labs: PT 14.7, INR 1.1, APTT 29.7, TROP 0.19  Heparin gtt start time- 2340  "

## 2020-04-16 NOTE — H&P
Hospital Medicine History & Physical Note    Date of Service  4/16/2020    Primary Care Physician  SILKE Hayes    Consultants  None     Code Status  Full code    Chief Complaint  Chest pain    History of Presenting Illness  50 y.o. female with a past medical history of stroke with moyamoya disease, MR, CKD stage IV, congestive heart failure, seizure disorder who presented 4/16/2020 with chest pain and shortness of breath started yesterday around 8 PM.  She reports substernal chest pain without radiation associated with shortness of breath.  She denies any fevers, chills or cough.  She was recently tested for Covid which returned negative.  She reports lower extremity edema bilaterally.  She was recently evaluated at [facility] for similar symptoms.  She underwent a echocardiogram and was treated with IV diuresis.  Apparently they were unable to perform any stress testing or angiography.     The patient presented to [facility].  I have reviewed the medical records from the transferring facility which are summarized as follows:    EKG interpreted by me reveals sinus rhythm with no ST elevation or ST depression.  Nonspecific intraventricular conduction delay noted    WBC 7.4, hemoglobin 11.1, hematocrit 33.5, platelets 151    Sodium 140, potassium 4.1, chloride 112, CO2 16, anion gap 12, glucose 106, BUN 94, creatinine 5.58 calcium 8.4, protein 6.7, albumin 2.9, total bilirubin 0.3, AST 14, ALT 44, alkaline phosphatase 148    Troponin 0 0.19    PT 14.7, INR 1.1, APTT 29.7    Ultrasound venous duplex bilateral negative for DVT    Patient was treated with 325 mg of aspirin and IV heparin prior to transfer    Review of Systems  Review of Systems   Constitutional: Negative for chills, diaphoresis and fever.   HENT: Negative for hearing loss and sore throat.    Eyes: Negative for blurred vision.   Respiratory: Positive for shortness of breath. Negative for cough, sputum production and  wheezing.    Cardiovascular: Positive for chest pain and leg swelling. Negative for palpitations.   Gastrointestinal: Negative for abdominal pain, blood in stool, diarrhea, nausea and vomiting.   Genitourinary: Negative for dysuria, flank pain and urgency.   Musculoskeletal: Negative for back pain, joint pain, myalgias and neck pain.   Skin: Negative for rash.   Neurological: Negative for dizziness, focal weakness, seizures and headaches.   Endo/Heme/Allergies: Does not bruise/bleed easily.   Psychiatric/Behavioral: Negative for suicidal ideas.   All other systems reviewed and are negative.      Past Medical History   has a past medical history of CKD (chronic kidney disease), stage IV ([facility]) (12/24/2015), H/O: CVA (cerebrovascular accident) (12/24/2015), Hypertension, Kidney disease, Mild mitral regurgitation (7/14/2014), Moyamoya disease (10/3/2013), Seizure disorder, grand mal ([facility]) (3/3/2016), and Stroke ([facility]).    Surgical History   has a past surgical history that includes primary c section; tubal coagulation laparoscopic bilateral; irrigation & debridement ortho (Right, 2/27/2017); irrigation & debridement ortho (Right, 3/5/2017); metatarsal head resection (3/5/2017); and irrigation & debridement ortho (Right, 3/9/2017).     Family History  family history includes Arthritis in her father; Cancer in her paternal grandmother; Diabetes in her maternal grandmother and mother; Hypertension in her father and mother; Psychiatric Illness in her maternal grandfather and paternal grandfather.     Social History   reports that she has quit smoking. She has never used smokeless tobacco. She reports that she does not drink alcohol or use drugs.    Allergies  Allergies   Allergen Reactions   • Allegra [Fexofenadine] Unspecified     Rxn = unknown   • Amoxicillin    • Azithromycin Unspecified     Rxn = unknown   • Claritin    • Erythromycin Unspecified     Rxn = unknown   • Ibuprofen & Caffeine-Vitamins Unspecified     Rxn =  unknown   • Penicillins Unspecified     Rxn = unknown   • Procardia [Nifedipine]    • Rosemary Oil Anaphylaxis     Throat starts to close/swell.  Oil and the herb.    • Zyrtec [Cetirizine] Unspecified     Rxn = unknown       Medications  Prior to Admission Medications   Prescriptions Last Dose Informant Patient Reported? Taking?   Ascorbic Acid (VITAMIN C) 1000 MG Tab   Yes No   Sig: Take  by mouth.   Esomeprazole Magnesium (NEXIUM 24HR PO)   Yes No   Sig: Take  by mouth.   LORazepam (ATIVAN) 1 MG Tab   No No   Sig: Take 1 Tab by mouth 2 Times a Day for 180 days.   Lysine 500 MG Cap   Yes No   Sig: Take  by mouth.   Melatonin 5 MG Tab   Yes No   Sig: Take  by mouth.   NEXIUM 20 MG capsule   Yes No   NS SOLN 50 mL with cefUROXime 7.5 GM SOLR 750 mg   No No   Si mg by Intravenous route every 8 hours.   OXcarbazepine (TRILEPTAL) 300 MG Tab   No No   Sig: Take 1 Tab by mouth 2 Times a Day.   acetaminophen (TYLENOL) 325 MG Tab   No No   Sig: Take 2 Tabs by mouth every 6 hours as needed (Mild Pain; (Pain scale 1-3); Temp greater than 100.5 F).   albuterol 108 (90 BASE) MCG/ACT Aero Soln inhalation aerosol   No No   Sig: Inhale 2 Puffs by mouth every four hours as needed for Shortness of Breath.   aspirin (ASA) 325 MG Tab   No No   Sig: Take 1 Tab by mouth every day.   atorvastatin (LIPITOR) 40 MG Tab   No No   Sig: Take 1 Tab by mouth every day.   calcium citrate (CALCITRATE) 950 MG Tab   Yes No   Sig: Take 950 mg by mouth every day.   carvedilol (COREG) 25 MG Tab   No No   Sig: Take 1 Tab by mouth 3 times a day.   coenzyme Q-10 30 MG capsule   Yes No   Sig: Take 60 mg by mouth every day.   escitalopram (LEXAPRO) 10 MG Tab   No No   Sig: Take 1 Tab by mouth every day.   levETIRAcetam (KEPPRA) 500 MG Tab   No No   Simg in AM and 1000mg in PM   lisinopril (PRINIVIL) 20 MG Tab   No No   Sig: Take 1 Tab by mouth 2 Times a Day.   magnesium hydroxide (MILK OF MAGNESIA) 400 MG/5ML Suspension   No No   Sig: Take 30 mL  by mouth 1 time daily as needed (if polyethylene glycol ineffective after 24 hours).   phenytoin ER (DILANTIN) 100 MG Cap   No No   Sig: Take 1 Cap by mouth 3 times a day.   sodium bicarbonate (SODIUM BICARBONATE) 650 MG Tab   No No   Sig: Take 1 Tab by mouth 3 times a day.      Facility-Administered Medications: None       Physical Exam  Temp:  [36.7 °C (98.1 °F)] 36.7 °C (98.1 °F)  Pulse:  [63-65] 65  Resp:  [18-22] 18  BP: (168-177)/(87-95) 168/87  SpO2:  [98 %-99 %] 98 %    Physical Exam  Vitals signs and nursing note reviewed.   Constitutional:       General: She is not in acute distress.     Appearance: Normal appearance. She is obese.   HENT:      Head: Normocephalic and atraumatic.      Nose: Nose normal.      Mouth/Throat:      Mouth: Mucous membranes are moist.   Eyes:      Extraocular Movements: Extraocular movements intact.      Conjunctiva/sclera: Conjunctivae normal.      Pupils: Pupils are equal, round, and reactive to light.   Neck:      Musculoskeletal: Normal range of motion and neck supple.   Cardiovascular:      Rate and Rhythm: Normal rate and regular rhythm.      Pulses: Normal pulses.      Heart sounds: Normal heart sounds.   Pulmonary:      Effort: No respiratory distress.      Breath sounds: No wheezing, rhonchi or rales.   Abdominal:      General: Bowel sounds are normal. There is no distension.      Palpations: Abdomen is soft.      Tenderness: There is no abdominal tenderness.   Musculoskeletal:         General: No swelling or tenderness.      Comments: Trace pedal edema bilaterally   Lymphadenopathy:      Cervical: No cervical adenopathy.   Skin:     General: Skin is warm.      Coloration: Skin is not jaundiced.      Findings: No rash.   Neurological:      General: No focal deficit present.      Mental Status: She is alert and oriented to person, place, and time.      Cranial Nerves: No cranial nerve deficit.      Motor: No weakness.   Psychiatric:         Mood and Affect: Mood normal.          Behavior: Behavior normal.         Laboratory:  Recent Labs     04/16/20  0212   WBC 7.7   RBC 3.45*   HEMOGLOBIN 11.3*   HEMATOCRIT 33.6*   MCV 97.4   MCH 32.8   MCHC 33.6   RDW 53.2*   PLATELETCT 147*   MPV 10.7         No results for input(s): ALTSGPT, ASTSGOT, ALKPHOSPHAT, TBILIRUBIN, DBILIRUBIN, GAMMAGT, AMYLASE, LIPASE, ALB, PREALBUMIN, GLUCOSE in the last 72 hours.      No results for input(s): NTPROBNP in the last 72 hours.      No results for input(s): TROPONINT in the last 72 hours.    Urinalysis:    No results found     Imaging:  DX-CHEST-PORTABLE (1 VIEW)   Final Result      1.  Hypoinflation and pulmonary vascular congestion.   2.  Moderate cardiomegaly, increased from prior exam.   3.  No lobar pneumonia or pneumothorax.            Assessment/Plan:  I anticipate this patient will require at least two midnights for appropriate medical management, necessitating inpatient admission.    Chest pain- (present on admission)  Assessment & Plan  Rule out ACS and PE  Unable to perform CTA chest with IV contrast due to low GFR.  Will perform VQ scan in the morning  Continuous cardiac monitoring with serial EKG and troponin  Nuclear medicine cardiac stress test in the morning if troponin is not uptrending  Patient has been given full dose of aspirin  Check lipid panel, TSH and hemoglobin A1c  Nitro when necessary for chest pain  Obtain 2D echo results from outlying facility    [facility]- (present on admission)  Assessment & Plan  Continue aspirin and Lipitor    Acute-on-chronic kidney injury (HCC)- (present on admission)  Assessment & Plan  We will consider consulting nephrology in the morning for further recommendations  Monitor BMP and assess response  Avoid IV contrast/nephrotoxins/NSAIDs  Dose adjust meds for decreased GFR        Obesity (BMI 30-39.9)- (present on admission)  Assessment & Plan  Body mass index is 37.36 kg/m².  Patient has been counseled on diet and lifestyle modifications  Recommended  outpatient weight management program and bariatric surgery evaluation      Seizure disorder, grand mal (HCC)- (present on admission)  Assessment & Plan  Continue Keppra, Dilantin and oxcarbazepine  Seizure precautions    H/O: CVA (cerebrovascular accident)- (present on admission)  Assessment & Plan  Continue aspirin and Lipitor    HTN (hypertension)- (present on admission)  Assessment & Plan  Continue Coreg    HLD (hyperlipidemia)- (present on admission)  Assessment & Plan  Continue Lipitor      VTE prophylaxis: Heparin    I spent a total of 35 minutes of non face to face time performing additional research, reviewing medical records from transferring facility, discussing plan of care with other healthcare providers. Start time:2 50 am. End time: 3 25 am

## 2020-04-16 NOTE — CARE PLAN
Pt remains free from falls and injury, steady on her feet, at this time denies any chest pain. Reviewed meds care plan and treatment, pt verbalizes understanding will cont to monitor.

## 2020-04-16 NOTE — ASSESSMENT & PLAN NOTE
Rule out AC  Unable to perform CTA chest with IV contrast due to low GFR  Unable to perform VQ scan due to COVID conditions  Continuous cardiac monitoring with serial EKG and troponin  MPI negative  Patient has been given full dose of aspirin  Nitro when necessary for chest pain  D-dimer slightly elevated

## 2020-04-17 ENCOUNTER — APPOINTMENT (OUTPATIENT)
Dept: RADIOLOGY | Facility: MEDICAL CENTER | Age: 50
DRG: 291 | End: 2020-04-17
Attending: INTERNAL MEDICINE
Payer: MEDICARE

## 2020-04-17 ENCOUNTER — PATIENT OUTREACH (OUTPATIENT)
Dept: HEALTH INFORMATION MANAGEMENT | Facility: OTHER | Age: 50
End: 2020-04-17

## 2020-04-17 LAB
ANION GAP SERPL CALC-SCNC: 18 MMOL/L (ref 7–16)
BASOPHILS # BLD AUTO: 0.3 % (ref 0–1.8)
BASOPHILS # BLD: 0.02 K/UL (ref 0–0.12)
BUN SERPL-MCNC: 86 MG/DL (ref 8–22)
CALCIUM SERPL-MCNC: 8.8 MG/DL (ref 8.5–10.5)
CHLORIDE SERPL-SCNC: 105 MMOL/L (ref 96–112)
CHOLEST SERPL-MCNC: 127 MG/DL (ref 100–199)
CO2 SERPL-SCNC: 15 MMOL/L (ref 20–33)
CREAT SERPL-MCNC: 5.62 MG/DL (ref 0.5–1.4)
D DIMER PPP IA.FEU-MCNC: 1 UG/ML (FEU) (ref 0–0.5)
EOSINOPHIL # BLD AUTO: 0.1 K/UL (ref 0–0.51)
EOSINOPHIL NFR BLD: 1.7 % (ref 0–6.9)
ERYTHROCYTE [DISTWIDTH] IN BLOOD BY AUTOMATED COUNT: 52.4 FL (ref 35.9–50)
EST. AVERAGE GLUCOSE BLD GHB EST-MCNC: 105 MG/DL
GLUCOSE SERPL-MCNC: 114 MG/DL (ref 65–99)
HAV IGM SERPL QL IA: NORMAL
HBA1C MFR BLD: 5.3 % (ref 0–5.6)
HBV CORE IGM SER QL: NORMAL
HBV SURFACE AB SERPL IA-ACNC: <3.5 MIU/ML (ref 0–10)
HBV SURFACE AG SER QL: NORMAL
HCT VFR BLD AUTO: 33.6 % (ref 37–47)
HCV AB SER QL: NORMAL
HDLC SERPL-MCNC: 57 MG/DL
HGB BLD-MCNC: 10.8 G/DL (ref 12–16)
IMM GRANULOCYTES # BLD AUTO: 0.02 K/UL (ref 0–0.11)
IMM GRANULOCYTES NFR BLD AUTO: 0.3 % (ref 0–0.9)
LDLC SERPL CALC-MCNC: 44 MG/DL
LYMPHOCYTES # BLD AUTO: 1.46 K/UL (ref 1–4.8)
LYMPHOCYTES NFR BLD: 24.6 % (ref 22–41)
MCH RBC QN AUTO: 31.7 PG (ref 27–33)
MCHC RBC AUTO-ENTMCNC: 32.1 G/DL (ref 33.6–35)
MCV RBC AUTO: 98.5 FL (ref 81.4–97.8)
MONOCYTES # BLD AUTO: 0.57 K/UL (ref 0–0.85)
MONOCYTES NFR BLD AUTO: 9.6 % (ref 0–13.4)
NEUTROPHILS # BLD AUTO: 3.77 K/UL (ref 2–7.15)
NEUTROPHILS NFR BLD: 63.5 % (ref 44–72)
NRBC # BLD AUTO: 0.02 K/UL
NRBC BLD-RTO: 0.3 /100 WBC
NT-PROBNP SERPL IA-MCNC: ABNORMAL PG/ML (ref 0–125)
PLATELET # BLD AUTO: 144 K/UL (ref 164–446)
PMV BLD AUTO: 10.6 FL (ref 9–12.9)
POTASSIUM SERPL-SCNC: 3.8 MMOL/L (ref 3.6–5.5)
RBC # BLD AUTO: 3.41 M/UL (ref 4.2–5.4)
SODIUM SERPL-SCNC: 138 MMOL/L (ref 135–145)
T4 FREE SERPL-MCNC: 0.75 NG/DL (ref 0.53–1.43)
TRIGL SERPL-MCNC: 129 MG/DL (ref 0–149)
TSH SERPL DL<=0.005 MIU/L-ACNC: 6.5 UIU/ML (ref 0.38–5.33)
WBC # BLD AUTO: 5.9 K/UL (ref 4.8–10.8)

## 2020-04-17 PROCEDURE — 85379 FIBRIN DEGRADATION QUANT: CPT

## 2020-04-17 PROCEDURE — 05HM33Z INSERTION OF INFUSION DEVICE INTO RIGHT INTERNAL JUGULAR VEIN, PERCUTANEOUS APPROACH: ICD-10-PCS | Performed by: RADIOLOGY

## 2020-04-17 PROCEDURE — 99233 SBSQ HOSP IP/OBS HIGH 50: CPT | Performed by: INTERNAL MEDICINE

## 2020-04-17 PROCEDURE — 36558 INSERT TUNNELED CV CATH: CPT

## 2020-04-17 PROCEDURE — 84443 ASSAY THYROID STIM HORMONE: CPT

## 2020-04-17 PROCEDURE — 700111 HCHG RX REV CODE 636 W/ 250 OVERRIDE (IP): Performed by: RADIOLOGY

## 2020-04-17 PROCEDURE — A9270 NON-COVERED ITEM OR SERVICE: HCPCS | Performed by: HOSPITALIST

## 2020-04-17 PROCEDURE — 36415 COLL VENOUS BLD VENIPUNCTURE: CPT

## 2020-04-17 PROCEDURE — 80061 LIPID PANEL: CPT

## 2020-04-17 PROCEDURE — 700101 HCHG RX REV CODE 250

## 2020-04-17 PROCEDURE — 84439 ASSAY OF FREE THYROXINE: CPT

## 2020-04-17 PROCEDURE — 700111 HCHG RX REV CODE 636 W/ 250 OVERRIDE (IP): Performed by: INTERNAL MEDICINE

## 2020-04-17 PROCEDURE — 0JH63XZ INSERTION OF TUNNELED VASCULAR ACCESS DEVICE INTO CHEST SUBCUTANEOUS TISSUE AND FASCIA, PERCUTANEOUS APPROACH: ICD-10-PCS | Performed by: RADIOLOGY

## 2020-04-17 PROCEDURE — 700102 HCHG RX REV CODE 250 W/ 637 OVERRIDE(OP): Performed by: HOSPITALIST

## 2020-04-17 PROCEDURE — 5A1D70Z PERFORMANCE OF URINARY FILTRATION, INTERMITTENT, LESS THAN 6 HOURS PER DAY: ICD-10-PCS | Performed by: INTERNAL MEDICINE

## 2020-04-17 PROCEDURE — 90935 HEMODIALYSIS ONE EVALUATION: CPT

## 2020-04-17 PROCEDURE — 85025 COMPLETE CBC W/AUTO DIFF WBC: CPT

## 2020-04-17 PROCEDURE — 96372 THER/PROPH/DIAG INJ SC/IM: CPT

## 2020-04-17 PROCEDURE — 700102 HCHG RX REV CODE 250 W/ 637 OVERRIDE(OP): Performed by: INTERNAL MEDICINE

## 2020-04-17 PROCEDURE — 80074 ACUTE HEPATITIS PANEL: CPT

## 2020-04-17 PROCEDURE — 86480 TB TEST CELL IMMUN MEASURE: CPT

## 2020-04-17 PROCEDURE — A9270 NON-COVERED ITEM OR SERVICE: HCPCS | Performed by: NURSE PRACTITIONER

## 2020-04-17 PROCEDURE — 700102 HCHG RX REV CODE 250 W/ 637 OVERRIDE(OP): Performed by: NURSE PRACTITIONER

## 2020-04-17 PROCEDURE — A9270 NON-COVERED ITEM OR SERVICE: HCPCS | Performed by: INTERNAL MEDICINE

## 2020-04-17 PROCEDURE — 700111 HCHG RX REV CODE 636 W/ 250 OVERRIDE (IP)

## 2020-04-17 PROCEDURE — 80048 BASIC METABOLIC PNL TOTAL CA: CPT

## 2020-04-17 PROCEDURE — 770020 HCHG ROOM/CARE - TELE (206)

## 2020-04-17 PROCEDURE — 83880 ASSAY OF NATRIURETIC PEPTIDE: CPT

## 2020-04-17 PROCEDURE — 86706 HEP B SURFACE ANTIBODY: CPT

## 2020-04-17 PROCEDURE — 96376 TX/PRO/DX INJ SAME DRUG ADON: CPT

## 2020-04-17 PROCEDURE — 99232 SBSQ HOSP IP/OBS MODERATE 35: CPT | Performed by: HOSPITALIST

## 2020-04-17 PROCEDURE — 96375 TX/PRO/DX INJ NEW DRUG ADDON: CPT

## 2020-04-17 RX ORDER — HYDRALAZINE HYDROCHLORIDE 25 MG/1
25 TABLET, FILM COATED ORAL EVERY 8 HOURS
Status: DISCONTINUED | OUTPATIENT
Start: 2020-04-17 | End: 2020-04-18

## 2020-04-17 RX ORDER — FUROSEMIDE 10 MG/ML
80 INJECTION INTRAMUSCULAR; INTRAVENOUS 3 TIMES DAILY
Status: DISCONTINUED | OUTPATIENT
Start: 2020-04-17 | End: 2020-04-17

## 2020-04-17 RX ORDER — MIDAZOLAM HYDROCHLORIDE 1 MG/ML
.5-2 INJECTION INTRAMUSCULAR; INTRAVENOUS PRN
Status: ACTIVE | OUTPATIENT
Start: 2020-04-17 | End: 2020-04-17

## 2020-04-17 RX ORDER — HEPARIN SODIUM 1000 [USP'U]/ML
3700 INJECTION, SOLUTION INTRAVENOUS; SUBCUTANEOUS
Status: DISCONTINUED | OUTPATIENT
Start: 2020-04-17 | End: 2020-04-20 | Stop reason: HOSPADM

## 2020-04-17 RX ORDER — DIPHENHYDRAMINE HCL 25 MG
25 TABLET ORAL ONCE
Status: ACTIVE | OUTPATIENT
Start: 2020-04-17 | End: 2020-04-18

## 2020-04-17 RX ORDER — CARVEDILOL 12.5 MG/1
12.5 TABLET ORAL 2 TIMES DAILY WITH MEALS
Status: DISCONTINUED | OUTPATIENT
Start: 2020-04-17 | End: 2020-04-18

## 2020-04-17 RX ORDER — HEPARIN SODIUM 1000 [USP'U]/ML
INJECTION, SOLUTION INTRAVENOUS; SUBCUTANEOUS
Status: COMPLETED
Start: 2020-04-17 | End: 2020-04-17

## 2020-04-17 RX ORDER — LIDOCAINE HYDROCHLORIDE AND EPINEPHRINE 10; 10 MG/ML; UG/ML
INJECTION, SOLUTION INFILTRATION; PERINEURAL
Status: COMPLETED
Start: 2020-04-17 | End: 2020-04-17

## 2020-04-17 RX ORDER — ONDANSETRON 2 MG/ML
4 INJECTION INTRAMUSCULAR; INTRAVENOUS PRN
Status: ACTIVE | OUTPATIENT
Start: 2020-04-17 | End: 2020-04-17

## 2020-04-17 RX ORDER — SODIUM CHLORIDE 9 MG/ML
500 INJECTION, SOLUTION INTRAVENOUS
Status: ACTIVE | OUTPATIENT
Start: 2020-04-17 | End: 2020-04-17

## 2020-04-17 RX ORDER — MIDAZOLAM HYDROCHLORIDE 1 MG/ML
INJECTION INTRAMUSCULAR; INTRAVENOUS
Status: COMPLETED
Start: 2020-04-17 | End: 2020-04-17

## 2020-04-17 RX ORDER — ISOSORBIDE DINITRATE 10 MG/1
20 TABLET ORAL 3 TIMES DAILY
Status: DISCONTINUED | OUTPATIENT
Start: 2020-04-17 | End: 2020-04-19

## 2020-04-17 RX ORDER — HYDRALAZINE HYDROCHLORIDE 10 MG/1
20 TABLET, FILM COATED ORAL ONCE
Status: COMPLETED | OUTPATIENT
Start: 2020-04-17 | End: 2020-04-17

## 2020-04-17 RX ORDER — FUROSEMIDE 40 MG/1
80 TABLET ORAL ONCE
Status: COMPLETED | OUTPATIENT
Start: 2020-04-17 | End: 2020-04-17

## 2020-04-17 RX ORDER — DIPHENHYDRAMINE HCL 25 MG
25 TABLET ORAL
Status: DISCONTINUED | OUTPATIENT
Start: 2020-04-17 | End: 2020-04-20 | Stop reason: HOSPADM

## 2020-04-17 RX ADMIN — MIDAZOLAM HYDROCHLORIDE 1 MG: 1 INJECTION, SOLUTION INTRAMUSCULAR; INTRAVENOUS at 11:52

## 2020-04-17 RX ADMIN — OMEPRAZOLE 20 MG: 20 CAPSULE, DELAYED RELEASE ORAL at 05:37

## 2020-04-17 RX ADMIN — HYDRALAZINE HYDROCHLORIDE 20 MG: 10 TABLET ORAL at 09:21

## 2020-04-17 RX ADMIN — ASPIRIN 325 MG: 325 TABLET, FILM COATED ORAL at 05:35

## 2020-04-17 RX ADMIN — MIDAZOLAM HYDROCHLORIDE 1 MG: 1 INJECTION, SOLUTION INTRAMUSCULAR; INTRAVENOUS at 11:59

## 2020-04-17 RX ADMIN — HYDRALAZINE HYDROCHLORIDE 10 MG: 10 TABLET ORAL at 00:42

## 2020-04-17 RX ADMIN — HEPARIN SODIUM 5000 UNITS: 5000 INJECTION, SOLUTION INTRAVENOUS; SUBCUTANEOUS at 05:39

## 2020-04-17 RX ADMIN — HEPARIN SODIUM 3700 UNITS: 1000 INJECTION, SOLUTION INTRAVENOUS; SUBCUTANEOUS at 13:23

## 2020-04-17 RX ADMIN — HYDRALAZINE HYDROCHLORIDE 10 MG: 10 TABLET ORAL at 09:00

## 2020-04-17 RX ADMIN — CARVEDILOL 12.5 MG: 12.5 TABLET, FILM COATED ORAL at 09:21

## 2020-04-17 RX ADMIN — ACETAMINOPHEN 650 MG: 325 TABLET, FILM COATED ORAL at 17:37

## 2020-04-17 RX ADMIN — ISOSORBIDE DINITRATE 10 MG: 10 TABLET ORAL at 05:36

## 2020-04-17 RX ADMIN — ATORVASTATIN CALCIUM 40 MG: 40 TABLET, FILM COATED ORAL at 05:35

## 2020-04-17 RX ADMIN — PHENYTOIN SODIUM 100 MG: 100 CAPSULE ORAL at 05:37

## 2020-04-17 RX ADMIN — HYDRALAZINE HYDROCHLORIDE 25 MG: 25 TABLET, FILM COATED ORAL at 17:30

## 2020-04-17 RX ADMIN — ESCITALOPRAM OXALATE 10 MG: 10 TABLET ORAL at 05:35

## 2020-04-17 RX ADMIN — LIDOCAINE HYDROCHLORIDE,EPINEPHRINE BITARTRATE: 10; .01 INJECTION, SOLUTION INFILTRATION; PERINEURAL at 11:56

## 2020-04-17 RX ADMIN — LEVETIRACETAM 1000 MG: 500 TABLET ORAL at 17:31

## 2020-04-17 RX ADMIN — FUROSEMIDE 80 MG: 40 TABLET ORAL at 09:20

## 2020-04-17 RX ADMIN — ISOSORBIDE DINITRATE 20 MG: 10 TABLET ORAL at 17:30

## 2020-04-17 RX ADMIN — LEVETIRACETAM 500 MG: 500 TABLET ORAL at 05:35

## 2020-04-17 RX ADMIN — CARVEDILOL 12.5 MG: 12.5 TABLET, FILM COATED ORAL at 17:30

## 2020-04-17 RX ADMIN — PHENYTOIN SODIUM 100 MG: 100 CAPSULE ORAL at 17:30

## 2020-04-17 RX ADMIN — HEPARIN SODIUM 5000 UNITS: 5000 INJECTION, SOLUTION INTRAVENOUS; SUBCUTANEOUS at 20:14

## 2020-04-17 RX ADMIN — HEPARIN 500 UNITS: 100 SYRINGE at 12:00

## 2020-04-17 ASSESSMENT — ENCOUNTER SYMPTOMS
CHEST TIGHTNESS: 0
CHOKING: 0
WHEEZING: 0
APNEA: 0
STRIDOR: 0
SHORTNESS OF BREATH: 0
COUGH: 0

## 2020-04-17 ASSESSMENT — FIBROSIS 4 INDEX: FIB4 SCORE: 0.98

## 2020-04-17 NOTE — PROGRESS NOTES
Bedside report received, pt care assumed, tele box on.  Pt is A&Ox4, sitting up in bed, and denies any additional needs at this time. Bed in lowest position, bed alarm on, pt educated on fall risk and verbalized understanding, call light within reach.

## 2020-04-17 NOTE — PROGRESS NOTES
Cardiology Follow Up Progress Note    Date of Service  4/17/2020    Attending Physician  Dino Calderón M.D.    Chief Complaint   Chest pain, orthopnea, dyspnea, leg swelling, weight gain    HPI  Pascale Acevedo is a 50 y.o. female admitted 4/16/2020 with chest pain, orthopnea, dyspnea, leg swelling, weight gain.      Cardiology consultation for new cardiomyopathy, EF 30%, in addition decompensated systolic heart failure.      Past medical history significant for CVA  Moyamoya disease, chronic kidney disease, hypertension, hyperlipidemia, former smoker        Labs reviewed  Sodium 130  8 potassium 3.8  BUN 86  Creatinine 5.62  NT proBNP > 35,000  Troponin peaked at 70 NG/L      Blood pressure 150/80  Rhythm sinus bradycardia    Interim Events    4/17/2020 overnight sinus rhythm/sinus bradycardia, accelerated idioventricular beats, denies angina, dyspnea improved significantly, lower extremity edema improved, denies orthopnea, all questions were answered.  Review of Systems  Review of Systems   Respiratory: Negative for apnea, cough, choking, chest tightness, shortness of breath, wheezing and stridor.    Cardiovascular: Negative for chest pain and leg swelling.       Vital signs in last 24 hours  Temp:  [36.2 °C (97.2 °F)-37.1 °C (98.8 °F)] 36.2 °C (97.2 °F)  Pulse:  [51-60] 56  Resp:  [17-18] 17  BP: (131-153)/(70-86) 153/82  SpO2:  [95 %-99 %] 98 %    Physical Exam  Physical Exam    Lab Review  Lab Results   Component Value Date/Time    WBC 5.9 04/17/2020 02:34 AM    RBC 3.41 (L) 04/17/2020 02:34 AM    HEMOGLOBIN 10.8 (L) 04/17/2020 02:34 AM    HEMATOCRIT 33.6 (L) 04/17/2020 02:34 AM    MCV 98.5 (H) 04/17/2020 02:34 AM    MCH 31.7 04/17/2020 02:34 AM    MCHC 32.1 (L) 04/17/2020 02:34 AM    MPV 10.6 04/17/2020 02:34 AM      Lab Results   Component Value Date/Time    SODIUM 138 04/17/2020 02:34 AM    POTASSIUM 3.8 04/17/2020 02:34 AM    CHLORIDE 105 04/17/2020 02:34 AM    CO2 15 (L) 04/17/2020 02:34 AM     GLUCOSE 114 (H) 04/17/2020 02:34 AM    BUN 86 (HH) 04/17/2020 02:34 AM    CREATININE 5.62 (HH) 04/17/2020 02:34 AM    CREATININE 0.9 11/19/2006 05:48 AM      Lab Results   Component Value Date/Time    ASTSGOT 16 04/16/2020 03:05 AM    ALTSGPT 31 04/16/2020 03:05 AM     Lab Results   Component Value Date/Time    CHOLSTRLTOT 127 04/17/2020 02:34 AM    LDL 44 04/17/2020 02:34 AM    HDL 57 04/17/2020 02:34 AM    TRIGLYCERIDE 129 04/17/2020 02:34 AM    TROPONINT 61 (H) 04/16/2020 08:42 AM       Recent Labs     04/16/20  0305 04/17/20  0234   NTPROBNP >12678* >95758*       Cardiac Imaging and Procedures Review  EKG: Sinus rhythm    Echocardiogram: 4/16/2020 severely reduced LV systolic function, LVEF 30%, global hypokinesis with further hypokinesis of the basal to mid inferior wall and inferior septum, grade 3 diastolic dysfunction, mildly dilated RV, mild to moderate MR, mild TR, RVSP 42 mmHg, biatrial enlargement    Cardiac Catheterization:     Imaging  Chest X-Ray: 4/16/20     Hypoinflation and pulmonary vascular congestion.  2.  Moderate cardiomegaly, increased from prior exam.  3.  No lobar pneumonia or pneumothorax.    Stress Test: 4/16/20    No evidence of ischemia.    Inferoapical photopenia is likely related to attenuation artifact.    Mild diffuse hypokinesis with left ventricle ejection fraction of 43%.    Assessment/Plan    New cardiomyopathy, LVEF 30%  -MPI 4/16/2020, no evidence of ischemia  -Optimal guided medical therapy  -Currently on aspirin, atorvastatin  -Hydralazine/isosorbide  -On Toprol-XL      Acute decompensated systolic and diastolic heart failure  -NT proBNP> 35,000  -Furosemide 80 mg  3 times daily  -Refused furosemide this morning, reports overnight requiring Benadryl after receiving furosemide due to rash and itching, will discuss with primary.  -Strict intake and output  -Daily standing weight      Acute on chronic kidney disease  -Creatinine 5.62  -Recommend nephrology consult  -Close  monitoring      Chest discomfort  -Denies chest pain this morning  -Insignificant troponin elevation  -MPI 4/16/2020 with no evidence of ischemia  -Continue with the current medical therapy      We will follow  Potentially will proceed for left and right heart cath when euvolemic        Please contact me with any questions.    MELISSA Lopez.   Cardiologist, [facility]  [phone]

## 2020-04-17 NOTE — OR SURGEON
Immediate Post- Operative Note        PostOp Diagnosis: FREDY.       Procedure(s): Permcath placement.      Estimated Blood Loss: Less than 5 ml        Complications: None            4/17/2020     1202 PM     Slick Montes M.D.

## 2020-04-17 NOTE — PROGRESS NOTES
Patient continues to ambulate without staff assistance. Patient also refusing socks. Patient given re-education about fall precautions. No evidence of learning. Bed alarm still in place. Bed locked and in lowest position.

## 2020-04-17 NOTE — ASSESSMENT & PLAN NOTE
Previously followed with Dr. Charles outpatient for dilated cardiomyopathy  EF is now decreased further  Also has volume overload  Cardiologist Dr. Palma consulted  Nephrologist Dr. San consulted for dialysis to remove further fluid  Cards started valsartan 4/19.  I discussed with  nephrologist Dr. Schwarz and he was okay with starting ACE/ARB anytime now that dialysis is started.

## 2020-04-17 NOTE — CONSULTS
[facility] -  CONSULTATION NOTE               Author: Slick San M.D. Date & Time: 4/17/2020  7:23 AM       REASON FOR CONSULTATION:   Progressive chronic kidney disease and volume overload    CHIEF COMPLAINT:   Shortness of breath    HISTORY OF PRESENT ILLNESS:    50-year-old female with a history of hypertension, moyamoya disease, CHF, atrophic right kidney and progressive CKD presented on 4/16 with chest pain and shortness of breath, found to have worsening heart failure and progressive CKD.    Patient followed outpatient by see her [place] nephrology in the [facility].  Last seen in March after recently reestablishing after being lost to follow-up for several years.  In March her creatinine was 4.7 and started dialysis planning.  She was supposed to have vein mapping done and access placement in April, but she thinks she only had vein mapping    Since April 7 she has had progressive worsening shortness of breath and chest pressure.  Recent hospital stay at [facility] for similar presentation.  COVID tested and negative.  She also notes decreasing appetite with intermittent nausea and metallic taste in mouth over last several weeks.    Upon admission creatinine elevated at 5.6.  Nuc med showed mild hypokinesis diffusely with EF estimated at 43%.  EF estimated to be 30% on echo.  IVC dilated without inspiratory collapse.  Given dose of Lasix, 80 mg IV last night, 800cc urine output but felt pruritus and rash which she believes was secondary to the Lasix, improved with Benadryl    REVIEW OF SYSTEMS:    General: No malaise  CV: +chest pain  RESP: +shortness of breath  GI: No abdominal pain   : No dysuria or gross hematuria  MSK: No trauma  Skin: + Pruritus  Neuro: No tremors  Psych: No depression    PAST MEDICAL HISTORY:   Past Medical History:   Diagnosis Date   • CKD (chronic kidney disease), stage IV (HCC) 12/24/2015   • H/O: CVA (cerebrovascular accident) 12/24/2015   •  Hypertension    • Kidney disease    • Mild mitral regurgitation 7/14/2014   • Moyamoya disease 10/3/2013   • Seizure disorder, grand mal (HCC) 3/3/2016   • Stroke (HCC)        PAST SURGICAL HISTORY:      Past Surgical History:   Procedure Laterality Date   • IRRIGATION & DEBRIDEMENT ORTHO Right 3/9/2017    Procedure: IRRIGATION & DEBRIDEMENT ORTHO - FOOT;  Surgeon: Gerardo Lynn M.D.;  Location: SURGERY [facility];  Service:    • IRRIGATION & DEBRIDEMENT ORTHO Right 3/5/2017    Procedure: IRRIGATION & DEBRIDEMENT ORTHO AND GASTROC RECESSION;  Surgeon: Gerardo Lynn M.D.;  Location: SURGERY [facility];  Service:    • METATARSAL HEAD RESECTION  3/5/2017    Procedure: SECOND METATARSAL HEAD RESECTION;  Surgeon: Gerardo Lynn M.D.;  Location: SURGERY [facility];  Service:    • IRRIGATION & DEBRIDEMENT ORTHO Right 2/27/2017    Procedure: IRRIGATION & DEBRIDEMENT ORTHO;  Surgeon: Coleman Monterroso M.D.;  Location: SURGERY [facility];  Service:    • PRIMARY C SECTION     • TUBAL COAGULATION LAPAROSCOPIC BILATERAL         FAMILY HISTORY:   No family history of renal disease    SOCIAL HISTORY:   No tobacco, No EtOH, No illicits    HOME MEDICATIONS:   No current facility-administered medications on file prior to encounter.      Current Outpatient Medications on File Prior to Encounter   Medication Sig Dispense Refill   • folic acid (FOLVITE) 1 MG Tab Take 1 mg by mouth two times a day may change times on alt/days.     • Lysine 500 MG Cap Take  by mouth every day.     • LORazepam (ATIVAN) 1 MG Tab Take 1 Tab by mouth 2 Times a Day for 180 days. 60 Tab 3   • escitalopram (LEXAPRO) 10 MG Tab Take 1 Tab by mouth every day. 90 Tab 2   • levETIRAcetam (KEPPRA) 500 MG Tab 500mg in AM and 1000mg in  Tab 2   • phenytoin ER (DILANTIN) 100 MG Cap Take 1 Cap by mouth 3 times a day. 270 Cap 4   • atorvastatin (LIPITOR) 40 MG Tab Take 1 Tab by mouth every day. 90 Tab 2   • Melatonin 5 MG Tab Take  by  "mouth every bedtime. Indications: Trouble Sleeping     • coenzyme Q-10 30 MG capsule Take 60 mg by mouth every day.     • Esomeprazole Magnesium (NEXIUM 24HR PO) Take  by mouth every day. noon     • Ascorbic Acid (VITAMIN C) 1000 MG Tab Take  by mouth every day.     • calcium citrate (CALCITRATE) 950 MG Tab Take 950 mg by mouth every day.     • carvedilol (COREG) 25 MG Tab Take 1 Tab by mouth 3 times a day. 60 Tab    • lisinopril (PRINIVIL) 20 MG Tab Take 1 Tab by mouth 2 Times a Day. 30 Tab    • aspirin (ASA) 325 MG Tab Take 1 Tab by mouth every day. 100 Tab    • sodium bicarbonate (SODIUM BICARBONATE) 650 MG Tab Take 1 Tab by mouth 3 times a day. 120 Tab 3   • acetaminophen (TYLENOL) 325 MG Tab Take 2 Tabs by mouth every 6 hours as needed (Mild Pain; (Pain scale 1-3); Temp greater than 100.5 F). 30 Tab 0   • magnesium hydroxide (MILK OF MAGNESIA) 400 MG/5ML Suspension Take 30 mL by mouth 1 time daily as needed (if polyethylene glycol ineffective after 24 hours). 1 Bottle    • albuterol 108 (90 BASE) MCG/ACT Aero Soln inhalation aerosol Inhale 2 Puffs by mouth every four hours as needed for Shortness of Breath. 8.5 g        ALLERGIES:  Allegra [fexofenadine]; Amoxicillin; Azithromycin; Claritin; Erythromycin; Ibuprofen & caffeine-vitamins; Penicillins; Procardia [nifedipine]; Rosemary oil; and Zyrtec [cetirizine]    PHYSICAL EXAM:  VS:  /82   Pulse (!) 56   Temp 36.2 °C (97.2 °F) (Temporal)   Resp 17   Ht 1.753 m (5' 9\")   Wt 122.6 kg (270 lb 4.5 oz)   LMP 04/14/2020   SpO2 98%   BMI 39.91 kg/m²   GENERAL: no acute distress  CV: Regular rate and rhythm, +edema  RESP: Clear to ausculation bilaterally, No rhales  GI: Soft  MSK: No joint deformities   SKIN: No concerning rashes  NEURO: AOx3  PSYCH: Cooperative    LABS:  Recent Results (from the past 24 hour(s))   Troponin in four (4) hours    Collection Time: 04/16/20  8:42 AM   Result Value Ref Range    Troponin T 61 (H) 6 - 19 ng/L   EC-ECHOCARDIOGRAM " COMPLETE W/ CONT    Collection Time: 04/16/20  1:37 PM   Result Value Ref Range    Eject.Frac. MOD BP 45.84     Eject.Frac. MOD 4C 44.49     Eject.Frac. MOD 2C 48.34     Left Ventrical Ejection Fraction 30    TSH WITH REFLEX TO FT4    Collection Time: 04/17/20  2:34 AM   Result Value Ref Range    TSH 6.500 (H) 0.380 - 5.330 uIU/mL   Basic Metabolic Panel (BMP)    Collection Time: 04/17/20  2:34 AM   Result Value Ref Range    Sodium 138 135 - 145 mmol/L    Potassium 3.8 3.6 - 5.5 mmol/L    Chloride 105 96 - 112 mmol/L    Co2 15 (L) 20 - 33 mmol/L    Glucose 114 (H) 65 - 99 mg/dL    Bun 86 (HH) 8 - 22 mg/dL    Creatinine 5.62 (HH) 0.50 - 1.40 mg/dL    Calcium 8.8 8.5 - 10.5 mg/dL    Anion Gap 18.0 (H) 7.0 - 16.0   CBC with Differential    Collection Time: 04/17/20  2:34 AM   Result Value Ref Range    WBC 5.9 4.8 - 10.8 K/uL    RBC 3.41 (L) 4.20 - 5.40 M/uL    Hemoglobin 10.8 (L) 12.0 - 16.0 g/dL    Hematocrit 33.6 (L) 37.0 - 47.0 %    MCV 98.5 (H) 81.4 - 97.8 fL    MCH 31.7 27.0 - 33.0 pg    MCHC 32.1 (L) 33.6 - 35.0 g/dL    RDW 52.4 (H) 35.9 - 50.0 fL    Platelet Count 144 (L) 164 - 446 K/uL    MPV 10.6 9.0 - 12.9 fL    Neutrophils-Polys 63.50 44.00 - 72.00 %    Lymphocytes 24.60 22.00 - 41.00 %    Monocytes 9.60 0.00 - 13.40 %    Eosinophils 1.70 0.00 - 6.90 %    Basophils 0.30 0.00 - 1.80 %    Immature Granulocytes 0.30 0.00 - 0.90 %    Nucleated RBC 0.30 /100 WBC    Neutrophils (Absolute) 3.77 2.00 - 7.15 K/uL    Lymphs (Absolute) 1.46 1.00 - 4.80 K/uL    Monos (Absolute) 0.57 0.00 - 0.85 K/uL    Eos (Absolute) 0.10 0.00 - 0.51 K/uL    Baso (Absolute) 0.02 0.00 - 0.12 K/uL    Immature Granulocytes (abs) 0.02 0.00 - 0.11 K/uL    NRBC (Absolute) 0.02 K/uL   D-DIMER    Collection Time: 04/17/20  2:34 AM   Result Value Ref Range    D-Dimer Screen 1.00 (H) 0.00 - 0.50 ug/mL (FEU)   Lipid Profile    Collection Time: 04/17/20  2:34 AM   Result Value Ref Range    Cholesterol,Tot 127 100 - 199 mg/dL    Triglycerides 129 0  - 149 mg/dL    HDL 57 >=40 mg/dL    LDL 44 <100 mg/dL   proBrain Natriuretic Peptide, NT    Collection Time: 04/17/20  2:34 AM   Result Value Ref Range    NT-proBNP >87502 (H) 0 - 125 pg/mL   FREE THYROXINE    Collection Time: 04/17/20  2:34 AM   Result Value Ref Range    Free T-4 0.75 0.53 - 1.43 ng/dL   ESTIMATED GFR    Collection Time: 04/17/20  2:34 AM   Result Value Ref Range    GFR If African American 10 (A) >60 mL/min/1.73 m 2    GFR If Non  8 (A) >60 mL/min/1.73 m 2       (click the triangle to expand results)    IMAGING:  NM-CARDIAC STRESS TEST   Final Result      EC-ECHOCARDIOGRAM COMPLETE W/ CONT   Final Result      OUTSIDE IMAGES-US VASCULAR   Final Result      OUTSIDE IMAGES-DX CHEST   Final Result      DX-CHEST-PORTABLE (1 VIEW)   Final Result      1.  Hypoinflation and pulmonary vascular congestion.   2.  Moderate cardiomegaly, increased from prior exam.   3.  No lobar pneumonia or pneumothorax.          ASSESSMENT:  # New ESRD: Progressive uremic symptoms and volume overload necessitating initiation of dialysis while inpatient   # HTN: Volume driven  # Acidosis: off target  # Anemia in CKD: at goal  # HFrEF: decompensated with EF ~30%.  # Hypoxic resp failure: 2/2 HF exacerbation  # Chest Pian: ACS rule out  # Moyamoya disease: BP goal 140s per cards in the pat      PLAN:  -Plan to initiate dialysis after tunneled line placed.  Plan for 3 days in a row.  Ultrafiltration as tolerated  -Case management alerted assist in finding outpatient dialysis unit  -Patient agreeable to another one-time trial of oral Lasix, if develops similar symptoms may be allergic going forward.  -Holding bicarb with initiation of dialysis  -vein mapping completed outpatient per patient  -Check iron stores  -Renal diet  -Strict I/Os  -Dose all meds per eGFR <15     Thank you for this consult, we will continue to follow.    Slick San MD  [facility]  [phone]

## 2020-04-17 NOTE — PROGRESS NOTES
Assumed care of patient at bedside report from NOC RN. Updated on POC. Patient currently A & O x 4; on room air; up x1; without complaints of acute pain. Patient SB/SR. Patient requesting to ambulate independently. Patient educated on rationale for bed alarm and fall precaution. Patient verbalizes understanding. Call light within reach. Whiteboard updated. Fall precautions in place. Bed locked and in lowest position. All questions answered. No other needs indicated at this time.

## 2020-04-17 NOTE — PROGRESS NOTES
[facility] Medicine Daily Progress Note    Date of Service  4/17/2020    Chief Complaint  50 y.o. female admitted 4/16/2020 initially for chest pain rule out but then found to have new onset heart failure    Interval Problem Update  4/16: MPI negative.  Echo completed, EF 30% this visit, down from 65% 2/2017  Consulted cardiologist Dr. Palma  Consulted [facility] nephro Dr. San  Giving IV lasix 80mg once now per Dr. Enciso, and he will see in AM and re-eval further doses of diuretics  4/17: Patient felt that she had reaction to IV Lasix overnight and initially refused additional Lasix.  After additional discussion with both nephro and cardiology patient agreed to try additional Lasix.  Temporary dialysis catheter was placed and she was taken for dialysis.  Patient now has malaise after dialysis and does not want to talk  She will be admitted to inpatient for further management that is expected to extend the LOS to greater than 2 midnights.     Disposition  Pending improvement of volume overload    Review of Systems  Review of Systems   Unable to perform ROS: Medical condition   Cardiovascular: Positive for leg swelling. Negative for chest pain.        Physical Exam  Temp:  [36.2 °C (97.2 °F)-36.8 °C (98.3 °F)] 36.8 °C (98.3 °F)  Pulse:  [51-64] 64  Resp:  [16-22] 18  BP: (123-158)/(64-94) 158/85  SpO2:  [96 %-99 %] 99 %    Physical Exam  Constitutional:       Appearance: She is well-developed.   HENT:      Head: Normocephalic.   Cardiovascular:      Rate and Rhythm: Normal rate.      Heart sounds: No gallop.    Pulmonary:      Effort: No respiratory distress.      Breath sounds: No wheezing.   Abdominal:      General: There is no distension.      Tenderness: There is no abdominal tenderness.   Musculoskeletal:      Right lower leg: Edema present.      Left lower leg: Edema present.   Skin:     General: Skin is warm.   Neurological:      Mental Status: She is alert. Mental status is at baseline.          Fluids    Intake/Output Summary (Last 24 hours) at 4/17/2020 1650  Last data filed at 4/17/2020 1229  Gross per 24 hour   Intake 860 ml   Output 2300 ml   Net -1440 ml       Laboratory  Recent Labs     04/16/20  0212 04/17/20  0234   WBC 7.7 5.9   RBC 3.45* 3.41*   HEMOGLOBIN 11.3* 10.8*   HEMATOCRIT 33.6* 33.6*   MCV 97.4 98.5*   MCH 32.8 31.7   MCHC 33.6 32.1*   RDW 53.2* 52.4*   PLATELETCT 147* 144*   MPV 10.7 10.6     Recent Labs     04/16/20  0305 04/17/20  0234   SODIUM 140 138   POTASSIUM 4.1 3.8   CHLORIDE 106 105   CO2 14* 15*   GLUCOSE 99 114*   BUN 91* 86*   CREATININE 5.69* 5.62*   CALCIUM 8.6 8.8             Recent Labs     04/17/20  0234   TRIGLYCERIDE 129   HDL 57   LDL 44       Imaging  IR-NELSON,GROSHONG PLACEMENT >5   Final Result      1. Ultrasound and fluoroscopic guided placement of a right internal jugular 14.5 [place] HemoSplit tunneled dialysis catheter.      2. The hemodialysis catheter may be used immediately as clinically indicated. Flushes per protocol.         NM-CARDIAC STRESS TEST   Final Result      EC-ECHOCARDIOGRAM COMPLETE W/ CONT   Final Result      OUTSIDE IMAGES-US VASCULAR   Final Result      OUTSIDE IMAGES-DX CHEST   Final Result      DX-CHEST-PORTABLE (1 VIEW)   Final Result      1.  Hypoinflation and pulmonary vascular congestion.   2.  Moderate cardiomegaly, increased from prior exam.   3.  No lobar pneumonia or pneumothorax.           Assessment/Plan  Chest pain- (present on admission)  Assessment & Plan  Rule out AC  Unable to perform CTA chest with IV contrast due to low GFR  Unable to perform VQ scan due to COVID conditions  Continuous cardiac monitoring with serial EKG and troponin  MPI negative  Patient has been given full dose of aspirin  Nitro when necessary for chest pain  D-dimer slightly elevated    Moyamoya- (present on admission)  Assessment & Plan  Continue aspirin and Lipitor    Acute-on-chronic kidney injury (HCC)- (present on admission)  Assessment  & Plan  Nephrologist Dr. San consulted  Diuresing with Lasix if possible  Temporary dialysis catheter placed  Dialysis given inpatient to remove fluid      Obesity (BMI 30-39.9)- (present on admission)  Assessment & Plan  Body mass index is 37.36 kg/m².  Patient has been counseled on diet and lifestyle modifications  Recommended outpatient weight management program and bariatric surgery evaluation      Seizure disorder, grand mal (HCC)- (present on admission)  Assessment & Plan  Continue Keppra, Dilantin and oxcarbazepine  Seizure precautions    H/O: CVA (cerebrovascular accident)- (present on admission)  Assessment & Plan  Continue aspirin and Lipitor    HTN (hypertension)- (present on admission)  Assessment & Plan  Continue Coreg    HLD (hyperlipidemia)- (present on admission)  Assessment & Plan  Continue Lipitor    Acute on chronic systolic heart failure (HCC)- (present on admission)  Assessment & Plan  Previously followed with Dr. Charles outpatient for dilated cardiomyopathy  EF is now decreased further  Also has volume overload  Cardiologist Dr. Palma consulted  Nephrologist Dr. San consulted for dialysis to remove further fluid       VTE prophylaxis: sc hep

## 2020-04-17 NOTE — DISCHARGE PLANNING
Outpatient Dialysis Placement Notification    Received notification for outpatient dialysis placement from Dr. San.    Met with patients mother telephonically due to patient being in IR, and confirmed demographic and insurance information.  Provided patient with dialysis education materials and list of HD centers. Per request referral initiated to:    [facility]  [facility] [place] [address]     Tentative Schedule: Monday, Wednesday, Friday  Tentative Time: 3:15 pm    Pending medical and financial clearance.    Triny Paulino- Dialysis Coordinator  [facility] # [phone]

## 2020-04-17 NOTE — PROGRESS NOTES
Monitor Summary  Rhythm: SR/SB  Rate: 51-60  Ectopy: O PVC, R Couplet, R Bigem, 2x Runs of 5 bts of Accelerated Ideo, 1x run of 4 bts of Accelerated Ideo  0.14 / 0.10 / 0.40

## 2020-04-17 NOTE — RESPIRATORY CARE
COPD EDUCATION by COPD CLINICAL EDUCATOR  4/17/2020 at 7:51 AM by Debbi Greenwood, RRT     Patient reviewed by COPD education team. Patient does not have a history or diagnosis of COPD and is a non-smoker, therefore does not qualify for the COPD program.

## 2020-04-17 NOTE — PROGRESS NOTES
Hemodialysis ordered by Dr. San. Pt came from IR after CVC placed. Treatment started at 1229 and ended at 1429. Pt stable, vss, no c/o post tx. See flow sheets for details. Net UF 1.0 L. Reported to JOSE Velasquez RN.

## 2020-04-17 NOTE — PROGRESS NOTES
IR Nursing Note:    Patient consented for Tunneled Hemodialysis Catheter Placement with imaging guidance by Dr. Montes, all questions answered. Patient sedated at Dr. Montes's direction, see MAR. The pt appeared to tolerate the procedure well.   Sutures, biopatch, gauze and tegaderm applied to right neck, CDI and soft.  Pt alert and verbally appropriate post procedure, vital signs stable during procedure and transport, see flow sheet for vital signs.  Report given to DIAEN Olson.  RN transported pt to Dialysis with Sao2 monitor.      Bard Hemo Split Long Term Hemodialysis Catheter [id number]. Ref [id number]. Lot [id number]

## 2020-04-17 NOTE — CARE PLAN
Problem: Safety  Goal: Will remain free from injury  Outcome: PROGRESSING AS EXPECTED     Problem: Knowledge Deficit  Goal: Knowledge of the prescribed therapeutic regimen will improve  Outcome: PROGRESSING AS EXPECTED     Problem: Pain Management  Goal: Pain level will decrease to patient's comfort goal  Outcome: PROGRESSING AS EXPECTED     Problem: Respiratory:  Goal: Respiratory status will improve  Outcome: PROGRESSING AS EXPECTED

## 2020-04-18 LAB
ALBUMIN SERPL BCP-MCNC: 3 G/DL (ref 3.2–4.9)
BUN SERPL-MCNC: 57 MG/DL (ref 8–22)
CALCIUM SERPL-MCNC: 8.4 MG/DL (ref 8.5–10.5)
CHLORIDE SERPL-SCNC: 104 MMOL/L (ref 96–112)
CO2 SERPL-SCNC: 21 MMOL/L (ref 20–33)
CREAT SERPL-MCNC: 4.45 MG/DL (ref 0.5–1.4)
ERYTHROCYTE [DISTWIDTH] IN BLOOD BY AUTOMATED COUNT: 51 FL (ref 35.9–50)
GLUCOSE SERPL-MCNC: 142 MG/DL (ref 65–99)
HCT VFR BLD AUTO: 30.4 % (ref 37–47)
HGB BLD-MCNC: 10.2 G/DL (ref 12–16)
IRON SATN MFR SERPL: 31 % (ref 15–55)
IRON SERPL-MCNC: 63 UG/DL (ref 40–170)
MCH RBC QN AUTO: 32.1 PG (ref 27–33)
MCHC RBC AUTO-ENTMCNC: 33.6 G/DL (ref 33.6–35)
MCV RBC AUTO: 95.6 FL (ref 81.4–97.8)
NT-PROBNP SERPL IA-MCNC: ABNORMAL PG/ML (ref 0–125)
PHOSPHATE SERPL-MCNC: 3.8 MG/DL (ref 2.5–4.5)
PLATELET # BLD AUTO: 155 K/UL (ref 164–446)
PMV BLD AUTO: 10.5 FL (ref 9–12.9)
POTASSIUM SERPL-SCNC: 3.7 MMOL/L (ref 3.6–5.5)
RBC # BLD AUTO: 3.18 M/UL (ref 4.2–5.4)
SODIUM SERPL-SCNC: 139 MMOL/L (ref 135–145)
TIBC SERPL-MCNC: 202 UG/DL (ref 250–450)
UIBC SERPL-MCNC: 139 UG/DL (ref 110–370)
WBC # BLD AUTO: 6.5 K/UL (ref 4.8–10.8)

## 2020-04-18 PROCEDURE — 90935 HEMODIALYSIS ONE EVALUATION: CPT

## 2020-04-18 PROCEDURE — 700102 HCHG RX REV CODE 250 W/ 637 OVERRIDE(OP): Performed by: INTERNAL MEDICINE

## 2020-04-18 PROCEDURE — 85027 COMPLETE CBC AUTOMATED: CPT

## 2020-04-18 PROCEDURE — 700111 HCHG RX REV CODE 636 W/ 250 OVERRIDE (IP)

## 2020-04-18 PROCEDURE — 83540 ASSAY OF IRON: CPT

## 2020-04-18 PROCEDURE — A9270 NON-COVERED ITEM OR SERVICE: HCPCS | Performed by: INTERNAL MEDICINE

## 2020-04-18 PROCEDURE — 96372 THER/PROPH/DIAG INJ SC/IM: CPT

## 2020-04-18 PROCEDURE — A9270 NON-COVERED ITEM OR SERVICE: HCPCS | Performed by: NURSE PRACTITIONER

## 2020-04-18 PROCEDURE — A9270 NON-COVERED ITEM OR SERVICE: HCPCS | Performed by: HOSPITALIST

## 2020-04-18 PROCEDURE — 700111 HCHG RX REV CODE 636 W/ 250 OVERRIDE (IP): Performed by: INTERNAL MEDICINE

## 2020-04-18 PROCEDURE — 700102 HCHG RX REV CODE 250 W/ 637 OVERRIDE(OP): Performed by: NURSE PRACTITIONER

## 2020-04-18 PROCEDURE — 80069 RENAL FUNCTION PANEL: CPT

## 2020-04-18 PROCEDURE — 99232 SBSQ HOSP IP/OBS MODERATE 35: CPT | Performed by: INTERNAL MEDICINE

## 2020-04-18 PROCEDURE — 700102 HCHG RX REV CODE 250 W/ 637 OVERRIDE(OP): Performed by: HOSPITALIST

## 2020-04-18 PROCEDURE — 83550 IRON BINDING TEST: CPT

## 2020-04-18 PROCEDURE — 99232 SBSQ HOSP IP/OBS MODERATE 35: CPT | Performed by: HOSPITALIST

## 2020-04-18 PROCEDURE — 5A1D70Z PERFORMANCE OF URINARY FILTRATION, INTERMITTENT, LESS THAN 6 HOURS PER DAY: ICD-10-PCS | Performed by: INTERNAL MEDICINE

## 2020-04-18 PROCEDURE — 770020 HCHG ROOM/CARE - TELE (206)

## 2020-04-18 PROCEDURE — 83880 ASSAY OF NATRIURETIC PEPTIDE: CPT

## 2020-04-18 RX ORDER — HYDRALAZINE HYDROCHLORIDE 25 MG/1
37.5 TABLET, FILM COATED ORAL EVERY 8 HOURS
Status: DISCONTINUED | OUTPATIENT
Start: 2020-04-18 | End: 2020-04-19

## 2020-04-18 RX ORDER — CARVEDILOL 12.5 MG/1
12.5 TABLET ORAL 2 TIMES DAILY WITH MEALS
Status: DISCONTINUED | OUTPATIENT
Start: 2020-04-18 | End: 2020-04-20 | Stop reason: HOSPADM

## 2020-04-18 RX ORDER — HEPARIN SODIUM 1000 [USP'U]/ML
INJECTION, SOLUTION INTRAVENOUS; SUBCUTANEOUS
Status: COMPLETED
Start: 2020-04-18 | End: 2020-04-18

## 2020-04-18 RX ORDER — LORAZEPAM 1 MG/1
1 TABLET ORAL 2 TIMES DAILY
Status: DISCONTINUED | OUTPATIENT
Start: 2020-04-18 | End: 2020-04-20 | Stop reason: HOSPADM

## 2020-04-18 RX ADMIN — OMEPRAZOLE 20 MG: 20 CAPSULE, DELAYED RELEASE ORAL at 04:38

## 2020-04-18 RX ADMIN — HYDRALAZINE HYDROCHLORIDE 37.5 MG: 25 TABLET, FILM COATED ORAL at 08:17

## 2020-04-18 RX ADMIN — PHENYTOIN SODIUM 100 MG: 100 CAPSULE ORAL at 04:38

## 2020-04-18 RX ADMIN — PHENYTOIN SODIUM 100 MG: 100 CAPSULE ORAL at 17:43

## 2020-04-18 RX ADMIN — HEPARIN SODIUM 3700 UNITS: 1000 INJECTION, SOLUTION INTRAVENOUS; SUBCUTANEOUS at 10:27

## 2020-04-18 RX ADMIN — CARVEDILOL 12.5 MG: 12.5 TABLET, FILM COATED ORAL at 04:39

## 2020-04-18 RX ADMIN — CARVEDILOL 12.5 MG: 12.5 TABLET, FILM COATED ORAL at 17:41

## 2020-04-18 RX ADMIN — LEVETIRACETAM 500 MG: 500 TABLET ORAL at 04:41

## 2020-04-18 RX ADMIN — PHENYTOIN SODIUM 100 MG: 100 CAPSULE ORAL at 12:43

## 2020-04-18 RX ADMIN — ISOSORBIDE DINITRATE 20 MG: 10 TABLET ORAL at 17:41

## 2020-04-18 RX ADMIN — HEPARIN SODIUM 5000 UNITS: 5000 INJECTION, SOLUTION INTRAVENOUS; SUBCUTANEOUS at 04:37

## 2020-04-18 RX ADMIN — HYDRALAZINE HYDROCHLORIDE 37.5 MG: 25 TABLET, FILM COATED ORAL at 17:42

## 2020-04-18 RX ADMIN — ESCITALOPRAM OXALATE 10 MG: 10 TABLET ORAL at 04:39

## 2020-04-18 RX ADMIN — ISOSORBIDE DINITRATE 20 MG: 10 TABLET ORAL at 12:43

## 2020-04-18 RX ADMIN — ASPIRIN 325 MG: 325 TABLET, FILM COATED ORAL at 04:41

## 2020-04-18 RX ADMIN — HEPARIN SODIUM 5000 UNITS: 5000 INJECTION, SOLUTION INTRAVENOUS; SUBCUTANEOUS at 14:01

## 2020-04-18 RX ADMIN — HYDRALAZINE HYDROCHLORIDE 25 MG: 25 TABLET, FILM COATED ORAL at 01:58

## 2020-04-18 RX ADMIN — LORAZEPAM 1 MG: 1 TABLET ORAL at 17:43

## 2020-04-18 RX ADMIN — ISOSORBIDE DINITRATE 20 MG: 10 TABLET ORAL at 04:40

## 2020-04-18 RX ADMIN — LEVETIRACETAM 1000 MG: 500 TABLET ORAL at 17:41

## 2020-04-18 RX ADMIN — ATORVASTATIN CALCIUM 40 MG: 40 TABLET, FILM COATED ORAL at 04:41

## 2020-04-18 ASSESSMENT — PATIENT HEALTH QUESTIONNAIRE - PHQ9
SUM OF ALL RESPONSES TO PHQ9 QUESTIONS 1 AND 2: 0
1. LITTLE INTEREST OR PLEASURE IN DOING THINGS: NOT AT ALL
2. FEELING DOWN, DEPRESSED, IRRITABLE, OR HOPELESS: NOT AT ALL

## 2020-04-18 ASSESSMENT — ENCOUNTER SYMPTOMS
SORE THROAT: 0
EYE PAIN: 0
NAUSEA: 0
HEADACHES: 0
CHILLS: 0
MYALGIAS: 0
VOMITING: 0
COUGH: 0
FEVER: 0
CONSTIPATION: 0
HALLUCINATIONS: 0
DIZZINESS: 0
EYE REDNESS: 0
DIARRHEA: 0
ABDOMINAL PAIN: 0
BRUISES/BLEEDS EASILY: 0
SHORTNESS OF BREATH: 0
SHORTNESS OF BREATH: 1
POLYDIPSIA: 0
DEPRESSION: 0
HEMOPTYSIS: 0
WHEEZING: 0

## 2020-04-18 ASSESSMENT — FIBROSIS 4 INDEX: FIB4 SCORE: 0.91

## 2020-04-18 NOTE — CARE PLAN
Problem: Communication  Goal: The ability to communicate needs accurately and effectively will improve  Outcome: PROGRESSING AS EXPECTED  Note: Pt is able to make her needs known; a/ox4.     Problem: Safety  Goal: Will remain free from injury  Outcome: PROGRESSING AS EXPECTED  Note: Call bell within reach; bed alarm on; sr up x2; up to the bathroom x1 assist; gait steady.

## 2020-04-18 NOTE — PROGRESS NOTES
[facility] Medicine Daily Progress Note    Date of Service  4/18/2020    Chief Complaint  50 y.o. female admitted 4/16/2020 initially for chest pain rule out but then found to have new onset heart failure    Interval Problem Update  4/16: MPI negative.  Echo completed, EF 30% this visit, down from 65% 2/2017  Consulted cardiologist Dr. Palma  Consulted SN nephro Dr. San  Giving IV lasix 80mg once now per Dr. Enciso, and he will see in AM and re-eval further doses of diuretics  4/17: Patient felt that she had reaction to IV Lasix overnight and initially refused additional Lasix.  After additional discussion with both nephro and cardiology patient agreed to try additional Lasix.  Temporary dialysis catheter was placed and she was taken for dialysis.  Patient now has malaise after dialysis and does not want to talk  She will be admitted to inpatient for further management that is expected to extend the 4/18: Feeling much better after dialysis today.  Mood largely improved.  Discussed with nephro and ACE/ARB can be started anytime.  Discussed with cards who plan to start tomorrow.    Disposition  Pending patient getting outpatient dialysis chair    Review of Systems  Review of Systems   Constitutional: Negative for chills, fever and malaise/fatigue.   Respiratory: Negative for cough, shortness of breath and wheezing.    Cardiovascular: Positive for leg swelling. Negative for chest pain.   Gastrointestinal: Negative for constipation, diarrhea, nausea and vomiting.   Genitourinary: Negative for dysuria.   Neurological: Negative for headaches.        Physical Exam  Temp:  [36.4 °C (97.6 °F)-36.9 °C (98.4 °F)] 36.9 °C (98.4 °F)  Pulse:  [52-67] 57  Resp:  [17-18] 18  BP: (112-158)/(68-85) 127/69  SpO2:  [97 %-99 %] 97 %    Physical Exam  Constitutional:       Appearance: She is well-developed.   HENT:      Head: Normocephalic.      Mouth/Throat:      Pharynx: No posterior oropharyngeal erythema.   Cardiovascular:       Rate and Rhythm: Normal rate.      Heart sounds: No gallop.    Pulmonary:      Effort: No respiratory distress.      Breath sounds: No wheezing.   Abdominal:      General: There is no distension.      Tenderness: There is no abdominal tenderness.   Musculoskeletal:      Right lower leg: Edema present.      Left lower leg: Edema present.   Skin:     General: Skin is warm.   Neurological:      Mental Status: She is alert. Mental status is at baseline.         Fluids    Intake/Output Summary (Last 24 hours) at 4/18/2020 1220  Last data filed at 4/18/2020 1200  Gross per 24 hour   Intake 1560 ml   Output 4000 ml   Net -2440 ml       Laboratory  Recent Labs     04/16/20  0212 04/17/20  0234 04/18/20  0915   WBC 7.7 5.9 6.5   RBC 3.45* 3.41* 3.18*   HEMOGLOBIN 11.3* 10.8* 10.2*   HEMATOCRIT 33.6* 33.6* 30.4*   MCV 97.4 98.5* 95.6   MCH 32.8 31.7 32.1   MCHC 33.6 32.1* 33.6   RDW 53.2* 52.4* 51.0*   PLATELETCT 147* 144* 155*   MPV 10.7 10.6 10.5     Recent Labs     04/16/20  0305 04/17/20  0234 04/18/20  0915   SODIUM 140 138 139   POTASSIUM 4.1 3.8 3.7   CHLORIDE 106 105 104   CO2 14* 15* 21   GLUCOSE 99 114* 142*   BUN 91* 86* 57*   CREATININE 5.69* 5.62* 4.45*   CALCIUM 8.6 8.8 8.4*             Recent Labs     04/17/20  0234   TRIGLYCERIDE 129   HDL 57   LDL 44       Imaging  IR-NELSON,GROSHONG PLACEMENT >5   Final Result      1. Ultrasound and fluoroscopic guided placement of a right internal jugular 14.5 [place] HemoSplit tunneled dialysis catheter.      2. The hemodialysis catheter may be used immediately as clinically indicated. Flushes per protocol.         NM-CARDIAC STRESS TEST   Final Result      EC-ECHOCARDIOGRAM COMPLETE W/ CONT   Final Result      OUTSIDE IMAGES-US VASCULAR   Final Result      OUTSIDE IMAGES-DX CHEST   Final Result      DX-CHEST-PORTABLE (1 VIEW)   Final Result      1.  Hypoinflation and pulmonary vascular congestion.   2.  Moderate cardiomegaly, increased from prior exam.   3.  No lobar  pneumonia or pneumothorax.           Assessment/Plan  Chest pain- (present on admission)  Assessment & Plan  Rule out AC  Unable to perform CTA chest with IV contrast due to low GFR  Unable to perform VQ scan due to COVID conditions  Continuous cardiac monitoring with serial EKG and troponin  MPI negative  Patient has been given full dose of aspirin  Nitro when necessary for chest pain  D-dimer slightly elevated    Moyamoya- (present on admission)  Assessment & Plan  Continue aspirin and Lipitor    Acute-on-chronic kidney injury (HCC)- (present on admission)  Assessment & Plan  Nephrologist Dr. San consulted  Diuresing with Lasix if possible  Temporary dialysis catheter placed  Dialysis given inpatient to remove fluid  Looking for outpatient dialysis chair    Obesity (BMI 30-39.9)- (present on admission)  Assessment & Plan  Body mass index is 37.36 kg/m².  Patient has been counseled on diet and lifestyle modifications  Recommended outpatient weight management program and bariatric surgery evaluation      Seizure disorder, grand mal (HCC)- (present on admission)  Assessment & Plan  Continue Keppra, Dilantin and oxcarbazepine  Seizure precautions    H/O: CVA (cerebrovascular accident)- (present on admission)  Assessment & Plan  Continue aspirin and Lipitor    HTN (hypertension)- (present on admission)  Assessment & Plan  Continue Coreg    HLD (hyperlipidemia)- (present on admission)  Assessment & Plan  Continue Lipitor    Acute on chronic systolic heart failure (HCC)- (present on admission)  Assessment & Plan  Previously followed with Dr. Charles outpatient for dilated cardiomyopathy  EF is now decreased further  Also has volume overload  Cardiologist Dr. Palma consulted  Nephrologist Dr. San consulted for dialysis to remove further fluid  Cards planning to start ACE/ARB 4/19       VTE prophylaxis: sc hep

## 2020-04-18 NOTE — PROGRESS NOTES
Monitor Summary    SB-SR 56-70  Rare couplets, trig, and big  Frequent PVC  .16/.10/.38    12 Hr Chart Check

## 2020-04-18 NOTE — PROGRESS NOTES
[facility] -  PROGRESS NOTE               Author: Cole Schwarz M.D. Date & Time: 4/18/2020  9:26 AM     HPI:  50-year-old female with a history of hypertension, moyamoya disease, CHF, atrophic right kidney and progressive CKD presented on 4/16 with chest pain and shortness of breath, found to have worsening heart failure and progressive CKD.  Patient followed outpatient by see her [place] nephrology in the [place] clinic.  Last seen in March after recently reestablishing after being lost to follow-up for several years.  In March her creatinine was 4.7 and started dialysis planning.  She was supposed to have vein mapping done and access placement in April, but she thinks she only had vein mapping.  Since April 7 she has had progressive worsening shortness of breath and chest pressure.  Recent hospital stay at [place] for similar presentation.  COVID tested and negative.  She also notes decreasing appetite with intermittent nausea and metallic taste in mouth over last several weeks.  Upon admission creatinine elevated at 5.6.  Nuc med showed mild hypokinesis diffusely with EF estimated at 43%.  EF estimated to be 30% on echo.  IVC dilated without inspiratory collapse.  Given dose of Lasix, 80 mg IV last night, 800cc urine output but felt pruritus and rash which she believes was secondary to the Lasix, improved with Benadryl.    DAILY NEPHROLOGY SUMMARY:  4/17 - Consult done  4/18 - VARUN, feels better    REVIEW OF SYSTEMS:    Review of Systems   Constitutional: Negative for fever and malaise/fatigue.   HENT: Negative for ear pain and sore throat.    Eyes: Negative for pain and redness.   Respiratory: Positive for shortness of breath. Negative for cough and hemoptysis.    Cardiovascular: Positive for chest pain. Negative for leg swelling.   Gastrointestinal: Negative for abdominal pain and nausea.   Musculoskeletal: Negative for joint pain and myalgias.   Skin: Negative for itching and  "rash.   Neurological: Negative for dizziness and headaches.   Endo/Heme/Allergies: Negative for polydipsia. Does not bruise/bleed easily.   Psychiatric/Behavioral: Negative for depression and hallucinations.   All other systems reviewed and are negative.    PAST FAMILY HISTORY: Reviewed and unchanged since admission  PAST SURGICAL HISTORY:  Reviewed and unchanged since admission  SOCIAL HISTORY:  Reviewed and unchanged since admission  FAMILY HISTORY: Reviewed and unchanged since admission  CURRENT MEDICATIONS: Reviewed since admission to present  IMAGING STUDIES: Reviewed since admission to present  LABORATORY STUDIES: Reviewed since admission to present    PHYSICAL EXAM:  VS:  /69   Pulse (!) 57   Temp 36.9 °C (98.4 °F) (Temporal)   Resp 18   Ht 1.753 m (5' 9\")   Wt 119.4 kg (263 lb 3.7 oz)   LMP 04/14/2020   SpO2 97%   Breastfeeding No   BMI 38.87 kg/m²   Physical Exam  Vitals signs and nursing note reviewed.   Constitutional:       General: She is not in acute distress.     Appearance: Normal appearance. She is not ill-appearing.   HENT:      Head: Normocephalic and atraumatic.      Right Ear: External ear normal.      Left Ear: External ear normal.      Nose: Nose normal. No congestion.      Mouth/Throat:      Mouth: Mucous membranes are moist.      Pharynx: No oropharyngeal exudate.   Eyes:      General:         Right eye: No discharge.         Left eye: No discharge.      Extraocular Movements: Extraocular movements intact.   Neck:      Musculoskeletal: Neck supple. No muscular tenderness.   Cardiovascular:      Rate and Rhythm: Normal rate and regular rhythm.      Heart sounds: Normal heart sounds.   Pulmonary:      Effort: Pulmonary effort is normal.      Breath sounds: Normal breath sounds.   Chest:      Chest wall: No tenderness.   Abdominal:      General: Bowel sounds are normal. There is no distension.      Palpations: Abdomen is soft.   Musculoskeletal:         General: No swelling or " tenderness.      Right lower leg: Edema present.      Left lower leg: Edema present.   Skin:     General: Skin is warm and dry.      Findings: No rash.   Neurological:      General: No focal deficit present.      Mental Status: Mental status is at baseline.   Psychiatric:         Mood and Affect: Mood normal.         Behavior: Behavior normal.       Fluids:  In: 940 [P.O.:440; Dialysis:500]  Out: 1500     LABS:  Recent Results (from the past 24 hour(s))   HEPATITIS PANEL ACUTE(4 COMPONENTS)    Collection Time: 04/17/20 10:55 AM   Result Value Ref Range    Hepatitis B Surface Antigen Non-Reactive Non-Reactive    Hepatitis B Cors Ab,IgM Non-Reactive Non-Reactive    Hepatitis A Virus Ab, IgM Non-Reactive Non-Reactive    Hepatitis C Antibody Non-Reactive Non-Reactive   HEP B SURFACE AB    Collection Time: 04/17/20 10:55 AM   Result Value Ref Range    Hep B Surface Antibody Quant <3.50 0.00 - 10.00 mIU/mL       (click the triangle to expand results)    IMPRESSION:  - ESRD    * Etiology likely 2/2 HTN    * PermCath placed; iHD initiated 4/17  - HTN    * Goal BP < 140/90    * Stable  - Anemia of CKD    * Goal Hgb 10-11    * Stable  - CKD-MBD    * To be managed at HD unit  - HLD  - CAD    PLAN:  - MWF iHD during stay  - UF as tolerated  - No dietary protein restrictions  - [facility] [place] OP HD placement in progress  - Dose all meds per ESRD    All prior notes form other doctors and RN staff were reviewed from admission to current day to help me make my clinical decisions

## 2020-04-18 NOTE — PROGRESS NOTES
Hemodialysis done today, started @ 0918 and ended @ 1150 with net UF= 2000ml. Report given to DIANE Velasquez. See flow sheet for details

## 2020-04-18 NOTE — PROGRESS NOTES
Assumed care for the pt; bedside shift report received; side rails up x2; iv site intact sl; plan of care reviewed; pt is a/ox4 talking this nurse at this time.

## 2020-04-18 NOTE — PROGRESS NOTES
SB-SR   54-65  Opvc; bigem; Rpvc; Fpvc; 7beats BBB; Trip; coup     .20/.08/.38    12 hour chart check

## 2020-04-18 NOTE — HEART FAILURE PROGRAM
Cardiovascular Nurse Navigator () Advanced Heart Failure Program HF New Diagnosis Consult Note:     Patient presented to our ED via EMS from [facility] in [place]. C/o CP and SOB x 2 days with edema in LE's. Dyspnea per ERP note was worse with exertion. She has been found to have an ejection fraction of 30%, she also has FREDY on CKD with a creatinine of 5.62 and GFR of 8.     She has also been diagnosed with PHTN, and RV failure. When euvolemic, right heart cath and if kidneys recover by then, will add LHC.     · HFrEF (30%)  · NYHA: IV  · Stage: not being noted  · Etiology: as above to have cath when clinically possible  · Diuresis: currently none ordered  · Diabetic or newly diagnosed DM?: no  · Atrial fibrillation?: ECG shows sinus rhythm  · Current smoker? Notes indicate that she has quit  · PHQ-2 score: 0    [facility] Plan Notes: tentative outpatient dialysis set up    Therapy Notes: none    Demographics:    · Residence: [facility]  · Insurance: Medicare and Medicaid    GD Secondary Prevention Interventions:      · Influenza vaccine: prev this season per admit dustin    Daily Weights: ordered    I's and O's: ordered    HFrEF Specific GDMT:  · JUDI - I: Not currently prescribed, will need to be addressed: prescribed or a provider note indicating why not prescribed, prior to discharge.  · Evidence based BB (bisoprolol, carvedilol, or Toprol XL): carvedilol  · Aldosterone receptor antagonist: Not currently prescribed, will need to be addressed: prescribed or a provider note indicating why not prescribed, prior to discharge.   · Consider for hydralazine dinitrate?: no, does not self identify as     HFrEF Specific Device Therapy Screening Tool not indicated for new diagnosis    Source: Urge- SCA Prevention Program Screening Tool  2013 ACC/ AHA Heart Failure Guidelines  Rev date: 12/2014    Advanced Care Planning: AD on file. Full code status at the time of this note's filing.    The ACC  "recommends engaging palliative care as part of optimization of HF treatment to solicit goals of care and focus on quality of life throughout the clinical course of HF.    Once all diagnostics are in, please consider an order for palliative to discuss Advanced Care Planning.      Speaking with patients frankly about their end-of-life wishes is one of the most important things a palliative care team can do. This is especially important in the context of heart failure, since it’s such an unpredictable disease.    Follow up appointment:   • If discharged from acute care to home (exception hospice discharge), pt must have an appointment scheduled within 7 days of discharge (Cardiology, PCP, or DC Clinic).    • If discharged to Transitional Care Facility (LTAC, SNF, IRH), appointment should be made about a month out for after TCF.     Bedside Nursing Education:  Please provide HF booklet and repeated, ongoing education while administering medications, weighing patient, discussing management of symptoms, diet and need to follow up and act on changes. Please target education to the precipitant of the exacerbation.    Bedside Nursing Discharge:  When completing the after visit summary (discharge instructions) please select \"Cardiac Diagnosis, and Heart Failure\" in the special instructions section to populate the heart failure specific discharge instructions.     Referrals/Orders Placed:    Hospital Schedulers for HF f/u?  yes  Social Work   yes  Registered Dietician  yes  REMSA CP Program for patients with Medicaid, [facility], or [facility] coverage?  no  Outpatient Care Coordination for patients with Medicaid?  no    Many thanks, Venus, Cardiovascular Nurse Navigator, RN, [facility] [phone], & [employer] M-F (excluding holidays).          "

## 2020-04-19 LAB
ALBUMIN SERPL BCP-MCNC: 3 G/DL (ref 3.2–4.9)
BUN SERPL-MCNC: 34 MG/DL (ref 8–22)
CALCIUM SERPL-MCNC: 8.4 MG/DL (ref 8.5–10.5)
CHLORIDE SERPL-SCNC: 104 MMOL/L (ref 96–112)
CO2 SERPL-SCNC: 21 MMOL/L (ref 20–33)
CREAT SERPL-MCNC: 3.67 MG/DL (ref 0.5–1.4)
ERYTHROCYTE [DISTWIDTH] IN BLOOD BY AUTOMATED COUNT: 52.3 FL (ref 35.9–50)
FERRITIN SERPL-MCNC: 73.6 NG/ML (ref 10–291)
GLUCOSE SERPL-MCNC: 99 MG/DL (ref 65–99)
HCT VFR BLD AUTO: 32.4 % (ref 37–47)
HGB BLD-MCNC: 10.2 G/DL (ref 12–16)
MCH RBC QN AUTO: 31.6 PG (ref 27–33)
MCHC RBC AUTO-ENTMCNC: 31.5 G/DL (ref 33.6–35)
MCV RBC AUTO: 100.3 FL (ref 81.4–97.8)
PHOSPHATE SERPL-MCNC: 3.2 MG/DL (ref 2.5–4.5)
PLATELET # BLD AUTO: 100 K/UL (ref 164–446)
PMV BLD AUTO: 11.6 FL (ref 9–12.9)
POTASSIUM SERPL-SCNC: 3.9 MMOL/L (ref 3.6–5.5)
RBC # BLD AUTO: 3.23 M/UL (ref 4.2–5.4)
SODIUM SERPL-SCNC: 140 MMOL/L (ref 135–145)
WBC # BLD AUTO: 6.8 K/UL (ref 4.8–10.8)

## 2020-04-19 PROCEDURE — 99232 SBSQ HOSP IP/OBS MODERATE 35: CPT | Performed by: HOSPITALIST

## 2020-04-19 PROCEDURE — 700102 HCHG RX REV CODE 250 W/ 637 OVERRIDE(OP): Performed by: INTERNAL MEDICINE

## 2020-04-19 PROCEDURE — 82728 ASSAY OF FERRITIN: CPT

## 2020-04-19 PROCEDURE — 770020 HCHG ROOM/CARE - TELE (206)

## 2020-04-19 PROCEDURE — 700102 HCHG RX REV CODE 250 W/ 637 OVERRIDE(OP): Performed by: HOSPITALIST

## 2020-04-19 PROCEDURE — A9270 NON-COVERED ITEM OR SERVICE: HCPCS | Performed by: INTERNAL MEDICINE

## 2020-04-19 PROCEDURE — 99233 SBSQ HOSP IP/OBS HIGH 50: CPT | Performed by: INTERNAL MEDICINE

## 2020-04-19 PROCEDURE — A9270 NON-COVERED ITEM OR SERVICE: HCPCS | Performed by: HOSPITALIST

## 2020-04-19 PROCEDURE — 700111 HCHG RX REV CODE 636 W/ 250 OVERRIDE (IP): Performed by: INTERNAL MEDICINE

## 2020-04-19 PROCEDURE — 85027 COMPLETE CBC AUTOMATED: CPT

## 2020-04-19 PROCEDURE — 80069 RENAL FUNCTION PANEL: CPT

## 2020-04-19 PROCEDURE — 96372 THER/PROPH/DIAG INJ SC/IM: CPT

## 2020-04-19 RX ORDER — VALSARTAN 80 MG/1
40 TABLET ORAL TWICE DAILY
Status: DISCONTINUED | OUTPATIENT
Start: 2020-04-19 | End: 2020-04-20 | Stop reason: HOSPADM

## 2020-04-19 RX ADMIN — HYDRALAZINE HYDROCHLORIDE 37.5 MG: 25 TABLET, FILM COATED ORAL at 00:28

## 2020-04-19 RX ADMIN — PHENYTOIN SODIUM 100 MG: 100 CAPSULE ORAL at 12:31

## 2020-04-19 RX ADMIN — ESCITALOPRAM OXALATE 10 MG: 10 TABLET ORAL at 05:06

## 2020-04-19 RX ADMIN — VALSARTAN 40 MG: 80 TABLET, FILM COATED ORAL at 17:39

## 2020-04-19 RX ADMIN — ATORVASTATIN CALCIUM 40 MG: 40 TABLET, FILM COATED ORAL at 05:06

## 2020-04-19 RX ADMIN — HEPARIN SODIUM 5000 UNITS: 5000 INJECTION, SOLUTION INTRAVENOUS; SUBCUTANEOUS at 21:19

## 2020-04-19 RX ADMIN — VALSARTAN 40 MG: 80 TABLET, FILM COATED ORAL at 08:56

## 2020-04-19 RX ADMIN — PHENYTOIN SODIUM 100 MG: 100 CAPSULE ORAL at 17:39

## 2020-04-19 RX ADMIN — LORAZEPAM 1 MG: 1 TABLET ORAL at 05:06

## 2020-04-19 RX ADMIN — HEPARIN SODIUM 5000 UNITS: 5000 INJECTION, SOLUTION INTRAVENOUS; SUBCUTANEOUS at 13:57

## 2020-04-19 RX ADMIN — ISOSORBIDE DINITRATE 20 MG: 10 TABLET ORAL at 05:07

## 2020-04-19 RX ADMIN — LORAZEPAM 1 MG: 1 TABLET ORAL at 17:39

## 2020-04-19 RX ADMIN — HEPARIN SODIUM 5000 UNITS: 5000 INJECTION, SOLUTION INTRAVENOUS; SUBCUTANEOUS at 05:05

## 2020-04-19 RX ADMIN — OMEPRAZOLE 20 MG: 20 CAPSULE, DELAYED RELEASE ORAL at 05:07

## 2020-04-19 RX ADMIN — CARVEDILOL 12.5 MG: 12.5 TABLET, FILM COATED ORAL at 05:07

## 2020-04-19 RX ADMIN — CARVEDILOL 12.5 MG: 12.5 TABLET, FILM COATED ORAL at 17:39

## 2020-04-19 RX ADMIN — ASPIRIN 325 MG: 325 TABLET, FILM COATED ORAL at 05:07

## 2020-04-19 RX ADMIN — SENNOSIDES AND DOCUSATE SODIUM 2 TABLET: 8.6; 5 TABLET ORAL at 05:07

## 2020-04-19 RX ADMIN — LEVETIRACETAM 1000 MG: 500 TABLET ORAL at 17:39

## 2020-04-19 RX ADMIN — LEVETIRACETAM 500 MG: 500 TABLET ORAL at 05:07

## 2020-04-19 RX ADMIN — PHENYTOIN SODIUM 100 MG: 100 CAPSULE ORAL at 05:07

## 2020-04-19 ASSESSMENT — ENCOUNTER SYMPTOMS
ABDOMINAL PAIN: 0
HEMOPTYSIS: 0
NAUSEA: 0
EYE REDNESS: 0
BRUISES/BLEEDS EASILY: 0
DEPRESSION: 0
HEADACHES: 0
WHEEZING: 0
HALLUCINATIONS: 0
VOMITING: 0
SORE THROAT: 0
POLYDIPSIA: 0
SHORTNESS OF BREATH: 1
DIARRHEA: 0
DIZZINESS: 0
SHORTNESS OF BREATH: 0
MYALGIAS: 0
FEVER: 0
EYE PAIN: 0
CHILLS: 0
NAUSEA: 1
COUGH: 0
CONSTIPATION: 0

## 2020-04-19 ASSESSMENT — FIBROSIS 4 INDEX: FIB4 SCORE: 1.41

## 2020-04-19 NOTE — PROGRESS NOTES
RN received report, assumed patient care. Patient is resting in bed at this time. Cardiac monitor in place. Call light within reach.

## 2020-04-19 NOTE — PROGRESS NOTES
[facility] Medicine Daily Progress Note    Date of Service  4/19/2020    Chief Complaint  50 y.o. female admitted 4/16/2020 initially for chest pain rule out but then found to have new onset heart failure    Interval Problem Update  4/16: MPI negative.  Echo completed, EF 30% this visit, down from 65% 2/2017  Consulted cardiologist Dr. Palma  Consulted SN nephro Dr. San  Giving IV lasix 80mg once now per Dr. Enciso, and he will see in AM and re-eval further doses of diuretics  4/17: Patient felt that she had reaction to IV Lasix overnight and initially refused additional Lasix.  After additional discussion with both nephro and cardiology patient agreed to try additional Lasix.  Temporary dialysis catheter was placed and she was taken for dialysis.  Patient now has malaise after dialysis and does not want to talk  She will be admitted to inpatient for further management that is expected to extend the 4/18: Feeling much better after dialysis today.  Mood largely improved.  Discussed with nephro and ACE/ARB can be started anytime.  Discussed with cards who plan to start tomorrow.  4/18: Patient complaining of itching around her dialysis bandage.  Otherwise she is feeling okay and tolerating the valsartan which was started.    Disposition  Pending patient getting outpatient dialysis chair    Review of Systems  Review of Systems   Constitutional: Negative for chills, fever and malaise/fatigue.   Respiratory: Negative for cough, shortness of breath and wheezing.    Cardiovascular: Positive for leg swelling. Negative for chest pain.   Gastrointestinal: Negative for constipation, diarrhea, nausea and vomiting.   Genitourinary: Negative for dysuria.   Skin: Positive for itching.   Neurological: Negative for headaches.        Physical Exam  Temp:  [36.1 °C (97 °F)-37.1 °C (98.7 °F)] 37.1 °C (98.7 °F)  Pulse:  [64-75] 64  Resp:  [16-20] 20  BP: (120-154)/() 120/66  SpO2:  [94 %-98 %] 98 %    Physical  Exam  Constitutional:       Appearance: She is well-developed.   HENT:      Head: Normocephalic.      Mouth/Throat:      Pharynx: No posterior oropharyngeal erythema.   Cardiovascular:      Rate and Rhythm: Normal rate.      Heart sounds: No gallop.    Pulmonary:      Effort: No respiratory distress.      Breath sounds: No wheezing.   Abdominal:      General: There is no distension.      Tenderness: There is no abdominal tenderness.   Musculoskeletal:      Right lower leg: Edema present.      Left lower leg: Edema present.   Skin:     General: Skin is warm.      Comments: Dialysis catheter with no major erythema surrounding the patient does have itchiness there   Neurological:      Mental Status: She is alert. Mental status is at baseline.         Fluids    Intake/Output Summary (Last 24 hours) at 4/19/2020 1305  Last data filed at 4/18/2020 2200  Gross per 24 hour   Intake 240 ml   Output --   Net 240 ml       Laboratory  Recent Labs     04/17/20  0234 04/18/20  0915 04/19/20  0533   WBC 5.9 6.5 6.8   RBC 3.41* 3.18* 3.23*   HEMOGLOBIN 10.8* 10.2* 10.2*   HEMATOCRIT 33.6* 30.4* 32.4*   MCV 98.5* 95.6 100.3*   MCH 31.7 32.1 31.6   MCHC 32.1* 33.6 31.5*   RDW 52.4* 51.0* 52.3*   PLATELETCT 144* 155* 100*   MPV 10.6 10.5 11.6     Recent Labs     04/17/20  0234 04/18/20  0915 04/19/20  0533   SODIUM 138 139 140   POTASSIUM 3.8 3.7 3.9   CHLORIDE 105 104 104   CO2 15* 21 21   GLUCOSE 114* 142* 99   BUN 86* 57* 34*   CREATININE 5.62* 4.45* 3.67*   CALCIUM 8.8 8.4* 8.4*             Recent Labs     04/17/20  0234   TRIGLYCERIDE 129   HDL 57   LDL 44       Imaging  IR-NELSON,GROKATELYN PLACEMENT >5   Final Result      1. Ultrasound and fluoroscopic guided placement of a right internal jugular 14.5 [place] HemoSplit tunneled dialysis catheter.      2. The hemodialysis catheter may be used immediately as clinically indicated. Flushes per protocol.         NM-CARDIAC STRESS TEST   Final Result      EC-ECHOCARDIOGRAM COMPLETE W/  CONT   Final Result      OUTSIDE IMAGES-US VASCULAR   Final Result      OUTSIDE IMAGES-DX CHEST   Final Result      DX-CHEST-PORTABLE (1 VIEW)   Final Result      1.  Hypoinflation and pulmonary vascular congestion.   2.  Moderate cardiomegaly, increased from prior exam.   3.  No lobar pneumonia or pneumothorax.           Assessment/Plan  Chest pain- (present on admission)  Assessment & Plan  Rule out AC  Unable to perform CTA chest with IV contrast due to low GFR  Unable to perform VQ scan due to COVID conditions  Continuous cardiac monitoring with serial EKG and troponin  MPI negative  Patient has been given full dose of aspirin  Nitro when necessary for chest pain  D-dimer slightly elevated    Moyamoya- (present on admission)  Assessment & Plan  Continue aspirin and Lipitor    Acute-on-chronic kidney injury (HCC)- (present on admission)  Assessment & Plan  Nephrologist Dr. San consulted  Diuresing with Lasix if possible  Temporary dialysis catheter placed  Dialysis given inpatient to remove fluid  Looking for outpatient dialysis chair    Obesity (BMI 30-39.9)- (present on admission)  Assessment & Plan  Body mass index is 37.36 kg/m².  Patient has been counseled on diet and lifestyle modifications  Recommended outpatient weight management program and bariatric surgery evaluation      Seizure disorder, grand mal (HCC)- (present on admission)  Assessment & Plan  Continue Keppra, Dilantin and oxcarbazepine  Seizure precautions    H/O: CVA (cerebrovascular accident)- (present on admission)  Assessment & Plan  Continue aspirin and Lipitor    HTN (hypertension)- (present on admission)  Assessment & Plan  Continue Coreg    HLD (hyperlipidemia)- (present on admission)  Assessment & Plan  Continue Lipitor    Acute on chronic systolic heart failure (HCC)- (present on admission)  Assessment & Plan  Previously followed with Dr. Charles outpatient for dilated cardiomyopathy  EF is now decreased further  Also has volume  overload  Cardiologist Dr. Palma consulted  Nephrologist Dr. San consulted for dialysis to remove further fluid  Cards started valsartan 4/19.  I discussed with SN nephrologist Dr. Schwarz and he was okay with starting ACE/ARB anytime now that dialysis is started.       VTE prophylaxis: sc hep

## 2020-04-19 NOTE — CARE PLAN
Problem: Discharge Barriers/Planning  Goal: Patient's continuum of care needs will be met  Note: Waiting on insurance approval for dialysis bed.      Problem: Psychosocial Needs:  Goal: Level of anxiety will decrease  Note: Pt has history of anxiety and psychosis. Pt is A&Ox4. She does talk to herself and appears to be talking to someone else in the room who isnt there. Ativan is scheduled to decrease anxiety.

## 2020-04-19 NOTE — PROGRESS NOTES
[facility] -  PROGRESS NOTE               Author: Cole Schwarz M.D. Date & Time: 4/19/2020  8:55 AM     HPI:  50-year-old female with a history of hypertension, moyamoya disease, CHF, atrophic right kidney and progressive CKD presented on 4/16 with chest pain and shortness of breath, found to have worsening heart failure and progressive CKD.  Patient followed outpatient by see her [place] nephrology in the [facility].  Last seen in March after recently reestablishing after being lost to follow-up for several years.  In March her creatinine was 4.7 and started dialysis planning.  She was supposed to have vein mapping done and access placement in April, but she thinks she only had vein mapping.  Since April 7 she has had progressive worsening shortness of breath and chest pressure.  Recent hospital stay at [place] for similar presentation.  COVID tested and negative.  She also notes decreasing appetite with intermittent nausea and metallic taste in mouth over last several weeks.  Upon admission creatinine elevated at 5.6.  Nuc med showed mild hypokinesis diffusely with EF estimated at 43%.  EF estimated to be 30% on echo.  IVC dilated without inspiratory collapse.  Given dose of Lasix, 80 mg IV last night, 800cc urine output but felt pruritus and rash which she believes was secondary to the Lasix, improved with Benadryl.    DAILY NEPHROLOGY SUMMARY:  4/17 - Consult done  4/18 - VARUN, feels better  4/19 - CHADEO, had vomit of reddish material yesterday post dialysis, no further after that one time    REVIEW OF SYSTEMS:    Review of Systems   Constitutional: Negative for fever and malaise/fatigue.   HENT: Negative for ear pain and sore throat.    Eyes: Negative for pain and redness.   Respiratory: Positive for shortness of breath. Negative for cough and hemoptysis.    Cardiovascular: Positive for chest pain. Negative for leg swelling.   Gastrointestinal: Positive for nausea. Negative for  "abdominal pain.   Musculoskeletal: Negative for joint pain and myalgias.   Skin: Negative for itching and rash.   Neurological: Negative for dizziness and headaches.   Endo/Heme/Allergies: Negative for polydipsia. Does not bruise/bleed easily.   Psychiatric/Behavioral: Negative for depression and hallucinations.   All other systems reviewed and are negative.    PAST FAMILY HISTORY: Reviewed and unchanged since admission  PAST SURGICAL HISTORY:  Reviewed and unchanged since admission  SOCIAL HISTORY:  Reviewed and unchanged since admission  FAMILY HISTORY: Reviewed and unchanged since admission  CURRENT MEDICATIONS: Reviewed since admission to present  IMAGING STUDIES: Reviewed since admission to present  LABORATORY STUDIES: Reviewed since admission to present    PHYSICAL EXAM:  VS:  /66   Pulse 64   Temp 37.1 °C (98.7 °F) (Temporal)   Resp 20   Ht 1.753 m (5' 9\")   Wt 119.6 kg (263 lb 10.7 oz)   LMP 04/14/2020   SpO2 98%   Breastfeeding No   BMI 38.94 kg/m²   Physical Exam  Vitals signs and nursing note reviewed.   Constitutional:       General: She is not in acute distress.     Appearance: Normal appearance. She is not ill-appearing.   HENT:      Head: Normocephalic and atraumatic.      Right Ear: External ear normal.      Left Ear: External ear normal.      Nose: Nose normal. No congestion.      Mouth/Throat:      Mouth: Mucous membranes are moist.      Pharynx: No oropharyngeal exudate.   Eyes:      General:         Right eye: No discharge.         Left eye: No discharge.      Extraocular Movements: Extraocular movements intact.   Neck:      Musculoskeletal: Neck supple. No muscular tenderness.   Cardiovascular:      Rate and Rhythm: Normal rate and regular rhythm.      Heart sounds: Normal heart sounds.   Pulmonary:      Effort: Pulmonary effort is normal.      Breath sounds: Normal breath sounds.   Chest:      Chest wall: No tenderness.   Abdominal:      General: Bowel sounds are normal. There is " no distension.      Palpations: Abdomen is soft.   Musculoskeletal:         General: No swelling or tenderness.   Skin:     General: Skin is warm and dry.      Findings: No rash.   Neurological:      General: No focal deficit present.      Mental Status: Mental status is at baseline.   Psychiatric:         Mood and Affect: Mood normal.         Behavior: Behavior normal.       Fluids:  In: 980 [P.O.:480; Dialysis:500]  Out: 2500     LABS:  Recent Results (from the past 24 hour(s))   Renal Function Panel    Collection Time: 04/18/20  9:15 AM   Result Value Ref Range    Sodium 139 135 - 145 mmol/L    Potassium 3.7 3.6 - 5.5 mmol/L    Chloride 104 96 - 112 mmol/L    Co2 21 20 - 33 mmol/L    Glucose 142 (H) 65 - 99 mg/dL    Creatinine 4.45 (H) 0.50 - 1.40 mg/dL    Bun 57 (H) 8 - 22 mg/dL    Calcium 8.4 (L) 8.5 - 10.5 mg/dL    Phosphorus 3.8 2.5 - 4.5 mg/dL    Albumin 3.0 (L) 3.2 - 4.9 g/dL   CBC WITHOUT DIFFERENTIAL    Collection Time: 04/18/20  9:15 AM   Result Value Ref Range    WBC 6.5 4.8 - 10.8 K/uL    RBC 3.18 (L) 4.20 - 5.40 M/uL    Hemoglobin 10.2 (L) 12.0 - 16.0 g/dL    Hematocrit 30.4 (L) 37.0 - 47.0 %    MCV 95.6 81.4 - 97.8 fL    MCH 32.1 27.0 - 33.0 pg    MCHC 33.6 33.6 - 35.0 g/dL    RDW 51.0 (H) 35.9 - 50.0 fL    Platelet Count 155 (L) 164 - 446 K/uL    MPV 10.5 9.0 - 12.9 fL   proBrain Natriuretic Peptide, NT    Collection Time: 04/18/20  9:15 AM   Result Value Ref Range    NT-proBNP >03636 (H) 0 - 125 pg/mL   IRON/TOTAL IRON BIND    Collection Time: 04/18/20  9:15 AM   Result Value Ref Range    Iron 63 40 - 170 ug/dL    Total Iron Binding 202 (L) 250 - 450 ug/dL    Unsat Iron Binding 139 110 - 370 ug/dL    % Saturation 31 15 - 55 %   ESTIMATED GFR    Collection Time: 04/18/20  9:15 AM   Result Value Ref Range    GFR If  13 (A) >60 mL/min/1.73 m 2    GFR If Non African American 11 (A) >60 mL/min/1.73 m 2   FERRITIN    Collection Time: 04/19/20  5:33 AM   Result Value Ref Range    Ferritin  73.6 10.0 - 291.0 ng/mL   Renal Function Panel    Collection Time: 04/19/20  5:33 AM   Result Value Ref Range    Sodium 140 135 - 145 mmol/L    Potassium 3.9 3.6 - 5.5 mmol/L    Chloride 104 96 - 112 mmol/L    Co2 21 20 - 33 mmol/L    Glucose 99 65 - 99 mg/dL    Creatinine 3.67 (H) 0.50 - 1.40 mg/dL    Bun 34 (H) 8 - 22 mg/dL    Calcium 8.4 (L) 8.5 - 10.5 mg/dL    Phosphorus 3.2 2.5 - 4.5 mg/dL    Albumin 3.0 (L) 3.2 - 4.9 g/dL   CBC WITHOUT DIFFERENTIAL    Collection Time: 04/19/20  5:33 AM   Result Value Ref Range    WBC 6.8 4.8 - 10.8 K/uL    RBC 3.23 (L) 4.20 - 5.40 M/uL    Hemoglobin 10.2 (L) 12.0 - 16.0 g/dL    Hematocrit 32.4 (L) 37.0 - 47.0 %    .3 (H) 81.4 - 97.8 fL    MCH 31.6 27.0 - 33.0 pg    MCHC 31.5 (L) 33.6 - 35.0 g/dL    RDW 52.3 (H) 35.9 - 50.0 fL    Platelet Count 100 (L) 164 - 446 K/uL    MPV 11.6 9.0 - 12.9 fL   ESTIMATED GFR    Collection Time: 04/19/20  5:33 AM   Result Value Ref Range    GFR If  16 (A) >60 mL/min/1.73 m 2    GFR If Non  13 (A) >60 mL/min/1.73 m 2       (click the triangle to expand results)    IMPRESSION:  - ESRD    * Etiology likely 2/2 HTN    * PermCath placed; iHD initiated 4/17  - HTN    * Goal BP < 140/90    * Stable  - Anemia of CKD    * Goal Hgb 10-11    * Stable  - CKD-MBD    * To be managed at HD unit  - HLD  - CAD    PLAN:  - MWF iHD during stay  - UF as tolerated  - No dietary protein restrictions  - [facility] [place] OP HD placement in progress, if not available, then [facility]  - Dose all meds per ESRD  - Low sodium diet    All prior notes form other doctors and RN staff were reviewed from admission to current day to help me make my clinical decisions

## 2020-04-19 NOTE — PROGRESS NOTES
CC:   Chief Complaint   Patient presents with   • Chest Pain       HPI:     50 year old woman with moyamoya, hx cva, kenny on ckd, HTN, HLD, former +40 pack year smoker presents with chest pain, orthopnea, dyspnea, leg swelling, and weight gain. She has been admitted multiple times this month since onset about 4/2. Has not improved. Underwent nuclear stress and echo, see below with recurrent finding of HFrEF (previously known in 2013). She denies illicit drugs and alcohol. States diabetes runs in her family. Started HD 4/17/20.    Continues to improve, shortness of breath better every day. Swelling improved. Dr Calderón discussed with nephrology and they agree to starting ARB.    Medications / Drug list prior to admission:  No current facility-administered medications on file prior to encounter.      Current Outpatient Medications on File Prior to Encounter   Medication Sig Dispense Refill   • folic acid (FOLVITE) 1 MG Tab Take 1 mg by mouth two times a day may change times on alt/days.     • Lysine 500 MG Cap Take  by mouth every day.     • LORazepam (ATIVAN) 1 MG Tab Take 1 Tab by mouth 2 Times a Day for 180 days. 60 Tab 3   • escitalopram (LEXAPRO) 10 MG Tab Take 1 Tab by mouth every day. 90 Tab 2   • levETIRAcetam (KEPPRA) 500 MG Tab 500mg in AM and 1000mg in  Tab 2   • phenytoin ER (DILANTIN) 100 MG Cap Take 1 Cap by mouth 3 times a day. 270 Cap 4   • atorvastatin (LIPITOR) 40 MG Tab Take 1 Tab by mouth every day. 90 Tab 2   • Melatonin 5 MG Tab Take  by mouth every bedtime. Indications: Trouble Sleeping     • coenzyme Q-10 30 MG capsule Take 60 mg by mouth every day.     • Esomeprazole Magnesium (NEXIUM 24HR PO) Take  by mouth every day. noon     • Ascorbic Acid (VITAMIN C) 1000 MG Tab Take  by mouth every day.     • calcium citrate (CALCITRATE) 950 MG Tab Take 950 mg by mouth every day.     • carvedilol (COREG) 25 MG Tab Take 1 Tab by mouth 3 times a day. 60 Tab    • lisinopril (PRINIVIL) 20 MG Tab Take 1 Tab  by mouth 2 Times a Day. 30 Tab    • aspirin (ASA) 325 MG Tab Take 1 Tab by mouth every day. 100 Tab    • sodium bicarbonate (SODIUM BICARBONATE) 650 MG Tab Take 1 Tab by mouth 3 times a day. 120 Tab 3   • acetaminophen (TYLENOL) 325 MG Tab Take 2 Tabs by mouth every 6 hours as needed (Mild Pain; (Pain scale 1-3); Temp greater than 100.5 F). 30 Tab 0   • magnesium hydroxide (MILK OF MAGNESIA) 400 MG/5ML Suspension Take 30 mL by mouth 1 time daily as needed (if polyethylene glycol ineffective after 24 hours). 1 Bottle    • albuterol 108 (90 BASE) MCG/ACT Aero Soln inhalation aerosol Inhale 2 Puffs by mouth every four hours as needed for Shortness of Breath. 8.5 g        Current list of administered Medications:    Current Facility-Administered Medications:   •  valsartan (DIOVAN) tablet 40 mg, 40 mg, Oral, TWICE DAILY, Jared Palma M.D.  •  carvedilol (COREG) tablet 12.5 mg, 12.5 mg, Oral, BID WITH MEALS, Jared Palma M.D., 12.5 mg at 04/19/20 0507  •  LORazepam (ATIVAN) tablet 1 mg, 1 mg, Oral, BID, Dino Calderón M.D., 1 mg at 04/19/20 0506  •  lidocaine (XYLOCAINE) 1 % injection 1 mL, 1 mL, Intradermal, PRN, Slick San M.D.  •  diphenhydrAMINE (BENADRYL) tablet/capsule 25 mg, 25 mg, Oral, Once PRN, Dino Calderón M.D.  •  heparin injection 3,700 Units, 3,700 Units, Intravenous, DIALYSIS PRN, Slick San M.D., 3,700 Units at 04/18/20 1027  •  albuterol inhaler 2 Puff, 2 Puff, Inhalation, Q4HRS PRN, Masood Yarbrough M.D.  •  aspirin (ASA) tablet 325 mg, 325 mg, Oral, DAILY, Masood Yarbrough M.D., 325 mg at 04/19/20 0507  •  atorvastatin (LIPITOR) tablet 40 mg, 40 mg, Oral, DAILY, Masood Yarbrough M.D., 40 mg at 04/19/20 0506  •  escitalopram (LEXAPRO) tablet 10 mg, 10 mg, Oral, DAILY, Masood Yarbrough M.D., 10 mg at 04/19/20 0506  •  levETIRAcetam (KEPPRA) tablet 500 mg, 500 mg, Oral, QAM, Maosod Yarbrough M.D., 500 mg at 04/19/20 0507  •  phenytoin ER (DILANTIN) capsule 100 mg, 100 mg, Oral, TID, Masood Yarbrough M.D., 100 mg at  04/19/20 0507  •  senna-docusate (PERICOLACE or SENOKOT S) 8.6-50 MG per tablet 2 Tab, 2 Tab, Oral, BID, 2 Tab at 04/19/20 0507 **AND** polyethylene glycol/lytes (MIRALAX) PACKET 1 Packet, 1 Packet, Oral, QDAY PRN **AND** magnesium hydroxide (MILK OF MAGNESIA) suspension 30 mL, 30 mL, Oral, QDAY PRN **AND** bisacodyl (DULCOLAX) suppository 10 mg, 10 mg, Rectal, QDAY PRN, Masood Yarbrough M.D.  •  Respiratory Therapy Consult, , Nebulization, Continuous RT, Masood Yarbrough M.D.  •  heparin injection 5,000 Units, 5,000 Units, Subcutaneous, Q8HRS, Masood Yarbrough M.D., 5,000 Units at 04/19/20 0505  •  acetaminophen (TYLENOL) tablet 650 mg, 650 mg, Oral, Q6HRS PRN, aMsood Yarbrough M.D., 650 mg at 04/17/20 1737  •  aminophylline injection 100 mg, 100 mg, Intravenous, Q5 MIN PRN, Masood Yarbrough M.D.  •  omeprazole (PRILOSEC) capsule 20 mg, 20 mg, Oral, QAM AC, Masood Yarbrough M.D., 20 mg at 04/19/20 0507  •  hydrALAZINE (APRESOLINE) injection 20 mg, 20 mg, Intravenous, Q6HRS PRN, Masood Yarbrough M.D.  •  levETIRAcetam (KEPPRA) tablet 1,000 mg, 1,000 mg, Oral, Q EVENING, Dino Calderón M.D., 1,000 mg at 04/18/20 1741  •  albuterol (PROVENTIL) 2.5mg/0.5ml nebulizer solution 2.5 mg, 2.5 mg, Nebulization, Q4H PRN (RT), Dino Calderón M.D.    Past Medical History:   Diagnosis Date   • CKD (chronic kidney disease), stage IV (HCC) 12/24/2015   • H/O: CVA (cerebrovascular accident) 12/24/2015   • Hypertension    • Kidney disease    • Mild mitral regurgitation 7/14/2014   • Moyamoya disease 10/3/2013   • Seizure disorder, grand mal (HCC) 3/3/2016   • Stroke (HCC)        Past Surgical History:   Procedure Laterality Date   • IRRIGATION & DEBRIDEMENT ORTHO Right 3/9/2017    Procedure: IRRIGATION & DEBRIDEMENT ORTHO - FOOT;  Surgeon: Gerardo Lynn M.D.;  Location: SURGERY [facility];  Service:    • IRRIGATION & DEBRIDEMENT ORTHO Right 3/5/2017    Procedure: IRRIGATION & DEBRIDEMENT ORTHO AND GASTROC RECESSION;  Surgeon: Gerardo Lynn M.D.;  Location:  SURGERY [facility];  Service:    • METATARSAL HEAD RESECTION  3/5/2017    Procedure: SECOND METATARSAL HEAD RESECTION;  Surgeon: Gerardo Lynn M.D.;  Location: SURGERY [facility];  Service:    • IRRIGATION & DEBRIDEMENT ORTHO Right 2/27/2017    Procedure: IRRIGATION & DEBRIDEMENT ORTHO;  Surgeon: Coleman Monterroso M.D.;  Location: SURGERY [facility];  Service:    • PRIMARY C SECTION     • TUBAL COAGULATION LAPAROSCOPIC BILATERAL         Family History   Problem Relation Age of Onset   • Diabetes Mother    • Hypertension Mother    • Hypertension Father    • Arthritis Father    • Diabetes Maternal Grandmother    • Psychiatric Illness Maternal Grandfather    • Cancer Paternal Grandmother    • Psychiatric Illness Paternal Grandfather      Patient family history was personally reviewed, no pertinent family history to current presentation    Social History     Socioeconomic History   • Marital status: Single     Spouse name: Not on file   • Number of children: Not on file   • Years of education: Not on file   • Highest education level: Not on file   Occupational History   • Not on file   Social Needs   • Financial resource strain: Not on file   • Food insecurity     Worry: Not on file     Inability: Not on file   • Transportation needs     Medical: Not on file     Non-medical: Not on file   Tobacco Use   • Smoking status: Former Smoker   • Smokeless tobacco: Never Used   Substance and Sexual Activity   • Alcohol use: No   • Drug use: No   • Sexual activity: Not on file   Lifestyle   • Physical activity     Days per week: Not on file     Minutes per session: Not on file   • Stress: Not on file   Relationships   • Social connections     Talks on phone: Not on file     Gets together: Not on file     Attends Oriental orthodox service: Not on file     Active member of club or organization: Not on file     Attends meetings of clubs or organizations: Not on file     Relationship status: Not on file   • Intimate  partner violence     Fear of current or ex partner: Not on file     Emotionally abused: Not on file     Physically abused: Not on file     Forced sexual activity: Not on file   Other Topics Concern   • Not on file   Social History Narrative   • Not on file       ALLERGIES:  Allergies   Allergen Reactions   • Allegra [Fexofenadine] Unspecified     Rxn = unknown   • Amoxicillin    • Azithromycin Unspecified     Rxn = unknown   • Claritin    • Erythromycin Unspecified     Rxn = unknown   • Ibuprofen & Caffeine-Vitamins Unspecified     Rxn = unknown   • Penicillins Unspecified     Rxn = unknown   • Procardia [Nifedipine]    • Rosemary Oil Anaphylaxis     Throat starts to close/swell.  Oil and the herb.    • Zyrtec [Cetirizine] Unspecified     Rxn = unknown       Review of systems:  A complete review of symptoms was reviewed with patient. This is reviewed in H&P and PMH. ALL OTHERS reviewed and negative    Physical exam:  Vitals:    04/18/20 1552 04/18/20 1950 04/19/20 0027 04/19/20 0402   BP: 154/83 145/86 150/126 134/70   Pulse: 68 72 69 75   Resp: 16 19 18 18   Temp: 36.7 °C (98.1 °F) 36.6 °C (97.8 °F) 36.6 °C (97.8 °F) 36.1 °C (97 °F)   TempSrc: Temporal Temporal Temporal Temporal   SpO2: 94% 97% 96% 97%   Weight:  119.6 kg (263 lb 10.7 oz)     Height:         General: No acute distress.   EYES: no jaundice  HEENT: OP clear   Neck: No bruits No JVD.   CVS:  RRR. S1 + S2. No M/R/G. trace edema.  Resp: CTAB. No wheezing or crackles/rhonchi.  Abdomen: Soft, NT, ND,  Skin: Grossly nothing acute no obvious rashes  Neurological: Alert, Moves all extremities, no cranial nerve defects on limited exam  Extremities: Pulse 2+ in b/l LE. No cyanosis.     Data:  Laboratory studies personally reviewed by me:  Recent Results (from the past 24 hour(s))   Renal Function Panel    Collection Time: 04/18/20  9:15 AM   Result Value Ref Range    Sodium 139 135 - 145 mmol/L    Potassium 3.7 3.6 - 5.5 mmol/L    Chloride 104 96 - 112 mmol/L     Co2 21 20 - 33 mmol/L    Glucose 142 (H) 65 - 99 mg/dL    Creatinine 4.45 (H) 0.50 - 1.40 mg/dL    Bun 57 (H) 8 - 22 mg/dL    Calcium 8.4 (L) 8.5 - 10.5 mg/dL    Phosphorus 3.8 2.5 - 4.5 mg/dL    Albumin 3.0 (L) 3.2 - 4.9 g/dL   CBC WITHOUT DIFFERENTIAL    Collection Time: 04/18/20  9:15 AM   Result Value Ref Range    WBC 6.5 4.8 - 10.8 K/uL    RBC 3.18 (L) 4.20 - 5.40 M/uL    Hemoglobin 10.2 (L) 12.0 - 16.0 g/dL    Hematocrit 30.4 (L) 37.0 - 47.0 %    MCV 95.6 81.4 - 97.8 fL    MCH 32.1 27.0 - 33.0 pg    MCHC 33.6 33.6 - 35.0 g/dL    RDW 51.0 (H) 35.9 - 50.0 fL    Platelet Count 155 (L) 164 - 446 K/uL    MPV 10.5 9.0 - 12.9 fL   proBrain Natriuretic Peptide, NT    Collection Time: 04/18/20  9:15 AM   Result Value Ref Range    NT-proBNP >91087 (H) 0 - 125 pg/mL   IRON/TOTAL IRON BIND    Collection Time: 04/18/20  9:15 AM   Result Value Ref Range    Iron 63 40 - 170 ug/dL    Total Iron Binding 202 (L) 250 - 450 ug/dL    Unsat Iron Binding 139 110 - 370 ug/dL    % Saturation 31 15 - 55 %   ESTIMATED GFR    Collection Time: 04/18/20  9:15 AM   Result Value Ref Range    GFR If  13 (A) >60 mL/min/1.73 m 2    GFR If Non African American 11 (A) >60 mL/min/1.73 m 2   FERRITIN    Collection Time: 04/19/20  5:33 AM   Result Value Ref Range    Ferritin 73.6 10.0 - 291.0 ng/mL   Renal Function Panel    Collection Time: 04/19/20  5:33 AM   Result Value Ref Range    Sodium 140 135 - 145 mmol/L    Potassium 3.9 3.6 - 5.5 mmol/L    Chloride 104 96 - 112 mmol/L    Co2 21 20 - 33 mmol/L    Glucose 99 65 - 99 mg/dL    Creatinine 3.67 (H) 0.50 - 1.40 mg/dL    Bun 34 (H) 8 - 22 mg/dL    Calcium 8.4 (L) 8.5 - 10.5 mg/dL    Phosphorus 3.2 2.5 - 4.5 mg/dL    Albumin 3.0 (L) 3.2 - 4.9 g/dL   CBC WITHOUT DIFFERENTIAL    Collection Time: 04/19/20  5:33 AM   Result Value Ref Range    WBC 6.8 4.8 - 10.8 K/uL    RBC 3.23 (L) 4.20 - 5.40 M/uL    Hemoglobin 10.2 (L) 12.0 - 16.0 g/dL    Hematocrit 32.4 (L) 37.0 - 47.0 %    MCV  100.3 (H) 81.4 - 97.8 fL    MCH 31.6 27.0 - 33.0 pg    MCHC 31.5 (L) 33.6 - 35.0 g/dL    RDW 52.3 (H) 35.9 - 50.0 fL    Platelet Count 100 (L) 164 - 446 K/uL    MPV 11.6 9.0 - 12.9 fL   ESTIMATED GFR    Collection Time: 04/19/20  5:33 AM   Result Value Ref Range    GFR If  16 (A) >60 mL/min/1.73 m 2    GFR If Non  13 (A) >60 mL/min/1.73 m 2       EKG : personally reviewed by me sinus, nonspecific TWI, incomplete LBBB    All pertinent features of laboratory and imaging reviewed including primary images where applicable    TTE 4/16/20  CONCLUSIONS  Compared to the images of the prior study done 2/27/2017, LV is   moderately dilated, EF is severely reduced, grade 3 diastolic   dysfunction present.  Technically challenging study, improved with contrast.  Left ventricle is moderately dilated at 6 cm.  Severely reduced left ventricular systolic function.  Left ventricular ejection fraction is visually estimated to be 30%.  Global hypokinesis with further hypokinesis of the basal to mid   inferior wall and inferior septum.  Grade III diastolic dysfunction (restrictive pattern).  Right ventricle not well visualized, however, appears mildly dilated in   size and reduced in systolic function.  Biatrial enlargement.   Mild to moderate mitral regurgitation.  Mild tricuspid regurgitation.  Estimated right ventricular systolic pressure is 42 mmHg.  Right atrial pressure is estimated to be 15 mmHg.  Ascending aorta is borderline dilated with a diameter of 3.8 cm.    Nuclear stress spect stephie 4/16/20   NUCLEAR IMAGING INTERPRETATION   No evidence of ischemia.    Inferoapical photopenia is likely related to attenuation artifact.    Mild diffuse hypokinesis with left ventricle ejection fraction of 43%.   Sinus bradycardia, nonspecific intraventricular conduction delay, rare PVCs.   Nondiagnostic ECG portion of a regadenoson nuclear stress test.   ECG INTERPRETATION   Nondiagnostic ECG portion of a  regadenoson nuclear stress test.    Active Problems:    Chest pain POA: Yes    Moyamoya POA: Yes      Overview: IMO load March 2020    Obesity (BMI 30-39.9) POA: Yes    Acute-on-chronic kidney injury (HCC) POA: Yes    Acute on chronic systolic heart failure (HCC) POA: Yes      Overview: 1/29/2014: EF 45-50%    HLD (hyperlipidemia) POA: Yes    HTN (hypertension) POA: Yes    H/O: CVA (cerebrovascular accident) POA: Yes    Seizure disorder, grand mal (HCC) POA: Yes  Resolved Problems:    * No resolved hospital problems. *      Assessment / Plan:    50 year old woman with moyamoya, hx cva, kenny on ckd, HTN, HLD, former +40 pack year smoker presents with chest pain, orthopnea, dyspnea, leg swelling, and weight gain found HFrEF 30%, pHTN, RVF. HD of ESRD.    -HF-OMT - start ARB, convert from hydra/iso; continue coreg  -appreciate nephro recs. If able please maintain negative balance.  -when euvolemic will check R/LHC further eval pHTN  -continue aspirin and statin    I personally discussed her case with Dr Calderón    It is my pleasure to participate in the care of Ms. Acevedo.  Please do not hesitate to contact me with questions or concerns.    Jared Palma MD  Cardiologist [facility]

## 2020-04-20 VITALS
WEIGHT: 264.55 LBS | HEIGHT: 69 IN | BODY MASS INDEX: 39.18 KG/M2 | DIASTOLIC BLOOD PRESSURE: 80 MMHG | RESPIRATION RATE: 18 BRPM | SYSTOLIC BLOOD PRESSURE: 150 MMHG | OXYGEN SATURATION: 98 % | TEMPERATURE: 98.9 F | HEART RATE: 70 BPM

## 2020-04-20 LAB
ALBUMIN SERPL BCP-MCNC: 3.1 G/DL (ref 3.2–4.9)
BUN SERPL-MCNC: 36 MG/DL (ref 8–22)
CALCIUM SERPL-MCNC: 8.6 MG/DL (ref 8.5–10.5)
CHLORIDE SERPL-SCNC: 105 MMOL/L (ref 96–112)
CO2 SERPL-SCNC: 22 MMOL/L (ref 20–33)
CREAT SERPL-MCNC: 4.16 MG/DL (ref 0.5–1.4)
ERYTHROCYTE [DISTWIDTH] IN BLOOD BY AUTOMATED COUNT: 51.9 FL (ref 35.9–50)
GAMMA INTERFERON BACKGROUND BLD IA-ACNC: 0.04 IU/ML
GLUCOSE SERPL-MCNC: 96 MG/DL (ref 65–99)
HCT VFR BLD AUTO: 34.6 % (ref 37–47)
HGB BLD-MCNC: 10.9 G/DL (ref 12–16)
M TB IFN-G BLD-IMP: NEGATIVE
M TB IFN-G CD4+ BCKGRND COR BLD-ACNC: 0.01 IU/ML
MCH RBC QN AUTO: 31.7 PG (ref 27–33)
MCHC RBC AUTO-ENTMCNC: 31.5 G/DL (ref 33.6–35)
MCV RBC AUTO: 100.6 FL (ref 81.4–97.8)
MITOGEN IGNF BCKGRD COR BLD-ACNC: >10 IU/ML
PHOSPHATE SERPL-MCNC: 4.2 MG/DL (ref 2.5–4.5)
PLATELET # BLD AUTO: 147 K/UL (ref 164–446)
PMV BLD AUTO: 9.9 FL (ref 9–12.9)
POTASSIUM SERPL-SCNC: 3.8 MMOL/L (ref 3.6–5.5)
QFT TB2 - NIL TBQ2: 0 IU/ML
RBC # BLD AUTO: 3.44 M/UL (ref 4.2–5.4)
SODIUM SERPL-SCNC: 142 MMOL/L (ref 135–145)
WBC # BLD AUTO: 5.9 K/UL (ref 4.8–10.8)

## 2020-04-20 PROCEDURE — A9270 NON-COVERED ITEM OR SERVICE: HCPCS | Performed by: HOSPITALIST

## 2020-04-20 PROCEDURE — 700111 HCHG RX REV CODE 636 W/ 250 OVERRIDE (IP): Performed by: INTERNAL MEDICINE

## 2020-04-20 PROCEDURE — A9270 NON-COVERED ITEM OR SERVICE: HCPCS | Performed by: INTERNAL MEDICINE

## 2020-04-20 PROCEDURE — 700102 HCHG RX REV CODE 250 W/ 637 OVERRIDE(OP): Performed by: INTERNAL MEDICINE

## 2020-04-20 PROCEDURE — 700111 HCHG RX REV CODE 636 W/ 250 OVERRIDE (IP)

## 2020-04-20 PROCEDURE — 36415 COLL VENOUS BLD VENIPUNCTURE: CPT

## 2020-04-20 PROCEDURE — 85027 COMPLETE CBC AUTOMATED: CPT

## 2020-04-20 PROCEDURE — 99232 SBSQ HOSP IP/OBS MODERATE 35: CPT | Performed by: INTERNAL MEDICINE

## 2020-04-20 PROCEDURE — 700102 HCHG RX REV CODE 250 W/ 637 OVERRIDE(OP)

## 2020-04-20 PROCEDURE — 5A1D70Z PERFORMANCE OF URINARY FILTRATION, INTERMITTENT, LESS THAN 6 HOURS PER DAY: ICD-10-PCS | Performed by: INTERNAL MEDICINE

## 2020-04-20 PROCEDURE — 80069 RENAL FUNCTION PANEL: CPT

## 2020-04-20 PROCEDURE — A9270 NON-COVERED ITEM OR SERVICE: HCPCS

## 2020-04-20 PROCEDURE — 99239 HOSP IP/OBS DSCHRG MGMT >30: CPT | Performed by: HOSPITALIST

## 2020-04-20 PROCEDURE — 700102 HCHG RX REV CODE 250 W/ 637 OVERRIDE(OP): Performed by: HOSPITALIST

## 2020-04-20 PROCEDURE — 90935 HEMODIALYSIS ONE EVALUATION: CPT

## 2020-04-20 RX ORDER — ASPIRIN 81 MG/1
TABLET, CHEWABLE ORAL
Status: COMPLETED
Start: 2020-04-20 | End: 2020-04-20

## 2020-04-20 RX ORDER — VALSARTAN 40 MG/1
40 TABLET ORAL 2 TIMES DAILY
Qty: 60 TAB | Refills: 2 | Status: ON HOLD | OUTPATIENT
Start: 2020-04-20 | End: 2020-05-14

## 2020-04-20 RX ORDER — HEPARIN SODIUM 1000 [USP'U]/ML
2250 INJECTION, SOLUTION INTRAVENOUS; SUBCUTANEOUS
Status: DISCONTINUED | OUTPATIENT
Start: 2020-04-20 | End: 2020-04-20 | Stop reason: HOSPADM

## 2020-04-20 RX ORDER — HEPARIN SODIUM 1000 [USP'U]/ML
INJECTION, SOLUTION INTRAVENOUS; SUBCUTANEOUS
Status: COMPLETED
Start: 2020-04-20 | End: 2020-04-20

## 2020-04-20 RX ORDER — ASPIRIN 325 MG
TABLET ORAL
Status: DISCONTINUED
Start: 2020-04-20 | End: 2020-04-20 | Stop reason: HOSPADM

## 2020-04-20 RX ORDER — CARVEDILOL 12.5 MG/1
12.5 TABLET ORAL 2 TIMES DAILY WITH MEALS
Qty: 60 TAB | Refills: 2 | Status: ON HOLD | OUTPATIENT
Start: 2020-04-20 | End: 2020-05-14

## 2020-04-20 RX ORDER — ASPIRIN 81 MG/1
81 TABLET, CHEWABLE ORAL DAILY
Qty: 365 TAB | Refills: 0 | Status: SHIPPED | OUTPATIENT
Start: 2020-04-20 | End: 2020-06-11

## 2020-04-20 RX ORDER — ASPIRIN 81 MG/1
81 TABLET, CHEWABLE ORAL DAILY
Status: DISCONTINUED | OUTPATIENT
Start: 2020-04-20 | End: 2020-04-20 | Stop reason: HOSPADM

## 2020-04-20 RX ADMIN — PHENYTOIN SODIUM 100 MG: 100 CAPSULE ORAL at 12:06

## 2020-04-20 RX ADMIN — VALSARTAN 40 MG: 80 TABLET, FILM COATED ORAL at 05:19

## 2020-04-20 RX ADMIN — ESCITALOPRAM OXALATE 10 MG: 10 TABLET ORAL at 05:19

## 2020-04-20 RX ADMIN — ASPIRIN 81 MG 81 MG: 81 TABLET ORAL at 12:06

## 2020-04-20 RX ADMIN — ATORVASTATIN CALCIUM 40 MG: 40 TABLET, FILM COATED ORAL at 05:19

## 2020-04-20 RX ADMIN — LORAZEPAM 1 MG: 1 TABLET ORAL at 05:19

## 2020-04-20 RX ADMIN — HEPARIN SODIUM 2250 UNITS: 1000 INJECTION, SOLUTION INTRAVENOUS; SUBCUTANEOUS at 08:15

## 2020-04-20 RX ADMIN — ASPIRIN 325 MG: 325 TABLET, FILM COATED ORAL at 05:20

## 2020-04-20 RX ADMIN — HEPARIN SODIUM 5000 UNITS: 5000 INJECTION, SOLUTION INTRAVENOUS; SUBCUTANEOUS at 05:18

## 2020-04-20 RX ADMIN — PHENYTOIN SODIUM 100 MG: 100 CAPSULE ORAL at 05:19

## 2020-04-20 RX ADMIN — LEVETIRACETAM 500 MG: 500 TABLET ORAL at 05:19

## 2020-04-20 RX ADMIN — ASPIRIN 81 MG: 81 TABLET, CHEWABLE ORAL at 12:06

## 2020-04-20 RX ADMIN — OMEPRAZOLE 20 MG: 20 CAPSULE, DELAYED RELEASE ORAL at 06:30

## 2020-04-20 RX ADMIN — HEPARIN SODIUM 3700 UNITS: 1000 INJECTION, SOLUTION INTRAVENOUS; SUBCUTANEOUS at 11:34

## 2020-04-20 ASSESSMENT — ENCOUNTER SYMPTOMS
CHEST TIGHTNESS: 0
SHORTNESS OF BREATH: 1
NAUSEA: 1
COUGH: 0
EYE REDNESS: 0
MYALGIAS: 0
HEADACHES: 0
DIZZINESS: 0
EYE PAIN: 0
BLOOD IN STOOL: 0
FEVER: 0
HEMOPTYSIS: 0
BRUISES/BLEEDS EASILY: 0
POLYDIPSIA: 0
DEPRESSION: 0
HALLUCINATIONS: 0
ABDOMINAL PAIN: 0
PALPITATIONS: 0
SORE THROAT: 0
SHORTNESS OF BREATH: 0

## 2020-04-20 NOTE — DISCHARGE PLANNING
Outpatient Dialysis Placement Confirmation    Pending quantiferon.    Patient has been placed and confirmed at:    [facility]  [address] [place] [address]     Ph# [phone]  Fax#[phone]    Schedule: Monday, Wednesday, Friday  Time: 3:15 pm    Patient can start on Wednesday, 4/22/20, and needs to be there by 2:45 pm for paperwork.    Follow up call made to Heidi CONTRERAS CM ext [phone] and relayed outpatient dialysis confirmation. Follow up message to Dr. Schwarz to notify of placement. Provided patient dialysis schedule welcome letter.      Triny Paulino- Dialysis Coordinator  [facility] Ph# [phone]

## 2020-04-20 NOTE — DISCHARGE SUMMARY
Hospital Medicine Discharge Note     Admit Date:  4/16/2020       Discharge Date:   4/20/2020    Attending Physician:  Dino Calderón M.D.     Diagnoses (includes active and resolved):   Active Problems:    Chest pain POA: Yes    Moyamoya POA: Yes    Obesity (BMI 30-39.9) POA: Yes    Acute-on-chronic kidney injury (HCC) POA: Yes    Acute on chronic systolic heart failure (HCC) POA: Yes    HLD (hyperlipidemia) POA: Yes    HTN (hypertension) POA: Yes    H/O: CVA (cerebrovascular accident) POA: Yes    Seizure disorder, grand mal (HCC) POA: Yes  Resolved Problems:    * No resolved hospital problems. *      Hospital Summary (Brief Narrative):       50 y.o. female with a past medical history of stroke with moyamoya disease, MR, CKD stage IV, congestive heart failure, seizure disorder who presented 4/16/2020 with chest pain and shortness of breath.  She was recently tested for Covid which returned negative.  She was recently evaluated at [facility] for similar symptoms.  She underwent a echocardiogram and was treated with IV diuresis.  Apparently they were unable to perform any stress testing or angiography.  The patient presented to [facility]. Ultrasound venous duplex bilateral negative for DVT. Patient was treated with 325 mg of aspirin and IV heparin prior to transfer  Patient was admitted for work-up.  She had a negative stress test.  Repeat echo was done and showed EF had now decreased to 30% from previous value of 65% in the past.  She was then started on heart failure protocol and cards was consulted she did not tolerate oral or IV Lasix due to perceived reaction.  Thus, patient had a dialysis catheter placed and started on dialysis for fluid removal.  Her labs improved and she symptomatically improved considerably.  Thus she was set up with outpatient dialysis.  She was also placed on valsartan by cardiology she tolerated and was thus able to be discharged home.  QuantiFERON gold was  negative.      The patient met 2-midnight criteria for an inpatient stay at the time of discharge.     Consultants:      Nephrologist Dr. San  Cardiologist Dr. Palma    Procedures:        Procedure(s): Permcath placement.    Multiple dialysis sessions    Discharge Medications:           Medication List      START taking these medications      Instructions   aspirin 81 MG Chew chewable tablet  Commonly known as:  ASA  Replaces:  aspirin 325 MG Tabs   Take 1 Tab by mouth every day.  Dose:  81 mg     valsartan 40 MG Tabs  Commonly known as:  DIOVAN   Take 1 Tab by mouth 2 Times a Day.  Dose:  40 mg        CHANGE how you take these medications      Instructions   carvedilol 12.5 MG Tabs  What changed:    · medication strength  · how much to take  · when to take this  Commonly known as:  COREG   Take 1 Tab by mouth 2 times a day, with meals.  Dose:  12.5 mg        CONTINUE taking these medications      Instructions   acetaminophen 325 MG Tabs  Commonly known as:  TYLENOL   Take 2 Tabs by mouth every 6 hours as needed (Mild Pain; (Pain scale 1-3); Temp greater than 100.5 F).  Dose:  650 mg     albuterol 108 (90 Base) MCG/ACT Aers inhalation aerosol   Inhale 2 Puffs by mouth every four hours as needed for Shortness of Breath.  Dose:  2 Puff     atorvastatin 40 MG Tabs  Commonly known as:  LIPITOR   Take 1 Tab by mouth every day.  Dose:  40 mg     calcium citrate 950 MG Tabs  Commonly known as:  CALCITRATE   Take 950 mg by mouth every day.  Dose:  950 mg     coenzyme Q-10 30 MG capsule   Take 60 mg by mouth every day.  Dose:  60 mg     escitalopram 10 MG Tabs  Commonly known as:  LEXAPRO   Take 1 Tab by mouth every day.  Dose:  10 mg     folic acid 1 MG Tabs  Commonly known as:  FOLVITE   Take 1 mg by mouth two times a day may change times on alt/days.  Dose:  1 mg     levETIRAcetam 500 MG Tabs  Commonly known as:  KEPPRA   500mg in AM and 1000mg in PM     LORazepam 1 MG Tabs  Commonly known as:  ATIVAN   Take 1 Tab  by mouth 2 Times a Day for 180 days.  Dose:  1 mg     Lysine 500 MG Caps   Take  by mouth every day.     magnesium hydroxide 400 MG/5ML Susp  Commonly known as:  MILK OF MAGNESIA   Take 30 mL by mouth 1 time daily as needed (if polyethylene glycol ineffective after 24 hours).  Dose:  30 mL     Melatonin 5 MG Tabs   Take  by mouth every bedtime. Indications: Trouble Sleeping     NEXIUM 24HR PO   Take  by mouth every day. noon     phenytoin  MG Caps  Commonly known as:  DILANTIN   Take 1 Cap by mouth 3 times a day.  Dose:  100 mg     sodium bicarbonate 650 MG Tabs  Commonly known as:  SODIUM BICARBONATE   Take 1 Tab by mouth 3 times a day.  Dose:  650 mg     Vitamin C 1000 MG Tabs   Take  by mouth every day.        STOP taking these medications    aspirin 325 MG Tabs  Commonly known as:  ASA  Replaced by:  aspirin 81 MG Chew chewable tablet     lisinopril 20 MG Tabs  Commonly known as:  PRINIVIL          Disposition:   Discharge home    Activity:   As tolerated    Code status:   Full code    Primary Care Provider:    Pcp Not In Computer    Follow up appointment details :      Aureliano Elam M.D.  [address] [facility] [address]  [phone]    Go on 4/22/2020  Please check in at 1:05pm for a hospital follow up at 1:20pm. Please gain a referral to Cardiology while you are in the office. Thank you!     Future Appointments   Date Time Provider Department Center   4/27/2020  2:30 PM Rhoda Thomas P.A.-C. [facility] None   6/23/2020 10:00 AM Pham Weems M.D. [facility] None       Pending Studies:        None    Time spent on discharge day patient visit: 42 minutes    #################################################    Interval history/exam for day of discharge:    Vitals:    04/20/20 0341 04/20/20 0729 04/20/20 1140 04/20/20 1210   BP: 152/76 144/84 148/70 150/80   Pulse: 65 62 81 70   Resp: 17 16 17 18   Temp: 36.8 °C (98.2 °F) 36.9 °C (98.5 °F) 36.6 °C (97.8 °F) 37.2 °C (98.9 °F)   TempSrc: Temporal Temporal  Temporal Temporal   SpO2: 96% 98%  98%   Weight:       Height:         Weight/BMI: Body mass index is 39.07 kg/m².  Pulse Oximetry: 98 %, O2 (LPM): 0, O2 Delivery Device: None - Room Air    General:  NAD, obese  CVS:  RRR  PULM:  CTAB, no respiratory distress  Ext: edema resolved     Most Recent Labs:    Lab Results   Component Value Date/Time    WBC 5.9 04/20/2020 03:05 AM    RBC 3.44 (L) 04/20/2020 03:05 AM    HEMOGLOBIN 10.9 (L) 04/20/2020 03:05 AM    HEMATOCRIT 34.6 (L) 04/20/2020 03:05 AM    .6 (H) 04/20/2020 03:05 AM    MCH 31.7 04/20/2020 03:05 AM    MCHC 31.5 (L) 04/20/2020 03:05 AM    MPV 9.9 04/20/2020 03:05 AM    NEUTSPOLYS 63.50 04/17/2020 02:34 AM    LYMPHOCYTES 24.60 04/17/2020 02:34 AM    MONOCYTES 9.60 04/17/2020 02:34 AM    EOSINOPHILS 1.70 04/17/2020 02:34 AM    BASOPHILS 0.30 04/17/2020 02:34 AM    HYPOCHROMIA 1+ 07/29/2011 07:05 AM      Lab Results   Component Value Date/Time    SODIUM 142 04/20/2020 03:05 AM    POTASSIUM 3.8 04/20/2020 03:05 AM    CHLORIDE 105 04/20/2020 03:05 AM    CO2 22 04/20/2020 03:05 AM    GLUCOSE 96 04/20/2020 03:05 AM    BUN 36 (H) 04/20/2020 03:05 AM    CREATININE 4.16 (H) 04/20/2020 03:05 AM    CREATININE 0.9 11/19/2006 05:48 AM      Lab Results   Component Value Date/Time    ALTSGPT 31 04/16/2020 03:05 AM    ASTSGOT 16 04/16/2020 03:05 AM    ALKPHOSPHAT 132 (H) 04/16/2020 03:05 AM    TBILIRUBIN 0.3 04/16/2020 03:05 AM    ALBUMIN 3.1 (L) 04/20/2020 03:05 AM    GLOBULIN 2.9 04/16/2020 03:05 AM    INR 1.20 (H) 03/05/2017 11:13 AM     Lab Results   Component Value Date/Time    PROTHROMBTM 15.6 (H) 03/05/2017 11:13 AM    INR 1.20 (H) 03/05/2017 11:13 AM        Imaging/ Testing:      GENTRY DEL RIO PLACEMENT >5   Final Result      1. Ultrasound and fluoroscopic guided placement of a right internal jugular 14.5 [place] HemoSplit tunneled dialysis catheter.      2. The hemodialysis catheter may be used immediately as clinically indicated. Flushes per protocol.          NM-CARDIAC STRESS TEST   Final Result      EC-ECHOCARDIOGRAM COMPLETE W/ CONT   Final Result      OUTSIDE IMAGES-US VASCULAR   Final Result      OUTSIDE IMAGES-DX CHEST   Final Result      DX-CHEST-PORTABLE (1 VIEW)   Final Result      1.  Hypoinflation and pulmonary vascular congestion.   2.  Moderate cardiomegaly, increased from prior exam.   3.  No lobar pneumonia or pneumothorax.          Instructions:      The patient was instructed to return to the ER in the event of worsening symptoms. I have counseled the patient on the importance of compliance and the patient has agreed to proceed with all medical recommendations and follow up plan indicated above.   The patient understands that all medications come with benefits and risks. Risks may include permanent injury or death and these risks can be minimized with close reassessment and monitoring.      c

## 2020-04-20 NOTE — DISCHARGE PLANNING
Anticipated Discharge Disposition: home    Action: Patient discussed in IDT rounds. She has a HD chair confirmed at [facility] at 3:15. Per dialysis coordinator, Triny, she is just pending her quantiferon result which should result after 1500 today. DIANE LYMAN informed Dr. Calderón who states that if that results negative patient can discharge. DIANE LYMAN informed DC RN, Quin.    Barriers to Discharge: quantiferon results    Plan: Case coordination available for discharge needs

## 2020-04-20 NOTE — PROGRESS NOTES
Received report from day shift RNPham. Assumed care of pt. Pt reports no pain at this time. Updated pt on plan of care. Pt resting comfortably in bed and in no distress. Educated on use of call light. Hourly rounding and continuous monitoring in place.

## 2020-04-20 NOTE — PROGRESS NOTES
Cardiology Follow Up Progress Note    Date of Service  4/20/2020    Attending Physician  Dino Calderón M.D.    Chief Complaint   Chest pain and SOB    HPI  Pascale Acevedo is a 50 y.o. female admitted 4/16/2020 with progressive chest pressure and shortness of breath since April 7th. Upon admission creatinine elevated at 5.6. Echo showed reduced LVEF estimated to be 30%, IVC dilation without inspiratory collapse, global hypokinesis and grade III diastolic dysfunction. NM cardiac stress test showed no evidence of ischemia.     Past medical history significant for hypertension, moyamoya disease, CVA, HTN, HLD,CHF,atrophic right kidney, progressive CKD and former smoker with 40 pack years.    Interim Events  4/20/20: Resting in bed without any significant concerns or complaints. Anxious to go home. No significant events overnight.     24 Hour Output: -2,500 with HD    Review of Systems  Review of Systems   Constitutional: Negative for fever.   Respiratory: Negative for chest tightness and shortness of breath.    Cardiovascular: Negative for chest pain, palpitations and leg swelling.   Gastrointestinal: Negative for abdominal pain and blood in stool.   Genitourinary: Negative for hematuria.   Musculoskeletal: Negative for gait problem.   Neurological: Negative for dizziness and syncope.   All other systems reviewed and are negative.      Vital signs in last 24 hours  Temp:  [36.4 °C (97.5 °F)-36.9 °C (98.5 °F)] 36.9 °C (98.5 °F)  Pulse:  [60-65] 62  Resp:  [16-18] 16  BP: (108-152)/(48-84) 144/84  SpO2:  [96 %-99 %] 98 %    Physical Exam  Physical Exam  Constitutional:       General: She is not in acute distress.     Appearance: Normal appearance.   HENT:      Head: Normocephalic.   Neck:      Musculoskeletal: Normal range of motion.   Cardiovascular:      Rate and Rhythm: Normal rate and regular rhythm.      Pulses: Normal pulses.      Heart sounds: Normal heart sounds.   Pulmonary:      Effort: Pulmonary effort  is normal.      Breath sounds: Normal breath sounds.   Abdominal:      Palpations: Abdomen is soft.      Tenderness: There is no abdominal tenderness.   Musculoskeletal:      Right lower leg: No edema.      Left lower leg: No edema.   Skin:     General: Skin is warm and dry.   Neurological:      Mental Status: She is alert and oriented to person, place, and time.   Psychiatric:         Mood and Affect: Mood normal.         Behavior: Behavior normal.         Thought Content: Thought content normal.         Lab Review  Lab Results   Component Value Date/Time    WBC 5.9 04/20/2020 03:05 AM    RBC 3.44 (L) 04/20/2020 03:05 AM    HEMOGLOBIN 10.9 (L) 04/20/2020 03:05 AM    HEMATOCRIT 34.6 (L) 04/20/2020 03:05 AM    .6 (H) 04/20/2020 03:05 AM    MCH 31.7 04/20/2020 03:05 AM    MCHC 31.5 (L) 04/20/2020 03:05 AM    MPV 9.9 04/20/2020 03:05 AM      Lab Results   Component Value Date/Time    SODIUM 142 04/20/2020 03:05 AM    POTASSIUM 3.8 04/20/2020 03:05 AM    CHLORIDE 105 04/20/2020 03:05 AM    CO2 22 04/20/2020 03:05 AM    GLUCOSE 96 04/20/2020 03:05 AM    BUN 36 (H) 04/20/2020 03:05 AM    CREATININE 4.16 (H) 04/20/2020 03:05 AM    CREATININE 0.9 11/19/2006 05:48 AM      Lab Results   Component Value Date/Time    ASTSGOT 16 04/16/2020 03:05 AM    ALTSGPT 31 04/16/2020 03:05 AM     Lab Results   Component Value Date/Time    CHOLSTRLTOT 127 04/17/2020 02:34 AM    LDL 44 04/17/2020 02:34 AM    HDL 57 04/17/2020 02:34 AM    TRIGLYCERIDE 129 04/17/2020 02:34 AM    TROPONINT 61 (H) 04/16/2020 08:42 AM       Recent Labs     04/18/20  0915   NTPROBNP >44137*       Cardiac Imaging and Procedures Review  NM Cardiac Stress Test 4/16/20:  NUCLEAR IMAGING INTERPRETATION  No evidence of ischemia.   Inferoapical photopenia is likely related to attenuation artifact.   Mild diffuse hypokinesis with left ventricle ejection fraction of 43%.     Sinus bradycardia, nonspecific intraventricular conduction delay, rare PVCs.  Nondiagnostic  ECG portion of a regadenoson nuclear stress test.  ECG INTERPRETATION  Nondiagnostic ECG portion of a regadenoson nuclear stress test.    Echocardiogram 4/16/20:  CONCLUSIONS  Compared to the images of the prior study done 2/27/2017, LV is moderately dilated, EF is severely reduced, grade 3 diastolic dysfunction present.  Technically challenging study, improved with contrast.  Left ventricle is moderately dilated at 6 cm.  Severely reduced left ventricular systolic function.  Left ventricular ejection fraction is visually estimated to be 30%.  Global hypokinesis with further hypokinesis of the basal to mid inferior wall and inferior septum.  Grade III diastolic dysfunction (restrictive pattern).  Right ventricle not well visualized, however, appears mildly dilated in size and reduced in systolic function.  Biatrial enlargement.   Mild to moderate mitral regurgitation.  Mild tricuspid regurgitation.  Estimated right ventricular systolic pressure is 42 mmHg.  Right atrial pressure is estimated to be 15 mmHg.  Ascending aorta is borderline dilated with a diameter of 3.8 cm.  Findings sent to Dr. Calderón by [facility] text at time of report.      Assessment/Plan  Acute Systolic and Diastolic Heart Failure (EF 30%):  -proBNP on admit was >74089.   -Continues to diurese with HD, net output is -1.7L.  -Patient states that she is no sure what her dry weight is. Importance of obtaining a scale for home, daily weights was discussed and low sodium diet was discussed.   -Continue to manage patients volume status with HD.     New Cardiomyopathy:  -MPI on 4/16/20 showed no evidence of ischemia.   -Continue to optimize GDMT.  -Dr. Palma recommending R/LHC in future once euvolemic to further evaluate pHTN.   -Continue Coreg 12.5 mg BID and Valsartan 40 mg BID.    Acute on CKD:  -HD initiated this admission, will need outpatient HD.   -Neph consulted and following, appreciate their recommendations.     Mixed HLD:  -Stable.  -Continue ASA  81 mg daily and Atorvastatin 40 mg daily.       Thank you for allowing us to participate in the care of this patient. We will sign off. Close cardiology follow up has been arranged as below. Please let us know if you have any questions or concerns.     Future Appointments   Date Time Provider Department Center   4/27/2020  2:30 PM Rhoda Thomas P.A.-C. [facility] None   6/23/2020 10:00 AM Pham Weems M.D. [facility] None       BRENDA Burton   [facility]  [phone]

## 2020-04-20 NOTE — CARE PLAN
Problem: Communication  Goal: The ability to communicate needs accurately and effectively will improve  Outcome: PROGRESSING AS EXPECTED     Problem: Safety  Goal: Will remain free from injury  Outcome: PROGRESSING AS EXPECTED  Goal: Will remain free from falls  Outcome: PROGRESSING AS EXPECTED     Problem: Infection  Goal: Will remain free from infection  Outcome: PROGRESSING AS EXPECTED     Problem: Venous Thromboembolism (VTW)/Deep Vein Thrombosis (DVT) Prevention:  Goal: Patient will participate in Venous Thrombosis (VTE)/Deep Vein Thrombosis (DVT)Prevention Measures  Outcome: PROGRESSING AS EXPECTED     Problem: Bowel/Gastric:  Goal: Normal bowel function is maintained or improved  Outcome: PROGRESSING AS EXPECTED     Problem: Knowledge Deficit  Goal: Knowledge of disease process/condition, treatment plan, diagnostic tests, and medications will improve  Outcome: PROGRESSING AS EXPECTED  Goal: Knowledge of the prescribed therapeutic regimen will improve  Outcome: PROGRESSING AS EXPECTED     Problem: Discharge Barriers/Planning  Goal: Patient's continuum of care needs will be met  Outcome: PROGRESSING AS EXPECTED     Problem: Pain Management  Goal: Pain level will decrease to patient's comfort goal  Outcome: PROGRESSING AS EXPECTED     Problem: Respiratory:  Goal: Respiratory status will improve  Outcome: PROGRESSING AS EXPECTED     Problem: Fluid Volume:  Goal: Will maintain balanced intake and output  Outcome: PROGRESSING AS EXPECTED     Problem: Psychosocial Needs:  Goal: Level of anxiety will decrease  Outcome: PROGRESSING AS EXPECTED     Problem: Urinary Elimination:  Goal: Ability to reestablish a normal urinary elimination pattern will improve  Outcome: PROGRESSING AS EXPECTED

## 2020-04-20 NOTE — PROGRESS NOTES
HEMODIALYSIS NOTES:     HD today x 3.5 hours per Dr. Schwarz . Initiated at 0803 and ended at 1133. Patient tolerated treatment. See paper flowsheet for details.    UF Net: 2000 mL    R IJ intact and patent with good flow during dialysis. No s/sx of infection, no redness nor bleeding noted on the CVC site. CVC dressing clean, dry and intact. Blood returned and CVC port flushed with NS and Heparin 1000 units/mL used  to lock catheter given per designated amount. CVC port clamped and capped.     Report given to VINCE Smith RN.

## 2020-04-20 NOTE — DISCHARGE INSTRUCTIONS
Discharge Instructions    Discharged to home by car with relative. Discharged via wheelchair, hospital escort: Yes.  Special equipment needed: Not Applicable    Be sure to schedule a follow-up appointment with your primary care doctor or any specialists as instructed.     Discharge Plan:   Diet Plan: Discussed  Activity Level: Discussed  Confirmed Follow up Appointment: Appointment Scheduled  Confirmed Symptoms Management: Discussed  Medication Reconciliation Updated: Yes  Influenza Vaccine Indication: Not indicated: Previously immunized this influenza season and > 8 years of age    I understand that a diet low in cholesterol, fat, and sodium is recommended for good health. Unless I have been given specific instructions below for another diet, I accept this instruction as my diet prescription.   Other diet: Renal    Special Instructions:   HF Patient Discharge Instructions  · Monitor your weight daily, and maintain a weight chart, to track your weight changes.   · Activity as tolerated, unless your Doctor has ordered otherwise.   · Follow a low fat, low cholesterol, low salt diet unless instructed otherwise by your Doctor. Read the labels on the back of food products and track your intake of fat, cholesterol and salt.   · Fluid Restriction No. If a Fluid Restriction has been ordered by your Doctor, measure fluids with a measuring cup to ensure that you are not exceeding the restriction.   · No smoking.  · Oxygen No. If your Doctor has ordered that you wear Oxygen at home, it is important to wear it as ordered.  · Did you receive an explanation from staff on the importance of taking each of your medications and why it is necessary to keep taking them unless your doctor says to stop? Yes  · Were all of your questions answered about how to manage your heart failure and what to do if you have increased signs and symptoms after you go home? Yes  · Do you feel like your heart failure care team involved you in the care  treatment plan and allowed you to make decisions regarding your care while in the hospital and addressed any discharge needs you might have? Yes    See the educational handout provided at discharge for more information on monitoring your daily weight, activity and diet. This also explains more about Heart Failure, symptoms of a flare-up and some of the tests that you have undergone.     Warning Signs of a Flare-Up include:  · Swelling in the ankles or lower legs.  · Shortness of breath, while at rest, or while doing normal activities.   · Shortness of breath at night when in bed, or coughing in bed.   · Requiring more pillows to sleep at night, or needing to sit up at night to sleep.  · Feeling weak, dizzy or fatigued.     When to call your Doctor:  · Call [facility] seven days a week from 8:00 a.m. to 8:00 p.m. for medical questions [phone].  · Call your Primary Care Physician or Cardiologist if:   1. You experience any pain radiating to your jaw or neck.  2. You have any difficulty breathing.  3. You experience weight gain of 3 lbs in a day or 5 lbs in a week.   4. You feel any palpitations or irregular heartbeats.  5. You become dizzy or lose consciousness.   If you have had an angiogram or had a pacemaker or AICD placed, and experience:  1. Bleeding, drainage or swelling at the surgical / puncture site.  2. Fever greater than 100.0 F  3. Shock from internal defibrillator.  4. Cool and / or numb extremities.      · Is patient discharged on Warfarin / Coumadin?   No Dialysis  Care After  Dialysis is a treatment that removes the toxic wastes from the body when the kidneys fail to do this on their own. There are 2 types of dialysis:  · Hemodialysis. Blood is pumped from the body through a filter (dialyzer). The blood is cleaned of waste products and returned to the body. Hemodialysis is performed in a dialysis center for 3 to 4 hours, 3 times a week. Dialysis is performed through an arteriovenous  "(AV) access, which provides access to the blood vessel. The main advantage of hemodialysis is that no training is required of the patient. Disadvantages include potential AV access failure and lack of freedom, as you must stay relatively near a dialysis center.   · Peritoneal dialysis. The body's own lining (membrane) is used as a filter. A fluid drains in and out of the abdomen to get rid of the body's toxic wastes. Advantages are that it can be taught to the patient and can be done at home with careful technique. It allows more freedom and less discomfort. Disadvantages include potential inflammation inside the abdomen (peritonitis) and membrane failure.   HOME CARE INSTRUCTIONS  · Never wear tight clothing or jewelry around the access.   · Avoid sleeping on the access. This can decrease circulation and can cause the access to clot.   · Avoid getting your dressing wet. If the dressing comes off, cover the access site with a 4x4 gauze dressing and tape securely.   · Keep your access site clean to prevent infection.   · Control your blood pressure. High blood pressure (hypertension) damages your heart and vessels. It is important to exercise as much as possible.   · Keep your cholesterol under control. Exercise regularly or as directed.   SEEK MEDICAL CARE IF:  · You do not feel a \"thrill\" in your AV access.   · You have a fever, chills, sweats, or weakness.   · You have a small amount of bleeding at your access site.   · Your blood pressure is too high.   MAKE SURE YOU:  · Understand these instructions.   · Will watch your condition.   · Will get help right away if you are not doing well or get worse.   Document Released: 07/10/2006 Document Revised: 03/11/2013 Document Reviewed: 01/24/2012  ExitCare® Patient Information ©2013 [employer].    Dialysis Diet  Dialysis is a treatment that cleans your blood. It is used when the kidneys are damaged. When you need dialysis, you should watch your diet. This is because " some nutrients can build up in your blood between treatments and make you sick. These nutrients are:  · Potassium.  · Phosphorus.  · Sodium.  Your doctor or dietitian will:  · Tell you how much of these you can have.  · Tell you if you need to look out for other nutrients too.  · Help you plan meals.  · Tell you how much to drink each day.  WHAT DO I NEED TO KNOW ABOUT THIS DIET?  · Limit potassium. Potassium is in milk, fruits, and vegetables.  · Limit phosphorus. Phosphorus is in milk, cheese, beans, nuts, and carbonated beverages.  · Limit salt (sodium). Foods that have a lot sodium include processed and cured meats, ready-made frozen meals, canned vegetables, and salty snack foods.  · Do not use salt substitutes.  · Try not to eat whole-grain foods and foods that have a lot of fiber.  · Follow your doctor's instructions about how much to drink. You may be told to:  ¨ Write down what you drink.  ¨ Write down foods you eat that are made mostly from water, such as gelatin and soups.  ¨ Drink from small cups.  · Ask your doctor if you should take a medicine that binds phosphorus.  · Take vitamin and mineral supplements only as told by your doctor.  · Eat meat, poultry, fish, and eggs. Limit nuts and beans.  · Before you cook potatoes, cut them into small pieces. Then boil them in unsalted water.  · Drain all fluid from cooked vegetables and canned fruits before you eat them.  WHAT FOODS CAN I EAT?  Grains  White bread. White rice. Cooked cereal. Unsalted popcorn. Tortillas. Pasta.  Vegetables  Fresh or frozen broccoli, carrots, and green beans. Cabbage. Cauliflower. Celery. Cucumbers. Eggplant. Radishes. Zucchini.  Fruits  Apples. Fresh or frozen berries. Fresh or canned pears, peaches, and pineapple. Grapes. Plums.  Meats and Other Protein Sources  Fresh or frozen beef, pork, chicken, and fish. Eggs. Low-sodium canned tuna or salmon.  Dairy  Cream cheese. Heavy cream. Ricotta cheese.  Beverages  Apple cider.  Cranberry juice. Grape juice. Lemonade. Black coffee.  Condiments  Herbs. Spices. Jam and jelly. Honey.  Sweets and Desserts  Sherbet. Cakes. Cookies.  Fats and Oils  Olive oil, canola oil, and safflower oil.  Other  Non-dairy creamer. Non-dairy whipped topping. Homemade broth without salt.  The items listed above may not be a complete list of recommended foods or beverages. Contact your dietitian for more option  WHAT FOODS ARE NOT RECOMMENDED?  Grains  Whole-grain bread. Whole-grain pasta. High-fiber cereal.  Vegetables  Potatoes. Beets. Tomatoes. Winter squash and pumpkin. Asparagus. Spinach. Parsnips.  Fruits  Star fruit. Bananas. Oranges. Kiwi. Nectarines. Prunes. Melon. Dried fruit. Avocado.  Meats and Other Protein Sources  Canned, smoked, and cured meats. Packaged luncheon meat. Sardines. Nuts and seeds. Peanut butter. Beans and legumes.  Dairy  Milk. Buttermilk. Yogurt. Cheese and cottage cheese. Processed cheese spreads.   Beverages  Orange juice. Prune juice. Carbonated soft drinks.  Condiments  Salt. Salt substitutes. Soy sauce.   Sweets and Desserts  Ice cream. Chocolate. Candied nuts.  Fats and Oils  Butter. Margarine.  Other  Ready-made frozen meals. Canned soups.   The items listed above may not be a complete list of foods and beverages to avoid. Contact your dietitian for more information.     This information is not intended to replace advice given to you by your health care provider. Make sure you discuss any questions you have with your health care provider.     Document Released: 06/18/2013 Document Revised: 01/08/2016 Document Reviewed: 07/21/2015  [employer] Interactive Patient Education ©2016 [employer]    Depression / Suicide Risk    As you are discharged from this [facility] facility, it is important to learn how to keep safe from harming yourself.    Recognize the warning signs:  · Abrupt changes in personality, positive or negative- including increase in energy   · Giving away  possessions  · Change in eating patterns- significant weight changes-  positive or negative  · Change in sleeping patterns- unable to sleep or sleeping all the time   · Unwillingness or inability to communicate  · Depression  · Unusual sadness, discouragement and loneliness  · Talk of wanting to die  · Neglect of personal appearance   · Rebelliousness- reckless behavior  · Withdrawal from people/activities they love  · Confusion- inability to concentrate     If you or a loved one observes any of these behaviors or has concerns about self-harm, here's what you can do:  · Talk about it- your feelings and reasons for harming yourself  · Remove any means that you might use to hurt yourself (examples: pills, rope, extension cords, firearm)  · Get professional help from the community (Mental Health, Substance Abuse, psychological counseling)  · Do not be alone:Call your Safe Contact- someone whom you trust who will be there for you.  · Call your local CRISIS HOTLINE 455-5301 or 121-005-7793  · Call your local Children's Mobile Crisis Response Team [place] (528) 631-8735 or www.[employer]  · Call the toll free National Suicide Prevention Hotlines   · National Suicide Prevention Lifeline 152-486-WDUB (7989)  · National Hope Line Network 800-SUICIDE (162-8430)

## 2020-04-20 NOTE — DOCUMENTATION QUERY
[facility]                                                                       Query Response Note      PATIENT:               ELIJAH BOWLING  ACCT #:                  [id number]  MRN:                     [id number]  :                      1970  ADMIT DATE:       2020 2:03 AM  DISCH DATE:          RESPONDING  PROVIDER #:        [id number]           QUERY TEXT:    There is conflicting documentation in the medical record.      Acute Systolic and Diastolic Heart Failure is documented by the Cardiology MD pn from  to   Acute on chronic systolic heart failure is documented by the Hosp MD PN on     Based on treatment, clinical findings and risk factors, can this documentation be further clarified?    The patient's Clinical Indicators include:  - Card MD PN: Acute Systolic and Diastolic Heart Failure (EF 30%)   Hosp MD PN: Acute on chronic systolic heart failure   Treatment: Dialysis, Lasix, Card consult, Coreg, Valsartan  Risk Factors: New onset Heart failure, ESRD, FREDY, HTN, New cardiomyopathy  Options provided:   -- Acute Systolic and Diastolic Heart Failure   -- Acute on chronic systolic heart failure   -- Unable to determine      Query created by: Ashley Aguillon on 2020 2:30 PM    RESPONSE TEXT:    Acute on chronic combined systolic and diastolic heart failure          Electronically signed by:  OLVIN CHAVARRIA MD 2020 3:39 PM

## 2020-04-20 NOTE — PROGRESS NOTES
0810 Pt had 5 beats of VTACH.  Presently in dialysis.  0811 Called DIANE Nath in dialysis; stated the patient was sleeping.   0812 Notified Dr. Calderón of event. No new orders at this time.

## 2020-04-20 NOTE — PROGRESS NOTES
Assumed care of patient from Silvino Bernstein RN  Pt on RA & resting comfortably.  NSR with PVC on tele with rates in the 60s  Bed low & locked.   Call light within reach.  Alarm in place & active.  A&O x4.  Rates pain 0/10 at this time.  Will continue to monitor.

## 2020-04-20 NOTE — PROGRESS NOTES
[facility] -  PROGRESS NOTE               Author: Cole Schwarz M.D. Date & Time: 4/20/2020  10:21 AM     HPI:  50-year-old female with a history of hypertension, moyamoya disease, CHF, atrophic right kidney and progressive CKD presented on 4/16 with chest pain and shortness of breath, found to have worsening heart failure and progressive CKD.  Patient followed outpatient by see her [place] nephrology in the [facility].  Last seen in March after recently reestablishing after being lost to follow-up for several years.  In March her creatinine was 4.7 and started dialysis planning.  She was supposed to have vein mapping done and access placement in April, but she thinks she only had vein mapping.  Since April 7 she has had progressive worsening shortness of breath and chest pressure.  Recent hospital stay at [facility] for similar presentation.  COVID tested and negative.  She also notes decreasing appetite with intermittent nausea and metallic taste in mouth over last several weeks.  Upon admission creatinine elevated at 5.6.  Nuc med showed mild hypokinesis diffusely with EF estimated at 43%.  EF estimated to be 30% on echo.  IVC dilated without inspiratory collapse.  Given dose of Lasix, 80 mg IV last night, 800cc urine output but felt pruritus and rash which she believes was secondary to the Lasix, improved with Benadryl.    DAILY NEPHROLOGY SUMMARY:  4/17 - Consult done  4/18 - VARUN, feels better  4/19 - NAEO, had vomit of reddish material yesterday post dialysis, no further after that one time  4/20 - VARUN, SOD, no complaints, wants to go home    REVIEW OF SYSTEMS:    Review of Systems   Constitutional: Negative for fever and malaise/fatigue.   HENT: Negative for ear pain and sore throat.    Eyes: Negative for pain and redness.   Respiratory: Positive for shortness of breath. Negative for cough and hemoptysis.    Cardiovascular: Positive for chest pain. Negative for leg swelling.  "  Gastrointestinal: Positive for nausea. Negative for abdominal pain.   Musculoskeletal: Negative for joint pain and myalgias.   Skin: Negative for itching and rash.   Neurological: Negative for dizziness and headaches.   Endo/Heme/Allergies: Negative for polydipsia. Does not bruise/bleed easily.   Psychiatric/Behavioral: Negative for depression and hallucinations.   All other systems reviewed and are negative.    PAST FAMILY HISTORY: Reviewed and unchanged since admission  PAST SURGICAL HISTORY:  Reviewed and unchanged since admission  SOCIAL HISTORY:  Reviewed and unchanged since admission  FAMILY HISTORY: Reviewed and unchanged since admission  CURRENT MEDICATIONS: Reviewed since admission to present  IMAGING STUDIES: Reviewed since admission to present  LABORATORY STUDIES: Reviewed since admission to present    PHYSICAL EXAM:  VS:  /84   Pulse 62   Temp 36.9 °C (98.5 °F) (Temporal)   Resp 16   Ht 1.753 m (5' 9\")   Wt 120 kg (264 lb 8.8 oz)   LMP 04/14/2020   SpO2 98%   Breastfeeding No   BMI 39.07 kg/m²   Physical Exam  Vitals signs and nursing note reviewed.   Constitutional:       General: She is not in acute distress.     Appearance: Normal appearance. She is not ill-appearing.   HENT:      Head: Normocephalic and atraumatic.      Right Ear: External ear normal.      Left Ear: External ear normal.      Nose: Nose normal. No congestion.      Mouth/Throat:      Mouth: Mucous membranes are moist.      Pharynx: No oropharyngeal exudate.   Eyes:      General:         Right eye: No discharge.         Left eye: No discharge.      Extraocular Movements: Extraocular movements intact.   Neck:      Musculoskeletal: Neck supple. No muscular tenderness.   Cardiovascular:      Rate and Rhythm: Normal rate and regular rhythm.      Heart sounds: Normal heart sounds.   Pulmonary:      Effort: Pulmonary effort is normal.      Breath sounds: Normal breath sounds.   Chest:      Chest wall: No tenderness. "   Abdominal:      General: Bowel sounds are normal. There is no distension.      Palpations: Abdomen is soft.   Musculoskeletal:         General: No swelling or tenderness.   Skin:     General: Skin is warm and dry.      Findings: No rash.   Neurological:      General: No focal deficit present.      Mental Status: Mental status is at baseline.   Psychiatric:         Mood and Affect: Mood normal.         Behavior: Behavior normal.       Fluids:  In: 660 [P.O.:660]  Out: -     LABS:  Recent Results (from the past 24 hour(s))   Renal Function Panel    Collection Time: 04/20/20  3:05 AM   Result Value Ref Range    Sodium 142 135 - 145 mmol/L    Potassium 3.8 3.6 - 5.5 mmol/L    Chloride 105 96 - 112 mmol/L    Co2 22 20 - 33 mmol/L    Glucose 96 65 - 99 mg/dL    Creatinine 4.16 (H) 0.50 - 1.40 mg/dL    Bun 36 (H) 8 - 22 mg/dL    Calcium 8.6 8.5 - 10.5 mg/dL    Phosphorus 4.2 2.5 - 4.5 mg/dL    Albumin 3.1 (L) 3.2 - 4.9 g/dL   CBC WITHOUT DIFFERENTIAL    Collection Time: 04/20/20  3:05 AM   Result Value Ref Range    WBC 5.9 4.8 - 10.8 K/uL    RBC 3.44 (L) 4.20 - 5.40 M/uL    Hemoglobin 10.9 (L) 12.0 - 16.0 g/dL    Hematocrit 34.6 (L) 37.0 - 47.0 %    .6 (H) 81.4 - 97.8 fL    MCH 31.7 27.0 - 33.0 pg    MCHC 31.5 (L) 33.6 - 35.0 g/dL    RDW 51.9 (H) 35.9 - 50.0 fL    Platelet Count 147 (L) 164 - 446 K/uL    MPV 9.9 9.0 - 12.9 fL   ESTIMATED GFR    Collection Time: 04/20/20  3:05 AM   Result Value Ref Range    GFR If  14 (A) >60 mL/min/1.73 m 2    GFR If Non African American 11 (A) >60 mL/min/1.73 m 2       (click the triangle to expand results)    IMPRESSION:  - ESRD    * Etiology likely 2/2 HTN    * PermCath placed; iHD initiated 4/17  - HTN    * Goal BP < 140/90    * Stable  - Anemia of CKD    * Goal Hgb 10-11    * Stable  - CKD-MBD    * To be managed at HD unit  - HLD  - CAD    PLAN:  - MWF iHD during stay  - UF as tolerated  - No dietary protein restrictions  - Low sodium diet  - [facility] OP HD  placement in progress, if not available, then [facility]  - Dose all meds per ESRD  - Low sodium diet    All prior notes form other doctors and RN staff were reviewed from admission to current day to help me make my clinical decisions

## 2020-04-20 NOTE — PROGRESS NOTES
Monitor Summary  SB/SR 59-67  Frequent PVCs, rare Bigem and trigem, Occ PACs, rare couplets, 7 beats VTach  .16/.08/.44

## 2020-04-24 ENCOUNTER — TELEPHONE (OUTPATIENT)
Dept: CARDIOLOGY | Facility: MEDICAL CENTER | Age: 50
End: 2020-04-24

## 2020-04-24 NOTE — TELEPHONE ENCOUNTER
Spoke with patient mom (Andria) stated patient has not see other cardio or testing outside of [facility].

## 2020-04-27 NOTE — PROGRESS NOTES
Chief Complaint   Patient presents with   • Congestive Heart Failure       Subjective:   Pascale Acevedo is a 50 y.o. female who presents today for hospital follow-up with her mother and daughter.    Patient of Dr. Palma.  She has a complicated neurologic history including moyamoya, history of seizures and CVA, hypertension, hyperlipidemia, history of tobacco use.  She was admitted to the hospital 4/16 with chest pain, orthopnea, dyspnea, leg swelling, diagnosed with acute systolic and diastolic heart failure (previously found to have HFrEF in 2013).  Hospitalization complicated by acute on chronic renal failure requiring dialysis.     Since discharge home she is feeling quite fatigued, has low energy off and on dialysis days (dialysis on M, W, F).  She is not sure what the blood pressure is at dialysis, the nurses have not told her it is low.    Both her caregivers and Myla agree that she is lost weight since starting dialysis, she is less bloated, she does not have a scale at home.  Weight in hospital was 264, today 247 in the office.  Has minimal leg swelling, no abdominal distention or nausea, no orthopnea or PND. She urinates frequently at home.     She has been tested for sleep apnea several times and has never been told she has sleep apnea or needed CPAP/O2 at night.    Past Medical History:   Diagnosis Date   • CKD (chronic kidney disease), stage IV ([facility]) 12/24/2015   • H/O: CVA (cerebrovascular accident) 12/24/2015   • Hypertension    • Kidney disease    • Mild mitral regurgitation 7/14/2014   • Moyamoya disease 10/3/2013   • Seizure disorder, grand mal (HCC) 3/3/2016   • Stroke ([facility])      Past Surgical History:   Procedure Laterality Date   • IRRIGATION & DEBRIDEMENT ORTHO Right 3/9/2017    Procedure: IRRIGATION & DEBRIDEMENT ORTHO - FOOT;  Surgeon: Gerardo Lynn M.D.;  Location: SURGERY [facility];  Service:    • IRRIGATION & DEBRIDEMENT ORTHO Right 3/5/2017    Procedure: IRRIGATION  & DEBRIDEMENT ORTHO AND GASTROC RECESSION;  Surgeon: Gerardo Lynn M.D.;  Location: SURGERY [facility];  Service:    • METATARSAL HEAD RESECTION  3/5/2017    Procedure: SECOND METATARSAL HEAD RESECTION;  Surgeon: Gerardo Lynn M.D.;  Location: SURGERY [facility];  Service:    • IRRIGATION & DEBRIDEMENT ORTHO Right 2/27/2017    Procedure: IRRIGATION & DEBRIDEMENT ORTHO;  Surgeon: Coleman Monterroso M.D.;  Location: SURGERY [facility];  Service:    • PRIMARY C SECTION     • TUBAL COAGULATION LAPAROSCOPIC BILATERAL       Family History   Problem Relation Age of Onset   • Diabetes Mother    • Hypertension Mother    • Hypertension Father    • Arthritis Father    • Diabetes Maternal Grandmother    • Psychiatric Illness Maternal Grandfather    • Cancer Paternal Grandmother    • Psychiatric Illness Paternal Grandfather      Social History     Socioeconomic History   • Marital status: Single     Spouse name: Not on file   • Number of children: Not on file   • Years of education: Not on file   • Highest education level: Not on file   Occupational History   • Not on file   Social Needs   • Financial resource strain: Not on file   • Food insecurity     Worry: Not on file     Inability: Not on file   • Transportation needs     Medical: Not on file     Non-medical: Not on file   Tobacco Use   • Smoking status: Former Smoker   • Smokeless tobacco: Never Used   Substance and Sexual Activity   • Alcohol use: No   • Drug use: No   • Sexual activity: Not on file   Lifestyle   • Physical activity     Days per week: Not on file     Minutes per session: Not on file   • Stress: Not on file   Relationships   • Social connections     Talks on phone: Not on file     Gets together: Not on file     Attends Jewish service: Not on file     Active member of club or organization: Not on file     Attends meetings of clubs or organizations: Not on file     Relationship status: Not on file   • Intimate partner violence      Fear of current or ex partner: Not on file     Emotionally abused: Not on file     Physically abused: Not on file     Forced sexual activity: Not on file   Other Topics Concern   • Not on file   Social History Narrative   • Not on file     Allergies   Allergen Reactions   • Allegra [Fexofenadine] Unspecified     Rxn = unknown   • Amoxicillin    • Azithromycin Unspecified     Rxn = unknown   • Claritin    • Erythromycin Unspecified     Rxn = unknown   • Ibuprofen & Caffeine-Vitamins Unspecified     Rxn = unknown   • Penicillins Unspecified     Rxn = unknown   • Procardia [Nifedipine]    • Rosemary Oil Anaphylaxis     Throat starts to close/swell.  Oil and the herb.    • Zyrtec [Cetirizine] Unspecified     Rxn = unknown     Outpatient Encounter Medications as of 4/28/2020   Medication Sig Dispense Refill   • amLODIPine (NORVASC) 10 MG Tab Take 10 mg by mouth.     • calcitRIOL (ROCALTROL) 0.25 MCG Cap TAKE 1 CAPSULE BY MOUTH ON MONDAY WEDNESDAY AND FRIDAY     • torsemide (DEMADEX) 10 MG tablet Take 2 Tabs by mouth every 48 hours. Take on nondialysis days 45 Tab 2   • aspirin (ASA) 81 MG Chew Tab chewable tablet Take 1 Tab by mouth every day. 365 Tab 0   • carvedilol (COREG) 12.5 MG Tab Take 1 Tab by mouth 2 times a day, with meals. 60 Tab 2   • valsartan (DIOVAN) 40 MG Tab Take 1 Tab by mouth 2 Times a Day. 60 Tab 2   • folic acid (FOLVITE) 1 MG Tab Take 1 mg by mouth two times a day may change times on alt/days.     • Lysine 500 MG Cap Take  by mouth every day.     • LORazepam (ATIVAN) 1 MG Tab Take 1 Tab by mouth 2 Times a Day for 180 days. 60 Tab 3   • escitalopram (LEXAPRO) 10 MG Tab Take 1 Tab by mouth every day. 90 Tab 2   • levETIRAcetam (KEPPRA) 500 MG Tab 500mg in AM and 1000mg in  Tab 2   • phenytoin ER (DILANTIN) 100 MG Cap Take 1 Cap by mouth 3 times a day. 270 Cap 4   • atorvastatin (LIPITOR) 40 MG Tab Take 1 Tab by mouth every day. 90 Tab 2   • Melatonin 5 MG Tab Take  by mouth every bedtime.  "Indications: Trouble Sleeping     • coenzyme Q-10 30 MG capsule Take 60 mg by mouth every day.     • Esomeprazole Magnesium (NEXIUM 24HR PO) Take  by mouth every day. noon     • Ascorbic Acid (VITAMIN C) 1000 MG Tab Take  by mouth every day.     • calcium citrate (CALCITRATE) 950 MG Tab Take 950 mg by mouth every day.     • sodium bicarbonate (SODIUM BICARBONATE) 650 MG Tab Take 1 Tab by mouth 3 times a day. 120 Tab 3   • acetaminophen (TYLENOL) 325 MG Tab Take 2 Tabs by mouth every 6 hours as needed (Mild Pain; (Pain scale 1-3); Temp greater than 100.5 F). 30 Tab 0   • albuterol 108 (90 BASE) MCG/ACT Aero Soln inhalation aerosol Inhale 2 Puffs by mouth every four hours as needed for Shortness of Breath. 8.5 g    • [DISCONTINUED] magnesium hydroxide (MILK OF MAGNESIA) 400 MG/5ML Suspension Take 30 mL by mouth 1 time daily as needed (if polyethylene glycol ineffective after 24 hours). (Patient not taking: Reported on 4/28/2020) 1 Bottle      No facility-administered encounter medications on file as of 4/28/2020.      Review of Systems   Constitutional: Positive for malaise/fatigue and weight loss. Negative for fever.   HENT: Negative for nosebleeds.    Respiratory: Negative for cough and shortness of breath.    Cardiovascular: Negative for chest pain, palpitations, orthopnea, leg swelling and PND.   Gastrointestinal: Negative for nausea.        No abdominal distention   Genitourinary: Positive for frequency.   Skin: Positive for itching (With Lasix).   Neurological: Negative for dizziness.   Psychiatric/Behavioral: Positive for memory loss.        Objective:   /88 (BP Location: Left arm, Patient Position: Sitting, BP Cuff Size: Adult)   Pulse 60   Ht 1.753 m (5' 9\")   Wt 112 kg (247 lb)   LMP 04/14/2020   SpO2 91%   BMI 36.48 kg/m²     Physical Exam   Constitutional: She is oriented to person, place, and time. She appears well-developed and well-nourished. No distress.   HENT:   Head: Normocephalic and " atraumatic.   Eyes: Conjunctivae are normal. No scleral icterus.   Neck: Neck supple. JVD (JVP 9-10) present.   Cardiovascular: Normal rate, regular rhythm and intact distal pulses.   No murmur heard.  Pulmonary/Chest: Effort normal and breath sounds normal. No respiratory distress. She has no wheezes. She has no rales.   Weak inspiratory effort, diminished breath sounds at bases.   Tunneled cath on right, no redness or warmth around area, bandage is clean   Abdominal: Soft. Bowel sounds are normal. She exhibits no distension. There is no abdominal tenderness.   Musculoskeletal:         General: No edema.   Neurological: She is alert and oriented to person, place, and time. No cranial nerve deficit.   Skin: Skin is warm and dry. She is not diaphoretic. No pallor.   Psychiatric: Judgment normal.   Vitals reviewed.      TTE 4/16/20  Compared to the images of the prior study done 2/27/2017, LV is moderately dilated, EF is severely reduced, grade 3 diastolic dysfunction present.  Technically challenging study, improved with contrast.    Left ventricle is moderately dilated at 6 cm.  Severely reduced left ventricular systolic function.  Left ventricular ejection fraction is visually estimated to be 30%.  Global hypokinesis with further hypokinesis of the basal to mid inferior wall and inferior septum.  Grade III diastolic dysfunction (restrictive pattern).  Right ventricle not well visualized, however, appears mildly dilated in size and reduced in systolic function.  Biatrial enlargement.   Mild to moderate mitral regurgitation.  Mild tricuspid regurgitation.  Estimated right ventricular systolic pressure is 42 mmHg.  Right atrial pressure is estimated to be 15 mmHg.  Ascending aorta is borderline dilated with a diameter of 3.8 cm.     Nuclear stress spect stephie 4/16/20   NUCLEAR IMAGING INTERPRETATION   No evidence of ischemia.    Inferoapical photopenia is likely related to attenuation artifact.    Mild diffuse  hypokinesis with left ventricle ejection fraction of 43%.   Sinus bradycardia, nonspecific intraventricular conduction delay, rare PVCs.   Nondiagnostic ECG portion of a regadenoson nuclear stress test.   ECG INTERPRETATION   Nondiagnostic ECG portion of a regadenoson nuclear stress test.     Assessment:     1. ACC/AHA stage C systolic heart failure (HCC)     2. Heart failure, NYHA class 3 (HCC)     3. Ischemic cardiomyopathy     4. Diastolic dysfunction     5. Chronic right-sided heart failure (HCC)     6. Essential hypertension, benign     7. Mild mitral regurgitation     8. ESRD (end stage renal disease) on dialysis (HCC)     9. Seizure disorder, grand mal (HCC)     10. Moyamoya     11. Obesity (BMI 30-39.9)         Medical Decision Making:  Today's Assessment / Status / Plan:   Chronic HFrEF, Stage C, Class III, LVEF 30%  Diastolic dysfunction  Nonischemic  Cardiomyopathy (neg MPI, no LHC yet)  Mitral Regurg  - dilated cardiomyopathy documented as far back as 2014  -still volume overloaded, I will reach out to her nephrologist Dr. Pichardo's to confirm OK to start Torsemide on off dialysis days  - obtain 2 weeks of BP readings, then adjust HF meds  - for now continue coreg 12.5mg bid  - valsartan 40mg bid  - avoiding yancy for hyperkalemia, will address with nephrologist  - RTC in 2 weeks, assess volume status and order L/RHC  -Reviewed s/sx of worsening heart failure with patient and weight monitoring. Pt to call office or RTC if present.     Chronic right heart failure  - Sleep apnea testing performed several times, all tests have been negative  - no signs of congenital defect on TTE    HTN  - obtain BP cuff at home    ESRD on iHD M, W, F  - dialyzing through tunneled cath    Follow up in 2 weeks with BP measurements, weights for med titration.     Addendum: Spoke with Nicole PATRICK on call for Dr. Ortiz, OK to start Torsemide, avoid yancy for hyperkalemia, OK to proceed with angiogram.     Collaborating MD:   Dwain

## 2020-04-28 ENCOUNTER — OFFICE VISIT (OUTPATIENT)
Dept: CARDIOLOGY | Facility: MEDICAL CENTER | Age: 50
End: 2020-04-28
Payer: MEDICARE

## 2020-04-28 VITALS
HEIGHT: 69 IN | HEART RATE: 60 BPM | OXYGEN SATURATION: 91 % | BODY MASS INDEX: 36.58 KG/M2 | WEIGHT: 247 LBS | DIASTOLIC BLOOD PRESSURE: 88 MMHG | SYSTOLIC BLOOD PRESSURE: 138 MMHG

## 2020-04-28 DIAGNOSIS — I50.20 ACC/AHA STAGE C SYSTOLIC HEART FAILURE (HCC): ICD-10-CM

## 2020-04-28 DIAGNOSIS — I10 ESSENTIAL HYPERTENSION, BENIGN: ICD-10-CM

## 2020-04-28 DIAGNOSIS — I25.5 ISCHEMIC CARDIOMYOPATHY: ICD-10-CM

## 2020-04-28 DIAGNOSIS — N18.6 ESRD (END STAGE RENAL DISEASE) ON DIALYSIS (HCC): ICD-10-CM

## 2020-04-28 DIAGNOSIS — I67.5 MOYAMOYA DISEASE: ICD-10-CM

## 2020-04-28 DIAGNOSIS — G40.409 SEIZURE DISORDER, GRAND MAL (HCC): ICD-10-CM

## 2020-04-28 DIAGNOSIS — I51.89 DIASTOLIC DYSFUNCTION: ICD-10-CM

## 2020-04-28 DIAGNOSIS — I50.812 CHRONIC RIGHT-SIDED HEART FAILURE (HCC): ICD-10-CM

## 2020-04-28 DIAGNOSIS — I50.9 HEART FAILURE, NYHA CLASS 3 (HCC): ICD-10-CM

## 2020-04-28 DIAGNOSIS — Z99.2 ESRD (END STAGE RENAL DISEASE) ON DIALYSIS (HCC): ICD-10-CM

## 2020-04-28 DIAGNOSIS — I34.0 MILD MITRAL REGURGITATION: ICD-10-CM

## 2020-04-28 DIAGNOSIS — E66.9 OBESITY (BMI 30-39.9): ICD-10-CM

## 2020-04-28 PROCEDURE — 99214 OFFICE O/P EST MOD 30 MIN: CPT | Performed by: PHYSICIAN ASSISTANT

## 2020-04-28 RX ORDER — AMLODIPINE BESYLATE 10 MG/1
10 TABLET ORAL
Status: ON HOLD | COMMUNITY
Start: 2020-02-14 | End: 2020-05-14

## 2020-04-28 RX ORDER — CALCITRIOL 0.25 UG/1
CAPSULE, LIQUID FILLED ORAL
Status: ON HOLD | COMMUNITY
Start: 2020-04-14 | End: 2020-05-30

## 2020-04-28 RX ORDER — TORSEMIDE 10 MG/1
20 TABLET ORAL
Qty: 45 TAB | Refills: 2 | Status: ON HOLD | OUTPATIENT
Start: 2020-04-28 | End: 2020-05-14

## 2020-04-28 ASSESSMENT — ENCOUNTER SYMPTOMS
SHORTNESS OF BREATH: 0
COUGH: 0
FEVER: 0
PALPITATIONS: 0
MEMORY LOSS: 1
ORTHOPNEA: 0
ROS GI COMMENTS: NO ABDOMINAL DISTENTION
NAUSEA: 0
PND: 0
DIZZINESS: 0
WEIGHT LOSS: 1

## 2020-04-28 ASSESSMENT — FIBROSIS 4 INDEX: FIB4 SCORE: 0.96

## 2020-05-10 ENCOUNTER — HOSPITAL ENCOUNTER (OUTPATIENT)
Facility: MEDICAL CENTER | Age: 50
DRG: 871 | End: 2020-05-10
Admitting: HOSPITALIST
Payer: MEDICARE

## 2020-05-11 ENCOUNTER — HOSPITAL ENCOUNTER (INPATIENT)
Facility: MEDICAL CENTER | Age: 50
LOS: 3 days | DRG: 871 | End: 2020-05-14
Attending: HOSPITALIST | Admitting: HOSPITALIST
Payer: MEDICARE

## 2020-05-11 DIAGNOSIS — J44.9 CHRONIC OBSTRUCTIVE PULMONARY DISEASE, UNSPECIFIED COPD TYPE (HCC): ICD-10-CM

## 2020-05-11 DIAGNOSIS — J44.1 COPD EXACERBATION (HCC): ICD-10-CM

## 2020-05-11 PROBLEM — E87.70 VOLUME OVERLOAD: Status: ACTIVE | Noted: 2020-05-11

## 2020-05-11 PROBLEM — N18.6 ESRD (END STAGE RENAL DISEASE) (HCC): Status: ACTIVE | Noted: 2020-05-11

## 2020-05-11 PROBLEM — J18.9 CAP (COMMUNITY ACQUIRED PNEUMONIA): Status: ACTIVE | Noted: 2020-05-11

## 2020-05-11 LAB
COVID ORDER STATUS COVID19: NORMAL
CRP SERPL HS-MCNC: 11.16 MG/DL (ref 0–0.75)
FERRITIN SERPL-MCNC: 1643 NG/ML (ref 10–291)
LDH SERPL L TO P-CCNC: 440 U/L (ref 107–266)
PROCALCITONIN SERPL-MCNC: 8.27 NG/ML
SARS-COV-2 RNA RESP QL NAA+PROBE: NOTDETECTED
SPECIMEN SOURCE: NORMAL

## 2020-05-11 PROCEDURE — 84145 PROCALCITONIN (PCT): CPT

## 2020-05-11 PROCEDURE — 700111 HCHG RX REV CODE 636 W/ 250 OVERRIDE (IP): Performed by: HOSPITALIST

## 2020-05-11 PROCEDURE — G2023 SPECIMEN COLLECT COVID-19: HCPCS | Performed by: HOSPITALIST

## 2020-05-11 PROCEDURE — 36415 COLL VENOUS BLD VENIPUNCTURE: CPT

## 2020-05-11 PROCEDURE — 700105 HCHG RX REV CODE 258: Performed by: HOSPITALIST

## 2020-05-11 PROCEDURE — 83615 LACTATE (LD) (LDH) ENZYME: CPT

## 2020-05-11 PROCEDURE — 700111 HCHG RX REV CODE 636 W/ 250 OVERRIDE (IP)

## 2020-05-11 PROCEDURE — 99358 PROLONG SERVICE W/O CONTACT: CPT | Performed by: HOSPITALIST

## 2020-05-11 PROCEDURE — 700102 HCHG RX REV CODE 250 W/ 637 OVERRIDE(OP): Performed by: INTERNAL MEDICINE

## 2020-05-11 PROCEDURE — 90935 HEMODIALYSIS ONE EVALUATION: CPT

## 2020-05-11 PROCEDURE — A9270 NON-COVERED ITEM OR SERVICE: HCPCS | Performed by: INTERNAL MEDICINE

## 2020-05-11 PROCEDURE — 86140 C-REACTIVE PROTEIN: CPT

## 2020-05-11 PROCEDURE — 94760 N-INVAS EAR/PLS OXIMETRY 1: CPT

## 2020-05-11 PROCEDURE — 82728 ASSAY OF FERRITIN: CPT

## 2020-05-11 PROCEDURE — 700111 HCHG RX REV CODE 636 W/ 250 OVERRIDE (IP): Performed by: INTERNAL MEDICINE

## 2020-05-11 PROCEDURE — 700101 HCHG RX REV CODE 250: Performed by: HOSPITALIST

## 2020-05-11 PROCEDURE — U0004 COV-19 TEST NON-CDC HGH THRU: HCPCS

## 2020-05-11 PROCEDURE — 5A1D70Z PERFORMANCE OF URINARY FILTRATION, INTERMITTENT, LESS THAN 6 HOURS PER DAY: ICD-10-PCS | Performed by: INTERNAL MEDICINE

## 2020-05-11 PROCEDURE — 700102 HCHG RX REV CODE 250 W/ 637 OVERRIDE(OP): Performed by: HOSPITALIST

## 2020-05-11 PROCEDURE — 770020 HCHG ROOM/CARE - TELE (206)

## 2020-05-11 PROCEDURE — 99223 1ST HOSP IP/OBS HIGH 75: CPT | Mod: AI,CS | Performed by: HOSPITALIST

## 2020-05-11 PROCEDURE — A9270 NON-COVERED ITEM OR SERVICE: HCPCS | Performed by: HOSPITALIST

## 2020-05-11 RX ORDER — VITAMIN C
1 TAB ORAL DAILY
Status: CANCELLED | OUTPATIENT
Start: 2020-05-11

## 2020-05-11 RX ORDER — BISACODYL 10 MG
10 SUPPOSITORY, RECTAL RECTAL
Status: DISCONTINUED | OUTPATIENT
Start: 2020-05-11 | End: 2020-05-11

## 2020-05-11 RX ORDER — PROMETHAZINE HYDROCHLORIDE 25 MG/1
12.5-25 SUPPOSITORY RECTAL EVERY 4 HOURS PRN
Status: DISCONTINUED | OUTPATIENT
Start: 2020-05-11 | End: 2020-05-15 | Stop reason: HOSPADM

## 2020-05-11 RX ORDER — PROCHLORPERAZINE EDISYLATE 5 MG/ML
5-10 INJECTION INTRAMUSCULAR; INTRAVENOUS EVERY 4 HOURS PRN
Status: DISCONTINUED | OUTPATIENT
Start: 2020-05-11 | End: 2020-05-15 | Stop reason: HOSPADM

## 2020-05-11 RX ORDER — HEPARIN SODIUM 1000 [USP'U]/ML
INJECTION, SOLUTION INTRAVENOUS; SUBCUTANEOUS
Status: COMPLETED
Start: 2020-05-11 | End: 2020-05-11

## 2020-05-11 RX ORDER — LISINOPRIL 10 MG/1
10 TABLET ORAL 2 TIMES DAILY
Status: ON HOLD | COMMUNITY
End: 2020-05-14

## 2020-05-11 RX ORDER — ONDANSETRON 2 MG/ML
4 INJECTION INTRAMUSCULAR; INTRAVENOUS EVERY 4 HOURS PRN
Status: DISCONTINUED | OUTPATIENT
Start: 2020-05-11 | End: 2020-05-15 | Stop reason: HOSPADM

## 2020-05-11 RX ORDER — PHENYTOIN SODIUM 100 MG/1
100 CAPSULE, EXTENDED RELEASE ORAL 3 TIMES DAILY
Status: DISCONTINUED | OUTPATIENT
Start: 2020-05-11 | End: 2020-05-15 | Stop reason: HOSPADM

## 2020-05-11 RX ORDER — DOXYCYCLINE 100 MG/1
100 TABLET ORAL EVERY 12 HOURS
Status: COMPLETED | OUTPATIENT
Start: 2020-05-11 | End: 2020-05-11

## 2020-05-11 RX ORDER — ASPIRIN 81 MG/1
81 TABLET, CHEWABLE ORAL DAILY
Status: DISCONTINUED | OUTPATIENT
Start: 2020-05-11 | End: 2020-05-15 | Stop reason: HOSPADM

## 2020-05-11 RX ORDER — HEPARIN SODIUM 1000 [USP'U]/ML
3700 INJECTION, SOLUTION INTRAVENOUS; SUBCUTANEOUS
Status: DISCONTINUED | OUTPATIENT
Start: 2020-05-11 | End: 2020-05-11

## 2020-05-11 RX ORDER — HEPARIN SODIUM 5000 [USP'U]/ML
5000 INJECTION, SOLUTION INTRAVENOUS; SUBCUTANEOUS EVERY 12 HOURS
Status: DISCONTINUED | OUTPATIENT
Start: 2020-05-11 | End: 2020-05-13

## 2020-05-11 RX ORDER — AMOXICILLIN 250 MG
2 CAPSULE ORAL 2 TIMES DAILY
Status: DISCONTINUED | OUTPATIENT
Start: 2020-05-11 | End: 2020-05-11

## 2020-05-11 RX ORDER — ALBUTEROL SULFATE 90 UG/1
2 AEROSOL, METERED RESPIRATORY (INHALATION) EVERY 4 HOURS PRN
Status: DISCONTINUED | OUTPATIENT
Start: 2020-05-11 | End: 2020-05-15 | Stop reason: HOSPADM

## 2020-05-11 RX ORDER — CARVEDILOL 12.5 MG/1
12.5 TABLET ORAL 2 TIMES DAILY WITH MEALS
Status: DISCONTINUED | OUTPATIENT
Start: 2020-05-11 | End: 2020-05-13

## 2020-05-11 RX ORDER — AMLODIPINE BESYLATE 10 MG/1
10 TABLET ORAL
Status: DISCONTINUED | OUTPATIENT
Start: 2020-05-11 | End: 2020-05-12

## 2020-05-11 RX ORDER — PROMETHAZINE HYDROCHLORIDE 25 MG/1
12.5-25 TABLET ORAL EVERY 4 HOURS PRN
Status: DISCONTINUED | OUTPATIENT
Start: 2020-05-11 | End: 2020-05-15 | Stop reason: HOSPADM

## 2020-05-11 RX ORDER — POLYETHYLENE GLYCOL 3350 17 G/17G
1 POWDER, FOR SOLUTION ORAL
Status: DISCONTINUED | OUTPATIENT
Start: 2020-05-11 | End: 2020-05-11

## 2020-05-11 RX ORDER — METHYLPREDNISOLONE SODIUM SUCCINATE 40 MG/ML
40 INJECTION, POWDER, LYOPHILIZED, FOR SOLUTION INTRAMUSCULAR; INTRAVENOUS EVERY 6 HOURS
Status: DISCONTINUED | OUTPATIENT
Start: 2020-05-11 | End: 2020-05-12

## 2020-05-11 RX ORDER — HEPARIN SODIUM 5000 [USP'U]/ML
5000 INJECTION, SOLUTION INTRAVENOUS; SUBCUTANEOUS EVERY 8 HOURS
Status: DISCONTINUED | OUTPATIENT
Start: 2020-05-11 | End: 2020-05-11

## 2020-05-11 RX ORDER — HEPARIN SODIUM 1000 [USP'U]/ML
3700 INJECTION, SOLUTION INTRAVENOUS; SUBCUTANEOUS
Status: DISCONTINUED | OUTPATIENT
Start: 2020-05-11 | End: 2020-05-15 | Stop reason: HOSPADM

## 2020-05-11 RX ORDER — LORAZEPAM 1 MG/1
1 TABLET ORAL 2 TIMES DAILY
Status: DISCONTINUED | OUTPATIENT
Start: 2020-05-11 | End: 2020-05-15 | Stop reason: HOSPADM

## 2020-05-11 RX ORDER — VALSARTAN 80 MG/1
40 TABLET ORAL 2 TIMES DAILY
Status: DISCONTINUED | OUTPATIENT
Start: 2020-05-11 | End: 2020-05-11

## 2020-05-11 RX ORDER — HEPARIN SODIUM 1000 [USP'U]/ML
2000 INJECTION, SOLUTION INTRAVENOUS; SUBCUTANEOUS
Status: DISCONTINUED | OUTPATIENT
Start: 2020-05-11 | End: 2020-05-15 | Stop reason: HOSPADM

## 2020-05-11 RX ORDER — FUROSEMIDE 10 MG/ML
80 INJECTION INTRAMUSCULAR; INTRAVENOUS ONCE
Status: COMPLETED | OUTPATIENT
Start: 2020-05-11 | End: 2020-05-11

## 2020-05-11 RX ORDER — LEVETIRACETAM 500 MG/1
500 TABLET ORAL EVERY 12 HOURS
Status: DISCONTINUED | OUTPATIENT
Start: 2020-05-11 | End: 2020-05-14

## 2020-05-11 RX ORDER — ESCITALOPRAM OXALATE 10 MG/1
10 TABLET ORAL DAILY
Status: DISCONTINUED | OUTPATIENT
Start: 2020-05-11 | End: 2020-05-15 | Stop reason: HOSPADM

## 2020-05-11 RX ORDER — IPRATROPIUM BROMIDE AND ALBUTEROL SULFATE 2.5; .5 MG/3ML; MG/3ML
3 SOLUTION RESPIRATORY (INHALATION)
Status: DISCONTINUED | OUTPATIENT
Start: 2020-05-11 | End: 2020-05-11

## 2020-05-11 RX ORDER — ONDANSETRON 4 MG/1
4 TABLET, ORALLY DISINTEGRATING ORAL EVERY 4 HOURS PRN
Status: DISCONTINUED | OUTPATIENT
Start: 2020-05-11 | End: 2020-05-15 | Stop reason: HOSPADM

## 2020-05-11 RX ORDER — LISINOPRIL 10 MG/1
10 TABLET ORAL 2 TIMES DAILY
Status: DISCONTINUED | OUTPATIENT
Start: 2020-05-11 | End: 2020-05-11

## 2020-05-11 RX ORDER — FOLIC ACID 1 MG/1
1 TABLET ORAL DAILY
Status: CANCELLED | OUTPATIENT
Start: 2020-05-11

## 2020-05-11 RX ORDER — VALSARTAN 80 MG/1
40 TABLET ORAL
Status: DISCONTINUED | OUTPATIENT
Start: 2020-05-12 | End: 2020-05-13

## 2020-05-11 RX ORDER — OMEPRAZOLE 20 MG/1
20 CAPSULE, DELAYED RELEASE ORAL DAILY
Status: DISCONTINUED | OUTPATIENT
Start: 2020-05-11 | End: 2020-05-15 | Stop reason: HOSPADM

## 2020-05-11 RX ORDER — ATORVASTATIN CALCIUM 40 MG/1
40 TABLET, FILM COATED ORAL DAILY
Status: DISCONTINUED | OUTPATIENT
Start: 2020-05-11 | End: 2020-05-15 | Stop reason: HOSPADM

## 2020-05-11 RX ADMIN — LORAZEPAM 1 MG: 1 TABLET ORAL at 21:13

## 2020-05-11 RX ADMIN — METHYLPREDNISOLONE SODIUM SUCCINATE 40 MG: 40 INJECTION, POWDER, FOR SOLUTION INTRAMUSCULAR; INTRAVENOUS at 01:16

## 2020-05-11 RX ADMIN — HEPARIN SODIUM 3700 UNITS: 1000 INJECTION, SOLUTION INTRAVENOUS; SUBCUTANEOUS at 20:04

## 2020-05-11 RX ADMIN — LEVETIRACETAM 500 MG: 500 TABLET ORAL at 21:13

## 2020-05-11 RX ADMIN — PHENYTOIN SODIUM 100 MG: 100 CAPSULE ORAL at 21:15

## 2020-05-11 RX ADMIN — CEFTRIAXONE SODIUM 2 G: 2 INJECTION, POWDER, FOR SOLUTION INTRAMUSCULAR; INTRAVENOUS at 05:38

## 2020-05-11 RX ADMIN — HEPARIN SODIUM 2000 UNITS: 1000 INJECTION, SOLUTION INTRAVENOUS; SUBCUTANEOUS at 16:51

## 2020-05-11 RX ADMIN — ESCITALOPRAM OXALATE 10 MG: 10 TABLET ORAL at 05:36

## 2020-05-11 RX ADMIN — CARVEDILOL 12.5 MG: 12.5 TABLET, FILM COATED ORAL at 08:11

## 2020-05-11 RX ADMIN — ASPIRIN 81 MG 81 MG: 81 TABLET ORAL at 05:37

## 2020-05-11 RX ADMIN — METHYLPREDNISOLONE SODIUM SUCCINATE 40 MG: 40 INJECTION, POWDER, FOR SOLUTION INTRAMUSCULAR; INTRAVENOUS at 13:09

## 2020-05-11 RX ADMIN — OMEPRAZOLE 20 MG: 20 CAPSULE, DELAYED RELEASE ORAL at 05:36

## 2020-05-11 RX ADMIN — PHENYTOIN SODIUM 100 MG: 100 CAPSULE ORAL at 13:11

## 2020-05-11 RX ADMIN — FUROSEMIDE 80 MG: 10 INJECTION, SOLUTION INTRAMUSCULAR; INTRAVENOUS at 01:17

## 2020-05-11 RX ADMIN — METHYLPREDNISOLONE SODIUM SUCCINATE 40 MG: 40 INJECTION, POWDER, FOR SOLUTION INTRAMUSCULAR; INTRAVENOUS at 05:37

## 2020-05-11 RX ADMIN — LEVETIRACETAM 500 MG: 500 TABLET ORAL at 05:37

## 2020-05-11 RX ADMIN — LORAZEPAM 1 MG: 1 TABLET ORAL at 06:00

## 2020-05-11 RX ADMIN — AMLODIPINE BESYLATE 10 MG: 10 TABLET ORAL at 05:36

## 2020-05-11 RX ADMIN — DOXYCYCLINE 100 MG: 100 INJECTION, POWDER, LYOPHILIZED, FOR SOLUTION INTRAVENOUS at 06:49

## 2020-05-11 RX ADMIN — DOXYCYCLINE 100 MG: 100 TABLET ORAL at 21:13

## 2020-05-11 RX ADMIN — METHYLPREDNISOLONE SODIUM SUCCINATE 40 MG: 40 INJECTION, POWDER, FOR SOLUTION INTRAMUSCULAR; INTRAVENOUS at 21:14

## 2020-05-11 RX ADMIN — VALSARTAN 40 MG: 80 TABLET, FILM COATED ORAL at 05:36

## 2020-05-11 RX ADMIN — CARVEDILOL 12.5 MG: 12.5 TABLET, FILM COATED ORAL at 21:13

## 2020-05-11 RX ADMIN — PHENYTOIN SODIUM 100 MG: 100 CAPSULE ORAL at 05:37

## 2020-05-11 ASSESSMENT — COPD QUESTIONNAIRES
DURING THE PAST 4 WEEKS HOW MUCH DID YOU FEEL SHORT OF BREATH: SOME OF THE TIME
HAVE YOU SMOKED AT LEAST 100 CIGARETTES IN YOUR ENTIRE LIFE: YES
COPD SCREENING SCORE: 4
DO YOU EVER COUGH UP ANY MUCUS OR PHLEGM?: NO/ONLY WITH OCCASIONAL COLDS OR INFECTIONS

## 2020-05-11 ASSESSMENT — LIFESTYLE VARIABLES
HOW MANY TIMES IN THE PAST YEAR HAVE YOU HAD 5 OR MORE DRINKS IN A DAY: 0
ALCOHOL_USE: NO
HAVE YOU EVER FELT YOU SHOULD CUT DOWN ON YOUR DRINKING: NO
EVER FELT BAD OR GUILTY ABOUT YOUR DRINKING: NO
EVER_SMOKED: YES
SUBSTANCE_ABUSE: 0
TOTAL SCORE: 0
HAVE PEOPLE ANNOYED YOU BY CRITICIZING YOUR DRINKING: NO
AVERAGE NUMBER OF DAYS PER WEEK YOU HAVE A DRINK CONTAINING ALCOHOL: 0
TOTAL SCORE: 0
CONSUMPTION TOTAL: NEGATIVE
EVER HAD A DRINK FIRST THING IN THE MORNING TO STEADY YOUR NERVES TO GET RID OF A HANGOVER: NO
ON A TYPICAL DAY WHEN YOU DRINK ALCOHOL HOW MANY DRINKS DO YOU HAVE: 0
TOTAL SCORE: 0
EVER_SMOKED: YES

## 2020-05-11 ASSESSMENT — ENCOUNTER SYMPTOMS
HEMOPTYSIS: 0
HEARTBURN: 0
EYE DISCHARGE: 0
ORTHOPNEA: 1
ABDOMINAL PAIN: 0
HALLUCINATIONS: 0
FLANK PAIN: 0
WEAKNESS: 0
CHILLS: 0
BLURRED VISION: 0
DEPRESSION: 0
DIZZINESS: 0
FOCAL WEAKNESS: 0
VOMITING: 0
DOUBLE VISION: 0
BRUISES/BLEEDS EASILY: 0
SPEECH CHANGE: 0
SENSORY CHANGE: 0
FEVER: 1
MYALGIAS: 0
PALPITATIONS: 0
NAUSEA: 0
SHORTNESS OF BREATH: 1
COUGH: 1

## 2020-05-11 ASSESSMENT — COGNITIVE AND FUNCTIONAL STATUS - GENERAL
DAILY ACTIVITIY SCORE: 24
CLIMB 3 TO 5 STEPS WITH RAILING: A LITTLE
SUGGESTED CMS G CODE MODIFIER DAILY ACTIVITY: CH
STANDING UP FROM CHAIR USING ARMS: A LITTLE
SUGGESTED CMS G CODE MODIFIER MOBILITY: CJ
WALKING IN HOSPITAL ROOM: A LITTLE
MOBILITY SCORE: 21

## 2020-05-11 ASSESSMENT — FIBROSIS 4 INDEX
FIB4 SCORE: 0.96
FIB4 SCORE: 0.96

## 2020-05-11 ASSESSMENT — PATIENT HEALTH QUESTIONNAIRE - PHQ9
2. FEELING DOWN, DEPRESSED, IRRITABLE, OR HOPELESS: NOT AT ALL
1. LITTLE INTEREST OR PLEASURE IN DOING THINGS: NOT AT ALL
SUM OF ALL RESPONSES TO PHQ9 QUESTIONS 1 AND 2: 0

## 2020-05-11 NOTE — PROGRESS NOTES
Report received from NOC RN, pt care assumed, tele box on. Pt sleeping. Bed in lowest position, pt call light within reach.

## 2020-05-11 NOTE — ASSESSMENT & PLAN NOTE
Blood pressure borderline.  Hold blood pressure medications for now to allow for dialysis tomorrow.

## 2020-05-11 NOTE — PROGRESS NOTES
Received transfer request from [facility]  Sending Physician: Jad FORMAN  Diagnosis: COVID R/O, Acute CHF  Type of transfer requested: ED to Direct Admit  Specialist consulted: Dr. Piedra-Cardiology  Hospitalist consulted: Dr. EDER Guerin  Patient Accepted  Patient coming via: [facility]  ETA: [id number]  Room Assignment: T728-2  Please notify Admitting Hospitalist upon patient arrival to unit.

## 2020-05-11 NOTE — ASSESSMENT & PLAN NOTE
Presumed CAP given fever 103 at OSH, tachycardia and tachypnea  R/o covid 19   Cont with IV rocephin and azithro   Wean abx as clinically appropraite   5/13: COVID 19 PCR negative x2.  Complete a course of antibiotics.

## 2020-05-11 NOTE — PROGRESS NOTES
Pt seen, examined.  D/W Nephrology, to see  K normal, mildly volume overloaded, in NAD  May benefit from additional dialysis  H&P as per Dr. Osullivan dated this am.

## 2020-05-11 NOTE — DISCHARGE PLANNING
Outpatient Dialysis Note    Confirmed patient is active at:    [facility]  [address] [place] [address]    Schedule: Monday, Wednesday, Friday  Time: 11:00am     Spoke with Chantelle at facility who confirmed.    Forwarded records for review.    Pham Arredondo- Dialysis Coordinator  [facility] [employer] # [phone]

## 2020-05-11 NOTE — CONSULTS
[facility] Nephrology Consult Note    Patient seen and examined, please see my dictated consult note for full details.   50yoF with PMH significant for ESRD on HD, HTN, COPD, seizure disorder, Moyamoya Disease, Systolic CHF with EF 30% in 4/20, Anemia secondary to CKD, CKD Bone Mineral Disorder/Renal Osteodystrophy, Secondary Hyperparathyroidism, admitted with sepsis secondary to possible community acquired pneumonia and being ruled out for COVID-19.   Assessment:  1. ESRD--on HD  2. Sepsis--possible CAP, also being evaluated for COVID-19  --On abx for CAP  --Being ruled out for COVID-19  3. Acute Hypoxic Respiratory Failure--possible CAP, outside hospital CXR showed vascular congestion  --Requiring supplemental O2  4. HTN--BP low earlier  --Pt received multiple BP meds  5. Anemia--Hgb 10.8 at OSH  6. CKD Bone Mineral Disorder/Renal Osteodystrophy  7. Secondary Hyperparathyroidism  --On calcitriol  8. Seizure Disorder  9. History of Systolic CHF--EF 30% in 4/20    Plan:  --HD today with fluid removal as tolerated  --Dose adjust all meds for decreased GFR  --Albumin with HD for BP support  --Place holding parameters on BP meds and avoid aggressive lowering of BP  --PRN NS boluses for symptomatic hypotension  --Continue broad spectrum abx  --Follow up culture results  --Follow up COVID-19 test  --Continue supplemental O2  --No need for WILL at this time  --Continue calcitriol  --Check phos level    Report Confirmation #[id number]

## 2020-05-11 NOTE — PROGRESS NOTES
Patient report received. Patient is a direct admit from [facility]. Chief complaint: SOB, hypoxia, edema and fever of 103F. Patient was given IV steroids, IV fluids, and Tylenol. Patient arrived to unit via gurney. Ambulated to bed with stand-by assist. Assumed pt care. Pt is on droplet, contact and eye protection isolation for Covid r/o. Pt is A/Ox4. Pt is tachypnic, temperature of 100.1F. No complaints or pain at this time. Pt oriented to unit and call light system. POC reviewed and white board updated. Tele box on. Monitor room notified. Safety precautions in place. Call light in reach. Bed locked in lowest position with upper bed rails up. Will continue to monitor. Paged Dr. Osullivan for bedside assessment. New orders in place.

## 2020-05-11 NOTE — ASSESSMENT & PLAN NOTE
Suspect likely multifactorial with possible volume overload and COPD exacerbation.  Will give dose of IV lasix 80 x 1  IV steroids, duo nebs   Empiric IV abx given concern for underlying sepsis/sirs  COVID 19 PCR test negative.  Repeat in 48 hours.  Wean 02 as tolerated  Arranging for home oxygen.

## 2020-05-11 NOTE — CARE PLAN
Problem: Safety  Goal: Will remain free from injury  Outcome: PROGRESSING AS EXPECTED   Educate pt to call for assistance as needed. Safety precautions in place: bed is locked and in lowest position, call light within reach and non-skid socks applied.       Problem: Knowledge Deficit  Goal: Knowledge of disease process/condition, treatment plan, diagnostic tests, and medications will improve  Outcome: PROGRESSING AS EXPECTED   Plan of care discussed w/ pt. Encourage pt to voice feelings/concerns regarding treatment plan, procedures/results and medications. Answer accordingly.

## 2020-05-11 NOTE — PROGRESS NOTES
MS:    SR 69-89  6 bts of VT, ST depression (pt asymptomatic, coughing)  (F) PVC, bigem  (O) PAC, trig, bigem  .18/.10/.40    12 HR CC

## 2020-05-11 NOTE — ASSESSMENT & PLAN NOTE
Appears volume overloaded on exam with vascular congestion on CXR   IV lasix x 1  Nephrology following.

## 2020-05-11 NOTE — RESPIRATORY CARE
COPD EDUCATION by COPD CLINICAL EDUCATOR  5/11/2020 at 6:14 AM by Rosalee Ortiz, RRT     Patient interview by COPD education team. Patient does not have any prior history or diagnosis of COPD and is a non-smoker. There is no PFT or CT (EMR review completed). There is a remote comment to mild Asthma. Therefore does not qualify for the COPD program at this time. She is here for Acute CHF

## 2020-05-11 NOTE — ASSESSMENT & PLAN NOTE
This is Sepsis Present on admission  SIRS criteria identified on my evaluation include: Fever, with temperature greater than 101 deg F, Tachycardia, with heart rate greater than 90 BPM and Tachypnea, with respirations greater than 20 per minute  Source is pulmonary  Sepsis protocol initiated  Fluid resuscitation ordered per protocol  IV antibiotics as appropriate for source of sepsis  While organ dysfunction may be noted elsewhere in this problem list or in the chart, degree of organ dysfunction does not meet CMS criteria for severe sepsis  Resolved.

## 2020-05-11 NOTE — H&P
Hospital Medicine History & Physical Note    Date of Service  5/11/2020    Primary Care Physician  Aureliano Elam M.D.    Code Status  Full    Chief Complaint  Cough, shortness of breath     History of Presenting Illness  50 y.o. female who presented 5/11/2020 with past medical history of end-stage renal disease on hemodialysis, CHF, COPD, hypertension, hyperlipidemia seizure disorder who presents with worsening shortness of breath.  This patient's had worsening shortness of breath over the last several days.  Acutely hypoxic at the outside hospital and significant lower extremity edema.  She has been compliant with her home dialysis.  She is also noticed significant amount of cough.  And significant sputum production.  Shortness of breath is worse with lying flat.  She is also noted to be febrile at outside hospital and tachycardic as well as tachypneic concerning for infectious etiology.  Otherwise no known alleviating or exacerbating factors to her symptoms.  She will be admitted to the hospital for further management of her symptoms.    Vital signs from outside hospital systolic blood pressure 112 diastolic 70 respiratory rate 46 3 L of nasal cannula satting 96% temperature 103.1 °F WBC 9.9 hemoglobin 10.8 platelet count 145 glucose 128 BUN 31 creatinine 4.8 sodium 137 potassium 4.7 chloride 103 CO2 24 anion gap 10 calcium 8.7 albumin 2.9 chest x-ray right ureteral catheter in place.  Enlarged cardiopericardial silhouette with vascular congestion. BNP at 3400, Troponin .17     Review of Systems  Review of Systems   Constitutional: Positive for fever and malaise/fatigue. Negative for chills.   HENT: Negative for congestion, hearing loss and tinnitus.    Eyes: Negative for blurred vision, double vision and discharge.   Respiratory: Positive for cough and shortness of breath. Negative for hemoptysis.    Cardiovascular: Positive for orthopnea and leg swelling. Negative for chest pain and palpitations.    Gastrointestinal: Negative for abdominal pain, heartburn, nausea and vomiting.   Genitourinary: Negative for dysuria and flank pain.   Musculoskeletal: Negative for joint pain and myalgias.   Skin: Negative for rash.   Neurological: Negative for dizziness, sensory change, speech change, focal weakness and weakness.   Endo/Heme/Allergies: Negative for environmental allergies. Does not bruise/bleed easily.   Psychiatric/Behavioral: Negative for depression, hallucinations and substance abuse.       Past Medical History   has a past medical history of CKD (chronic kidney disease), stage IV ([facility]) (12/24/2015), H/O: CVA (cerebrovascular accident) (12/24/2015), Hypertension, Kidney disease, Mild mitral regurgitation (7/14/2014), Moyamoya disease (10/3/2013), Seizure disorder, grand mal ([facility]) (3/3/2016), and Stroke ([facility]).    Surgical History   has a past surgical history that includes primary c section; tubal coagulation laparoscopic bilateral; irrigation & debridement ortho (Right, 2/27/2017); irrigation & debridement ortho (Right, 3/5/2017); metatarsal head resection (3/5/2017); and irrigation & debridement ortho (Right, 3/9/2017).     Family History  family history includes Arthritis in her father; Cancer in her paternal grandmother; Diabetes in her maternal grandmother and mother; Hypertension in her father and mother; Psychiatric Illness in her maternal grandfather and paternal grandfather.     Social History   reports that she has quit smoking. She has never used smokeless tobacco. She reports that she does not drink alcohol or use drugs.    Allergies  Allergies   Allergen Reactions   • Allegra [Fexofenadine] Unspecified     Rxn = unknown   • Amoxicillin    • Azithromycin Unspecified     Rxn = unknown   • Claritin    • Erythromycin Unspecified     Rxn = unknown   • Ibuprofen & Caffeine-Vitamins Unspecified     Rxn = unknown   • Penicillins Unspecified     Rxn = unknown   • Procardia [Nifedipine]    • Rosemary Oil  Anaphylaxis     Throat starts to close/swell.  Oil and the herb.    • Zyrtec [Cetirizine] Unspecified     Rxn = unknown       Medications  Prior to Admission Medications   Prescriptions Last Dose Informant Patient Reported? Taking?   Ascorbic Acid (VITAMIN C) 1000 MG Tab 5/11/2020 at Unknown time  Yes No   Sig: Take  by mouth every day.   Esomeprazole Magnesium (NEXIUM 24HR PO)   Yes No   Sig: Take  by mouth as needed. noon   LORazepam (ATIVAN) 1 MG Tab   No No   Sig: Take 1 Tab by mouth 2 Times a Day for 180 days.   Lysine 500 MG Cap   Yes No   Sig: Take  by mouth every day.   Melatonin 5 MG Tab   Yes No   Sig: Take  by mouth every bedtime. Indications: Trouble Sleeping   acetaminophen (TYLENOL) 325 MG Tab 5/11/2020 at Unknown time  No No   Sig: Take 2 Tabs by mouth every 6 hours as needed (Mild Pain; (Pain scale 1-3); Temp greater than 100.5 F).   albuterol 108 (90 BASE) MCG/ACT Aero Soln inhalation aerosol 5/11/2020 at Unknown time  No No   Sig: Inhale 2 Puffs by mouth every four hours as needed for Shortness of Breath.   amLODIPine (NORVASC) 10 MG Tab 5/11/2020 at Unknown time  Yes No   Sig: Take 10 mg by mouth.   aspirin (ASA) 81 MG Chew Tab chewable tablet 5/11/2020 at Unknown time  No No   Sig: Take 1 Tab by mouth every day.   atorvastatin (LIPITOR) 40 MG Tab 5/11/2020 at Unknown time  No No   Sig: Take 1 Tab by mouth every day.   calcitRIOL (ROCALTROL) 0.25 MCG Cap 5/10/2020 at Unknown time  Yes No   Sig: TAKE 1 CAPSULE BY MOUTH ON MONDAY WEDNESDAY AND FRIDAY   calcium citrate (CALCITRATE) 950 MG Tab 5/10/2020 at Unknown time  Yes No   Sig: Take 950 mg by mouth every day.   carvedilol (COREG) 12.5 MG Tab 5/11/2020 at Unknown time  No No   Sig: Take 1 Tab by mouth 2 times a day, with meals.   coenzyme Q-10 30 MG capsule 5/11/2020 at Unknown time  Yes No   Sig: Take 60 mg by mouth every day.   escitalopram (LEXAPRO) 10 MG Tab 5/11/2020 at Unknown time  No No   Sig: Take 1 Tab by mouth every day.   folic acid  (FOLVITE) 1 MG Tab 2020 at Unknown time  Yes No   Sig: Take 1 mg by mouth two times a day may change times on alt/days.   levETIRAcetam (KEPPRA) 500 MG Tab 2020 at Unknown time  No No   Simg in AM and 1000mg in PM   lisinopril (PRINIVIL) 10 MG Tab 2020 at Unknown time  Yes Yes   Sig: Take 10 mg by mouth 2 times a day.   phenytoin ER (DILANTIN) 100 MG Cap 2020 at Unknown time  No No   Sig: Take 1 Cap by mouth 3 times a day.   sodium bicarbonate (SODIUM BICARBONATE) 650 MG Tab 2020 at Unknown time  No No   Sig: Take 1 Tab by mouth 3 times a day.   torsemide (DEMADEX) 10 MG tablet 5/10/2020 at Unknown time  No No   Sig: Take 2 Tabs by mouth every 48 hours. Take on nondialysis days   valsartan (DIOVAN) 40 MG Tab 2020 at Unknown time  No No   Sig: Take 1 Tab by mouth 2 Times a Day.      Facility-Administered Medications: None       Physical Exam  Temp:  [37.8 °C (100.1 °F)] 37.8 °C (100.1 °F)  Pulse:  [91] 91  Resp:  [25] 25  BP: (146)/(82) 146/82  SpO2:  [97 %] 97 %    Physical Exam  Vitals signs reviewed.   Constitutional:       Appearance: Normal appearance. She is ill-appearing.   HENT:      Head: Normocephalic and atraumatic.      Nose: No congestion.      Mouth/Throat:      Mouth: Mucous membranes are dry.   Eyes:      Extraocular Movements: Extraocular movements intact.      Pupils: Pupils are equal, round, and reactive to light.   Neck:      Musculoskeletal: Normal range of motion and neck supple. No muscular tenderness.   Cardiovascular:      Rate and Rhythm: Normal rate and regular rhythm.      Pulses: Normal pulses.      Heart sounds: Normal heart sounds. No murmur.   Pulmonary:      Effort: Pulmonary effort is normal. No respiratory distress.      Breath sounds: No stridor. Wheezing and rhonchi present.   Abdominal:      General: Bowel sounds are normal. There is no distension.      Palpations: Abdomen is soft.      Tenderness: There is no abdominal tenderness.    Musculoskeletal:         General: Swelling present. No deformity.      Right lower leg: Edema present.      Left lower leg: Edema present.   Skin:     General: Skin is warm and dry.      Capillary Refill: Capillary refill takes 2 to 3 seconds.   Neurological:      General: No focal deficit present.      Mental Status: She is alert and oriented to person, place, and time.   Psychiatric:         Mood and Affect: Mood normal.         Behavior: Behavior normal.         Thought Content: Thought content normal.         Judgment: Judgment normal.         Laboratory:          No results for input(s): ALTSGPT, ASTSGOT, ALKPHOSPHAT, TBILIRUBIN, DBILIRUBIN, GAMMAGT, AMYLASE, LIPASE, ALB, PREALBUMIN, GLUCOSE in the last 72 hours.      No results for input(s): NTPROBNP in the last 72 hours.      No results for input(s): TROPONINT in the last 72 hours.    Imaging:  No orders to display         Assessment/Plan:  Anticipate greater than 2 midnight hospitalization     * Acute respiratory failure with hypoxia (HCC)- (present on admission)  Assessment & Plan  Suspect likely multifactorial, she is in some mild resp distress with wheezing and seems to be volume overloaded on exam   Will give dose of IV lasix 80 x 1  IV steroids, duo nebs   Empiric IV abx given concern for underlying sepsis/sirs  COVID 19 r/o   Wean 02 as tolerated  Consult Nephrology in am     Volume overload  Assessment & Plan  Appears volume overloaded on exam with vascular congestion on CXR   Cont with IV lasix x 1  Nephro ocnsultation in the am for dailysis     CAP (community acquired pneumonia)  Assessment & Plan  Presumed CAP given fever 103 at OSH, tachycardia and tachypnea  R/o covid 19   Cont with IV rocephin and azithro   Wean abx as clinically appropraite     COPD exacerbation (HCC)  Assessment & Plan  Duo nebs, IV steroids  Wean 02 as tolerated  Keep sats 88-92%    ESRD (end stage renal disease) ([facility])  Assessment & Plan  Consult nephrology in am     Sepsis  (HCC)- (present on admission)  Assessment & Plan  This is Sepsis Present on admission  SIRS criteria identified on my evaluation include: Fever, with temperature greater than 101 deg F, Tachycardia, with heart rate greater than 90 BPM and Tachypnea, with respirations greater than 20 per minute  Source is pulmonary  Sepsis protocol initiated  Fluid resuscitation ordered per protocol  IV antibiotics as appropriate for source of sepsis  While organ dysfunction may be noted elsewhere in this problem list or in the chart, degree of organ dysfunction does not meet CMS criteria for severe sepsis          Seizure disorder, grand mal (HCC)- (present on admission)  Assessment & Plan  Resume home keppra and dilantin     H/O: CVA (cerebrovascular accident)- (present on admission)  Assessment & Plan  Hx of     HTN (hypertension)- (present on admission)  Assessment & Plan  Resume home anti hypertensive medications     HLD (hyperlipidemia)- (present on admission)  Assessment & Plan  Resume home medications    Essential hypertension, benign- (present on admission)  Assessment & Plan  Resume home anti hypertensive medications     Ppx: lovenox     I spent a total of 31 minutes of non face to face time performing additional research, reviewing old medical records, provided on going management by following up on labs, placing orders, discussing plan of care with other healthcare providers and nursing staff. Start time: 12:30 am. End time: 1:01 am

## 2020-05-11 NOTE — PROGRESS NOTES
TRIAGE OFFICER DIRECT ADMISSION ACCEPTANCE NOTE  51 yo F dialysis patient with history of heart failure presenting to [facility] with shortness of breath, hypoxia and edema. Was covid swabbed at [facility] two weeks ago and was negative but presenting today with fever 103 and sob. PMH of htn, renal failure, stroke, heart failure. Cardiology will consult if we call them. BNP is 3400 consistent with renal failure, trp 0.14, spo2 95% on 2L NC. They can't do dialysis at [facility].    PLEASE PAGE THE HOSPITALIST ON CALL FOR ADMISSIONS TO GET FULL ORDERS AND H&P.

## 2020-05-11 NOTE — PROGRESS NOTES
Pt had COVID Swab performed at [facility] prior to patient transfer to [place] [id number].

## 2020-05-11 NOTE — PROGRESS NOTES
2 RN skin check complete w/ Kimber RN.   Devices in place: nasal cannula.  Skin assessed under devices - bilateral ears: intact and blanching.  Confirmed pressure ulcers found on n/a.  New potential pressure ulcers noted on n/a. Wound consult placed n/a.    The following interventions in place: patient turns self, pillows in use for support/comfort.  Generalized bruising  Scattered scabs noted to BLE and BUE  Generalized skin: clean, dry, intact and blanching.

## 2020-05-11 NOTE — PROGRESS NOTES
COVID swab sent for testing. Pt attempted to refuse a repeat swab but after education agreed to the test.

## 2020-05-12 ENCOUNTER — APPOINTMENT (OUTPATIENT)
Dept: RADIOLOGY | Facility: MEDICAL CENTER | Age: 50
DRG: 871 | End: 2020-05-12
Attending: FAMILY MEDICINE
Payer: MEDICARE

## 2020-05-12 PROBLEM — I50.42 CHRONIC COMBINED SYSTOLIC AND DIASTOLIC CONGESTIVE HEART FAILURE (HCC): Status: ACTIVE | Noted: 2020-05-12

## 2020-05-12 LAB
ALBUMIN SERPL BCP-MCNC: 2.9 G/DL (ref 3.2–4.9)
ALBUMIN/GLOB SERPL: 0.9 G/DL
ALP SERPL-CCNC: 112 U/L (ref 30–99)
ALT SERPL-CCNC: 422 U/L (ref 2–50)
ANION GAP SERPL CALC-SCNC: 17 MMOL/L (ref 7–16)
AST SERPL-CCNC: 570 U/L (ref 12–45)
BASOPHILS # BLD AUTO: 0.2 % (ref 0–1.8)
BASOPHILS # BLD: 0.03 K/UL (ref 0–0.12)
BILIRUB SERPL-MCNC: 0.6 MG/DL (ref 0.1–1.5)
BUN SERPL-MCNC: 36 MG/DL (ref 8–22)
CALCIUM SERPL-MCNC: 8.6 MG/DL (ref 8.5–10.5)
CHLORIDE SERPL-SCNC: 93 MMOL/L (ref 96–112)
CO2 SERPL-SCNC: 21 MMOL/L (ref 20–33)
CREAT SERPL-MCNC: 4 MG/DL (ref 0.5–1.4)
EOSINOPHIL # BLD AUTO: 0 K/UL (ref 0–0.51)
EOSINOPHIL NFR BLD: 0 % (ref 0–6.9)
ERYTHROCYTE [DISTWIDTH] IN BLOOD BY AUTOMATED COUNT: 52.5 FL (ref 35.9–50)
GLOBULIN SER CALC-MCNC: 3.3 G/DL (ref 1.9–3.5)
GLUCOSE SERPL-MCNC: 124 MG/DL (ref 65–99)
HCT VFR BLD AUTO: 29.9 % (ref 37–47)
HGB BLD-MCNC: 9.4 G/DL (ref 12–16)
IMM GRANULOCYTES # BLD AUTO: 0.14 K/UL (ref 0–0.11)
IMM GRANULOCYTES NFR BLD AUTO: 1.1 % (ref 0–0.9)
LYMPHOCYTES # BLD AUTO: 0.51 K/UL (ref 1–4.8)
LYMPHOCYTES NFR BLD: 3.9 % (ref 22–41)
MCH RBC QN AUTO: 32.1 PG (ref 27–33)
MCHC RBC AUTO-ENTMCNC: 31.4 G/DL (ref 33.6–35)
MCV RBC AUTO: 102 FL (ref 81.4–97.8)
MONOCYTES # BLD AUTO: 0.46 K/UL (ref 0–0.85)
MONOCYTES NFR BLD AUTO: 3.5 % (ref 0–13.4)
NEUTROPHILS # BLD AUTO: 12.1 K/UL (ref 2–7.15)
NEUTROPHILS NFR BLD: 91.3 % (ref 44–72)
NRBC # BLD AUTO: 0 K/UL
NRBC BLD-RTO: 0 /100 WBC
PLATELET # BLD AUTO: 85 K/UL (ref 164–446)
PMV BLD AUTO: 11.6 FL (ref 9–12.9)
POTASSIUM SERPL-SCNC: 4.5 MMOL/L (ref 3.6–5.5)
PROCALCITONIN SERPL-MCNC: 49.41 NG/ML
PROT SERPL-MCNC: 6.2 G/DL (ref 6–8.2)
RBC # BLD AUTO: 2.93 M/UL (ref 4.2–5.4)
SODIUM SERPL-SCNC: 131 MMOL/L (ref 135–145)
WBC # BLD AUTO: 13.2 K/UL (ref 4.8–10.8)

## 2020-05-12 PROCEDURE — 700111 HCHG RX REV CODE 636 W/ 250 OVERRIDE (IP): Performed by: INTERNAL MEDICINE

## 2020-05-12 PROCEDURE — 71045 X-RAY EXAM CHEST 1 VIEW: CPT

## 2020-05-12 PROCEDURE — 700105 HCHG RX REV CODE 258: Performed by: FAMILY MEDICINE

## 2020-05-12 PROCEDURE — 700102 HCHG RX REV CODE 250 W/ 637 OVERRIDE(OP): Performed by: HOSPITALIST

## 2020-05-12 PROCEDURE — 85025 COMPLETE CBC W/AUTO DIFF WBC: CPT

## 2020-05-12 PROCEDURE — 80053 COMPREHEN METABOLIC PANEL: CPT

## 2020-05-12 PROCEDURE — 99233 SBSQ HOSP IP/OBS HIGH 50: CPT | Performed by: FAMILY MEDICINE

## 2020-05-12 PROCEDURE — 700102 HCHG RX REV CODE 250 W/ 637 OVERRIDE(OP): Performed by: INTERNAL MEDICINE

## 2020-05-12 PROCEDURE — A9270 NON-COVERED ITEM OR SERVICE: HCPCS | Performed by: FAMILY MEDICINE

## 2020-05-12 PROCEDURE — A9270 NON-COVERED ITEM OR SERVICE: HCPCS | Performed by: INTERNAL MEDICINE

## 2020-05-12 PROCEDURE — 700102 HCHG RX REV CODE 250 W/ 637 OVERRIDE(OP): Performed by: FAMILY MEDICINE

## 2020-05-12 PROCEDURE — A9270 NON-COVERED ITEM OR SERVICE: HCPCS | Performed by: HOSPITALIST

## 2020-05-12 PROCEDURE — 770020 HCHG ROOM/CARE - TELE (206)

## 2020-05-12 PROCEDURE — 36415 COLL VENOUS BLD VENIPUNCTURE: CPT

## 2020-05-12 PROCEDURE — 87040 BLOOD CULTURE FOR BACTERIA: CPT

## 2020-05-12 PROCEDURE — 700111 HCHG RX REV CODE 636 W/ 250 OVERRIDE (IP): Performed by: FAMILY MEDICINE

## 2020-05-12 PROCEDURE — 84145 PROCALCITONIN (PCT): CPT

## 2020-05-12 RX ORDER — VITAMIN C
1 TAB ORAL DAILY
Status: DISCONTINUED | OUTPATIENT
Start: 2020-05-12 | End: 2020-05-15 | Stop reason: HOSPADM

## 2020-05-12 RX ORDER — METHYLPREDNISOLONE SODIUM SUCCINATE 40 MG/ML
40 INJECTION, POWDER, LYOPHILIZED, FOR SOLUTION INTRAMUSCULAR; INTRAVENOUS EVERY 6 HOURS
Status: COMPLETED | OUTPATIENT
Start: 2020-05-12 | End: 2020-05-12

## 2020-05-12 RX ORDER — FOLIC ACID 1 MG/1
1 TABLET ORAL DAILY
Status: DISCONTINUED | OUTPATIENT
Start: 2020-05-12 | End: 2020-05-15 | Stop reason: HOSPADM

## 2020-05-12 RX ORDER — DOXYCYCLINE 100 MG/1
100 TABLET ORAL EVERY 12 HOURS
Status: DISCONTINUED | OUTPATIENT
Start: 2020-05-12 | End: 2020-05-15 | Stop reason: HOSPADM

## 2020-05-12 RX ADMIN — METHYLPREDNISOLONE SODIUM SUCCINATE 40 MG: 40 INJECTION, POWDER, FOR SOLUTION INTRAMUSCULAR; INTRAVENOUS at 01:56

## 2020-05-12 RX ADMIN — LORAZEPAM 1 MG: 1 TABLET ORAL at 17:42

## 2020-05-12 RX ADMIN — ASPIRIN 81 MG 81 MG: 81 TABLET ORAL at 04:56

## 2020-05-12 RX ADMIN — DOXYCYCLINE 100 MG: 100 TABLET ORAL at 09:20

## 2020-05-12 RX ADMIN — OMEPRAZOLE 20 MG: 20 CAPSULE, DELAYED RELEASE ORAL at 04:55

## 2020-05-12 RX ADMIN — ATORVASTATIN CALCIUM 40 MG: 40 TABLET, FILM COATED ORAL at 11:16

## 2020-05-12 RX ADMIN — HEPARIN SODIUM 5000 UNITS: 5000 INJECTION INTRAVENOUS; SUBCUTANEOUS at 17:42

## 2020-05-12 RX ADMIN — DOXYCYCLINE 100 MG: 100 TABLET ORAL at 17:41

## 2020-05-12 RX ADMIN — CARVEDILOL 12.5 MG: 12.5 TABLET, FILM COATED ORAL at 05:00

## 2020-05-12 RX ADMIN — METHYLPREDNISOLONE SODIUM SUCCINATE 40 MG: 40 INJECTION, POWDER, FOR SOLUTION INTRAMUSCULAR; INTRAVENOUS at 09:20

## 2020-05-12 RX ADMIN — VALSARTAN 40 MG: 80 TABLET, FILM COATED ORAL at 04:56

## 2020-05-12 RX ADMIN — CEFTRIAXONE SODIUM 2 G: 2 INJECTION, POWDER, FOR SOLUTION INTRAMUSCULAR; INTRAVENOUS at 09:20

## 2020-05-12 RX ADMIN — VITAMIN C 1 TABLET: TAB at 11:16

## 2020-05-12 RX ADMIN — PHENYTOIN SODIUM 100 MG: 100 CAPSULE ORAL at 11:16

## 2020-05-12 RX ADMIN — FOLIC ACID 1 MG: 1 TABLET ORAL at 09:20

## 2020-05-12 RX ADMIN — LEVETIRACETAM 500 MG: 500 TABLET ORAL at 04:56

## 2020-05-12 RX ADMIN — LEVETIRACETAM 500 MG: 500 TABLET ORAL at 17:42

## 2020-05-12 RX ADMIN — AMLODIPINE BESYLATE 10 MG: 10 TABLET ORAL at 04:56

## 2020-05-12 RX ADMIN — METHYLPREDNISOLONE SODIUM SUCCINATE 40 MG: 40 INJECTION, POWDER, FOR SOLUTION INTRAMUSCULAR; INTRAVENOUS at 23:06

## 2020-05-12 RX ADMIN — ESCITALOPRAM OXALATE 10 MG: 10 TABLET ORAL at 04:56

## 2020-05-12 RX ADMIN — PHENYTOIN SODIUM 100 MG: 100 CAPSULE ORAL at 04:56

## 2020-05-12 RX ADMIN — LORAZEPAM 1 MG: 1 TABLET ORAL at 04:56

## 2020-05-12 RX ADMIN — METHYLPREDNISOLONE SODIUM SUCCINATE 40 MG: 40 INJECTION, POWDER, FOR SOLUTION INTRAMUSCULAR; INTRAVENOUS at 17:42

## 2020-05-12 RX ADMIN — PHENYTOIN SODIUM 100 MG: 100 CAPSULE ORAL at 17:42

## 2020-05-12 ASSESSMENT — ENCOUNTER SYMPTOMS
FOCAL WEAKNESS: 0
POLYDIPSIA: 0
DIZZINESS: 0
FEVER: 1
MYALGIAS: 0
WHEEZING: 1
VOMITING: 0
HEADACHES: 0
CHILLS: 1
HEMOPTYSIS: 0
SHORTNESS OF BREATH: 0
COUGH: 0
NERVOUS/ANXIOUS: 0
TINGLING: 0
PHOTOPHOBIA: 0
SHORTNESS OF BREATH: 1
SORE THROAT: 0
ABDOMINAL PAIN: 0
NECK PAIN: 0
DEPRESSION: 0
FEVER: 0
BLURRED VISION: 0
HEARTBURN: 0
NAUSEA: 0
DOUBLE VISION: 0
CHILLS: 0
PALPITATIONS: 0
BACK PAIN: 0
BRUISES/BLEEDS EASILY: 0
DIARRHEA: 1

## 2020-05-12 ASSESSMENT — LIFESTYLE VARIABLES: SUBSTANCE_ABUSE: 0

## 2020-05-12 ASSESSMENT — FIBROSIS 4 INDEX: FIB4 SCORE: 16

## 2020-05-12 NOTE — PROGRESS NOTES
[facility] Nephrology Daily Progress Note    Date of Service  5/12/2020    Chief Complaint  Follow up ESRD    Interval Problem Update  This is a 50-year-old  female with a past medical history significant for end-stage renal disease, on chronic hemodialysis Monday, Wednesday, Fridays via a right chest PermCath; hypertension, COPD, hyperlipidemia, seizure disorder, moyamoya disease, history of CVA, systolic CHF with EF of 30%, anemia, secondary to chronic kidney disease, CKD bone mineral disorder/renal osteodystrophy, secondary   hyperparathyroidism, was admitted with sepsis secondary to possible community-acquired pneumonia and being ruled out for COVID-19 infection.  The patient initially presented to an outside hospital in [place] with complaints of cough, shortness of breath and fever for the past 3 days.  She was noted to have acute hypoxic respiratory failure, requiring supplemental oxygen at the outside hospital, and because she was a dialysis patient, she was transferred to [facility] for further care.  The patient reports that she is compliant with her outpatient dialysis treatments and her last hemodialysis treatment was on Friday, 05/08/2020, and it was unremarkable.  Also, at the outside hospital, she was noted to be febrile with a temperature of 103.1.  She did have a chest x-ray that showed an enlarged cardiac silhouette with vascular congestion and her BNP was elevated at 3400.  She was admitted with sepsis and is being treated for community-acquired pneumonia, but there is also concern for possible COVID-19 infection given her fevers and respiratory complaints and testing has been sent and is currently pending.  The patient reports that she normally dialyzes Monday, Wednesday, and Fridays via a right chest PermCath.  She has been compliant with her outpatient dialysis treatments.  She just started dialysis last month in 04/2020.    DAILY NEPHROLOGY SUMMARY  5/12/20--No events,  having diarrhea since last night, afebrile this am, BP borderline low overnight and this am, tolerated HD yest with 2L UF, pt denies any cough    Review of Systems  Review of Systems   Constitutional: Positive for malaise/fatigue. Negative for chills and fever.   HENT: Negative for congestion, nosebleeds and sore throat.    Eyes: Negative for blurred vision, double vision and photophobia.   Respiratory: Negative for cough, hemoptysis and shortness of breath.    Cardiovascular: Negative for chest pain, palpitations and leg swelling.   Gastrointestinal: Positive for diarrhea. Negative for abdominal pain, nausea and vomiting.   Genitourinary: Negative for dysuria, hematuria and urgency.   Musculoskeletal: Negative for back pain, myalgias and neck pain.   Skin: Negative for itching and rash.   Neurological: Negative for dizziness, focal weakness and headaches.   Endo/Heme/Allergies: Negative for environmental allergies and polydipsia. Does not bruise/bleed easily.   Psychiatric/Behavioral: Negative for depression. The patient is not nervous/anxious.         Physical Exam  Temp:  [36.3 °C (97.4 °F)-37.8 °C (100 °F)] 36.3 °C (97.4 °F)  Pulse:  [55-85] 76  Resp:  [18-22] 20  BP: ()/(38-63) 97/44  SpO2:  [96 %-99 %] 97 %    Physical Exam    Fluids    Intake/Output Summary (Last 24 hours) at 5/12/2020 0916  Last data filed at 5/12/2020 0400  Gross per 24 hour   Intake 1120 ml   Output 2500 ml   Net -1380 ml       Laboratory  Recent Labs     05/12/20  0416   WBC 13.2*   RBC 2.93*   HEMOGLOBIN 9.4*   HEMATOCRIT 29.9*   .0*   MCH 32.1   MCHC 31.4*   RDW 52.5*   PLATELETCT 85*   MPV 11.6     Recent Labs     05/12/20  0416   SODIUM 131*   POTASSIUM 4.5   CHLORIDE 93*   CO2 21   GLUCOSE 124*   BUN 36*   CREATININE 4.00*   CALCIUM 8.6         No results for input(s): NTPROBNP in the last 72 hours.        Assessment:  1. ESRD--on HD MWF via R Chest PC  2. Sepsis--possible CAP, also evaluated for COVID-19  --COVID-19  negative 5/11/20  --Being ruled out for COVID-19  3. Acute Hypoxic Respiratory Failure--possible CAP, outside hospital CXR showed vascular congestion  --Requiring supplemental O2  4. HTN--BP borderline low  --On multiple BP meds  5. Anemia--secondary to CKD  6. CKD Bone Mineral Disorder/Renal Osteodystrophy  7. Secondary Hyperparathyroidism  --On calcitriol  8. Seizure Disorder  9. History of Systolic CHF--EF 30% in 4/20  10. Systolic CHF--EF 30% by echo in 4/20  11. Moyamoya Disease  12. Diarrhea--started 5/11/20  13. Leukocytosis--on steroids     Plan:  --No HD today, continue qMWF dialysis schedule  --Dose adjust all meds for decreased GFR  --Albumin with HD for BP support  --Place holding parameters on BP meds and avoid aggressive lowering of BP  --Stop amlodipine for now  --PRN NS boluses for symptomatic hypotension  --Continue broad spectrum abx  --Follow up culture results  --Continue supplemental O2  --Start epogen with HD  --Continue calcitriol  --Check phos level  --Consider checking stool studies  --COVID-19 test negative but pt will need 2nd negative test before she can go back to her regular outpt dialysis unit or she will have to transfer to a different dialysis unit temporarily, see dialysis coordinator note

## 2020-05-12 NOTE — PROGRESS NOTES
Hospital Medicine Daily Progress Note    Date of Service  5/12/2020    Chief Complaint  50 y.o. female admitted 5/11/2020 with shortness of breath.    Hospital Course  This is a 50 years old female who has past medical history of end-stage renal disease on hemodialysis, history of nonischemic cardiomyopathy and combined systolic and diastolic congestive heart failure, and history of moyamoya disease and mental retardation comes in with shortness of breath.  Patient recently hospitalized last month for chest pain and shortness of breath and was found to be in acute on chronic congestive heart failure and volume overload.  She received dialysis and her cardiac medical treatment was optimized.  She was discharged home.  This time she comes then with worsening shortness of breath that started few days ago.  Also patient admits to occasional fever, chills, diarrhea, and productive cough.  Was initially presented to outside facility and was transferred here for higher level of care.  Noted to be febrile over there.  Had a chest x-ray at the outside facility which showed pulmonary congestion.  She was hypoxic and started on oxygen.  Nephrology consulted.  [place] that her respiratory failure is a combination of volume overload and possible underlying upper respiratory infection/COPD exacerbation.  Her first COVID-19 PCR was negative.  Started on broad-spectrum IV antibiotics.  Interval Problem Update  Resting in bed comfortably.  Denies any chest pain.  Respiratory status stable.  Has been afebrile overnight and this morning.  Admits to diarrhea.  No acute distress noted.    Consultants/Specialty  Nephrology    Code Status  Full code    Disposition  Pending clinical course    Review of Systems  Review of Systems   Constitutional: Positive for chills, fever and malaise/fatigue.   HENT: Negative for ear pain, hearing loss and tinnitus.    Respiratory: Positive for shortness of breath and wheezing.    Cardiovascular: Positive  for leg swelling. Negative for chest pain and palpitations.   Gastrointestinal: Negative for heartburn, nausea and vomiting.   Genitourinary: Negative for dysuria and urgency.   Musculoskeletal: Negative for back pain, myalgias and neck pain.   Skin: Negative for rash.   Neurological: Negative for dizziness, tingling and headaches.   Psychiatric/Behavioral: Negative for depression, substance abuse and suicidal ideas.        Physical Exam  Temp:  [36.3 °C (97.4 °F)-37.8 °C (100 °F)] 36.8 °C (98.2 °F)  Pulse:  [53-85] 53  Resp:  [18-22] 19  BP: ()/(38-63) 94/45  SpO2:  [92 %-98 %] 96 %    Physical Exam  Constitutional:       Appearance: Normal appearance. She is not ill-appearing.   HENT:      Head: Normocephalic and atraumatic.      Nose: No rhinorrhea.      Mouth/Throat:      Pharynx: No oropharyngeal exudate or posterior oropharyngeal erythema.   Eyes:      Extraocular Movements: Extraocular movements intact.      Pupils: Pupils are equal, round, and reactive to light.   Cardiovascular:      Rate and Rhythm: Normal rate and regular rhythm.      Heart sounds: No friction rub. No gallop.    Pulmonary:      Breath sounds: Decreased breath sounds and rales present.   Abdominal:      General: There is no distension.      Palpations: Abdomen is soft.      Tenderness: There is no abdominal tenderness.   Musculoskeletal:         General: Swelling present. No tenderness.      Right lower leg: Edema present.      Left lower leg: Edema present.   Skin:     Coloration: Skin is not jaundiced.   Neurological:      General: No focal deficit present.      Mental Status: She is alert and oriented to person, place, and time.   Psychiatric:         Mood and Affect: Mood normal.         Behavior: Behavior normal.         Fluids    Intake/Output Summary (Last 24 hours) at 5/12/2020 1654  Last data filed at 5/12/2020 1300  Gross per 24 hour   Intake 1280 ml   Output 2500 ml   Net -1220 ml       Laboratory  Recent Labs      05/12/20  0416   WBC 13.2*   RBC 2.93*   HEMOGLOBIN 9.4*   HEMATOCRIT 29.9*   .0*   MCH 32.1   MCHC 31.4*   RDW 52.5*   PLATELETCT 85*   MPV 11.6     Recent Labs     05/12/20  0416   SODIUM 131*   POTASSIUM 4.5   CHLORIDE 93*   CO2 21   GLUCOSE 124*   BUN 36*   CREATININE 4.00*   CALCIUM 8.6                   Imaging  DX-CHEST-PORTABLE (1 VIEW)   Final Result      Stable cardiomegaly.      Atherosclerotic plaque.      Dialysis catheter projects over the SVC.              Assessment/Plan  * Acute respiratory failure with hypoxia (HCC)- (present on admission)  Assessment & Plan  Suspect likely multifactorial with possible volume overload and COPD exacerbation.  Will give dose of IV lasix 80 x 1  IV steroids, duo nebs   Empiric IV abx given concern for underlying sepsis/sirs  COVID 19 PCR test negative.  Repeat in 48 hours.  Wean 02 as tolerated      Chronic combined systolic and diastolic congestive heart failure (HCC)- (present on admission)  Assessment & Plan  History of nonischemic cardiomyopathy with ejection fraction of 30% and grade 3 diastolic dysfunction.  Continue with offloading with dialysis.  Hold cardiac medications for now per parameters due to borderline blood pressure.      Volume overload  Assessment & Plan  Appears volume overloaded on exam with vascular congestion on CXR   Cont with IV lasix x 1  Nephrology consulted.    CAP (community acquired pneumonia)  Assessment & Plan  Presumed CAP given fever 103 at OSH, tachycardia and tachypnea  R/o covid 19   Cont with IV rocephin and azithro   Wean abx as clinically appropraite     COPD exacerbation (HCC)  Assessment & Plan  Duo nebs, IV steroids  Wean 02 as tolerated  Keep sats 88-92%    ESRD (end stage renal disease) (HCC)  Assessment & Plan  Nephrology following.    Sepsis (HCC)- (present on admission)  Assessment & Plan  This is Sepsis Present on admission  SIRS criteria identified on my evaluation include: Fever, with temperature greater than  101 deg F, Tachycardia, with heart rate greater than 90 BPM and Tachypnea, with respirations greater than 20 per minute  Source is pulmonary  Sepsis protocol initiated  Fluid resuscitation ordered per protocol  IV antibiotics as appropriate for source of sepsis  While organ dysfunction may be noted elsewhere in this problem list or in the chart, degree of organ dysfunction does not meet CMS criteria for severe sepsis          Seizure disorder, grand mal (HCC)- (present on admission)  Assessment & Plan  Resume home keppra and dilantin     H/O: CVA (cerebrovascular accident)- (present on admission)  Assessment & Plan  Hx of     HTN (hypertension)  Assessment & Plan  Resume home anti hypertensive medications     HLD (hyperlipidemia)- (present on admission)  Assessment & Plan  Resume home medications    Essential hypertension, benign- (present on admission)  Assessment & Plan  Now hypotensive.  Hold blood pressure medications for now to allow for dialysis tomorrow.       VTE prophylaxis: Heparin

## 2020-05-12 NOTE — DISCHARGE PLANNING
"SW called patient over phone for assessment. Patient was difficult to hear, unclear speech, long pauses, perhaps falling asleep? Did our best to answer some questions for assessment.     Patient is a 50-year old single female who lives in [place], [place] with her family (parents/grandparents). Patient is disabled, has SSI \"for a long time\" and has Medicare/Medicaid FFS insurance.     PCP is listed as Dr. Elam. Per chart review, no notes from this provider. Patient is being seen by cardiology, DWIGHT Lam and Dr. Weems for neurology. Unsure if patient has any issues with ADLs or IADLS. Asked yes or no questions, but still unable to determine patient's response due to unclear speech. Will check with RN for any needs.     Patient did say, \"no\" to home O2. Currently on 2L here in hospital. Did verify pharmacy as [facility] in [place]. Patient does have ACP docs on file, is full code. Unsure of any drug, alcohol, or mental health issues.     This is a readmission for patient. Admitted on 4/16/2020 for chest pain and SOB. Per that admission, she was also recently seen at [place] and [facility].     PLAN:   SW to follow for D/C needs, currently on O2 here in hospital. Ensure that patient does not need home O2 set up. It appears through other admissions and discharges patient does have family support in place.       "

## 2020-05-12 NOTE — CARE PLAN
Problem: Communication  Goal: The ability to communicate needs accurately and effectively will improve  Intervention: Educate patient and significant other/support system about the plan of care, procedures, treatments, medications and allow for questions  Flowsheets (Taken 5/11/2020 2247)  Pt & Family Have Been Educated on Methods Available to Report Concerns Related to Care, Treatment, Services, and Patient Safety Issues: Yes     Problem: Venous Thromboembolism (VTW)/Deep Vein Thrombosis (DVT) Prevention:  Goal: Patient will participate in Venous Thrombosis (VTE)/Deep Vein Thrombosis (DVT)Prevention Measures  Flowsheets (Taken 5/11/2020 2247)  Pharmacologic Prophylaxis Used: Unfractionated Heparin  Note: Patient refused, education provided on importance

## 2020-05-12 NOTE — PROGRESS NOTES
[facility] Progress Note     Hemodialysis treatment ordered today per Dr. Flores x 3 hours.   Treatment initiated at 1712, ended at 2012.       Patient tolerated tx; see paper flow sheet for details.      Net UF 2,000 mL.      Post tx, CVC flushed with saline then locked with heparin 1000 units/mL per designated amount in each wing then clamped and capped.   Aspirate heparin prior to next CVC use.     Report given to Primary RN Reese Spence.

## 2020-05-12 NOTE — PROGRESS NOTES
Patient anxious to return home, updated patient on need for second COVID test which is typically 48 hrs after first one and pt verbalized understanding. Patient demonstrates shortness of breath while ambulating and loose stool this morning, remains on 2L of O2.

## 2020-05-12 NOTE — DISCHARGE PLANNING
Outpatient Dialysis Note    Per HD clinic, they will need a 2nd negative Covid-19 result in order to take the patient back or the patient will need to be set up at [facility] on a PUI shift for 7 days. Notified Dr. Flores.      Triny Paulino- Dialysis Coordinator  [facility] # [phone]

## 2020-05-12 NOTE — CARE PLAN
Problem: Safety  Goal: Will remain free from injury  Outcome: PROGRESSING AS EXPECTED  Pt educated on safety precautions and precautions in place. Treaded socks on, bed locked and in lowest position, belongings and call light within reach. Bed alarm in place and on.        Problem: Communication  Goal: The ability to communicate needs accurately and effectively will improve  Outcome: PROGRESSING SLOWER THAN EXPECTED   Patient educated to utilize call light. Patient encouraged to ask questions about plan of care. Patient uses call light and is involved in POC.

## 2020-05-12 NOTE — CONSULTS
DATE OF SERVICE:  05/11/2020    REQUESTING PHYSICIAN:  Hospitalist.    REASON FOR CONSULTATION:  Evaluation and management of end-stage renal   disease.    HISTORY OF PRESENT ILLNESS:  This is a 50-year-old  female with a   past medical history significant for end-stage renal disease, on chronic   hemodialysis Monday, Wednesday, Fridays via a right chest PermCath;   hypertension, COPD, hyperlipidemia, seizure disorder, moyamoya disease,   history of CVA, systolic CHF with EF of 30%, anemia, secondary to chronic   kidney disease, CKD bone mineral disorder/renal osteodystrophy, secondary   hyperparathyroidism, was admitted with sepsis secondary to possible   community-acquired pneumonia and being ruled out for COVID-19 infection.  The   patient initially presented to an outside hospital in [place] with complaints   of cough, shortness of breath and fever for the past 3 days.  She was noted to   have acute hypoxic respiratory failure, requiring supplemental oxygen at the   outside hospital, and because she was a dialysis patient, she was transferred   to [facility] for further care.  The patient reports that she is   compliant with her outpatient dialysis treatments and her last hemodialysis   treatment was on Friday, 05/08/2020, and it was unremarkable.  Also, at the   outside hospital, she was noted to be febrile with a temperature of 103.1.    She did have a chest x-ray that showed an enlarged cardiac silhouette with   vascular congestion and her BNP was elevated at 3400.  She was admitted with   sepsis and is being treated for community-acquired pneumonia, but there is   also concern for possible COVID-19 infection given her fevers and respiratory   complaints and testing has been sent and is currently pending.  The patient   reports that she normally dialyzes Monday, Wednesday, and Fridays via a right   chest PermCath.  She has been compliant with her outpatient dialysis   treatments.  She just  started dialysis last month in 2020.    REVIEW OF SYSTEMS:  CONSTITUTIONAL:  The patient reports fever, malaise, and fatigue.  She denies   any chills.  She denies any weight gain.  HEENT:  The patient denies any nasal congestion, nosebleeds, sore throat.  NECK:  The patient denies any pain, masses, or swelling.  RESPIRATORY:  The patient reports a dry cough and shortness of breath.  She   denies any hemoptysis.  CARDIOVASCULAR:  The patient denies any chest pain.  She denies any lower   extremity edema.  She denies any palpitations.  GASTROINTESTINAL:  The patient denies any abdominal pain, nausea, vomiting or   diarrhea.  GENITOURINARY:  The patient denies any flank pain or hematuria.  MUSCULOSKELETAL:  The patient denies any joint pains or myalgias.  SKIN:  The patient denies any rashes or ulcerations.  NEUROLOGIC:  The patient denies any dizziness, headaches or focal weakness.  PSYCHIATRIC:  The patient denies any depression, anxiety, or hallucinations.    PAST MEDICAL HISTORY:  1.  End-stage renal disease.  The patient is currently dialysis dependent.    She started dialysis last month in 2020.  She currently dialyzes via a   right chest PermCath and she reports that she does in-center dialysis at   [facility] Monday, Wednesday, .  2.  Hypertension.  3.  Moyamoya disease.  4.  CVA in 2015.  5.  Mitral regurgitation.  6.  Seizure disorder.  7.  History of COPD.  8.  Systolic CHF with an EF of 30% by echo in 2020.  9.  Anemia secondary to chronic kidney disease.  10.  CKD bone mineral disorder/renal osteodystrophy.  11.  Secondary hyperparathyroidism.    PAST SURGICAL HISTORY:  1.  .  2.  Tubal ligation.  3.  Irrigation and debridement of the right metatarsal head.  4.  Right chest PermCath.    SOCIAL HISTORY:  The patient denies any smoking, alcohol or illicit drug use.    FAMILY HISTORY:  There is no family history of kidney disease or relatives on   dialysis.    ALLERGIES:  THE  PATIENT IS ALLERGIC TO ALLEGRA, AMOXICILLIN, AZITHROMYCIN,   CLARITIN, ERYTHROMYCIN, IBUPROFEN, PENICILLIN, NIFEDIPINE, ROSEMARY OIL, AND   ZYRTEC.    CURRENT MEDICATIONS:  1.  Norvasc 10 mg daily.  2.  Aspirin 81 mg daily.  3.  Lipitor 40 mg daily.  4.  Carvedilol 12.5 mg twice daily.  5.  Rocephin 2 g IV q. 24 hours.  6.  Doxycycline 100 mg p.o. q. 12 hours.  7.  Lexapro 10 mg daily.  8.  Heparin 5000 units subQ every 12 hours.  9.  Keppra 500 mg every 12 hours.  10.  Ativan 1 mg twice daily.  11.  Solu-Medrol 40 mg IV q. 6 hours.  12.  Prilosec 20 mg daily.  13.  Phenytoin  mg 3 times a day.  14.  Valsartan 40 mg daily.    PHYSICAL EXAMINATION:  VITAL SIGNS:  Temperature is 97.4 degrees Fahrenheit, pulse 55, respirations   18, blood pressure 92/50, satting 96% on 2 liters nasal cannula.  GENERAL:  The patient is drowsy, but arousable.  She appears ill.  She is in   no acute distress.  HEENT:  Pupils are equal, round and reactive to light.  Extraocular muscles   are intact.  Mucous membranes appear dry.  NECK:  Reveals no JVD.  Trachea is midline.  There is no thyromegaly.  CARDIOVASCULAR:  Heart is regular rate and rhythm.  No murmurs, rubs or   gallops.  RESPIRATORY:  Lungs are with decreased breath sounds at the bilateral bases.    There are no rales.  There are no wheezes.  There is symmetric chest wall   rise.  There is no tachypnea.  GASTROINTESTINAL:  Abdomen is soft, nontender, nondistended.  Bowel sounds are   present.  EXTREMITIES:  Reveal no clubbing, cyanosis or edema.  MUSCULOSKELETAL:  There are no obvious joint deformities.  There are no joint   effusions.  SKIN:  There are no rashes or ulcerations.  There is normal skin turgor.  NEUROLOGIC:  Reveals no focal motor deficits.  Sensation is grossly intact to   light touch.  LYMPHATIC:  Reveals no cervical lymphadenopathy, no axillary lymphadenopathy.  PSYCHIATRIC:  The patient is drowsy, but arousable and oriented x3.  She has a   flat affect.   She is calm and cooperative.    LABORATORY DATA:  Labs from the outside hospital last night include a white   blood cell count of 9.9, hemoglobin 10.8, platelet count is 145.  Glucose is   128, sodium is 137, potassium is 4.7, chloride is 103, bicarbonate is 24,   calcium is 8.7, BUN is 31, creatinine is 4.8, albumin is 2.9.  BNP was 3400.    Troponin was 0.17.    I have reviewed all available labs.    IMAGING:  Chest x-ray at the outside hospital done yesterday revealed an   enlarged cardiac silhouette with vascular congestion.    I have reviewed all imaging reports.    ASSESSMENTS:  1.  End-stage renal disease.  The patient is on hemodialysis Monday,   Wednesday, and Fridays via a right chest PermCath.  2.  Sepsis, possibly related to community-acquired pneumonia.  She is also   being evaluated for coronavirus disease 2019.  3.  She is on antibiotics for community-acquired pneumonia.  4.  Acute hypoxic respiratory failure, possible community-acquired pneumonia.    Outside hospital chest x-ray also showed vascular congestion consistent with   fluid overload and pulmonary edema.  She is currently requiring supplemental   oxygen.  5.  Hypertension.  Blood pressure is currently borderline low.  She did   receive multiple blood pressure medications earlier today.  6.  Anemia.  Hemoglobin 10.8 at the outside hospital.  Goal hemoglobin is   10-11.  7.  Chronic kidney disease, bone mineral disorder/renal osteodystrophy.  The   patient does not recall if she is on any phosphorus binders.  8.  Secondary hyperparathyroidism.  The patient is on calcitriol as an   outpatient.  9.  Seizure disorder.  10.  History of systolic congestive heart failure with an ejection fraction of   30% by echo in 04/2020.  11.  Moyamoya disease.    PLAN:  1.  Hemodialysis today with fluid removal as tolerated.  2.  We will use albumin for blood pressure support during dialysis.  3.  Place holding parameters on blood pressure medications.  4.   Avoid aggressive lowering of blood pressure.  5.  P.r.n. normal saline boluses for symptomatic hypotension.  6.  Continue broad spectrum antibiotics.  7.  Follow up on culture results.  8.  Follow up COVID-19 test results.  9.  Continue supplemental oxygen.  10.  Continue supportive care for possible COPD exacerbation.  11.  No need for erythrocyte stimulating agent at this time.  12.  Continue calcitriol.  13.  Check phosphorus level.    Thank you for this very interesting consult and thank you for involving me in   the care of this very pleasant patient.  If you have any questions or need any   further information, please do not hesitate to contact me.       ____________________________________     MD CHRIS WATTS / NEETA    DD:  05/11/2020 14:44:17  DT:  05/11/2020 18:18:59    D#:  [id number]  Job#:  [id number]

## 2020-05-12 NOTE — PROGRESS NOTES
Patient requested all of her evening medications after dialysis, educated they were ok to take during dialysis but requested to wait.     Discussed with Dr. Alvares about pt's sBP in the 90's still and if she wanted to add parameters to this Coreg. She stated she does not want to add parameters as she wants pt to receive this medication. Verbalized understanding to physician and updated her that pt was receiving dialysis at this time.

## 2020-05-12 NOTE — PROGRESS NOTES
Report received. Patient oriented x4  Pain level 0 out of 10. Mild sob with movement. Fall risk interventions in place, bed in low position, and bed alarm on. Assessment completed.

## 2020-05-12 NOTE — PROGRESS NOTES
Report received from NOC RN, pt care assumed, tele box on. Pt sleeping. Bed in lowest position, call light within reach, bed alarm plugged in and on.

## 2020-05-13 ENCOUNTER — TELEPHONE (OUTPATIENT)
Dept: CARDIOLOGY | Facility: MEDICAL CENTER | Age: 50
End: 2020-05-13

## 2020-05-13 LAB
ALBUMIN SERPL BCP-MCNC: 2.6 G/DL (ref 3.2–4.9)
ALBUMIN/GLOB SERPL: 0.8 G/DL
ALP SERPL-CCNC: 111 U/L (ref 30–99)
ALT SERPL-CCNC: 740 U/L (ref 2–50)
ANION GAP SERPL CALC-SCNC: 14 MMOL/L (ref 7–16)
AST SERPL-CCNC: 768 U/L (ref 12–45)
BASOPHILS # BLD AUTO: 0.3 % (ref 0–1.8)
BASOPHILS # BLD: 0.03 K/UL (ref 0–0.12)
BILIRUB SERPL-MCNC: 0.4 MG/DL (ref 0.1–1.5)
BUN SERPL-MCNC: 57 MG/DL (ref 8–22)
CALCIUM SERPL-MCNC: 8.6 MG/DL (ref 8.5–10.5)
CHLORIDE SERPL-SCNC: 94 MMOL/L (ref 96–112)
CO2 SERPL-SCNC: 22 MMOL/L (ref 20–33)
COVID ORDER STATUS COVID19: NORMAL
CREAT SERPL-MCNC: 5.25 MG/DL (ref 0.5–1.4)
EOSINOPHIL # BLD AUTO: 0 K/UL (ref 0–0.51)
EOSINOPHIL NFR BLD: 0 % (ref 0–6.9)
ERYTHROCYTE [DISTWIDTH] IN BLOOD BY AUTOMATED COUNT: 50.6 FL (ref 35.9–50)
GLOBULIN SER CALC-MCNC: 3.2 G/DL (ref 1.9–3.5)
GLUCOSE SERPL-MCNC: 159 MG/DL (ref 65–99)
HCT VFR BLD AUTO: 28.8 % (ref 37–47)
HGB BLD-MCNC: 9.3 G/DL (ref 12–16)
IMM GRANULOCYTES # BLD AUTO: 0.09 K/UL (ref 0–0.11)
IMM GRANULOCYTES NFR BLD AUTO: 0.8 % (ref 0–0.9)
LYMPHOCYTES # BLD AUTO: 0.46 K/UL (ref 1–4.8)
LYMPHOCYTES NFR BLD: 4.3 % (ref 22–41)
MAGNESIUM SERPL-MCNC: 2.3 MG/DL (ref 1.5–2.5)
MCH RBC QN AUTO: 32.1 PG (ref 27–33)
MCHC RBC AUTO-ENTMCNC: 32.3 G/DL (ref 33.6–35)
MCV RBC AUTO: 99.3 FL (ref 81.4–97.8)
MONOCYTES # BLD AUTO: 0.61 K/UL (ref 0–0.85)
MONOCYTES NFR BLD AUTO: 5.7 % (ref 0–13.4)
NEUTROPHILS # BLD AUTO: 9.5 K/UL (ref 2–7.15)
NEUTROPHILS NFR BLD: 88.9 % (ref 44–72)
NRBC # BLD AUTO: 0 K/UL
NRBC BLD-RTO: 0 /100 WBC
PLATELET # BLD AUTO: 74 K/UL (ref 164–446)
PMV BLD AUTO: 11.6 FL (ref 9–12.9)
POTASSIUM SERPL-SCNC: 4.1 MMOL/L (ref 3.6–5.5)
PROT SERPL-MCNC: 5.8 G/DL (ref 6–8.2)
RBC # BLD AUTO: 2.9 M/UL (ref 4.2–5.4)
SARS-COV-2 RNA RESP QL NAA+PROBE: NOTDETECTED
SODIUM SERPL-SCNC: 130 MMOL/L (ref 135–145)
SPECIMEN SOURCE: NORMAL
WBC # BLD AUTO: 10.7 K/UL (ref 4.8–10.8)

## 2020-05-13 PROCEDURE — 85025 COMPLETE CBC W/AUTO DIFF WBC: CPT

## 2020-05-13 PROCEDURE — 700102 HCHG RX REV CODE 250 W/ 637 OVERRIDE(OP): Performed by: FAMILY MEDICINE

## 2020-05-13 PROCEDURE — 700111 HCHG RX REV CODE 636 W/ 250 OVERRIDE (IP): Performed by: FAMILY MEDICINE

## 2020-05-13 PROCEDURE — 90935 HEMODIALYSIS ONE EVALUATION: CPT

## 2020-05-13 PROCEDURE — 83735 ASSAY OF MAGNESIUM: CPT

## 2020-05-13 PROCEDURE — U0004 COV-19 TEST NON-CDC HGH THRU: HCPCS

## 2020-05-13 PROCEDURE — G2023 SPECIMEN COLLECT COVID-19: HCPCS | Performed by: FAMILY MEDICINE

## 2020-05-13 PROCEDURE — 99233 SBSQ HOSP IP/OBS HIGH 50: CPT | Performed by: FAMILY MEDICINE

## 2020-05-13 PROCEDURE — 700102 HCHG RX REV CODE 250 W/ 637 OVERRIDE(OP): Performed by: HOSPITALIST

## 2020-05-13 PROCEDURE — 80053 COMPREHEN METABOLIC PANEL: CPT

## 2020-05-13 PROCEDURE — 700105 HCHG RX REV CODE 258: Performed by: FAMILY MEDICINE

## 2020-05-13 PROCEDURE — 700105 HCHG RX REV CODE 258

## 2020-05-13 PROCEDURE — A9270 NON-COVERED ITEM OR SERVICE: HCPCS | Performed by: INTERNAL MEDICINE

## 2020-05-13 PROCEDURE — A9270 NON-COVERED ITEM OR SERVICE: HCPCS | Performed by: FAMILY MEDICINE

## 2020-05-13 PROCEDURE — 700102 HCHG RX REV CODE 250 W/ 637 OVERRIDE(OP): Performed by: INTERNAL MEDICINE

## 2020-05-13 PROCEDURE — 36415 COLL VENOUS BLD VENIPUNCTURE: CPT

## 2020-05-13 PROCEDURE — 770020 HCHG ROOM/CARE - TELE (206)

## 2020-05-13 PROCEDURE — A9270 NON-COVERED ITEM OR SERVICE: HCPCS | Performed by: HOSPITALIST

## 2020-05-13 RX ORDER — SODIUM CHLORIDE 9 MG/ML
INJECTION, SOLUTION INTRAVENOUS
Status: COMPLETED
Start: 2020-05-13 | End: 2020-05-13

## 2020-05-13 RX ORDER — VALSARTAN 80 MG/1
20 TABLET ORAL
Status: DISCONTINUED | OUTPATIENT
Start: 2020-05-14 | End: 2020-05-15 | Stop reason: HOSPADM

## 2020-05-13 RX ORDER — CARVEDILOL 6.25 MG/1
6.25 TABLET ORAL 2 TIMES DAILY WITH MEALS
Status: DISCONTINUED | OUTPATIENT
Start: 2020-05-13 | End: 2020-05-15 | Stop reason: HOSPADM

## 2020-05-13 RX ORDER — PREDNISONE 20 MG/1
40 TABLET ORAL DAILY
Status: DISCONTINUED | OUTPATIENT
Start: 2020-05-13 | End: 2020-05-15 | Stop reason: HOSPADM

## 2020-05-13 RX ORDER — ALBUMIN (HUMAN) 12.5 G/50ML
12.5 SOLUTION INTRAVENOUS
Status: DISCONTINUED | OUTPATIENT
Start: 2020-05-13 | End: 2020-05-15 | Stop reason: HOSPADM

## 2020-05-13 RX ADMIN — PHENYTOIN SODIUM 100 MG: 100 CAPSULE ORAL at 05:32

## 2020-05-13 RX ADMIN — LEVETIRACETAM 500 MG: 500 TABLET ORAL at 05:32

## 2020-05-13 RX ADMIN — CEFTRIAXONE SODIUM 2 G: 2 INJECTION, POWDER, FOR SOLUTION INTRAMUSCULAR; INTRAVENOUS at 05:32

## 2020-05-13 RX ADMIN — FOLIC ACID 1 MG: 1 TABLET ORAL at 05:32

## 2020-05-13 RX ADMIN — PREDNISONE 40 MG: 20 TABLET ORAL at 17:06

## 2020-05-13 RX ADMIN — DOXYCYCLINE 100 MG: 100 TABLET ORAL at 05:32

## 2020-05-13 RX ADMIN — DOXYCYCLINE 100 MG: 100 TABLET ORAL at 17:06

## 2020-05-13 RX ADMIN — PHENYTOIN SODIUM 100 MG: 100 CAPSULE ORAL at 17:07

## 2020-05-13 RX ADMIN — HEPARIN SODIUM 3700 UNITS: 1000 INJECTION, SOLUTION INTRAVENOUS; SUBCUTANEOUS at 10:41

## 2020-05-13 RX ADMIN — SODIUM CHLORIDE 500 ML: 9 INJECTION, SOLUTION INTRAVENOUS at 06:44

## 2020-05-13 RX ADMIN — LORAZEPAM 1 MG: 1 TABLET ORAL at 05:32

## 2020-05-13 RX ADMIN — CARVEDILOL 12.5 MG: 12.5 TABLET, FILM COATED ORAL at 05:32

## 2020-05-13 RX ADMIN — VITAMIN C 1 TABLET: TAB at 05:31

## 2020-05-13 RX ADMIN — VALSARTAN 40 MG: 80 TABLET, FILM COATED ORAL at 05:31

## 2020-05-13 RX ADMIN — ESCITALOPRAM OXALATE 10 MG: 10 TABLET ORAL at 05:32

## 2020-05-13 RX ADMIN — PHENYTOIN SODIUM 100 MG: 100 CAPSULE ORAL at 12:34

## 2020-05-13 RX ADMIN — LEVETIRACETAM 500 MG: 500 TABLET ORAL at 17:07

## 2020-05-13 RX ADMIN — SODIUM CHLORIDE: 9 INJECTION, SOLUTION INTRAVENOUS at 08:00

## 2020-05-13 RX ADMIN — ASPIRIN 81 MG 81 MG: 81 TABLET ORAL at 05:32

## 2020-05-13 RX ADMIN — ATORVASTATIN CALCIUM 40 MG: 40 TABLET, FILM COATED ORAL at 17:07

## 2020-05-13 RX ADMIN — LORAZEPAM 1 MG: 1 TABLET ORAL at 17:07

## 2020-05-13 RX ADMIN — OMEPRAZOLE 20 MG: 20 CAPSULE, DELAYED RELEASE ORAL at 05:31

## 2020-05-13 RX ADMIN — CARVEDILOL 6.25 MG: 6.25 TABLET, FILM COATED ORAL at 17:06

## 2020-05-13 ASSESSMENT — ENCOUNTER SYMPTOMS
HEARTBURN: 0
HEMOPTYSIS: 0
TREMORS: 0
NAUSEA: 0
ABDOMINAL PAIN: 0
TINGLING: 0
SORE THROAT: 0
DOUBLE VISION: 0
BRUISES/BLEEDS EASILY: 0
VOMITING: 0
DEPRESSION: 0
FEVER: 0
DIZZINESS: 0
PHOTOPHOBIA: 0
PALPITATIONS: 0
COUGH: 0
SHORTNESS OF BREATH: 1
DIARRHEA: 1
CHILLS: 0
FOCAL WEAKNESS: 0
MYALGIAS: 0
NECK PAIN: 0
NERVOUS/ANXIOUS: 0
WHEEZING: 0
BACK PAIN: 0
SHORTNESS OF BREATH: 0
POLYDIPSIA: 0
HEADACHES: 0
BLURRED VISION: 0

## 2020-05-13 ASSESSMENT — LIFESTYLE VARIABLES: SUBSTANCE_ABUSE: 0

## 2020-05-13 NOTE — TELEPHONE ENCOUNTER
Spoke with Andria and reviewed hospital notes.  Advised Andria that Cardiology has not been consulted and that the patient is being treated for a non-cardiac illness.  Provided reassurance that Cardiology will be on board is a consult is requested by the hospitalist.    Andria verbalized understanding and was appreciative for the return phone call.  Andria requested to be transferred to scheduling to change the patient's appointment.

## 2020-05-13 NOTE — PROGRESS NOTES
Patient requested to wait for any medications or food until after dialysis. COVID swab was sent to lab. Patient denies any loose stool today, unable to obtain stool specimen for Cdiff rule out.

## 2020-05-13 NOTE — CARE PLAN
Problem: Safety  Goal: Will remain free from falls  Outcome: PROGRESSING AS EXPECTED  Intervention: Implement fall precautions  Flowsheets (Taken 5/12/2020 2028)  Bed Alarm: Yes - Alarm On  Environmental Precautions:   Treaded Slipper Socks on Patient   Personal Belongings, Wastebasket, Call Bell etc. in Easy Reach   Report Given to Other Health Care Providers Regarding Fall Risk   Bed in Low Position   Communication Sign for Patients & Families   Mobility Assessed & Appropriate Sign Placed  IV Pole on Same Side of Bed as Bathroom: Yes  Bedrails: Bedrails Closest to Bathroom Down  Chair/Bed Strip Alarm: Yes - Alarm On     Problem: Bowel/Gastric:  Goal: Normal bowel function is maintained or improved  Outcome: PROGRESSING AS EXPECTED  Intervention: Educate patient and significant other/support system about signs and symptoms of constipation and interventions to implement  Note: Patient maintaining adequate hydration and oral intake. Patient reports BM in the last 24 hrs. Bowel protocol in place.

## 2020-05-13 NOTE — CARE PLAN
Problem: Knowledge Deficit  Goal: Knowledge of disease process/condition, treatment plan, diagnostic tests, and medications will improve  Outcome: PROGRESSING AS EXPECTED   Pt updated on POC, tests, and medications. Pt verbalizes understanding and has no further questions at this time. Pt feels more anxious to go home now that she is feeling better.     Problem: Discharge Barriers/Planning  Goal: Patient's continuum of care needs will be met  Outcome: PROGRESSING AS EXPECTED   Discussed with patient regarding barriers to discharge. Patient verbalized understanding regarding asking any questions she has about her POC.

## 2020-05-13 NOTE — PROGRESS NOTES
[facility] Nephrology Daily Progress Note    Date of Service  5/13/2020    Chief Complaint  Follow up ESRD    Interval Problem Update  This is a 50-year-old  female with a past medical history significant for end-stage renal disease, on chronic hemodialysis Monday, Wednesday, Fridays via a right chest PermCath; hypertension, COPD, hyperlipidemia, seizure disorder, moyamoya disease, history of CVA, systolic CHF with EF of 30%, anemia, secondary to chronic kidney disease, CKD bone mineral disorder/renal osteodystrophy, secondary   hyperparathyroidism, was admitted with sepsis secondary to possible community-acquired pneumonia and being ruled out for COVID-19 infection.  The patient initially presented to an outside hospital in [place] with complaints of cough, shortness of breath and fever for the past 3 days.  She was noted to have acute hypoxic respiratory failure, requiring supplemental oxygen at the outside hospital, and because she was a dialysis patient, she was transferred to [facility] for further care.  The patient reports that she is compliant with her outpatient dialysis treatments and her last hemodialysis treatment was on Friday, 05/08/2020, and it was unremarkable.  Also, at the outside hospital, she was noted to be febrile with a temperature of 103.1.  She did have a chest x-ray that showed an enlarged cardiac silhouette with vascular congestion and her BNP was elevated at 3400.  She was admitted with sepsis and is being treated for community-acquired pneumonia, but there is also concern for possible COVID-19 infection given her fevers and respiratory complaints and testing has been sent and is currently pending.  The patient reports that she normally dialyzes Monday, Wednesday, and Fridays via a right chest PermCath.  She has been compliant with her outpatient dialysis treatments.  She just started dialysis last month in 04/2020.    DAILY NEPHROLOGY SUMMARY  5/12/20--No events,  having diarrhea since last night, afebrile this am, BP borderline low overnight and this am, tolerated HD yest with 2L UF, pt denies any cough  5/13/20--No events, afebrile overnight, reports diarrhea a little better but still watery, BP stable this am, seen on dialysis    Review of Systems  Review of Systems   Constitutional: Positive for malaise/fatigue. Negative for chills and fever.   HENT: Negative for congestion, nosebleeds and sore throat.    Eyes: Negative for blurred vision, double vision and photophobia.   Respiratory: Negative for cough, hemoptysis and shortness of breath.    Cardiovascular: Negative for chest pain, palpitations and leg swelling.   Gastrointestinal: Positive for diarrhea. Negative for abdominal pain, nausea and vomiting.   Genitourinary: Negative for dysuria, hematuria and urgency.   Musculoskeletal: Negative for back pain, myalgias and neck pain.   Skin: Negative for itching and rash.   Neurological: Negative for dizziness, focal weakness and headaches.   Endo/Heme/Allergies: Negative for environmental allergies and polydipsia. Does not bruise/bleed easily.   Psychiatric/Behavioral: Negative for depression. The patient is not nervous/anxious.         Physical Exam  Temp:  [36.4 °C (97.6 °F)-36.8 °C (98.3 °F)] 36.8 °C (98.3 °F)  Pulse:  [53-59] 56  Resp:  [19-20] 19  BP: ()/(45-70) 109/52  SpO2:  [92 %-100 %] 100 %    Physical Exam  Constitutional:       General: She is not in acute distress.     Appearance: Normal appearance.   HENT:      Head: Normocephalic and atraumatic.      Right Ear: External ear normal.      Left Ear: External ear normal.      Nose: Nose normal. No congestion.      Mouth/Throat:      Mouth: Mucous membranes are moist.      Pharynx: Oropharynx is clear.   Eyes:      General: No scleral icterus.     Extraocular Movements: Extraocular movements intact.      Conjunctiva/sclera: Conjunctivae normal.   Neck:      Musculoskeletal: Normal range of motion and neck  supple. No neck rigidity.   Cardiovascular:      Rate and Rhythm: Normal rate and regular rhythm.      Heart sounds: No murmur.   Pulmonary:      Effort: Pulmonary effort is normal. No respiratory distress.      Breath sounds: Examination of the right-lower field reveals decreased breath sounds. Examination of the left-lower field reveals decreased breath sounds. Decreased breath sounds present. No wheezing.      Comments: +R Chest PC  Abdominal:      General: Abdomen is flat. Bowel sounds are normal.      Palpations: Abdomen is soft.      Tenderness: There is no abdominal tenderness.   Musculoskeletal: Normal range of motion.         General: No tenderness.      Right lower leg: No edema.      Left lower leg: No edema.   Skin:     General: Skin is warm and dry.      Findings: No erythema.   Neurological:      General: No focal deficit present.      Mental Status: She is alert and oriented to person, place, and time.      Motor: No weakness.   Psychiatric:         Mood and Affect: Mood normal.         Behavior: Behavior normal.         Fluids    Intake/Output Summary (Last 24 hours) at 5/13/2020 1329  Last data filed at 5/13/2020 1200  Gross per 24 hour   Intake 620 ml   Output 2500 ml   Net -1880 ml       Laboratory  Recent Labs     05/12/20  0416 05/13/20  0430   WBC 13.2* 10.7   RBC 2.93* 2.90*   HEMOGLOBIN 9.4* 9.3*   HEMATOCRIT 29.9* 28.8*   .0* 99.3*   MCH 32.1 32.1   MCHC 31.4* 32.3*   RDW 52.5* 50.6*   PLATELETCT 85* 74*   MPV 11.6 11.6     Recent Labs     05/12/20  0416 05/13/20  0430   SODIUM 131* 130*   POTASSIUM 4.5 4.1   CHLORIDE 93* 94*   CO2 21 22   GLUCOSE 124* 159*   BUN 36* 57*   CREATININE 4.00* 5.25*   CALCIUM 8.6 8.6         No results for input(s): NTPROBNP in the last 72 hours.        Assessment:  1. ESRD--on HD MWF via R Chest PC  2. Sepsis--possible CAP, also evaluated for COVID-19  --COVID-19 negative 5/11/20  --Being ruled out for COVID-19 with 2nd test collected 5/13  3. Acute  Hypoxic Respiratory Failure--possible CAP, outside hospital CXR showed vascular congestion  --Requiring supplemental O2  4. HTN--BP controlled  5. Anemia--secondary to CKD  6. CKD Bone Mineral Disorder/Renal Osteodystrophy  7. Secondary Hyperparathyroidism  --On calcitriol  8. Seizure Disorder  9. History of Systolic CHF--EF 30% in 4/20  10. Systolic CHF--EF 30% by echo in 4/20  11. Moyamoya Disease  12. Diarrhea--started 5/11/20  13. Leukocytosis--on steroids     Plan:  --No HD today, continue qMWF dialysis schedule  --Fluid removal with HD as tolerated  --Avoid aggressive lowering of BP to allow for fluid removal with HD  --Hold BP meds in am on dialysis days  --Dose adjust all meds for decreased GFR  --Albumin with HD for BP support if needed  --PRN NS boluses for symptomatic hypotension  --Continue broad spectrum abx  --Follow up culture results  --Continue supplemental O2  --Epogen with HD  --Continue calcitriol  --Check phos level  --Consider checking stool studies if diarrhea persists  --COVID-19 test 5/11 negative but pt will need 2nd negative test before she can go back to her regular outpt dialysis unit or she will have to transfer to a different dialysis unit temporarily, see dialysis coordinator note  --COVID-19 test ordered 5/13 and results pending

## 2020-05-13 NOTE — PROGRESS NOTES
Patient up to the bathroom without her oxygen and experiencing increased SOB, requested she put back on her oxygen to have on at all times but refused. Reinforcement provided on education of keeping her oxygen on and continued to refuse until she was back in bed.

## 2020-05-13 NOTE — PROGRESS NOTES
Hospital Medicine Daily Progress Note    Date of Service  5/13/2020    Chief Complaint  50 y.o. female admitted 5/11/2020 with shortness of breath.    Hospital Course  This is a 50 years old female who has past medical history of end-stage renal disease on hemodialysis, history of nonischemic cardiomyopathy and combined systolic and diastolic congestive heart failure, and history of moyamoya disease and mental retardation comes in with shortness of breath.  Patient recently hospitalized last month for chest pain and shortness of breath and was found to be in acute on chronic congestive heart failure and volume overload.  She received dialysis and her cardiac medical treatment was optimized.  She was discharged home.  This time she comes then with worsening shortness of breath that started few days ago.  Also patient admits to occasional fever, chills, diarrhea, and productive cough.  Was initially presented to outside facility and was transferred here for higher level of care.  Noted to be febrile over there.  Had a chest x-ray at the outside facility which showed pulmonary congestion.  She was hypoxic and started on oxygen.  Nephrology consulted.  Alvin that her respiratory failure is a combination of volume overload and possible underlying upper respiratory infection/COPD exacerbation.  Her first COVID-19 PCR was negative.  Started on broad-spectrum IV antibiotics.  Interval Problem Update  Resting in bed comfortably.  Denies any chest pain.  Respiratory status stable.  Has been afebrile overnight and this morning.  Admits to diarrhea.  No acute distress noted.  5/13: Sitting up in bed comfortably.  Not in distress.  Having dialysis at bedside.  Blood pressure continue to be borderline but patient is asymptomatic.  No dizziness or lightheadedness.  Has been afebrile overnight and this morning.  Second COVID 19 PCR test came back negative.  Have not had any bowel movement since yesterday and C. difficile PCR/stool  studies pending.  No issues overnight per staff.  Consultants/Specialty  Nephrology    Code Status  Full code    Disposition  Pending clinical course    Review of Systems  Review of Systems   Constitutional: Positive for malaise/fatigue. Negative for chills and fever.   HENT: Negative for ear discharge, ear pain, hearing loss and tinnitus.    Eyes: Negative for blurred vision and double vision.   Respiratory: Positive for shortness of breath. Negative for cough and wheezing.    Cardiovascular: Positive for leg swelling. Negative for chest pain and palpitations.   Gastrointestinal: Negative for heartburn and nausea.   Genitourinary: Negative for dysuria and frequency.   Musculoskeletal: Negative for back pain, joint pain, myalgias and neck pain.   Skin: Negative for rash.   Neurological: Negative for dizziness, tingling, tremors and headaches.   Psychiatric/Behavioral: Negative for depression, substance abuse and suicidal ideas.        Physical Exam  Temp:  [36.4 °C (97.6 °F)-36.8 °C (98.3 °F)] 36.8 °C (98.3 °F)  Pulse:  [53-59] 56  Resp:  [19-20] 19  BP: ()/(45-70) 109/52  SpO2:  [92 %-100 %] 100 %    Physical Exam  Constitutional:       Appearance: Normal appearance. She is not ill-appearing.   HENT:      Head: Normocephalic and atraumatic.      Nose: No rhinorrhea.      Mouth/Throat:      Pharynx: No oropharyngeal exudate or posterior oropharyngeal erythema.   Eyes:      Extraocular Movements: Extraocular movements intact.      Pupils: Pupils are equal, round, and reactive to light.   Cardiovascular:      Rate and Rhythm: Normal rate and regular rhythm.      Heart sounds: No friction rub. No gallop.    Pulmonary:      Effort: No respiratory distress.      Breath sounds: Decreased breath sounds and rales present.   Abdominal:      General: There is no distension.      Palpations: Abdomen is soft.      Tenderness: There is no abdominal tenderness.   Musculoskeletal:         General: Swelling present. No  tenderness.      Right lower leg: Edema present.      Left lower leg: Edema present.   Skin:     Coloration: Skin is not jaundiced or pale.   Neurological:      General: No focal deficit present.      Mental Status: She is alert and oriented to person, place, and time.      Cranial Nerves: No cranial nerve deficit.   Psychiatric:         Mood and Affect: Mood normal.         Behavior: Behavior normal.         Fluids    Intake/Output Summary (Last 24 hours) at 5/13/2020 1552  Last data filed at 5/13/2020 1200  Gross per 24 hour   Intake 860 ml   Output 2500 ml   Net -1640 ml       Laboratory  Recent Labs     05/12/20  0416 05/13/20  0430   WBC 13.2* 10.7   RBC 2.93* 2.90*   HEMOGLOBIN 9.4* 9.3*   HEMATOCRIT 29.9* 28.8*   .0* 99.3*   MCH 32.1 32.1   MCHC 31.4* 32.3*   RDW 52.5* 50.6*   PLATELETCT 85* 74*   MPV 11.6 11.6     Recent Labs     05/12/20  0416 05/13/20  0430   SODIUM 131* 130*   POTASSIUM 4.5 4.1   CHLORIDE 93* 94*   CO2 21 22   GLUCOSE 124* 159*   BUN 36* 57*   CREATININE 4.00* 5.25*   CALCIUM 8.6 8.6                   Imaging  DX-CHEST-PORTABLE (1 VIEW)   Final Result      Stable cardiomegaly.      Atherosclerotic plaque.      Dialysis catheter projects over the SVC.              Assessment/Plan  * Acute respiratory failure with hypoxia (HCC)- (present on admission)  Assessment & Plan  Suspect likely multifactorial with possible volume overload and COPD exacerbation.  Will give dose of IV lasix 80 x 1  IV steroids, duo nebs   Empiric IV abx given concern for underlying sepsis/sirs  COVID 19 PCR test negative.  Repeat in 48 hours.  Wean 02 as tolerated      Chronic combined systolic and diastolic congestive heart failure (HCC)- (present on admission)  Assessment & Plan  History of nonischemic cardiomyopathy with ejection fraction of 30% and grade 3 diastolic dysfunction.  Continue with offloading with dialysis.  Hold cardiac medications for now per parameters due to borderline blood  pressure.      Volume overload  Assessment & Plan  Appears volume overloaded on exam with vascular congestion on CXR   IV lasix x 1  Nephrology following.    CAP (community acquired pneumonia)  Assessment & Plan  Presumed CAP given fever 103 at OSH, tachycardia and tachypnea  R/o covid 19   Cont with IV rocephin and azithro   Wean abx as clinically appropraite   5/13: COVID 19 PCR negative x2.    COPD exacerbation ([facility])  Assessment & Plan  Continue bronchodilators along with steroids.  Wean 02 as tolerated  Keep sats 88-92%    ESRD (end stage renal disease) ([facility])  Assessment & Plan  Nephrology following.    Sepsis ([facility])- (present on admission)  Assessment & Plan  This is Sepsis Present on admission  SIRS criteria identified on my evaluation include: Fever, with temperature greater than 101 deg F, Tachycardia, with heart rate greater than 90 BPM and Tachypnea, with respirations greater than 20 per minute  Source is pulmonary  Sepsis protocol initiated  Fluid resuscitation ordered per protocol  IV antibiotics as appropriate for source of sepsis  While organ dysfunction may be noted elsewhere in this problem list or in the chart, degree of organ dysfunction does not meet CMS criteria for severe sepsis  Resolving.          Seizure disorder, grand mal (HCC)- (present on admission)  Assessment & Plan  Resume home keppra and dilantin     H/O: CVA (cerebrovascular accident)- (present on admission)  Assessment & Plan  Hx of     HTN (hypertension)  Assessment & Plan  Resume home anti hypertensive medications     HLD (hyperlipidemia)- (present on admission)  Assessment & Plan  Resume home medications    Essential hypertension, benign- (present on admission)  Assessment & Plan  Blood pressure borderline.  Hold blood pressure medications for now to allow for dialysis tomorrow.       VTE prophylaxis: SCDs.  Holding anticoagulation for now due to thrombocytopenia.

## 2020-05-13 NOTE — PROGRESS NOTES
12 hour Chart Check    Monitor Summary   SB 52-58  PAC PVC BBB Coup Bigem  7-9 beat runs of A-fib   .18/.10/.48

## 2020-05-13 NOTE — TELEPHONE ENCOUNTER
NICOLE Culver, patients mother Andria calling and would like to know if NICOLE is aware that pt is still admitted. She is wondering if Rhoda Thomas will talk to the physician that is attending her and if it will be okay to cancel appt for tomorrow with NICOLE. She will like a call back at [phone]

## 2020-05-13 NOTE — PROGRESS NOTES
Hemodialysis done today, started @ 0901 and ended @ 1202 with net UF= 2000ml, Heparin held per Dr Flores due to low platelets. Report given to DIANE So. See flow sheet for details.

## 2020-05-13 NOTE — ASSESSMENT & PLAN NOTE
History of nonischemic cardiomyopathy with ejection fraction of 30% and grade 3 diastolic dysfunction.  Continue with offloading with dialysis.  Hold cardiac medications per parameters due to borderline blood pressure.

## 2020-05-13 NOTE — PROGRESS NOTES
Report received from NOC RN, pt care assumed, tele box on. Pt aaox4, no signs of distress noted at this time. Patient resting comfortably in bed. POC discussed with pt and verbalizes no questions. Pt c/o of no pain. Pt denies any additional needs at this time. Bed in lowest position, pt educated on fall risk and verbalized understanding, call light within reach, bed alarm plugged in and on.    Dialysis RN at bedside.

## 2020-05-13 NOTE — PROGRESS NOTES
Assumed care this pm from day shift RN. Patient resting in bed with no signs of pain or distress. Patient AxO 4, O2 @ 2L thru nasal cannula, VSS. Call bell and personal items within reach, bed locked in low position. Bed exit alarm on and plugged in. Hourly rounding in place. Tele box in place, monitor room notified.

## 2020-05-14 ENCOUNTER — APPOINTMENT (OUTPATIENT)
Dept: CARDIOLOGY | Facility: MEDICAL CENTER | Age: 50
End: 2020-05-14
Payer: MEDICARE

## 2020-05-14 VITALS
OXYGEN SATURATION: 98 % | DIASTOLIC BLOOD PRESSURE: 55 MMHG | WEIGHT: 242.73 LBS | SYSTOLIC BLOOD PRESSURE: 93 MMHG | HEART RATE: 60 BPM | TEMPERATURE: 97 F | BODY MASS INDEX: 35.84 KG/M2 | RESPIRATION RATE: 17 BRPM

## 2020-05-14 PROBLEM — J44.1 COPD EXACERBATION (HCC): Status: RESOLVED | Noted: 2020-05-11 | Resolved: 2020-05-14

## 2020-05-14 PROBLEM — A41.9 SEPSIS (HCC): Status: RESOLVED | Noted: 2017-03-26 | Resolved: 2020-05-14

## 2020-05-14 PROBLEM — E87.70 VOLUME OVERLOAD: Status: RESOLVED | Noted: 2020-05-11 | Resolved: 2020-05-14

## 2020-05-14 LAB
ALBUMIN SERPL BCP-MCNC: 2.6 G/DL (ref 3.2–4.9)
ALBUMIN/GLOB SERPL: 0.8 G/DL
ALP SERPL-CCNC: 119 U/L (ref 30–99)
ALT SERPL-CCNC: 708 U/L (ref 2–50)
ANION GAP SERPL CALC-SCNC: 13 MMOL/L (ref 7–16)
AST SERPL-CCNC: 497 U/L (ref 12–45)
BASOPHILS # BLD AUTO: 0.2 % (ref 0–1.8)
BASOPHILS # BLD: 0.02 K/UL (ref 0–0.12)
BILIRUB SERPL-MCNC: 0.4 MG/DL (ref 0.1–1.5)
BUN SERPL-MCNC: 36 MG/DL (ref 8–22)
CALCIUM SERPL-MCNC: 8.5 MG/DL (ref 8.5–10.5)
CHLORIDE SERPL-SCNC: 96 MMOL/L (ref 96–112)
CO2 SERPL-SCNC: 24 MMOL/L (ref 20–33)
CREAT SERPL-MCNC: 4.19 MG/DL (ref 0.5–1.4)
EOSINOPHIL # BLD AUTO: 0 K/UL (ref 0–0.51)
EOSINOPHIL NFR BLD: 0 % (ref 0–6.9)
ERYTHROCYTE [DISTWIDTH] IN BLOOD BY AUTOMATED COUNT: 50.4 FL (ref 35.9–50)
GLOBULIN SER CALC-MCNC: 3.1 G/DL (ref 1.9–3.5)
GLUCOSE SERPL-MCNC: 110 MG/DL (ref 65–99)
HCT VFR BLD AUTO: 30.5 % (ref 37–47)
HGB BLD-MCNC: 9.7 G/DL (ref 12–16)
IMM GRANULOCYTES # BLD AUTO: 0.06 K/UL (ref 0–0.11)
IMM GRANULOCYTES NFR BLD AUTO: 0.6 % (ref 0–0.9)
LYMPHOCYTES # BLD AUTO: 0.67 K/UL (ref 1–4.8)
LYMPHOCYTES NFR BLD: 7.1 % (ref 22–41)
MCH RBC QN AUTO: 31.7 PG (ref 27–33)
MCHC RBC AUTO-ENTMCNC: 31.8 G/DL (ref 33.6–35)
MCV RBC AUTO: 99.7 FL (ref 81.4–97.8)
MONOCYTES # BLD AUTO: 0.8 K/UL (ref 0–0.85)
MONOCYTES NFR BLD AUTO: 8.5 % (ref 0–13.4)
NEUTROPHILS # BLD AUTO: 7.83 K/UL (ref 2–7.15)
NEUTROPHILS NFR BLD: 83.6 % (ref 44–72)
NRBC # BLD AUTO: 0 K/UL
NRBC BLD-RTO: 0 /100 WBC
PLATELET # BLD AUTO: 73 K/UL (ref 164–446)
PMV BLD AUTO: 12.4 FL (ref 9–12.9)
POTASSIUM SERPL-SCNC: 3.8 MMOL/L (ref 3.6–5.5)
PROT SERPL-MCNC: 5.7 G/DL (ref 6–8.2)
RBC # BLD AUTO: 3.06 M/UL (ref 4.2–5.4)
SODIUM SERPL-SCNC: 133 MMOL/L (ref 135–145)
WBC # BLD AUTO: 9.4 K/UL (ref 4.8–10.8)

## 2020-05-14 PROCEDURE — A9270 NON-COVERED ITEM OR SERVICE: HCPCS | Performed by: HOSPITALIST

## 2020-05-14 PROCEDURE — 36415 COLL VENOUS BLD VENIPUNCTURE: CPT

## 2020-05-14 PROCEDURE — 94760 N-INVAS EAR/PLS OXIMETRY 1: CPT

## 2020-05-14 PROCEDURE — 85025 COMPLETE CBC W/AUTO DIFF WBC: CPT

## 2020-05-14 PROCEDURE — A9270 NON-COVERED ITEM OR SERVICE: HCPCS | Performed by: FAMILY MEDICINE

## 2020-05-14 PROCEDURE — 700102 HCHG RX REV CODE 250 W/ 637 OVERRIDE(OP): Performed by: FAMILY MEDICINE

## 2020-05-14 PROCEDURE — 80053 COMPREHEN METABOLIC PANEL: CPT

## 2020-05-14 PROCEDURE — 700111 HCHG RX REV CODE 636 W/ 250 OVERRIDE (IP): Performed by: FAMILY MEDICINE

## 2020-05-14 PROCEDURE — 99239 HOSP IP/OBS DSCHRG MGMT >30: CPT | Performed by: FAMILY MEDICINE

## 2020-05-14 PROCEDURE — 700102 HCHG RX REV CODE 250 W/ 637 OVERRIDE(OP): Performed by: HOSPITALIST

## 2020-05-14 PROCEDURE — 700105 HCHG RX REV CODE 258: Performed by: FAMILY MEDICINE

## 2020-05-14 RX ORDER — CEFDINIR 300 MG/1
300 CAPSULE ORAL
Qty: 1 CAP | Refills: 0 | Status: SHIPPED | OUTPATIENT
Start: 2020-05-15 | End: 2020-05-17

## 2020-05-14 RX ORDER — CARVEDILOL 6.25 MG/1
6.25 TABLET ORAL 2 TIMES DAILY WITH MEALS
Qty: 60 TAB | Refills: 0 | Status: SHIPPED | OUTPATIENT
Start: 2020-05-14 | End: 2020-06-11

## 2020-05-14 RX ORDER — PREDNISONE 20 MG/1
TABLET ORAL
Qty: 6 TAB | Refills: 0 | Status: ON HOLD | OUTPATIENT
Start: 2020-05-15 | End: 2020-05-30

## 2020-05-14 RX ORDER — VITAMIN C
1 TAB ORAL DAILY
Qty: 30 TAB | Refills: 0 | Status: SHIPPED | OUTPATIENT
Start: 2020-05-15 | End: 2020-06-11

## 2020-05-14 RX ORDER — DOXYCYCLINE 100 MG/1
100 TABLET ORAL EVERY 12 HOURS
Qty: 4 TAB | Refills: 0 | Status: SHIPPED | OUTPATIENT
Start: 2020-05-14 | End: 2020-05-16

## 2020-05-14 RX ORDER — LEVETIRACETAM 500 MG/1
500 TABLET ORAL EVERY MORNING
Status: DISCONTINUED | OUTPATIENT
Start: 2020-05-15 | End: 2020-05-15 | Stop reason: HOSPADM

## 2020-05-14 RX ORDER — LEVETIRACETAM 500 MG/1
1000 TABLET ORAL EVERY EVENING
Status: DISCONTINUED | OUTPATIENT
Start: 2020-05-14 | End: 2020-05-15 | Stop reason: HOSPADM

## 2020-05-14 RX ORDER — VALSARTAN 40 MG/1
20 TABLET ORAL DAILY
Qty: 30 TAB | Refills: 3 | Status: ON HOLD | OUTPATIENT
Start: 2020-05-15 | End: 2020-05-30

## 2020-05-14 RX ADMIN — LEVETIRACETAM 500 MG: 500 TABLET ORAL at 05:37

## 2020-05-14 RX ADMIN — ASPIRIN 81 MG 81 MG: 81 TABLET ORAL at 05:37

## 2020-05-14 RX ADMIN — ESCITALOPRAM OXALATE 10 MG: 10 TABLET ORAL at 05:36

## 2020-05-14 RX ADMIN — FOLIC ACID 1 MG: 1 TABLET ORAL at 05:37

## 2020-05-14 RX ADMIN — PHENYTOIN SODIUM 100 MG: 100 CAPSULE ORAL at 12:40

## 2020-05-14 RX ADMIN — OMEPRAZOLE 20 MG: 20 CAPSULE, DELAYED RELEASE ORAL at 05:37

## 2020-05-14 RX ADMIN — PREDNISONE 40 MG: 20 TABLET ORAL at 05:36

## 2020-05-14 RX ADMIN — VITAMIN C 1 TABLET: TAB at 05:36

## 2020-05-14 RX ADMIN — CEFTRIAXONE SODIUM 2 G: 2 INJECTION, POWDER, FOR SOLUTION INTRAMUSCULAR; INTRAVENOUS at 05:37

## 2020-05-14 RX ADMIN — PHENYTOIN SODIUM 100 MG: 100 CAPSULE ORAL at 05:36

## 2020-05-14 RX ADMIN — DOXYCYCLINE 100 MG: 100 TABLET ORAL at 05:36

## 2020-05-14 RX ADMIN — LORAZEPAM 1 MG: 1 TABLET ORAL at 05:37

## 2020-05-14 RX ADMIN — CARVEDILOL 6.25 MG: 6.25 TABLET, FILM COATED ORAL at 05:37

## 2020-05-14 ASSESSMENT — ENCOUNTER SYMPTOMS
HEMOPTYSIS: 0
NAUSEA: 0
DIZZINESS: 0
HEADACHES: 0
DOUBLE VISION: 0
FEVER: 0
SHORTNESS OF BREATH: 1
FOCAL WEAKNESS: 0
PALPITATIONS: 0
BACK PAIN: 0
MYALGIAS: 0
DIARRHEA: 0
TINGLING: 0
WHEEZING: 0
HEARTBURN: 0
COUGH: 0
BLURRED VISION: 0
NECK PAIN: 0
SHORTNESS OF BREATH: 0
CHILLS: 0
PHOTOPHOBIA: 0
NERVOUS/ANXIOUS: 0
ABDOMINAL PAIN: 0
POLYDIPSIA: 0
VOMITING: 0
BRUISES/BLEEDS EASILY: 0
SORE THROAT: 0
DEPRESSION: 0

## 2020-05-14 NOTE — DISCHARGE PLANNING
Outpatient Dialysis Note    Sent Covid-19 results to HD clinic, pending review.    Triny Paulino- Dialysis Coordinator  [facility] # [phone]

## 2020-05-14 NOTE — DISCHARGE PLANNING
"[facility]INITY left handoff note to Dominique Barber [phone]    \"Patient is expected to D/C this evening back to [facility]. Family will be picking her up.   [facility] for [facility]. Last called at 3:35, reported they are “reviewing.”   Could you call [facility] at [phone] to see if they are accepted?     Then if accepted, please call respiratory, [phone] to deliver tank to bedside. Charge for Tele 7 is Radha [phone]. Daytime bedside RN is [phone] Diya CONTRERAS\"    PLAN: RN, please follow up with Dominique Barber on this, as things can still be approved moving forward this evening.     Thank you    "

## 2020-05-14 NOTE — DISCHARGE PLANNING
@[phone]  Agency/Facility Name: [facility]  Spoke To: Admission  Outcome: Reviewing referral.    Received Choice form at 3079  Agency/Facility Name: [facility]  Referral sent per Choice form @ [phone]

## 2020-05-14 NOTE — RESPIRATORY CARE
Oxygen Rounds      Patient found on    O2 L/m:  2  Oxygen device:  Nasal Cannula  Spo2: 98%      Respiratory device skin site inspection completed.

## 2020-05-14 NOTE — DISCHARGE PLANNING
TRINITY called Venus Paulino, dialysis coordinator. She reports COVID testing results have already been sent to [place] and Dialysis in [place] is approved and is good to go. [facility]ITY updated RN that waiting on [facility] acceptance.   Discussed IMM, no issues with D/C per patient.     PLAN: Still waiting on O2 acceptance from [facility].

## 2020-05-14 NOTE — PROGRESS NOTES
[facility] Nephrology Daily Progress Note    Date of Service  5/14/2020    Chief Complaint  Follow up ESRD    Interval Problem Update  This is a 50-year-old  female with a past medical history significant for end-stage renal disease, on chronic hemodialysis Monday, Wednesday, Fridays via a right chest PermCath; hypertension, COPD, hyperlipidemia, seizure disorder, moyamoya disease, history of CVA, systolic CHF with EF of 30%, anemia, secondary to chronic kidney disease, CKD bone mineral disorder/renal osteodystrophy, secondary   hyperparathyroidism, was admitted with sepsis secondary to possible community-acquired pneumonia and being ruled out for COVID-19 infection.  The patient initially presented to an outside hospital in [place] with complaints of cough, shortness of breath and fever for the past 3 days.  She was noted to have acute hypoxic respiratory failure, requiring supplemental oxygen at the outside hospital, and because she was a dialysis patient, she was transferred to [facility] for further care.  The patient reports that she is compliant with her outpatient dialysis treatments and her last hemodialysis treatment was on Friday, 05/08/2020, and it was unremarkable.  Also, at the outside hospital, she was noted to be febrile with a temperature of 103.1.  She did have a chest x-ray that showed an enlarged cardiac silhouette with vascular congestion and her BNP was elevated at 3400.  She was admitted with sepsis and is being treated for community-acquired pneumonia, but there is also concern for possible COVID-19 infection given her fevers and respiratory complaints and testing has been sent and is currently pending.  The patient reports that she normally dialyzes Monday, Wednesday, and Fridays via a right chest PermCath.  She has been compliant with her outpatient dialysis treatments.  She just started dialysis last month in 04/2020.    DAILY NEPHROLOGY SUMMARY  5/12/20--No events,  having diarrhea since last night, afebrile this am, BP borderline low overnight and this am, tolerated HD yest with 2L UF, pt denies any cough  5/13/20--No events, afebrile overnight, reports diarrhea a little better but still watery, BP stable this am, seen on dialysis  5/14/20--No events, reports diarrhea resolved and having formed stools, BP stable, denies any SOB, still requiring supplemental O2, COVID x2 negative, pt eager to go home    Review of Systems  Review of Systems   Constitutional: Positive for malaise/fatigue. Negative for chills and fever.   HENT: Negative for congestion, nosebleeds and sore throat.    Eyes: Negative for blurred vision, double vision and photophobia.   Respiratory: Negative for cough, hemoptysis and shortness of breath.    Cardiovascular: Negative for chest pain, palpitations and leg swelling.   Gastrointestinal: Negative for abdominal pain, diarrhea, nausea and vomiting.   Genitourinary: Negative for dysuria, hematuria and urgency.   Musculoskeletal: Negative for back pain, myalgias and neck pain.   Skin: Negative for itching and rash.   Neurological: Negative for dizziness, focal weakness and headaches.   Endo/Heme/Allergies: Negative for environmental allergies and polydipsia. Does not bruise/bleed easily.   Psychiatric/Behavioral: Negative for depression. The patient is not nervous/anxious.         Physical Exam  Temp:  [36.1 °C (97 °F)-37.3 °C (99.2 °F)] 36.3 °C (97.3 °F)  Pulse:  [55-73] 61  Resp:  [17-20] 18  BP: ()/(49-69) 116/69  SpO2:  [95 %-100 %] 98 %    Physical Exam  Constitutional:       General: She is not in acute distress.     Appearance: Normal appearance.   HENT:      Head: Normocephalic and atraumatic.      Right Ear: External ear normal.      Left Ear: External ear normal.      Nose: Nose normal. No congestion.      Mouth/Throat:      Mouth: Mucous membranes are moist.      Pharynx: Oropharynx is clear.   Eyes:      General: No scleral icterus.      Extraocular Movements: Extraocular movements intact.      Conjunctiva/sclera: Conjunctivae normal.   Neck:      Musculoskeletal: Normal range of motion and neck supple. No neck rigidity.   Cardiovascular:      Rate and Rhythm: Normal rate and regular rhythm.      Heart sounds: No murmur.   Pulmonary:      Effort: Pulmonary effort is normal. No respiratory distress.      Breath sounds: Examination of the right-lower field reveals decreased breath sounds. Examination of the left-lower field reveals decreased breath sounds. Decreased breath sounds present. No wheezing.      Comments: +R Chest PC  Abdominal:      General: Abdomen is flat. Bowel sounds are normal.      Palpations: Abdomen is soft.      Tenderness: There is no abdominal tenderness.   Musculoskeletal: Normal range of motion.         General: No tenderness.      Right lower leg: No edema.      Left lower leg: No edema.   Skin:     General: Skin is warm and dry.      Findings: No erythema.   Neurological:      General: No focal deficit present.      Mental Status: She is alert and oriented to person, place, and time.      Motor: No weakness.   Psychiatric:         Mood and Affect: Mood normal.         Behavior: Behavior normal.         Fluids    Intake/Output Summary (Last 24 hours) at 5/14/2020 1127  Last data filed at 5/13/2020 1200  Gross per 24 hour   Intake 740 ml   Output 2500 ml   Net -1760 ml       Laboratory  Recent Labs     05/12/20 0416 05/13/20 0430 05/14/20 0419   WBC 13.2* 10.7 9.4   RBC 2.93* 2.90* 3.06*   HEMOGLOBIN 9.4* 9.3* 9.7*   HEMATOCRIT 29.9* 28.8* 30.5*   .0* 99.3* 99.7*   MCH 32.1 32.1 31.7   MCHC 31.4* 32.3* 31.8*   RDW 52.5* 50.6* 50.4*   PLATELETCT 85* 74* 73*   MPV 11.6 11.6 12.4     Recent Labs     05/12/20 0416 05/13/20 0430 05/14/20 0419   SODIUM 131* 130* 133*   POTASSIUM 4.5 4.1 3.8   CHLORIDE 93* 94* 96   CO2 21 22 24   GLUCOSE 124* 159* 110*   BUN 36* 57* 36*   CREATININE 4.00* 5.25* 4.19*   CALCIUM 8.6 8.6  8.5         No results for input(s): NTPROBNP in the last 72 hours.        Assessment:  1. ESRD--on HD MWF via R Chest PC  2. Sepsis--possible CAP, also evaluated for COVID-19  --COVID-19 negative 5/11/20 and again negative on 5/13  3. Acute Hypoxic Respiratory Failure--possible CAP, outside hospital CXR showed vascular congestion  --Requiring supplemental O2  4. HTN--BP controlled  5. Anemia--secondary to CKD  6. CKD Bone Mineral Disorder/Renal Osteodystrophy  7. Secondary Hyperparathyroidism  --On calcitriol  8. Seizure Disorder  9. History of Systolic CHF--EF 30% in 4/20  10. Systolic CHF--EF 30% by echo in 4/20  11. Moyamoya Disease  12. Diarrhea--started 5/11/20, now resolved  13. Leukocytosis--now resolved     Plan:  --No HD today (THURS), continue qMWF dialysis schedule  --Fluid removal with HD as tolerated  --Avoid aggressive lowering of BP to allow for fluid removal with HD  --Hold BP meds in am on dialysis days  --May need to decrease carvedilol dose to allow for fluid removal with HD  --Dose adjust all meds for decreased GFR  --Albumin with HD for BP support if needed  --PRN NS boluses for symptomatic hypotension  --Wean abx per primary svc  --Continue supplemental O2 and attempt to wean  --Epogen with HD  --Continue calcitriol  --Check phos level  --COVID-19 test results sent to outpt dialysis unit for review  --Please do not discharge patient until outpt dialysis confirmed

## 2020-05-14 NOTE — DISCHARGE PLANNING
TRINITY talked with patient, she is agreeable to [facility] choice. TRINITY sent off to [facility] to [facility] [phone]    PLAN: Pending [facility] acceptance. Patient would like to D/C today if possible. Pending acceptance with [facility]. Then delivery of O2 tanks from respritory [phone] once [facility] approves

## 2020-05-14 NOTE — DISCHARGE PLANNING
Agency: [facility] in [place]  Spoke to: Laura  Outcome:  Laura will review referral and if approved she will call the [facility] in [place] for a tank delivery.  SW asked about [employer] tanks in respiratory closet.  Laura stated that [facility] has to be the one to authorize release of a tank.

## 2020-05-14 NOTE — PROGRESS NOTES
12 hr cc          Monitor Summary:  SR 57-68   With frequent PVC, rare couplet, rare PAC, and big  .14/.10/.44

## 2020-05-14 NOTE — PROGRESS NOTES
Hospital Medicine Daily Progress Note    Date of Service  5/14/2020    Chief Complaint  50 y.o. female admitted 5/11/2020 with shortness of breath.    Hospital Course  This is a 50 years old female who has past medical history of end-stage renal disease on hemodialysis, history of nonischemic cardiomyopathy and combined systolic and diastolic congestive heart failure, and history of moyamoya disease and mental retardation comes in with shortness of breath.  Patient recently hospitalized last month for chest pain and shortness of breath and was found to be in acute on chronic congestive heart failure and volume overload.  She received dialysis and her cardiac medical treatment was optimized.  She was discharged home.  This time she comes then with worsening shortness of breath that started few days ago.  Also patient admits to occasional fever, chills, diarrhea, and productive cough.  Was initially presented to outside facility and was transferred here for higher level of care.  Noted to be febrile over there.  Had a chest x-ray at the outside facility which showed pulmonary congestion.  She was hypoxic and started on oxygen.  Nephrology consulted.  Fleming that her respiratory failure is a combination of volume overload and possible underlying upper respiratory infection/COPD exacerbation.  Her first COVID-19 PCR was negative.  Started on broad-spectrum IV antibiotics.  Interval Problem Update  Resting in bed comfortably.  Denies any chest pain.  Respiratory status stable.  Has been afebrile overnight and this morning.  Admits to diarrhea.  No acute distress noted.  5/13: Sitting up in bed comfortably.  Not in distress.  Having dialysis at bedside.  Blood pressure continue to be borderline but patient is asymptomatic.  No dizziness or lightheadedness.  Has been afebrile overnight and this morning.  Second COVID 19 PCR test came back negative.  Have not had any bowel movement since yesterday and C. difficile PCR/stool  studies pending.  No issues overnight per staff.  5/14: Resting in bed comfortable.  Respiratory status stable.  Home oxygen has been ordered.  Stated that she had 3 episodes of loose stools this morning.  Sample for C. Difficile toxin PCR still pending.  Blood pressure borderline but asymptomatic.  No acute distress.  No issues overnight per staff.  Consultants/Specialty  Nephrology    Code Status  Full code    Disposition  Home once medically cleared.    Review of Systems  Review of Systems   Constitutional: Positive for malaise/fatigue. Negative for fever.   HENT: Negative for ear pain, hearing loss and tinnitus.    Eyes: Negative for blurred vision and double vision.   Respiratory: Positive for shortness of breath. Negative for cough, hemoptysis and wheezing.    Cardiovascular: Positive for leg swelling. Negative for chest pain and palpitations.   Gastrointestinal: Negative for heartburn and nausea.   Genitourinary: Negative for dysuria and urgency.   Musculoskeletal: Negative for myalgias and neck pain.   Skin: Negative for rash.   Neurological: Negative for dizziness, tingling and headaches.   Psychiatric/Behavioral: Negative for depression and suicidal ideas.        Physical Exam  Temp:  [36.1 °C (97 °F)-37.2 °C (98.9 °F)] 36.1 °C (97 °F)  Pulse:  [55-73] 60  Resp:  [17-18] 17  BP: ()/(49-69) 93/55  SpO2:  [94 %-99 %] 98 %    Physical Exam  Constitutional:       General: She is not in acute distress.     Appearance: Normal appearance. She is not ill-appearing.   HENT:      Head: Normocephalic and atraumatic.      Nose: No rhinorrhea.      Mouth/Throat:      Pharynx: No oropharyngeal exudate or posterior oropharyngeal erythema.   Eyes:      Extraocular Movements: Extraocular movements intact.      Pupils: Pupils are equal, round, and reactive to light.   Cardiovascular:      Rate and Rhythm: Normal rate and regular rhythm.      Heart sounds: No friction rub. No gallop.    Pulmonary:      Effort: No  respiratory distress.      Breath sounds: Decreased breath sounds and rales present.   Abdominal:      General: There is no distension.      Palpations: Abdomen is soft.      Tenderness: There is no abdominal tenderness.   Musculoskeletal:         General: Swelling present. No tenderness.      Right lower leg: Edema present.      Left lower leg: Edema present.   Skin:     Coloration: Skin is not jaundiced or pale.   Neurological:      General: No focal deficit present.      Mental Status: She is alert and oriented to person, place, and time.   Psychiatric:         Mood and Affect: Mood normal.         Fluids    Intake/Output Summary (Last 24 hours) at 5/14/2020 1656  Last data filed at 5/14/2020 1300  Gross per 24 hour   Intake 120 ml   Output --   Net 120 ml       Laboratory  Recent Labs     05/12/20 0416 05/13/20 0430 05/14/20 0419   WBC 13.2* 10.7 9.4   RBC 2.93* 2.90* 3.06*   HEMOGLOBIN 9.4* 9.3* 9.7*   HEMATOCRIT 29.9* 28.8* 30.5*   .0* 99.3* 99.7*   MCH 32.1 32.1 31.7   MCHC 31.4* 32.3* 31.8*   RDW 52.5* 50.6* 50.4*   PLATELETCT 85* 74* 73*   MPV 11.6 11.6 12.4     Recent Labs     05/12/20 0416 05/13/20 0430 05/14/20 0419   SODIUM 131* 130* 133*   POTASSIUM 4.5 4.1 3.8   CHLORIDE 93* 94* 96   CO2 21 22 24   GLUCOSE 124* 159* 110*   BUN 36* 57* 36*   CREATININE 4.00* 5.25* 4.19*   CALCIUM 8.6 8.6 8.5                   Imaging  DX-CHEST-PORTABLE (1 VIEW)   Final Result      Stable cardiomegaly.      Atherosclerotic plaque.      Dialysis catheter projects over the SVC.              Assessment/Plan  * Acute respiratory failure with hypoxia (HCC)- (present on admission)  Assessment & Plan  Suspect likely multifactorial with possible volume overload and COPD exacerbation.  Will give dose of IV lasix 80 x 1  IV steroids, duo nebs   Empiric IV abx given concern for underlying sepsis/sirs  COVID 19 PCR test negative.  Repeat in 48 hours.  Wean 02 as tolerated  Arranging for home oxygen.      Chronic  combined systolic and diastolic congestive heart failure (HCC)- (present on admission)  Assessment & Plan  History of nonischemic cardiomyopathy with ejection fraction of 30% and grade 3 diastolic dysfunction.  Continue with offloading with dialysis.  Hold cardiac medications per parameters due to borderline blood pressure.      Volume overload  Assessment & Plan  Appears volume overloaded on exam with vascular congestion on CXR   IV lasix x 1  Nephrology following.    CAP (community acquired pneumonia)  Assessment & Plan  Presumed CAP given fever 103 at OSH, tachycardia and tachypnea  R/o covid 19   Cont with IV rocephin and azithro   Wean abx as clinically appropraite   5/13: COVID 19 PCR negative x2.  Complete a course of antibiotics.    COPD exacerbation ([facility])  Assessment & Plan  Continue bronchodilators along with steroids.  Wean 02 as tolerated  Keep sats 88-92%    ESRD (end stage renal disease) ([facility])  Assessment & Plan  Nephrology following.    Sepsis ([facility])- (present on admission)  Assessment & Plan  This is Sepsis Present on admission  SIRS criteria identified on my evaluation include: Fever, with temperature greater than 101 deg F, Tachycardia, with heart rate greater than 90 BPM and Tachypnea, with respirations greater than 20 per minute  Source is pulmonary  Sepsis protocol initiated  Fluid resuscitation ordered per protocol  IV antibiotics as appropriate for source of sepsis  While organ dysfunction may be noted elsewhere in this problem list or in the chart, degree of organ dysfunction does not meet CMS criteria for severe sepsis  Resolved.          Seizure disorder, grand mal (HCC)- (present on admission)  Assessment & Plan  Resume home keppra and dilantin     H/O: CVA (cerebrovascular accident)- (present on admission)  Assessment & Plan  Hx of     HTN (hypertension)  Assessment & Plan  Resume home anti hypertensive medications     HLD (hyperlipidemia)- (present on admission)  Assessment & Plan  Resume  home medications    Essential hypertension, benign- (present on admission)  Assessment & Plan  Blood pressure borderline.  Hold blood pressure medications for now to allow for dialysis tomorrow.  Plan of care discussed with multidisciplinary team during rounds.    VTE prophylaxis: SCDs.  Holding anticoagulation for now due to thrombocytopenia.

## 2020-05-14 NOTE — FACE TO FACE
"Face to Face Note  -  Durable Medical Equipment    Kee Mariee M.D. - NPI: [id number]  I certify that this patient is under my care and that they had a durable medical equipment(DME)face to face encounter by myself that meets the physician DME face-to-face encounter requirements with this patient on:    Date of encounter:   Patient:                    MRN:                       YOB: 2019  Pascale Acevedo  [id number]  1970     The encounter with the patient was in whole, or in part, for the following medical condition, which is the primary reason for durable medical equipment:  COPD    I certify that, based on my findings, the following durable medical equipment is medically necessary:  Oxygen.    HOME O2 Saturation Measurements:(Values must be present for Home Oxygen orders)  Room air sat at rest: 96  Room air sat with amb: 86  With liters of O2: 3, O2 sat at rest with O2: 96  With Liters of O2: 3, O2 sat with amb with O2 : 92  Is the patient mobile?: Yes    My Clinical findings support the need for the above equipment due to:  Hypoxia    Supporting Symptoms: The patient requires supplemental oxygen, as the following interventions have been tried with limited or no improvement: \"Oral and/or IV steroids, \"Ambulation with oximetry and \"Incentive spirometry    "

## 2020-05-14 NOTE — PROGRESS NOTES
Handoff report received from day shift nurse. Pt care assumed. Pt is currently resting in bed. POC discussed with Pt and Pt verbalizes no questions at this time. Pt is AAOx4, on 2L Nc, on Tele monitoring, and VSS. Call light and belongings within reach, bed in lowest and locked position, and Pt educated on use of call light. Will continue to monitor.

## 2020-05-14 NOTE — PROGRESS NOTES
Monitor Summary:  SB-SR 51-66  (o) PAC, coup, trip, (f) PVC  .16/.10/.46    Run of 5 beats of Vtach with 1 run of 11 beats, unsustained and asymptomatic, physician notified

## 2020-05-14 NOTE — DISCHARGE PLANNING
Outpatient Dialysis Note    Patient can return to [facility] on her normal HD schedule.    [facility]  [address] [place] [address]     Schedule: Monday, Wednesday, Friday  Time: 11:00 am     Triny Paulino- Dialysis Coordinator  [facility] # [phone]

## 2020-05-15 NOTE — DISCHARGE PLANNING
Agency: [facility] in [place]  Spoke to: Dameon  Outcome:  TRINITY updated that [facility] has approved tank delivery from respiratory closet.  Dameon requested that Pt call [facility] as soon as Pt is discharged. TRINITY called respiratory [phone] and updated David with Brittni request to release a tank to Pt.  TRINITY called Pt's bedside RN Diya ([phone]) and updated her that [facility] has approved and David would be on his way with a tank.  TRINITY also provided Diya with [facility] information and asked that she inform the Pt that she will need to call them to get the rest of the DME delivered to her home.

## 2020-05-15 NOTE — DISCHARGE INSTRUCTIONS
Discharge Instructions    Discharged to home by car with relative. Discharged via wheelchair, hospital escort: Yes.  Special equipment needed: Oxygen    Be sure to schedule a follow-up appointment with your primary care doctor or any specialists as instructed.     Discharge Plan:        I understand that a diet low in cholesterol, fat, and sodium is recommended for good health. Unless I have been given specific instructions below for another diet, I accept this instruction as my diet prescription.   Other diet: previous home diet    Special Instructions:   [facility] Patient Discharge Instructions  · Monitor your weight daily, and maintain a weight chart, to track your weight changes.   · Activity as tolerated, unless your Doctor has ordered otherwise. Other activity order: NA.  · Follow a low fat, low cholesterol, low salt diet unless instructed otherwise by your Doctor. Read the labels on the back of food products and track your intake of fat, cholesterol and salt.   · Fluid Restriction No. If a Fluid Restriction has been ordered by your Doctor, measure fluids with a measuring cup to ensure that you are not exceeding the restriction.   · No smoking.  · Oxygen Yes. If your Doctor has ordered that you wear Oxygen at home, it is important to wear it as ordered.  · Did you receive an explanation from staff on the importance of taking each of your medications and why it is necessary to keep taking them unless your doctor says to stop? Yes  · Were all of your questions answered about how to manage your heart failure and what to do if you have increased signs and symptoms after you go home? Yes  · Do you feel like your heart failure care team involved you in the care treatment plan and allowed you to make decisions regarding your care while in the hospital and addressed any discharge needs you might have? Yes    See the educational handout provided at discharge for more information on monitoring your daily weight, activity and  diet. This also explains more about Heart Failure, symptoms of a flare-up and some of the tests that you have undergone.     Warning Signs of a Flare-Up include:  · Swelling in the ankles or lower legs.  · Shortness of breath, while at rest, or while doing normal activities.   · Shortness of breath at night when in bed, or coughing in bed.   · Requiring more pillows to sleep at night, or needing to sit up at night to sleep.  · Feeling weak, dizzy or fatigued.     When to call your Doctor:  · Call [facility] seven days a week from 8:00 a.m. to 8:00 p.m. for medical questions [phone].  · Call your Primary Care Physician or Cardiologist if:   1. You experience any pain radiating to your jaw or neck.  2. You have any difficulty breathing.  3. You experience weight gain of 3 lbs in a day or 5 lbs in a week.   4. You feel any palpitations or irregular heartbeats.  5. You become dizzy or lose consciousness.   If you have had an angiogram or had a pacemaker or AICD placed, and experience:  1. Bleeding, drainage or swelling at the surgical / puncture site.  2. Fever greater than 100.0 F  3. Shock from internal defibrillator.  4. Cool and / or numb extremities.      · Is patient discharged on Warfarin / Coumadin?   No     Depression / Suicide Risk    As you are discharged from this [facility], it is important to learn how to keep safe from harming yourself.    Recognize the warning signs:  · Abrupt changes in personality, positive or negative- including increase in energy   · Giving away possessions  · Change in eating patterns- significant weight changes-  positive or negative  · Change in sleeping patterns- unable to sleep or sleeping all the time   · Unwillingness or inability to communicate  · Depression  · Unusual sadness, discouragement and loneliness  · Talk of wanting to die  · Neglect of personal appearance   · Rebelliousness- reckless behavior  · Withdrawal from people/activities they  love  · Confusion- inability to concentrate     If you or a loved one observes any of these behaviors or has concerns about self-harm, here's what you can do:  · Talk about it- your feelings and reasons for harming yourself  · Remove any means that you might use to hurt yourself (examples: pills, rope, extension cords, firearm)  · Get professional help from the community (Mental Health, Substance Abuse, psychological counseling)  · Do not be alone:Call your Safe Contact- someone whom you trust who will be there for you.  · Call your local CRISIS HOTLINE 264-5871 or 033-962-5976  · Call your local Children's Mobile Crisis Response Team [place] (647) 325-0706 or www.goOutMap  · Call the toll free National Suicide Prevention Hotlines   · National Suicide Prevention Lifeline 537-594-DELY (5822)  · [employer] 800-SUICIDE (501-7063)      Community-Acquired Pneumonia, Adult  Introduction  Pneumonia is an infection of the lungs. One type of pneumonia can happen while a person is in a hospital. A different type can happen when a person is not in a hospital (community-acquired pneumonia). It is easy for this kind to spread from person to person. It can spread to you if you breathe near an infected person who coughs or sneezes. Some symptoms include:  · A dry cough.  · A wet (productive) cough.  · Fever.  · Sweating.  · Chest pain.  Follow these instructions at home:  · Take over-the-counter and prescription medicines only as told by your doctor.  ¨ Only take cough medicine if you are losing sleep.  ¨ If you were prescribed an antibiotic medicine, take it as told by your doctor. Do not stop taking the antibiotic even if you start to feel better.  · Sleep with your head and neck raised (elevated). You can do this by putting a few pillows under your head, or you can sleep in a recliner.  · Do not use tobacco products. These include cigarettes, chewing tobacco, and e-cigarettes. If you need help  quitting, ask your doctor.  · Drink enough water to keep your pee (urine) clear or pale yellow.  A shot (vaccine) can help prevent pneumonia. Shots are often suggested for:  · People older than 65 years of age.  · People older than 19 years of age:  ¨ Who are having cancer treatment.  ¨ Who have long-term (chronic) lung disease.  ¨ Who have problems with their body's defense system (immune system).  You may also prevent pneumonia if you take these actions:  · Get the flu (influenza) shot every year.  · Go to the dentist as often as told.  · Wash your hands often. If soap and water are not available, use hand .  Contact a doctor if:  · You have a fever.  · You lose sleep because your cough medicine does not help.  Get help right away if:  · You are short of breath and it gets worse.  · You have more chest pain.  · Your sickness gets worse. This is very serious if:  ¨ You are an older adult.  ¨ Your body's defense system is weak.  · You cough up blood.  This information is not intended to replace advice given to you by your health care provider. Make sure you discuss any questions you have with your health care provider.  Document Released: 06/05/2009 Document Revised: 05/25/2017 Document Reviewed: 04/13/2016  © 2017 [employer]

## 2020-05-15 NOTE — DISCHARGE SUMMARY
Discharge Summary    CHIEF COMPLAINT ON ADMISSION  No chief complaint on file.      Reason for Admission  COVID R/O     Admission Date  5/11/2020    CODE STATUS  Full Code    HPI & HOSPITAL COURSE  This is a 50 years old female who has past medical history of end-stage renal disease on hemodialysis, history of nonischemic cardiomyopathy and combined systolic and diastolic congestive heart failure with ejection fraction estimated to be around 30% and grade 3 diastolic dysfunction, and history of moyamoya disease and mental retardation comes in with shortness of breath.  Patient recently hospitalized last month for chest pain and shortness of breath and was found to be in acute on chronic congestive heart failure and volume overload.  She received dialysis and her cardiac medical treatment was optimized.  She was discharged home.  This time she comes then with worsening shortness of breath that started few days ago.  Also patient admits to occasional fever, chills, diarrhea, and productive cough.  Was initially presented to outside facility and was transferred here for higher level of care.  Noted to be febrile over there.  Had a chest x-ray at the outside facility which showed pulmonary congestion.  She was hypoxic and started on oxygen.  Nephrology consulted.  [facility] that her respiratory failure is a combination of volume overload and possible underlying upper respiratory infection/COPD exacerbation.  Her first COVID-19 PCR was negative.  Started on broad-spectrum IV antibiotics.  Cultures has been negative.  Repeat COVID PCR was negative as well.  Respiratory status improved gradually over hospital course but patient continued to require oxygen.  Received dialysis during this hospital stay.  Home oxygen was ordered and delivered to the patient's house.  She was ambulatory in the room down the govea.  She also initially developed diarrhea which improved over the course of hospital stay.  Was hemodynamically and  clinically stable.  She met the sepsis criteria on admission that resolved over course of hospital stay.  She was cleared for discharge from medical standpoint.       Therefore, she is discharged in guarded and stable condition to home with close outpatient follow-up.    The patient met 2-midnight criteria for an inpatient stay at the time of discharge.    Discharge Date  5/14/20    FOLLOW UP ITEMS POST DISCHARGE  Follow-up with PCP in 1 week  Follow-up with nephrology as per recommendations  Follow-up with dialysis center as per schedule for hemodialysis.    DISCHARGE DIAGNOSES  Principal Problem:    Acute respiratory failure with hypoxia (HCC) POA: Yes  Active Problems:    ESRD (end stage renal disease) (HCC) POA: Unknown    CAP (community acquired pneumonia) POA: Unknown    Chronic combined systolic and diastolic congestive heart failure (HCC) POA: Yes    Essential hypertension, benign POA: Yes    HLD (hyperlipidemia) POA: Yes    H/O: CVA (cerebrovascular accident) POA: Yes    Seizure disorder, grand mal (HCC) POA: Yes  Resolved Problems:    Sepsis (HCC) POA: Yes    COPD exacerbation (HCC) POA: Unknown    Volume overload POA: Unknown      FOLLOW UP  Future Appointments   Date Time Provider Department Center   5/19/2020  2:20 PM Pham Weems M.D. [facility] None   6/4/2020 10:45 AM Rhoda Thomas P.A.-C. [facility] None     [facility]  [phone]  Call  Please Call [facility] once home, to schedule delivery of equipment.       MEDICATIONS ON DISCHARGE     Medication List      START taking these medications      Instructions   cefdinir 300 MG Caps  Start taking on:  May 15, 2020  Commonly known as:  OMNICEF   Take 1 Cap by mouth every 48 hours for 2 days.  Dose:  300 mg     doxycycline monohydrate 100 MG tablet  Commonly known as:  ADOXA   Take 1 Tab by mouth every 12 hours for 2 days.  Dose:  100 mg     predniSONE 20 MG Tabs  Start taking on:  May 15, 2020  Commonly known as:  DELTASONE   Take 2 tabs daily for  3 days     vitamin B comp+C Tabs  Start taking on:  May 15, 2020   Take 1 Tab by mouth every day.  Dose:  1 Tab        CHANGE how you take these medications      Instructions   carvedilol 6.25 MG Tabs  What changed:    · medication strength  · how much to take  Commonly known as:  COREG   Take 1 Tab by mouth 2 times a day, with meals.  Dose:  6.25 mg     valsartan 40 MG Tabs  Start taking on:  May 15, 2020  What changed:    · how much to take  · when to take this  Commonly known as:  DIOVAN   Take 0.5 Tabs by mouth every day.  Dose:  20 mg        CONTINUE taking these medications      Instructions   acetaminophen 325 MG Tabs  Commonly known as:  TYLENOL   Take 2 Tabs by mouth every 6 hours as needed (Mild Pain; (Pain scale 1-3); Temp greater than 100.5 F).  Dose:  650 mg     albuterol 108 (90 Base) MCG/ACT Aers inhalation aerosol   Inhale 2 Puffs by mouth every four hours as needed for Shortness of Breath.  Dose:  2 Puff     aspirin 81 MG Chew chewable tablet  Commonly known as:  ASA   Take 1 Tab by mouth every day.  Dose:  81 mg     atorvastatin 40 MG Tabs  Commonly known as:  LIPITOR   Take 1 Tab by mouth every day.  Dose:  40 mg     calcitRIOL 0.25 MCG Caps  Commonly known as:  ROCALTROL   TAKE 1 CAPSULE BY MOUTH ON MONDAY WEDNESDAY AND FRIDAY     calcium citrate 950 MG Tabs  Commonly known as:  CALCITRATE   Take 950 mg by mouth every day.  Dose:  950 mg     coenzyme Q-10 30 MG capsule   Take 60 mg by mouth every day.  Dose:  60 mg     escitalopram 10 MG Tabs  Commonly known as:  LEXAPRO   Take 1 Tab by mouth every day.  Dose:  10 mg     folic acid 1 MG Tabs  Commonly known as:  FOLVITE   Take 1 mg by mouth two times a day may change times on alt/days.  Dose:  1 mg     levETIRAcetam 500 MG Tabs  Commonly known as:  KEPPRA   500mg in AM and 1000mg in PM     LORazepam 1 MG Tabs  Commonly known as:  ATIVAN   Take 1 Tab by mouth 2 Times a Day for 180 days.  Dose:  1 mg     Lysine 500 MG Caps   Take  by mouth every  day.     Melatonin 5 MG Tabs   Take  by mouth every bedtime. Indications: Trouble Sleeping     NEXIUM 24HR PO   Take  by mouth as needed. noon     phenytoin  MG Caps  Commonly known as:  DILANTIN   Take 1 Cap by mouth 3 times a day.  Dose:  100 mg     sodium bicarbonate 650 MG Tabs  Commonly known as:  SODIUM BICARBONATE   Take 1 Tab by mouth 3 times a day.  Dose:  650 mg     Vitamin C 1000 MG Tabs   Take  by mouth every day.        STOP taking these medications    amLODIPine 10 MG Tabs  Commonly known as:  NORVASC     lisinopril 10 MG Tabs  Commonly known as:  PRINIVIL     torsemide 10 MG tablet  Commonly known as:  DEMADEX            Allergies  Allergies   Allergen Reactions   • Allegra [Fexofenadine] Unspecified     Rxn = unknown   • Amoxicillin    • Azithromycin Rash     Rxn = unknown   • Claritin    • Erythromycin Unspecified     Rxn = unknown   • Ibuprofen & Caffeine-Vitamins Unspecified     Rxn = unknown   • Penicillins Unspecified     Rxn = unknown   • Procardia [Nifedipine]    • Rosemary Oil Anaphylaxis     Throat starts to close/swell.  Oil and the herb.    • Zyrtec [Cetirizine] Unspecified     Rxn = unknown       DIET  Orders Placed This Encounter   Procedures   • Diet Order Renal     Standing Status:   Standing     Number of Occurrences:   1     Order Specific Question:   Diet:     Answer:   Renal [8]       ACTIVITY  As tolerated.  Weight bearing as tolerated    CONSULTATIONS  Nephrology    PROCEDURES  None    LABORATORY  Lab Results   Component Value Date    SODIUM 133 (L) 05/14/2020    POTASSIUM 3.8 05/14/2020    CHLORIDE 96 05/14/2020    CO2 24 05/14/2020    GLUCOSE 110 (H) 05/14/2020    BUN 36 (H) 05/14/2020    CREATININE 4.19 (H) 05/14/2020    CREATININE 0.9 11/19/2006        Lab Results   Component Value Date    WBC 9.4 05/14/2020    HEMOGLOBIN 9.7 (L) 05/14/2020    HEMATOCRIT 30.5 (L) 05/14/2020    PLATELETCT 73 (L) 05/14/2020        Total time of the discharge process exceeds 40 minutes.

## 2020-05-17 LAB
BACTERIA BLD CULT: NORMAL
BACTERIA BLD CULT: NORMAL
SIGNIFICANT IND 70042: NORMAL
SIGNIFICANT IND 70042: NORMAL
SITE SITE: NORMAL
SITE SITE: NORMAL
SOURCE SOURCE: NORMAL
SOURCE SOURCE: NORMAL

## 2020-05-19 ENCOUNTER — OFFICE VISIT (OUTPATIENT)
Dept: NEUROLOGY | Facility: MEDICAL CENTER | Age: 50
End: 2020-05-19
Payer: MEDICARE

## 2020-05-19 VITALS
WEIGHT: 240 LBS | HEIGHT: 69 IN | BODY MASS INDEX: 35.55 KG/M2 | HEART RATE: 84 BPM | RESPIRATION RATE: 18 BRPM | SYSTOLIC BLOOD PRESSURE: 124 MMHG | OXYGEN SATURATION: 95 % | TEMPERATURE: 98.3 F | DIASTOLIC BLOOD PRESSURE: 76 MMHG

## 2020-05-19 DIAGNOSIS — F41.9 ANXIETY: ICD-10-CM

## 2020-05-19 DIAGNOSIS — I67.5 MOYAMOYA DISEASE: ICD-10-CM

## 2020-05-19 DIAGNOSIS — N18.6 ESRD (END STAGE RENAL DISEASE) (HCC): ICD-10-CM

## 2020-05-19 DIAGNOSIS — G40.409 SEIZURE DISORDER, GRAND MAL (HCC): ICD-10-CM

## 2020-05-19 PROCEDURE — 99214 OFFICE O/P EST MOD 30 MIN: CPT | Performed by: PSYCHIATRY & NEUROLOGY

## 2020-05-19 RX ORDER — LORAZEPAM 1 MG/1
1 TABLET ORAL 2 TIMES DAILY
Qty: 60 TAB | Refills: 3 | Status: SHIPPED | OUTPATIENT
Start: 2020-05-19 | End: 2020-06-11

## 2020-05-19 ASSESSMENT — ENCOUNTER SYMPTOMS
FALLS: 0
ABDOMINAL PAIN: 0
FEVER: 0
HALLUCINATIONS: 0
WEIGHT LOSS: 0

## 2020-05-19 ASSESSMENT — FIBROSIS 4 INDEX: FIB4 SCORE: 12.54

## 2020-05-19 NOTE — PROGRESS NOTES
Chief Complaint   Patient presents with   • Follow-Up     CKD , nonintractable epilepsy Patient seen in ER recently      History of present illness:  Pascale Acevedo 49 y.o. female presents today for seizure follow-up.   History is obtained from patient and mom.  and Patient is accompanied by mom. Previsouly came with father Renan and daughter Deedee. She is a prior patient of Dr. Rhoades. 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 Lives with mom, aunt, daughter (Deedee - [phone]).  Patient prefers I speak with daughter Deedee with guarding any medical issues/results and management.  She has limited memory.    Duration/timing: as below  Context:   Seizures: described as some altered consciousness at times, without abnormal movements. Duration of 45 seconds. 2016 last seizure. She has had grand mal seizures. Also hx of grand mal at 9 months old and treated with phenobarbital.   Hx of stroke w/Moyamoya disease: 2004, caused right foot numbness toes, occasional trouble with getting words out and slurring, cognition expressing and relaying. Last stroke was 2004.   Location: Brain  Quality: Altered mentation  Severity: Mild to moderate  Modifying factors: Improved with treatment  Associated signs/symptoms: As above  Denies: bladder incontinence, bowel incontinence, headaches, head trauma , weakness, other numbness/tingling, swallowing difficulties, speech disturbance, hearing loss, loss of consciousness, hallucinations, abnormal movements, aura, falls, family hx seizure, febrile seizure as child and snoring/apnea during sleep     Patient has tried:  -Dilantin 300mg QHS   -Keppra 500 mg in the morning and 1000 mg at night; switched to 250mg in AM and 1250mg at night on days of dialysis  -Keppra extended release- not recommended on dialysis  -Trileptal 300 mg twice daily - stopped during admission in Spring 2020?, she did not resume on discharge  -Ativan-for anxiety, was prescribed by [facility] neurologist at 1  mg twice daily, very severe, was prescribed by Dr. Rhoades pending psychiatry appointment    -ASA 325mg daily for moyamoya; decreased to 1/2 tablet when dialysis initiated  -Topiramate - didn't tolerate  -Depakote - caused freaking out  -Phenobarbital - as child    Subjective: Patient was last seen in neurology clinic August 2019 by me.    Epilepsy: No seizures since last visit.  She denies any episodes of altered mental status, loss of consciousness, abnormal movements, bowel or bladder incontinence or tongue biting.  She is not driving.  Last known seizure was in 2016.  Mom concurs, no reported episodes concerning for seizure.     Moyamoya disease/history of stroke: She has had no stroke symptoms for the last year.  They do not remember having an MRA done as recommended.    Anxiety: Patient is running out of her lorazepam.  Her anxiety is still uncontrolled.  She missed her psychiatry appointment due to an admission for hypoxia and respiratory failure.  They are awaiting rescheduling.    Since last visit, patient has had multiple admissions.  Patient was started on dialysis for renal failure (indefinitely) and worsening heart failure and history of volume overload.      Past medical history:   Past Medical History:   Diagnosis Date   • CKD (chronic kidney disease), stage IV ([facility]) 12/24/2015   • H/O: CVA (cerebrovascular accident) 12/24/2015   • Hypertension    • Kidney disease    • Mild mitral regurgitation 7/14/2014   • Moyamoya disease 10/3/2013   • Seizure disorder, grand mal ([facility]) 3/3/2016   • Stroke ([facility])        Past surgical history:   Past Surgical History:   Procedure Laterality Date   • IRRIGATION & DEBRIDEMENT ORTHO Right 3/9/2017    Procedure: IRRIGATION & DEBRIDEMENT ORTHO - FOOT;  Surgeon: Gerardo Lynn M.D.;  Location: SURGERY [facility];  Service:    • IRRIGATION & DEBRIDEMENT ORTHO Right 3/5/2017    Procedure: IRRIGATION & DEBRIDEMENT ORTHO AND GASTROC RECESSION;  Surgeon: Gerardo Lynn  M.D.;  Location: SURGERY [facility];  Service:    • METATARSAL HEAD RESECTION  3/5/2017    Procedure: SECOND METATARSAL HEAD RESECTION;  Surgeon: Gerardo Lynn M.D.;  Location: SURGERY [facility];  Service:    • IRRIGATION & DEBRIDEMENT ORTHO Right 2/27/2017    Procedure: IRRIGATION & DEBRIDEMENT ORTHO;  Surgeon: Coleman Monterroso M.D.;  Location: SURGERY [facility];  Service:    • PRIMARY C SECTION     • TUBAL COAGULATION LAPAROSCOPIC BILATERAL         Family history:   Family History   Problem Relation Age of Onset   • Diabetes Mother    • Hypertension Mother    • Hypertension Father    • Arthritis Father    • Diabetes Maternal Grandmother    • Psychiatric Illness Maternal Grandfather    • Cancer Paternal Grandmother    • Psychiatric Illness Paternal Grandfather        Social history:   Tobacco Use   • Smoking status: Former Smoker   • Smokeless tobacco: Never Used   Substance and Sexual Activity   • Alcohol use: No   • Drug use: No     Current medications:   Current Outpatient Medications   Medication   • LORazepam (ATIVAN) 1 MG Tab   • carvedilol (COREG) 6.25 MG Tab   • valsartan (DIOVAN) 40 MG Tab   • predniSONE (DELTASONE) 20 MG Tab   • vitamin B comp+C (ALLBEE WITH C) Tab   • calcitRIOL (ROCALTROL) 0.25 MCG Cap   • aspirin (ASA) 81 MG Chew Tab chewable tablet   • folic acid (FOLVITE) 1 MG Tab   • Lysine 500 MG Cap   • escitalopram (LEXAPRO) 10 MG Tab   • levETIRAcetam (KEPPRA) 500 MG Tab   • phenytoin ER (DILANTIN) 100 MG Cap   • atorvastatin (LIPITOR) 40 MG Tab   • Melatonin 5 MG Tab   • coenzyme Q-10 30 MG capsule   • Esomeprazole Magnesium (NEXIUM 24HR PO)   • Ascorbic Acid (VITAMIN C) 1000 MG Tab   • calcium citrate (CALCITRATE) 950 MG Tab   • sodium bicarbonate (SODIUM BICARBONATE) 650 MG Tab   • acetaminophen (TYLENOL) 325 MG Tab   • albuterol 108 (90 BASE) MCG/ACT Aero Soln inhalation aerosol     No current facility-administered medications for this visit.        Medication  "Allergy:  Allergies   Allergen Reactions   • Allegra [Fexofenadine] Unspecified     Rxn = unknown   • Amoxicillin    • Azithromycin Rash     Rxn = unknown   • Claritin    • Erythromycin Unspecified     Rxn = unknown   • Ibuprofen & Caffeine-Vitamins Unspecified     Rxn = unknown   • Penicillins Unspecified     Rxn = unknown   • Procardia [Nifedipine]    • Rosemary Oil Anaphylaxis     Throat starts to close/swell.  Oil and the herb.    • Zyrtec [Cetirizine] Unspecified     Rxn = unknown       Review of Systems   Constitutional: Negative for fever and weight loss.   HENT: Negative for sore throat.    Respiratory: Positive for shortness of breath.         On O2   Cardiovascular: Negative for chest pain.   Gastrointestinal: Negative for abdominal pain.   Musculoskeletal: Negative for falls.   Neurological:        As per HPI   Psychiatric/Behavioral: Negative for hallucinations.       Physical examination:   Vitals:    05/19/20 1406   BP: 124/76   BP Location: Left arm   Patient Position: Sitting   BP Cuff Size: Adult   Pulse: 84   Resp: 18   Temp: 36.8 °C (98.3 °F)   TempSrc: Temporal   SpO2: 95%   Weight: 108.9 kg (240 lb)   Height: 1.753 m (5' 9\")     General: Patient in well nourished in no apparent distress. Elevated BMI.   Eyes: Ophthalmoscopic examination performed but discs cannot be visualized well enough to characterize bilaterally. Prior exam  HENT: Normocephalic, atraumatic.   Cardiovascular: No lower extremity edema.  Respiratory: Normal respiratory effort.   Skin: No appreciable signs of acute rashes or bruising.   Musculoskeletal: No signs of joint or muscle swelling.   Psychiatric: Pleasant.     NEUROLOGICAL EXAM:   Mental status: Awake, alert and fully oriented to person, place, time and situation. Normal attention, concentration and fund of knowledge for education level.  Patient is able to remember last visits discussion and our telephone conversation regarding her renal failure.  Speech and language: " Speech is fluent without errors and clear.  Cranial nerve exam:  II: Pupils are equally round and reactive to light.  Visual fields are intact by confrontation.  III, IV, VI: EOMI, no diplopia, no ptosis.  Unchanged from prior  V: Sensation to light touch is normal over V1-3 distributions bilaterally.     VII: Facial movements are symmetrical. There is no facial droop.   VIII: Hearing intact to soft speech   IX: Dysarthria is not present.  XI: Shoulder shrug are symmetrical and strong. Prior exam  XII: Tongue protrudes midline. Prior exam    Motor exam: Prior exam as documented below, today she has 5-5 proximal upper and lower extremity strength  Muscle tone is normal in all 4 limbs. and No abnormal movements appreciated.    Muscle strength: 5 out of 5 proximal upper and lower extremity strength.  She is distally 5 out of 5 in upper extremities as well.    Sensory exam: Unchanged today  Intact to Light touch in bilateral upper and lower extremity.    Deep tendon reflexes: Unchanged today     Right  Left  Biceps   0/4  0/4  Triceps  0/4  0/4  Brachioradialis 0/4  0/4  Knee jerk  0/4  0/4  Ankle jerk  0/4  0/4   bilateral toes are downgoing to plantar stimulation..    Coordination: shows a normal finger-nose-finger, unchanged today  Gait: Casual gait is normal.  Unchanged today      ANCILLARY DATA REVIEWED:   Lab Data Review:  Lab Results   Component Value Date/Time    WBC 9.4 05/14/2020 04:19 AM    RBC 3.06 (L) 05/14/2020 04:19 AM    HEMOGLOBIN 9.7 (L) 05/14/2020 04:19 AM    HEMATOCRIT 30.5 (L) 05/14/2020 04:19 AM    MCV 99.7 (H) 05/14/2020 04:19 AM    MCH 31.7 05/14/2020 04:19 AM    MCHC 31.8 (L) 05/14/2020 04:19 AM    MPV 12.4 05/14/2020 04:19 AM    NEUTSPOLYS 83.60 (H) 05/14/2020 04:19 AM    LYMPHOCYTES 7.10 (L) 05/14/2020 04:19 AM    MONOCYTES 8.50 05/14/2020 04:19 AM    EOSINOPHILS 0.00 05/14/2020 04:19 AM    BASOPHILS 0.20 05/14/2020 04:19 AM    HYPOCHROMIA 1+ 07/29/2011 07:05 AM      Lab Results   Component  Value Date/Time    SODIUM 133 (L) 05/14/2020 04:19 AM    POTASSIUM 3.8 05/14/2020 04:19 AM    CHLORIDE 96 05/14/2020 04:19 AM    CO2 24 05/14/2020 04:19 AM    GLUCOSE 110 (H) 05/14/2020 04:19 AM    BUN 36 (H) 05/14/2020 04:19 AM    CREATININE 4.19 (H) 05/14/2020 04:19 AM    CREATININE 0.9 11/19/2006 05:48 AM     Lab Results   Component Value Date/Time    ASTSGOT 17 04/23/2017 0900    ALTSGPT 18 04/23/2017 0900    ALKPHOSPHAT 67 04/23/2017 0900    ALBUMIN 2.7 (L) 04/23/2017 0900     Lab Results   Component Value Date/Time    HBA1C 5.3 04/16/2020 08:59 PM      September 3, 2019 Total cholesterol 224, HDL 42, , triglycerides 328. -Fasting, compliant with atorvastatin 40 mg nightly    Imaging: None  Records reviewed:   Patient was admitted to the hospital on May 11, 2020.  She presented with shortness of breath with fever and was a COVID rule out.  She was COVID negative.    Patient was admitted April 16, 2020 for chest pain or shortness of breath.      ASSESSMENT AND PLAN:    1. Nonintractable epilepsy without status epilepticus, unspecified epilepsy type (HCC): History of seizures as an infant with recurrent seizures after cerebrovascular accident/moyamoya disease. Sounds complex partial with some secondary generalization. MRI brain has been abnormal due to prior stroke and evidence of subarachnoid hemorrhage.  Prior EEGs that showed low right hemispheric cortical dysfunction (interpreted by Dr. Harrington). Patient has a tubal ligation. Previously treated with Dilantin, Trileptal, Keppra with recent change in spring 2020 due to hospitalization?/noncompliance?.  Currently she is doing well on Keppra and Dilantin with ongoing dialysis Monday Wednesday and Friday.  Dilantin levels are not as reliable as she is on dialysis.  Given she has stopped oxcarbazepine without breakthrough seizures, will continue to monitor and reinitiate additional AED if clinically indicated.  Keppra dosing will be slightly changed due to  dialysis.  +History of noncompliance with workup/monitoring is a treatment barrier for this patient.  -Switch Keppra from 500 mg in the morning and 1000 mg in the evening to 500mg BID with additional 500mg post dialysis on MWF - discussed with Dr. Bland  -Continue Dilantin 100 mg extended release, 3 times daily  -Okay to stop Trileptal  -Recent CBC and CMP personally reviewed from admission    2. Moyamoya disease: Diagnosed in 2004 after stroke.  Evident on MRA, last performed 2018.  Compliant with daily aspirin, now half dose secondary to renal failure and dialysis.  No recurrent symptoms of stroke.  Due to possibility of developing cerebral aneurysms, continued imaging of the cerebral arteries is recommended.  No history of prior aneurysm.    -Recommend continued optimization of blood pressure per primary care/nephrology as tolerated  -Previously ordered MRA of the head without contrast multiple times, patient did not obtain  -Long discussion with regards to the risk/benefits of nonurgent work-up.  Patient is extremely high risk for COVID infection as well as complications.  We decided to temporarily postpone the MRA and reconsider on follow-up.  -Continue aspirin daily for stroke prevention  -Reorder MRA head on follow-up    3. H/O: CVA (cerebrovascular accident): Chronic infarcts in the right basal ganglia and left frontal lobe with chronic hemosiderin deposition in bilateral subarachnoid spaces which can be seen and moyamoya disease.  See problem #2.  -Continue atorvastatin (LIPITOR) 40 MG Tab; Take 1 Tab by mouth every day  -LDL goals as documented below    4. Anxiety, persistent: Chronic and uncontrolled when off of benzodiazepines.  Benzodiazepines and SSRI was started by neurology at [facility] and continued by Dr. Rhoades.  There was an attempt to wean patient off of daily Ativan but was unsuccessful due to severe stress reaction.  Given her well-controlled epilepsy increased stress and uncontrolled anxiety  may worsen her sleep and therefore precipitate seizures.  I re-stressed the importance of a psychiatry evaluation and further management today.  We re-discussed the risks of chronic benzodiazepines in the setting of seizure disorder.  Due to the risks of uncontrolled anxiety and stress will continue medications for now without further changes until establish care with psychiatry.  - Controlled Substance Treatment Agreement - signed 8/2019  -Refill for lORazepam (ATIVAN) 1 MG Tab; Take 1 Tab by mouth 2 Times a Day for 180 days.  Dispense: 60 Tab; Refill: 3, I reiterated that I do not plan on continuing this long-term and recommend she seek psychiatric care  -Continue escitalopram (LEXAPRO) 10 MG Tab; Take 1 Tab by mouth every day  -Family and patient communicated understanding    5. Dilated cardiomyopathy (CMS-HCC): Recommend follow-up with cardiology    6.  Renal failure on dialysis Monday Wednesday Friday, new: Keppra dose adjusted for dialysis    FOLLOW-UP: Return in about 3 months (around 8/19/2020).  Due to patient's history of noncompliance, will continue close follow-up for optimization of care  EDUCATION AND COUNSELING:  -I personally discussed the following with the patient:   Risks/benefits/side effects/alternatives of medication including but not limited to drowsiness, sedation, dizziness, increased risk for falls, liver dysfunction, increased risk for bleeding, avoid abrupt cessation of medication and suppression of respiratory drive in combination with other sedating medications and risk of death., Usefulness of anti-platelets in secondary stroke prevention was discussed. The side effects, including increased risk for bleeding (both traumatic and spontaneous) were reviewed with the patient at length. , Recommended goal for LDL is 70 or less. Side effects of statin, including idiosyncratic reactions as well as more common myalgias, and liver injury  were discussed. Monitoring of LFT's will be deferred to  the patient's primary care physician. , Secondary stroke prevention education was provided, including; lifestyle modification with healthy diet, and exercise, as well as recommendations for optimal control of risk factors. , I have made the patient aware of mandatory reporting required by the law in the State of [place] regarding episodes of seizures, loss of consciousness, alteration of awareness, and/or concerns for driving safety as discussed today. The patient and family are responsible for reporting events to the DMV, instructions were provided. The patient verbalized understanding.  and Other seizure precautions were discussed at length, including but not limited to no diving, no skydiving, no unsupervised swimming, no Jacuzzi or bathing in bathtubs.  Medical reasoning as above.  Emotional support.    I personally discussed the risks/benefits/alternatives of temporarily postponing non-urgent workup during the current COVID-19 pandemic.     The patient understands and agrees that due to the complexity of his/her diagnosis, results of any testing and further recommendations will typically be discussed/made during a face to face encounter in my office. The patient and/or family further understands it is their responsibility to keep proper follow up.     Disclaimer  This dictation was created using voice recognition software. I have made every reasonable attempt to avoid dictation errors, but this document may contain an error not identified before finalizing. If the error changes the accuracy of the document, I would appreciate it being brought to my attention. Thank you very much.     Pham Weems MD  Neurology, Neurophysiology  [facility]

## 2020-05-20 ASSESSMENT — ENCOUNTER SYMPTOMS
SHORTNESS OF BREATH: 1
SORE THROAT: 0

## 2020-05-21 ENCOUNTER — TELEPHONE (OUTPATIENT)
Dept: CARDIOLOGY | Facility: MEDICAL CENTER | Age: 50
End: 2020-05-21

## 2020-05-21 NOTE — TELEPHONE ENCOUNTER
Per hospital discharge note, torsemide was discontinue.  Recommended that Andria reach out to nephrology for more specific information concerning the Torsemide and urged her to make sure the patient keeps her 6/4/20 appointment with NICOLE.    Andria verbalized understanding and was appreciative for the response.

## 2020-05-21 NOTE — TELEPHONE ENCOUNTER
"NICOLE    Pt's mother Andria called to clarify if pt should be taking torsemide. NICOLE notes says it's ok but her mother says the pharmacy told her the Rx was \"cancelled.\" #[phone]   "

## 2020-05-22 DIAGNOSIS — I42.0 DILATED CARDIOMYOPATHY (HCC): ICD-10-CM

## 2020-05-22 DIAGNOSIS — Z86.73 H/O: CVA (CEREBROVASCULAR ACCIDENT): ICD-10-CM

## 2020-05-22 RX ORDER — LEVETIRACETAM 500 MG/1
TABLET ORAL
Qty: 225 TAB | Refills: 2 | Status: SHIPPED | OUTPATIENT
Start: 2020-05-22 | End: 2020-06-11

## 2020-05-27 ENCOUNTER — TELEPHONE (OUTPATIENT)
Dept: NEUROLOGY | Facility: MEDICAL CENTER | Age: 50
End: 2020-05-27

## 2020-05-27 NOTE — TELEPHONE ENCOUNTER
----- Message from Pham Weems M.D. sent at 5/22/2020  9:09 AM PDT -----  Regarding: Medication adjustment  Hey, can you ask patient the adjust her Keppra as below:    Instead of Keppra 250mg in AM and 1250mg PM on days of dialysis, I want to decrease slightly to:    500mg twice daily every day with additional 500mg after dialysis MWF. Will rewrite the Rx as well. I think she will do better with this regimen. Thanks.

## 2020-05-27 NOTE — TELEPHONE ENCOUNTER
Patient's mother was notified she verbally understood directions . No further questions noted by patient or mother .

## 2020-05-30 ENCOUNTER — HOSPITAL ENCOUNTER (INPATIENT)
Facility: MEDICAL CENTER | Age: 50
LOS: 2 days | DRG: 314 | End: 2020-06-01
Attending: HOSPITALIST | Admitting: HOSPITALIST
Payer: MEDICARE

## 2020-05-30 ENCOUNTER — APPOINTMENT (OUTPATIENT)
Dept: RADIOLOGY | Facility: MEDICAL CENTER | Age: 50
DRG: 314 | End: 2020-05-30
Attending: INTERNAL MEDICINE
Payer: MEDICARE

## 2020-05-30 ENCOUNTER — HOSPITAL ENCOUNTER (OUTPATIENT)
Facility: MEDICAL CENTER | Age: 50
DRG: 314 | End: 2020-05-30
Payer: MEDICARE

## 2020-05-30 PROBLEM — R78.81 BACTEREMIA: Status: ACTIVE | Noted: 2020-05-30

## 2020-05-30 LAB
ALBUMIN SERPL BCP-MCNC: 2.8 G/DL (ref 3.2–4.9)
ALBUMIN/GLOB SERPL: 0.7 G/DL
ALP SERPL-CCNC: 103 U/L (ref 30–99)
ALT SERPL-CCNC: 24 U/L (ref 2–50)
ANION GAP SERPL CALC-SCNC: 17 MMOL/L (ref 7–16)
AST SERPL-CCNC: 23 U/L (ref 12–45)
BASOPHILS # BLD AUTO: 0.2 % (ref 0–1.8)
BASOPHILS # BLD: 0.02 K/UL (ref 0–0.12)
BILIRUB SERPL-MCNC: 0.5 MG/DL (ref 0.1–1.5)
BUN SERPL-MCNC: 35 MG/DL (ref 8–22)
CALCIUM SERPL-MCNC: 8.7 MG/DL (ref 8.5–10.5)
CHLORIDE SERPL-SCNC: 96 MMOL/L (ref 96–112)
CO2 SERPL-SCNC: 21 MMOL/L (ref 20–33)
CREAT SERPL-MCNC: 4.87 MG/DL (ref 0.5–1.4)
EOSINOPHIL # BLD AUTO: 0.12 K/UL (ref 0–0.51)
EOSINOPHIL NFR BLD: 1 % (ref 0–6.9)
ERYTHROCYTE [DISTWIDTH] IN BLOOD BY AUTOMATED COUNT: 55.2 FL (ref 35.9–50)
GLOBULIN SER CALC-MCNC: 4.1 G/DL (ref 1.9–3.5)
GLUCOSE SERPL-MCNC: 155 MG/DL (ref 65–99)
HCT VFR BLD AUTO: 26.2 % (ref 37–47)
HGB BLD-MCNC: 8.2 G/DL (ref 12–16)
IMM GRANULOCYTES # BLD AUTO: 0.06 K/UL (ref 0–0.11)
IMM GRANULOCYTES NFR BLD AUTO: 0.5 % (ref 0–0.9)
LYMPHOCYTES # BLD AUTO: 0.96 K/UL (ref 1–4.8)
LYMPHOCYTES NFR BLD: 7.8 % (ref 22–41)
MCH RBC QN AUTO: 31.7 PG (ref 27–33)
MCHC RBC AUTO-ENTMCNC: 31.3 G/DL (ref 33.6–35)
MCV RBC AUTO: 101.2 FL (ref 81.4–97.8)
MONOCYTES # BLD AUTO: 1.05 K/UL (ref 0–0.85)
MONOCYTES NFR BLD AUTO: 8.5 % (ref 0–13.4)
NEUTROPHILS # BLD AUTO: 10.17 K/UL (ref 2–7.15)
NEUTROPHILS NFR BLD: 82 % (ref 44–72)
NRBC # BLD AUTO: 0 K/UL
NRBC BLD-RTO: 0 /100 WBC
PLATELET # BLD AUTO: 149 K/UL (ref 164–446)
PMV BLD AUTO: 10.2 FL (ref 9–12.9)
POTASSIUM SERPL-SCNC: 3.9 MMOL/L (ref 3.6–5.5)
PROT SERPL-MCNC: 6.9 G/DL (ref 6–8.2)
RBC # BLD AUTO: 2.59 M/UL (ref 4.2–5.4)
SODIUM SERPL-SCNC: 134 MMOL/L (ref 135–145)
VANCOMYCIN SERPL-MCNC: 13.6 UG/ML
WBC # BLD AUTO: 12.4 K/UL (ref 4.8–10.8)

## 2020-05-30 PROCEDURE — 80202 ASSAY OF VANCOMYCIN: CPT

## 2020-05-30 PROCEDURE — 99223 1ST HOSP IP/OBS HIGH 75: CPT | Mod: AI | Performed by: INTERNAL MEDICINE

## 2020-05-30 PROCEDURE — 700102 HCHG RX REV CODE 250 W/ 637 OVERRIDE(OP): Performed by: INTERNAL MEDICINE

## 2020-05-30 PROCEDURE — 87040 BLOOD CULTURE FOR BACTERIA: CPT

## 2020-05-30 PROCEDURE — 87071 CULTURE AEROBIC QUANT OTHER: CPT

## 2020-05-30 PROCEDURE — 770001 HCHG ROOM/CARE - MED/SURG/GYN PRIV*

## 2020-05-30 PROCEDURE — 36415 COLL VENOUS BLD VENIPUNCTURE: CPT

## 2020-05-30 PROCEDURE — 80053 COMPREHEN METABOLIC PANEL: CPT

## 2020-05-30 PROCEDURE — 05PYX3Z REMOVAL OF INFUSION DEVICE FROM UPPER VEIN, EXTERNAL APPROACH: ICD-10-PCS | Performed by: RADIOLOGY

## 2020-05-30 PROCEDURE — 87186 SC STD MICRODIL/AGAR DIL: CPT

## 2020-05-30 PROCEDURE — 36589 REMOVAL TUNNELED CV CATH: CPT

## 2020-05-30 PROCEDURE — 85025 COMPLETE CBC W/AUTO DIFF WBC: CPT

## 2020-05-30 PROCEDURE — 87077 CULTURE AEROBIC IDENTIFY: CPT | Mod: 91

## 2020-05-30 PROCEDURE — 87147 CULTURE TYPE IMMUNOLOGIC: CPT

## 2020-05-30 PROCEDURE — 87150 DNA/RNA AMPLIFIED PROBE: CPT

## 2020-05-30 PROCEDURE — C9803 HOPD COVID-19 SPEC COLLECT: HCPCS | Performed by: INTERNAL MEDICINE

## 2020-05-30 PROCEDURE — A9270 NON-COVERED ITEM OR SERVICE: HCPCS | Performed by: INTERNAL MEDICINE

## 2020-05-30 RX ORDER — BISACODYL 10 MG
10 SUPPOSITORY, RECTAL RECTAL
Status: DISCONTINUED | OUTPATIENT
Start: 2020-05-30 | End: 2020-06-01 | Stop reason: HOSPADM

## 2020-05-30 RX ORDER — ACETAMINOPHEN 325 MG/1
650 TABLET ORAL EVERY 6 HOURS PRN
Status: DISCONTINUED | OUTPATIENT
Start: 2020-05-30 | End: 2020-06-01 | Stop reason: HOSPADM

## 2020-05-30 RX ORDER — SODIUM CHLORIDE 9 MG/ML
500 INJECTION, SOLUTION INTRAVENOUS
Status: CANCELLED | OUTPATIENT
Start: 2020-05-30 | End: 2020-05-30

## 2020-05-30 RX ORDER — CHOLECALCIFEROL (VITAMIN D3) 125 MCG
5 CAPSULE ORAL
Status: DISCONTINUED | OUTPATIENT
Start: 2020-05-30 | End: 2020-05-30

## 2020-05-30 RX ORDER — ESCITALOPRAM OXALATE 10 MG/1
10 TABLET ORAL DAILY
Status: DISCONTINUED | OUTPATIENT
Start: 2020-05-31 | End: 2020-06-01 | Stop reason: HOSPADM

## 2020-05-30 RX ORDER — PHENYTOIN SODIUM 100 MG/1
100 CAPSULE, EXTENDED RELEASE ORAL 3 TIMES DAILY
Status: DISCONTINUED | OUTPATIENT
Start: 2020-05-30 | End: 2020-06-01 | Stop reason: HOSPADM

## 2020-05-30 RX ORDER — ATORVASTATIN CALCIUM 40 MG/1
40 TABLET, FILM COATED ORAL DAILY
Status: DISCONTINUED | OUTPATIENT
Start: 2020-05-31 | End: 2020-06-01 | Stop reason: HOSPADM

## 2020-05-30 RX ORDER — ONDANSETRON 2 MG/ML
4 INJECTION INTRAMUSCULAR; INTRAVENOUS PRN
Status: CANCELLED | OUTPATIENT
Start: 2020-05-30 | End: 2020-05-30

## 2020-05-30 RX ORDER — AMOXICILLIN 250 MG
2 CAPSULE ORAL 2 TIMES DAILY
Status: DISCONTINUED | OUTPATIENT
Start: 2020-05-30 | End: 2020-06-01 | Stop reason: HOSPADM

## 2020-05-30 RX ORDER — CARVEDILOL 6.25 MG/1
6.25 TABLET ORAL 2 TIMES DAILY WITH MEALS
Status: DISCONTINUED | OUTPATIENT
Start: 2020-05-30 | End: 2020-06-01 | Stop reason: HOSPADM

## 2020-05-30 RX ORDER — ASPIRIN 81 MG/1
81 TABLET, CHEWABLE ORAL DAILY
Status: DISCONTINUED | OUTPATIENT
Start: 2020-05-31 | End: 2020-06-01 | Stop reason: HOSPADM

## 2020-05-30 RX ORDER — LEVETIRACETAM 500 MG/1
500 TABLET ORAL EVERY 12 HOURS
Status: DISCONTINUED | OUTPATIENT
Start: 2020-05-30 | End: 2020-06-01 | Stop reason: HOSPADM

## 2020-05-30 RX ORDER — MIDAZOLAM HYDROCHLORIDE 1 MG/ML
.5-2 INJECTION INTRAMUSCULAR; INTRAVENOUS PRN
Status: CANCELLED | OUTPATIENT
Start: 2020-05-30 | End: 2020-05-30

## 2020-05-30 RX ORDER — LORAZEPAM 1 MG/1
1 TABLET ORAL 2 TIMES DAILY
Status: DISCONTINUED | OUTPATIENT
Start: 2020-05-30 | End: 2020-06-01 | Stop reason: HOSPADM

## 2020-05-30 RX ORDER — CLONIDINE HYDROCHLORIDE 0.1 MG/1
0.1 TABLET ORAL EVERY 6 HOURS PRN
Status: DISCONTINUED | OUTPATIENT
Start: 2020-05-30 | End: 2020-06-01 | Stop reason: HOSPADM

## 2020-05-30 RX ORDER — POLYETHYLENE GLYCOL 3350 17 G/17G
1 POWDER, FOR SOLUTION ORAL
Status: DISCONTINUED | OUTPATIENT
Start: 2020-05-30 | End: 2020-06-01 | Stop reason: HOSPADM

## 2020-05-30 RX ORDER — SODIUM BICARBONATE 650 MG/1
650 TABLET ORAL 3 TIMES DAILY
Status: DISCONTINUED | OUTPATIENT
Start: 2020-05-30 | End: 2020-06-01 | Stop reason: HOSPADM

## 2020-05-30 RX ADMIN — LEVETIRACETAM 500 MG: 500 TABLET ORAL at 18:14

## 2020-05-30 RX ADMIN — PHENYTOIN SODIUM 100 MG: 100 CAPSULE ORAL at 18:14

## 2020-05-30 RX ADMIN — LORAZEPAM 1 MG: 1 TABLET ORAL at 18:14

## 2020-05-30 RX ADMIN — SODIUM BICARBONATE 650 MG: 650 TABLET ORAL at 18:14

## 2020-05-30 ASSESSMENT — ENCOUNTER SYMPTOMS
HEADACHES: 0
BLURRED VISION: 0
NECK PAIN: 0
CONSTIPATION: 0
SENSORY CHANGE: 0
WEIGHT LOSS: 0
MYALGIAS: 0
BRUISES/BLEEDS EASILY: 0
HEMOPTYSIS: 0
PALPITATIONS: 0
EYE REDNESS: 0
SORE THROAT: 0
WEAKNESS: 1
SPUTUM PRODUCTION: 0
HALLUCINATIONS: 0
PHOTOPHOBIA: 0
ABDOMINAL PAIN: 0
CHILLS: 1
ORTHOPNEA: 0
CHILLS: 0
TREMORS: 0
DIZZINESS: 0
TINGLING: 0
DIARRHEA: 0
SPEECH CHANGE: 0
VOMITING: 0
FOCAL WEAKNESS: 0
COUGH: 0
BACK PAIN: 0
DOUBLE VISION: 0
DEPRESSION: 0
FEVER: 1
NAUSEA: 0
POLYDIPSIA: 0
EYE PAIN: 0
SHORTNESS OF BREATH: 0

## 2020-05-30 ASSESSMENT — LIFESTYLE VARIABLES
AVERAGE NUMBER OF DAYS PER WEEK YOU HAVE A DRINK CONTAINING ALCOHOL: 0
TOTAL SCORE: 0
HOW MANY TIMES IN THE PAST YEAR HAVE YOU HAD 5 OR MORE DRINKS IN A DAY: 0
EVER HAD A DRINK FIRST THING IN THE MORNING TO STEADY YOUR NERVES TO GET RID OF A HANGOVER: NO
HAVE YOU EVER FELT YOU SHOULD CUT DOWN ON YOUR DRINKING: NO
ALCOHOL_USE: NO
TOTAL SCORE: 0
HAVE PEOPLE ANNOYED YOU BY CRITICIZING YOUR DRINKING: NO
HAVE PEOPLE ANNOYED YOU BY CRITICIZING YOUR DRINKING: NO
DOES PATIENT WANT TO STOP DRINKING: NO
EVER FELT BAD OR GUILTY ABOUT YOUR DRINKING: NO
ALCOHOL_USE: NO
EVER HAD A DRINK FIRST THING IN THE MORNING TO STEADY YOUR NERVES TO GET RID OF A HANGOVER: NO
ON A TYPICAL DAY WHEN YOU DRINK ALCOHOL HOW MANY DRINKS DO YOU HAVE: 0
CONSUMPTION TOTAL: NEGATIVE
CONSUMPTION TOTAL: INCOMPLETE
TOTAL SCORE: 0
SUBSTANCE_ABUSE: 0
EVER FELT BAD OR GUILTY ABOUT YOUR DRINKING: NO
HAVE YOU EVER FELT YOU SHOULD CUT DOWN ON YOUR DRINKING: NO
EVER_SMOKED: YES
TOTAL SCORE: 0

## 2020-05-30 ASSESSMENT — COGNITIVE AND FUNCTIONAL STATUS - GENERAL
MOBILITY SCORE: 24
SUGGESTED CMS G CODE MODIFIER DAILY ACTIVITY: CH
DAILY ACTIVITIY SCORE: 24
SUGGESTED CMS G CODE MODIFIER MOBILITY: CH

## 2020-05-30 ASSESSMENT — FIBROSIS 4 INDEX: FIB4 SCORE: 12.54

## 2020-05-30 ASSESSMENT — PATIENT HEALTH QUESTIONNAIRE - PHQ9
SUM OF ALL RESPONSES TO PHQ9 QUESTIONS 1 AND 2: 0
2. FEELING DOWN, DEPRESSED, IRRITABLE, OR HOPELESS: NOT AT ALL
1. LITTLE INTEREST OR PLEASURE IN DOING THINGS: NOT AT ALL

## 2020-05-30 NOTE — PROGRESS NOTES
Transfer from [facility], by Dr Mckay     50F PMHx ESRD on HD dialysis, ischemic cardiomyopathy, chronic respiratoyr failure on 2 L O2.       Developed fevers and chills during dialysis yesterday, temp up to 104, that improved with empiric antibiotics Cefepime and vanco.     Blood cultures came back showing gram positive 2/2 from 5/29, concerning for infected HD catheter. Will likely need prolonged IV ABX, Echo, Infectious disease +/- vascular or IR consult, SU.     Reportedly normal vials , On 2L, her baseline   lactic 1.4    Dr Cruz from Nephro consulting  COVID Neg on 5/10, COVID pending, started on Isolation      Inpatient COVID r/o unit on continuous cardiac monitoring

## 2020-05-30 NOTE — H&P
Hospital Medicine History & Physical Note    Date of Service  5/30/2020    Primary Care Physician  Aureliano Elam M.D.    Code Status  Full Code    Chief Complaint  No chief complaint on file.      History of Presenting Illness    50 y.o. female with PMHx of ESRD on HD who presented as a direct admit from outside facility on 5/30/2020 for management of bacteremia, nephrology consultation and infectious disease consultation.  Patient presented to the outside facility on May 29, 2020 after she developed cough during her hemodialysis and she was unable to complete her hemodialysis and she went home.  She developed fever and cough and she decided to go to the hospital and she was admitted to outside facility.  Due to her symptoms of cough and possible developing pneumonia she was started on vancomycin and cefepime and blood cultures were obtained her blood culture showed gram-positive cocci in clusters.  Outside facility requested consult with nephrology and hospitalist service and transfer her here for further management.  I evaluated and examined her at the bedside she reported that she has been having cough and she denies any symptoms of chest pain, nausea and vomiting.  She makes a small amount of urine and she denies any symptoms of dysuria.  She also reported that she uses 2 L of oxygen and she denies any acute symptoms other than cough.    I reviewed outside blood culture results it showed  Gram positive cocci in clusters.  Discussed with pharmacist regarding vancomycin and cefepime.     Review of Systems  Review of Systems   Constitutional: Positive for fever. Negative for chills and weight loss.   HENT: Negative for hearing loss and tinnitus.    Eyes: Negative for blurred vision, double vision, photophobia and pain.   Respiratory: Negative for cough, sputum production and shortness of breath.    Cardiovascular: Negative for chest pain, palpitations, orthopnea and leg swelling.   Gastrointestinal: Negative for  abdominal pain, constipation, diarrhea, nausea and vomiting.   Genitourinary: Negative for dysuria, frequency and urgency.   Musculoskeletal: Negative for back pain, joint pain, myalgias and neck pain.   Skin: Negative for rash.   Neurological: Negative for dizziness, tingling, tremors, sensory change, speech change, focal weakness and headaches.   Psychiatric/Behavioral: Negative for hallucinations and substance abuse.   All other systems reviewed and are negative.      Past Medical History   has a past medical history of CKD (chronic kidney disease), stage IV ([facility]) (12/24/2015), H/O: CVA (cerebrovascular accident) (12/24/2015), Hypertension, Kidney disease, Mild mitral regurgitation (7/14/2014), Moyamoya disease (10/3/2013), Seizure disorder, grand mal ([facility]) (3/3/2016), and Stroke ([facility]).    Surgical History   has a past surgical history that includes primary c section; tubal coagulation laparoscopic bilateral; irrigation & debridement ortho (Right, 2/27/2017); irrigation & debridement ortho (Right, 3/5/2017); metatarsal head resection (3/5/2017); and irrigation & debridement ortho (Right, 3/9/2017).     Family History  family history includes Arthritis in her father; Cancer in her paternal grandmother; Diabetes in her maternal grandmother and mother; Hypertension in her father and mother; Psychiatric Illness in her maternal grandfather and paternal grandfather.     Social History   reports that she has quit smoking. She has never used smokeless tobacco. She reports that she does not drink alcohol or use drugs.    Allergies  Allergies   Allergen Reactions   • Allegra [Fexofenadine] Unspecified     Rxn = unknown   • Amoxicillin    • Azithromycin Rash     Rxn = unknown   • Claritin    • Erythromycin Unspecified     Rxn = unknown   • Ibuprofen & Caffeine-Vitamins Unspecified     Rxn = unknown   • Penicillins Unspecified     Rxn = unknown   • Procardia [Nifedipine]    • Rosemary Oil Anaphylaxis     Throat starts to  close/swell.  Oil and the herb.    • Zyrtec [Cetirizine] Unspecified     Rxn = unknown       Medications  Prior to Admission Medications   Prescriptions Last Dose Informant Patient Reported? Taking?   Ascorbic Acid (VITAMIN C) 1000 MG Tab   Yes No   Sig: Take  by mouth every day.   Esomeprazole Magnesium (NEXIUM 24HR PO)   Yes No   Sig: Take  by mouth as needed. noon   LORazepam (ATIVAN) 1 MG Tab   No No   Sig: Take 1 Tab by mouth 2 Times a Day for 180 days.   Lysine 500 MG Cap   Yes No   Sig: Take  by mouth every day.   Melatonin 5 MG Tab   Yes No   Sig: Take  by mouth every bedtime. Indications: Trouble Sleeping   acetaminophen (TYLENOL) 325 MG Tab   No No   Sig: Take 2 Tabs by mouth every 6 hours as needed (Mild Pain; (Pain scale 1-3); Temp greater than 100.5 F).   aspirin (ASA) 81 MG Chew Tab chewable tablet   No No   Sig: Take 1 Tab by mouth every day.   atorvastatin (LIPITOR) 40 MG Tab   No No   Sig: Take 1 Tab by mouth every day.   calcium citrate (CALCITRATE) 950 MG Tab   Yes No   Sig: Take 950 mg by mouth every day.   carvedilol (COREG) 6.25 MG Tab   No No   Sig: Take 1 Tab by mouth 2 times a day, with meals.   escitalopram (LEXAPRO) 10 MG Tab   No No   Sig: Take 1 Tab by mouth every day.   folic acid (FOLVITE) 1 MG Tab   Yes No   Sig: Take 1 mg by mouth two times a day may change times on alt/days.   levETIRAcetam (KEPPRA) 500 MG Tab   No No   Simg BID with additional 500mg after dialysis on Mon/Wed/Fri.   phenytoin ER (DILANTIN) 100 MG Cap   No No   Sig: Take 1 Cap by mouth 3 times a day.   sodium bicarbonate (SODIUM BICARBONATE) 650 MG Tab   No No   Sig: Take 1 Tab by mouth 3 times a day.   valsartan (DIOVAN) 40 MG Tab   No No   Sig: Take 0.5 Tabs by mouth every day.   vitamin B comp+C (ALLBEE WITH C) Tab   No No   Sig: Take 1 Tab by mouth every day.      Facility-Administered Medications: None       Physical Exam  Temp:  [37 °C (98.6 °F)] 37 °C (98.6 °F)  Pulse:  [60] 60  Resp:  [19] 19  BP:  (101)/(51) 101/51  SpO2:  [100 %] 100 %    Physical Exam  Vitals signs reviewed.   Constitutional:       General: She is not in acute distress.     Appearance: She is obese. She is not ill-appearing.   HENT:      Head: Normocephalic and atraumatic.      Nose: No congestion.      Comments: Oxygen nasal cannula  Eyes:      General:         Right eye: No discharge.         Left eye: No discharge.      Pupils: Pupils are equal, round, and reactive to light.   Neck:      Musculoskeletal: Normal range of motion. No neck rigidity.   Cardiovascular:      Rate and Rhythm: Normal rate and regular rhythm.      Pulses: Normal pulses.      Heart sounds: Normal heart sounds. No murmur.   Pulmonary:      Effort: Pulmonary effort is normal. No respiratory distress.      Breath sounds: Normal breath sounds. No stridor.   Abdominal:      General: Bowel sounds are normal. There is no distension.      Palpations: Abdomen is soft.      Tenderness: There is no abdominal tenderness.   Musculoskeletal: Normal range of motion.         General: No swelling or tenderness.   Skin:     General: Skin is warm.      Capillary Refill: Capillary refill takes less than 2 seconds.      Coloration: Skin is not jaundiced or pale.      Findings: No bruising.   Neurological:      General: No focal deficit present.      Mental Status: She is alert and oriented to person, place, and time.      Cranial Nerves: No cranial nerve deficit.   Psychiatric:         Mood and Affect: Mood normal.         Behavior: Behavior normal.         Laboratory:          No results for input(s): ALTSGPT, ASTSGOT, ALKPHOSPHAT, TBILIRUBIN, DBILIRUBIN, GAMMAGT, AMYLASE, LIPASE, ALB, PREALBUMIN, GLUCOSE in the last 72 hours.      No results for input(s): NTPROBNP in the last 72 hours.      No results for input(s): TROPONINT in the last 72 hours.    Imaging:  No orders to display         Assessment/Plan:      Bacteremia  Assessment & Plan  She found to have bacteremia at outside  facility  PermCath could be the source of infection per  Nephrology consulted.  I started her on vancomycin and dose adjustment as per vancomycin trough levels and monitor for vancomycin toxicity.  Continue cefepime due to possibility of pneumonia.  Order repeat blood cultures and requested RN to perform blood culture prior to initiation of antibiotics.  Order echocardiogram to evaluate for any signs of infective endocarditis.  COVID-19 testing at our first facility is pending  I ordered repeat COVID-19 testing as outside COVID-19 testing will take up to 3 days.  Ordered procalcitonin level for tomorrow although it will be less sensitive in the setting of end-stage renal disease.  Infectious disease consult in a.m.    Pneumonia  Assessment & Plan  Patient has been having symptoms of cough and she found to have elevated white blood cell count at the outside facility.  Due to concern for pneumonia she was started on cefepime and vancomycin.  She is allergic to penicillin.  I ordered cefepime and vancomycin.  Antibiotic de-escalation as per patient clinical response.  Infectious disease consult.    ESRD (end stage renal disease) (HCC)  Assessment & Plan  Neurology consulted continuation of dialysis.  Medication doses as per renal functions.      Seizure disorder, grand mal (HCC)- (present on admission)  Assessment & Plan  Continue outpatient seizure medications  Seizure precautions.      HTN (hypertension)- (present on admission)  Assessment & Plan  Continue outpatient medications with holding parameters.  Order clonidine 0.1 mg every 6 hours as needed with parameters.  Continue to monitor.    I reviewed her last discharge summary which is from May 14, 2020.

## 2020-05-30 NOTE — CONSULTS
"[facility] -  CONSULTATION NOTE               Author: Cole Schwarz M.D. Date & Time: 5/30/2020  4:18 PM       REASON FOR CONSULTATION:   - Inpatient hemodialysis management.    CHIEF COMPLAINT:   -  \"I had fever at dialysis\"    HISTORY OF PRESENT ILLNESS:    51 yo F PMH ESRD MWF iHD, HTN, anemia of CKD, and CKD-MBD [place] [facility] [place].  She presented to OSH after being advised to by OP HD unit.  She has been compliant with treatments and was doing well until recently when she's been having fevers/chills during treatment.  She had BCx drawn at HD unit and yesterday she had a temp as high as 104 and later in the day BCx came back for GPC.  She was advised to come to ED and after evaluation at OSH and phone call with us by ED physician there she was transferred here for further evaluation and care.  Her PermCath is likely source of her bacteremia and she did have a COVID-19 test on 5/10 that was negative.  She had a COVID-19 test done again this time and is in isolation.  In the past she has had MSSA bacteremia due to catheter and has responded to IV abx.  She was given Vanc/Cefepime at the HD unit after cultures resulted.  She denies any N/V/CP/SOB.  No melena, hematochezia, hematemesis.  No HA, visual changes, or abdominal pain.    REVIEW OF SYSTEMS:    Review of Systems   Constitutional: Positive for chills, fever and malaise/fatigue.   HENT: Negative for ear pain and sore throat.    Eyes: Negative for pain and redness.   Respiratory: Negative for cough and hemoptysis.    Cardiovascular: Negative for chest pain and leg swelling.   Gastrointestinal: Negative for abdominal pain and nausea.   Musculoskeletal: Negative for joint pain and myalgias.   Skin: Negative for itching and rash.   Neurological: Positive for weakness. Negative for dizziness and headaches.   Endo/Heme/Allergies: Negative for polydipsia. Does not bruise/bleed easily.   Psychiatric/Behavioral: " "Negative for depression and hallucinations.   All other systems reviewed and are negative.    PAST MEDICAL HISTORY:   - ESRD MWF iHD  - HTN  - Anemia of CKD  - CKD-MBD  - Moyamoya disease  - CVA  - ICH  - DCM, EF 30%  - HLD  - CAD  - Seizuer d/o  - COPD    PAST SURGICAL HISTORY:   - Tonsillectomy  -   - BTL  - I&D right metatarsal head  - Cholecystectomy  - Uterine ablation  - RIJ PermCath placement    FAMILY HISTORY:   - No CKD/ESRD  - Otherwise reviewed and non contributory to current illness    SOCIAL HISTORY:   - No tobacco  - No EtOH  - No illicits    HOME MEDICATIONS:   - Reviewed and documented in chart    LABORATORY STUDIES:   - Reviewed and documented in chart    ALLERGIES:  Allegra [fexofenadine]; Amoxicillin; Azithromycin; Claritin; Erythromycin; Ibuprofen & caffeine-vitamins; Penicillins; Procardia [nifedipine]; Rosemary oil; and Zyrtec [cetirizine]    VS:  /51   Pulse 60   Temp 37 °C (98.6 °F) (Temporal)   Resp 19   Ht 1.753 m (5' 9\")   Wt 106.5 kg (234 lb 12.6 oz)   SpO2 100%   BMI 34.67 kg/m²   Physical Exam  Vitals signs and nursing note reviewed.   Constitutional:       General: She is not in acute distress.     Appearance: She is ill-appearing.   HENT:      Head: Normocephalic and atraumatic.      Right Ear: External ear normal.      Left Ear: External ear normal.      Nose: Nose normal. No congestion.      Mouth/Throat:      Mouth: Mucous membranes are moist.      Pharynx: No oropharyngeal exudate.   Eyes:      General:         Right eye: No discharge.         Left eye: No discharge.      Extraocular Movements: Extraocular movements intact.   Neck:      Musculoskeletal: Neck supple. No muscular tenderness.   Cardiovascular:      Rate and Rhythm: Normal rate and regular rhythm.      Heart sounds: Normal heart sounds.   Pulmonary:      Effort: Pulmonary effort is normal.      Breath sounds: Normal breath sounds.   Chest:      Chest wall: No tenderness.   Abdominal:      " General: Bowel sounds are normal. There is no distension.      Palpations: Abdomen is soft.   Musculoskeletal:         General: No swelling or tenderness.   Skin:     General: Skin is warm and dry.      Findings: No rash.   Neurological:      General: No focal deficit present.      Mental Status: Mental status is at baseline.   Psychiatric:         Mood and Affect: Mood normal.         Behavior: Behavior normal.     LABS:  No results found for this or any previous visit (from the past 24 hour(s)).    (click the triangle to expand results)    IMAGING:  No orders to display     IMPRESSION:  - ESRD    * Etiology likely 2/2 HTN    * MWF @ [facility]    * RIJ PermCath  - Sepsis    * BCx GPC, likely source PermCath    * COVID-19 test in progress, tested on 5/10 negative  - HTN    * Goal BP < 140/90    * Stable  - Anemia of CKD    * Goal Hgb 10-11    * Stable  - CKD-MBD    * Managed at HD unit  - HLD  - CAD  - Chronic respiratory failure with hypoxia    * On 2L O2 at home    PLAN:  - IR consult for PermCath removal and line holiday  - Vanco until sensitivities result from OP cultures  - Repeat BCx tomorrow  - May need SU if persistently febrile or positive cultures on abx  - Try to hold off on new line until negative culture set obtained, if needs RRT, will place TempCath if still not cleared bacteria  - No dietary protein restrictions  - Dose all meds per ESRD  - Low sodium diet  - 1.5L fluid restriction    All prior notes form other doctors and RN staff were reviewed from admission to current day to help me make my clinical decisions    Thank you for the consultation!

## 2020-05-30 NOTE — PROGRESS NOTES
Transfer Center:    Received inpt to Direct Admit transfer request from [facility] @ 1000  Sending Physician:  Woody   Specialist consulted:  christopher   Diagnosis:  Bacteremia, ESRD   Patient accepted by:  [facility]     Patient coming via:  ground  ETA:  TBD   Medical records from sending facility scanned into Media tab.  Nursing to notify Direct Admit On-Call hospitalist and Specialist when patient arrives. Please release signed & held ADT.  Plan:  Transfer Center to assist if needed.

## 2020-05-31 ENCOUNTER — APPOINTMENT (OUTPATIENT)
Dept: CARDIOLOGY | Facility: MEDICAL CENTER | Age: 50
DRG: 314 | End: 2020-05-31
Attending: INTERNAL MEDICINE
Payer: MEDICARE

## 2020-05-31 ENCOUNTER — APPOINTMENT (OUTPATIENT)
Dept: RADIOLOGY | Facility: MEDICAL CENTER | Age: 50
DRG: 314 | End: 2020-05-31
Attending: INTERNAL MEDICINE
Payer: MEDICARE

## 2020-05-31 LAB
ALBUMIN SERPL BCP-MCNC: 2.8 G/DL (ref 3.2–4.9)
ALBUMIN SERPL BCP-MCNC: 2.9 G/DL (ref 3.2–4.9)
ALBUMIN/GLOB SERPL: 0.7 G/DL
ALP SERPL-CCNC: 108 U/L (ref 30–99)
ALT SERPL-CCNC: 19 U/L (ref 2–50)
ANION GAP SERPL CALC-SCNC: 15 MMOL/L (ref 7–16)
AST SERPL-CCNC: 19 U/L (ref 12–45)
BASOPHILS # BLD AUTO: 0.1 % (ref 0–1.8)
BASOPHILS # BLD AUTO: 0.2 % (ref 0–1.8)
BASOPHILS # BLD: 0.01 K/UL (ref 0–0.12)
BASOPHILS # BLD: 0.02 K/UL (ref 0–0.12)
BILIRUB SERPL-MCNC: 0.4 MG/DL (ref 0.1–1.5)
BUN SERPL-MCNC: 37 MG/DL (ref 8–22)
BUN SERPL-MCNC: 41 MG/DL (ref 8–22)
CALCIUM SERPL-MCNC: 8.7 MG/DL (ref 8.5–10.5)
CALCIUM SERPL-MCNC: 9.1 MG/DL (ref 8.5–10.5)
CHLORIDE SERPL-SCNC: 96 MMOL/L (ref 96–112)
CHLORIDE SERPL-SCNC: 97 MMOL/L (ref 96–112)
CO2 SERPL-SCNC: 22 MMOL/L (ref 20–33)
CO2 SERPL-SCNC: 23 MMOL/L (ref 20–33)
CREAT SERPL-MCNC: 5.43 MG/DL (ref 0.5–1.4)
CREAT SERPL-MCNC: 5.93 MG/DL (ref 0.5–1.4)
EOSINOPHIL # BLD AUTO: 0.07 K/UL (ref 0–0.51)
EOSINOPHIL # BLD AUTO: 0.1 K/UL (ref 0–0.51)
EOSINOPHIL NFR BLD: 0.8 % (ref 0–6.9)
EOSINOPHIL NFR BLD: 0.8 % (ref 0–6.9)
ERYTHROCYTE [DISTWIDTH] IN BLOOD BY AUTOMATED COUNT: 54.4 FL (ref 35.9–50)
ERYTHROCYTE [DISTWIDTH] IN BLOOD BY AUTOMATED COUNT: 54.7 FL (ref 35.9–50)
GLOBULIN SER CALC-MCNC: 4.1 G/DL (ref 1.9–3.5)
GLUCOSE SERPL-MCNC: 112 MG/DL (ref 65–99)
GLUCOSE SERPL-MCNC: 97 MG/DL (ref 65–99)
HCT VFR BLD AUTO: 24.7 % (ref 37–47)
HCT VFR BLD AUTO: 26.2 % (ref 37–47)
HGB BLD-MCNC: 7.7 G/DL (ref 12–16)
HGB BLD-MCNC: 8.2 G/DL (ref 12–16)
IMM GRANULOCYTES # BLD AUTO: 0.04 K/UL (ref 0–0.11)
IMM GRANULOCYTES # BLD AUTO: 0.08 K/UL (ref 0–0.11)
IMM GRANULOCYTES NFR BLD AUTO: 0.4 % (ref 0–0.9)
IMM GRANULOCYTES NFR BLD AUTO: 0.6 % (ref 0–0.9)
LYMPHOCYTES # BLD AUTO: 1.11 K/UL (ref 1–4.8)
LYMPHOCYTES # BLD AUTO: 1.15 K/UL (ref 1–4.8)
LYMPHOCYTES NFR BLD: 11.9 % (ref 22–41)
LYMPHOCYTES NFR BLD: 8.9 % (ref 22–41)
MAGNESIUM SERPL-MCNC: 1.7 MG/DL (ref 1.5–2.5)
MCH RBC QN AUTO: 31.4 PG (ref 27–33)
MCH RBC QN AUTO: 31.8 PG (ref 27–33)
MCHC RBC AUTO-ENTMCNC: 31.2 G/DL (ref 33.6–35)
MCHC RBC AUTO-ENTMCNC: 31.3 G/DL (ref 33.6–35)
MCV RBC AUTO: 100.4 FL (ref 81.4–97.8)
MCV RBC AUTO: 102.1 FL (ref 81.4–97.8)
MONOCYTES # BLD AUTO: 1.18 K/UL (ref 0–0.85)
MONOCYTES # BLD AUTO: 1.2 K/UL (ref 0–0.85)
MONOCYTES NFR BLD AUTO: 12.9 % (ref 0–13.4)
MONOCYTES NFR BLD AUTO: 9.1 % (ref 0–13.4)
NEUTROPHILS # BLD AUTO: 10.44 K/UL (ref 2–7.15)
NEUTROPHILS # BLD AUTO: 6.89 K/UL (ref 2–7.15)
NEUTROPHILS NFR BLD: 73.9 % (ref 44–72)
NEUTROPHILS NFR BLD: 80.4 % (ref 44–72)
NRBC # BLD AUTO: 0 K/UL
NRBC # BLD AUTO: 0 K/UL
NRBC BLD-RTO: 0 /100 WBC
NRBC BLD-RTO: 0 /100 WBC
PHOSPHATE SERPL-MCNC: 3.2 MG/DL (ref 2.5–4.5)
PHOSPHATE SERPL-MCNC: 3.3 MG/DL (ref 2.5–4.5)
PLATELET # BLD AUTO: 123 K/UL (ref 164–446)
PLATELET # BLD AUTO: 155 K/UL (ref 164–446)
PMV BLD AUTO: 10.5 FL (ref 9–12.9)
PMV BLD AUTO: 10.7 FL (ref 9–12.9)
POTASSIUM SERPL-SCNC: 3.8 MMOL/L (ref 3.6–5.5)
POTASSIUM SERPL-SCNC: 3.9 MMOL/L (ref 3.6–5.5)
PROT SERPL-MCNC: 6.9 G/DL (ref 6–8.2)
RBC # BLD AUTO: 2.42 M/UL (ref 4.2–5.4)
RBC # BLD AUTO: 2.61 M/UL (ref 4.2–5.4)
SODIUM SERPL-SCNC: 133 MMOL/L (ref 135–145)
SODIUM SERPL-SCNC: 135 MMOL/L (ref 135–145)
WBC # BLD AUTO: 13 K/UL (ref 4.8–10.8)
WBC # BLD AUTO: 9.3 K/UL (ref 4.8–10.8)

## 2020-05-31 PROCEDURE — 99223 1ST HOSP IP/OBS HIGH 75: CPT | Mod: CS | Performed by: INTERNAL MEDICINE

## 2020-05-31 PROCEDURE — 80069 RENAL FUNCTION PANEL: CPT

## 2020-05-31 PROCEDURE — A9270 NON-COVERED ITEM OR SERVICE: HCPCS | Performed by: INTERNAL MEDICINE

## 2020-05-31 PROCEDURE — 85025 COMPLETE CBC W/AUTO DIFF WBC: CPT | Mod: 91

## 2020-05-31 PROCEDURE — 84100 ASSAY OF PHOSPHORUS: CPT

## 2020-05-31 PROCEDURE — 36415 COLL VENOUS BLD VENIPUNCTURE: CPT

## 2020-05-31 PROCEDURE — 770001 HCHG ROOM/CARE - MED/SURG/GYN PRIV*

## 2020-05-31 PROCEDURE — 99233 SBSQ HOSP IP/OBS HIGH 50: CPT | Performed by: INTERNAL MEDICINE

## 2020-05-31 PROCEDURE — 80053 COMPREHEN METABOLIC PANEL: CPT

## 2020-05-31 PROCEDURE — 83735 ASSAY OF MAGNESIUM: CPT

## 2020-05-31 PROCEDURE — 71046 X-RAY EXAM CHEST 2 VIEWS: CPT

## 2020-05-31 PROCEDURE — 700111 HCHG RX REV CODE 636 W/ 250 OVERRIDE (IP): Performed by: INTERNAL MEDICINE

## 2020-05-31 PROCEDURE — 700105 HCHG RX REV CODE 258: Performed by: INTERNAL MEDICINE

## 2020-05-31 PROCEDURE — 700102 HCHG RX REV CODE 250 W/ 637 OVERRIDE(OP): Performed by: INTERNAL MEDICINE

## 2020-05-31 RX ADMIN — SODIUM BICARBONATE 650 MG: 650 TABLET ORAL at 05:34

## 2020-05-31 RX ADMIN — VANCOMYCIN HYDROCHLORIDE 1600 MG: 500 INJECTION, POWDER, LYOPHILIZED, FOR SOLUTION INTRAVENOUS at 15:01

## 2020-05-31 RX ADMIN — CARVEDILOL 6.25 MG: 6.25 TABLET, FILM COATED ORAL at 09:09

## 2020-05-31 RX ADMIN — LORAZEPAM 1 MG: 1 TABLET ORAL at 05:33

## 2020-05-31 RX ADMIN — PHENYTOIN SODIUM 100 MG: 100 CAPSULE ORAL at 13:42

## 2020-05-31 RX ADMIN — ESCITALOPRAM OXALATE 10 MG: 10 TABLET ORAL at 05:34

## 2020-05-31 RX ADMIN — ASPIRIN 81 MG 81 MG: 81 TABLET ORAL at 05:34

## 2020-05-31 RX ADMIN — LEVETIRACETAM 500 MG: 500 TABLET ORAL at 18:12

## 2020-05-31 RX ADMIN — PHENYTOIN SODIUM 100 MG: 100 CAPSULE ORAL at 18:00

## 2020-05-31 RX ADMIN — LEVETIRACETAM 500 MG: 500 TABLET ORAL at 05:34

## 2020-05-31 RX ADMIN — SODIUM BICARBONATE 650 MG: 650 TABLET ORAL at 18:41

## 2020-05-31 RX ADMIN — LORAZEPAM 1 MG: 1 TABLET ORAL at 18:12

## 2020-05-31 RX ADMIN — CARVEDILOL 6.25 MG: 6.25 TABLET, FILM COATED ORAL at 18:12

## 2020-05-31 RX ADMIN — ACETAMINOPHEN 650 MG: 325 TABLET, FILM COATED ORAL at 20:51

## 2020-05-31 RX ADMIN — CEFEPIME HYDROCHLORIDE 1 G: 1 INJECTION, POWDER, FOR SOLUTION INTRAMUSCULAR; INTRAVENOUS at 13:42

## 2020-05-31 RX ADMIN — SODIUM BICARBONATE 650 MG: 650 TABLET ORAL at 13:43

## 2020-05-31 RX ADMIN — ATORVASTATIN CALCIUM 40 MG: 40 TABLET, FILM COATED ORAL at 05:33

## 2020-05-31 RX ADMIN — PHENYTOIN SODIUM 100 MG: 100 CAPSULE ORAL at 05:33

## 2020-05-31 ASSESSMENT — ENCOUNTER SYMPTOMS
NERVOUS/ANXIOUS: 0
WEAKNESS: 1
CHILLS: 1
NAUSEA: 1
FEVER: 0
COUGH: 0
CARDIOVASCULAR NEGATIVE: 1
PALPITATIONS: 0
MYALGIAS: 1
HEADACHES: 0
FALLS: 0
DEPRESSION: 0
SHORTNESS OF BREATH: 0
SORE THROAT: 0
BLURRED VISION: 0
NAUSEA: 0
CONSTIPATION: 0
DIARRHEA: 0
DIZZINESS: 0
ABDOMINAL PAIN: 0
FEVER: 1
VOMITING: 0
WEAKNESS: 0
CHILLS: 0

## 2020-05-31 NOTE — ASSESSMENT & PLAN NOTE
She found to have bacteremia at [facility] could be the source of infection discontinued 5/30  Nephrology consulted.  I started her on vancomycin and dose adjustment as per vancomycin trough levels and monitor for vancomycin toxicity.  Continue cefepime due to possibility of pneumonia.  Order echocardiogram to evaluate for any signs of infective endocarditis.  COVID-19 testing at our first facility is pending    Repeat blood cultures here have grown staph  We will continue to check blood cultures to ensure clearance  Counseled patient extensively about excepting IV antibiotics  Echo performed this morning with read pending.

## 2020-05-31 NOTE — ASSESSMENT & PLAN NOTE
Patient has been having symptoms of cough and she found to have elevated white blood cell count at the outside facility.  Chest x-ray concerning for right lower lobe pneumonia  Due to concern for pneumonia she was started on cefepime and vancomycin.  She is allergic to penicillin.  Continue cefepime and vancomycin.  Infectious disease consult.  COVID 19 negative.

## 2020-05-31 NOTE — PROGRESS NOTES
Assessment completed. Patient irritable. Pt A&Ox4. Respirations are even and unlabored on 2L n/c-baseline. Pt denies pain at this time. Tender to right upper chest where permacath was removed. Monitors applied, VS stable, call light and belongings within reach. POC updated (abx, IR for port). Patient refused am care. Pt educated on room and call light. Communication board updated. Needs met.

## 2020-05-31 NOTE — PROGRESS NOTES
Hospital Medicine Daily Progress Note    Date of Service  5/31/2020    Chief Complaint  50 y.o. female admitted 5/30/2020 with cough, bacteremia    Hospital Course    Ms. Pascale Acevedo is a 50 y.o. female with past medical history significant for end-stage renal disease on hemodialysis, pretension, anemia of chronic kidney disease, ischemic cardiomyopathy, chronic hypoxic respiratory failure on 2 L nasal cannula who presented on 5/30/2020 from [facility] for management of bacteremia, nephrology consultation, infectious disease consultation.  Patient presented to outside facility on 5/29/2020 at that she developed a fever and cough during hemodialysis and was unable to complete her session.  She presented to an outside facility and was started on vancomycin and cefepime.  Blood cultures were obtained and are growing gram-positive cocci in clusters.  There is concern of permacath to be source of infection or bacteremia.  Patient was started on vancomycin and cefepime was continued for pneumonia.  She is allergic to penicillin.  COVID 19 was negative on 5/10.  Repeat pending from [facility] and was again repeated on presentation at [facility] Problem Update  Patient was seen and examined at bedside.  I have personally reviewed vitals, labs, and imaging.    5/31.  Afebrile.  Episode of bradycardia.  On baseline 2 L nasal cannula.  Patient reports cough is improved.  Denies fever, chills, chest pains, shortness of breath.  Dialysis access was pulled yesterday.  Patient is sleepy at the time of encounter and wants to leave AMA.  She has been refusing care.  Patient does not want IV vancomycin stating that the IV start.  Counseled about bacteremia and need for PICC/midline for IV antibiotics and to reduce blood draws.  She will also need new dialysis access.  Patient blames old catheter stating that it gave her bacteremia.  Reports that she no longer wants dialysis access and wants to go  "home.  Counseled her extensively about risks of leaving with bacteremia and no dialysis access to include risk of volume overload and arrhythmia easily in the coming days.  Patient states that \" yeah, what ever I want to go home\".  She is not oriented to the date and it is unclear to me if she completely understands the risks of leaving AGAINST MEDICAL ADVICE as she could decompensate relatively quickly.  Will get cog eval. After speaking with her father who is listed as next of kin who reports the patient does have a history of strokes and moyamoya.  She does make her own decisions and is usually lucid.  History of psychosis and hallucinations.  Her father describes her as confrontational and not caring.  I did speak with ID Dr. Cormier about the case.      Consultants/Specialty  Nephrology    Code Status  Full    Disposition  Does not have capacity to leave AGAINST MEDICAL ADVICE    Review of Systems  Review of Systems   Constitutional: Negative for chills and fever (resolved).   HENT: Negative for congestion and sore throat.    Eyes: Negative for blurred vision.   Respiratory: Negative for cough (resolved) and shortness of breath.    Cardiovascular: Negative for chest pain, palpitations and leg swelling.   Gastrointestinal: Negative for abdominal pain, constipation, diarrhea, nausea and vomiting.   Genitourinary: Negative for dysuria, frequency and urgency.        Makes minimal urine   Musculoskeletal: Negative for falls.   Skin: Negative for rash.   Neurological: Negative for dizziness, weakness and headaches.   Psychiatric/Behavioral: Negative for depression. The patient is not nervous/anxious.    All other systems reviewed and are negative.       Physical Exam  Temp:  [36.1 °C (97 °F)-37.2 °C (99 °F)] 37.2 °C (99 °F)  Pulse:  [58-83] 83  Resp:  [18-20] 20  BP: (101-120)/(49-51) 120/50  SpO2:  [92 %-100 %] 97 %    Physical Exam  Vitals signs and nursing note reviewed.   Constitutional:       General: She is " not in acute distress.     Appearance: Normal appearance. She is obese.   HENT:      Head: Normocephalic and atraumatic.      Nose: Nose normal.      Mouth/Throat:      Mouth: Mucous membranes are moist.      Pharynx: Oropharynx is clear. No oropharyngeal exudate or posterior oropharyngeal erythema.   Eyes:      Extraocular Movements: Extraocular movements intact.      Conjunctiva/sclera: Conjunctivae normal.   Neck:      Musculoskeletal: Normal range of motion and neck supple.   Cardiovascular:      Rate and Rhythm: Normal rate and regular rhythm.      Pulses: Normal pulses.      Heart sounds: Normal heart sounds. No murmur.   Pulmonary:      Effort: Pulmonary effort is normal. No respiratory distress.      Breath sounds: Normal breath sounds. No stridor. No wheezing or rales.   Chest:      Chest wall: Tenderness (Of right upper chest at dressing site of previous dialysis access) present.   Abdominal:      General: Abdomen is flat. Bowel sounds are normal. There is no distension.      Palpations: Abdomen is soft. There is no mass.      Tenderness: There is no abdominal tenderness.   Musculoskeletal:      Right lower leg: Edema present.      Left lower leg: Edema present.   Skin:     General: Skin is warm.      Capillary Refill: Capillary refill takes less than 2 seconds.   Neurological:      General: No focal deficit present.      Mental Status: She is alert. Mental status is at baseline.      Cranial Nerves: No cranial nerve deficit.      Comments: Not oriented to date.   Psychiatric:         Mood and Affect: Mood normal.         Behavior: Behavior normal.         Fluids    Intake/Output Summary (Last 24 hours) at 5/31/2020 0806  Last data filed at 5/30/2020 2327  Gross per 24 hour   Intake 1040 ml   Output --   Net 1040 ml       Laboratory  Recent Labs     05/30/20  2042 05/31/20  0403   WBC 12.4* 13.0*   RBC 2.59* 2.61*   HEMOGLOBIN 8.2* 8.2*   HEMATOCRIT 26.2* 26.2*   .2* 100.4*   MCH 31.7 31.4   MCHC  31.3* 31.3*   RDW 55.2* 54.4*   PLATELETCT 149* 155*   MPV 10.2 10.5     Recent Labs     05/30/20 2042 05/31/20  0403   SODIUM 134* 133*   POTASSIUM 3.9 3.8   CHLORIDE 96 96   CO2 21 22   GLUCOSE 155* 97   BUN 35* 37*   CREATININE 4.87* 5.43*   CALCIUM 8.7 9.1                   Imaging  IR-CVC TUNNEL W/O PORT REMOVAL    (Results Pending)        Assessment/Plan  Bacteremia  Assessment & Plan  She found to have bacteremia at [facility] could be the source of infection discontinued 5/30  Nephrology consulted.  I started her on vancomycin and dose adjustment as per vancomycin trough levels and monitor for vancomycin toxicity.  Continue cefepime due to possibility of pneumonia.  Order echocardiogram to evaluate for any signs of infective endocarditis.  COVID-19 testing at our first facility is pending  I ordered repeat COVID-19 testing as outside COVID-19 testing will take up to 3 days.  Ordered procalcitonin level for tomorrow although it will be less sensitive in the setting of end-stage renal disease.    Blood culture showed no growth    Pneumonia  Assessment & Plan  Patient has been having symptoms of cough and she found to have elevated white blood cell count at the outside facility.  Due to concern for pneumonia she was started on cefepime and vancomycin.  She is allergic to penicillin.  Continue cefepime and vancomycin.  Antibiotic de-escalation as per patient clinical response.  Infectious disease consult.    ESRD (end stage renal disease) (HCC)  Assessment & Plan  Neurology consulted continuation of dialysis.  Medication doses as per renal functions.      Seizure disorder, grand mal (HCC)- (present on admission)  Assessment & Plan  Continue outpatient levetiracetam, phenytoin  Seizure and aspiration, fall precautions.    HTN (hypertension)- (present on admission)  Assessment & Plan  Continue outpatient carvedilol with holding parameters.  Order clonidine 0.1 mg every 6 hours as needed with  parameters.  Continue to monitor.    Gastrointestinal prophylaxis: The patient has no risk factors for developing gastric ulcers or gastritis.  As the patient is tolerating oral intake, GI prophylaxis is not indicated at this time.  Antibiotics: Vancomycin, cefepime  Diet Order Renal  Code status: Full  Prognosis: Guarded  Risk: The Patient is at HIGH risk for complications and decompensation secondary to her multiple cormorbidities including bacteremia secondary to dialysis catheter requiring IV antibiotics.  Pneumonia requiring IV antibiotics.  Hypertension.  Cognitive impairment with history of stroke/psychiatric disorder    I have personally reviewed notes, labs, vitals, imaging.  I discussed the plan of care with bedside RN as well as on multidisciplinary rounds    Discussed case with infectious disease Dr. Cormier    VTE prophylaxis: SCDs

## 2020-05-31 NOTE — PROGRESS NOTES
"After blood cultures were drawn and a vancomycin level the pharmacy ordered vancomycin for the patient. Pharmacy sent the medication and the patient is addiment about not getting the medication. She states \" I do not want that, Ill be getting out of here anyway\" patient was educated about the importance of the medication and possible outcomes for not receiving the medication.  "

## 2020-05-31 NOTE — PROGRESS NOTES
"Pharmacy Kinetics 50 y.o. female on vancomycin day # 2  5/31/2020    Currently Dose: Vancomycin pulse dosing  05/30 VR 13.6 mcg/mL  05/31 1600 mg    Indication for Treatment: staphylococcus aureus bacteremia rule-out  Provider Specified End Date: No  ID Service Following: No    Pertinent history per medical record: Admitted on 5/30/2020 for concern of  Bacteremia from outside facility. ESRD iHD. Nephology consulted..    Other antibiotics: cefepime 1 gm q24h    Allergies: Allegra [fexofenadine]; Amoxicillin; Azithromycin; Claritin; Erythromycin; Ibuprofen & caffeine-vitamins; Penicillins; Procardia [nifedipine]; Rosemary oil; and Zyrtec [cetirizine]     List concerns for accumulation of vancomycin: ESRD iHD    Pertinent cultures to date:   Results     Procedure Component Value Units Date/Time    BLOOD CULTURE [[id number]]     Order Status:  Sent Specimen:  Blood from Peripheral     BLOOD CULTURE [[id number]]     Order Status:  Sent Specimen:  Blood from Peripheral     BLOOD CULTURE [[id number]] Collected:  05/30/20 2016    Order Status:  Completed Specimen:  Blood from Peripheral Updated:  05/31/20 0716     Significant Indicator NEG     Source BLD     Site PERIPHERAL     Culture Result No Growth  Note: Blood cultures are incubated for 5 days and  are monitored continuously.Positive blood cultures  are called to the RN and reported as soon as  they are identified.      Narrative:       Droplet, Contact, and Eye Protection  Per Hospital Policy: Only change Specimen Src: to \"Line\" if  specified by physician order.  Right Hand    BLOOD CULTURE [[id number]] Collected:  05/30/20 2042    Order Status:  Completed Specimen:  Blood from Peripheral Updated:  05/31/20 0716     Significant Indicator NEG     Source BLD     Site PERIPHERAL     Culture Result No Growth  Note: Blood cultures are incubated for 5 days and  are monitored continuously.Positive blood cultures  are called to the RN and reported as soon as  they are " "identified.      Narrative:       Droplet, Contact, and Eye Protection  Per Hospital Policy: Only change Specimen Src: to \"Line\" if  specified by physician order.  Left Hand    CATH TIP CULTURE [[id number]] Collected:  20 1750    Order Status:  Completed Specimen:  Other Updated:  20    CATH TIP CULTURE [[id number]]     Order Status:  No result Specimen:  Cath Tip from Central Line     COVID/SARS CoV-2 [[id number]]     Order Status:  No result Specimen:  Respirate from Nasopharyngeal         MRSA nares swab if pneumonia is a concern (ordered/positive/negative/n-a): n/a    Recent Labs     20  0403   WBC 12.4* 13.0*   NEUTSPOLYS 82.00* 80.40*     Recent Labs     20  0403   BUN 35* 37*   CREATININE 4.87* 5.43*   ALBUMIN 2.8* 2.8*     Recent Labs     20  2301   VANCORANDOM 13.6     Intake/Output Summary (Last 24 hours) at 2020 1100  Last data filed at 2020 2327  Gross per 24 hour   Intake 1040 ml   Output --   Net 1040 ml      /50   Pulse 83   Temp 37.2 °C (99 °F) (Oral)   Resp 20   Ht 1.753 m (5' 9\")   Wt 106.5 kg (234 lb 12.6 oz)   SpO2 97%  Temp (24hrs), Av.8 °C (98.3 °F), Min:36.1 °C (97 °F), Max:37.2 °C (99 °F)    Estimated Creatinine Clearance: 16.3 mL/min (A) (by C-G formula based on SCr of 5.43 mg/dL ()).    A/P   1. Vancomycin dose change: not indicated   2. Next vancomycin level: 2020 @0300 (ordered)  3. Goal trough: 16-20 mcg/mL (pending findings)  4. Comments: VS stable. Afebrile. WBC elevated. ESRD iHD pending vascular access plan to determine timing of future dialysis sessions per nephrology charting. Microbiology pending. OSH microbiology pending confirmation. Patient appears to intermittently decline care per chart review. Will supplement below goal random vancomycin level on admit with ~15 mg/kg once. Will place repeat vancomycin level in ~48 hours pending updated plans for dialysis timing/vascular access. " Recommend ID consult. Pharmacy will continue to follow.    John Fowler, PharmD

## 2020-05-31 NOTE — CARE PLAN
Patient is Aox4. Patient has good appetite and has no complaints of pain. Patient was educated on plan of care, safety and fall precautions. Patient no questions or concerns at this time, nurse will continue to monitor.   Problem: Communication  Goal: The ability to communicate needs accurately and effectively will improve  Outcome: PROGRESSING AS EXPECTED     Problem: Safety  Goal: Will remain free from injury  Outcome: PROGRESSING AS EXPECTED  Goal: Will remain free from falls  Outcome: PROGRESSING AS EXPECTED     Problem: Infection  Goal: Will remain free from infection  Outcome: PROGRESSING AS EXPECTED     Problem: Venous Thromboembolism (VTW)/Deep Vein Thrombosis (DVT) Prevention:  Goal: Patient will participate in Venous Thrombosis (VTE)/Deep Vein Thrombosis (DVT)Prevention Measures  Outcome: PROGRESSING AS EXPECTED     Problem: Bowel/Gastric:  Goal: Normal bowel function is maintained or improved  Outcome: PROGRESSING AS EXPECTED  Goal: Will not experience complications related to bowel motility  Outcome: PROGRESSING AS EXPECTED     Problem: Knowledge Deficit  Goal: Knowledge of disease process/condition, treatment plan, diagnostic tests, and medications will improve  Outcome: PROGRESSING AS EXPECTED  Goal: Knowledge of the prescribed therapeutic regimen will improve  Outcome: PROGRESSING AS EXPECTED     Problem: Discharge Barriers/Planning  Goal: Patient's continuum of care needs will be met  Outcome: PROGRESSING AS EXPECTED     Problem: Respiratory:  Goal: Respiratory status will improve  Outcome: PROGRESSING AS EXPECTED

## 2020-05-31 NOTE — PROGRESS NOTES
"Pharmacy Kinetics 50 y.o. female on vancomycin day # 1 2020    Vancomycin New Start  Provider specified end date: TBD    Indication for Treatment: Staph aureus bacteremia    Pertinent history per medical record: Admitted on 2020 for Staph bacteremia. Patient is dialysis dependent with a dialysis catheter in place. She tested positive for MSSA on  and was discharged on cefdinir. She returned to the OSH for possible pneumonia. Blood cultures returned positive and she was transferred to [facility].     Other antibiotics: Cefepime 1gm IV q24H    Allergies: Allegra [fexofenadine]; Amoxicillin; Azithromycin; Claritin; Erythromycin; Ibuprofen & caffeine-vitamins; Penicillins; Procardia [nifedipine]; Rosemary oil; and Zyrtec [cetirizine]     List concerns for renal function: None    Pertinent cultures to date:   20 Blood - MSSA  20 Blood - Staph aureus  20 Blood x 2 - In process    MRSA nares swab if pneumonia is a concern (ordered/positive/negative/n-a): n/a    Recent Labs     20   WBC 12.4*   NEUTSPOLYS 82.00*     No results for input(s): BUN, CREATININE, ALBUMIN in the last 72 hours.  No results for input(s): VANCOTROUGH, VANCOPEAK, VANCORANDOM in the last 72 hours.    Intake/Output Summary (Last 24 hours) at 2020  Last data filed at 2020 1922  Gross per 24 hour   Intake 240 ml   Output --   Net 240 ml      /50   Pulse 69   Temp 37 °C (98.6 °F) (Temporal)   Resp 18   Ht 1.753 m (5' 9\")   Wt 106.5 kg (234 lb 12.6 oz)   SpO2 97%  Temp (24hrs), Av °C (98.6 °F), Min:37 °C (98.6 °F), Max:37 °C (98.6 °F)      A/P   1. Vancomycin dose change: Given 12 mg/kg (1300 mg) IV once  2. Next vancomycin level: 3-5 days  3. Goal trough: 16-20 mcg/mL  4. Comments: Patient with subtherapeutic random level. Unclear when dose was given (not in outside medical record in Epic). Will provide one time dose to bump level to therapeutic goal. Anticipate level to remain elevated " for the next 5-7 days given poor clearance with dialysis. May be able to de-escalate, likely MSSA from previous culture given inadequate prior treatment.     Leoncio Hennessy, PharmD

## 2020-05-31 NOTE — PROGRESS NOTES
"[facility] -  PROGRESS NOTE    Author: Adal Cruz D.O.    Date of Service  5/31/2020    HISTORY OF PRESENT ILLNESS:    51 yo F PMH ESRD MWF iHD, HTN, anemia of CKD, and CKD-MBD who presents to [facility] as a direct admit from OSH.  She presented to OSH after being advised to by OP HD unit.  She has been compliant with treatments and was doing well until recently when she's been having fevers/chills during treatment.  She had BCx drawn at HD unit and yesterday she had a temp as high as 104 and later in the day BCx came back for GPC.  She was advised to come to ED and after evaluation at OSH and phone call with us by ED physician there she was transferred here for further evaluation and care.  Her PermCath is likely source of her bacteremia and she did have a COVID-19 test on 5/10 that was negative.  She had a COVID-19 test done again this time and is in isolation.  In the past she has had MSSA bacteremia due to catheter and has responded to IV abx.  She was given Vanc/Cefepime at the HD unit after cultures resulted.  She denies any N/V/CP/SOB.  No melena, hematochezia, hematemesis.  No HA, visual changes, or abdominal pain.    DAILY NEPHROLOGY SUMMARY   5/30 - Consult done.     5/31 - Permcath out.  On IV vanc.  \"I don't want another catheter\".  Long discussion about need and access options.       Review of Systems  Review of Systems   Constitutional: Positive for chills and fever.   HENT: Negative.    Respiratory: Negative for cough and shortness of breath.    Cardiovascular: Negative.  Negative for chest pain.   Gastrointestinal: Positive for nausea. Negative for vomiting.   Genitourinary: Negative.    Musculoskeletal: Positive for myalgias.   Skin: Negative.    Neurological: Positive for weakness.   Endo/Heme/Allergies: Negative.         Physical Exam  Temp:  [36.1 °C (97 °F)-37.2 °C (99 °F)] 37.2 °C (99 °F)  Pulse:  [58-83] 83  Resp:  [18-20] 20  BP: (101-120)/(49-51) 120/50  SpO2:  [92 " %-100 %] 97 %    Physical Exam  Constitutional:       Appearance: Normal appearance.   HENT:      Head: Normocephalic.      Nose: Nose normal.      Mouth/Throat:      Mouth: Mucous membranes are moist.      Pharynx: Oropharynx is clear.   Eyes:      Extraocular Movements: Extraocular movements intact.      Conjunctiva/sclera: Conjunctivae normal.      Pupils: Pupils are equal, round, and reactive to light.   Neck:      Musculoskeletal: Normal range of motion and neck supple. No muscular tenderness.   Cardiovascular:      Rate and Rhythm: Normal rate and regular rhythm.      Pulses: Normal pulses.      Heart sounds: Normal heart sounds. No friction rub.   Pulmonary:      Effort: Pulmonary effort is normal.      Breath sounds: Normal breath sounds. No wheezing.   Abdominal:      General: Bowel sounds are normal.      Palpations: Abdomen is soft.   Musculoskeletal:         General: No swelling or tenderness.   Skin:     General: Skin is warm and dry.      Findings: No erythema.   Neurological:      General: No focal deficit present.      Mental Status: She is alert and oriented to person, place, and time.         Fluids    Intake/Output Summary (Last 24 hours) at 5/31/2020 1217  Last data filed at 5/30/2020 2327  Gross per 24 hour   Intake 1040 ml   Output --   Net 1040 ml       Laboratory  Recent Labs     05/30/20 2042 05/31/20  0403   WBC 12.4* 13.0*   RBC 2.59* 2.61*   HEMOGLOBIN 8.2* 8.2*   HEMATOCRIT 26.2* 26.2*   .2* 100.4*   MCH 31.7 31.4   MCHC 31.3* 31.3*   RDW 55.2* 54.4*   PLATELETCT 149* 155*   MPV 10.2 10.5     Recent Labs     05/30/20 2042 05/31/20  0403   SODIUM 134* 133*   POTASSIUM 3.9 3.8   CHLORIDE 96 96   CO2 21 22   GLUCOSE 155* 97   BUN 35* 37*   CREATININE 4.87* 5.43*   CALCIUM 8.7 9.1           IMPRESSION:   # ESRD    Etiology likely 2/2 HTN    No emergent need for dialysis today (SUN)    Maintenance dialysis qMWF @ [facility]    Failed AVF X 3 attempts   # Sepsis    BCx GPC, likely  source PermCath    COVID-19 test in progress, test on 5/10 was negative    Permcath out, 5/30    Will need new permcath by TUES    Temp line sooner if uremic or bacteremia persists   # HTN    Goal BP < 140/90   # Anemia of CKD    WILL with HD to goal Hgb 10-11    Avoid IV iron given sepsis   # CKD-MBD   # HLD   # CAD   # Chronic respiratory failure with hypoxia    On 2L O2 at home     SUGGESTIONS:   No emergent need for dialysis today (SUN)   Will need new permcath by TUES to continue maintenance dialysis   Continue vanco until sensitivities result from OP cultures   Repeat blood cultures today (SUN)   May need SU if persistently febrile or positive cultures on abx   Try to hold off on new line until negative culture set obtained, if needs RRT, will place TempCath if still not cleared bacteria   No dietary protein restrictions   Dose all meds per ESRD   Low sodium diet   1.5L fluid restriction   Follow labs, further recommendations to follow    Thank you,

## 2020-05-31 NOTE — CONSULTS
[facility] INFECTIOUS DISEASES INPATIENT CONSULT NOTE     Date of Service: 5/31/2020    Consult Requested By: Augustus Fernandez D.O.    Reason for Consultation: Bacteremia    Chief Complaint: Cough    History of Present Illness:     Pascale Acevedo is a 50 y.o. female admitted 5/30/2020.  Patient has history of moyamoya disease with history of stroke, noncompliance in the past, ESRD on HD, presented to an outside facility on 5/29/2020 after developing fevers and chills, cough during her hemodialysis session, fever as high as 104.  She was unable to completed and went home.  Due to persistent fevers and cough, she presented to the hospital and was admitted.  She was started on vancomycin and cefepime and blood cultures turned positive for GPC in clusters.  She was then transferred to [facility] for further management, admitted on 5/30.  Vancomycin and cefepime initiated here.  SARS-CoV-2 PCR negative x2 on 5/11 and 5/13, repeat this admission is pending.    Nephrology has been consulted, and plan is for IR removal of the permacath with line holiday..  Repeat blood cultures x2 obtained here, no growth till date.    Patient notes that her shortness of breath continues, no significant change since admission.    Review of Systems:  All other systems reviewed and are negative expect as noted in HPI    Past Medical History:   Diagnosis Date   • CKD (chronic kidney disease), stage IV (HCC) 12/24/2015   • H/O: CVA (cerebrovascular accident) 12/24/2015   • Hypertension    • Kidney disease    • Mild mitral regurgitation 7/14/2014   • Moyamoya disease 10/3/2013   • Seizure disorder, grand mal (HCC) 3/3/2016   • Stroke (HCC)        Past Surgical History:   Procedure Laterality Date   • IRRIGATION & DEBRIDEMENT ORTHO Right 3/9/2017    Procedure: IRRIGATION & DEBRIDEMENT ORTHO - FOOT;  Surgeon: Gerardo Lynn M.D.;  Location: SURGERY [facility];  Service:    • IRRIGATION & DEBRIDEMENT ORTHO Right 3/5/2017    Procedure:  IRRIGATION & DEBRIDEMENT ORTHO AND GASTROC RECESSION;  Surgeon: Gerardo Lynn M.D.;  Location: SURGERY [facility];  Service:    • METATARSAL HEAD RESECTION  3/5/2017    Procedure: SECOND METATARSAL HEAD RESECTION;  Surgeon: Gerardo Lynn M.D.;  Location: SURGERY [facility];  Service:    • IRRIGATION & DEBRIDEMENT ORTHO Right 2/27/2017    Procedure: IRRIGATION & DEBRIDEMENT ORTHO;  Surgeon: Coleman Monterroso M.D.;  Location: SURGERY [facility];  Service:    • PRIMARY C SECTION     • TUBAL COAGULATION LAPAROSCOPIC BILATERAL         Family History   Problem Relation Age of Onset   • Diabetes Mother    • Hypertension Mother    • Hypertension Father    • Arthritis Father    • Diabetes Maternal Grandmother    • Psychiatric Illness Maternal Grandfather    • Cancer Paternal Grandmother    • Psychiatric Illness Paternal Grandfather        Social History     Socioeconomic History   • Marital status: Single     Spouse name: Not on file   • Number of children: Not on file   • Years of education: Not on file   • Highest education level: Not on file   Occupational History   • Not on file   Social Needs   • Financial resource strain: Not on file   • Food insecurity     Worry: Not on file     Inability: Not on file   • Transportation needs     Medical: Not on file     Non-medical: Not on file   Tobacco Use   • Smoking status: Former Smoker   • Smokeless tobacco: Never Used   Substance and Sexual Activity   • Alcohol use: No   • Drug use: No   • Sexual activity: Not on file   Lifestyle   • Physical activity     Days per week: Not on file     Minutes per session: Not on file   • Stress: Not on file   Relationships   • Social connections     Talks on phone: Not on file     Gets together: Not on file     Attends Church service: Not on file     Active member of club or organization: Not on file     Attends meetings of clubs or organizations: Not on file     Relationship status: Not on file   • Intimate partner  violence     Fear of current or ex partner: Not on file     Emotionally abused: Not on file     Physically abused: Not on file     Forced sexual activity: Not on file   Other Topics Concern   • Not on file   Social History Narrative   • Not on file       Allergies   Allergen Reactions   • Allegra [Fexofenadine] Unspecified     Rxn = unknown   • Amoxicillin    • Azithromycin Rash     Rxn = unknown   • Claritin    • Erythromycin Unspecified     Rxn = unknown   • Ibuprofen & Caffeine-Vitamins Unspecified     Rxn = unknown   • Penicillins Unspecified     Rxn = unknown   • Procardia [Nifedipine]    • Rosemary Oil Anaphylaxis     Throat starts to close/swell.  Oil and the herb.    • Zyrtec [Cetirizine] Unspecified     Rxn = unknown       Medications:    Current Facility-Administered Medications:   •  senna-docusate (PERICOLACE or SENOKOT S) 8.6-50 MG per tablet 2 Tab, 2 Tab, Oral, BID **AND** polyethylene glycol/lytes (MIRALAX) PACKET 1 Packet, 1 Packet, Oral, QDAY PRN **AND** magnesium hydroxide (MILK OF MAGNESIA) suspension 30 mL, 30 mL, Oral, QDAY PRN **AND** bisacodyl (DULCOLAX) suppository 10 mg, 10 mg, Rectal, QDAY PRN, Jarek Lujan M.D.  •  Respiratory Therapy Consult, , Nebulization, Continuous RT, Jarek Lujan M.D.  •  acetaminophen (TYLENOL) tablet 650 mg, 650 mg, Oral, Q6HRS PRN, Jarek Lujan M.D.  •  cloNIDine (CATAPRES) tablet 0.1 mg, 0.1 mg, Oral, Q6HRS PRN, Jarek Lujan M.D.  •  MD Alert...Vancomycin per Pharmacy, , Other, PHARMACY TO DOSE, Jarek Lujan M.D.  •  ceFEPIme (MAXIPIME) 1 g in  mL IVPB, 1 g, Intravenous, DAILY, Jarek Lujan M.D.  •  aspirin (ASA) chewable tab 81 mg, 81 mg, Oral, DAILY, Jarek Lujan M.D., 81 mg at 05/31/20 0534  •  atorvastatin (LIPITOR) tablet 40 mg, 40 mg, Oral, DAILY, Jarek Lujan M.D., 40 mg at 05/31/20 0579  •  carvedilol (COREG) tablet 6.25 mg, 6.25 mg, Oral, BID WITH MEALS, Jarek Tellez  "RIOS Lujan, 6.25 mg at 20 0909  •  escitalopram (LEXAPRO) tablet 10 mg, 10 mg, Oral, DAILY, Jarek Lujan M.D., 10 mg at 20 0534  •  levETIRAcetam (KEPPRA) tablet 500 mg, 500 mg, Oral, Q12HRS, Jarek Lujan M.D., 500 mg at 20 0534  •  LORazepam (ATIVAN) tablet 1 mg, 1 mg, Oral, BID, Jarek Lujan M.D., 1 mg at 20 0533  •  phenytoin ER (DILANTIN) capsule 100 mg, 100 mg, Oral, TID, Jarek Lujan M.D., 100 mg at 20 0533  •  sodium bicarbonate tablet 650 mg, 650 mg, Oral, TID, Jarek Lujan M.D., 650 mg at 20 0534  •  vancomycin (VANCOCIN) 1,300 mg in  mL IVPB, 12 mg/kg, Intravenous, Once, Dario Turner M.D.    Physical Exam:   Vital Signs: /50   Pulse 83   Temp 37.2 °C (99 °F) (Oral)   Resp 20   Ht 1.753 m (5' 9\")   Wt 106.5 kg (234 lb 12.6 oz)   SpO2 97%   BMI 34.67 kg/m²   Temp  Av.8 °C (98.3 °F)  Min: 36.1 °C (97 °F)  Max: 37.2 °C (99 °F)  Vital signs reviewed  Constitutional: Patient is oriented to person, place, and time. Appears well-developed and well-nourished.  Appears uncomfortable due to shortness of breath, but not in distress  Head: Atraumatic, normocephalic  Eyes: Conjunctivae normal, Pupils are equal, round, and reactive to light.   Mouth/Throat: Lips without lesions, oropharynx is clear and moist.  Neck: Neck supple. No masses/lymphadenopathy  Cardiovascular: Normal rate, regular rhythm, normal S1S2 and intact distal pulses. No murmur, gallop, or friction rub. No pedal edema.  Pulmonary/Chest: Some accessory respiratory muscle use. No wheezes.  Abdominal: Soft, non tender. BS + x 4. No masses or hepatosplenomegaly.   Musculoskeletal: No joint tenderness, swelling, erythema, or restriction of motion noted.  Neurological: Alert and oriented to person, place, and time. No gross cranial nerve deficit. No focal neural deficit noted  Skin: Skin is warm and dry. No rashes or embolic phenomena noted on " exposed skin  Psychiatric: Normal mood and affect. Behavior is normal.     LABS:  Recent Labs     05/30/20 2042 05/31/20  0403   WBC 12.4* 13.0*      Recent Labs     05/30/20 2042 05/31/20  0403   HEMOGLOBIN 8.2* 8.2*   HEMATOCRIT 26.2* 26.2*   .2* 100.4*   MCH 31.7 31.4   PLATELETCT 149* 155*       Recent Labs     05/30/20 2042 05/31/20  0403   SODIUM 134* 133*   POTASSIUM 3.9 3.8   CHLORIDE 96 96   CO2 21 22   CREATININE 4.87* 5.43*        Recent Labs     05/30/20 2042 05/31/20  0403   ALBUMIN 2.8* 2.8*        MICRO:  Blood Culture   Date Value Ref Range Status   03/26/2017 No growth after 5 days of incubation.  Final        Latest pertinent labs were reviewed    IMAGING STUDIES:  No recent imaging available to review.    Hospital Course/Assessment:   Pascale Acevedo is a 50 y.o. female with a history of moyamoya disease with history of stroke, noncompliance in the past, ESRD on HD, presented to an outside facility on 5/29/2020 after developing fevers and chills, cough during her hemodialysis session    Pertinent Diagnoses:  Staph aureus bacteremia  Acute hypoxemic respiratory failure  ESRD on HD via permacath  Moyamoya disease  History of noncompliance    Plan:   -Discussed with  [place] [facility] lab.  The organism in Staphylococcus aureus, and they do not have rapid methicillin-resistance testing.  Will have to call back tomorrow for sensitivities  -Continue IV vancomycin.  Stop cefepime  -Please obtain chest x-ray  -Please obtain TTE  -Follow-up pending blood cultures from here 5/30, no growth till date  -Agree with removal of permacath and line holiday for as long as possible  -Agree with isolation while repeat SARS-CoV-2 PCR is awaited    Plan was discussed with the primary team, Dr. Fernandez    ID will follow. Please feel free to call with questions.    Silvio Cormier M.D.    Please note that this dictation was created using voice recognition software. I have worked with  technical experts from [facility] to optimize the interface.  I have made every reasonable attempt to correct obvious errors, but there may be errors of grammar and possibly content that I did not discover before finalizing the note.

## 2020-05-31 NOTE — ASSESSMENT & PLAN NOTE
Continue outpatient carvedilol with holding parameters.  Order clonidine 0.1 mg every 6 hours as needed with parameters.  Continue to monitor.

## 2020-06-01 ENCOUNTER — HOSPITAL ENCOUNTER (OUTPATIENT)
Dept: RADIOLOGY | Facility: MEDICAL CENTER | Age: 50
End: 2020-06-01
Payer: MEDICARE

## 2020-06-01 ENCOUNTER — APPOINTMENT (OUTPATIENT)
Dept: CARDIOLOGY | Facility: MEDICAL CENTER | Age: 50
DRG: 314 | End: 2020-06-01
Attending: INTERNAL MEDICINE
Payer: MEDICARE

## 2020-06-01 VITALS
WEIGHT: 234.79 LBS | BODY MASS INDEX: 34.78 KG/M2 | DIASTOLIC BLOOD PRESSURE: 60 MMHG | OXYGEN SATURATION: 98 % | HEART RATE: 60 BPM | HEIGHT: 69 IN | SYSTOLIC BLOOD PRESSURE: 110 MMHG | TEMPERATURE: 97.3 F | RESPIRATION RATE: 16 BRPM

## 2020-06-01 LAB
ANION GAP SERPL CALC-SCNC: 11 MMOL/L (ref 7–16)
BASOPHILS # BLD AUTO: 0.3 % (ref 0–1.8)
BASOPHILS # BLD: 0.02 K/UL (ref 0–0.12)
BUN SERPL-MCNC: 43 MG/DL (ref 8–22)
CALCIUM SERPL-MCNC: 8.8 MG/DL (ref 8.5–10.5)
CHLORIDE SERPL-SCNC: 94 MMOL/L (ref 96–112)
CO2 SERPL-SCNC: 23 MMOL/L (ref 20–33)
COVID ORDER STATUS COVID19: NORMAL
CREAT SERPL-MCNC: 5.78 MG/DL (ref 0.5–1.4)
EOSINOPHIL # BLD AUTO: 0.1 K/UL (ref 0–0.51)
EOSINOPHIL NFR BLD: 1.4 % (ref 0–6.9)
ERYTHROCYTE [DISTWIDTH] IN BLOOD BY AUTOMATED COUNT: 54.4 FL (ref 35.9–50)
GLUCOSE SERPL-MCNC: 91 MG/DL (ref 65–99)
HCT VFR BLD AUTO: 25.4 % (ref 37–47)
HGB BLD-MCNC: 7.9 G/DL (ref 12–16)
IMM GRANULOCYTES # BLD AUTO: 0.04 K/UL (ref 0–0.11)
IMM GRANULOCYTES NFR BLD AUTO: 0.5 % (ref 0–0.9)
LV EJECT FRACT  99904: 30
LV EJECT FRACT MOD 2C 99903: 36.39
LV EJECT FRACT MOD 4C 99902: 28.31
LV EJECT FRACT MOD BP 99901: 29.01
LYMPHOCYTES # BLD AUTO: 1.2 K/UL (ref 1–4.8)
LYMPHOCYTES NFR BLD: 16.2 % (ref 22–41)
MAGNESIUM SERPL-MCNC: 1.8 MG/DL (ref 1.5–2.5)
MCH RBC QN AUTO: 31.6 PG (ref 27–33)
MCHC RBC AUTO-ENTMCNC: 31.1 G/DL (ref 33.6–35)
MCV RBC AUTO: 101.6 FL (ref 81.4–97.8)
MONOCYTES # BLD AUTO: 1.05 K/UL (ref 0–0.85)
MONOCYTES NFR BLD AUTO: 14.2 % (ref 0–13.4)
NEUTROPHILS # BLD AUTO: 4.99 K/UL (ref 2–7.15)
NEUTROPHILS NFR BLD: 67.4 % (ref 44–72)
NRBC # BLD AUTO: 0 K/UL
NRBC BLD-RTO: 0 /100 WBC
PHOSPHATE SERPL-MCNC: 4.6 MG/DL (ref 2.5–4.5)
PLATELET # BLD AUTO: 117 K/UL (ref 164–446)
PMV BLD AUTO: 10.6 FL (ref 9–12.9)
POTASSIUM SERPL-SCNC: 3.7 MMOL/L (ref 3.6–5.5)
PROCALCITONIN SERPL-MCNC: 21.32 NG/ML
RBC # BLD AUTO: 2.5 M/UL (ref 4.2–5.4)
SARS-COV-2 RNA RESP QL NAA+PROBE: NOTDETECTED
SODIUM SERPL-SCNC: 128 MMOL/L (ref 135–145)
SPECIMEN SOURCE: NORMAL
WBC # BLD AUTO: 7.4 K/UL (ref 4.8–10.8)

## 2020-06-01 PROCEDURE — 84100 ASSAY OF PHOSPHORUS: CPT

## 2020-06-01 PROCEDURE — A9270 NON-COVERED ITEM OR SERVICE: HCPCS | Performed by: INTERNAL MEDICINE

## 2020-06-01 PROCEDURE — 99233 SBSQ HOSP IP/OBS HIGH 50: CPT | Performed by: INTERNAL MEDICINE

## 2020-06-01 PROCEDURE — 83735 ASSAY OF MAGNESIUM: CPT

## 2020-06-01 PROCEDURE — 92523 SPEECH SOUND LANG COMPREHEN: CPT

## 2020-06-01 PROCEDURE — 93306 TTE W/DOPPLER COMPLETE: CPT

## 2020-06-01 PROCEDURE — 84145 PROCALCITONIN (PCT): CPT

## 2020-06-01 PROCEDURE — 85025 COMPLETE CBC W/AUTO DIFF WBC: CPT

## 2020-06-01 PROCEDURE — U0004 COV-19 TEST NON-CDC HGH THRU: HCPCS

## 2020-06-01 PROCEDURE — 93306 TTE W/DOPPLER COMPLETE: CPT | Mod: 26 | Performed by: INTERNAL MEDICINE

## 2020-06-01 PROCEDURE — 36415 COLL VENOUS BLD VENIPUNCTURE: CPT

## 2020-06-01 PROCEDURE — 700102 HCHG RX REV CODE 250 W/ 637 OVERRIDE(OP): Performed by: INTERNAL MEDICINE

## 2020-06-01 PROCEDURE — 80048 BASIC METABOLIC PNL TOTAL CA: CPT

## 2020-06-01 RX ORDER — CEFAZOLIN SODIUM 1 G/50ML
1 INJECTION, SOLUTION INTRAVENOUS EVERY 24 HOURS
Status: DISCONTINUED | OUTPATIENT
Start: 2020-06-01 | End: 2020-06-01 | Stop reason: HOSPADM

## 2020-06-01 RX ORDER — CEPHALEXIN 500 MG/1
500 CAPSULE ORAL 4 TIMES DAILY
Qty: 56 CAP | Refills: 0 | Status: SHIPPED | OUTPATIENT
Start: 2020-06-01 | End: 2020-06-11

## 2020-06-01 RX ADMIN — CARVEDILOL 6.25 MG: 6.25 TABLET, FILM COATED ORAL at 04:11

## 2020-06-01 RX ADMIN — LORAZEPAM 1 MG: 1 TABLET ORAL at 04:11

## 2020-06-01 RX ADMIN — PHENYTOIN SODIUM 100 MG: 100 CAPSULE ORAL at 04:10

## 2020-06-01 RX ADMIN — SODIUM BICARBONATE 650 MG: 650 TABLET ORAL at 04:11

## 2020-06-01 RX ADMIN — ATORVASTATIN CALCIUM 40 MG: 40 TABLET, FILM COATED ORAL at 04:11

## 2020-06-01 RX ADMIN — ESCITALOPRAM OXALATE 10 MG: 10 TABLET ORAL at 04:11

## 2020-06-01 RX ADMIN — LEVETIRACETAM 500 MG: 500 TABLET ORAL at 04:10

## 2020-06-01 RX ADMIN — ASPIRIN 81 MG 81 MG: 81 TABLET ORAL at 04:11

## 2020-06-01 ASSESSMENT — ENCOUNTER SYMPTOMS
COUGH: 1
MUSCULOSKELETAL NEGATIVE: 1
NAUSEA: 0
PSYCHIATRIC NEGATIVE: 1
HEADACHES: 0
DIARRHEA: 0
DIZZINESS: 0
GASTROINTESTINAL NEGATIVE: 1
CONSTITUTIONAL NEGATIVE: 1
VOMITING: 0
EYES NEGATIVE: 1
NEUROLOGICAL NEGATIVE: 1
WEAKNESS: 0
CHILLS: 0
NERVOUS/ANXIOUS: 0
SORE THROAT: 0
DEPRESSION: 0
SHORTNESS OF BREATH: 0
PALPITATIONS: 0
CONSTIPATION: 0
BLURRED VISION: 0
COUGH: 0
FEVER: 0
ABDOMINAL PAIN: 0
FALLS: 0
CARDIOVASCULAR NEGATIVE: 1

## 2020-06-01 NOTE — CONSULTS
"PSYCHIATRIC INTAKE EVALUATION    Reason for admission: Bacteremia from permcath, pnemonia                   Reason for consult:Hx of pyshcosis.  [place] eval.  Refusing treatment.  Wants to leave AMA and may need legal hold.        Requesting Physician/APN: Dr. Fernandez       Supervising Psychiatrist: Dr. Tom        Legal Hold status: N/A       *Chief Complaint:\" I want to go home\" As stated by patient.       *HPI (includes Psychiatric ROS):  Patient is a 50-year-old female with a history of moyamoya disease that have led to multiple CVAs and dementia, seizure disorder, ESRD on HD, anemia of CKD, hypertension, CAD who was admitted for bacteremia from her R IJ permacath after being found to have fever and cough during an HD appointment.  She is consulted to psychiatry for refusing treatment and wanting to leave AGAINST MEDICAL ADVICE.  On evaluation today, patient states her mood as \"I want to go home\".  She states \"I just have to get out of [facility]\", but declines to answer why.  When asked what are the risks of leaving AGAINST MEDICAL ADVICE she states \"I know I could die, but I will take the oral antibiotics and if I start to feel sick I will just come back to the hospital\".  She is able to state a coherent plan for discharge stating \"my mom and my daughter are coming to pick me up and get me my medications\".  She is able to rationalize her decision stating \"I can get sicker in the hospital than being home, as long as I take the antibiotics I will be fine, if not then I will come back\".  She is denying AVH, SI or HI.  Patient scored a 29 out of 30 on the MMSE, she is AXOX4.      Depression: Negative. Denies depressed mood, sleep disturbances, anhedonia, feelings of guilt, low energy, poor concentration, poor appetite, psychomotor disturbances and suicidality.  Anxiety: Negative. Denies excessive anxiety more days than not, or associated symptoms such as difficulty concentrating, sleep disturbance and " "irritability.  Psychosis: Negative. Denies AVH, disorganized thinking or speech.  Positive for paranoid delusions.  Suzi/Bipolar: Negative. Denies periods of decreased need for sleep, distractability, impulsivity, dangerous activities, flight of ideas.  Eating D/O: Negative. Denies binging/purging, weight issues or amenorrhea.  Borderline PD: Negative. Denies fear of abandonment, unstable relationships, impulsivity, self-harming behaviors, chronic feelings of emptiness, extreme emotional swings or explosive anger.  PTSD: Negative. Denies past trauma or associated symptoms such as nightmares, flashbacks, hypervigilance, avoidance of situations.        *Medical Review Of Symptoms (not dx conditions):   Review of Systems   Constitutional: Negative.    HENT: Negative.    Eyes: Negative.    Respiratory: Positive for cough.    Cardiovascular: Negative.    Gastrointestinal: Negative.    Genitourinary: Negative.    Musculoskeletal: Negative.    Skin: Negative.    Neurological: Negative.    Endo/Heme/Allergies: Negative.    Psychiatric/Behavioral: Negative.          *Psychiatric Examination:   Vitals: Blood pressure 110/60, pulse 60, temperature 36.3 °C (97.3 °F), temperature source Temporal, resp. rate 16, height 1.753 m (5' 9\"), weight 106.5 kg (234 lb 12.6 oz), SpO2 98 %, not currently breastfeeding.   General Appearance:50 y.o. female laying in bed in NAD. Moderately kempt and clean.     Behavior: Cooperative and engaged with interview.  Appropriate eye contact.  No aggressive behaviors.  Abnormal Movements: No abnormal movements, muscle spasms or choreiform movements.  Gait and Posture: Not assessed.  Speech: Normal volume. Regular rate and rhythm.  Thought Process: Appears linear.  Associations: None  Abnormal or Psychotic Thoughts: Denies AVH, disorganized thinking or delusional thinking.  Judgement and Insight: Poor/Fair  Orientation: Alert and oriented x4  Recent and Remote Memory: Intact  Attention Span and " "Concentration: Intact  Language: English  Fund of Knowledge: Appropriate  Mood:\" I want to go home\" as stated by patient  Affect: Euthymic, full range. congruent with stated mood.  SI/HI: Denies SI and HI.  MMSE score and/or clock drawing: Declined       *PAST MEDICAL/PSYCH/FAMILY/SOCIAL(as reported by patient):       Medical hx:        TBI: Denies  SZ: Multiple  Stroke: Multiple  Past Medical History:   Diagnosis Date   • CKD (chronic kidney disease), stage IV (HCC) 12/24/2015   • H/O: CVA (cerebrovascular accident) 12/24/2015   • Hypertension    • Kidney disease    • Mild mitral regurgitation 7/14/2014   • Moyamoya disease 10/3/2013   • Seizure disorder, grand mal (HCC) 3/3/2016   • Stroke (HCC)      Past Surgical History:   Procedure Laterality Date   • IRRIGATION & DEBRIDEMENT ORTHO Right 3/9/2017    Procedure: IRRIGATION & DEBRIDEMENT ORTHO - FOOT;  Surgeon: Gerardo Lynn M.D.;  Location: SURGERY [facility];  Service:    • IRRIGATION & DEBRIDEMENT ORTHO Right 3/5/2017    Procedure: IRRIGATION & DEBRIDEMENT ORTHO AND GASTROC RECESSION;  Surgeon: Gerardo Lynn M.D.;  Location: SURGERY [facility];  Service:    • METATARSAL HEAD RESECTION  3/5/2017    Procedure: SECOND METATARSAL HEAD RESECTION;  Surgeon: Gerardo Lynn M.D.;  Location: SURGERY [facility];  Service:    • IRRIGATION & DEBRIDEMENT ORTHO Right 2/27/2017    Procedure: IRRIGATION & DEBRIDEMENT ORTHO;  Surgeon: Coleamn Monterroso M.D.;  Location: SURGERY [facility];  Service:    • PRIMARY C SECTION     • TUBAL COAGULATION LAPAROSCOPIC BILATERAL          Psychiatric hx:   SAs: Denies  Guns: Denies  Hx of Violence: Denies  Hospitalizations: Denies  Med Hx: Denies  Dx Hx: Denies  Other:     Family Psych Hx: Denies      Social hx:  Alcohol: Denies  Drugs: Denies  Tobacco: Denies     *MEDICAL HX: labs, MARS, medications, etc were reviewed. Only those findings of potential interest to psychiatry are noted below:    *Current Medical " issues: See Below.     *Allergies:  Allergies   Allergen Reactions   • Allegra [Fexofenadine] Unspecified     Rxn = unknown   • Amoxicillin    • Azithromycin Rash     Rxn = unknown   • Claritin    • Erythromycin Unspecified     Rxn = unknown   • Ibuprofen & Caffeine-Vitamins Unspecified     Rxn = unknown   • Penicillins Unspecified     Rxn = unknown   • Procardia [Nifedipine]    • Rosemary Oil Anaphylaxis     Throat starts to close/swell.  Oil and the herb.    • Zyrtec [Cetirizine] Unspecified     Rxn = unknown     *Current Medications:    Current Facility-Administered Medications:   •  senna-docusate (PERICOLACE or SENOKOT S) 8.6-50 MG per tablet 2 Tab, 2 Tab, Oral, BID **AND** polyethylene glycol/lytes (MIRALAX) PACKET 1 Packet, 1 Packet, Oral, QDAY PRN **AND** magnesium hydroxide (MILK OF MAGNESIA) suspension 30 mL, 30 mL, Oral, QDAY PRN **AND** bisacodyl (DULCOLAX) suppository 10 mg, 10 mg, Rectal, QDAY PRN, Jarek Lujan M.D.  •  Respiratory Therapy Consult, , Nebulization, Continuous RT, Jarek Lujan M.D.  •  acetaminophen (TYLENOL) tablet 650 mg, 650 mg, Oral, Q6HRS PRN, Jarek Lujan M.D., 650 mg at 05/31/20 2051  •  cloNIDine (CATAPRES) tablet 0.1 mg, 0.1 mg, Oral, Q6HRS PRN, Jarek Lujan M.D.  •  MD Alert...Vancomycin per Pharmacy, , Other, PHARMACY TO DOSE, Jarek Lujan M.D.  •  aspirin (ASA) chewable tab 81 mg, 81 mg, Oral, DAILY, Jarek Lujan M.D., 81 mg at 06/01/20 0411  •  atorvastatin (LIPITOR) tablet 40 mg, 40 mg, Oral, DAILY, Jarek Lujan M.D., 40 mg at 06/01/20 0411  •  carvedilol (COREG) tablet 6.25 mg, 6.25 mg, Oral, BID WITH MEALS, Jarek Lujan M.D., 6.25 mg at 06/01/20 0411  •  escitalopram (LEXAPRO) tablet 10 mg, 10 mg, Oral, DAILY, Jarek Lujan M.D., 10 mg at 06/01/20 0411  •  levETIRAcetam (KEPPRA) tablet 500 mg, 500 mg, Oral, Q12HRS, Jarek Lujan M.D., 500 mg at 06/01/20 0410  •  LORazepam  (ATIVAN) tablet 1 mg, 1 mg, Oral, BID, Jarek Lujan M.D., 1 mg at 20 0411  •  phenytoin ER (DILANTIN) capsule 100 mg, 100 mg, Oral, TID, Jarek Lujan M.D., 100 mg at 20 0410  •  sodium bicarbonate tablet 650 mg, 650 mg, Oral, TID, Jarek Lujan M.D., 650 mg at 20 0411  EK QTC  Imaging: None  EEG: 10/11/18 1. Potential mild focal irritability over left ( this would   increase the risk of focal seizure)      *Labs:  Recent Labs     20  0403 20  043   WBC 13.0* 9.3 7.4   RBC 2.61* 2.42* 2.50*   HEMOGLOBIN 8.2* 7.7* 7.9*   HEMATOCRIT 26.2* 24.7* 25.4*   .4* 102.1* 101.6*   MCH 31.4 31.8 31.6   RDW 54.4* 54.7* 54.4*   PLATELETCT 155* 123* 117*   MPV 10.5 10.7 10.6   NEUTSPOLYS 80.40* 73.90* 67.40   LYMPHOCYTES 8.90* 11.90* 16.20*   MONOCYTES 9.10 12.90 14.20*   EOSINOPHILS 0.80 0.80 1.40   BASOPHILS 0.20 0.10 0.30     Lab Results   Component Value Date/Time    SODIUM 128 (L) 2020 04:30 AM    POTASSIUM 3.7 2020 04:30 AM    CHLORIDE 94 (L) 2020 04:30 AM    CO2 23 2020 04:30 AM    GLUCOSE 91 2020 04:30 AM    BUN 43 (H) 2020 04:30 AM    CREATININE 5.78 (HH) 2020 04:30 AM    CREATININE 0.9 2006 05:48 AM         No results found for: BREATHALIZER  No components found for: BLOODALCOHOL   Lab Results   Component Value Date/Time    BARBSURINE Negative 2011    BENZODIAZU Negative 2011    COCAINEMET Negative 2011    OPIATES Negative 2011    PCPURINE Negative 2011     Lab Results   Component Value Date/Time    FREET4 0.75 2020 0234        ASSESSMENT/PLAN:  Formulation: While it is not recommended patient discharged AGAINST MEDICAL ADVICE, she does appear to be capacitated to make medical decisions.  Patient understands the risks and benefits of going AGAINST MEDICAL ADVICE and communicates a relatively safe and logical plan for discharge.   She is able to appreciate the severity of her illness and rationalize her decision to leave AGAINST MEDICAL ADVICE.    1.  Patient has capacity to make medical decisions.          2.  Medical diagnoses:     -Bacteremia due to infected R IJ permacath  -Pneumonia  -Moyamoya disease  -ESRD on HD  -Seizure disorder  -Hypertension  -Anemia of CKD  -CAD             Legal hold: No  Other:   Sitter: No   Visitors: Yes   Phone calls: Per nursing discretion.   Personal items (specify): Per nursing discretion.  Calls (obtain hx from other person not the pt)  *Will Follow  *Thank you for the consult      Patient was not able to be seen by attending physician Dr. Tom due to leaving AGAINST MEDICAL ADVICE before she could be seen.

## 2020-06-01 NOTE — PROGRESS NOTES
"Pharmacy Kinetics 50 y.o. female on vancomycin day # 3 2020    Currently on Vancomycin pulse dosing  Provider specified end date: TBD    Indication for Treatment: staphylococcus aureus bacteremia rule-out    Pertinent history per medical record: Admitted on 2020 for concern of  Bacteremia from outside facility. ESRD iHD. Nephology consulted.    Other antibiotics: N/A    Allergies: Allegra [fexofenadine]; Amoxicillin; Azithromycin; Claritin; Erythromycin; Ibuprofen & caffeine-vitamins; Penicillins; Procardia [nifedipine]; Rosemary oil; and Zyrtec [cetirizine]     List concerns for renal function (possible concerns include abnormal LFTs, BUN/SCr ratio > 20:1, CHF, obesity, malnutrition/low albumin, hypermetabolic state (SIRS), pressors/hypotension, nephrotoxic drugs, etc.): ESDR    Pertinent cultures to date:   : Blood peripheral- Gram Stain: Gram positive cocci: Possible Staphylococcus sp.   Staphylococcus aureus (methicillin sensitive)   detected by PCR.   : Blood peripheral-Growth detected by Bactec instrument. 2020  22:02   Gram Stain: Gram positive cocci: Possible Staphylococcus sp.       MRSA nares swab if pneumonia is a concern (ordered/positive/negative/n-a): N/A    Recent Labs     20  0430   WBC 12.4* 13.0* 9.3 7.4   NEUTSPOLYS 82.00* 80.40* 73.90* 67.40     Recent Labs     20  21020  0430   BUN 35* 37* 41* 43*   CREATININE 4.87* 5.43* 5.93* 5.78*   ALBUMIN 2.8* 2.8* 2.9*  --      Recent Labs     20  2301   VANCORANDOM 13.6       Intake/Output Summary (Last 24 hours) at 2020 0943  Last data filed at 2020 1900  Gross per 24 hour   Intake 240 ml   Output --   Net 240 ml      /60   Pulse 60   Temp 36.3 °C (97.3 °F) (Temporal)   Resp 16   Ht 1.753 m (5' 9\")   Wt 106.5 kg (234 lb 12.6 oz)   SpO2 98%  Temp (24hrs), Av.6 °C (97.9 °F), Min:36.1 °C (96.9 °F), Max:37.3 " °C (99.1 °F)      A/P   1. Vancomycin dose change: Pulse dosing- dose not indicated at this time.  2. Next vancomycin level: 06/02 @ 0300  3. Goal trough: 16-20 mcg/mL  4. Comments: Patient receiving pulse dose vancomycin; however, per discussion with nurse patient currently refusing all lab draws and IV medications and wants to leave.  Patient is not currently due for dose of vancomycin given renal function; however, level ordered for tomorrow AM. Pharmacy will continue to monitor.     Trini Gilbert, CrystalD

## 2020-06-01 NOTE — PROGRESS NOTES
"Patient refusing care at this time. No IV in place per day shift RN. This RN educated patient on need for IV access, plan of care. Patient states \"No, No, No I am going home tomorrow, I don't care.\" No further needs stated at this time. VSS. Will continue to monitor.     Per lab at 2000 - blood cultures positive for staph aureus. Charge nurse educated patient again regarding plan of care, need for IV access and purpose of antibiotics. Patient again refusing antibiotics and IV access at this time. Dr. Bonilla notified via phone call.   "

## 2020-06-01 NOTE — ASSESSMENT & PLAN NOTE
Patient has limited cognitive ability which family attributes to moyamoya and prior strokes.  Psychiatry agrees that she does not have capacity to leave AGAINST MEDICAL ADVICE.  We will continue to try to coax into accepting medical therapy.  Very limited insight into the seriousness of her condition and risks of leaving against recommendations.    Daughter's phone number [phone]  Trying to get power of  paperwork as patient's mother and father are power of  and help make decisions   Detail Level: Generalized

## 2020-06-01 NOTE — PROGRESS NOTES
Infectious Disease Progress Note    Author: Ivy Gutierrez M.D. Date & Time of service: 2020  10:18 AM    Chief Complaint:  Follow-up for bacteremia    Interval History:  2020-no fevers.  WBC 7.4 Platelets 117 blood cultures are positive for MSSA.  Currently patient is refusing all treatment including tests  Labs Reviewed and Medications Reviewed.    Review of Systems:  Review of Systems   Unable to perform ROS: Other (Uncooperative)     Symptoms refusing to answer any questions  Hemodynamics:  Temp (24hrs), Av.6 °C (97.9 °F), Min:36.1 °C (96.9 °F), Max:37.3 °C (99.1 °F)  Temperature: 36.3 °C (97.3 °F)  Pulse  Av.4  Min: 58  Max: 83   Blood Pressure: 110/60       Physical Exam:  Physical Exam  Vitals signs reviewed.   Constitutional:       General: She is not in acute distress.     Appearance: Normal appearance.   HENT:      Head: Atraumatic.   Skin:     General: Skin is warm and dry.   Neurological:      General: No focal deficit present.      Mental Status: She is alert.   Psychiatric:         Attention and Perception: She is inattentive.      Comments: Patient is refusing to answer all questions         Meds:    Current Facility-Administered Medications:   •  senna-docusate **AND** polyethylene glycol/lytes **AND** magnesium hydroxide **AND** bisacodyl  •  Respiratory Therapy Consult  •  acetaminophen  •  cloNIDine  •  MD Alert...Vancomycin per Pharmacy  •  aspirin  •  atorvastatin  •  carvedilol  •  escitalopram  •  levETIRAcetam  •  LORazepam  •  phenytoin ER  •  sodium bicarbonate    Labs:  Recent Labs     20  0403 20  2201 20  0430   WBC 13.0* 9.3 7.4   RBC 2.61* 2.42* 2.50*   HEMOGLOBIN 8.2* 7.7* 7.9*   HEMATOCRIT 26.2* 24.7* 25.4*   .4* 102.1* 101.6*   MCH 31.4 31.8 31.6   RDW 54.4* 54.7* 54.4*   PLATELETCT 155* 123* 117*   MPV 10.5 10.7 10.6   NEUTSPOLYS 80.40* 73.90* 67.40   LYMPHOCYTES 8.90* 11.90* 16.20*   MONOCYTES 9.10 12.90 14.20*   EOSINOPHILS 0.80 0.80  1.40   BASOPHILS 0.20 0.10 0.30     Recent Labs     05/31/20  0403 05/31/20  2104 06/01/20  0430   SODIUM 133* 135 128*   POTASSIUM 3.8 3.9 3.7   CHLORIDE 96 97 94*   CO2 22 23 23   GLUCOSE 97 112* 91   BUN 37* 41* 43*     Recent Labs     05/30/20 2042 05/31/20 0403 05/31/20 2104 06/01/20  0430   ALBUMIN 2.8* 2.8* 2.9*  --    TBILIRUBIN 0.5 0.4  --   --    ALKPHOSPHAT 103* 108*  --   --    TOTPROTEIN 6.9 6.9  --   --    ALTSGPT 24 19  --   --    ASTSGOT 23 19  --   --    CREATININE 4.87* 5.43* 5.93* 5.78*       Imaging:  Dx-chest-2 Views    Result Date: 5/31/2020 5/31/2020 3:06 PM HISTORY/REASON FOR EXAM:  Cough TECHNIQUE/EXAM DESCRIPTION AND NUMBER OF VIEWS: Two views of the chest. COMPARISON:  5/12/2020 FINDINGS: The cardiac silhouette remains enlarged. Mediastinal contours are stable. There is calcification in the aortic knob. Faint opacification in the right lung base may be related to overlying soft tissues. No pleural fluid or pneumothorax. No suspicious bony lesions.     1.  Faint opacification in the right lung base may be related to overlying soft tissue. Developing pneumonia or atelectasis cannot be excluded. 2.  Stable cardiomegaly    Dx-chest-portable (1 View)    Result Date: 5/12/2020 5/12/2020 8:55 AM HISTORY/REASON FOR EXAM:  Shortness of Breath. TECHNIQUE/EXAM DESCRIPTION AND NUMBER OF VIEWS: Single portable view of the chest. COMPARISON: 4/16/2020 FINDINGS: Dialysis catheter projects over the SVC. The cardiomediastinal silhouette is enlarged. Atherosclerotic calcification is seen.  No focal consolidation, pleural effusion or pneumothorax is identified.  Costophrenic angles are clear.     Stable cardiomegaly. Atherosclerotic plaque. Dialysis catheter projects over the SVC.     Ir-cvc Tunnel W/o Port Removal    Result Date: 5/31/2020 5/30/2020 5:59 PM HISTORY/REASON FOR EXAM: Infected tunneled hemodialysis catheter TECHNIQUE/EXAM DESCRIPTION: Perm catheter removal. COMPARISON:  None. FINDINGS:   Following a thorough discussion of the risks and benefits of the procedure, informed written consent was obtained. The patient was positioned supine on her bed and the existing perm catheter skin exit site and catheter were prepped and draped in the usual sterile fashion. Using manual traction, the dialysis catheter was removed and pressure held on the tunnel tract until hemostasis was achieved.  An occlusive dressing was placed over the skin exit site. The patient tolerated the procedure well.     Removal of the right internal jugular hemodialysis catheter.    Ec-echocardiogram Complete W/o Cont    Result Date: 2020  Transthoracic Echo Report Echocardiography Laboratory CONCLUSIONS Moderately reduced left ventricular systolic function. Left ventricular ejection fraction is visually estimated to be 30%. Global hypokinesis. Reduced right ventricular systolic function. Dilated inferior vena cava without inspiratory collapse. Moderately dilated left atrium. Mild mitral regurgitation. Estimated right ventricular systolic pressure is 45 mmHg. Prior echo 20 - no significant changes in LV function TAWNYAELIJAH ROBBINS Exam Date:         2020                    07:15 Exam Location:     Inpatient Priority:          Routine Ordering Physician:        BRODERICK MADRIGAL Referring Physician: Sonographer:               Renan Carrillo RDCS Age:    50     Gender:    F MRN:    [id number] :    1970 BSA:    2.21   Ht (in):    69     Wt (lb):    234 Exam Type:     Complete Indications:     Encounter for screening for cardiovascular disorders ICD Codes:       Z13.6 CPT Codes:       51519 BP:   120    /   50     HR:   54 Technical Quality:       Fair MEASUREMENTS  (Male / Female) Normal Values 2D ECHO LV Diastolic Diameter PLAX        5.4 cm                4.2 - 5.9 / 3.9 - 5.3 cm LV Systolic Diameter PLAX         4 cm                  2.1 - 4.0 cm IVS Diastolic Thickness           1 cm                  LVPW Diastolic  Thickness          0.84 cm               LVOT Diameter                     2 cm                  Estimated LV Ejection Fraction    30 %                  LV Ejection Fraction MOD BP       29 %                  >= 55  % LV Ejection Fraction MOD 4C       28.3 %                LV Ejection Fraction MOD 2C       36.4 %                DOPPLER AV Peak Velocity                  1.5 m/s               AV Peak Gradient                  8.8 mmHg              AV Mean Gradient                  4.9 mmHg              LVOT Peak Velocity                1.3 m/s               AV Area Cont Eq vti               2.8 cm2               MR ERO PISA                       0.1 cm2               MR Regurgitant Volume PISA        15.4 cm3              TR Peak Velocity                  287 cm/s              PV Peak Velocity                  1.1 m/s               PV Peak Gradient                  4.7 mmHg              PV Mean Gradient                  3.3 mmHg              * Indicates values subject to auto-interpretation LV EF:  30    % FINDINGS Left Ventricle Left ventricle is mildly dilated. Normal left ventricular wall thickness. Moderately reduced left ventricular systolic function. Left ventricular ejection fraction is visually estimated to be 30%. Global hypokinesis. Diastolic function is abnormal, but grade cannot be determined. Right Ventricle Normal right ventricular size. Reduced right ventricular systolic function. Right Atrium Normal right atrial size. Dilated inferior vena cava without inspiratory collapse. Left Atrium Moderately dilated left atrium. Left atrial volume index is 48  mL/sq m. Mitral Valve Structurally normal mitral valve. No mitral stenosis. Mild mitral regurgitation. Aortic Valve Structurally normal aortic valve without significant stenosis or regurgitation. Tricuspid Valve Structurally normal tricuspid valve. No tricuspid stenosis. Mild tricuspid regurgitation. Estimated right ventricular systolic pressure is 45 mmHg.  "Pulmonic Valve Structurally normal pulmonic valve. No pulmonic stenosis. Mild pulmonic insufficiency. Pericardium Normal pericardium without effusion. Aorta Normal aortic root for body surface area. Ascending aorta diameter is 3.1 cm. Andrew Christie MD (Electronically Signed) Final Date:     01 June 2020                 10:09      Micro:  Results     Procedure Component Value Units Date/Time    COVID/SARS CoV-2 [[id number]] Collected:  06/01/20 0900    Order Status:  Sent Specimen:  Respirate from Nasopharyngeal     BLOOD CULTURE [[id number]]  (Abnormal) Collected:  05/30/20 2042    Order Status:  Completed Specimen:  Blood from Peripheral Updated:  05/31/20 2202     Significant Indicator POS     Source BLD     Site PERIPHERAL     Culture Result Growth detected by Bactec instrument. 05/31/2020  22:02  Gram Stain: Gram positive cocci: Possible Staphylococcus sp.      Narrative:       Droplet, Contact, and Eye Protection  Per Hospital Policy: Only change Specimen Src: to \"Line\" if  specified by physician order.  Left Hand    BLOOD CULTURE [[id number]]  (Abnormal) Collected:  05/30/20 2016    Order Status:  Completed Specimen:  Blood from Peripheral Updated:  05/31/20 1902     Significant Indicator POS     Source BLD     Site PERIPHERAL     Culture Result Growth detected by Bactec instrument. 05/31/2020  19:01  Gram Stain: Gram positive cocci: Possible Staphylococcus sp.  Staphylococcus aureus (methicillin sensitive)  detected by PCR.  Susceptibility to follow.      Narrative:       Droplet, Contact, and Eye Protection  Per Hospital Policy: Only change Specimen Src: to \"Line\" if  specified by physician order.  Right Hand    BLOOD CULTURE [[id number]] Collected:  05/31/20 0000    Order Status:  Canceled Specimen:  Other from Peripheral     BLOOD CULTURE [[id number]] Collected:  05/31/20 0000    Order Status:  Canceled Specimen:  Other from Peripheral     CATH TIP CULTURE [[id number]] Collected:  05/30/20 1750    Order Status: "  Completed Specimen:  Other Updated:  05/30/20 2151    CATH TIP CULTURE [[id number]]     Order Status:  No result Specimen:  Cath Tip from Central Line           Assessment:  Active Hospital Problems    Diagnosis   • Bacteremia [R78.81]   • Seizure disorder, grand mal (HCC) [G40.409]   • HTN (hypertension) [I10]   • Pneumonia [J18.9]       Plan:  MSSA bacteremia  Blood cultures are positive for MSSA on  5/30/2020  Change vancomycin to Ancef-patient has tolerated cephalosporins in the past  Repeat blood cultures x2 if patient agrees  She is refusing all treatment including dialysis  Says she will take oral antibiotics  In case she decides to leave AMA suggest sending her home on p.o. Keflex    ESRD on hemodialysis  Refusing dialysis    History of seizure disorder    Pneumonia  Patient had cough  Cxr shows right sided pneumonia    History of MoyaMoya disease    Noncompliance  Patient at very high risk for leaving AMA  She understands the risks    Discussed with internal medicine dr Fernandez and nursing at length

## 2020-06-01 NOTE — CARE PLAN
Problem: Knowledge Deficit  Goal: Knowledge of disease process/condition, treatment plan, diagnostic tests, and medications will improve  Outcome: PROGRESSING SLOWER THAN EXPECTED  Goal: Knowledge of the prescribed therapeutic regimen will improve  Outcome: PROGRESSING SLOWER THAN EXPECTED   Needs reinforcement.

## 2020-06-01 NOTE — PROGRESS NOTES
"[facility] Nephrology Daily Progress Note     Date of Service       Author: Jef Magana Jr., MD     Chief Complaint  51 yo F PMH ESRD MWF iHD, HTN, anemia of CKD, and CKD-MBD who presents to [facility] as a direct admit from OSH.  She presented to OSH after being advised to by OP HD unit.  She has been compliant with treatments and was doing well until recently when she's been having fevers/chills during treatment.  She had BCx drawn at HD unit and yesterday she had a temp as high as 104 and later in the day BCx came back for GPC.  She was advised to come to ED and after evaluation at OSH and phone call with us by ED physician there she was transferred here for further evaluation and care.  Her PermCath is likely source of her bacteremia and she did have a COVID-19 test on 5/10 that was negative.  She had a COVID-19 test done again this time and is in isolation.  In the past she has had MSSA bacteremia due to catheter and has responded to IV abx.  She was given Vanc/Cefepime at the HD unit after cultures resulted.  She denies any N/V/CP/SOB.  No melena, hematochezia, hematemesis.  No HA, visual changes, or abdominal pain.     DAILY NEPHROLOGY SUMMARY              5/30 - Consult done.                5/31 - Permcath out.  On IV vanc.  \"I don't want another catheter\".  Long discussion about need and access options.    6/1 - \"I will not get catheter. I will not do dialysis. I want to go home.\" Patient says she does not want further dialysis. Understands risks of uremia, volume overload, death. She says she will sign out AMA. I d/w hospitalist who said he was trying to get psych evaluation. However, on my conversation, patient was goal directed and said she wanted to stop further treatments.            Review of Systems  Refused to answer    Physical Exam  Vitals:    06/01/20 0740   BP: 110/60   Pulse: 60   Resp: 16   Temp: 36.3 °C (97.3 °F)   SpO2: 98%          Physical Exam  Constitutional:       Appearance: Normal " appearance. He is normal weight.   Refused exam        Fluids       Intake/Output Summary (Last 24 hours) at 6/1/2020 1153  Last data filed at 5/31/2020 1900  Gross per 24 hour   Intake 240 ml   Output --   Net 240 ml           Labs    Lab Results   Component Value Date/Time    SODIUM 128 (L) 06/01/2020 04:30 AM    POTASSIUM 3.7 06/01/2020 04:30 AM    CHLORIDE 94 (L) 06/01/2020 04:30 AM    CO2 23 06/01/2020 04:30 AM    GLUCOSE 91 06/01/2020 04:30 AM    BUN 43 (H) 06/01/2020 04:30 AM    CREATININE 5.78 (HH) 06/01/2020 04:30 AM    CREATININE 0.9 11/19/2006 05:48 AM       Recent Labs     05/31/20  0403 05/31/20  2201 06/01/20  0430   WBC 13.0* 9.3 7.4   RBC 2.61* 2.42* 2.50*   HEMOGLOBIN 8.2* 7.7* 7.9*   HEMATOCRIT 26.2* 24.7* 25.4*   .4* 102.1* 101.6*   MCH 31.4 31.8 31.6   RDW 54.4* 54.7* 54.4*   PLATELETCT 155* 123* 117*   MPV 10.5 10.7 10.6   NEUTSPOLYS 80.40* 73.90* 67.40   LYMPHOCYTES 8.90* 11.90* 16.20*   MONOCYTES 9.10 12.90 14.20*   EOSINOPHILS 0.80 0.80 1.40   BASOPHILS 0.20 0.10 0.30       Assessment/Plan              # ESRD                          Etiology likely 2/2 HTN                          No emergent need for dialysis today (SUN)                          Maintenance dialysis qMWF @ [facility]                          Failed AVF X 3 attempts    Refuses any dialysis - states she will not get repeat line              # Sepsis                          BCx GPC, likely source PermCath                          COVID-19 test in progress, test on 5/10 was negative                          Permcath out, 5/30                          Will need new permcath by TUES=              # HTN                          Goal BP < 140/90              # Anemia of CKD                          WILL with HD to goal Hgb 10-11                          Avoid IV iron given sepsis              # CKD-MBD              # HLD              # CAD              # Chronic respiratory failure with hypoxia                          On 2L  O2 at home     PLAN:  I tried to hear patient about any complaints. She seems resistant to believing that she needs antibiotics for infection. She states that she will not another dialysis catheter and will not agree to further dialysis.     I reviewed with her medical advice of getting line tomorrow once BCx cleared, and restart dialysis since she appears to had started in April with volume overload and uremia. However, patient states she will not go on dialysis again. She understands she may die from this problem.     >At this time, patient refusing all treatment and states she wants to home, even if that is against medical advice  >She states she will come back if she gets uremic symptoms again or muscle weakness (possible due to high K) but would not restart dialysis otherwise. She understands she may die from infection or renal failure.   >If patient does leave AMA, please text m so I can try to arrange outpatient follow-up. I would recommend sending out on Torsemide 20mg daily (this was reported to be preferred agent in cardiology note from April). She will also need follow-up in clinic, if patient decides that she will want that.       Jef Magana Jr, MD  [facility] Nephrology

## 2020-06-01 NOTE — PROGRESS NOTES
Assumed patient care, Echo got done this morning. Refused any Lab draw and IV line access. Patient verbalized to go home. Update plan of care.

## 2020-06-01 NOTE — THERAPY
Speech Language Pathology  Cognitive Evaluation      Patient Name: Pascale Acevedo  Age:  50 y.o., Sex:  female  Medical Record #: [id number]  Today's Date: 6/1/2020        Subjective    Pt was sitting up in a chair, reporting she is leaving today and agreeable to participate, although somewhat reluctant.      Assessment    Pt is a 50 year old female with PMHx of ESRD on HD who presented as a direct admit from outside facility on 5/30/2020 for management of bacteremia, nephrology consultation and infectious disease consultation.  RN reports pt is refusing care, specifically dialysis.  She was seen for a cognitive evaluation, and the Cognistat was administered.  Pt presents with severe deficits with short term memory, and mild deficits with attention, safety awareness, and medication management.   She had trace deficits with complex problem solving, however with cueing was able to provide more through and functional answers.  Pt verbalized good understanding of her current medical status, and provided reasoning for declining hemodialysis.  She verbalized good understanding of potential health risks associated with declining hemodialysis and kidney failure, as well as s/sx of kidney failure.  Suspect pt is at her baseline in terms of cognitive functioning.  She would not likely benefit from further skilled SLP services in the acute care setting at this time.  SLP will not actively follow, however will be available if requested by MD.       06/01/20 5854   Prior Living Situation   Housing / Facility 1 Story House   Lives with - Patient's Self Care Capacity Parents  (Mother)   Prior Level Of Function   Communication Unknown   Swallow Within Functional Limits   Hearing Within Functional Limits for Evaluation   Hearing Aid None   Vision Wears Corrective Lenses;Reading ;Distance   Patient's Primary Language English   Education High School Graduate or GED   Education Years Completed 12   Occupation (Pre-Hospital  Vocational) Not Employed   Verbal Expression   Verbal Expression / Aphasia Eval (WDL) WDL   Verbal Output Conversation Within Functional Limits (6-7)   Verbal Output Functional Within Functional Limits (6-7)   Repetition: Sentences Within Functional Limits (6-7)   Naming Within Functional Limits (6-7)   Word Finding Deficits Within Functional Limits (6-7)   Auditory Comprehension   Auditory Comprehension (WDL) X   Yes / No Questions: Personal Information Within Functional Limits (6-7)   Yes / No Questions: General Information Within Functional Limits (6-7)   Yes / No Questions: Abstract Within Functional Limits (6-7)   Identifies Objects Within Functional Limits (6-7)   Follows Three Unit Commands Within Functional Limits (6-7)   Understands Simple, Structured Conversation  Within Functional Limits (6-7)   Understands Complex Conversation Supervision (5)   Reading Comprehension   Comments Not tested, pt declining   Written Expression   Comments Not tested, pt declining   Cognitive-Linguistic   Cognitive-Linguistic (WDL) X   Level of Consciousness Alert   Orientation Level Not Oriented to Day;Not Oriented to Month   Sustained Attention Minimal (4)   Selective Attention Minimal (4)   Short Term Memory Severe (2)   Simple Reasoning / Problem Solving Within Functional Limits (6-7)   Complex Reasoning  / Problem Solving Supervision (5)   Safety Awareness Minimal (4)   Insight into Deficits Within Functional Limits (6-7)   Auditory Math Within Functional Limits (6-7)   Medication Management  Minimal (4)   Social / Pragmatic Communication   Social / Pragmatic Communication X   Eye Contact Moderate (3)   Comments Pt had poor eye contact, suspect was irritable regarding taking evaluation       Plan    SLP will not actively follow, however will be available if requested by MD.    Discharge recommendations:  Anticipate that the patient will have no further speech therapy needs after discharge from the hospital.

## 2020-06-01 NOTE — PROGRESS NOTES
Seen by Dr Magana , patient refused to have dialysis. MD contacted Dr Fernandez. Patient refused to have blood draw even with health teaching.

## 2020-06-01 NOTE — CARE PLAN
Problem: Communication  Goal: The ability to communicate needs accurately and effectively will improve  Outcome: MET     Problem: Safety  Goal: Will remain free from injury  Outcome: MET     Problem: Knowledge Deficit  Goal: Knowledge of disease process/condition, treatment plan, diagnostic tests, and medications will improve  Outcome: MET     Problem: Discharge Barriers/Planning  Goal: Patient's continuum of care needs will be met  Outcome: MET

## 2020-06-02 LAB
BACTERIA BLD CULT: ABNORMAL
CC # CATH TIP CULT: ABNORMAL /ML
CC # CATH TIP CULT: ABNORMAL /ML
SIGNIFICANT IND 70042: ABNORMAL
SITE SITE: ABNORMAL
SOURCE SOURCE: ABNORMAL

## 2020-06-02 PROCEDURE — 99239 HOSP IP/OBS DSCHRG MGMT >30: CPT | Performed by: INTERNAL MEDICINE

## 2020-06-02 NOTE — DISCHARGE SUMMARY
Discharge Summary    CHIEF COMPLAINT ON ADMISSION  No chief complaint on file.      Reason for Admission  bacteremia     Admission Date  5/30/2020    CODE STATUS  Prior    HPI & HOSPITAL COURSE  Ms. Pascale Acevedo is a 50 y.o. female with past medical history significant for end-stage renal disease on hemodialysis, hyprertension, anemia of chronic kidney disease, ischemic cardiomyopathy, chronic hypoxic respiratory failure on 2 L nasal cannula who presented on 5/30/2020 from [facility] for management of bacteremia, nephrology consultation, infectious disease consultation.  Patient presented to outside facility on 5/29/2020 after she developed a fever and cough during hemodialysis and was unable to complete her session.  She presented to an outside facility and was started on vancomycin and cefepime.  Blood cultures were obtained and are growing gram-positive cocci in clusters.  There is concern of permacath to be source of infection or bacteremia.  Patient was started on vancomycin and cefepime was continued for pneumonia.  She is allergic to penicillin.  Cultures grew out MSSA.  Echocardiogram demonstrated ejection fraction 30%, global hypokinesis, mild mitral regurgitation, reduced right ventricular systolic function.  Patient was declining treatment to include antibiotics, blood draws, dialysis, new access.  Patient was adamant about leaving AGAINST MEDICAL ADVICE.  Patient wanted only p.o. antibiotics and was not interested in resuming dialysis.  She reports that if she does decompensate and need dialysis she will come back to the hospital.  Evaluated by SLP for cognitive eval and was able to demonstrate understanding of her medical status and risks of declining hemodialysis as well as signs and symptoms of kidney failure.  She was also evaluated by psychiatry.  I did speak with Psychiatry resident Dr. Beckham, but the patient left AMA before she could be evaluated by the attending Psychiatrist.  I  also spoke with her parents and daughter who would prefer that she continue with dialysis.  Her parents are power of .  At the time she left AMA she was able to demonstrate understanding of her medical condition to include risks of refusing medical care and to include bacteremia, pneumonia, and stopping dialysis.  Patient was given a script for Cephalexin as recommended by ALLISON DASILVAID 19 was negative on 5/10 and again on 6/1.      Therefore, she is discharged in guarded and stable condition against medcial advice.    The patient recovered much more quickly than anticipated on admission.    Discharge Date  6/1/2020    FOLLOW UP ITEMS POST DISCHARGE  Bacteremia, diualysis    DISCHARGE DIAGNOSES  Active Problems:    Bacteremia POA: Yes    Psychosis (HCC) POA: Yes    Pneumonia POA: Yes    HTN (hypertension) POA: Yes    Seizure disorder, grand mal (HCC) POA: Yes  Resolved Problems:    * No resolved hospital problems. *      FOLLOW UP  Future Appointments   Date Time Provider Department Center   6/4/2020 10:45 AM Rhoda Thomas P.A.-C. [facility] None     Aureliano Elam M.D.  [address] [facility] [address]  [phone]            MEDICATIONS ON DISCHARGE     Medication List      START taking these medications      Instructions   cephALEXin 500 MG Caps  Commonly known as:  KEFLEX   Take 1 Cap by mouth 4 times a day for 14 days.  Dose:  500 mg        ASK your doctor about these medications      Instructions   acetaminophen 325 MG Tabs  Commonly known as:  TYLENOL   Take 2 Tabs by mouth every 6 hours as needed (Mild Pain; (Pain scale 1-3); Temp greater than 100.5 F).  Dose:  650 mg     aspirin 81 MG Chew chewable tablet  Commonly known as:  ASA   Take 1 Tab by mouth every day.  Dose:  81 mg     atorvastatin 40 MG Tabs  Commonly known as:  LIPITOR   Take 1 Tab by mouth every day.  Dose:  40 mg     calcium citrate 950 MG Tabs  Commonly known as:  CALCITRATE   Take 950 mg by mouth every day.  Dose:  950 mg      carvedilol 6.25 MG Tabs  Commonly known as:  COREG   Take 1 Tab by mouth 2 times a day, with meals.  Dose:  6.25 mg     escitalopram 10 MG Tabs  Commonly known as:  LEXAPRO   Take 1 Tab by mouth every day.  Dose:  10 mg     folic acid 1 MG Tabs  Commonly known as:  FOLVITE   Take 1 mg by mouth two times a day may change times on alt/days.  Dose:  1 mg     levETIRAcetam 500 MG Tabs  Commonly known as:  KEPPRA   500mg BID with additional 500mg after dialysis on Mon/Wed/Fri.     LORazepam 1 MG Tabs  Commonly known as:  ATIVAN   Take 1 Tab by mouth 2 Times a Day for 180 days.  Dose:  1 mg     Lysine 500 MG Caps   Take  by mouth every day.     Melatonin 5 MG Tabs   Take  by mouth every bedtime. Indications: Trouble Sleeping     NEXIUM 24HR PO   Take  by mouth as needed. noon     phenytoin  MG Caps  Commonly known as:  DILANTIN   Take 1 Cap by mouth 3 times a day.  Dose:  100 mg     sodium bicarbonate 650 MG Tabs  Commonly known as:  SODIUM BICARBONATE   Take 1 Tab by mouth 3 times a day.  Dose:  650 mg     vitamin B comp+C Tabs   Take 1 Tab by mouth every day.  Dose:  1 Tab     Vitamin C 1000 MG Tabs   Take  by mouth every day.            Allergies  Allergies   Allergen Reactions   • Allegra [Fexofenadine] Unspecified     Rxn = unknown   • Amoxicillin    • Azithromycin Rash     Rxn = unknown   • Claritin    • Erythromycin Unspecified     Rxn = unknown   • Ibuprofen & Caffeine-Vitamins Unspecified     Rxn = unknown   • Penicillins Unspecified     Rxn = unknown   • Procardia [Nifedipine]    • Rosemary Oil Anaphylaxis     Throat starts to close/swell.  Oil and the herb.    • Zyrtec [Cetirizine] Unspecified     Rxn = unknown       DIET  No orders of the defined types were placed in this encounter.      ACTIVITY  As tolerated.  Weight bearing as tolerated    CONSULTATIONS  ID  Nephrology  Psychiatry    PROCEDURES  Removal of dialysis catheter    LABORATORY  Lab Results   Component Value Date    SODIUM 128 (L)  06/01/2020    POTASSIUM 3.7 06/01/2020    CHLORIDE 94 (L) 06/01/2020    CO2 23 06/01/2020    GLUCOSE 91 06/01/2020    BUN 43 (H) 06/01/2020    CREATININE 5.78 (HH) 06/01/2020    CREATININE 0.9 11/19/2006        Lab Results   Component Value Date    WBC 7.4 06/01/2020    HEMOGLOBIN 7.9 (L) 06/01/2020    HEMATOCRIT 25.4 (L) 06/01/2020    PLATELETCT 117 (L) 06/01/2020        I discussed medications and side effects with the patient.  I discussed prognosis and importance of medical compliance with the patient.  I counseled the patient about diet, exercise, weight loss, and life style modifications.  All questions and concerns have been addressed.  Total time of the discharge process was 60 minutes.  I spent over 1 hour counseling the patient face to face, speaking with Psychiatry, ID, Nephrology, speech therapy, her parents and daughter coordinating her medical care.

## 2020-06-02 NOTE — PSYCHIATRY
"Psychiatry consult was requested for \" hx of psychosis. Capacity evaluation, refusing treatment, wants to leave AMA\". Pt was discharged prior evaluation by this provider.        "

## 2020-06-02 NOTE — PROGRESS NOTES
Seen by MD , patient left AMA with prescription. Did talk to daughter and family members when patient escort to her ride. Got her prescription of ABX and family going to fill it up.

## 2020-06-02 NOTE — PROGRESS NOTES
Pascale Acevedo patient has chosen to leave the hospital against medical advice. The attending physician has not discharged the patient. Patient is not a risk to himself or others. I have discussed with the patient the following:  Physician has not determined patient is ready for discharge, Risks and consequences of leaving the hospital too soon and Benefit of continued hospitalization.      Discharge against medical advice form has been Signed.          Attending physician has been notified.

## 2020-06-10 NOTE — DOCUMENTATION QUERY
[facility]                                                                       Query Response Note      PATIENT:               ELIJAH BOWLING  ACCT #:                  [id number]  MRN:                     [id number]  :                      1970  ADMIT DATE:       2020 3:21 PM  DISCH DATE:        2020 5:00 PM  RESPONDING  PROVIDER #:        [id number]           QUERY TEXT:    There is conflicting documentation in the medical record.      Bacteremia is documented on the H&P, progress notes, and DC Summary.     Sepsis is documented on Consult and progress notes.     Based on treatment, clinical findings and risk factors, can this documentation be further clarified?    The patient's Clinical Indicators include:  Blood Culture positive for MSSA  PermCath is source of MSSA infection - documented in multiple PNs    Bacteremia is documented on H&P dated  and DC Summary dated  with no mention of sepsis however still linked the MSSA infection to the PermCath.     Sepsis is documented on Consult note dated , Progress Note dated  and .   (WBC 13.0, pulse 60, resp 16, temp 97.3)  Options provided:   -- Sepsis ruled in, present on admission   -- Sepsis ruled in, developed after admission   -- Bacteremia, no sepsis   -- Unable to determine      Query created by: Sawallisch, Beth on 2020 3:50 PM    RESPONSE TEXT:    Sepsis ruled in, present on admission          Electronically signed by:  BRODERICK MADRIGAL MD 2020 8:31 PM

## 2020-06-11 ENCOUNTER — HOSPITAL ENCOUNTER (INPATIENT)
Facility: MEDICAL CENTER | Age: 50
LOS: 2 days | DRG: 291 | End: 2020-06-13
Attending: EMERGENCY MEDICINE | Admitting: INTERNAL MEDICINE
Payer: MEDICARE

## 2020-06-11 ENCOUNTER — APPOINTMENT (OUTPATIENT)
Dept: RADIOLOGY | Facility: MEDICAL CENTER | Age: 50
DRG: 291 | End: 2020-06-11
Attending: EMERGENCY MEDICINE
Payer: MEDICARE

## 2020-06-11 ENCOUNTER — HOSPITAL ENCOUNTER (OUTPATIENT)
Dept: RADIOLOGY | Facility: MEDICAL CENTER | Age: 50
End: 2020-06-11
Payer: MEDICARE

## 2020-06-11 ENCOUNTER — HOSPITAL ENCOUNTER (OUTPATIENT)
Facility: MEDICAL CENTER | Age: 50
End: 2020-06-11
Payer: MEDICARE

## 2020-06-11 DIAGNOSIS — R06.02 SOB (SHORTNESS OF BREATH): ICD-10-CM

## 2020-06-11 DIAGNOSIS — E87.70 HYPERVOLEMIA, UNSPECIFIED HYPERVOLEMIA TYPE: ICD-10-CM

## 2020-06-11 LAB
ALBUMIN SERPL BCP-MCNC: 3.6 G/DL (ref 3.2–4.9)
ALBUMIN/GLOB SERPL: 0.8 G/DL
ALP SERPL-CCNC: 130 U/L (ref 30–99)
ALT SERPL-CCNC: 18 U/L (ref 2–50)
ANION GAP SERPL CALC-SCNC: 16 MMOL/L (ref 7–16)
ANION GAP SERPL CALC-SCNC: 16 MMOL/L (ref 7–16)
APTT PPP: 31 SEC (ref 24.7–36)
AST SERPL-CCNC: 14 U/L (ref 12–45)
BASOPHILS # BLD AUTO: 0.4 % (ref 0–1.8)
BASOPHILS # BLD: 0.04 K/UL (ref 0–0.12)
BILIRUB SERPL-MCNC: 0.3 MG/DL (ref 0.1–1.5)
BUN SERPL-MCNC: 85 MG/DL (ref 8–22)
BUN SERPL-MCNC: 87 MG/DL (ref 8–22)
CALCIUM SERPL-MCNC: 9.4 MG/DL (ref 8.5–10.5)
CALCIUM SERPL-MCNC: 9.5 MG/DL (ref 8.5–10.5)
CHLORIDE SERPL-SCNC: 103 MMOL/L (ref 96–112)
CHLORIDE SERPL-SCNC: 104 MMOL/L (ref 96–112)
CO2 SERPL-SCNC: 16 MMOL/L (ref 20–33)
CO2 SERPL-SCNC: 17 MMOL/L (ref 20–33)
CREAT SERPL-MCNC: 6.39 MG/DL (ref 0.5–1.4)
CREAT SERPL-MCNC: 6.7 MG/DL (ref 0.5–1.4)
EKG IMPRESSION: NORMAL
EOSINOPHIL # BLD AUTO: 0.08 K/UL (ref 0–0.51)
EOSINOPHIL NFR BLD: 0.8 % (ref 0–6.9)
ERYTHROCYTE [DISTWIDTH] IN BLOOD BY AUTOMATED COUNT: 54.7 FL (ref 35.9–50)
GLOBULIN SER CALC-MCNC: 4.4 G/DL (ref 1.9–3.5)
GLUCOSE SERPL-MCNC: 67 MG/DL (ref 65–99)
GLUCOSE SERPL-MCNC: 93 MG/DL (ref 65–99)
HAV IGM SERPL QL IA: NORMAL
HBV CORE IGM SER QL: NORMAL
HBV SURFACE AB SERPL IA-ACNC: <3.5 MIU/ML (ref 0–10)
HBV SURFACE AG SER QL: NORMAL
HCG UR QL: NEGATIVE
HCT VFR BLD AUTO: 30.1 % (ref 37–47)
HCV AB SER QL: NORMAL
HGB BLD-MCNC: 9.3 G/DL (ref 12–16)
IMM GRANULOCYTES # BLD AUTO: 0.07 K/UL (ref 0–0.11)
IMM GRANULOCYTES NFR BLD AUTO: 0.7 % (ref 0–0.9)
INR PPP: 1.26 (ref 0.87–1.13)
LYMPHOCYTES # BLD AUTO: 2.58 K/UL (ref 1–4.8)
LYMPHOCYTES NFR BLD: 25 % (ref 22–41)
MCH RBC QN AUTO: 31.2 PG (ref 27–33)
MCHC RBC AUTO-ENTMCNC: 30.9 G/DL (ref 33.6–35)
MCV RBC AUTO: 101 FL (ref 81.4–97.8)
MONOCYTES # BLD AUTO: 0.5 K/UL (ref 0–0.85)
MONOCYTES NFR BLD AUTO: 4.8 % (ref 0–13.4)
NEUTROPHILS # BLD AUTO: 7.05 K/UL (ref 2–7.15)
NEUTROPHILS NFR BLD: 68.3 % (ref 44–72)
NRBC # BLD AUTO: 0 K/UL
NRBC BLD-RTO: 0 /100 WBC
NT-PROBNP SERPL IA-MCNC: ABNORMAL PG/ML (ref 0–125)
PLATELET # BLD AUTO: 326 K/UL (ref 164–446)
PMV BLD AUTO: 9.8 FL (ref 9–12.9)
POTASSIUM SERPL-SCNC: 4.9 MMOL/L (ref 3.6–5.5)
POTASSIUM SERPL-SCNC: 5 MMOL/L (ref 3.6–5.5)
PROT SERPL-MCNC: 8 G/DL (ref 6–8.2)
PROTHROMBIN TIME: 16.2 SEC (ref 12–14.6)
RBC # BLD AUTO: 2.98 M/UL (ref 4.2–5.4)
SODIUM SERPL-SCNC: 135 MMOL/L (ref 135–145)
SODIUM SERPL-SCNC: 137 MMOL/L (ref 135–145)
T4 FREE SERPL-MCNC: 1.03 NG/DL (ref 0.93–1.7)
TROPONIN T SERPL-MCNC: 47 NG/L (ref 6–19)
TROPONIN T SERPL-MCNC: 48 NG/L (ref 6–19)
TSH SERPL DL<=0.005 MIU/L-ACNC: 6.76 UIU/ML (ref 0.38–5.33)
WBC # BLD AUTO: 10.3 K/UL (ref 4.8–10.8)

## 2020-06-11 PROCEDURE — 700111 HCHG RX REV CODE 636 W/ 250 OVERRIDE (IP): Performed by: STUDENT IN AN ORGANIZED HEALTH CARE EDUCATION/TRAINING PROGRAM

## 2020-06-11 PROCEDURE — 85610 PROTHROMBIN TIME: CPT

## 2020-06-11 PROCEDURE — 93005 ELECTROCARDIOGRAM TRACING: CPT | Performed by: EMERGENCY MEDICINE

## 2020-06-11 PROCEDURE — 93010 ELECTROCARDIOGRAM REPORT: CPT | Performed by: INTERNAL MEDICINE

## 2020-06-11 PROCEDURE — 700111 HCHG RX REV CODE 636 W/ 250 OVERRIDE (IP): Performed by: INTERNAL MEDICINE

## 2020-06-11 PROCEDURE — 99285 EMERGENCY DEPT VISIT HI MDM: CPT

## 2020-06-11 PROCEDURE — 85025 COMPLETE CBC W/AUTO DIFF WBC: CPT

## 2020-06-11 PROCEDURE — 80048 BASIC METABOLIC PNL TOTAL CA: CPT

## 2020-06-11 PROCEDURE — 700105 HCHG RX REV CODE 258

## 2020-06-11 PROCEDURE — 36415 COLL VENOUS BLD VENIPUNCTURE: CPT

## 2020-06-11 PROCEDURE — 80074 ACUTE HEPATITIS PANEL: CPT

## 2020-06-11 PROCEDURE — 86706 HEP B SURFACE ANTIBODY: CPT

## 2020-06-11 PROCEDURE — 700102 HCHG RX REV CODE 250 W/ 637 OVERRIDE(OP): Performed by: STUDENT IN AN ORGANIZED HEALTH CARE EDUCATION/TRAINING PROGRAM

## 2020-06-11 PROCEDURE — 81025 URINE PREGNANCY TEST: CPT

## 2020-06-11 PROCEDURE — 84484 ASSAY OF TROPONIN QUANT: CPT | Mod: 91

## 2020-06-11 PROCEDURE — 93005 ELECTROCARDIOGRAM TRACING: CPT | Performed by: STUDENT IN AN ORGANIZED HEALTH CARE EDUCATION/TRAINING PROGRAM

## 2020-06-11 PROCEDURE — 84443 ASSAY THYROID STIM HORMONE: CPT

## 2020-06-11 PROCEDURE — 71045 X-RAY EXAM CHEST 1 VIEW: CPT

## 2020-06-11 PROCEDURE — 84439 ASSAY OF FREE THYROXINE: CPT

## 2020-06-11 PROCEDURE — A9270 NON-COVERED ITEM OR SERVICE: HCPCS | Performed by: EMERGENCY MEDICINE

## 2020-06-11 PROCEDURE — 83880 ASSAY OF NATRIURETIC PEPTIDE: CPT

## 2020-06-11 PROCEDURE — A9270 NON-COVERED ITEM OR SERVICE: HCPCS | Performed by: STUDENT IN AN ORGANIZED HEALTH CARE EDUCATION/TRAINING PROGRAM

## 2020-06-11 PROCEDURE — 80053 COMPREHEN METABOLIC PANEL: CPT

## 2020-06-11 PROCEDURE — 700102 HCHG RX REV CODE 250 W/ 637 OVERRIDE(OP): Performed by: EMERGENCY MEDICINE

## 2020-06-11 PROCEDURE — 85730 THROMBOPLASTIN TIME PARTIAL: CPT

## 2020-06-11 PROCEDURE — 770020 HCHG ROOM/CARE - TELE (206)

## 2020-06-11 PROCEDURE — 99223 1ST HOSP IP/OBS HIGH 75: CPT | Mod: AI,GC | Performed by: INTERNAL MEDICINE

## 2020-06-11 RX ORDER — AMLODIPINE BESYLATE 10 MG/1
10 TABLET ORAL DAILY
COMMUNITY

## 2020-06-11 RX ORDER — CARVEDILOL 6.25 MG/1
6.25 TABLET ORAL 2 TIMES DAILY WITH MEALS
Status: DISCONTINUED | OUTPATIENT
Start: 2020-06-11 | End: 2020-06-13

## 2020-06-11 RX ORDER — SODIUM BICARBONATE 650 MG/1
650 TABLET ORAL 3 TIMES DAILY
COMMUNITY

## 2020-06-11 RX ORDER — CEFAZOLIN SODIUM 1 G/50ML
1 INJECTION, SOLUTION INTRAVENOUS EVERY 24 HOURS
Status: DISCONTINUED | OUTPATIENT
Start: 2020-06-11 | End: 2020-06-13 | Stop reason: HOSPADM

## 2020-06-11 RX ORDER — LEVETIRACETAM 500 MG/1
500 TABLET ORAL 2 TIMES DAILY
Status: DISCONTINUED | OUTPATIENT
Start: 2020-06-11 | End: 2020-06-13 | Stop reason: HOSPADM

## 2020-06-11 RX ORDER — CARVEDILOL 6.25 MG/1
6.25 TABLET ORAL 2 TIMES DAILY WITH MEALS
Status: ON HOLD | COMMUNITY
End: 2020-06-13

## 2020-06-11 RX ORDER — ASCORBIC ACID 500 MG
1000 TABLET ORAL DAILY
Status: DISCONTINUED | OUTPATIENT
Start: 2020-06-11 | End: 2020-06-13 | Stop reason: HOSPADM

## 2020-06-11 RX ORDER — SODIUM BICARBONATE 650 MG/1
650 TABLET ORAL 3 TIMES DAILY
Status: DISCONTINUED | OUTPATIENT
Start: 2020-06-11 | End: 2020-06-11

## 2020-06-11 RX ORDER — BISACODYL 10 MG
10 SUPPOSITORY, RECTAL RECTAL
Status: DISCONTINUED | OUTPATIENT
Start: 2020-06-11 | End: 2020-06-13 | Stop reason: HOSPADM

## 2020-06-11 RX ORDER — LEVETIRACETAM 500 MG/1
500 TABLET ORAL SEE ADMIN INSTRUCTIONS
COMMUNITY

## 2020-06-11 RX ORDER — LABETALOL HYDROCHLORIDE 5 MG/ML
10 INJECTION, SOLUTION INTRAVENOUS EVERY 4 HOURS PRN
Status: DISCONTINUED | OUTPATIENT
Start: 2020-06-11 | End: 2020-06-12

## 2020-06-11 RX ORDER — FUROSEMIDE 10 MG/ML
80 INJECTION INTRAMUSCULAR; INTRAVENOUS TWICE DAILY
Status: DISCONTINUED | OUTPATIENT
Start: 2020-06-11 | End: 2020-06-13 | Stop reason: HOSPADM

## 2020-06-11 RX ORDER — CEPHALEXIN 500 MG/1
500 CAPSULE ORAL 3 TIMES DAILY
Status: DISCONTINUED | OUTPATIENT
Start: 2020-06-11 | End: 2020-06-11

## 2020-06-11 RX ORDER — VALSARTAN 40 MG/1
20 TABLET ORAL DAILY
Status: ON HOLD | COMMUNITY
End: 2020-06-13

## 2020-06-11 RX ORDER — PHENYTOIN SODIUM 100 MG/1
100 CAPSULE, EXTENDED RELEASE ORAL 3 TIMES DAILY
Status: DISCONTINUED | OUTPATIENT
Start: 2020-06-11 | End: 2020-06-13 | Stop reason: HOSPADM

## 2020-06-11 RX ORDER — ASPIRIN 81 MG/1
81 TABLET, CHEWABLE ORAL DAILY
Status: DISCONTINUED | OUTPATIENT
Start: 2020-06-11 | End: 2020-06-13 | Stop reason: HOSPADM

## 2020-06-11 RX ORDER — AMOXICILLIN 250 MG
2 CAPSULE ORAL 2 TIMES DAILY
Status: DISCONTINUED | OUTPATIENT
Start: 2020-06-11 | End: 2020-06-13 | Stop reason: HOSPADM

## 2020-06-11 RX ORDER — SODIUM CHLORIDE 9 MG/ML
INJECTION, SOLUTION INTRAVENOUS
Status: COMPLETED
Start: 2020-06-11 | End: 2020-06-12

## 2020-06-11 RX ORDER — AMLODIPINE BESYLATE 10 MG/1
10 TABLET ORAL DAILY
Status: DISCONTINUED | OUTPATIENT
Start: 2020-06-11 | End: 2020-06-13 | Stop reason: HOSPADM

## 2020-06-11 RX ORDER — CEPHALEXIN 500 MG/1
500 CAPSULE ORAL 4 TIMES DAILY
Status: ON HOLD | COMMUNITY
Start: 2020-06-01 | End: 2020-06-13

## 2020-06-11 RX ORDER — CHOLECALCIFEROL (VITAMIN D3) 125 MCG
5 CAPSULE ORAL
Status: DISCONTINUED | OUTPATIENT
Start: 2020-06-11 | End: 2020-06-11

## 2020-06-11 RX ORDER — TORSEMIDE 5 MG/1
5 TABLET ORAL DAILY
Status: DISCONTINUED | OUTPATIENT
Start: 2020-06-11 | End: 2020-06-11

## 2020-06-11 RX ORDER — ATORVASTATIN CALCIUM 40 MG/1
40 TABLET, FILM COATED ORAL NIGHTLY
Status: DISCONTINUED | OUTPATIENT
Start: 2020-06-11 | End: 2020-06-13 | Stop reason: HOSPADM

## 2020-06-11 RX ORDER — POLYETHYLENE GLYCOL 3350 17 G/17G
1 POWDER, FOR SOLUTION ORAL
Status: DISCONTINUED | OUTPATIENT
Start: 2020-06-11 | End: 2020-06-13 | Stop reason: HOSPADM

## 2020-06-11 RX ORDER — PHENYTOIN SODIUM 100 MG/1
100 CAPSULE, EXTENDED RELEASE ORAL 3 TIMES DAILY
COMMUNITY

## 2020-06-11 RX ORDER — TORSEMIDE 10 MG/1
5 TABLET ORAL DAILY
Status: ON HOLD | COMMUNITY
End: 2020-06-13

## 2020-06-11 RX ORDER — LORAZEPAM 1 MG/1
1 TABLET ORAL 2 TIMES DAILY
COMMUNITY

## 2020-06-11 RX ORDER — LORAZEPAM 1 MG/1
1 TABLET ORAL 2 TIMES DAILY
Status: DISCONTINUED | OUTPATIENT
Start: 2020-06-11 | End: 2020-06-13 | Stop reason: HOSPADM

## 2020-06-11 RX ORDER — FOLIC ACID 1 MG/1
1 TABLET ORAL DAILY
Status: DISCONTINUED | OUTPATIENT
Start: 2020-06-11 | End: 2020-06-13 | Stop reason: HOSPADM

## 2020-06-11 RX ORDER — ATORVASTATIN CALCIUM 40 MG/1
40 TABLET, FILM COATED ORAL NIGHTLY
COMMUNITY

## 2020-06-11 RX ADMIN — FOLIC ACID 1 MG: 1 TABLET ORAL at 14:54

## 2020-06-11 RX ADMIN — ATORVASTATIN CALCIUM 40 MG: 40 TABLET, FILM COATED ORAL at 20:15

## 2020-06-11 RX ADMIN — ASPIRIN 81 MG 81 MG: 81 TABLET ORAL at 14:58

## 2020-06-11 RX ADMIN — FUROSEMIDE 80 MG: 10 INJECTION, SOLUTION INTRAMUSCULAR; INTRAVENOUS at 14:54

## 2020-06-11 RX ADMIN — AMLODIPINE BESYLATE 10 MG: 10 TABLET ORAL at 14:54

## 2020-06-11 RX ADMIN — SODIUM CHLORIDE 500 ML: 9 INJECTION, SOLUTION INTRAVENOUS at 17:29

## 2020-06-11 RX ADMIN — NITROGLYCERIN 1 INCH: 20 OINTMENT TOPICAL at 10:25

## 2020-06-11 RX ADMIN — Medication 1000 MG: at 14:54

## 2020-06-11 RX ADMIN — LEVETIRACETAM 500 MG: 500 TABLET ORAL at 17:36

## 2020-06-11 RX ADMIN — CARVEDILOL 6.25 MG: 6.25 TABLET, FILM COATED ORAL at 14:54

## 2020-06-11 RX ADMIN — CEFAZOLIN SODIUM 1 G: 1 INJECTION, SOLUTION INTRAVENOUS at 17:29

## 2020-06-11 RX ADMIN — LORAZEPAM 1 MG: 1 TABLET ORAL at 17:29

## 2020-06-11 RX ADMIN — PHENYTOIN SODIUM 100 MG: 100 CAPSULE ORAL at 17:29

## 2020-06-11 ASSESSMENT — LIFESTYLE VARIABLES
EVER HAD A DRINK FIRST THING IN THE MORNING TO STEADY YOUR NERVES TO GET RID OF A HANGOVER: NO
AVERAGE NUMBER OF DAYS PER WEEK YOU HAVE A DRINK CONTAINING ALCOHOL: 0
ALCOHOL_USE: NO
TOTAL SCORE: 0
TOTAL SCORE: 0
SUBSTANCE_ABUSE: 0
CONSUMPTION TOTAL: NEGATIVE
EVER FELT BAD OR GUILTY ABOUT YOUR DRINKING: NO
HAVE YOU EVER FELT YOU SHOULD CUT DOWN ON YOUR DRINKING: NO
DOES PATIENT WANT TO STOP DRINKING: NO
TOTAL SCORE: 0
ON A TYPICAL DAY WHEN YOU DRINK ALCOHOL HOW MANY DRINKS DO YOU HAVE: 0
EVER_SMOKED: YES
HAVE PEOPLE ANNOYED YOU BY CRITICIZING YOUR DRINKING: NO
HOW MANY TIMES IN THE PAST YEAR HAVE YOU HAD 5 OR MORE DRINKS IN A DAY: 0

## 2020-06-11 ASSESSMENT — ENCOUNTER SYMPTOMS
HALLUCINATIONS: 0
SPUTUM PRODUCTION: 0
VOMITING: 0
CHILLS: 1
HEMOPTYSIS: 0
COUGH: 1
PHOTOPHOBIA: 0
DIZZINESS: 0
BLURRED VISION: 0
DOUBLE VISION: 0
DIAPHORESIS: 0
ORTHOPNEA: 0
FOCAL WEAKNESS: 0
SINUS PAIN: 0
NAUSEA: 0
HEADACHES: 0
TINGLING: 0
ABDOMINAL PAIN: 0
HEARTBURN: 0
FLANK PAIN: 0
MYALGIAS: 0
NECK PAIN: 0
SPEECH CHANGE: 0
SORE THROAT: 0
FEVER: 0
DEPRESSION: 0
BLOOD IN STOOL: 0
BACK PAIN: 0
BRUISES/BLEEDS EASILY: 1
PALPITATIONS: 0
SHORTNESS OF BREATH: 0

## 2020-06-11 ASSESSMENT — COGNITIVE AND FUNCTIONAL STATUS - GENERAL
SUGGESTED CMS G CODE MODIFIER DAILY ACTIVITY: CH
SUGGESTED CMS G CODE MODIFIER MOBILITY: CH
MOBILITY SCORE: 24
DAILY ACTIVITIY SCORE: 24

## 2020-06-11 ASSESSMENT — FIBROSIS 4 INDEX
FIB4 SCORE: 1.83
FIB4 SCORE: 0.5

## 2020-06-11 NOTE — CONSULTS
[facility] -  CONSULTATION NOTE               Author: Slick San M.D. Date & Time: 6/11/2020  12:39 PM       REASON FOR CONSULTATION:   Inpatient hemodialysis management.    CHIEF COMPLAINT:   Shortness of breath and chest pressure    HISTORY OF PRESENT ILLNESS:    50-year-old male with a history of end-stage renal disease previously and hemodialysis presented as transfer from [place] with chest pressure and shortness of breath.    Started on hemodialysis earlier this spring and has been noncompliant with her treatment.  She is intermittently not wanted to continue with treatment and has had ongoing discussions with her mother about continuing.  She developed MSSA bacteremia in May and was hospitalized at [facility].  She left [place] from that hospitalization that she did not want any further dialysis or medical treatment going forward, she was discharged on oral antibiotics and diuretics.  Continued the antibiotics but did not continue taking the diuretics.  Over the last she has had worsening malaise and shortness of breath and after further discussions with mother she has decided that she does not want to continue dialysis.  She was seen in outpatient kidney clinic yesterday with plan for permacath placement today and to resume dialysis tomorrow.  She has negative blood cultures from 6/5.  Last night she developed worsening chest pressure and shortness of breath for which she presented to the ED in [place].  Some concern for an STEMI prompting transfer to [facility].  She was given diuretics at that hospitalization reports robust urine output since.  Notes her shortness of breath and chest pressure are improving.  ACS rule out underway    REVIEW OF SYSTEMS:    General: +malaise  CV: + Chest pressure  RESP: +shortness of breath  GI: No abdominal pain   : No dysuria or gross hematuria  MSK: No trauma  Skin: No rashes  Neuro: No tremors  Psych: No depression    PAST MEDICAL HISTORY:   Past  "Medical History:   Diagnosis Date   • CKD (chronic kidney disease), stage IV (HCC) 12/24/2015   • H/O: CVA (cerebrovascular accident) 12/24/2015   • Hypertension    • Kidney disease    • Mild mitral regurgitation 7/14/2014   • Moyamoya disease 10/3/2013   • Seizure disorder, grand mal (HCC) 3/3/2016   • Stroke (formerly [facility])          PAST SURGICAL HISTORY:   Past Surgical History:   Procedure Laterality Date   • IRRIGATION & DEBRIDEMENT ORTHO Right 3/9/2017    Procedure: IRRIGATION & DEBRIDEMENT ORTHO - FOOT;  Surgeon: Gerardo Lynn M.D.;  Location: SURGERY [facility];  Service:    • IRRIGATION & DEBRIDEMENT ORTHO Right 3/5/2017    Procedure: IRRIGATION & DEBRIDEMENT ORTHO AND GASTROC RECESSION;  Surgeon: Gerardo Lynn M.D.;  Location: SURGERY [facility];  Service:    • METATARSAL HEAD RESECTION  3/5/2017    Procedure: SECOND METATARSAL HEAD RESECTION;  Surgeon: Gerardo Lynn M.D.;  Location: SURGERY [facility];  Service:    • IRRIGATION & DEBRIDEMENT ORTHO Right 2/27/2017    Procedure: IRRIGATION & DEBRIDEMENT ORTHO;  Surgeon: Coleman Monterroso M.D.;  Location: SURGERY [facility];  Service:    • PRIMARY C SECTION     • TUBAL COAGULATION LAPAROSCOPIC BILATERAL         FAMILY HISTORY:   No family history of kidney disease    SOCIAL HISTORY:   No smoking, no etoh, no illicit drugs.    HOME MEDICATIONS:   Reviewed and documented in chart    ALLERGIES:  Allegra [fexofenadine]; Amoxicillin; Azithromycin; Claritin; Erythromycin; Ibuprofen & caffeine-vitamins; Penicillins; Procardia [nifedipine]; Rosemary oil; and Zyrtec [cetirizine]    PHYSICAL EXAM:  VS:  /90   Pulse 86   Temp 35.9 °C (96.6 °F) (Temporal)   Resp (!) 22   Ht 1.778 m (5' 10\")   Wt 113.4 kg (250 lb)   SpO2 98%   BMI 35.87 kg/m²   GENERAL: no acute distress  CV: RRR, +edema  RESP: Decreased breath sounds bilaterally  GI: Soft  MSK: No joint deformities   SKIN: No concerning rashes  NEURO: AOx3  PSYCH: " Cooperative    LABS:  Recent Results (from the past 24 hour(s))   EKG    Collection Time: 20  8:41 AM   Result Value Ref Range    Report       [facility]    Test Date:  2020  Pt Name:    ELIJAH BOWLING             Department: ER  MRN:        [id number]                      Room:       [facility]  Gender:     Female                       Technician: [id number]  :        1970                   Requested By:SOM BELLO  Order #:    [id number]                    Reading MD: Onesimo Prakash MD    Measurements  Intervals                                Axis  Rate:       52                           P:          51  NE:         156                          QRS:        42  QRSD:       135                          T:          -28  QT:         525  QTc:        489    Interpretive Statements  SINUS BRADYCARDIA  LEFT BUNDLE BRANCH BLOCK  Compared to ECG 2020 06:37:41  Sinus rhythm no longer present  Electronically Signed On 2020 8:56:19 PDT by Onesimo Prakash MD     CBC w/ Differential    Collection Time: 20  9:05 AM   Result Value Ref Range    WBC 10.3 4.8 - 10.8 K/uL    RBC 2.98 (L) 4.20 - 5.40 M/uL    Hemoglobin 9.3 (L) 12.0 - 16.0 g/dL    Hematocrit 30.1 (L) 37.0 - 47.0 %    .0 (H) 81.4 - 97.8 fL    MCH 31.2 27.0 - 33.0 pg    MCHC 30.9 (L) 33.6 - 35.0 g/dL    RDW 54.7 (H) 35.9 - 50.0 fL    Platelet Count 326 164 - 446 K/uL    MPV 9.8 9.0 - 12.9 fL    Neutrophils-Polys 68.30 44.00 - 72.00 %    Lymphocytes 25.00 22.00 - 41.00 %    Monocytes 4.80 0.00 - 13.40 %    Eosinophils 0.80 0.00 - 6.90 %    Basophils 0.40 0.00 - 1.80 %    Immature Granulocytes 0.70 0.00 - 0.90 %    Nucleated RBC 0.00 /100 WBC    Neutrophils (Absolute) 7.05 2.00 - 7.15 K/uL    Lymphs (Absolute) 2.58 1.00 - 4.80 K/uL    Monos (Absolute) 0.50 0.00 - 0.85 K/uL    Eos (Absolute) 0.08 0.00 - 0.51 K/uL    Baso (Absolute) 0.04 0.00 - 0.12 K/uL    Immature Granulocytes (abs) 0.07 0.00 - 0.11 K/uL    NRBC (Absolute) 0.00 K/uL    Complete Metabolic Panel (CMP)    Collection Time: 06/11/20  9:05 AM   Result Value Ref Range    Sodium 135 135 - 145 mmol/L    Potassium 4.9 3.6 - 5.5 mmol/L    Chloride 103 96 - 112 mmol/L    Co2 16 (L) 20 - 33 mmol/L    Anion Gap 16.0 7.0 - 16.0    Glucose 67 65 - 99 mg/dL    Bun 85 (HH) 8 - 22 mg/dL    Creatinine 6.39 (HH) 0.50 - 1.40 mg/dL    Calcium 9.4 8.5 - 10.5 mg/dL    AST(SGOT) 14 12 - 45 U/L    ALT(SGPT) 18 2 - 50 U/L    Alkaline Phosphatase 130 (H) 30 - 99 U/L    Total Bilirubin 0.3 0.1 - 1.5 mg/dL    Albumin 3.6 3.2 - 4.9 g/dL    Total Protein 8.0 6.0 - 8.2 g/dL    Globulin 4.4 (H) 1.9 - 3.5 g/dL    A-G Ratio 0.8 g/dL   proBrain Natriuretic Peptide, NT    Collection Time: 06/11/20  9:05 AM   Result Value Ref Range    NT-proBNP >18932 (H) 0 - 125 pg/mL   Troponin STAT    Collection Time: 06/11/20  9:05 AM   Result Value Ref Range    Troponin T 48 (H) 6 - 19 ng/L   TSH (for screening thyroid dysfunction)    Collection Time: 06/11/20  9:05 AM   Result Value Ref Range    TSH 6.760 (H) 0.380 - 5.330 uIU/mL   Free Thyroxine (add to TSH for patients with existing thyroid dysfunction)    Collection Time: 06/11/20  9:05 AM   Result Value Ref Range    Free T-4 1.03 0.93 - 1.70 ng/dL   PT/INR    Collection Time: 06/11/20  9:05 AM   Result Value Ref Range    PT 16.2 (H) 12.0 - 14.6 sec    INR 1.26 (H) 0.87 - 1.13   APTT    Collection Time: 06/11/20  9:05 AM   Result Value Ref Range    APTT 31.0 24.7 - 36.0 sec   ESTIMATED GFR    Collection Time: 06/11/20  9:05 AM   Result Value Ref Range    GFR If  8 (A) >60 mL/min/1.73 m 2    GFR If Non African American 7 (A) >60 mL/min/1.73 m 2       (click the triangle to expand results)    IMAGING:  DX-CHEST-PORTABLE (1 VIEW)   Final Result      Stable prominence of the cardiomediastinal silhouette.      Atherosclerotic plaque.             ASSESSMENT:  # ESRD: Has not received dialysis over the last 2 weeks, without access.  He is now willing to be fully  compliant with these  # Shortness of breath: Likely secondary to volume overload  # Chest pressure: ACS rule out underway, suspect secondary to volume overload  # Anemia in CKD:  # CKD-MBD:  # Recent MSSA bacteremia: Patient blood cultures from 6/5 negative.  No leukocytosis.  # Moyamoya disease: Bp goal 140s/80 per cards in the past    PLAN:  -Hold on further blood cultures given negative cultures several days ago  -Plan for permacath placement via IR  -Need to start dialysis once permacath placed  -lasix 80 mg IV twice daily until cath placed  -Can continue previously recommended IV course of antibiotics for bacteremia with dialysis, if desired by primary   -Dose all meds for ESRD  -Renal Diet  -Home blood pressure meds  -Renal panel in a.m.    Thank you for this consult, we will continue to follow.    Slick San MD

## 2020-06-11 NOTE — H&P
History & Physical Note    Date of Admission: 6/11/2020  Admission Status: Inpatient  [facility] Team: [facility] IM Yellow Team  Attending: Dr. Ton Brown M.D.   Senior Resident: Dr. Benson  Intern: Dr. Doan  Contact Number: [phone]    Chief Complaint:   Chest Pain, Catheter placement.     History of Present Illness (HPI):     Pascale is a 50 y.o. female who presented 6/11/2020 as a transfer from [facility] for chest pain. Patient has a complex medical history relevant for CKD stage 4, Hypertension, CVA, Moyamoya, seizure disorder, CHF EF 30%, noncompliant with dialysis.  She was recently hospitalized in late may for Bacteremia, her port was removed as it was thought to be the source of infection, patient left AMA on oral antibiotics declining further management of her Renal Disease.  Patient presents today to the ED at [facility] stating that she is willing to be compliant with medical management even if includes dialysis, which she was against to in her previous admission.  Patient presented to [place] for chest pain which started while watching TV, pain was located in both sides of the chest, refer as a sharp pain, 7-8 in intensity which resolved after taking tylenol, patient stated that pain only lasted abut 2 min or so.   She stated that the pain did not change with activity, there was no associated diaphoresis, pain did not radiate to other anatomical regions, no aggravating  Factors, got better by tylenol.  Patient also stated that she has had similar pain on and off every few or several days that are short lived and usually responds to tylenol or goes away or its own.  Patient also stated feeling fever and chills last night, but not documented fever.  In the ED patient was hemodynamically stable, afebrile, blood pressure mildly elevated, troponin was 48, proBNP:03169, No electrolytes disturbances, BUN:85, Creatinine:6.39, GFR: 8  NO leukocytosis, No tachycardia, Afebrile.  EKG: No significant changes from  baseline. Sinus Ponce, rate 52, no ST or Q wave changes.  CXR: Stable prominence of the cardiomediastinal silhouette.            Atherosclerotic plaque.  When admitting team saw patient, she was very comfortable in bed, no signs of distress, pain or other complaints, her BL was 147/75 at that moment, heart rate in the 60's, in Rom air with O2 sats in the mid-high 90's.  Patient is well know to [facility] [place], per Nephrology after consultation patient just got negative Blood Cultures which were Drawn on 6/5, 5 days NGTD.  No signs of infection, no tachycardia, no Leukocytosis, Bacteremia.  Since patient did not completed ideal treatment we will start again IV Cefazolin and consult with ID for recommendations on antibiotic course length.  Patient will be admitted for further management and Cath placement per Nephrology recommendations.    Review of Systems:   Review of Systems   Constitutional: Positive for chills. Negative for diaphoresis, fever and malaise/fatigue.   HENT: Negative for ear discharge, hearing loss, sinus pain, sore throat and tinnitus.    Eyes: Negative for blurred vision, double vision and photophobia.   Respiratory: Positive for cough. Negative for hemoptysis, sputum production and shortness of breath.    Cardiovascular: Negative for chest pain, palpitations and orthopnea.   Gastrointestinal: Negative for abdominal pain, blood in stool, heartburn, melena, nausea and vomiting.   Genitourinary: Negative for dysuria, flank pain, frequency, hematuria and urgency.   Musculoskeletal: Negative for back pain, joint pain, myalgias and neck pain.   Skin: Positive for itching. Negative for rash.   Neurological: Negative for dizziness, tingling, speech change, focal weakness and headaches.   Endo/Heme/Allergies: Negative for environmental allergies. Bruises/bleeds easily.   Psychiatric/Behavioral: Negative for depression, hallucinations, substance abuse and suicidal ideas.         Past Medical History:    Past Medical History was reviewed with patient.   has a past medical history of CKD (chronic kidney disease), stage IV (HCC) (12/24/2015), H/O: CVA (cerebrovascular accident) (12/24/2015), Hypertension, Kidney disease, Mild mitral regurgitation (7/14/2014), Moyamoya disease (10/3/2013), Seizure disorder, grand mal (HCC) (3/3/2016), and Stroke (HCC).    Past Surgical History: Past Surgical History was reviewed with patient.   has a past surgical history that includes primary c section; tubal coagulation laparoscopic bilateral; irrigation & debridement ortho (Right, 2/27/2017); irrigation & debridement ortho (Right, 3/5/2017); metatarsal head resection (3/5/2017); and irrigation & debridement ortho (Right, 3/9/2017).    Medications: Medications have been reviewed with patient.  Prior to Admission Medications   Prescriptions Last Dose Informant Patient Reported? Taking?   Ascorbic Acid (VITAMIN C) 1000 MG Tab 6/10/2020 at AM Patient Yes No   Sig: Take 1,000 mg by mouth every day.   LORazepam (ATIVAN) 1 MG Tab 6/10/2020 at PM Patient Yes Yes   Sig: Take 1 mg by mouth 2 Times a Day. Scheduled.   Lysine 500 MG Cap 6/10/2020 at AM Patient Yes No   Sig: Take 500 mg by mouth every day.   Melatonin 5 MG Tab 6/10/2020 at PM Patient Yes No   Sig: Take 5 mg by mouth every bedtime. Indications: Trouble Sleeping   amLODIPine (NORVASC) 10 MG Tab 6/10/2020 at AM Patient Yes Yes   Sig: Take 10 mg by mouth every day.   atorvastatin (LIPITOR) 40 MG Tab 6/10/2020 at PM Patient Yes Yes   Sig: Take 40 mg by mouth every evening.   carvedilol (COREG) 6.25 MG Tab 6/10/2020 at PM Patient Yes Yes   Sig: Take 6.25 mg by mouth 2 times a day, with meals.   cephALEXin (KEFLEX) 500 MG Cap 6/10/2020 at PM Patient Yes Yes   Sig: Take 500 mg by mouth 4 times a day. 14-day course Starting 06/01/20 Ending 06/14/20.   folic acid (FOLVITE) 1 MG Tab 6/10/2020 at AM Patient Yes No   Sig: Take 1 mg by mouth every day.   levETIRAcetam (KEPPRA) 500 MG Tab  6/10/2020 at PM Patient Yes Yes   Sig: Take 500 mg by mouth See Admin Instructions. 500mg twice daily every day of the week.  Additionally, 500mg after Dialysis on Mon, Wed, and Fri.   phenytoin ER (DILANTIN) 100 MG Cap 6/10/2020 at PM Patient Yes Yes   Sig: Take 100 mg by mouth 3 times a day.   sodium bicarbonate (SODIUM BICARBONATE) 650 MG Tab 6/10/2020 at PM Patient Yes Yes   Sig: Take 650 mg by mouth 3 times a day.   torsemide (DEMADEX) 10 MG tablet 6/10/2020 at AM Patient Yes Yes   Sig: Take 5 mg by mouth every day.   valsartan (DIOVAN) 40 MG Tab 6/10/2020 at AM Patient Yes Yes   Sig: Take 20 mg by mouth every day.      Facility-Administered Medications: None        Allergies: Allergies have been reviewed with patient.  Allergies   Allergen Reactions   • Allegra [Fexofenadine] Unspecified     Rxn = unknown   • Amoxicillin    • Azithromycin Rash     Rxn = unknown   • Claritin    • Erythromycin Unspecified     Rxn = unknown   • Ibuprofen & Caffeine-Vitamins Unspecified     Rxn = unknown   • Penicillins Unspecified     Rxn = unknown   • Procardia [Nifedipine]    • Rosemary Oil Anaphylaxis     Throat starts to close/swell.  Oil and the herb.    • Zyrtec [Cetirizine] Unspecified     Rxn = unknown       Family History:   Father: DM and HTN  Mother: HTN and DM  Paternal and Maternal Grandfather: DM  No siblings    family history includes Arthritis in her father; Cancer in her paternal grandmother; Diabetes in her maternal grandmother and mother; Hypertension in her father and mother; Psychiatric Illness in her maternal grandfather and paternal grandfather.     Social History:   Tobacco: No   Alcohol: No   Recreational drugs (illegal and prescription):  No currently, quit 5 years ago (Meth)   Employment: No  Activity Level: Walks   Living situation:  Home  Recent travel:  No  Primary Care Provider: reviewed Aureliano Elam M.D.  Other (stressors, spirituality, exposures):  No  Physical Exam:   Vitals:  Temp:  [35.9 °C  (96.6 °F)-36.6 °C (97.8 °F)] 36.6 °C (97.8 °F)  Pulse:  [52-86] 61  Resp:  [19-22] 19  BP: (141-169)/(65-90) 147/68  SpO2:  [98 %-100 %] 98 %    Physical Exam  Constitutional:       General: She is not in acute distress.     Appearance: She is obese. She is not ill-appearing or diaphoretic.   HENT:      Head: Normocephalic and atraumatic.      Nose: Nose normal.      Mouth/Throat:      Mouth: Mucous membranes are moist.      Pharynx: Oropharynx is clear. No oropharyngeal exudate or posterior oropharyngeal erythema.      Comments: Pallanpati score 3-4  Eyes:      General: No scleral icterus.     Extraocular Movements: Extraocular movements intact.      Conjunctiva/sclera: Conjunctivae normal.      Pupils: Pupils are equal, round, and reactive to light.   Neck:      Musculoskeletal: Normal range of motion and neck supple. No neck rigidity or muscular tenderness.   Cardiovascular:      Rate and Rhythm: Normal rate and regular rhythm.      Pulses: Normal pulses.      Heart sounds: Normal heart sounds. No murmur. No gallop.    Pulmonary:      Effort: Pulmonary effort is normal. No respiratory distress.      Breath sounds: Normal breath sounds. No wheezing or rales.   Abdominal:      General: Bowel sounds are normal. There is no distension.      Palpations: Abdomen is soft.      Tenderness: There is abdominal tenderness. There is no right CVA tenderness, left CVA tenderness, guarding or rebound.   Musculoskeletal: Normal range of motion.         General: No swelling or tenderness.      Right lower leg: Edema present.      Left lower leg: Edema present.   Skin:     General: Skin is warm.      Capillary Refill: Capillary refill takes less than 2 seconds.      Coloration: Skin is not jaundiced or pale.      Findings: No bruising or lesion.   Neurological:      General: No focal deficit present.      Mental Status: She is alert. Mental status is at baseline.      Cranial Nerves: No cranial nerve deficit.      Motor: No  weakness.      Comments: Mild sensory loss in her toes, likely secondary to stroke.   Psychiatric:         Mood and Affect: Mood normal.         Behavior: Behavior normal.         Thought Content: Thought content normal.         Labs:   Review    EKG: Per my read, Sinus Bradycardia, Rate 52, No ST changes or T wave inversions        Imaging:   Stable Cardiomegaly. No pneumothorax or pleural effusions or concerns for Pneumonia.    Previous Data Review: reviewed    CKD (chronic kidney disease), stage V (HCC)- (present on admission)  Assessment & Plan  - CKD V history  - Noncompliant with treatment  - Recent hospitalization in May 29th left AMA declining Dialysis  - Patient states that she is willing to be Compliant with medical plan  - BUN:85. Creatinine:6.7  - Patient states that urinates 4-6 times a day  - Denied any flank pain or hematuria.                  PLAN:  - Lasix 80mg BID  - Nephrology On Board  - Fluid Restriction 1.5L daily  - Low salt diet  - Daily weights  - Catheter placement  - Dialysis after cath placement  - CTM  - Daily labs      Acute on chronic combined systolic and diastolic heart failure (HCC)- (present on admission)  Assessment & Plan  - History of CHF EF 30% most recent ECHO on April 2020  - Patient imaging no signs of fluid overload  - Denied orthopnea or shortness of breath.  - proBNP: 58886  - Troponin:48, previously higher in the 60's                PLAN:    - Lasix 80 BID  - Continue Coreg  - Fluid restriction 1.5 L per day  - Low salt diet  - Cardiac diet  - CTM      Bacteremia- (present on admission)  Assessment & Plan  - Diagnosed with Bacteremia MSSA sensitive last Admission early this month  - Patient left AMA given oral Antibiotics  - Antibiotic course not finished yet  - Patient saw Dr San outpatient BC at [facility] on 6/5 were negative after 5 days  - We called [facility] to get faxed Negative Blood Cultures results   - Patient mentioned feeling warm and chills last  night (6/10), but not documented fever.  - In the ED Afebrile, NO Leukocytosis, NO tachycardia.           PLAN:  - We will resume IV Antibiotics (Cefazolin)  - Consult ID for recommendations in length of treatment  - CTM    Seizure disorder, grand mal (HCC)- (present on admission)  Assessment & Plan  - History os seizures  - No recent seizure activity  - Continue Home Keppra and Phenytoin  - Seizure precautions  - CTM    H/O: CVA (cerebrovascular accident)- (present on admission)  Assessment & Plan  - History of 2 CVA  - Continue Aspirin  - CTM    HTN (hypertension)- (present on admission)  Assessment & Plan  - History of HTN  - Stop Arb  - PRN meds for HTN     HLD (hyperlipidemia)- (present on admission)  Assessment & Plan  - History of HLD  - Continue Home Atorvastatin  - CTM

## 2020-06-11 NOTE — ASSESSMENT & PLAN NOTE
- History of CHF EF 30% most recent ECHO on April 2020  - Patient imaging no signs of fluid overload  - Denied orthopnea or shortness of breath.  - proBNP: 05589  - Troponin:48, previously higher in the 60's  - RN reported bradycardia 40's night of 6/11  - Patient asymptomatic  - EKG and CMP unremarkable                PLAN:    - Lasix 80 BID  - Holding coreg  - Fluid restriction 1.5 L per day  - Low salt diet  - Cardiac diet  - CTM

## 2020-06-11 NOTE — SENIOR ADMIT NOTE
[facility] INTERNAL MEDICINE SENIOR ADMIT NOTE:    Patient ID:   Name:             Pascale Acevedo   YOB: 1970  Age:                 50 y.o.  female   MRN:               [id number]                                                          Chief Complaint:       Chest pain, fever, chills, shortness of breath    History of Present Illness:    Mr. Acevdeo is a 50 y.o. female with ESRD (follows with Dr. Ortiz) that presented to the ED as a transfer from [place] for shortness of breath and chest pain.  [place] believed she had possible NSTEMI.  She was recently admitted for catheter associated MSSA bactermia and pneumonia.  She was refusing care and left AMA 6/1, and was given oral Keflex and toresemide.  She presented to [facility] yesterday and said that they had scheduled her for a placement of new permacath today, 6/11 at 1pm.  There is no record or schedule of this.  Other history includes CVA (2004) with residual numbness in toes, prior warfarin for possible hypercoagulable state leading to CVA, subarachnoid hemorrhage (2006, stopped anticoagulation), Moyamoya, seizure disorder.    ROS: Positive for fever, shortness of breath, chest pain.    EX: Afebrile in ED.  Reduced breath sounds (limited by body habitus), no murmur, no skin lesions.  LABS: No leukocytosis.  Macrocytic anemia.  HAGMA.  Elevated alk phos.  Cr. 6.39.  Trop T 48 (last 61 on 4/2020).  IMAGING: R lung opacification resolved.    Active Ambulatory Problems     Diagnosis Date Noted   • Essential hypertension, benign 10/03/2013   • Acute on chronic combined systolic and diastolic heart failure (HCC) 10/03/2013   • Morbid obesity (HCC) 10/03/2013   • Pure hypercholesterolemia 01/27/2014   • CKD (chronic kidney disease), stage V ([facility]) 07/14/2014   • Mild mitral regurgitation 07/14/2014   • Aortic valve sclerosis 07/14/2014   • Aphasia complicating stroke 11/28/2015   • Acute cystitis without hematuria 11/29/2015   • Chest pain 12/24/2015   •  "HLD (hyperlipidemia) 12/24/2015   • HTN (hypertension) 12/24/2015   • Leukocytosis 12/24/2015   • Bronchitis 12/24/2015   • H/O: CVA (cerebrovascular accident) 12/24/2015   • Anxiety 12/24/2015   • Seizure disorder, grand mal ([facility]) 03/03/2016   • Moyamoya 02/26/2017   • Cellulitis of right foot 02/26/2017   • CKD stage 4 secondary to hypertension ([facility]) 02/26/2017   • Cellulitis of foot 03/26/2017   • Acute respiratory failure with hypoxia ([facility]) 03/26/2017   • Vitamin deficiency 05/11/2018   • Obesity (BMI 30-39.9) 07/10/2018   • Psychosis ([facility]) 02/14/2019   • Acute-on-chronic kidney injury ([facility]) 04/16/2020   • ESRD (end stage renal disease) ([facility]) 05/11/2020   • Pneumonia 05/11/2020   • Chronic combined systolic and diastolic congestive heart failure ([facility]) 05/12/2020   • Bacteremia 05/30/2020     Resolved Ambulatory Problems     Diagnosis Date Noted   • Anemia 02/27/2017   • Acute on chronic renal failure ([facility]) 02/27/2017   • Metabolic acidosis 02/27/2017   • Severe sepsis ([facility]) 02/27/2017   • Thrombocytopenia ([facility]) 02/27/2017   • Sepsis ([facility]) 03/26/2017   • COPD exacerbation ([facility]) 05/11/2020   • Volume overload 05/11/2020     Past Medical History:   Diagnosis Date   • CKD (chronic kidney disease), stage IV ([facility]) 12/24/2015   • Hypertension    • Kidney disease    • Stroke ([facility])        PHYSICAL EXAM  Vitals:   Weight/BMI: Body mass index is 35.87 kg/m².  /70   Pulse (!) 55   Temp 35.9 °C (96.6 °F) (Temporal)   Resp 20   Ht 1.778 m (5' 10\")   Wt 113.4 kg (250 lb)   SpO2 98%   Vitals:    06/11/20 0931 06/11/20 1011 06/11/20 1015 06/11/20 1025   BP: 141/65 158/70     Pulse:   (!) 52 (!) 55   Resp:   20    Temp:       TempSrc:       SpO2:   98%    Weight:       Height:         Oxygen Therapy:  Pulse Oximetry: 98 %, O2 Delivery Device: None - Room Air      IMPRESSION    49 yo female with ESRD and incompleted treated MSSA bacteremia presents for signs of infection and chest pain    ASSESSMENT:    # Volume " "overload / ESRD / anemia of CKD /CKD-MBD.  Follow with [facility], Dr. Ortiz.  At her last admission, she refused replacement of cath and dialysis.  # MSSA bacteremia / recent pneumonia:  Has been on outpatient Keflex.  Permacath removed 5/31.    # Elevated alkaline phosphatase.    # Chest pain, chronically elevated troponins.  # Medication none compliance.  Patient \"wants to live now\" and is open to full care.    PLAN:  -Admit to medicine  -Follow OSH blood cultures.  Restart IV ancef per ID's last note.  -Will continue with diuresis.  Consult nephrology for HD and cath placement.   Recommendations welcome.  -Repeat trop x1.  -Get GGT  -DVT PPX:  SCD today and when patient does not require future procedures,       Plan discussed with intern.  Please refer to Interns Dr. Doan H&P for complete admission details.        "

## 2020-06-11 NOTE — ASSESSMENT & PLAN NOTE
- CKD V history  - Noncompliant with treatment  - Recent hospitalization in May 29th left AMA declining Dialysis  - Patient states that she is willing to be Compliant with medical plan  - BUN:85. Creatinine:6.7  - Patient states that urinates 4-6 times a day  - Denied any flank pain or hematuria.  - Perm cath placement  - HD today                  PLAN:  - Lasix 80mg BID  - Nephrology On Board  - Discharge tomorrow  - Patient has a HD chair on [place] M/W/F  - Fluid Restriction 1.5L daily  - Low salt diet  - Daily weights  - Catheter placement  - Dialysis after cath placement  - CTM  - Daily labs

## 2020-06-11 NOTE — CARE PLAN
Problem: Communication  Goal: The ability to communicate needs accurately and effectively will improve  Outcome: PROGRESSING AS EXPECTED   Pt calls appropriately for assistance and communicates needs effectively  Problem: Knowledge Deficit  Goal: Knowledge of disease process/condition, treatment plan, diagnostic tests, and medications will improve  Outcome: PROGRESSING AS EXPECTED   Pt educated on POC, verbalizes understanding

## 2020-06-11 NOTE — PROGRESS NOTES
Received report from Kimmy CONTRERAS.   Pt transported to [facility]. Pt ambulated to bathroom and bed.   Denies pain at this time    Skin assessment done with DIANE Mayorga    Abrasions noted to R chest, abdomen, BLE   Calluses noted on BL feet   All other skin intact

## 2020-06-11 NOTE — ED NOTES
Med Rec Updated and Complete per Pt at bedside  Allergies Reviewed    Pt currently on a 14-day course of Keflex 500mg four times daily Starting 06/01/20 Ending 06/14/20.

## 2020-06-11 NOTE — PROGRESS NOTES
IR called to notify this RN that they can not get the pt in today for permacath dialysis placement. Pt was given diet tray, and will be NPO at midnight for IR. Plan for dialysis tomorrow after placement     Pt refused repeat EKG,  MD aware

## 2020-06-11 NOTE — ED TRIAGE NOTES
Pt transferred via EMS from [facility] c/o SOB that occurred at midnight last night. Pt states she has not received dialysis d/t a possible port infection. Pt was scheduled to have port replaced today as per pt. Pt currently A&O x 4, able to speak in full sentences and 100% RA oxygen saturation.

## 2020-06-11 NOTE — ASSESSMENT & PLAN NOTE
- Diagnosed with Bacteremia MSSA sensitive last Admission early this month  - Patient left AMA given oral Antibiotics  - Antibiotic course not finished yet  - Patient saw Dr San outpatient BC at [facility] on 6/5 were negative after 5 days  - We called [facility] to get faxed Negative Blood Cultures results   - Patient mentioned feeling warm and chills last night (6/10), but not documented fever.  - In the ED Afebrile, NO Leukocytosis, NO tachycardia.           PLAN:  - We will resume IV Antibiotics (Cefazolin)  - Antibiotic course will be finished today  - CTM

## 2020-06-11 NOTE — ED NOTES
As per transfer sheet, pt received 162 mg ASA PO, and 40 mg lasix IV prior to arrival. As per EMS, pt has received 5 covid swabs, all resulted negative.

## 2020-06-11 NOTE — ASSESSMENT & PLAN NOTE
- History os seizures  - No recent seizure activity  - Continue Home Keppra and Phenytoin  - Seizure precautions  - CTM

## 2020-06-11 NOTE — ED NOTES
Pt ambulated to bathroom. Pt awaiting admission room, pt has no further requests at this time. RN will continue to monitor pt.

## 2020-06-11 NOTE — ED PROVIDER NOTES
ED Provider Note    CHIEF COMPLAINT  Chief Complaint   Patient presents with   • Shortness of Breath     Pt c/o SOB at midnight last night, pt has not received dialysis in 2 weeks.       HPI  Pascale Acevedo is a 50 y.o. female who presents as a transfer by [facility] with report that she had shortness of breath late last night and some chest pain and presented to the [facility] emergency department where she was diagnosed with possible N STEMI and shortness of breath secondary to lack of dialysis over the past 2 weeks.  Patient is under the care of Dr. Ortiz and apparently her catheter was removed 2 weeks ago for infection and she is been on cefdinir and compliant.  She says that she thinks she is supposed to get a new catheter today but nursing staff cannot find her on the schedule anywhere.  Patient is diagnosed with stage IV chronic kidney disease    REVIEW OF SYSTEMS  See HPI for further details. All other systems are negative.     PAST MEDICAL HISTORY  Past Medical History:   Diagnosis Date   • CKD (chronic kidney disease), stage IV (HCC) 12/24/2015   • H/O: CVA (cerebrovascular accident) 12/24/2015   • Hypertension    • Kidney disease    • Mild mitral regurgitation 7/14/2014   • Moyamoya disease 10/3/2013   • Seizure disorder, grand mal (HCC) 3/3/2016   • Stroke (HCC)        FAMILY HISTORY  Family History   Problem Relation Age of Onset   • Diabetes Mother    • Hypertension Mother    • Hypertension Father    • Arthritis Father    • Diabetes Maternal Grandmother    • Psychiatric Illness Maternal Grandfather    • Cancer Paternal Grandmother    • Psychiatric Illness Paternal Grandfather        SOCIAL HISTORY   reports that she has quit smoking. She has never used smokeless tobacco. She reports that she does not drink alcohol or use drugs.    SURGICAL HISTORY  Past Surgical History:   Procedure Laterality Date   • IRRIGATION & DEBRIDEMENT ORTHO Right 3/9/2017    Procedure: IRRIGATION & DEBRIDEMENT ORTHO -  FOOT;  Surgeon: Gerardo Lynn M.D.;  Location: SURGERY [facility];  Service:    • IRRIGATION & DEBRIDEMENT ORTHO Right 3/5/2017    Procedure: IRRIGATION & DEBRIDEMENT ORTHO AND GASTROC RECESSION;  Surgeon: Gerardo Lynn M.D.;  Location: SURGERY [facility];  Service:    • METATARSAL HEAD RESECTION  3/5/2017    Procedure: SECOND METATARSAL HEAD RESECTION;  Surgeon: Gerardo Lynn M.D.;  Location: SURGERY [facility];  Service:    • IRRIGATION & DEBRIDEMENT ORTHO Right 2/27/2017    Procedure: IRRIGATION & DEBRIDEMENT ORTHO;  Surgeon: Coleman Monterroso M.D.;  Location: SURGERY [facility];  Service:    • PRIMARY C SECTION     • TUBAL COAGULATION LAPAROSCOPIC BILATERAL         CURRENT MEDICATIONS  Home Medications     Reviewed by Kimmy Poole R.N. (Registered Nurse) on 06/11/20 at 0844  Med List Status: <None>   Medication Last Dose Status   acetaminophen (TYLENOL) 325 MG Tab  Active   Ascorbic Acid (VITAMIN C) 1000 MG Tab  Active   aspirin (ASA) 81 MG Chew Tab chewable tablet  Active   atorvastatin (LIPITOR) 40 MG Tab  Active   calcium citrate (CALCITRATE) 950 MG Tab  Active   carvedilol (COREG) 6.25 MG Tab  Active   cephALEXin (KEFLEX) 500 MG Cap  Active   escitalopram (LEXAPRO) 10 MG Tab  Active   Esomeprazole Magnesium (NEXIUM 24HR PO)  Active   folic acid (FOLVITE) 1 MG Tab  Active   levETIRAcetam (KEPPRA) 500 MG Tab  Active   LORazepam (ATIVAN) 1 MG Tab  Active   Lysine 500 MG Cap  Active   Melatonin 5 MG Tab  Active   phenytoin ER (DILANTIN) 100 MG Cap  Active   sodium bicarbonate (SODIUM BICARBONATE) 650 MG Tab  Active   vitamin B comp+C (ALLBEE WITH C) Tab  Active                ALLERGIES  Allergies   Allergen Reactions   • Allegra [Fexofenadine] Unspecified     Rxn = unknown   • Amoxicillin    • Azithromycin Rash     Rxn = unknown   • Claritin    • Erythromycin Unspecified     Rxn = unknown   • Ibuprofen & Caffeine-Vitamins Unspecified     Rxn = unknown   • Penicillins  "Unspecified     Rxn = unknown   • Procardia [Nifedipine]    • Rosemary Oil Anaphylaxis     Throat starts to close/swell.  Oil and the herb.    • Zyrtec [Cetirizine] Unspecified     Rxn = unknown       PHYSICAL EXAM  VITAL SIGNS: BP (!) 164/82   Pulse (!) 54   Temp 35.9 °C (96.6 °F) (Temporal)   Resp 20   Ht 1.778 m (5' 10\")   Wt 113.4 kg (250 lb)   SpO2 99%   BMI 35.87 kg/m²    Constitutional: Well developed, Well nourished, mild respiratory distress, Non-toxic appearance.   HENT: Normocephalic, Atraumatic, Bilateral external ears normal, Oropharynx is clear mucous membranes are moist. No oral exudates or nasal discharge.   Eyes: Pupils are equal round and reactive, EOMI, Conjunctiva normal, No discharge.   Neck: Normal range of motion, No tenderness, Supple, No stridor. No meningismus.  Lymphatic: No lymphadenopathy noted.   Cardiovascular: Regular rate and rhythm without murmur rub or gallop.  Thorax & Lungs: Tachypnea, clear breath sounds bilaterally without wheezes, rhonchi or rales. There is no chest wall tenderness.   Abdomen: Soft non-tender non-distended. There is no rebound or guarding. No organomegaly is appreciated. Bowel sounds are normal.  Skin: Evidence of right upper chest wounds consistent with recent Port-A-Cath removal, the abdomen is otherwise without rash.   Back: No CVA or spinal tenderness.   Extremities: Intact distal pulses, No edema, No tenderness, No cyanosis, No clubbing. Capillary refill is less than 2 seconds.  Musculoskeletal: Good range of motion in all major joints. No tenderness to palpation or major deformities noted.   Neurologic: Alert & oriented x 3, Normal motor function, Normal sensory function, No focal deficits noted. Reflexes are normal.  Psychiatric: Affect normal, Judgment normal, Mood normal. There is no suicidal ideation or patient reported hallucinations.     EKG  Results for orders placed or performed during the hospital encounter of 06/11/20   EKG   Result " Value Ref Range    Report       [facility]    Test Date:  2020  Pt Name:    ELIJAH BOWLING             Department: ER  MRN:        [id number]                      Room:       RD 12  Gender:     Female                       Technician: [id number]  :        1970                   Requested By:SOM BELLO  Order #:    [id number]                    Reading MD: Onesimo Prakash MD    Measurements  Intervals                                Axis  Rate:       52                           P:          51  TN:         156                          QRS:        42  QRSD:       135                          T:          -28  QT:         525  QTc:        489    Interpretive Statements  SINUS BRADYCARDIA  LEFT BUNDLE BRANCH BLOCK  Compared to ECG 2020 06:37:41  Sinus rhythm no longer present  Electronically Signed On 2020 8:56:19 PDT by Onesimo Prakash MD           RADIOLOGY/PROCEDURES  DX-CHEST-PORTABLE (1 VIEW)   Final Result      Stable prominence of the cardiomediastinal silhouette.      Atherosclerotic plaque.               COURSE & MEDICAL DECISION MAKING  Pertinent Labs & Imaging studies reviewed. (See chart for details)  The patient presents as a transfer from [place] with shortness of breath and volume overload and received 40 mg of Lasix prior to arrival.  She continued to urinate well and frequently here in the emergency department.  She is not hypoxic but is tachypneic    EKG shows a left bundle branch block with sinus bradycardia but no evidence of ischemic changes or dysrhythmia.    Laboratory evaluation reveals anemia of renal disease, no leukocytosis or shift, INR slightly elevated at 1.26 and TSH elevated but free T4 is normal.  Metabolic panel shows acute on chronic renal failure with BUN and creatinine of 85 and 6.39.  Does have volume overload with a proBNP greater than 35,000.  She continues to diurese after 40 mg of Lasix and we will continue to monitor output.  Troponin is unremarkable at  48.  This is slight elevation but given renal disease this is not consistent with N STEMI    I spoke with the [facility] team and they will admit her to the hospital and speak with Dr. Ortiz regarding the patient's need for catheter placement and dialysis on this admission.    Please note a critical care time of 30 minutes is spent in the care of this patient outside of procedure time.  At risk system is cardiovascular and renal    FINAL IMPRESSION  1. SOB (shortness of breath)    2. Hypervolemia, unspecified hypervolemia type             Electronically signed by: Brent Bush M.D., 6/11/2020 9:27 AM

## 2020-06-12 ENCOUNTER — PATIENT OUTREACH (OUTPATIENT)
Dept: HEALTH INFORMATION MANAGEMENT | Facility: OTHER | Age: 50
End: 2020-06-12

## 2020-06-12 ENCOUNTER — APPOINTMENT (OUTPATIENT)
Dept: RADIOLOGY | Facility: MEDICAL CENTER | Age: 50
DRG: 291 | End: 2020-06-12
Attending: INTERNAL MEDICINE
Payer: MEDICARE

## 2020-06-12 LAB
ALBUMIN SERPL BCP-MCNC: 3.2 G/DL (ref 3.2–4.9)
ALBUMIN/GLOB SERPL: 0.8 G/DL
ALP SERPL-CCNC: 118 U/L (ref 30–99)
ALT SERPL-CCNC: 17 U/L (ref 2–50)
ANION GAP SERPL CALC-SCNC: 16 MMOL/L (ref 7–16)
AST SERPL-CCNC: 13 U/L (ref 12–45)
BASOPHILS # BLD AUTO: 0.5 % (ref 0–1.8)
BASOPHILS # BLD: 0.04 K/UL (ref 0–0.12)
BILIRUB SERPL-MCNC: 0.2 MG/DL (ref 0.1–1.5)
BUN SERPL-MCNC: 89 MG/DL (ref 8–22)
CALCIUM SERPL-MCNC: 9.6 MG/DL (ref 8.5–10.5)
CHLORIDE SERPL-SCNC: 105 MMOL/L (ref 96–112)
CO2 SERPL-SCNC: 17 MMOL/L (ref 20–33)
CREAT SERPL-MCNC: 6.53 MG/DL (ref 0.5–1.4)
EKG IMPRESSION: NORMAL
EOSINOPHIL # BLD AUTO: 0.09 K/UL (ref 0–0.51)
EOSINOPHIL NFR BLD: 1.1 % (ref 0–6.9)
ERYTHROCYTE [DISTWIDTH] IN BLOOD BY AUTOMATED COUNT: 53.5 FL (ref 35.9–50)
GLOBULIN SER CALC-MCNC: 3.9 G/DL (ref 1.9–3.5)
GLUCOSE SERPL-MCNC: 69 MG/DL (ref 65–99)
HCT VFR BLD AUTO: 30.9 % (ref 37–47)
HGB BLD-MCNC: 9.7 G/DL (ref 12–16)
IMM GRANULOCYTES # BLD AUTO: 0.03 K/UL (ref 0–0.11)
IMM GRANULOCYTES NFR BLD AUTO: 0.4 % (ref 0–0.9)
LYMPHOCYTES # BLD AUTO: 2.1 K/UL (ref 1–4.8)
LYMPHOCYTES NFR BLD: 26.1 % (ref 22–41)
MAGNESIUM SERPL-MCNC: 2.1 MG/DL (ref 1.5–2.5)
MCH RBC QN AUTO: 31.3 PG (ref 27–33)
MCHC RBC AUTO-ENTMCNC: 31.4 G/DL (ref 33.6–35)
MCV RBC AUTO: 99.7 FL (ref 81.4–97.8)
MONOCYTES # BLD AUTO: 0.55 K/UL (ref 0–0.85)
MONOCYTES NFR BLD AUTO: 6.8 % (ref 0–13.4)
NEUTROPHILS # BLD AUTO: 5.24 K/UL (ref 2–7.15)
NEUTROPHILS NFR BLD: 65.1 % (ref 44–72)
NRBC # BLD AUTO: 0.02 K/UL
NRBC BLD-RTO: 0.2 /100 WBC
PHOSPHATE SERPL-MCNC: 7.2 MG/DL (ref 2.5–4.5)
PLATELET # BLD AUTO: 293 K/UL (ref 164–446)
PMV BLD AUTO: 9.7 FL (ref 9–12.9)
POTASSIUM SERPL-SCNC: 4.9 MMOL/L (ref 3.6–5.5)
PROT SERPL-MCNC: 7.1 G/DL (ref 6–8.2)
RBC # BLD AUTO: 3.1 M/UL (ref 4.2–5.4)
SODIUM SERPL-SCNC: 138 MMOL/L (ref 135–145)
WBC # BLD AUTO: 8.1 K/UL (ref 4.8–10.8)

## 2020-06-12 PROCEDURE — 05HY33Z INSERTION OF INFUSION DEVICE INTO UPPER VEIN, PERCUTANEOUS APPROACH: ICD-10-PCS | Performed by: RADIOLOGY

## 2020-06-12 PROCEDURE — 700102 HCHG RX REV CODE 250 W/ 637 OVERRIDE(OP): Performed by: STUDENT IN AN ORGANIZED HEALTH CARE EDUCATION/TRAINING PROGRAM

## 2020-06-12 PROCEDURE — 90935 HEMODIALYSIS ONE EVALUATION: CPT

## 2020-06-12 PROCEDURE — 83735 ASSAY OF MAGNESIUM: CPT

## 2020-06-12 PROCEDURE — 0JH63XZ INSERTION OF TUNNELED VASCULAR ACCESS DEVICE INTO CHEST SUBCUTANEOUS TISSUE AND FASCIA, PERCUTANEOUS APPROACH: ICD-10-PCS | Performed by: RADIOLOGY

## 2020-06-12 PROCEDURE — 700111 HCHG RX REV CODE 636 W/ 250 OVERRIDE (IP)

## 2020-06-12 PROCEDURE — 770020 HCHG ROOM/CARE - TELE (206)

## 2020-06-12 PROCEDURE — 99153 MOD SED SAME PHYS/QHP EA: CPT

## 2020-06-12 PROCEDURE — 84100 ASSAY OF PHOSPHORUS: CPT

## 2020-06-12 PROCEDURE — 99233 SBSQ HOSP IP/OBS HIGH 50: CPT | Mod: GC | Performed by: INTERNAL MEDICINE

## 2020-06-12 PROCEDURE — 36558 INSERT TUNNELED CV CATH: CPT

## 2020-06-12 PROCEDURE — 5A1D70Z PERFORMANCE OF URINARY FILTRATION, INTERMITTENT, LESS THAN 6 HOURS PER DAY: ICD-10-PCS | Performed by: INTERNAL MEDICINE

## 2020-06-12 PROCEDURE — 700111 HCHG RX REV CODE 636 W/ 250 OVERRIDE (IP): Performed by: INTERNAL MEDICINE

## 2020-06-12 PROCEDURE — 700111 HCHG RX REV CODE 636 W/ 250 OVERRIDE (IP): Performed by: RADIOLOGY

## 2020-06-12 PROCEDURE — 85025 COMPLETE CBC W/AUTO DIFF WBC: CPT

## 2020-06-12 PROCEDURE — A9270 NON-COVERED ITEM OR SERVICE: HCPCS | Performed by: STUDENT IN AN ORGANIZED HEALTH CARE EDUCATION/TRAINING PROGRAM

## 2020-06-12 PROCEDURE — 80053 COMPREHEN METABOLIC PANEL: CPT

## 2020-06-12 PROCEDURE — 36415 COLL VENOUS BLD VENIPUNCTURE: CPT

## 2020-06-12 PROCEDURE — 700111 HCHG RX REV CODE 636 W/ 250 OVERRIDE (IP): Performed by: STUDENT IN AN ORGANIZED HEALTH CARE EDUCATION/TRAINING PROGRAM

## 2020-06-12 PROCEDURE — 700101 HCHG RX REV CODE 250

## 2020-06-12 PROCEDURE — 93010 ELECTROCARDIOGRAM REPORT: CPT | Performed by: INTERNAL MEDICINE

## 2020-06-12 RX ORDER — ONDANSETRON 2 MG/ML
4 INJECTION INTRAMUSCULAR; INTRAVENOUS PRN
Status: ACTIVE | OUTPATIENT
Start: 2020-06-12 | End: 2020-06-12

## 2020-06-12 RX ORDER — LIDOCAINE HYDROCHLORIDE AND EPINEPHRINE 10; 10 MG/ML; UG/ML
INJECTION, SOLUTION INFILTRATION; PERINEURAL
Status: COMPLETED
Start: 2020-06-12 | End: 2020-06-12

## 2020-06-12 RX ORDER — MIDAZOLAM HYDROCHLORIDE 1 MG/ML
INJECTION INTRAMUSCULAR; INTRAVENOUS
Status: COMPLETED
Start: 2020-06-12 | End: 2020-06-12

## 2020-06-12 RX ORDER — SODIUM CHLORIDE 9 MG/ML
500 INJECTION, SOLUTION INTRAVENOUS
Status: ACTIVE | OUTPATIENT
Start: 2020-06-12 | End: 2020-06-12

## 2020-06-12 RX ORDER — SODIUM BICARBONATE 650 MG/1
1300 TABLET ORAL 2 TIMES DAILY
Status: DISCONTINUED | OUTPATIENT
Start: 2020-06-12 | End: 2020-06-13 | Stop reason: HOSPADM

## 2020-06-12 RX ORDER — HYDRALAZINE HYDROCHLORIDE 20 MG/ML
20 INJECTION INTRAMUSCULAR; INTRAVENOUS EVERY 4 HOURS PRN
Status: DISCONTINUED | OUTPATIENT
Start: 2020-06-12 | End: 2020-06-13 | Stop reason: HOSPADM

## 2020-06-12 RX ORDER — HEPARIN SODIUM 1000 [USP'U]/ML
3900 INJECTION, SOLUTION INTRAVENOUS; SUBCUTANEOUS
Status: DISCONTINUED | OUTPATIENT
Start: 2020-06-12 | End: 2020-06-13 | Stop reason: HOSPADM

## 2020-06-12 RX ORDER — HEPARIN SODIUM 1000 [USP'U]/ML
2300 INJECTION, SOLUTION INTRAVENOUS; SUBCUTANEOUS
Status: DISCONTINUED | OUTPATIENT
Start: 2020-06-12 | End: 2020-06-13 | Stop reason: HOSPADM

## 2020-06-12 RX ORDER — MIDAZOLAM HYDROCHLORIDE 1 MG/ML
.5-2 INJECTION INTRAMUSCULAR; INTRAVENOUS PRN
Status: ACTIVE | OUTPATIENT
Start: 2020-06-12 | End: 2020-06-12

## 2020-06-12 RX ADMIN — LEVETIRACETAM 500 MG: 500 TABLET ORAL at 17:35

## 2020-06-12 RX ADMIN — HEPARIN: 100 SYRINGE at 12:10

## 2020-06-12 RX ADMIN — FENTANYL CITRATE 25 MCG: 50 INJECTION INTRAMUSCULAR; INTRAVENOUS at 11:49

## 2020-06-12 RX ADMIN — PHENYTOIN SODIUM 100 MG: 100 CAPSULE ORAL at 17:35

## 2020-06-12 RX ADMIN — CEFAZOLIN SODIUM 1 G: 1 INJECTION, SOLUTION INTRAVENOUS at 17:35

## 2020-06-12 RX ADMIN — LIDOCAINE HYDROCHLORIDE,EPINEPHRINE BITARTRATE: 10; .01 INJECTION, SOLUTION INFILTRATION; PERINEURAL at 11:50

## 2020-06-12 RX ADMIN — FOLIC ACID 1 MG: 1 TABLET ORAL at 04:07

## 2020-06-12 RX ADMIN — SODIUM BICARBONATE 1300 MG: 650 TABLET ORAL at 08:13

## 2020-06-12 RX ADMIN — ASPIRIN 81 MG 81 MG: 81 TABLET ORAL at 04:07

## 2020-06-12 RX ADMIN — SODIUM BICARBONATE 1300 MG: 650 TABLET ORAL at 17:35

## 2020-06-12 RX ADMIN — LORAZEPAM 1 MG: 1 TABLET ORAL at 04:07

## 2020-06-12 RX ADMIN — LEVETIRACETAM 500 MG: 500 TABLET ORAL at 04:07

## 2020-06-12 RX ADMIN — Medication 1000 MG: at 04:07

## 2020-06-12 RX ADMIN — MIDAZOLAM HYDROCHLORIDE 1 MG: 1 INJECTION, SOLUTION INTRAMUSCULAR; INTRAVENOUS at 11:49

## 2020-06-12 RX ADMIN — ATORVASTATIN CALCIUM 40 MG: 40 TABLET, FILM COATED ORAL at 19:40

## 2020-06-12 RX ADMIN — FUROSEMIDE 80 MG: 10 INJECTION, SOLUTION INTRAMUSCULAR; INTRAVENOUS at 17:35

## 2020-06-12 RX ADMIN — HEPARIN SODIUM 3900 UNITS: 1000 INJECTION, SOLUTION INTRAVENOUS; SUBCUTANEOUS at 15:14

## 2020-06-12 RX ADMIN — FUROSEMIDE 80 MG: 10 INJECTION, SOLUTION INTRAMUSCULAR; INTRAVENOUS at 06:04

## 2020-06-12 RX ADMIN — MIDAZOLAM HYDROCHLORIDE 1 MG: 1 INJECTION INTRAMUSCULAR; INTRAVENOUS at 11:49

## 2020-06-12 RX ADMIN — FENTANYL CITRATE 25 MCG: 50 INJECTION INTRAMUSCULAR; INTRAVENOUS at 12:00

## 2020-06-12 RX ADMIN — PHENYTOIN SODIUM 100 MG: 100 CAPSULE ORAL at 04:13

## 2020-06-12 RX ADMIN — HEPARIN SODIUM 2300 UNITS: 1000 INJECTION, SOLUTION INTRAVENOUS; SUBCUTANEOUS at 15:12

## 2020-06-12 RX ADMIN — LORAZEPAM 1 MG: 1 TABLET ORAL at 17:35

## 2020-06-12 SDOH — ECONOMIC STABILITY: INCOME INSECURITY: HOW HARD IS IT FOR YOU TO PAY FOR THE VERY BASICS LIKE FOOD, HOUSING, MEDICAL CARE, AND HEATING?: NOT VERY HARD

## 2020-06-12 SDOH — ECONOMIC STABILITY: TRANSPORTATION INSECURITY
IN THE PAST 12 MONTHS, HAS THE LACK OF TRANSPORTATION KEPT YOU FROM MEDICAL APPOINTMENTS OR FROM GETTING MEDICATIONS?: NO

## 2020-06-12 SDOH — ECONOMIC STABILITY: FOOD INSECURITY: WITHIN THE PAST 12 MONTHS, THE FOOD YOU BOUGHT JUST DIDN'T LAST AND YOU DIDN'T HAVE MONEY TO GET MORE.: NEVER TRUE

## 2020-06-12 SDOH — ECONOMIC STABILITY: FOOD INSECURITY: WITHIN THE PAST 12 MONTHS, YOU WORRIED THAT YOUR FOOD WOULD RUN OUT BEFORE YOU GOT MONEY TO BUY MORE.: NEVER TRUE

## 2020-06-12 SDOH — ECONOMIC STABILITY: TRANSPORTATION INSECURITY
IN THE PAST 12 MONTHS, HAS LACK OF TRANSPORTATION KEPT YOU FROM MEETINGS, WORK, OR FROM GETTING THINGS NEEDED FOR DAILY LIVING?: NO

## 2020-06-12 ASSESSMENT — ENCOUNTER SYMPTOMS
CLAUDICATION: 0
PHOTOPHOBIA: 0
FEVER: 0
NECK PAIN: 0
HEARTBURN: 0
SPEECH CHANGE: 0
ORTHOPNEA: 0
CHILLS: 0
NAUSEA: 0
BACK PAIN: 0
DIZZINESS: 0
COUGH: 0
HEADACHES: 0
ABDOMINAL PAIN: 0
BLURRED VISION: 0
MYALGIAS: 0
DIAPHORESIS: 0
DEPRESSION: 0
HALLUCINATIONS: 0
TINGLING: 0
SPUTUM PRODUCTION: 0
VOMITING: 0
SHORTNESS OF BREATH: 0
DOUBLE VISION: 0
STRIDOR: 0
PALPITATIONS: 0
SORE THROAT: 0
BRUISES/BLEEDS EASILY: 0
HEMOPTYSIS: 0

## 2020-06-12 ASSESSMENT — FIBROSIS 4 INDEX: FIB4 SCORE: 0.53

## 2020-06-12 NOTE — OR SURGEON
Immediate Post- Operative Note        PostOp Diagnosis: CRF. R IJ thrombosed.       Procedure(s): L IJ Permcath placement.       Estimated Blood Loss: Less than 5 ml        Complications: None            6/12/2020     1206 PM     Slick Montes M.D.

## 2020-06-12 NOTE — PROGRESS NOTES
EKG and labs completed.   Pt mobilized to bathroom. During mobilization, HR increased to 60's. Once resting in bed, HR returned to 40's.   This RN received a follow up call from JOLLY Moreno. [employer]  was updated on HR during mobilization and rest. No new orders received via phone.

## 2020-06-12 NOTE — PROGRESS NOTES
Community Health Worker Intake    • Social determinates of health intake complete.   • Identified barriers to PCP.  • Contact information provided to Pascale Acevedo.  • Inpatient assessment completed.    Plan:  YANET Banks reached out to pt via TC to introduce CCM services. Pt accepted. Pt indicated that she would like to see a different PCP other than the one listed on chart. She mentioned being interested in seeing Dr. Conway in [place]. CHW will find Dr's contact information to Dr. Conway's office for pt, pt to call to get established. She does not currently have trouble paying for resources such as food, housing, or medical care. Pt indicated having reliable transportation to get to medical appointments, mainly from her family. Pt indicated living with her mom, aunt and daughter who sometimes stays with her, in a house in [place], [place]. Her family is her support system. Pt indicated being on a diabetic diet that she does not like. CHW gave pt a [employer] flyer for nutritional education and for pt to get connected with diabetes program. Pt indicated being confident in managing her health and currently sees no barriers that could impact her treatment. CHW will follow-up after d/c.

## 2020-06-12 NOTE — PROGRESS NOTES
Pt refused AM EKG despite education by technician and this RN.   [employer] Yellow team will be updated during AM rounds.

## 2020-06-12 NOTE — CARE PLAN
Problem: Psychosocial Needs:  Goal: Level of anxiety will decrease  Outcome: PROGRESSING AS EXPECTED   Pt anxiety decreases with discussion of POC and answering questions   Problem: Mobility  Goal: Risk for activity intolerance will decrease  Outcome: PROGRESSING AS EXPECTED   Pt tolerated ambulation well with a steady gait

## 2020-06-12 NOTE — PROGRESS NOTES
IR called to state pt needed negative blood cultures to get permacath, placed paper copies of negative culture results from [facility] on chart.       A&O x 4. Patient calls appropriately. Pt drowsy this AM, but will arose with verbal stimulation.   Patient ambulates with minial assist.   Patient has 0/10 pain.   Npo for IR.   + void, + flatus, + BM.  Patient denies SOB..  Patient tele monitored, pt continues to sustain HR in the 40's, [employer] PA and MD updated. Denies N/V, dizziness/ light headed       Review plan with of care with patient. Call light and personal belongings with in reach. Hourly rounding in place. All needs met at this time.

## 2020-06-12 NOTE — PROGRESS NOTES
Sinus Bradycardia: 46-50 bpm    Rare PVC's, rare PAC's, 5 bundle branch beats.     8 junctional beats, triplets, 4 bundle branch beats.     .18/.10/.48

## 2020-06-12 NOTE — PROGRESS NOTES
2045 received call from tele tech. Pt is sustaining in the 40's for HR and has decreased to HR of 39 intermittently with junctional beats noted.   Pt is drowsy and irritable with waking, but does rouse appropriately. Once awake, pt is oriented and alert with no complaints of chest pain or SOB.   This RN updated [employer] Yellow Team. Parameters for HR and when to update MD were requested. Resident to round on pt to assess.

## 2020-06-12 NOTE — PROGRESS NOTES
Report received from Tati CONTRERAS  Assumed care of patient at 1905.    Pt is A&O x 4. Pt is fatigued and drowsy, but rouses appropriately.   HR maintaining in the 40's. Intermittently HR increases to 60's with activity and increased stimulation.   Pain reported at 0/10.   Nausea denied   Tolerating renal DM 2 g NA Diet with 1.5 liter fluid restriction.   + Urine output  + BM PTA   + Flatus  Up SBA  Bed in lowest position and locked.  Pt resting comfortably now.  Review plan of care with patient  Call light within reach  Hourly rounds in place  All needs met at this time

## 2020-06-12 NOTE — PROGRESS NOTES
IR Nursing Note:    Procedure Confirmed with MD, patient and RN pre procedure. Consent obtained by MD with all questions answered, placed in Patient's chart. Patient assisted to the table in the supine position with all bony prominences padded and draped in sterile fashion.    Tunneled Hemodialysis Catheter Placement by MD Montes assisted by RT Brian, right chest/neck IJ access site sutured in place and CDI with derma bond, biopatch and a tegaderm dressing.     End Tidal CO2 range 12-32 during procedure.     Patient tolerated procedure, hemodynamically stable; pt drowsy but easily aroused post procedure; report given to DIANE Rainey; patient transported to Dialysis via IR RN monitored then transferred care to report RN.    BARD HemoSplit Long-Term Hemodialysis Catheter 14.5F x 27cm REF# [id number] LOT# [id number] EXP. 07/31/2020

## 2020-06-12 NOTE — DISCHARGE PLANNING
Outpatient Dialysis Note    Confirmed patient is active at:    [facility]  [address] [place] [address]      Schedule: Monday, Wednesday, Friday  Time: 3:30 pm    Spoke with Soniya at facility who confirmed. Per HD clinic, last admission patient left AMA and no longer wanted to continue HD. HD clinic stated they kept patients chair available for her and will take her back.    Forwarded records for review.      Triny Paulino- Dialysis Coordinator  [facility] # [phone]

## 2020-06-12 NOTE — CARE PLAN
Problem: Safety  Goal: Will remain free from injury  Outcome: PROGRESSING AS EXPECTED  Goal: Will remain free from falls  Outcome: PROGRESSING AS EXPECTED  Intervention: Assess risk factors for falls  Note: Pt was educated on fall risk and need to call nursing staff prior to mobilization. Pt verbalizes understanding of teaching. Pt is A&O x 4 and demonstrates appropriate use of call light. Bed alarm is NA per Estes Rafita Score.  CLIP. Hourly rounding in place.      Problem: Knowledge Deficit  Goal: Knowledge of disease process/condition, treatment plan, diagnostic tests, and medications will improve  Outcome: PROGRESSING AS EXPECTED  Goal: Knowledge of the prescribed therapeutic regimen will improve  Outcome: PROGRESSING AS EXPECTED  Intervention: Discuss information regarding therpeutic regimen and document in education  Note: Pt was educated on POC, MAR, shift routine and nursing education R/T Dx. Pt had no questions and prefers to sleep at this time. Pt verbalized understanding of all teaching.            ----- Message from Gabriel Nunes sent at 11/23/2018  4:13 PM CST -----  Contact: patient  Type:  Patient Returning Call    Who Called:  Patient  Who Left Message for Patient:  Kiara  Does the patient know what this is regarding?:  yes  Best Call Back Number:  [phone]  Additional Information:  Requesting a call back to discuss results

## 2020-06-12 NOTE — PROGRESS NOTES
Daily Progress Note:     Date of Service: 6/12/2020  Primary Team: [employer] A[facility] Yellow Team   Attending: Ton Brown M.D.   Senior Resident: Dr. Alvarez  Intern: Dr. Doan  Contact:  [phone]    Chief Complaint:   End stage renal disease    Subjective:  Overnight patient was bradycardic in the 40's while asleep.  EKG and CMP stat were unremarkable.  Coreg was stopped.  Patient stating feeling well, denied any symptoms, SOB and chest pain gone.  Patient underwent Perm Cath and will go for HD  Per Nephro patient can go home after dialysis.  Patient has a chair for HD in [place] Monday, Wednesday, Friday.  Patient likely discharge tomorrow 6/13    Consultants/Specialty:  Nephrology     Review of Systems:      Review of Systems   Constitutional: Negative for chills, diaphoresis, fever and malaise/fatigue.   HENT: Negative for ear discharge, hearing loss and sore throat.    Eyes: Negative for blurred vision, double vision and photophobia.   Respiratory: Negative for cough, hemoptysis, sputum production, shortness of breath and stridor.    Cardiovascular: Negative for chest pain, palpitations, orthopnea, claudication and leg swelling.   Gastrointestinal: Negative for abdominal pain, heartburn, nausea and vomiting.   Genitourinary: Negative for dysuria, frequency, hematuria and urgency.   Musculoskeletal: Negative for back pain, joint pain, myalgias and neck pain.   Skin: Negative for itching and rash.   Neurological: Negative for dizziness, tingling, speech change and headaches.   Endo/Heme/Allergies: Negative for environmental allergies. Does not bruise/bleed easily.   Psychiatric/Behavioral: Negative for depression, hallucinations and suicidal ideas.       Objective Data:   Physical Exam:   Vitals:   Temp:  [35.9 °C (96.6 °F)-36.6 °C (97.8 °F)] 35.9 °C (96.6 °F)  Pulse:  [47-90] 54  Resp:  [14-21] 14  BP: (109-157)/(59-86) 140/69  SpO2:  [92 %-100 %] 95 %    Physical Exam  Constitutional:       General: She is not in  acute distress.     Appearance: She is obese. She is not ill-appearing, toxic-appearing or diaphoretic.   HENT:      Head: Normocephalic and atraumatic.      Nose: Nose normal. No congestion or rhinorrhea.      Mouth/Throat:      Mouth: Mucous membranes are moist.      Pharynx: Oropharynx is clear. No oropharyngeal exudate or posterior oropharyngeal erythema.   Eyes:      General: No scleral icterus.     Extraocular Movements: Extraocular movements intact.      Conjunctiva/sclera: Conjunctivae normal.      Pupils: Pupils are equal, round, and reactive to light.   Neck:      Musculoskeletal: Normal range of motion and neck supple. No neck rigidity or muscular tenderness.   Cardiovascular:      Rate and Rhythm: Normal rate and regular rhythm.      Pulses: Normal pulses.      Heart sounds: Normal heart sounds. No murmur. No gallop.    Pulmonary:      Effort: Pulmonary effort is normal. No respiratory distress.      Breath sounds: Normal breath sounds. No wheezing or rales.   Abdominal:      General: Bowel sounds are normal. There is no distension.      Palpations: Abdomen is soft.      Tenderness: There is no abdominal tenderness. There is no right CVA tenderness, left CVA tenderness or guarding.   Musculoskeletal: Normal range of motion.         General: No swelling, tenderness, deformity or signs of injury.      Right lower leg: No edema.      Left lower leg: No edema.   Skin:     General: Skin is warm.      Coloration: Skin is not jaundiced or pale.      Findings: No bruising or lesion.   Neurological:      General: No focal deficit present.      Mental Status: She is alert and oriented to person, place, and time.      Cranial Nerves: No cranial nerve deficit.      Sensory: No sensory deficit.      Motor: No weakness.      Gait: Gait normal.   Psychiatric:         Mood and Affect: Mood normal.         Behavior: Behavior normal.         Thought Content: Thought content normal.           Labs:   Review    Imaging:    Review      CKD (chronic kidney disease), stage V ([facility])- (present on admission)  Assessment & Plan  - CKD V history  - Noncompliant with treatment  - Recent hospitalization in May 29th left AMA declining Dialysis  - Patient states that she is willing to be Compliant with medical plan  - BUN:85. Creatinine:6.7  - Patient states that urinates 4-6 times a day  - Denied any flank pain or hematuria.  - Perm cath placement  - HD today                  PLAN:  - Lasix 80mg BID  - Nephrology On Board  - Discharge tomorrow  - Patient has a HD chair on [place] M/W/F  - Fluid Restriction 1.5L daily  - Low salt diet  - Daily weights  - Catheter placement  - Dialysis after cath placement  - CTM  - Daily labs      Acute on chronic combined systolic and diastolic heart failure (HCC)- (present on admission)  Assessment & Plan  - History of CHF EF 30% most recent ECHO on April 2020  - Patient imaging no signs of fluid overload  - Denied orthopnea or shortness of breath.  - proBNP: 79744  - Troponin:48, previously higher in the 60's  - RN reported bradycardia 40's night of 6/11  - Patient asymptomatic  - EKG and CMP unremarkable                PLAN:    - Lasix 80 BID  - Holding coreg  - Fluid restriction 1.5 L per day  - Low salt diet  - Cardiac diet  - CTM      Bacteremia- (present on admission)  Assessment & Plan  - Diagnosed with Bacteremia MSSA sensitive last Admission early this month  - Patient left AMA given oral Antibiotics  - Antibiotic course not finished yet  - Patient saw Dr San outpatient BC at [place] drawn on 6/5 were negative after 5 days  - We called [facility] to get faxed Negative Blood Cultures results   - Patient mentioned feeling warm and chills last night (6/10), but not documented fever.  - In the ED Afebrile, NO Leukocytosis, NO tachycardia.           PLAN:  - We will resume IV Antibiotics (Cefazolin)  - Antibiotic course will be finished today  - CTM    Seizure disorder, grand mal ([facility])- (present on  admission)  Assessment & Plan  - History os seizures  - No recent seizure activity  - Continue Home Keppra and Phenytoin  - Seizure precautions  - CTM    H/O: CVA (cerebrovascular accident)- (present on admission)  Assessment & Plan  - History of 2 CVA  - Continue Aspirin  - CTM    HTN (hypertension)- (present on admission)  Assessment & Plan  - History of HTN  - Stop Arb  - PRN meds for HTN     HLD (hyperlipidemia)- (present on admission)  Assessment & Plan  - History of HLD  - Continue Home Atorvastatin  - CTM

## 2020-06-12 NOTE — RESPIRATORY CARE
COPD EDUCATION by COPD CLINICAL EDUCATOR  6/12/2020 at 8:45 AM by Evelia Almanzar, RRT     Patient reviewed by COPD education team. Patient does not have a history or diagnosis of COPD and is a non-smoker, therefore does not qualify for the COPD program.

## 2020-06-12 NOTE — PROGRESS NOTES
[facility] -  PROGRESS NOTE               Author: Slick San M.D. Date & Time: 6/12/2020  8:47 AM     HPI:  50-year-old male with a history of end-stage renal disease previously and hemodialysis presented as transfer from [place] with chest pressure and shortness of breath.     Started on hemodialysis earlier this spring and has been noncompliant with her treatment.  She is intermittently not wanted to continue with treatment and has had ongoing discussions with her mother about continuing.  She developed MSSA bacteremia in May and was hospitalized at [place].  She left [place] from that hospitalization that she did not want any further dialysis or medical treatment going forward, she was discharged on oral antibiotics and diuretics.  Continued the antibiotics but did not continue taking the diuretics.  Over the last she has had worsening malaise and shortness of breath and after further discussions with mother she has decided that she does not want to continue dialysis.  She was seen in outpatient kidney clinic yesterday with plan for permacath placement today and to resume dialysis tomorrow.  She has negative blood cultures from 6/5.  Last night she developed worsening chest pressure and shortness of breath for which she presented to the ED in [place].  Some concern for an STEMI prompting transfer to [facility].  She was given diuretics at that hospitalization reports robust urine output since.  Notes her shortness of breath and chest pressure are improving.  ACS rule out underway    DAILY NEPHROLOGY SUMMARY:  6/11: consult done  6/12: SOB and chest pressure improved, pending permacath placement this AM.    PAST FAMILY HISTORY: Reviewed and Unchanged  SOCIAL HISTORY: Reviewed and Unchanged  CURRENT MEDICATIONS: Reviewed  IMAGING STUDIES: Reviewed    ROS  General: no malaise  CV: no Chest pressure  RESP: no shortness of breath  GI: No abdominal pain   : No dysuria or gross hematuria  MSK:  "No trauma  Skin: No rashes  Neuro: No tremors  Psych: No depression    PHYSICAL EXAM  VS:  /69   Pulse (!) 47   Temp 36 °C (96.8 °F) (Temporal)   Resp 18   Ht 1.778 m (5' 10\")   Wt 114 kg (251 lb 5.2 oz)   SpO2 98%   BMI 36.06 kg/m²   GENERAL: no acute distress  CV: RRR, +edema  RESP: Decreased breath sounds bilaterally  GI: Soft  MSK: No joint deformities   SKIN: No concerning rashes  NEURO: AOx3  PSYCH: Cooperative    Fluids:  In: 830 [P.O.:780]  Out: 0     LABS:  Recent Results (from the past 24 hour(s))   HCG QUALITATIVE UR    Collection Time: 06/11/20  8:58 AM   Result Value Ref Range    Beta-Hcg Urine Negative Negative   CBC w/ Differential    Collection Time: 06/11/20  9:05 AM   Result Value Ref Range    WBC 10.3 4.8 - 10.8 K/uL    RBC 2.98 (L) 4.20 - 5.40 M/uL    Hemoglobin 9.3 (L) 12.0 - 16.0 g/dL    Hematocrit 30.1 (L) 37.0 - 47.0 %    .0 (H) 81.4 - 97.8 fL    MCH 31.2 27.0 - 33.0 pg    MCHC 30.9 (L) 33.6 - 35.0 g/dL    RDW 54.7 (H) 35.9 - 50.0 fL    Platelet Count 326 164 - 446 K/uL    MPV 9.8 9.0 - 12.9 fL    Neutrophils-Polys 68.30 44.00 - 72.00 %    Lymphocytes 25.00 22.00 - 41.00 %    Monocytes 4.80 0.00 - 13.40 %    Eosinophils 0.80 0.00 - 6.90 %    Basophils 0.40 0.00 - 1.80 %    Immature Granulocytes 0.70 0.00 - 0.90 %    Nucleated RBC 0.00 /100 WBC    Neutrophils (Absolute) 7.05 2.00 - 7.15 K/uL    Lymphs (Absolute) 2.58 1.00 - 4.80 K/uL    Monos (Absolute) 0.50 0.00 - 0.85 K/uL    Eos (Absolute) 0.08 0.00 - 0.51 K/uL    Baso (Absolute) 0.04 0.00 - 0.12 K/uL    Immature Granulocytes (abs) 0.07 0.00 - 0.11 K/uL    NRBC (Absolute) 0.00 K/uL   Complete Metabolic Panel (CMP)    Collection Time: 06/11/20  9:05 AM   Result Value Ref Range    Sodium 135 135 - 145 mmol/L    Potassium 4.9 3.6 - 5.5 mmol/L    Chloride 103 96 - 112 mmol/L    Co2 16 (L) 20 - 33 mmol/L    Anion Gap 16.0 7.0 - 16.0    Glucose 67 65 - 99 mg/dL    Bun 85 (HH) 8 - 22 mg/dL    Creatinine 6.39 (HH) 0.50 - 1.40 " mg/dL    Calcium 9.4 8.5 - 10.5 mg/dL    AST(SGOT) 14 12 - 45 U/L    ALT(SGPT) 18 2 - 50 U/L    Alkaline Phosphatase 130 (H) 30 - 99 U/L    Total Bilirubin 0.3 0.1 - 1.5 mg/dL    Albumin 3.6 3.2 - 4.9 g/dL    Total Protein 8.0 6.0 - 8.2 g/dL    Globulin 4.4 (H) 1.9 - 3.5 g/dL    A-G Ratio 0.8 g/dL   proBrain Natriuretic Peptide, NT    Collection Time: 06/11/20  9:05 AM   Result Value Ref Range    NT-proBNP >88419 (H) 0 - 125 pg/mL   Troponin STAT    Collection Time: 06/11/20  9:05 AM   Result Value Ref Range    Troponin T 48 (H) 6 - 19 ng/L   TSH (for screening thyroid dysfunction)    Collection Time: 06/11/20  9:05 AM   Result Value Ref Range    TSH 6.760 (H) 0.380 - 5.330 uIU/mL   Free Thyroxine (add to TSH for patients with existing thyroid dysfunction)    Collection Time: 06/11/20  9:05 AM   Result Value Ref Range    Free T-4 1.03 0.93 - 1.70 ng/dL   PT/INR    Collection Time: 06/11/20  9:05 AM   Result Value Ref Range    PT 16.2 (H) 12.0 - 14.6 sec    INR 1.26 (H) 0.87 - 1.13   APTT    Collection Time: 06/11/20  9:05 AM   Result Value Ref Range    APTT 31.0 24.7 - 36.0 sec   ESTIMATED GFR    Collection Time: 06/11/20  9:05 AM   Result Value Ref Range    GFR If  8 (A) >60 mL/min/1.73 m 2    GFR If Non African American 7 (A) >60 mL/min/1.73 m 2   Troponin - STAT Once    Collection Time: 06/11/20 12:05 PM   Result Value Ref Range    Troponin T 47 (H) 6 - 19 ng/L   HEPATITIS PANEL ACUTE(4 COMPONENTS)    Collection Time: 06/11/20  6:47 PM   Result Value Ref Range    Hepatitis B Surface Antigen Non-Reactive Non-Reactive    Hepatitis B Cors Ab,IgM Non-Reactive Non-Reactive    Hepatitis A Virus Ab, IgM Non-Reactive Non-Reactive    Hepatitis C Antibody Non-Reactive Non-Reactive   HEP B SURFACE AB    Collection Time: 06/11/20  6:47 PM   Result Value Ref Range    Hep B Surface Antibody Quant <3.50 0.00 - 10.00 mIU/mL   Basic Metabolic Panel    Collection Time: 06/11/20  9:59 PM   Result Value Ref Range     Sodium 137 135 - 145 mmol/L    Potassium 5.0 3.6 - 5.5 mmol/L    Chloride 104 96 - 112 mmol/L    Co2 17 (L) 20 - 33 mmol/L    Glucose 93 65 - 99 mg/dL    Bun 87 (HH) 8 - 22 mg/dL    Creatinine 6.70 (HH) 0.50 - 1.40 mg/dL    Calcium 9.5 8.5 - 10.5 mg/dL    Anion Gap 16.0 7.0 - 16.0   ESTIMATED GFR    Collection Time: 20  9:59 PM   Result Value Ref Range    GFR If  8 (A) >60 mL/min/1.73 m 2    GFR If Non African American 7 (A) >60 mL/min/1.73 m 2   EKG    Collection Time: 20 10:02 PM   Result Value Ref Range    Report       [facility]    Test Date:  2020  Pt Name:    ELIJAH BOWLING             Department: 141  MRN:        [id number]                      Room:       [facility]  Gender:     Female                       Technician: THEA  :        [id number]                   Requested By:ERNST GUEVARA  Order #:    [id number]                    Reading MD: Cy Concepcion MD    Measurements  Intervals                                Axis  Rate:       52                           P:          53  MI:         192                          QRS:        45  QRSD:       108                          T:          254  QT:         520  QTc:        484    Interpretive Statements  SINUS BRADYCARDIA  ATRIAL PREMATURE COMPLEX  BORDERLINE INTRAVENTRICULAR CONDUCTION DELAY  LOW VOLTAGE IN FRONTAL LEADS  BORDERLINE R WAVE PROGRESSION, ANTERIOR LEADS  BORDERLINE REPOLARIZATION ABNORMALITY  Compared to ECG 2020 08:41:49  NO SIGNIFICANT CHANGES  Electronically Signed On 2020 0:04:40 PDT by Cy waldrop MD     CBC with Differential    Collection Time: 20  4:46 AM   Result Value Ref Range    WBC 8.1 4.8 - 10.8 K/uL    RBC 3.10 (L) 4.20 - 5.40 M/uL    Hemoglobin 9.7 (L) 12.0 - 16.0 g/dL    Hematocrit 30.9 (L) 37.0 - 47.0 %    MCV 99.7 (H) 81.4 - 97.8 fL    MCH 31.3 27.0 - 33.0 pg    MCHC 31.4 (L) 33.6 - 35.0 g/dL    RDW 53.5 (H) 35.9 - 50.0 fL    Platelet Count 293 164 - 446  K/uL    MPV 9.7 9.0 - 12.9 fL    Neutrophils-Polys 65.10 44.00 - 72.00 %    Lymphocytes 26.10 22.00 - 41.00 %    Monocytes 6.80 0.00 - 13.40 %    Eosinophils 1.10 0.00 - 6.90 %    Basophils 0.50 0.00 - 1.80 %    Immature Granulocytes 0.40 0.00 - 0.90 %    Nucleated RBC 0.20 /100 WBC    Neutrophils (Absolute) 5.24 2.00 - 7.15 K/uL    Lymphs (Absolute) 2.10 1.00 - 4.80 K/uL    Monos (Absolute) 0.55 0.00 - 0.85 K/uL    Eos (Absolute) 0.09 0.00 - 0.51 K/uL    Baso (Absolute) 0.04 0.00 - 0.12 K/uL    Immature Granulocytes (abs) 0.03 0.00 - 0.11 K/uL    NRBC (Absolute) 0.02 K/uL   Comp Metabolic Panel (CMP)    Collection Time: 06/12/20  4:46 AM   Result Value Ref Range    Sodium 138 135 - 145 mmol/L    Potassium 4.9 3.6 - 5.5 mmol/L    Chloride 105 96 - 112 mmol/L    Co2 17 (L) 20 - 33 mmol/L    Anion Gap 16.0 7.0 - 16.0    Glucose 69 65 - 99 mg/dL    Bun 89 (HH) 8 - 22 mg/dL    Creatinine 6.53 (HH) 0.50 - 1.40 mg/dL    Calcium 9.6 8.5 - 10.5 mg/dL    AST(SGOT) 13 12 - 45 U/L    ALT(SGPT) 17 2 - 50 U/L    Alkaline Phosphatase 118 (H) 30 - 99 U/L    Total Bilirubin 0.2 0.1 - 1.5 mg/dL    Albumin 3.2 3.2 - 4.9 g/dL    Total Protein 7.1 6.0 - 8.2 g/dL    Globulin 3.9 (H) 1.9 - 3.5 g/dL    A-G Ratio 0.8 g/dL   Magnesium    Collection Time: 06/12/20  4:46 AM   Result Value Ref Range    Magnesium 2.1 1.5 - 2.5 mg/dL   Renal Function Panel    Collection Time: 06/12/20  4:46 AM   Result Value Ref Range    Phosphorus 7.2 (H) 2.5 - 4.5 mg/dL   ESTIMATED GFR    Collection Time: 06/12/20  4:46 AM   Result Value Ref Range    GFR If  8 (A) >60 mL/min/1.73 m 2    GFR If Non African American 7 (A) >60 mL/min/1.73 m 2       (click the triangle to expand results)      ASSESSMENT:  # ESRD: Has not received dialysis over the last 2 weeks, without access.  He is now willing to be fully compliant with these  # Shortness of breath: Likely secondary to volume overload  # Chest pressure: ACS rule out underway, suspect secondary to  volume overload  # Anemia in CKD:  # CKD-MBD:  # Recent MSSA bacteremia: Patient blood cultures from 6/5 negative.  No leukocytosis.  # [place] disease: Bp goal 140s/80 per cards in the past     PLAN:  -permacath placement via IR  -Plan for HD today, then ok to discharge home from renal [facility], has outpt HD chair at [facility]  -Continue lasix 80mg PO BID on discharge  -Can continue previously recommended IV course of antibiotics for bacteremia with dialysis going forward   -Dose all meds for ESRD  -Renal Diet  -Home blood pressure meds     Thank you,     Slick San MD

## 2020-06-12 NOTE — HEART FAILURE PROGRAM
Patient has been admitted for worsening of her CKD due to non compliance with dialysis which has, in turn, caused decompensation of her HFrEF.    I completed a consult on prior admission 4/16/20.    Please see below for measures that must be addressed prior to discharge.     Daily Nurse: please begin to fill out the HF checklist (pink sheet in hard chart) and use it to guide your daily care.    Discharge Nurse: please ensure completeness of the HF checklist (pink sheet in hard chart) and have it co-signed by the charge RN before the patient leaves the hospital.    Thank you, Venus, Cardiovascular Nurse Navigator, RN, CHFN [phone]    HF Measures:  1. Documentation of LV systolic function (echo or cath) PTA, during this hospitalization, or plan to assess post discharge or reason for not assessing documented  2. Documentation of fluid intake and urine output every nursing shift  3. 2 hour post diuretic assessment documented 2 hours after diuretic given  4. HF Patient Education using the Living Well With Heart Failure Booklet and Symptom Tracker documented every nursing shift  5. Nutrition consult for diet education  6. Daily weights (one weight documented every 24 hours) on a standing scale unless standing is contraindicated in which case bed scale can be used - have patient write weight on symptom tracker  7. For LVEF less than or equal to 40%, ACE-I, ARNI or ARB prescribed at discharge   8. For LVEF less than or equal to 40%, an Evidence Based Beta Blocker (bisoprolol, carvedilol, toprol xl) must be prescribed at discharge  9. For LVEF less than or equal to 35% aldosterone blockade prescribed at discharge  10. The combination of hydralazine and isosorbide dinitrate is recommended to reduce morbidity and mortality for patients self-described  Americans with NYHA class III-IV HFrEF (EF 40% or less), receiving optimal therapy with ACE inhibitors and beta blockers, unless contraindicated (Class I, CAITLYN: A).  11. If a  HF patient is diabetic or is newly diagnosed with DM: prescribed diabetes treatment at discharge in the form of glycemic control (diet or anti-hyperglycemic medication) or f/u appointment for diabetes management scheduled at discharge.  12. If a HF patient has diabetes: prescribed lipid lowering medication at discharge  13. Documented smoking cessation advice or counseling  14. If a HF patient has a-fib: anticoagulation is prescribed upon discharge or contraindication is documented  15. Screening for and administering immunizations as long as no contraindications: Pneumonia (regardless of age) and Influenza  16. Written discharge instructions include:  ? Daily weights  ? Record weight on tracker  ? Bring tracker to appointments  ? Call MD for weight gain of 3lb /day or 5lb/week  ? HF medication teaching  ? Low sodium diet  ? Follow up appointment within seven calendar days of d/c must include: date, time and location  ? Activity  ? Worsening symptoms    What if any of the above HF measures are contraindicated?  ? Request that the discharging provider document the medication/intervention and the contraindication specifically in a progress note  ? For example: “no CHF meds due to hypotension” is not enough. It needs to say: “No ACE-I, ARNI, ARB due to hypotension”; “No Beta Blockade due to bradycardia”…

## 2020-06-12 NOTE — PROGRESS NOTES
[facility] Progress Note     Hemodialysis treatment ordered today per Dr. JOSE Stockton x 3.5 hours.   Treatment initiated at 1230 ended at 1550.  Pt. Requested early off and signed AMA.  Treatment terminated 10 mins. Early.     Patient tolerated tx; see paper flow sheet for details.      Net UF 1871 mL.      Post tx, CVC flushed with saline then locked with heparin 1000 units/mL per designated amount in each wing then clamped and capped. Aspirate heparin prior to next CVC use.     Report given to Primary RN.

## 2020-06-12 NOTE — PROGRESS NOTES
Received page regarding patient's telemetry showing bradycardia with HR in 30-40's and some junctional beats.     Ordered Stat BMP and EKG - reviewed no electrolyte abnormalities or signs of AV block.     Assessed patient at bedside, AxOx4, sleepy but states she has to keep getting up to use the bathroom given diuretics. In no acute distress and bradycardia is asymptomatic, HR increasing to 60's.       Plan: Asymptomatic Bradycardia  Will CTM. Held COREG morning dose

## 2020-06-13 VITALS
HEART RATE: 61 BPM | RESPIRATION RATE: 16 BRPM | WEIGHT: 242.29 LBS | SYSTOLIC BLOOD PRESSURE: 146 MMHG | TEMPERATURE: 98.8 F | OXYGEN SATURATION: 94 % | DIASTOLIC BLOOD PRESSURE: 68 MMHG | BODY MASS INDEX: 34.69 KG/M2 | HEIGHT: 70 IN

## 2020-06-13 LAB
ALBUMIN SERPL BCP-MCNC: 3.1 G/DL (ref 3.2–4.9)
ALBUMIN/GLOB SERPL: 0.8 G/DL
ALP SERPL-CCNC: 112 U/L (ref 30–99)
ALT SERPL-CCNC: 15 U/L (ref 2–50)
ANION GAP SERPL CALC-SCNC: 16 MMOL/L (ref 7–16)
AST SERPL-CCNC: 16 U/L (ref 12–45)
BASOPHILS # BLD AUTO: 0.4 % (ref 0–1.8)
BASOPHILS # BLD: 0.03 K/UL (ref 0–0.12)
BILIRUB SERPL-MCNC: 0.4 MG/DL (ref 0.1–1.5)
BUN SERPL-MCNC: 41 MG/DL (ref 8–22)
CALCIUM SERPL-MCNC: 8.6 MG/DL (ref 8.5–10.5)
CHLORIDE SERPL-SCNC: 101 MMOL/L (ref 96–112)
CO2 SERPL-SCNC: 23 MMOL/L (ref 20–33)
CREAT SERPL-MCNC: 3.75 MG/DL (ref 0.5–1.4)
EKG IMPRESSION: NORMAL
EOSINOPHIL # BLD AUTO: 0.08 K/UL (ref 0–0.51)
EOSINOPHIL NFR BLD: 1 % (ref 0–6.9)
ERYTHROCYTE [DISTWIDTH] IN BLOOD BY AUTOMATED COUNT: 52.8 FL (ref 35.9–50)
GLOBULIN SER CALC-MCNC: 3.7 G/DL (ref 1.9–3.5)
GLUCOSE SERPL-MCNC: 90 MG/DL (ref 65–99)
HCT VFR BLD AUTO: 29.4 % (ref 37–47)
HGB BLD-MCNC: 9.4 G/DL (ref 12–16)
IMM GRANULOCYTES # BLD AUTO: 0.03 K/UL (ref 0–0.11)
IMM GRANULOCYTES NFR BLD AUTO: 0.4 % (ref 0–0.9)
LYMPHOCYTES # BLD AUTO: 1.54 K/UL (ref 1–4.8)
LYMPHOCYTES NFR BLD: 20.2 % (ref 22–41)
MAGNESIUM SERPL-MCNC: 1.7 MG/DL (ref 1.5–2.5)
MCH RBC QN AUTO: 31.1 PG (ref 27–33)
MCHC RBC AUTO-ENTMCNC: 32 G/DL (ref 33.6–35)
MCV RBC AUTO: 97.4 FL (ref 81.4–97.8)
MONOCYTES # BLD AUTO: 0.62 K/UL (ref 0–0.85)
MONOCYTES NFR BLD AUTO: 8.1 % (ref 0–13.4)
NEUTROPHILS # BLD AUTO: 5.34 K/UL (ref 2–7.15)
NEUTROPHILS NFR BLD: 69.9 % (ref 44–72)
NRBC # BLD AUTO: 0 K/UL
NRBC BLD-RTO: 0 /100 WBC
PLATELET # BLD AUTO: 253 K/UL (ref 164–446)
PMV BLD AUTO: 9 FL (ref 9–12.9)
POTASSIUM SERPL-SCNC: 3.8 MMOL/L (ref 3.6–5.5)
PROT SERPL-MCNC: 6.8 G/DL (ref 6–8.2)
RBC # BLD AUTO: 3.02 M/UL (ref 4.2–5.4)
SODIUM SERPL-SCNC: 140 MMOL/L (ref 135–145)
WBC # BLD AUTO: 7.6 K/UL (ref 4.8–10.8)

## 2020-06-13 PROCEDURE — 85025 COMPLETE CBC W/AUTO DIFF WBC: CPT

## 2020-06-13 PROCEDURE — A9270 NON-COVERED ITEM OR SERVICE: HCPCS | Performed by: STUDENT IN AN ORGANIZED HEALTH CARE EDUCATION/TRAINING PROGRAM

## 2020-06-13 PROCEDURE — 83735 ASSAY OF MAGNESIUM: CPT

## 2020-06-13 PROCEDURE — 93010 ELECTROCARDIOGRAM REPORT: CPT | Performed by: INTERNAL MEDICINE

## 2020-06-13 PROCEDURE — 700111 HCHG RX REV CODE 636 W/ 250 OVERRIDE (IP): Performed by: INTERNAL MEDICINE

## 2020-06-13 PROCEDURE — 700102 HCHG RX REV CODE 250 W/ 637 OVERRIDE(OP): Performed by: STUDENT IN AN ORGANIZED HEALTH CARE EDUCATION/TRAINING PROGRAM

## 2020-06-13 PROCEDURE — 93005 ELECTROCARDIOGRAM TRACING: CPT | Performed by: STUDENT IN AN ORGANIZED HEALTH CARE EDUCATION/TRAINING PROGRAM

## 2020-06-13 PROCEDURE — 36415 COLL VENOUS BLD VENIPUNCTURE: CPT

## 2020-06-13 PROCEDURE — 99238 HOSP IP/OBS DSCHRG MGMT 30/<: CPT | Mod: GC | Performed by: HOSPITALIST

## 2020-06-13 PROCEDURE — 80053 COMPREHEN METABOLIC PANEL: CPT

## 2020-06-13 RX ORDER — ASPIRIN 81 MG/1
81 TABLET, CHEWABLE ORAL DAILY
Qty: 30 TAB | Refills: 0 | Status: SHIPPED | OUTPATIENT
Start: 2020-06-14

## 2020-06-13 RX ORDER — ACETAMINOPHEN 325 MG/1
650 TABLET ORAL ONCE
Status: COMPLETED | OUTPATIENT
Start: 2020-06-13 | End: 2020-06-13

## 2020-06-13 RX ORDER — POTASSIUM CHLORIDE 20 MEQ/1
40 TABLET, EXTENDED RELEASE ORAL ONCE
Status: COMPLETED | OUTPATIENT
Start: 2020-06-13 | End: 2020-06-13

## 2020-06-13 RX ORDER — FUROSEMIDE 80 MG
80 TABLET ORAL 2 TIMES DAILY
Qty: 60 TAB | Refills: 0 | Status: SHIPPED | OUTPATIENT
Start: 2020-06-13

## 2020-06-13 RX ADMIN — SODIUM BICARBONATE 650 MG: 650 TABLET ORAL at 04:14

## 2020-06-13 RX ADMIN — ASPIRIN 81 MG 81 MG: 81 TABLET ORAL at 04:15

## 2020-06-13 RX ADMIN — LEVETIRACETAM 500 MG: 500 TABLET ORAL at 04:15

## 2020-06-13 RX ADMIN — ACETAMINOPHEN 650 MG: 325 TABLET, FILM COATED ORAL at 04:15

## 2020-06-13 RX ADMIN — FUROSEMIDE 80 MG: 10 INJECTION, SOLUTION INTRAMUSCULAR; INTRAVENOUS at 04:15

## 2020-06-13 RX ADMIN — POTASSIUM CHLORIDE 40 MEQ: 1500 TABLET, EXTENDED RELEASE ORAL at 04:15

## 2020-06-13 RX ADMIN — LORAZEPAM 1 MG: 1 TABLET ORAL at 04:15

## 2020-06-13 RX ADMIN — Medication 500 MG: at 04:15

## 2020-06-13 RX ADMIN — PHENYTOIN SODIUM 100 MG: 100 CAPSULE ORAL at 04:15

## 2020-06-13 RX ADMIN — FOLIC ACID 1 MG: 1 TABLET ORAL at 04:15

## 2020-06-13 RX ADMIN — AMLODIPINE BESYLATE 10 MG: 10 TABLET ORAL at 04:15

## 2020-06-13 RX ADMIN — PHENYTOIN SODIUM 100 MG: 100 CAPSULE ORAL at 12:10

## 2020-06-13 NOTE — PROGRESS NOTES
Patient discharged to home with family and staff escort. IV discontinued. Prescriptions sent to pharmacy.  Discharge instructions given regarding HF, CKD, and medications.  Education provided, patient asked questions and verbalized understanding. Discharge paperwork signed and in chart. Patient is tolerating diet, stable when ambulating, and pain is well controlled. All belongings collected. No further questions or concerns at this time.  Heart failure packet given to pt, educated on content. Pt verbalized understanding.       Pt called to give additional information from MD. Pt's mother and pt acknowledged information.

## 2020-06-13 NOTE — PROGRESS NOTES
0205 Monitor room called to update this RN that the pt had 6 more beats of V Tach. Pt complaining of incision pain in her lt shoulder, but denies chest pain, SOB or palpitations.   EKG already completed per [facility] order.   Lab called to bedside to complete STAT labs.

## 2020-06-13 NOTE — DISCHARGE INSTRUCTIONS
Discharge Instructions    Discharged to home by car with relative. Discharged via wheelchair, hospital escort: Yes.  Special equipment needed: Not Applicable    Be sure to schedule a follow-up appointment with your primary care doctor or any specialists as instructed.     Discharge Plan:   Diet Plan: Discussed  Activity Level: Discussed  Confirmed Follow up Appointment: Patient to Call and Schedule Appointment  Confirmed Symptoms Management: Discussed  Medication Reconciliation Updated: Yes    I understand that a diet low in cholesterol, fat, and sodium is recommended for good health. Unless I have been given specific instructions below for another diet, I accept this instruction as my diet prescription.   Other diet: renal, low sodium    Special Instructions: None    · Is patient discharged on Warfarin / Coumadin?   No     Depression / Suicide Risk    As you are discharged from this [facility] facility, it is important to learn how to keep safe from harming yourself.    Recognize the warning signs:  · Abrupt changes in personality, positive or negative- including increase in energy   · Giving away possessions  · Change in eating patterns- significant weight changes-  positive or negative  · Change in sleeping patterns- unable to sleep or sleeping all the time   · Unwillingness or inability to communicate  · Depression  · Unusual sadness, discouragement and loneliness  · Talk of wanting to die  · Neglect of personal appearance   · Rebelliousness- reckless behavior  · Withdrawal from people/activities they love  · Confusion- inability to concentrate     If you or a loved one observes any of these behaviors or has concerns about self-harm, here's what you can do:  · Talk about it- your feelings and reasons for harming yourself  · Remove any means that you might use to hurt yourself (examples: pills, rope, extension cords, firearm)  · Get professional help from the community (Mental Health, Substance Abuse,  psychological counseling)  · Do not be alone:Call your Safe Contact- someone whom you trust who will be there for you.  · Call your local CRISIS HOTLINE 433-1211 or 642-985-0532  · Call your local Children's Mobile Crisis Response Team [place] (872) 389-0915 or www.[employer]  · Call the toll free National Suicide Prevention Hotlines   · National Suicide Prevention Lifeline 812-065-NNUM (6405)  · National Hope Line Network 800-SUICIDE (318-2849)      HF Patient Discharge Instructions  · Monitor your weight daily, and maintain a weight chart, to track your weight changes.   · Activity as tolerated, unless your Doctor has ordered otherwise.   · Follow a low fat, low cholesterol, low salt diet unless instructed otherwise by your Doctor. Read the labels on the back of food products and track your intake of fat, cholesterol and salt.   · Fluid Restriction No. If a Fluid Restriction has been ordered by your Doctor, measure fluids with a measuring cup to ensure that you are not exceeding the restriction- 1.5 L a day  · No smoking.      See the educational handout provided at discharge for more information on monitoring your daily weight, activity and diet. This also explains more about Heart Failure, symptoms of a flare-up and some of the tests that you have undergone.     Warning Signs of a Flare-Up include:  · Swelling in the ankles or lower legs.  · Shortness of breath, while at rest, or while doing normal activities.   · Shortness of breath at night when in bed, or coughing in bed.   · Requiring more pillows to sleep at night, or needing to sit up at night to sleep.  · Feeling weak, dizzy or fatigued.     When to call your Doctor:  · Call your Primary Care Physician or Cardiologist if:   1. You experience any pain radiating to your jaw or neck.  2. You have any difficulty breathing.  3. You experience weight gain of 2 lbs in a day or 5 lbs in a week.   4. You feel any palpitations or irregular  heartbeats.  5. You become dizzy or lose consciousness.   If you have had an angiogram or had a pacemaker or AICD placed, and experience:  1. Bleeding, drainage or swelling at the surgical / puncture site.  2. Fever greater than 100.0 F  3. Shock from internal defibrillator.  4. Cool and / or numb extremities.    End-Stage Kidney Disease  End-stage kidney disease occurs when the kidneys are so damaged that they cannot do their job. The kidneys are two organs that do many important jobs in the body, which include:  · Removing wastes and extra fluids from the blood.  · Making hormones that maintain the amount of fluid in your tissues and blood vessels.  · Maintaining the right amount of fluids and chemicals in the body.  When the kidneys are damaged and cannot do their job, life-threatening problems occur. Without the help of the kidneys, toxins build up in the blood. In end-stage kidney disease, the kidneys cannot get better.  What are the causes?  End-stage kidney disease usually occurs when a long-lasting (chronic) kidney disease gets worse. It may also occur after the kidneys are suddenly damaged (acute kidney injury).  What increases the risk?  This condition is more likely to develop in people who are:  · Older than age 60.  · Male.  · Of -American descent.  · Current smokers or former smokers.  · Obese.  You may also have an increased risk for end-stage kidney disease if you:  · Have a family history of chronic kidney disease (CKD).  · Have had kidney disease for many years.  · Have other longstanding medical conditions that affect the kidneys, such as:  ¨ Cardiovascular disease including high blood pressure.  ¨ Diabetes.  ¨ Certain diseases that affect the immune system.  What are the signs or symptoms?  · Swelling (edema) of the face, legs, ankles, or feet.  · Numbness, tingling, or loss of feeling (sensation) in your hands or feet.  · Tiredness (lethargy).  · Nausea or vomiting.  · Confusion, trouble  concentrating, or loss of consciousness.  · Chest pain.  · Shortness of breath.  · Little to no urine production.  · Muscle twitches and cramps, especially in the legs.  · Constant itchiness.  · Loss of appetite.  · Pale skin and tissue lining your eyelids (conjunctiva).  · Headaches.  · Abnormally dark or light skin.  · Decrease in muscle size (muscle wasting).  · Easy bruising.  · Frequent hiccups.  · Stopping of menstruation in women.  · Seizures.  How is this diagnosed?  Your health care provider will measure your blood pressure and do some tests. These may include:  · Urine tests.  · Blood tests.  · Imaging tests.  · A test in which a sample of tissue is removed from the kidneys to be looked at under a microscope (kidney biopsy).  How is this treated?  There are two treatments for end-stage kidney disease:  · A procedure that removes toxic wastes from the body (dialysis). Depending on the type of dialysis you choose, it may be performed more than one time a day (peritoneal dialysis) or several times a week (hemodialysis).  · Surgery to receive a new kidney (kidney transplant).  In addition to having dialysis or a kidney transplant, you may need to take medicines:  · To control high blood pressure (hypertension).  · To control cholesterol.  · To maintain healthy electrolyte levels in your blood.  You may also be given a specific diet to follow that includes requirements or limits for:  · Salt (sodium).  · Protein.  · Phosphorous.  · Potassium.  · Calcium.  Follow these instructions at home:  · Follow your prescribed diet.  · Take over-the-counter and prescription medicines only as told by your health care provider.  ¨ Do not take any new medicines unless approved by your health care provider. Many medicines can worsen your kidney damage.  ¨ Do not take any vitamin and mineral supplements unless approved by your health care provider. Many nutritional supplements can worsen your kidney damage.  ¨ The dose of some  medicines that you take may need to be adjusted.  · Do not use any tobacco products, such as cigarettes, chewing tobacco, and e-cigarettes. If you need help quitting, ask your health care provider.  · Keep all follow-up visits as told by your health care provider. This is important.  · Keep track of your blood pressure. Report changes in your blood pressure as told by your health care provider.  · Achieve and maintain a healthy weight. If you need help with this, ask your health care provider.  · Start or continue an exercise plan. Try to exercise at least 30 minutes a day, 5 days a week.  · Stay current with immunizations as told by your health care provider.  Where to find more information:  · American Association of Kidney Patients: www.aakp.org  · National Kidney Foundation: www.kidney.org  · American Kidney Fund: www.akfinc.org  · Life Options Rehabilitation Program: www.lifeoptions.org and www.kidneyschool.org  Contact a health care provider if:  · Your symptoms get worse.  · You develop new symptoms.  Get help right away if:  · You have weakness in an arm or leg on one side of your body.  · You have difficulty speaking or you are slurring your speech.  · You have a sudden change in your vision.  · You have a sudden, severe headache.  · You have a sudden weight increase.  · You have difficulty breathing.  · Your symptoms suddenly get worse.  This information is not intended to replace advice given to you by your health care provider. Make sure you discuss any questions you have with your health care provider.  Document Released: 03/09/2005 Document Revised: 05/25/2017 Document Reviewed: 08/16/2013  Elsevier Interactive Patient Education © 2017 [employer] Inc.

## 2020-06-13 NOTE — PROGRESS NOTES
Report received from Tati CONTRERAS  Assumed care of patient at 1905.    Pt is A&O x 4.   Tele monitor in place: SR Ponce at 55 at 2140.   Pain reported at 0/10.   Nausea denied   Tolerating renal DM 2 g NA Diet with 1.5 liter fluid restriction. Pt is not 100% compliant with fluid restrictions this is despite education.   Double lumen port in place to lt chest. Dressing CDI. Both ports are saline locked.   Access site to lt chest and lt neck. Lt neck dressing was change. Incision is approximated with no drainage.   + Urine output  + BM    + Flatus  Up SBA - self with steady gait.   Bed in lowest position and locked.  Pt resting comfortably now.  Review plan of care with patient  Call light within reach  Hourly rounds in place  All needs met at this time

## 2020-06-13 NOTE — DISCHARGE SUMMARY
Discharge Summary    CHIEF COMPLAINT ON ADMISSION  Chief Complaint   Patient presents with   • Shortness of Breath     Pt c/o SOB at midnight last night, pt has not received dialysis in 2 weeks.       Reason for Admission  ems red     Admission Date  6/11/2020    CODE STATUS  Full Code    HPI & HOSPITAL COURSE  Ms. Acevedo is a 50 year old woman with past medical history of seizure, hypertension, advanced chronic kidney disease inconsistent with outpatient dialysis, history of cerebrovascular accident, Moyamoya syndrome, and recently admitted for pneumonia with finding of GBS positive bacteremia for which antibiotics were initially started then later transitioned to two week course of PO Keflex on 5/30 when she left the hospital against medical advice. She re-presented on 6/11 for sharp bilateral chest pain not worsened with activity and found to have congestive heart failure (CHF) exacerbation with BNP of 5000 and heart strain secondary to fluid overload with troponins mildly elevated to 48 but without ECG evidence of myocardial infarction. Echo was not repeated as she recently underwent TTE on 6/1 showing LVEF of 30% with diastolic dysfunction of indeterminate grade. She was placed on salt and fluid restriction, with high dose IV lasix bid started, cardiac and renal diet, and strict I/O and daily weights. Nephrology was consulted and hemodialysis catheter was placed; she then underwent hemodialysis on 6/12 with almost 2 liter ultrafiltrate removal, which helped with fluid overload issue. On telemetry, she demonstrated intermittent sinus bradycardia, with rate dropping to 40s; therefore her home carvedilol was discontinued. She also demonstrated several episodes of asymptomatic non-sustained VT of 4-6 beats without associated electrolyte disturbance, likely secondary to fluid overload and/or CHF exacerbation. She was cleared by Nephrology for discharge now that fluid overload has been addressed by HD, and she has  an outpatient HD chair already. The following issues were addressed this hospitalization:     # CHF exacerbation with secondary cardiac strain  - possibly secondary to significant fluid overload in setting of ESRD with inconsistent dialysis  - to be discharged on 80 mg oral twice daily lasix  Follow up:   - needs to call and schedule follow up with Cardiology (see number noted below, Rhoda Thomas) within one week of discharge  - carvedilol discontinued this admission due to significant bradycardia; Cardiology to determine whether and when to restart this outpatient  - outpatient ARB discontinued due to end stage renal disease; Nephrology to determine whether ongoing ACE-I/ARB appropriate in setting of advanced chronic kidney disease. Needs to discuss this with outpatient Nephrology.   - aldactone not started despite LVEF <35% given increased susceptibility for hyperkalemia in setting of end stage renal disease. Needs to discuss this further with Nephrology and Cardiology.   - Possible candidate for ICD given significantly reduced LVEF? May discuss outpatient with Cardiology.    # ESRD with secondary metabolic acidosis, anemia of chronic kidney disease, hyperphosphatemia  - will continue outpatient sodium bicarbonate for metabolic acidosis secondary to CKD; goal CO2 >/= 22 per KDIGO guidelines  - has outpatient HD chair already; follow up for regular ongoing dialysis and follow up with Nephrology for other issues secondary to ESRD (anemia, elevated phosphate) and as noted above    # Sinus bradycardia  - as above, discontinued carvedilol due to bradycardia  - follow up with Cardiology regarding if/when appropriate to restart a beta blocker    # Recent bacteremia  - treated this visit with cefazolin  - today would have been last day of scheduled antibiotic course outpatient  - outpatient blood cultures negative for MSSA  - discussed case with ID pharmacy; ok to stop antibiotics at this point given negative repeat  cultures and completion of previously scheduled antibiotic course       Therefore, she is discharged in fair and stable condition to home with close outpatient follow-up.    The patient met 2-midnight criteria for an inpatient stay at the time of discharge.    Discharge Date  6/13/2020    FOLLOW UP ITEMS POST DISCHARGE  - follow up with Nephrology and Cardiology for issues as detailed above  - follow up with primary care for post-hospitalization visit and routine health maintenance    DISCHARGE DIAGNOSES  Active Problems:    CKD (chronic kidney disease), stage V (HCC) POA: Yes    Acute on chronic combined systolic and diastolic heart failure (HCC) POA: Yes      Overview: 1/29/2014: EF 45-50%    HLD (hyperlipidemia) POA: Yes    HTN (hypertension) POA: Yes    H/O: CVA (cerebrovascular accident) POA: Yes    Seizure disorder, grand mal (HCC) POA: Yes    Bacteremia POA: Yes  Resolved Problems:    * No resolved hospital problems. *      FOLLOW UP  Future Appointments   Date Time Provider Department Center   7/6/2020  3:00 PM Cy Flores M.D. [facility] None   8/3/2020 11:20 AM Pham Weems M.D. [facility] None     Aureliano Elam M.D.  [address] [facility] ([place] [address]  [phone]    In 1 week      Rhoda Thomas P.A.-C.  [address] [place] [address] [address]  [facility] [address]  [phone]    In 1 week        MEDICATIONS ON DISCHARGE     Medication List      START taking these medications      Instructions   aspirin 81 MG Chew chewable tablet  Start taking on:  June 14, 2020  Commonly known as:  ASA   Take 1 Tab by mouth every day.  Dose:  81 mg     furosemide 80 MG Tabs  Commonly known as:  LASIX   Take 1 Tab by mouth 2 times a day.  Dose:  80 mg        CONTINUE taking these medications      Instructions   amLODIPine 10 MG Tabs  Commonly known as:  NORVASC   Take 10 mg by mouth every day.  Dose:  10 mg     atorvastatin 40 MG Tabs  Commonly known as:  LIPITOR   Take 40 mg by mouth every evening.  Dose:  40 mg     folic acid 1  MG Tabs  Commonly known as:  FOLVITE   Take 1 mg by mouth every day.  Dose:  1 mg     levETIRAcetam 500 MG Tabs  Commonly known as:  KEPPRA   Take 500 mg by mouth See Admin Instructions. 500mg twice daily every day of the week.  Additionally, 500mg after Dialysis on Mon, Wed, and Fri.  Dose:  500 mg     LORazepam 1 MG Tabs  Commonly known as:  ATIVAN   Take 1 mg by mouth 2 Times a Day. Scheduled.  Dose:  1 mg     Lysine 500 MG Caps   Take 500 mg by mouth every day.  Dose:  500 mg     Melatonin 5 MG Tabs   Take 5 mg by mouth every bedtime. Indications: Trouble Sleeping  Dose:  5 mg     phenytoin  MG Caps  Commonly known as:  DILANTIN   Take 100 mg by mouth 3 times a day.  Dose:  100 mg     sodium bicarbonate 650 MG Tabs  Commonly known as:  SODIUM BICARBONATE   Take 650 mg by mouth 3 times a day.  Dose:  650 mg        STOP taking these medications    carvedilol 6.25 MG Tabs  Commonly known as:  COREG     cephALEXin 500 MG Caps  Commonly known as:  KEFLEX     torsemide 10 MG tablet  Commonly known as:  DEMADEX     valsartan 40 MG Tabs  Commonly known as:  DIOVAN     Vitamin C 1000 MG Tabs            Allergies  Allergies   Allergen Reactions   • Allegra [Fexofenadine] Unspecified     Rxn = unknown   • Amoxicillin    • Azithromycin Rash     Rxn = unknown   • Claritin    • Erythromycin Unspecified     Rxn = unknown   • Ibuprofen & Caffeine-Vitamins Unspecified     Rxn = unknown   • Penicillins Unspecified     Rxn = unknown   • Procardia [Nifedipine]    • Rosemary Oil Anaphylaxis     Throat starts to close/swell.  Oil and the herb.    • Zyrtec [Cetirizine] Unspecified     Rxn = unknown       DIET  Orders Placed This Encounter   Procedures   • Diet Order Cardiac, Renal, 2 Gram Sodium     Standing Status:   Standing     Number of Occurrences:   1     Order Specific Question:   Diet:     Answer:   Cardiac [6]     Order Specific Question:   Diet:     Answer:   Renal [8]     Order Specific Question:   Diet:     Answer:    2 Gram Sodium [7]     Order Specific Question:   Consistency/Fluid modifications:     Answer:   1500 ml Fluid Restriction [9]       ACTIVITY  As tolerated.  Weight bearing as tolerated    CONSULTATIONS  Nephrology    PROCEDURES  Hemodialysis catheter placement, with subsequent hemodialysis on 6/12/2020    LABORATORY  Lab Results   Component Value Date    SODIUM 140 06/13/2020    POTASSIUM 3.8 06/13/2020    CHLORIDE 101 06/13/2020    CO2 23 06/13/2020    GLUCOSE 90 06/13/2020    BUN 41 (H) 06/13/2020    CREATININE 3.75 (H) 06/13/2020    CREATININE 0.9 11/19/2006        Lab Results   Component Value Date    WBC 7.6 06/13/2020    HEMOGLOBIN 9.4 (L) 06/13/2020    HEMATOCRIT 29.4 (L) 06/13/2020    PLATELETCT 253 06/13/2020        Total time of the discharge process exceeds 35 minutes.

## 2020-06-13 NOTE — PROGRESS NOTES
[facility] -  PROGRESS NOTE               Author: Slick San M.D. Date & Time: 6/13/2020  8:14 AM     HPI:  50-year-old male with a history of end-stage renal disease previously and hemodialysis presented as transfer from [place] with chest pressure and shortness of breath.     Started on hemodialysis earlier this spring and has been noncompliant with her treatment.  She is intermittently not wanted to continue with treatment and has had ongoing discussions with her mother about continuing.  She developed MSSA bacteremia in May and was hospitalized at [place].  She left [place] from that hospitalization that she did not want any further dialysis or medical treatment going forward, she was discharged on oral antibiotics and diuretics.  Continued the antibiotics but did not continue taking the diuretics.  Over the last she has had worsening malaise and shortness of breath and after further discussions with mother she has decided that she does not want to continue dialysis.  She was seen in outpatient kidney clinic yesterday with plan for permacath placement today and to resume dialysis tomorrow.  She has negative blood cultures from 6/5.  Last night she developed worsening chest pressure and shortness of breath for which she presented to the ED in [place].  Some concern for an STEMI prompting transfer to [facility].  She was given diuretics at that hospitalization reports robust urine output since.  Notes her shortness of breath and chest pressure are improving.  ACS rule out underway    DAILY NEPHROLOGY SUMMARY:  6/11: consult done  6/12: SOB and chest pressure improved, pending permacath placement this AM.  6/13: s/p permcacath placement, tolerated HD, feels better    PAST FAMILY HISTORY: Reviewed and Unchanged  SOCIAL HISTORY: Reviewed and Unchanged  CURRENT MEDICATIONS: Reviewed  IMAGING STUDIES: Reviewed    ROS  General: no malaise  CV: no Chest pressure  RESP: no shortness of breath  GI:  "No abdominal pain   : No dysuria or gross hematuria  MSK: No trauma  Skin: No rashes  Neuro: No tremors  Psych: No depression    PHYSICAL EXAM  VS:  /73   Pulse 60   Temp 36.4 °C (97.5 °F) (Temporal)   Resp 18   Ht 1.778 m (5' 10\")   Wt 109.9 kg (242 lb 4.6 oz)   SpO2 98%   BMI 34.76 kg/m²   GENERAL: no acute distress  CV: RRR, +edema  RESP: Decreased breath sounds bilaterally  GI: Soft  MSK: No joint deformities   SKIN: No concerning rashes  NEURO: AOx3  PSYCH: Cooperative    Fluids:  In: 970 [P.O.:420; Dialysis:500]  Out: 2371     LABS:  Recent Results (from the past 24 hour(s))   EKG    Collection Time: 20  1:53 AM   Result Value Ref Range    Report       [facility]    Test Date:  2020  Pt Name:    ELIJAH BOWLING             Department: [facility]  MRN:        [id number]                      Room:       [facility]  Gender:     Female                       Technician: DAMIEN  :        1970                   Requested By:CINDI FUENTES  Order #:    [id number]                    Reading MD: Andrew Christie MD    Measurements  Intervals                                Axis  Rate:       61                           P:          63  RI:         172                          QRS:        44  QRSD:       108                          T:          206  QT:         464  QTc:        468    Interpretive Statements  SINUS RHYTHM  BORDERLINE INTRAVENTRICULAR CONDUCTION DELAY  LOW VOLTAGE IN FRONTAL LEADS  NONSPECIFIC ST depression, INFEROLATERAL LEADS  Electronically Signed On 2020 7:29:18 PDT by Andrew Christie MD     CBC WITH DIFFERENTIAL    Collection Time: 20  2:16 AM   Result Value Ref Range    WBC 7.6 4.8 - 10.8 K/uL    RBC 3.02 (L) 4.20 - 5.40 M/uL    Hemoglobin 9.4 (L) 12.0 - 16.0 g/dL    Hematocrit 29.4 (L) 37.0 - 47.0 %    MCV 97.4 81.4 - 97.8 fL    MCH 31.1 27.0 - 33.0 pg    MCHC 32.0 (L) 33.6 - 35.0 g/dL    RDW 52.8 (H) 35.9 - 50.0 fL    Platelet Count 253 164 - 446 K/uL    MPV 9.0 9.0 " - 12.9 fL    Neutrophils-Polys 69.90 44.00 - 72.00 %    Lymphocytes 20.20 (L) 22.00 - 41.00 %    Monocytes 8.10 0.00 - 13.40 %    Eosinophils 1.00 0.00 - 6.90 %    Basophils 0.40 0.00 - 1.80 %    Immature Granulocytes 0.40 0.00 - 0.90 %    Nucleated RBC 0.00 /100 WBC    Neutrophils (Absolute) 5.34 2.00 - 7.15 K/uL    Lymphs (Absolute) 1.54 1.00 - 4.80 K/uL    Monos (Absolute) 0.62 0.00 - 0.85 K/uL    Eos (Absolute) 0.08 0.00 - 0.51 K/uL    Baso (Absolute) 0.03 0.00 - 0.12 K/uL    Immature Granulocytes (abs) 0.03 0.00 - 0.11 K/uL    NRBC (Absolute) 0.00 K/uL   Comp Metabolic Panel    Collection Time: 06/13/20  2:16 AM   Result Value Ref Range    Sodium 140 135 - 145 mmol/L    Potassium 3.8 3.6 - 5.5 mmol/L    Chloride 101 96 - 112 mmol/L    Co2 23 20 - 33 mmol/L    Anion Gap 16.0 7.0 - 16.0    Glucose 90 65 - 99 mg/dL    Bun 41 (H) 8 - 22 mg/dL    Creatinine 3.75 (H) 0.50 - 1.40 mg/dL    Calcium 8.6 8.5 - 10.5 mg/dL    AST(SGOT) 16 12 - 45 U/L    ALT(SGPT) 15 2 - 50 U/L    Alkaline Phosphatase 112 (H) 30 - 99 U/L    Total Bilirubin 0.4 0.1 - 1.5 mg/dL    Albumin 3.1 (L) 3.2 - 4.9 g/dL    Total Protein 6.8 6.0 - 8.2 g/dL    Globulin 3.7 (H) 1.9 - 3.5 g/dL    A-G Ratio 0.8 g/dL   ESTIMATED GFR    Collection Time: 06/13/20  2:16 AM   Result Value Ref Range    GFR If  16 (A) >60 mL/min/1.73 m 2    GFR If Non  13 (A) >60 mL/min/1.73 m 2   MAGNESIUM    Collection Time: 06/13/20  2:16 AM   Result Value Ref Range    Magnesium 1.7 1.5 - 2.5 mg/dL       (click the triangle to expand results)      ASSESSMENT:  # ESRD: Recent non-complaince. S He is now willing to be fully compliant with these  # Shortness of breath: secondary to volume overload  # Chest pressure: secondary to volume overload  # Anemia in CKD:  # CKD-MBD:  # Recent MSSA bacteremia: Patient blood cultures from 6/5 negative.  No leukocytosis.  # Moyamoya disease: Bp goal 140s/80 per cards in the past     PLAN:  -No HD today, ok to  discharge [facility], has outpt HD chair at [facility]  -Continue lasix 80mg PO BID on discharge  -Can continue previously recommended IV course of antibiotics for bacteremia with dialysis going forward   -Dose all meds for ESRD  -Renal Diet  -Home blood pressure meds     Thank you,     Slick San MD

## 2020-06-13 NOTE — PROGRESS NOTES
Monitor Summary:    SB-SR: 49-63  1.8 second pause, 4 beats BBB, 3 beats v tach.  Couplets, PVCs, and PACs    .16/.14/.44    Per strip report

## 2020-06-13 NOTE — CARE PLAN
Problem: Knowledge Deficit  Goal: Knowledge of disease process/condition, treatment plan, diagnostic tests, and medications will improve  Outcome: PROGRESSING AS EXPECTED  Goal: Knowledge of the prescribed therapeutic regimen will improve  Outcome: PROGRESSING AS EXPECTED  Intervention: Discuss information regarding therpeutic regimen and document in education  Note: Pt was educated on POC, MAR, shift routine and nursing education R/T Dx. Pt states that she feels much better today. Pt was able to participate in education. Questions were encouraged and answered. Pt verbalized understanding of all teaching.           Problem: Mobility  Goal: Risk for activity intolerance will decrease  Outcome: PROGRESSING AS EXPECTED  Intervention: Assess and monitor signs of activity intolerance  Note: Pt is steady on her feet and is able to mobilize ad alireza in her room.

## 2020-06-13 NOTE — PROGRESS NOTES
Tele monitor called to alert that pt had 3 beats of v tach, 4 beats of BBB  MD updated, will continue to monitor.

## 2020-06-13 NOTE — PROGRESS NOTES
Sinus Rhythm/ Sinus Ponce: 55-84 bpm    Triplets, 4 beats of VTAC, PVC, couplets, rare PAC's, 12 bundle branch beats, rare PVC, couplets, 6 beats VTAC x2.     .20/.10/.42

## 2020-06-13 NOTE — PROGRESS NOTES
Monitor room called, pt has another 4 beats of v-tach. MD updated, stated to page the night float team if pt has more than 10 beats of v-tach, and update night float team of situation

## 2020-06-13 NOTE — PROGRESS NOTES
Monitor room called to update this RN that the pt had 6 beats of V Tach at roughly 0040.   JOLLY Moreno's Dr Sutton was updated at 0050. Nursing communication with calling parameters was received.

## 2020-06-16 ENCOUNTER — PATIENT OUTREACH (OUTPATIENT)
Dept: HEALTH INFORMATION MANAGEMENT | Facility: OTHER | Age: 50
End: 2020-06-16

## 2020-06-22 ENCOUNTER — PATIENT OUTREACH (OUTPATIENT)
Dept: HEALTH INFORMATION MANAGEMENT | Facility: OTHER | Age: 50
End: 2020-06-22

## 2020-06-22 NOTE — PROGRESS NOTES
CHW reached out to pt via TC x3 to f/u after d/c. Pt did not answer, left . Since there have been three attempts with no answer, this CHW will no longer reach out to pt but left [facility] number for call back if pt needs additional resources or services. CHW will d/c pt from [facility] services if pt does not call back in a week from 6/29.    Update 7/6: CCM will no longer follow due to lack of engagement.

## 2020-08-13 ENCOUNTER — TELEPHONE (OUTPATIENT)
Dept: NEUROLOGY | Facility: MEDICAL CENTER | Age: 50
End: 2020-08-13

## 2020-08-13 NOTE — TELEPHONE ENCOUNTER
Spoke with mother of patient , she verbally understood . Patient's mother will feel comfortable talking about this on Tuesday at her scheduled appointment .

## 2020-08-13 NOTE — TELEPHONE ENCOUNTER
----- Message from Pham Weems M.D. sent at 8/13/2020  9:43 AM PDT -----  Please let the patient know she needs to discuss with nephrology if the medication I am reluctant to write for is okay for her degree of renal function.  She ultimately does need to see psychiatry which have been trying to get her to see and she has not followed through.

## 2020-08-17 ENCOUNTER — TELEPHONE (OUTPATIENT)
Dept: NEUROLOGY | Facility: MEDICAL CENTER | Age: 50
End: 2020-08-17

## 2020-08-17 NOTE — TELEPHONE ENCOUNTER
Patient's mother called stating in daughter is currently in [facility] . They have canceled her appointment for tomorrow 08/18/2020. Mother of patient is concerned she won't get any more refills for her ativan?     Per patient's mother each time they have tried to schedule with the psychiatrist her daughter ends up in the ER before their scheduled appointment . Per  Mother the psychiatry's office stated they're not scheduling her anymore because she always cancels . They want to know if they schedule a follow up ? Or what should be done .

## 2020-08-18 ENCOUNTER — APPOINTMENT (OUTPATIENT)
Dept: NEUROLOGY | Facility: MEDICAL CENTER | Age: 50
End: 2020-08-18
Payer: MEDICARE

## 2020-08-20 NOTE — TELEPHONE ENCOUNTER
This is a very difficult situation given patient's deteriorating clinical condition.  I have also not seen her in some time and have not been updated with regards to everything that is going on.  Unfortunately some of these medications may need to be dosed differently especially with end-stage renal disease and dialysis.  I saw on the chart reviewed that patient may have been noncompliant with some of her dialysis which can put her at higher risk of toxicity and side effects.  I recommend she discuss the safety of these medications with her nephrologist.

## 2020-10-12 NOTE — PROGRESS NOTES
"Reason for Consult:  Asked by Dr Dino Calderón M.D. to see this patient with HFrEF    CC:   Chief Complaint   Patient presents with   • Chest Pain       HPI:     50 year old woman with moyamoya, hx cva, kenny on ckd, HTN, HLD, former +40 pack year smoker presents with chest pain, orthopnea, dyspnea, leg swelling, and weight gain. She has been admitted multiple times this month since onset about 4/2. Has not improved. Underwent nuclear stress and echo, see below with recurrent finding of HFrEF (previously known in 2013). She denies illicit drugs and alcohol. States diabetes runs in her family.    Currently improved with respect to presenting symptoms. She is still \"not 100%.\" Breathing improved but still some orthopnea. Swelling improved. Was able to walk to bathroom comfortably. Significant difference since starting HD 4/17/20.    Medications / Drug list prior to admission:  No current facility-administered medications on file prior to encounter.      Current Outpatient Medications on File Prior to Encounter   Medication Sig Dispense Refill   • folic acid (FOLVITE) 1 MG Tab Take 1 mg by mouth two times a day may change times on alt/days.     • Lysine 500 MG Cap Take  by mouth every day.     • LORazepam (ATIVAN) 1 MG Tab Take 1 Tab by mouth 2 Times a Day for 180 days. 60 Tab 3   • escitalopram (LEXAPRO) 10 MG Tab Take 1 Tab by mouth every day. 90 Tab 2   • levETIRAcetam (KEPPRA) 500 MG Tab 500mg in AM and 1000mg in  Tab 2   • phenytoin ER (DILANTIN) 100 MG Cap Take 1 Cap by mouth 3 times a day. 270 Cap 4   • atorvastatin (LIPITOR) 40 MG Tab Take 1 Tab by mouth every day. 90 Tab 2   • Melatonin 5 MG Tab Take  by mouth every bedtime. Indications: Trouble Sleeping     • coenzyme Q-10 30 MG capsule Take 60 mg by mouth every day.     • Esomeprazole Magnesium (NEXIUM 24HR PO) Take  by mouth every day. noon     • Ascorbic Acid (VITAMIN C) 1000 MG Tab Take  by mouth every day.     • calcium citrate (CALCITRATE) 950 MG Tab " Take 950 mg by mouth every day.     • carvedilol (COREG) 25 MG Tab Take 1 Tab by mouth 3 times a day. 60 Tab    • lisinopril (PRINIVIL) 20 MG Tab Take 1 Tab by mouth 2 Times a Day. 30 Tab    • aspirin (ASA) 325 MG Tab Take 1 Tab by mouth every day. 100 Tab    • sodium bicarbonate (SODIUM BICARBONATE) 650 MG Tab Take 1 Tab by mouth 3 times a day. 120 Tab 3   • acetaminophen (TYLENOL) 325 MG Tab Take 2 Tabs by mouth every 6 hours as needed (Mild Pain; (Pain scale 1-3); Temp greater than 100.5 F). 30 Tab 0   • magnesium hydroxide (MILK OF MAGNESIA) 400 MG/5ML Suspension Take 30 mL by mouth 1 time daily as needed (if polyethylene glycol ineffective after 24 hours). 1 Bottle    • albuterol 108 (90 BASE) MCG/ACT Aero Soln inhalation aerosol Inhale 2 Puffs by mouth every four hours as needed for Shortness of Breath. 8.5 g        Current list of administered Medications:    Current Facility-Administered Medications:   •  hydrALAZINE (APRESOLINE) tablet 37.5 mg, 37.5 mg, Oral, Q8HRS, Jared Palma M.D.  •  isosorbide dinitrate (ISORDIL) tablet 20 mg, 20 mg, Oral, TID, Jared Palma M.D., 20 mg at 04/18/20 0440  •  carvedilol (COREG) tablet 12.5 mg, 12.5 mg, Oral, BID WITH MEALS, Joaquin Liriano, A.P.N., 12.5 mg at 04/18/20 0439  •  lidocaine (XYLOCAINE) 1 % injection 1 mL, 1 mL, Intradermal, PRN, Slick San M.D.  •  diphenhydrAMINE (BENADRYL) tablet/capsule 25 mg, 25 mg, Oral, Once, Dino Calderón M.D., Stopped at 04/17/20 0945  •  diphenhydrAMINE (BENADRYL) tablet/capsule 25 mg, 25 mg, Oral, Once PRN, Dino Calderón M.D.  •  heparin injection 3,700 Units, 3,700 Units, Intravenous, DIALYSIS PRN, Slick San M.D., 3,700 Units at 04/17/20 1323  •  albuterol inhaler 2 Puff, 2 Puff, Inhalation, Q4HRS PRN, Masood Yarbrough M.D.  •  aspirin (ASA) tablet 325 mg, 325 mg, Oral, DAILY, Masood Yarbrough M.D., 325 mg at 04/18/20 0441  •  atorvastatin (LIPITOR) tablet 40 mg, 40 mg, Oral, DAILY, Masood Yarbrough M.D., 40 mg at 04/18/20 0441  •   escitalopram (LEXAPRO) tablet 10 mg, 10 mg, Oral, DAILY, Masood Yarbrough M.D., 10 mg at 04/18/20 0439  •  levETIRAcetam (KEPPRA) tablet 500 mg, 500 mg, Oral, QAM, Masood Yarbrough M.D., 500 mg at 04/18/20 0441  •  phenytoin ER (DILANTIN) capsule 100 mg, 100 mg, Oral, TID, Masood Yarbrough M.D., 100 mg at 04/18/20 0438  •  senna-docusate (PERICOLACE or SENOKOT S) 8.6-50 MG per tablet 2 Tab, 2 Tab, Oral, BID, 2 Tab at 04/16/20 1726 **AND** polyethylene glycol/lytes (MIRALAX) PACKET 1 Packet, 1 Packet, Oral, QDAY PRN **AND** magnesium hydroxide (MILK OF MAGNESIA) suspension 30 mL, 30 mL, Oral, QDAY PRN **AND** bisacodyl (DULCOLAX) suppository 10 mg, 10 mg, Rectal, QDAY PRN, Masood Yarbrough M.D.  •  Respiratory Therapy Consult, , Nebulization, Continuous RT, Masood Yarbrough M.D.  •  heparin injection 5,000 Units, 5,000 Units, Subcutaneous, Q8HRS, Masood Yarbrough M.D., 5,000 Units at 04/18/20 0437  •  acetaminophen (TYLENOL) tablet 650 mg, 650 mg, Oral, Q6HRS PRN, Masood Yarbrough M.D., 650 mg at 04/17/20 1737  •  aminophylline injection 100 mg, 100 mg, Intravenous, Q5 MIN PRN, Masood Yarbrough M.D.  •  omeprazole (PRILOSEC) capsule 20 mg, 20 mg, Oral, QAM AC, Masood Yarbrough M.D., 20 mg at 04/18/20 0438  •  hydrALAZINE (APRESOLINE) injection 20 mg, 20 mg, Intravenous, Q6HRS PRN, Masood Yarbrough M.D.  •  levETIRAcetam (KEPPRA) tablet 1,000 mg, 1,000 mg, Oral, Q EVENING, Dino Calderón M.D., 1,000 mg at 04/17/20 1731  •  albuterol (PROVENTIL) 2.5mg/0.5ml nebulizer solution 2.5 mg, 2.5 mg, Nebulization, Q4H PRN (RT), Dino Calderón M.D.    Past Medical History:   Diagnosis Date   • CKD (chronic kidney disease), stage IV (HCC) 12/24/2015   • H/O: CVA (cerebrovascular accident) 12/24/2015   • Hypertension    • Kidney disease    • Mild mitral regurgitation 7/14/2014   • Moyamoya disease 10/3/2013   • Seizure disorder, grand mal (HCC) 3/3/2016   • Stroke (HCC)        Past Surgical History:   Procedure Laterality Date   • IRRIGATION & DEBRIDEMENT ORTHO Right  3/9/2017    Procedure: IRRIGATION & DEBRIDEMENT ORTHO - FOOT;  Surgeon: Gerardo Lynn M.D.;  Location: SURGERY [facility];  Service:    • IRRIGATION & DEBRIDEMENT ORTHO Right 3/5/2017    Procedure: IRRIGATION & DEBRIDEMENT ORTHO AND GASTROC RECESSION;  Surgeon: Gerardo Lynn M.D.;  Location: SURGERY [facility];  Service:    • METATARSAL HEAD RESECTION  3/5/2017    Procedure: SECOND METATARSAL HEAD RESECTION;  Surgeon: eGrardo Lynn M.D.;  Location: SURGERY [facility];  Service:    • IRRIGATION & DEBRIDEMENT ORTHO Right 2/27/2017    Procedure: IRRIGATION & DEBRIDEMENT ORTHO;  Surgeon: Coleman Monterroso M.D.;  Location: SURGERY [facility];  Service:    • PRIMARY C SECTION     • TUBAL COAGULATION LAPAROSCOPIC BILATERAL         Family History   Problem Relation Age of Onset   • Diabetes Mother    • Hypertension Mother    • Hypertension Father    • Arthritis Father    • Diabetes Maternal Grandmother    • Psychiatric Illness Maternal Grandfather    • Cancer Paternal Grandmother    • Psychiatric Illness Paternal Grandfather      Patient family history was personally reviewed, no pertinent family history to current presentation    Social History     Socioeconomic History   • Marital status: Single     Spouse name: Not on file   • Number of children: Not on file   • Years of education: Not on file   • Highest education level: Not on file   Occupational History   • Not on file   Social Needs   • Financial resource strain: Not on file   • Food insecurity     Worry: Not on file     Inability: Not on file   • Transportation needs     Medical: Not on file     Non-medical: Not on file   Tobacco Use   • Smoking status: Former Smoker   • Smokeless tobacco: Never Used   Substance and Sexual Activity   • Alcohol use: No   • Drug use: No   • Sexual activity: Not on file   Lifestyle   • Physical activity     Days per week: Not on file     Minutes per session: Not on file   • Stress: Not on file  "  Relationships   • Social connections     Talks on phone: Not on file     Gets together: Not on file     Attends Yazdanism service: Not on file     Active member of club or organization: Not on file     Attends meetings of clubs or organizations: Not on file     Relationship status: Not on file   • Intimate partner violence     Fear of current or ex partner: Not on file     Emotionally abused: Not on file     Physically abused: Not on file     Forced sexual activity: Not on file   Other Topics Concern   • Not on file   Social History Narrative   • Not on file       ALLERGIES:  Allergies   Allergen Reactions   • Allegra [Fexofenadine] Unspecified     Rxn = unknown   • Amoxicillin    • Azithromycin Unspecified     Rxn = unknown   • Claritin    • Erythromycin Unspecified     Rxn = unknown   • Ibuprofen & Caffeine-Vitamins Unspecified     Rxn = unknown   • Penicillins Unspecified     Rxn = unknown   • Procardia [Nifedipine]    • Rosemary Oil Anaphylaxis     Throat starts to close/swell.  Oil and the herb.    • Zyrtec [Cetirizine] Unspecified     Rxn = unknown       Review of systems:  A complete review of symptoms was reviewed with patient. This is reviewed in H&P and PMH. ALL OTHERS reviewed and negative    Physical exam:  Patient Vitals for the past 24 hrs:   BP Temp Temp src Pulse Resp SpO2 Height Weight   04/16/20 1542 -- -- -- 60 18 96 % -- --   04/16/20 1136 137/76 36.5 °C (97.7 °F) Temporal (!) 51 18 97 % -- --   04/16/20 0742 131/70 36.5 °C (97.7 °F) Temporal (!) 55 18 95 % -- --   04/16/20 0601 157/96 36.5 °C (97.7 °F) Temporal 68 18 97 % 1.753 m (5' 9\") 122.7 kg (270 lb 8.1 oz)   04/16/20 0400 (!) 169/87 -- -- 68 20 99 % -- --   04/16/20 0300 158/83 -- -- 62 18 96 % -- --   04/16/20 0235 (!) 168/87 -- -- 65 18 98 % -- --   04/16/20 0210 (!) 177/95 36.7 °C (98.1 °F) Temporal 63 (!) 22 99 % -- --   04/16/20 0208 -- -- -- -- -- -- 1.753 m (5' 9\") 114.8 kg (253 lb)     General: No acute distress.   EYES: no " jaundice  HEENT: OP clear   Neck: No bruits No JVD.   CVS:  RRR. S1 + S2. No M/R/G. trace edema.  Resp: CTAB. No wheezing or crackles/rhonchi.  Abdomen: Soft, NT, ND,  Skin: Grossly nothing acute no obvious rashes  Neurological: Alert, Moves all extremities, no cranial nerve defects on limited exam  Extremities: Pulse 2+ in b/l LE. No cyanosis.     Data:  Laboratory studies personally reviewed by me:  Recent Results (from the past 24 hour(s))   HEPATITIS PANEL ACUTE(4 COMPONENTS)    Collection Time: 04/17/20 10:55 AM   Result Value Ref Range    Hepatitis B Surface Antigen Non-Reactive Non-Reactive    Hepatitis B Cors Ab,IgM Non-Reactive Non-Reactive    Hepatitis A Virus Ab, IgM Non-Reactive Non-Reactive    Hepatitis C Antibody Non-Reactive Non-Reactive   HEP B SURFACE AB    Collection Time: 04/17/20 10:55 AM   Result Value Ref Range    Hep B Surface Antibody Quant <3.50 0.00 - 10.00 mIU/mL       EKG : personally reviewed by me sinus, nonspecific TWI, incomplete LBBB    All pertinent features of laboratory and imaging reviewed including primary images where applicable    TTE 4/16/20  CONCLUSIONS  Compared to the images of the prior study done 2/27/2017, LV is   moderately dilated, EF is severely reduced, grade 3 diastolic   dysfunction present.  Technically challenging study, improved with contrast.  Left ventricle is moderately dilated at 6 cm.  Severely reduced left ventricular systolic function.  Left ventricular ejection fraction is visually estimated to be 30%.  Global hypokinesis with further hypokinesis of the basal to mid   inferior wall and inferior septum.  Grade III diastolic dysfunction (restrictive pattern).  Right ventricle not well visualized, however, appears mildly dilated in   size and reduced in systolic function.  Biatrial enlargement.   Mild to moderate mitral regurgitation.  Mild tricuspid regurgitation.  Estimated right ventricular systolic pressure is 42 mmHg.  Right atrial pressure is  estimated to be 15 mmHg.  Ascending aorta is borderline dilated with a diameter of 3.8 cm.    Nuclear stress spect stephie 4/16/20   NUCLEAR IMAGING INTERPRETATION   No evidence of ischemia.    Inferoapical photopenia is likely related to attenuation artifact.    Mild diffuse hypokinesis with left ventricle ejection fraction of 43%.   Sinus bradycardia, nonspecific intraventricular conduction delay, rare PVCs.   Nondiagnostic ECG portion of a regadenoson nuclear stress test.   ECG INTERPRETATION   Nondiagnostic ECG portion of a regadenoson nuclear stress test.    Active Problems:    Chest pain POA: Yes    Moyamoya POA: Yes      Overview: IMO load March 2020    Obesity (BMI 30-39.9) POA: Yes    Acute-on-chronic kidney injury (HCC) POA: Yes    Acute on chronic systolic heart failure (HCC) POA: Yes      Overview: 1/29/2014: EF 45-50%    HLD (hyperlipidemia) POA: Yes    HTN (hypertension) POA: Yes    H/O: CVA (cerebrovascular accident) POA: Yes    Seizure disorder, grand mal (HCC) POA: Yes  Resolved Problems:    * No resolved hospital problems. *      Assessment / Plan:    50 year old woman with moyamoya, hx cva, kenny on ckd, HTN, HLD, former +40 pack year smoker presents with chest pain, orthopnea, dyspnea, leg swelling, and weight gain found HFrEF 30%, pHTN, RVF.    -HF-OMT  -appreciate nephro input. Please make cards aware when patient can tolerate ARB  -for now titrate hydralazine/isordil  -when euvolemic will check R/LHC  -continue aspirin and statin    I personally discussed her case with Dr Calderón    It is my pleasure to participate in the care of Ms. Acevedo.  Please do not hesitate to contact me with questions or concerns.    Jared Palma MD  Cardiologist [facility]   No

## 2020-10-12 NOTE — RESPIRATORY CARE
COPD EDUCATION by COPD CLINICAL EDUCATOR  5/31/2020 at 6:59 AM by Rosalee Ortiz, RRT     Patient reviewed by COPD education team. Patient does not have a history or diagnosis of COPD and is a non-smoker, therefore does not qualify for the COPD program.  
How to get your Coronavirus (COVID-19) Testing Results:   Please be advised that you were tested for the coronavirus (COVID-19) in the Emergency Department at [facility].  You are to maintain self-quarantine procedures for 14 days until instructed otherwise by one of our healthcare agents. Please note that the test may take up to 2-4 days to result.  If you do not hear from us within 72 hours and you'd like to check on your results, you can call on of our coronavirus specialists at [address] [place] [address] (available 24/7).  Please DO NOT call the site where you received the test to obtain your results. ~Alexis Lopez PA-C

## 2021-03-15 NOTE — PROGRESS NOTES
A/OX4. Pt ambulates steady with FWW, educated patient to not bear any weight on right foot. Wound vac in place, draining accurately, will be changed today. Pt educated about repeat I&D happening tomorrow, as well as the need to be NPO at midnight on. Pt is on RA and stating within normal limits. No c/o pain at this time, -N/T, -N/V, no chest pain and no SOB. Morning med pass done at this time. Hourly rounding set in place, call light within reach, be din lowest position.   Glycopyrrolate Counseling:  I discussed with the patient the risks of glycopyrrolate including but not limited to skin rash, drowsiness, dry mouth, difficulty urinating, and blurred vision.

## 2022-12-01 NOTE — OR NURSING
CXR reviewed by Dr. Duval; natan to use right IJ.    For CT scan: Please call [facility] at [phone] to schedule your imaging     For pulmonary function test: Call [phone]

## 2023-08-01 NOTE — ED NOTES
Date of Service: 07/31/2023    REFERRING PHYSICIAN:    Dr. Ron Leung. PROCEDURE:    1.  Lumbar radiofrequency ablation performed on the left side only. This was performed at 3 lower lumbar levels (L3-L4, L4-L5 and L5-S1). 2.  Fluoroscopic guidance. 3.  Intravenous sedation (45 minutes). PREPROCEDURE DIAGNOSES:    1. Lumbar facet arthropathy. 2.  Lumbosacral facet arthropathy. 3.  Low back pain (left sided only). HISTORY OF PRESENT ILLNESS:    The patient is a very pleasant 78-year-old female seen for evaluation and treatment of left sided low back pain. There is no significant radicular component. Diagnostic medial branch blocks in the area resulted in near total resolution of her symptoms. She thus returns for lumbar radiofrequency ablation in hopes of obtaining long-term pain relief. Today's procedure along with risks and possible complications were fully discussed and reviewed. She consents. DESCRIPTION OF PROCEDURE:   The patient was brought to our fluoroscopy suite and placed in prone position. Intravenous line was started. She was mildly sedated during the procedure with Midazolam and Fentanyl. She remained hemodynamically stable. She remained responsive to verbal command. She was monitored throughout with noninvasive blood pressure cuff, pulse oximetry, and EKG. She was given supplemental oxygen by nasal cannula to maintain oxygen saturation 97% or greater. Sterile prep and drape of the area was then performed (DuraPrep). Local infiltration of 1% Lidocaine was provided. Our radiofrequency needles (100 mm length with 4 mm active tip) were then placed under fluoroscopic guidance. Needles were placed just below the junction of the superior articular process and the transverse process at the L3, L4 and L5 vertebral body levels, as well as at the sacral ala. This was performed on the left side only.   AP, lateral and oblique views revealed excellent needle placement at Pt given socks and assisted to and from bathroom. Pt placed back on all monitors.   each site. Each area was infiltrated with 2-3 mL of 1% Lidocaine. This was followed by radiofrequency ablation performed for 2 minutes and 30 seconds with a 30 second ramp time and 2 minute ablative time, during which temperature of the surrounding tissue was held constant at 80 degrees centigrade or greater. Needles were withdrawn slightly and readvanced to an area directly adjacent to our initial site. A second radiofrequency ablation was performed for 2 minutes and 30 seconds with a 30 second ramp time and 2 minute ablative time with the above-mentioned parameters. Prior to needle withdrawal, each area was infiltrated with 2 mL of 1% Lidocaine along with 4 mg of Dexamethasone. Needles were then removed without difficulty. The patient was placed in supine position and observed. She was discharged home in excellent condition. She will call with any questions or problems. Thank you for allowing me to participate in this pleasant patient's care. I am very pleased with her response thus far. We will keep you informed of her progress. There were no complications. There were no tissue specimens removed. There was no blood loss.       Dictated By: Townsend Sicard, MD  Signing Provider: Townsend Sicard, MD RCL/chante ([id number])   DD: 07/31/2023 1:35:01 PM TD: 07/31/2023 9:17:58 PM

## 2023-09-25 NOTE — PROGRESS NOTES
Hospital Medicine Interval Note  Date of Service:  3/12/2017    Chief Complaint  46 y.o.-year-old female admitted 2/26/2017 with foot infection    Interval Problem Update  Pt resting comfortably.  No acute issues or complaints overnight.  P walking halls with offloading boot with no difficulties    Disposition  Clinical     Review of Systems   Constitutional: Negative for fever and chills.   Respiratory: Negative for cough and shortness of breath.    Cardiovascular: Negative for chest pain.   Gastrointestinal: Negative for nausea, vomiting, diarrhea and constipation.   Psychiatric/Behavioral: Negative for depression. The patient is nervous/anxious.       Physical Exam Laboratory/Imaging   Filed Vitals:    03/11/17 1600 03/11/17 2000 03/12/17 0400 03/12/17 0800   BP: 140/69 142/67 151/72 146/75   Pulse: 62 87 65 62   Temp: 36.6 °C (97.9 °F) 36.9 °C (98.4 °F) 36.4 °C (97.5 °F) 36.3 °C (97.3 °F)   Resp: 16 17 18 18   Height:       Weight:       SpO2: 93% 97% 100% 94%     Physical Exam   Constitutional: She appears well-developed and well-nourished.   HENT:   Head: Normocephalic.   Eyes: Conjunctivae are normal.   Cardiovascular: Normal rate and regular rhythm.    Pulmonary/Chest: Effort normal. No respiratory distress. She has no wheezes.   Abdominal: Soft. Bowel sounds are normal. She exhibits no distension. There is no tenderness.   Musculoskeletal: Normal range of motion.   Right foot wrapped and protective boot on   Lymphadenopathy:        Right: No supraclavicular adenopathy present.        Left: No supraclavicular adenopathy present.   Neurological: She is alert.   Skin: Skin is warm and dry. Erythema: right foot bandage.   Vitals reviewed.   Lab Results   Component Value Date/Time    WBC 9.5 03/10/2017 02:55 AM    HEMOGLOBIN 10.3* 03/10/2017 02:55 AM    HEMATOCRIT 32.1* 03/10/2017 02:55 AM    PLATELET COUNT 329 03/10/2017 02:55 AM     Lab Results   Component Value Date/Time    SODIUM 140 03/11/2017 03:00 AM     POTASSIUM 4.1 03/11/2017 03:00 AM    CHLORIDE 108 03/11/2017 03:00 AM    CO2 23 03/11/2017 03:00 AM    GLUCOSE 90 03/11/2017 03:00 AM    BUN 22 03/11/2017 03:00 AM    CREATININE 2.04* 03/11/2017 03:00 AM      Assessment/Plan    Cellulitis of right foot  Assessment & Plan  Active Orders     Ordered     Ordering Provider       Wed Mar 1, 2017  1:56 PM    03/01/17 1356  cefUROXime (ZINACEF) 750 mg in NS 50 mL IVPB   EVERY 8 HOURS      Ivy Gutierrez M.D.      ID recs appreciated  Ortho recs appreciated  Ortho I&D'd 2/27, 3/5/2017, and 3/9/17 with wound closure  LPS on board; recs appreciated and following  Some non-compliance w/ abx in past   Tunneled IJ placed  Cont wound care and abx as above  IV abx for 6 weeks from 3/9    Villavicencio villavicencio disease  Assessment & Plan  Psych consulted  Discussed with family  Wander guard    CKD stage 4 secondary to hypertension (CMS-[facility])  Assessment & Plan  Baseline Cr 2-3-range, monitoring  Cont to encourage PO hydration  Avoiding nephrotoxics: NSAIDs etc  nephro signed off    Anemia  Assessment & Plan  Hgb stable     Essential hypertension, benign  Assessment & Plan  SBP goal less than 140 mmHg  DBP goal less than 90 mmHg  PRN and antihypertensives to titrate by hospitalist towards goal    Dilated cardiomyopathy (CMS-HCC)  Assessment & Plan  Medically managed    HLD (hyperlipidemia)  Assessment & Plan  Statin    Seizure disorder, grand mal (CMS-[facility])  Assessment & Plan  Dilantin       Labs reviewed and Medications reviewed        DVT Prophylaxis: Heparin    Ulcer prophylaxis: Not indicated                  No

## 2024-04-20 NOTE — PROGRESS NOTES
"Infectious Disease Progress Note    Author: BRENDA Mesa    DOS & Time created: 3/6/2017  11:54 AM    Chief Complaint   Patient presents with   • Wound Infection     pt has wound on right foot, went to [facility], report cellutliis with drainage on wound     Interval History:  46-year-old white female with history of morbid obesity,  Moyamoya syndrome with prior stroke, who presents with a traumatic injury to her right foot with subsequent development of cellulitis and abscess.  S/p repeat I&D with 2nd and 3rd metatarsal head excisions and GSR on 3/5.     AF WBC 13.3 s/p drainage abscess-has been refusing IV  3/1- willing to take iv. No fevers. C/o pain. Apparently walking on the foot  3/2- says she is feeling better. No fevers.  3/3- in good spirits. No fevers.   3/4 AF WBC 9.9 foot worse-denies pain  3/5 AF no CBC NPO for surgery. No new complaints  3/6 AF, WBC 8.6.  Minimal pain to Right foot, complains that \"boot is heavy.\"  Labs Reviewed, Medications Reviewed, Radiology Reviewed and Wound Reviewed.    Review of Systems:  Review of Systems   Constitutional: Negative for fever and chills.   Respiratory: Positive for shortness of breath.    Cardiovascular: Negative for chest pain.   Gastrointestinal: Negative for nausea, vomiting, abdominal pain and diarrhea.   Musculoskeletal: Positive for myalgias and joint pain.        Foot pain- improving   Skin: Negative for rash.   Neurological: Negative for headaches.       Hemodynamics:  Temp (24hrs), Av.7 °C (98.1 °F), Min:36.4 °C (97.6 °F), Max:36.9 °C (98.4 °F)  Temperature: 36.9 °C (98.4 °F)  Pulse  Av.9  Min: 52  Max: 82Heart Rate (Monitored): 64  Blood Pressure: 158/84 mmHg, NIBP: 139/71 mmHg       Physical Exam:  Physical Exam   Constitutional: She is oriented to person, place, and time. She appears well-developed. No distress.   Obese   HENT:   Head: Normocephalic and atraumatic.   Eyes: EOM are normal. Pupils are equal, round, and reactive " to light.   Neck: Neck supple.   Right IJ triple lumen- tender to touch, minimal erythema and bruising   Cardiovascular: Normal rate and regular rhythm.    No murmur heard.  Pulmonary/Chest: Effort normal and breath sounds normal. No respiratory distress. She has no wheezes. She has no rales.   Abdominal: Soft. Bowel sounds are normal. She exhibits no distension. There is no tenderness.   Musculoskeletal: She exhibits tenderness.   Right foot- DM walking boot, original surgical bandage in place with W[place].   Neurological: She is alert and oriented to person, place, and time.   Skin: Skin is warm and dry. No rash noted.   Psychiatric:   Poor insight   Nursing note and vitals reviewed.      Labs:  Recent Labs      03/06/17   0250   WBC  8.6   RBC  2.98*   HEMOGLOBIN  9.5*   HEMATOCRIT  29.0*   MCV  97.3   MCH  31.9   RDW  47.3   PLATELETCT  302   MPV  9.2     Recent Labs      03/06/17   0250   SODIUM  142   POTASSIUM  3.7   CHLORIDE  110   CO2  25   GLUCOSE  78   BUN  30*     Recent Labs      03/06/17   0250   CREATININE  2.40*        Imaging:       CT-FOOT W/O PLUS RECONS RIGHT (Final result) Result time: 02/27/17 00:30:10     Final result by Gee Tam M.D. (02/27/17 00:30:10)     Impression:     1.  Soft tissue swelling with gas in the distal plantar aspect of the foot at the level of the MTP joints with draining wound to the plantar surface of the skin.  2.  Minimal gas in the extensor aspect of the foot at the level of the MTP joints which may represent extension of cellulitis anteriorly.  3.  No loculated fluid collection identified.  4.  Diffuse soft tissue swelling in the remainder of the foot.  5.  No acute bony abnormality. No evidence of osteomyelitis. No radiopaque soft tissue foreign body identified     MR-FOOT-W/O RIGHT (Final result) Result time: 02/28/17 11:19:53     Final result by Aureliano Flores M.D. (02/28/17 11:19:53)     Impression:     Forefoot bilobed abscess with apparent plantar  draining sinus. Largest loculation is in the dorsal soft tissues as discussed above  No unenhanced MR evidence of osteomyelitis  Metatarsophalangeal joint effusions most likely are reactive given lack of bony destruction/marginal erosive change     Final result by Rad Intf (02/27/17 11:53:22)     Narrative:     TransthoracicEcho Report  CONCLUSIONS  No prior study is available for comparison.   Normal left ventricular size, thickness, systolic function, and   diastolic function.  Left ventricular ejection fraction is visually estimated to be 65%.  Normal right ventricular size and systolic function.  Trace tricuspid regurgitation.  Normal pericardium without effusion.     Medications:  Current Facility-Administered Medications   Medication Dose Frequency Provider Last Rate Last Dose   • NS (BOLUS) infusion 500 mL  500 mL PRN Slick Montes M.D.       • fentaNYL (SUBLIMAZE) injection 12.5-50 mcg  12.5-50 mcg PRN Slick Montes M.D.   50 mcg at 03/06/17 1106   • midazolam (VERSED) injection 0.5-2 mg  0.5-2 mg PRN Slick Montes M.D.   2 mg at 03/06/17 1106   • ondansetron (ZOFRAN) syringe/vial injection 4 mg  4 mg PRN Slick Montes M.D.       • FENTANYL CITRATE 0.05 MG/ML INJ SOLN           • cefUROXime (ZINACEF) 750 mg in NS 50 mL IVPB  750 mg Q8HR Ivy Gutierrez M.D.   Stopped at 03/06/17 0542   • haloperidol lactate (HALDOL) injection 5 mg  5 mg Q4HRS PRN Nabeel Monahan M.D.   5 mg at 03/02/17 0156   • oxcarbazepine (TRILEPTAL) tablet 150 mg  150 mg BID Lynn Carpenter M.D.   150 mg at 03/05/17 2131   • diphenoxylate-atropine (LOMOTIL) 2.5-0.025 MG per tablet 1 Tab  1 Tab 4X/DAY PRN Shazia Valle M.D.   1 Tab at 03/01/17 1125   • heparin injection 5,000 Units  5,000 Units Q8HRS Augustus Guerin M.D.   Stopped at 03/05/17 0600   • lorazepam (ATIVAN) tablet 1 mg  1 mg TID Augustus Guerin M.D.   1 mg at 03/06/17 0248   • sodium bicarbonate (SODIUM BICARBONATE) tablet 1,300 mg  1,300 mg TID Evelia BROWN  RIOS Yepez   1,300 mg at 03/06/17 0911   • oxycodone immediate-release (ROXICODONE) tablet 5 mg  5 mg Q6HRS PRN Evelia Yepez M.D.   5 mg at 03/06/17 0105   • amlodipine (NORVASC) tablet 5 mg  5 mg DAILY Chalino Bender M.D.   5 mg at 03/06/17 0912   • aspirin (ASA) tablet 325 mg  325 mg DAILY Chalino Bender M.D.   325 mg at 03/06/17 0910   • atorvastatin (LIPITOR) tablet 40 mg  40 mg Nightly Chalino Bender M.D.   40 mg at 03/05/17 2132   • carvedilol (COREG) tablet 25 mg  25 mg BID WITH MEALS Chalino Bender M.D.   25 mg at 03/06/17 0909   • cyanocobalamin (VITAMIN B-12) tablet 1,000 mcg  1,000 mcg DAILY Chalino Bender M.D.   1,000 mcg at 03/06/17 0911   • folic acid (FOLVITE) tablet 1 mg  1 mg DAILY Chalino Bender M.D.   1 mg at 03/06/17 0911   • phenytoin ER (DILANTIN) capsule 200 mg  200 mg BID Chalino Bender M.D.   200 mg at 03/06/17 0911   • docusate sodium (COLACE) capsule 100 mg  100 mg BID Chalino Bender M.D.   Stopped at 03/05/17 0900   • bisacodyl (DULCOLAX) suppository 10 mg  10 mg Q24HRS PRN Chalino Bender M.D.       • fleet enema 133 mL  1 Each Once PRN Chalino Bender M.D.       • Respiratory Care per Protocol   Continuous RT Chalino Bender M.D.       • ondansetron (ZOFRAN) syringe/vial injection 4 mg  4 mg Q4HRS PRN Chalino Bender M.D.       • ondansetron (ZOFRAN ODT) dispertab 4 mg  4 mg Q4HRS PRN Chalino Bender M.D.       • promethazine (PHENERGAN) tablet 12.5-25 mg  12.5-25 mg Q4HRS PRN Chalino Bender M.D.       • promethazine (PHENERGAN) suppository 12.5-25 mg  12.5-25 mg Q4HRS PRN Chalino Bender M.D.       • prochlorperazine (COMPAZINE) injection 5-10 mg  5-10 mg Q4HRS PRN Chalino Bender M.D.       • acetaminophen (TYLENOL) tablet 650 mg  650 mg Q6HRS PRN Chalino Bender M.D.   650 mg at 03/05/17 2131   • NS (BOLUS) infusion 500 mL  500 mL Once PRN Chalino Bender M.D.         Last reviewed on 2/26/2017 11:24 PM by  "Claudia Abebe, T    Micro:  Results     Procedure Component Value Units Date/Time    GRAM STAIN [[id number]] Collected:  03/05/17 1302    Order Status:  Completed Specimen Information:  Tissue Updated:  03/05/17 2013     Significant Indicator .      Source TISS      Site Right Foot Tissue      Gram Stain Result --      Result:        Few WBCs.  Moderate Gram positive cocci.      GRAM STAIN [[id number]] Collected:  03/05/17 1307    Order Status:  Completed Specimen Information:  Bone Updated:  03/05/17 2013     Significant Indicator .      Source BONE      Site Right Metatarsal Heads      Gram Stain Result --      Result:        Rare WBCs.  No organisms seen.      CULTURE TISSUE W/ GRM STAIN [[id number]] Collected:  03/05/17 1307    Order Status:  Completed Specimen Information:  Other Updated:  03/05/17 1359    ANAEROBIC CULTURE [[id number]] Collected:  03/05/17 1307    Order Status:  Completed Specimen Information:  Other Updated:  03/05/17 1359    CULTURE TISSUE W/ GRM STAIN [[id number]] Collected:  03/05/17 1302    Order Status:  Completed Specimen Information:  Other Updated:  03/05/17 1358    ANAEROBIC CULTURE [[id number]] Collected:  03/05/17 1302    Order Status:  Completed Specimen Information:  Other Updated:  03/05/17 1358    BLOOD CULTURE [[id number]] Collected:  02/26/17 2330    Order Status:  Completed Specimen Information:  Blood from Peripheral Updated:  03/04/17 0100     Significant Indicator NEG      Source BLD      Site PERIPHERAL      Blood Culture No growth after 5 days of incubation.     Narrative:      Per Hospital Policy: Only change Specimen Src: to \"Line\" if  specified by physician order.    BLOOD CULTURE [[id number]] Collected:  02/26/17 2230    Order Status:  Completed Specimen Information:  Blood from Peripheral Updated:  03/04/17 0100     Significant Indicator NEG      Source BLD      Site PERIPHERAL      Blood Culture No growth after 5 days of incubation.     Narrative:      Per Hospital " "Policy: Only change Specimen Src: to \"Line\" if  specified by physician order.    ANAEROBIC CULTURE [[id number]] Collected:  02/27/17 1243    Order Status:  Completed Specimen Information:  Tissue Updated:  03/02/17 1455     Significant Indicator NEG      Source TISS      Site Right Foot Abscess      Anaerobic Culture, Culture Res No Anaerobes isolated.     CULTURE TISSUE W/ GRM STAIN [[id number]]  (Abnormal) Collected:  02/27/17 1243    Order Status:  Completed Specimen Information:  Tissue Updated:  03/02/17 1455     Gram Stain Result -- (A)      Result:        Many WBCs.  Many Gram positive cocci.       Significant Indicator POS (POS)      Source TISS      Site Right Foot Abscess      Tissue Culture -- (A)      Tissue Culture -- (A)      Result:        Staphylococcus aureus  Heavy growth  See previous culture for sensitivity report.       Tissue Culture -- (A)      Result:        Escherichia coli  Rare growth  See previous culture for sensitivity report.       Tissue Culture -- (A)      Result:        Viridans Streptococcus  Moderate growth  Mixed morphologies       Tissue Culture -- (A)      Result:        Beta Streptococcus Group G  Light growth      CULTURE WOUND W/ GRAM STAIN [[id number]]  (Abnormal) Collected:  02/27/17 1015    Order Status:  Completed Specimen Information:  Wound from Left Foot Updated:  03/01/17 0949     Gram Stain Result --      Result:        Many WBCs.  Many Gram positive cocci.       Significant Indicator POS (POS)      Source WND      Site LEFT FOOT      Culture Result Wound Moderate growth Mixed skin shawn. (A)      Culture Result Wound -- (A)      Result:        Staphylococcus aureus  Heavy growth  See previous culture for sensitivity report.       Culture Result Wound -- (A)      Result:        Escherichia coli  Rare growth  See previous culture for sensitivity report.      Narrative:      Collected By:[id number] FRANCES MIX    CULTURE WOUND W/ GRAM STAIN [[id number]]  (Abnormal)  " (Susceptibility) Collected:  02/26/17 3828    Order Status:  Completed Specimen Information:  Wound from Right Foot Updated:  03/01/17 0946     Gram Stain Result --      Result:        Moderate WBCs.  Many Gram positive cocci.       Significant Indicator POS (POS)      Source WND      Site RIGHT FOOT      Culture Result Wound Moderate growth Mixed skin shawn. (A)      Culture Result Wound -- (A)      Result:        Staphylococcus aureus  Moderate growth       Culture Result Wound -- (A)      Result:        Escherichia coli  Rare growth      Culture & Susceptibility     ESCHERICHIA COLI     Antibiotic Sensitivity Microscan Unit Status    Ampicillin Sensitive <=8 mcg/mL Final    Cefepime Sensitive <=8 mcg/mL Final    Cefotaxime Sensitive <=2 mcg/mL Final    Cefotetan Sensitive <=16 mcg/mL Final    Ceftazidime Sensitive <=1 mcg/mL Final    Ceftriaxone Sensitive <=8 mcg/mL Final    Cefuroxime Sensitive <=4 mcg/mL Final    Ciprofloxacin Sensitive <=1 mcg/mL Final    Ertapenem Sensitive <=1 mcg/mL Final    Gentamicin Sensitive <=4 mcg/mL Final    Pip/Tazobactam Sensitive <=16 mcg/mL Final    Tigecycline Sensitive <=2 mcg/mL Final    Tobramycin Sensitive <=4 mcg/mL Final    Trimeth/Sulfa Sensitive <=2/38 mcg/mL Final              STAPHYLOCOCCUS AUREUS     Antibiotic Sensitivity Microscan Unit Status    Ampicillin/sulbactam Sensitive <=8/4 mcg/mL Final    Clindamycin Sensitive <=0.5 mcg/mL Final    Daptomycin Sensitive <=0.5 mcg/mL Final    Erythromycin Sensitive <=0.5 mcg/mL Final    Moxifloxacin Sensitive <=0.5 mcg/mL Final    Oxacillin Sensitive <=0.25 mcg/mL Final    Tetracycline Sensitive <=4 mcg/mL Final    Trimeth/Sulfa Sensitive <=0.5/9.5 mcg/mL Final    Vancomycin Sensitive 2 mcg/mL Final                       GRAM STAIN [[id number]] Collected:  02/27/17 1243    Order Status:  Completed Specimen Information:  Tissue Updated:  02/27/17 7464     Significant Indicator .      Source TISS      Site Right Foot Abscess      " Gram Stain Result --      Result:        Many WBCs.  Many Gram positive cocci.      GRAM STAIN [[id number]] Collected:  02/27/17 1015    Order Status:  Completed Specimen Information:  Wound Updated:  02/27/17 1743     Significant Indicator .      Source WND      Site LEFT FOOT      Gram Stain Result --      Result:        Many WBCs.  Many Gram positive cocci.      Narrative:      Collected By:[id number] FRANCES MIX    CDIFF BY PCR [[id number]] Collected:  02/27/17 1130    Order Status:  Completed Specimen Information:  Stool from Stool Updated:  02/27/17 1347     C Diff by PCR Negative      027-NAP1-BI Presumptive Negative      Comment: Presumptive 027/NAP1/BI target DNA sequences are NOT DETECTED.       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  C/s are MSSA,E coli, strep viridans, group C strep  Continue IV Zinacef  Due to worsening s/p repeat I&D with 2nd and 3rd metatarsal head excisions and GSR on 3/5 by Dr. Lynn.    OR cx sent of tissue and bone- pending.  Plan for return " to OR in 3-4 days for repeat I&D with W[place] change vs delayed primary closure.  Vascular studies normal  Continue wound care    Chronic renal failure- 2.4  Avoid nephrotoxic drugs  Adjusting abx for renal function    Moyamoya syndrome with prior stroke/dementia  Noncompliant with abx  Barrier to care    Remains at risk for limb loss     2weeks

## 2025-07-14 NOTE — PROGRESS NOTES
Pt attempted to leave without IV removal or discharge instructions.   Security was called & intercepted patient in lobby.   This RN was able to remove IV & give discharge paperwork to patient.  New prescriptions electronically sent to pt pharmacy.  Called patient's mother who was also picking her up to notify of the situation.  Pt escorted from [facility] entrance to ER entrance via w/c.     Alert-The patient is alert, awake and responds to voice. The patient is oriented to time, place, and person. The triage nurse is able to obtain subjective information.

## (undated) DEVICE — KIT ROOM DECONTAMINATION

## (undated) DEVICE — LEAD SET 6 DISP. EKG NIHON KOHDEN

## (undated) DEVICE — SUTURE 2-0 MONOCRYL CT-1

## (undated) DEVICE — SET EXTENSION WITH 2 PORTS (48EA/CA) ***PART #2C8610 IS A SUBSTITUTE*****

## (undated) DEVICE — PAD PREP 24 X 48 CUFFED - (100/CA)

## (undated) DEVICE — KIT ANESTHESIA W/CIRCUIT & 3/LT BAG W/FILTER (20EA/CA)

## (undated) DEVICE — TUBING CLEARLINK DUO-VENT - C-FLO (48EA/CA)

## (undated) DEVICE — SLEEVE, VASO, THIGH, MED

## (undated) DEVICE — WATER IRRIG. STER. 1500 ML - (9/CA)

## (undated) DEVICE — SENSOR SPO2 NEO LNCS ADHESIVE (20/BX) SEE USER NOTES

## (undated) DEVICE — BLADE SAGITTAL SAW 9.4MM X 25.5MM X .4MM FINE TOOTH (1/EA)

## (undated) DEVICE — LEAD SET 6 DISP. EKG NIHON KOHDEN (100EA/CA)

## (undated) DEVICE — SUCTION INSTRUMENT YANKAUER BULBOUS TIP W/O VENT (50EA/CA)

## (undated) DEVICE — SUTURE GENERAL

## (undated) DEVICE — AQUACEL 4X4

## (undated) DEVICE — VAC CANISTER W/GEL 500ML - FITS NEW MACHINES (10EA/CA)

## (undated) DEVICE — GLOVE BIOGEL ECLIPSE PF LATEX SIZE 7.5

## (undated) DEVICE — PACK LOWER EXTREMITY - (2/CA)

## (undated) DEVICE — BLOCK

## (undated) DEVICE — HEAD HOLDER JUNIOR/ADULT

## (undated) DEVICE — PADDING CAST 4 IN X 4 YDS - SOF-ROLL (12RL/BG 6BG/CT)

## (undated) DEVICE — LACTATED RINGERS INJ 1000 ML - (14EA/CA 60CA/PF)

## (undated) DEVICE — PADDING CAST 6 IN STERILE - 6 X 4 YDS (24/CA)

## (undated) DEVICE — GOWN WARMING STANDARD FLEX - (30/CA)

## (undated) DEVICE — MASK ANESTHESIA ADULT  - (100/CA)

## (undated) DEVICE — TRAY MULTI-LUMEN 7FR PRESSURE W/MAX BARRIER AND BIOPATCH - (5/CA)

## (undated) DEVICE — SUTURE 3-0 ETHILON FS-1 - (36/BX) 30 INCH

## (undated) DEVICE — SPLINT PLASTER 5 IN X 30 IN - (50EA/BX 6BX/CA)

## (undated) DEVICE — GLOVE BIOGEL SZ 6.5 SURGICAL PF LTX (50PR/BX 4BX/CA)

## (undated) DEVICE — ELECTRODE 850 FOAM ADHESIVE - HYDROGEL RADIOTRNSPRNT (50/PK)

## (undated) DEVICE — GLOVE BIOGEL INDICATOR SZ 8.5 SURGICAL PF LTX - (50/BX 4BX/CA)

## (undated) DEVICE — NEPTUNE 4 PORT MANIFOLD - (20/PK)

## (undated) DEVICE — DRESSING, WOUND VAC MED.

## (undated) DEVICE — PAD LAP STERILE 18 X 18 - (5/PK 40PK/CA)

## (undated) DEVICE — SUTURE 1 VICRYL PLUS CTX - 36 INCH (36/BX)

## (undated) DEVICE — TRAY SRGPRP PVP IOD WT PRP - (20/CA)

## (undated) DEVICE — SUTURE 0 VICRYL PLUS CT-1 - 8 X 18 INCH (12/BX)

## (undated) DEVICE — DRAPE IOBAN II INCISE 23X17 - (10EA/BX 4BX/CA)

## (undated) DEVICE — CHLORAPREP 26 ML APPLICATOR - ORANGE TINT(25/CA)

## (undated) DEVICE — LEAD SET 6 DISP. EKG NIHON KOHDEN (100EA/CA) [9859].

## (undated) DEVICE — CANISTER SUCTION 3000ML MECHANICAL FILTER AUTO SHUTOFF MEDI-VAC NONSTERILE LF DISP  (40EA/CA)

## (undated) DEVICE — TOURNIQUET CUFF 34 X 4 ONE PORT DISP - STERILE (10/BX)

## (undated) DEVICE — GLOVE BIOGEL PI INDICATOR SZ 7.0 SURGICAL PF LF - (50/BX 4BX/CA)

## (undated) DEVICE — GLOVE BIOGEL SZ 7.5 SURGICAL PF LTX - (50PR/BX 4BX/CA)

## (undated) DEVICE — GLOVE BIOGEL INDICATOR SZ 8 SURGICAL PF LTX - (50/BX 4BX/CA)

## (undated) DEVICE — ELECTRODE DUAL RETURN W/ CORD - (50/PK)

## (undated) DEVICE — GLOVE BIOGEL INDICATOR SZ 6.5 SURGICAL PF LTX - (50PR/BX 4BX/CA)

## (undated) DEVICE — BLADE SURGICAL #15 - (50/BX 3BX/CA)

## (undated) DEVICE — STOCKINET BIAS 6 IN STERILE - (20/CA)

## (undated) DEVICE — GLOVE BIOGEL SZ 8 SURGICAL PF LTX - (50PR/BX 4BX/CA)

## (undated) DEVICE — BANDAGE ELASTIC 4 HONEYCOMB - 4"X5YD LF (20/CA)"

## (undated) DEVICE — PROTECTOR ULNA NERVE - (36PR/CA)

## (undated) DEVICE — SET IRRIGATION CYSTOSCOPY TUBE L80 IN (20EA/CA)

## (undated) DEVICE — BANDAGE ROLL STERILE BULKEE 4.5 IN X 4 YD (100EA/CA)

## (undated) DEVICE — DRESSING TRANSPARENT FILM TEGADERM 4 X 4.75" (50EA/BX)"

## (undated) DEVICE — WRAP CO-FLEX 4IN X 5YD STERIL - SELF-ADHERENT (18/CA)

## (undated) DEVICE — CONTAINER SPECIMEN BAG OR - STERILE 4 OZ W/LID (100EA/CA)

## (undated) DEVICE — SODIUM CHL IRRIGATION 0.9% 1000ML (12EA/CA)

## (undated) DEVICE — SUTURE 3-0 VICRYL PLUS SH - 8X 18 INCH (12/BX)

## (undated) DEVICE — SUTURE 2-0 VICRYL PLUS CT-1 - 8 X 18 INCH(12/BX)

## (undated) DEVICE — SUTURE 2-0 ETHILON FS - (36/BX) 18 INCH

## (undated) DEVICE — BANDAGE ELASTIC 6 HONEYCOMB - 6X5YD LF (20/CA)"

## (undated) DEVICE — SET LEADWIRE 5 LEAD BEDSIDE DISPOSABLE ECG (1SET OF 5/EA)

## (undated) DEVICE — MASK, LARYNGEAL AIRWAY #4